# Patient Record
Sex: FEMALE | Race: WHITE | NOT HISPANIC OR LATINO | Employment: OTHER | URBAN - METROPOLITAN AREA
[De-identification: names, ages, dates, MRNs, and addresses within clinical notes are randomized per-mention and may not be internally consistent; named-entity substitution may affect disease eponyms.]

---

## 2021-03-19 ENCOUNTER — TELEPHONE (OUTPATIENT)
Dept: FAMILY MEDICINE CLINIC | Facility: CLINIC | Age: 71
End: 2021-03-19

## 2021-03-19 NOTE — TELEPHONE ENCOUNTER
T/c from pts  - both are being seen as np on 4/20 with Dr Lillie Swanson  Inquiring about scheduling a covid vaccine  Have signed up thru mychart but have not gotten a response  Confirmed with manager that we are not able to schedule for pt until they are established with the practice  Advised pt as such

## 2021-04-15 DIAGNOSIS — Z23 ENCOUNTER FOR IMMUNIZATION: ICD-10-CM

## 2021-04-20 ENCOUNTER — APPOINTMENT (OUTPATIENT)
Dept: LAB | Facility: HOSPITAL | Age: 71
End: 2021-04-20
Payer: MEDICARE

## 2021-04-20 ENCOUNTER — OFFICE VISIT (OUTPATIENT)
Dept: FAMILY MEDICINE CLINIC | Facility: CLINIC | Age: 71
End: 2021-04-20
Payer: MEDICARE

## 2021-04-20 VITALS
WEIGHT: 149.6 LBS | HEIGHT: 63 IN | OXYGEN SATURATION: 90 % | HEART RATE: 109 BPM | BODY MASS INDEX: 26.51 KG/M2 | DIASTOLIC BLOOD PRESSURE: 82 MMHG | SYSTOLIC BLOOD PRESSURE: 128 MMHG

## 2021-04-20 DIAGNOSIS — F41.9 ANXIETY AND DEPRESSION: ICD-10-CM

## 2021-04-20 DIAGNOSIS — Z11.59 SCREENING FOR VIRAL DISEASE: ICD-10-CM

## 2021-04-20 DIAGNOSIS — Z13.29 SCREENING FOR THYROID DISORDER: ICD-10-CM

## 2021-04-20 DIAGNOSIS — F40.298 OTHER SPECIFIED PHOBIA: ICD-10-CM

## 2021-04-20 DIAGNOSIS — F33.9 DEPRESSION, RECURRENT (HCC): ICD-10-CM

## 2021-04-20 DIAGNOSIS — F32.A ANXIETY AND DEPRESSION: ICD-10-CM

## 2021-04-20 DIAGNOSIS — Z13.6 SCREENING FOR CARDIOVASCULAR CONDITION: ICD-10-CM

## 2021-04-20 DIAGNOSIS — F41.3 OTHER MIXED ANXIETY DISORDERS: ICD-10-CM

## 2021-04-20 DIAGNOSIS — F17.200 TOBACCO DEPENDENCE: ICD-10-CM

## 2021-04-20 DIAGNOSIS — Z12.31 ENCOUNTER FOR SCREENING MAMMOGRAM FOR MALIGNANT NEOPLASM OF BREAST: ICD-10-CM

## 2021-04-20 DIAGNOSIS — Z76.89 ENCOUNTER TO ESTABLISH CARE WITH NEW DOCTOR: ICD-10-CM

## 2021-04-20 DIAGNOSIS — Z12.11 SCREENING FOR COLON CANCER: Primary | ICD-10-CM

## 2021-04-20 DIAGNOSIS — Z86.69 HISTORY OF SEIZURE DISORDER: ICD-10-CM

## 2021-04-20 DIAGNOSIS — R44.3 HALLUCINATIONS: ICD-10-CM

## 2021-04-20 LAB
ALBUMIN SERPL BCP-MCNC: 4.2 G/DL (ref 3.5–5)
ALP SERPL-CCNC: 134 U/L (ref 46–116)
ALT SERPL W P-5'-P-CCNC: 158 U/L (ref 12–78)
ANION GAP SERPL CALCULATED.3IONS-SCNC: 11 MMOL/L (ref 4–13)
AST SERPL W P-5'-P-CCNC: 66 U/L (ref 5–45)
BASOPHILS # BLD AUTO: 0.07 THOUSANDS/ΜL (ref 0–0.1)
BASOPHILS NFR BLD AUTO: 1 % (ref 0–1)
BILIRUB SERPL-MCNC: 0.23 MG/DL (ref 0.2–1)
BUN SERPL-MCNC: 22 MG/DL (ref 5–25)
CALCIUM SERPL-MCNC: 9.5 MG/DL (ref 8.3–10.1)
CHLORIDE SERPL-SCNC: 104 MMOL/L (ref 100–108)
CHOLEST SERPL-MCNC: 262 MG/DL (ref 50–200)
CO2 SERPL-SCNC: 26 MMOL/L (ref 21–32)
CREAT SERPL-MCNC: 0.84 MG/DL (ref 0.6–1.3)
EOSINOPHIL # BLD AUTO: 0.1 THOUSAND/ΜL (ref 0–0.61)
EOSINOPHIL NFR BLD AUTO: 1 % (ref 0–6)
ERYTHROCYTE [DISTWIDTH] IN BLOOD BY AUTOMATED COUNT: 13.6 % (ref 11.6–15.1)
EST. AVERAGE GLUCOSE BLD GHB EST-MCNC: 120 MG/DL
GFR SERPL CREATININE-BSD FRML MDRD: 72 ML/MIN/1.73SQ M
GLUCOSE P FAST SERPL-MCNC: 108 MG/DL (ref 65–99)
HBA1C MFR BLD: 5.8 %
HCT VFR BLD AUTO: 43.1 % (ref 34.8–46.1)
HDLC SERPL-MCNC: 45 MG/DL
HGB BLD-MCNC: 14.2 G/DL (ref 11.5–15.4)
IMM GRANULOCYTES # BLD AUTO: 0.03 THOUSAND/UL (ref 0–0.2)
IMM GRANULOCYTES NFR BLD AUTO: 0 % (ref 0–2)
LDLC SERPL CALC-MCNC: 159 MG/DL (ref 0–100)
LYMPHOCYTES # BLD AUTO: 2.3 THOUSANDS/ΜL (ref 0.6–4.47)
LYMPHOCYTES NFR BLD AUTO: 31 % (ref 14–44)
MCH RBC QN AUTO: 32.6 PG (ref 26.8–34.3)
MCHC RBC AUTO-ENTMCNC: 32.9 G/DL (ref 31.4–37.4)
MCV RBC AUTO: 99 FL (ref 82–98)
MONOCYTES # BLD AUTO: 0.43 THOUSAND/ΜL (ref 0.17–1.22)
MONOCYTES NFR BLD AUTO: 6 % (ref 4–12)
NEUTROPHILS # BLD AUTO: 4.45 THOUSANDS/ΜL (ref 1.85–7.62)
NEUTS SEG NFR BLD AUTO: 61 % (ref 43–75)
NONHDLC SERPL-MCNC: 217 MG/DL
NRBC BLD AUTO-RTO: 0 /100 WBCS
PLATELET # BLD AUTO: 391 THOUSANDS/UL (ref 149–390)
PMV BLD AUTO: 10.1 FL (ref 8.9–12.7)
POTASSIUM SERPL-SCNC: 3.8 MMOL/L (ref 3.5–5.3)
PROT SERPL-MCNC: 8.6 G/DL (ref 6.4–8.2)
RBC # BLD AUTO: 4.35 MILLION/UL (ref 3.81–5.12)
SODIUM SERPL-SCNC: 141 MMOL/L (ref 136–145)
TRIGL SERPL-MCNC: 291 MG/DL
TSH SERPL DL<=0.05 MIU/L-ACNC: 1.1 UIU/ML (ref 0.36–3.74)
WBC # BLD AUTO: 7.38 THOUSAND/UL (ref 4.31–10.16)

## 2021-04-20 PROCEDURE — 80053 COMPREHEN METABOLIC PANEL: CPT

## 2021-04-20 PROCEDURE — 84443 ASSAY THYROID STIM HORMONE: CPT

## 2021-04-20 PROCEDURE — 36415 COLL VENOUS BLD VENIPUNCTURE: CPT

## 2021-04-20 PROCEDURE — 83036 HEMOGLOBIN GLYCOSYLATED A1C: CPT

## 2021-04-20 PROCEDURE — 86803 HEPATITIS C AB TEST: CPT

## 2021-04-20 PROCEDURE — 99204 OFFICE O/P NEW MOD 45 MIN: CPT | Performed by: STUDENT IN AN ORGANIZED HEALTH CARE EDUCATION/TRAINING PROGRAM

## 2021-04-20 PROCEDURE — 80061 LIPID PANEL: CPT

## 2021-04-20 PROCEDURE — 85025 COMPLETE CBC W/AUTO DIFF WBC: CPT

## 2021-04-20 RX ORDER — QUETIAPINE FUMARATE 400 MG/1
400 TABLET, FILM COATED ORAL
COMMUNITY
Start: 2021-02-09

## 2021-04-20 RX ORDER — GABAPENTIN 100 MG/1
300 CAPSULE ORAL 3 TIMES DAILY
COMMUNITY
Start: 2021-03-09

## 2021-04-20 RX ORDER — ALPRAZOLAM 2 MG/1
2 TABLET ORAL 4 TIMES DAILY
COMMUNITY
Start: 2021-03-24

## 2021-04-20 RX ORDER — QUETIAPINE FUMARATE 100 MG/1
100 TABLET, FILM COATED ORAL EVERY MORNING
COMMUNITY
Start: 2021-04-07

## 2021-04-20 NOTE — ASSESSMENT & PLAN NOTE
Diet and exercise recommended  Do believe that her psych medications are likely contributing to this

## 2021-04-20 NOTE — PATIENT INSTRUCTIONS
Obesity   AMBULATORY CARE:   Obesity  is when your body mass index (BMI) is greater than 30  Your healthcare provider will use your height and weight to measure your BMI  The risks of obesity include  many health problems, such as injuries or physical disability  You may need tests to check for the following:  · Diabetes    · High blood pressure or high cholesterol    · Heart disease    · Gallbladder or liver disease    · Cancer of the colon, breast, prostate, liver, or kidney    · Sleep apnea    · Arthritis or gout    Seek care immediately if:   · You have a severe headache, confusion, or difficulty speaking  · You have weakness on one side of your body  · You have chest pain, sweating, or shortness of breath  Contact your healthcare provider if:   · You have symptoms of gallbladder or liver disease, such as pain in your upper abdomen  · You have knee or hip pain and discomfort while walking  · You have symptoms of diabetes, such as intense hunger and thirst, and frequent urination  · You have symptoms of sleep apnea, such as snoring or daytime sleepiness  · You have questions or concerns about your condition or care  Treatment for obesity  focuses on helping you lose weight to improve your health  Even a small decrease in BMI can reduce the risk for many health problems  Your healthcare provider will help you set a weight-loss goal   · Lifestyle changes  are the first step in treating obesity  These include making healthy food choices and getting regular physical activity  Your healthcare provider may suggest a weight-loss program that involves coaching, education, and therapy  · Medicine  may help you lose weight when it is used with a healthy diet and physical activity  · Surgery  can help you lose weight if you are very obese and have other health problems  There are several types of weight-loss surgery  Ask your healthcare provider for more information      Be successful losing weight:   · Set small, realistic goals  An example of a small goal is to walk for 20 minutes 5 days a week  Anther goal is to lose 5% of your body weight  · Tell friends, family members, and coworkers about your goals  and ask for their support  Ask a friend to lose weight with you, or join a weight-loss support group  · Identify foods or triggers that may cause you to overeat , and find ways to avoid them  Remove tempting high-calorie foods from your home and workplace  Place a bowl of fresh fruit on your kitchen counter  If stress causes you to eat, then find other ways to cope with stress  · Keep a diary to track what you eat and drink  Also write down how many minutes of physical activity you do each day  Weigh yourself once a week and record it in your diary  Eating changes: You will need to eat 500 to 1,000 fewer calories each day than you currently eat to lose 1 to 2 pounds a week  The following changes will help you cut calories:  · Eat smaller portions  Use small plates, no larger than 9 inches in diameter  Fill your plate half full of fruits and vegetables  Measure your food using measuring cups until you know what a serving size looks like  · Eat 3 meals and 1 or 2 snacks each day  Plan your meals in advance  [facility] and eat at home most of the time  Eat slowly  Do not skip meals  Skipping meals can lead to overeating later in the day  This can make it harder for you to lose weight  Talk with a dietitian to help you make a meal plan and schedule that is right for you  · Eat fruits and vegetables at every meal   They are low in calories and high in fiber, which makes you feel full  Do not add butter, margarine, or cream sauce to vegetables  Use herbs to season steamed vegetables  · Eat less fat and fewer fried foods  Eat more baked or grilled chicken and fish  These protein sources are lower in calories and fat than red meat  Limit fast food   Dress your salads with olive oil and vinegar instead of bottled dressing  · Limit the amount of sugar you eat  Do not drink sugary beverages  Limit alcohol  Activity changes:  Physical activity is good for your body in many ways  It helps you burn calories and build strong muscles  It decreases stress and depression, and improves your mood  It can also help you sleep better  Talk to your healthcare provider before you begin an exercise program   · Exercise for at least 30 minutes 5 days a week  Start slowly  Set aside time each day for physical activity that you enjoy and that is convenient for you  It is best to do both weight training and an activity that increases your heart rate, such as walking, bicycling, or swimming  · Find ways to be more active  Do yard work and housecleaning  Walk up the stairs instead of using elevators  Spend your leisure time going to events that require walking, such as outdoor festivals or fairs  This extra physical activity can help you lose weight and keep it off  Follow up with your healthcare provider as directed: You may need to meet with a dietitian  Write down your questions so you remember to ask them during your visits  © Copyright [address] [place] [address] Drive Information is for End User's use only and may not be sold, redistributed or otherwise used for commercial purposes  All illustrations and images included in CareNotes® are the copyrighted property of A D A M , Inc  or [facility] [place] Pasha Young   The above information is an  only  It is not intended as medical advice for individual conditions or treatments  Talk to your doctor, nurse or pharmacist before following any medical regimen to see if it is safe and effective for you  Weight Management   AMBULATORY CARE:   Why it is important to manage your weight:  Being overweight increases your risk of health conditions such as heart disease, high blood pressure, type 2 diabetes, and certain types of cancer   It can also increase your risk for osteoarthritis, sleep apnea, and other respiratory problems  Aim for a slow, steady weight loss  Even a small amount of weight loss can lower your risk of health problems  How to lose weight safely:  A safe and healthy way to lose weight is to eat fewer calories and get regular exercise  · You can lose up about 1 pound a week by decreasing the number of calories you eat by 500 calories each day  You can decrease calories by eating smaller portion sizes or by cutting out high-calorie foods  Read labels to find out how many calories are in the foods you eat  · You can also burn calories with exercise such as walking, swimming, or biking  You will be more likely to keep weight off if you make these changes part of your lifestyle  Exercise at least 30 minutes per day on most days of the week  You can also fit in more physical activity by taking the stairs instead of the elevator or parking farther away from stores  Ask your healthcare provider about the best exercise plan for you  Healthy meal plan for weight management:  A healthy meal plan includes a variety of foods, contains fewer calories, and helps you stay healthy  A healthy meal plan includes the following:     · Eat whole-grain foods more often  A healthy meal plan should contain fiber  Fiber is the part of grains, fruits, and vegetables that is not broken down by your body  Whole-grain foods are healthy and provide extra fiber in your diet  Some examples of whole-grain foods are whole-wheat breads and pastas, oatmeal, brown rice, and bulgur  · Eat a variety of vegetables every day  Include dark, leafy greens such as spinach, kale, kylee greens, and mustard greens  Eat yellow and orange vegetables such as carrots, sweet potatoes, and winter squash  · Eat a variety of fruits every day  Choose fresh or canned fruit (canned in its own juice or light syrup) instead of juice  Fruit juice has very little or no fiber      · Eat low-fat dairy foods  Drink fat-free (skim) milk or 1% milk  Eat fat-free yogurt and low-fat cottage cheese  Try low-fat cheeses such as mozzarella and other reduced-fat cheeses  · Choose meat and other protein foods that are low in fat  Choose beans or other legumes such as split peas or lentils  Choose fish, skinless poultry (chicken or turkey), or lean cuts of red meat (beef or pork)  Before you cook meat or poultry, cut off any visible fat  · Use less fat and oil  Try baking foods instead of frying them  Add less fat, such as margarine, sour cream, regular salad dressing and mayonnaise to foods  Eat fewer high-fat foods  Some examples of high-fat foods include french fries, doughnuts, ice cream, and cakes  · Eat fewer sweets  Limit foods and drinks that are high in sugar  This includes candy, cookies, regular soda, and sweetened drinks  Ways to decrease calories:   · Eat smaller portions  ? Use a small plate with smaller servings  ? Do not eat second helpings  ? When you eat at a restaurant, ask for a box and place half of your meal in the box before you eat  ? Share an entrée with someone else  · Replace high-calorie snacks with healthy, low-calorie snacks  ? Choose fresh fruit, vegetables, fat-free rice cakes, or air-popped popcorn instead of potato chips, nuts, or chocolate  ? Choose water or calorie-free drinks instead of soda or sweetened drinks  · Do not shop for groceries when you are hungry  You may be more likely to make unhealthy food choices  Take a grocery list of healthy foods and shop after you have eaten  · Eat regular meals  Do not skip meals  Skipping meals can lead to overeating later in the day  This can make it harder for you to lose weight  Eat a healthy snack in place of a meal if you do not have time to eat a regular meal  Talk with a dietitian to help you create a meal plan and schedule that is right for you      Other things to consider as you try to lose weight:   · Be aware of situations that may give you the urge to overeat, such as eating while watching television  Find ways to avoid these situations  For example, read a book, go for a walk, or do crafts  · Meet with a weight loss support group or friends who are also trying to lose weight  This may help you stay motivated to continue working on your weight loss goals  © Copyright [address] Information is for End User's use only and may not be sold, redistributed or otherwise used for commercial purposes  All illustrations and images included in CareNotes® are the copyrighted property of A D A M , Inc  or [facility] Pasha Young   The above information is an  only  It is not intended as medical advice for individual conditions or treatments  Talk to your doctor, nurse or pharmacist before following any medical regimen to see if it is safe and effective for you  Calorie Counting Diet   WHAT YOU NEED TO KNOW:   What is a calorie counting diet? It is a meal plan based on counting calories each day to reach a healthy body weight  You will need to eat fewer calories if you are trying to lose weight  Weight loss may decrease your risk for certain health problems or improve your health if you have health problems  Some of these health problems include heart disease, high blood pressure, and diabetes  What foods should I avoid? Your dietitian will tell you if you need to avoid certain foods based on your body weight and health condition  You may need to avoid high-fat foods if you are at risk for or have heart disease  You may need to eat fewer foods from the breads and starches food group if you have diabetes  How many calories are in foods? The following is a list of foods and drinks with the approximate number of calories in each  Check the food label to find the exact number of calories   A dietitian can tell you how many calories you should have from each food group each day   · Carbohydrate:      ? ½ of a 3-inch bagel, 1 slice of bread, or ½ of a hamburger bun or hot dog bun (80)    ? 1 (8-inch) flour tortilla or ½ cup of cooked rice (100)    ? 1 (6-inch) corn tortilla (80)    ? 1 (6-inch) pancake or 1 cup of bran flakes cereal (110)    ? ½ cup of cooked cereal (80)    ? ½ cup of cooked pasta (85)    ? 1 ounce of pretzels (100)    ? 3 cups of air-popped popcorn without butter or oil (80)    · Dairy:      ? 1 cup of skim or 1% milk (90)    ? 1 cup of 2% milk (120)    ? 1 cup of whole milk (160)    ? 1 cup of 2% chocolate milk (220)    ? 1 ounce of low-fat cheese with 3 grams of fat per ounce (70)    ? 1 ounce of cheddar cheese (114)    ? ½ cup of 1% fat cottage cheese (80)    ? 1 cup of plain or sugar-free, fat-free yogurt (90)    · Protein foods:      ? 3 ounces of fish (not breaded or fried) (95)    ? 3 ounces of breaded, fried fish (195)    ? ¾ cup of tuna canned in water (105)    ? 3 ounces of chicken breast without skin (105)    ? 1 fried chicken breast with skin (350)    ? ¼ cup of fat free egg substitute (40)    ? 1 large egg (75)    ? 3 ounces of lean beef or pork (165)    ? 3 ounces of fried pork chop or ham (185)    ? ½ cup of cooked dried beans, such as kidney, bowden, lentils, or navy (115)    ? 3 ounces of bologna or lunch meat (225)    ? 2 links of breakfast sausage (140)    · Vegetables:      ? ½ cup of sliced mushrooms (10)    ? 1 cup of salad greens, such as lettuce, spinach, or ina (15)    ? ½ cup of steamed asparagus (20)    ? ½ cup of cooked summer squash, zucchini squash, or green or wax beans (25)    ? 1 cup of broccoli or cauliflower florets, or 1 medium tomato (25)    ? 1 large raw carrot or ½ cup of cooked carrots (40)    ? ? of a medium cucumber or 1 stalk of celery (5)    ? 1 small baked potato (160)    ? 1 cup of breaded, fried vegetables (230)    · Fruit:      ? 1 (6-inch) banana (55)     ? ½ of a 4-inch grapefruit (55)    ?  15 grapes (60)    ? 1 medium orange or apple (70)    ? 1 large peach (65)    ? 1 cup of fresh pineapple chunks (75)    ? 1 cup of melon cubes (50)    ? 1¼ cups of whole strawberries (45)    ? ½ cup of fruit canned in juice (55)    ? ½ cup of fruit canned in heavy syrup (110)    ? ? cup of raisins (130)    ? ½ cup of unsweetened fruit juice (60)    ? ½ cup of grape, cranberry, or prune juice (90)    · Fat:      ? 10 peanuts or 2 teaspoons of peanut butter (55)    ? 2 tablespoons of avocado or 1 tablespoon of regular salad dressing (45)    ? 2 slices of castillo (90)    ? 1 teaspoon of oil, such as safflower, canola, corn, or olive oil (45)    ? 2 teaspoons of low-fat margarine, or 1 tablespoon of low-fat mayonnaise (50)    ? 1 teaspoon of regular margarine (40)    ? 1 tablespoon of regular mayonnaise (135)    ? 1 tablespoon of cream cheese or 2 tablespoons of low-fat cream cheese (45)    ? 2 tablespoons of vegetable shortening (215)    · Dessert and sweets:      ? 8 animal crackers or 5 vanilla wafers (80)    ? 1 frozen fruit juice bar (80)    ? ½ cup of ice milk or low-fat frozen yogurt (90)    ? ½ cup of sherbet or sorbet (125)    ? ½ cup of sugar-free pudding or custard (60)    ? ½ cup of ice cream (140)    ? ½ cup of pudding or custard (175)    ? 1 (2-inch) square chocolate brownie (185)    · Combination foods:      ? Bean burrito made with an 8-inch tortilla, without cheese (275)    ? Chicken breast sandwich with lettuce and tomato (325)    ? 1 cup of chicken noodle soup (60)    ? 1 beef taco (175)    ? Regular hamburger with lettuce and tomato (310)    ? Regular cheeseburger with lettuce and tomato (410)     ? ¼ of a 12-inch cheese pizza (280)    ? Fried fish sandwich with lettuce and tomato (425)    ? Hot dog and bun (275)    ? 1½ cups of macaroni and cheese (310)    ? Taco salad with a fried tortilla shell (870)    · Low-calorie foods:      ? 1 tablespoon of ketchup or 1 tablespoon of fat free sour cream (15)    ?  1 teaspoon of mustard (5)    ? ¼ cup of salsa (20)    ? 1 large dill pickle (15)    ? 1 tablespoon of fat free salad dressing (10)    ? 2 teaspoons of low-sugar, light jam or jelly, or 1 tablespoon of sugar-free syrup (15)    ? 1 sugar-free popsicle (15)    ? 1 cup of club soda, seltzer water, or diet soda (0)    CARE AGREEMENT:   You have the right to help plan your care  Discuss treatment options with your healthcare provider to decide what care you want to receive  You always have the right to refuse treatment  The above information is an  only  It is not intended as medical advice for individual conditions or treatments  Talk to your doctor, nurse or pharmacist before following any medical regimen to see if it is safe and effective for you  © Copyright [address] Information is for End User's use only and may not be sold, redistributed or otherwise used for commercial purposes   All illustrations and images included in CareNotes® are the copyrighted property of A D A M , [employer]  or [address] [place] [address]

## 2021-04-20 NOTE — PROGRESS NOTES
Assessment/Plan:         Problem List Items Addressed This Visit        Other    Anxiety and depression     Stable on current medications  Declines any behavioral issues  Follows with dev psych         Relevant Medications    ALPRAZolam (XANAX) 2 MG tablet    QUEtiapine (SEROquel) 400 MG tablet    QUEtiapine (SEROquel) 100 mg tablet    History of seizure disorder     Unclear history? Reports she is on keppra once daily? Would like her to bring any records of this         Depression, recurrent ([address] )     stable         Relevant Medications    ALPRAZolam (XANAX) 2 MG tablet    QUEtiapine (SEROquel) 400 MG tablet    QUEtiapine (SEROquel) 100 mg tablet    BMI 26 0-26 9,adult     Diet and exercise recommended  Do believe that her psych medications are likely contributing to this  Tobacco dependence    Relevant Orders    CT lung screening program    Hallucinations     Hears her mother speaking when she wakes up  Other wise no other hallucinations              Other Visit Diagnoses     Screening for colon cancer    -  Primary    Relevant Orders    Cologuard    Encounter to establish care with new doctor        Screening for thyroid disorder        Relevant Orders    TSH, 3rd generation with Free T4 reflex    HEMOGLOBIN A1C W/ EAG ESTIMATION    Screening for cardiovascular condition        Relevant Orders    CBC and differential    Comprehensive metabolic panel    Lipid panel    TSH, 3rd generation with Free T4 reflex    Other specified phobia         Relevant Orders    CBC and differential    Other mixed anxiety disorders         Relevant Medications    ALPRAZolam (XANAX) 2 MG tablet    QUEtiapine (SEROquel) 400 MG tablet    QUEtiapine (SEROquel) 100 mg tablet    Other Relevant Orders    TSH, 3rd generation with Free T4 reflex    Encounter for screening mammogram for malignant neoplasm of breast        Relevant Orders    Mammo screening bilateral w 3d & cad    Screening for viral disease        Relevant Orders Hepatitis C antibody        No other vaccnes until she is 2 weeks post covid vaccine    Subjective:      Patient ID: Airam Michelle is a 78 y o  female  Hx of psyhciatric issues  Well controlled and follows with dev sanon- Dr Rickie Head  Also reports she takes keppra    Back Pain  This is a new problem  The current episode started more than 1 month ago  The problem occurs intermittently  The problem has been gradually improving since onset  The pain is present in the sacro-iliac  The quality of the pain is described as aching  The pain radiates to the left thigh  The pain is at a severity of 3/10  The pain is mild  The pain is worse during the night  The symptoms are aggravated by lying down and sitting  Stiffness is present at night  Associated symptoms include headaches, leg pain and weakness  Pertinent negatives include no abdominal pain, bladder incontinence, bowel incontinence, chest pain, dysuria, fever, numbness, paresis, paresthesias, pelvic pain, perianal numbness, tingling or weight loss  Risk factors include lack of exercise, menopause, obesity, poor posture and sedentary lifestyle  She has tried NSAIDs for the symptoms  The treatment provided moderate relief  The following portions of the patient's history were reviewed and updated as appropriate:   Past Medical History:  She has no past medical history on file ,  _______________________________________________________________________  Medical Problems:  does not have any pertinent problems on file ,  _______________________________________________________________________  Past Surgical History:   has no past surgical history on file ,  _______________________________________________________________________  Family History:  family history includes Diabetes in her mother; Stroke in her father ,  _______________________________________________________________________  Social History:   reports that she has been smoking   She has been smoking about 0 50 packs per day  She has never used smokeless tobacco  She reports that she does not drink alcohol or use drugs  ,  _______________________________________________________________________  Allergies:  has No Known Allergies     _______________________________________________________________________  Current Outpatient Medications   Medication Sig Dispense Refill    ALPRAZolam (XANAX) 2 MG tablet Take 2 mg by mouth 4 (four) times a day      gabapentin (NEURONTIN) 100 mg capsule TAKE 2 CAPSULE BY MOUTH 3 TIMES A DAY ( 90 DAY SUPPLY)      QUEtiapine (SEROquel) 100 mg tablet Take 100 mg by mouth every morning      QUEtiapine (SEROquel) 400 MG tablet Take 400 mg by mouth daily at bedtime       No current facility-administered medications for this visit       _______________________________________________________________________  Review of Systems   Constitutional: Negative for chills, fatigue, fever and weight loss  HENT: Negative for rhinorrhea and sore throat  Eyes: Negative for visual disturbance  Respiratory: Negative for cough and shortness of breath  Cardiovascular: Negative for chest pain and palpitations  Gastrointestinal: Negative for abdominal pain, bowel incontinence, constipation, diarrhea, nausea and vomiting  Genitourinary: Negative for bladder incontinence, difficulty urinating, dysuria, frequency and pelvic pain  Musculoskeletal: Positive for back pain  Negative for arthralgias and myalgias  Skin: Negative for color change and rash  Neurological: Positive for weakness and headaches  Negative for tingling, numbness and paresthesias  Objective:  Vitals:    04/20/21 1407   BP: 128/82   BP Location: Left arm   Patient Position: Sitting   Cuff Size: Adult   Pulse: (!) 109   SpO2: 90%   Weight: 67 9 kg (149 lb 9 6 oz)   Height: 5' 3" (1 6 m)     Body mass index is 26 5 kg/m²  Physical Exam  Constitutional:       General: She is not in acute distress       Appearance: Normal appearance  She is obese  She is not ill-appearing  HENT:      Head: Normocephalic and atraumatic  Right Ear: External ear normal       Left Ear: External ear normal       Nose: Nose normal  No congestion or rhinorrhea  Mouth/Throat:      Mouth: Mucous membranes are moist       Pharynx: Oropharynx is clear  No oropharyngeal exudate or posterior oropharyngeal erythema  Eyes:      Extraocular Movements: Extraocular movements intact  Conjunctiva/sclera: Conjunctivae normal       Pupils: Pupils are equal, round, and reactive to light  Neck:      Musculoskeletal: Normal range of motion  Cardiovascular:      Rate and Rhythm: Normal rate and regular rhythm  Pulses: Normal pulses  Heart sounds: No murmur  Pulmonary:      Effort: Pulmonary effort is normal  No respiratory distress  Breath sounds: Normal breath sounds  No wheezing  Chest:      Chest wall: No tenderness  Abdominal:      General: Bowel sounds are normal       Palpations: Abdomen is soft  Tenderness: There is no abdominal tenderness  Musculoskeletal: Normal range of motion  Skin:     General: Skin is warm and dry  Capillary Refill: Capillary refill takes less than 2 seconds  Findings: No rash  Neurological:      General: No focal deficit present  Mental Status: She is alert  Mental status is at baseline  Psychiatric:         Mood and Affect: Mood normal          Behavior: Behavior normal          Thought Content: Thought content normal          BMI Counseling: Body mass index is 26 5 kg/m²  The BMI is above normal  Nutrition recommendations include reducing portion sizes, 3-5 servings of fruits/vegetables daily, moderation in carbohydrate intake, increasing intake of lean protein and reducing intake of cholesterol  Exercise recommendations include moderate aerobic physical activity for 150 minutes/week, exercising 3-5 times per week and strength training exercises

## 2021-04-21 ENCOUNTER — TELEPHONE (OUTPATIENT)
Dept: FAMILY MEDICINE CLINIC | Facility: CLINIC | Age: 71
End: 2021-04-21

## 2021-04-21 DIAGNOSIS — R79.89 ELEVATED LFTS: Primary | ICD-10-CM

## 2021-04-21 LAB — HCV AB SER QL: NORMAL

## 2021-06-02 ENCOUNTER — OFFICE VISIT (OUTPATIENT)
Dept: FAMILY MEDICINE CLINIC | Facility: CLINIC | Age: 71
End: 2021-06-02
Payer: MEDICARE

## 2021-06-02 VITALS
WEIGHT: 148.2 LBS | TEMPERATURE: 96.7 F | HEIGHT: 63 IN | BODY MASS INDEX: 26.26 KG/M2 | HEART RATE: 112 BPM | DIASTOLIC BLOOD PRESSURE: 84 MMHG | SYSTOLIC BLOOD PRESSURE: 120 MMHG

## 2021-06-02 DIAGNOSIS — F33.9 DEPRESSION, RECURRENT (HCC): ICD-10-CM

## 2021-06-02 DIAGNOSIS — F32.A ANXIETY AND DEPRESSION: ICD-10-CM

## 2021-06-02 DIAGNOSIS — F17.200 TOBACCO DEPENDENCE: ICD-10-CM

## 2021-06-02 DIAGNOSIS — Z00.00 MEDICARE ANNUAL WELLNESS VISIT, INITIAL: ICD-10-CM

## 2021-06-02 DIAGNOSIS — R79.89 ELEVATED LFTS: ICD-10-CM

## 2021-06-02 DIAGNOSIS — F41.9 ANXIETY AND DEPRESSION: ICD-10-CM

## 2021-06-02 PROCEDURE — 1123F ACP DISCUSS/DSCN MKR DOCD: CPT | Performed by: STUDENT IN AN ORGANIZED HEALTH CARE EDUCATION/TRAINING PROGRAM

## 2021-06-02 PROCEDURE — 99214 OFFICE O/P EST MOD 30 MIN: CPT | Performed by: STUDENT IN AN ORGANIZED HEALTH CARE EDUCATION/TRAINING PROGRAM

## 2021-06-02 PROCEDURE — G0438 PPPS, INITIAL VISIT: HCPCS | Performed by: STUDENT IN AN ORGANIZED HEALTH CARE EDUCATION/TRAINING PROGRAM

## 2021-06-02 RX ORDER — MULTIVITAMIN
1 TABLET ORAL DAILY
COMMUNITY

## 2021-06-02 RX ORDER — LANOLIN ALCOHOL/MO/W.PET/CERES
CREAM (GRAM) TOPICAL DAILY
COMMUNITY

## 2021-06-02 NOTE — PATIENT INSTRUCTIONS
Medicare Preventive Visit Patient Instructions  Thank you for completing your Welcome to Medicare Visit or Medicare Annual Wellness Visit today  Your next wellness visit will be due in one year (6/3/2022)  The screening/preventive services that you may require over the next 5-10 years are detailed below  Some tests may not apply to you based off risk factors and/or age  Screening tests ordered at today's visit but not completed yet may show as past due  Also, please note that scanned in results may not display below  Preventive Screenings:  Service Recommendations Previous Testing/Comments   Colorectal Cancer Screening  * Colonoscopy    * Fecal Occult Blood Test (FOBT)/Fecal Immunochemical Test (FIT)  * Fecal DNA/Cologuard Test  * Flexible Sigmoidoscopy Age: 54-65 years old   Colonoscopy: every 10 years (may be performed more frequently if at higher risk)  OR  FOBT/FIT: every 1 year  OR  Cologuard: every 3 years  OR  Sigmoidoscopy: every 5 years  Screening may be recommended earlier than age 48 if at higher risk for colorectal cancer  Also, an individualized decision between you and your healthcare provider will decide whether screening between the ages of 74-80 would be appropriate  Colonoscopy: Not on file  FOBT/FIT: Not on file  Cologuard: 04/26/2021  Sigmoidoscopy: Not on file    Screening Current     Breast Cancer Screening Age: 36 years old  Frequency: every 1-2 years  Not required if history of left and right mastectomy Mammogram: Not on file        Cervical Cancer Screening Between the ages of 21-29, pap smear recommended once every 3 years  Between the ages of 33-67, can perform pap smear with HPV co-testing every 5 years     Recommendations may differ for women with a history of total hysterectomy, cervical cancer, or abnormal pap smears in past  Pap Smear: Not on file    Screening Not Indicated   Hepatitis C Screening Once for adults born between 1945 and 1965  More frequently in patients at high risk for Hepatitis C Hep C Antibody: 04/20/2021    Screening Current   Diabetes Screening 1-2 times per year if you're at risk for diabetes or have pre-diabetes Fasting glucose: 108 mg/dL   A1C: 5 8 %    Screening Current   Cholesterol Screening Once every 5 years if you don't have a lipid disorder  May order more often based on risk factors  Lipid panel: 04/20/2021    Screening Current     Other Preventive Screenings Covered by Medicare:  1  Abdominal Aortic Aneurysm (AAA) Screening: covered once if your at risk  You're considered to be at risk if you have a family history of AAA  2  Lung Cancer Screening: covers low dose CT scan once per year if you meet all of the following conditions: (1) Age 50-69; (2) No signs or symptoms of lung cancer; (3) Current smoker or have quit smoking within the last 15 years; (4) You have a tobacco smoking history of at least 30 pack years (packs per day multiplied by number of years you smoked); (5) You get a written order from a healthcare provider  3  Glaucoma Screening: covered annually if you're considered high risk: (1) You have diabetes OR (2) Family history of glaucoma OR (3)  aged 48 and older OR (3)  American aged 72 and older  3  Osteoporosis Screening: covered every 2 years if you meet one of the following conditions: (1) You're estrogen deficient and at risk for osteoporosis based off medical history and other findings; (2) Have a vertebral abnormality; (3) On glucocorticoid therapy for more than 3 months; (4) Have primary hyperparathyroidism; (5) On osteoporosis medications and need to assess response to drug therapy  · Last bone density test (DXA Scan): Not on file  5  HIV Screening: covered annually if you're between the age of 12-76  Also covered annually if you are younger than 13 and older than 72 with risk factors for HIV infection  For pregnant patients, it is covered up to 3 times per pregnancy      Immunizations:  Immunization Recommendations   Influenza Vaccine Annual influenza vaccination during flu season is recommended for all persons aged >= 6 months who do not have contraindications   Pneumococcal Vaccine (Prevnar and Pneumovax)  * Prevnar = PCV13  * Pneumovax = PPSV23   Adults 25-60 years old: 1-3 doses may be recommended based on certain risk factors  Adults 72 years old: Prevnar (PCV13) vaccine recommended followed by Pneumovax (PPSV23) vaccine  If already received PPSV23 since turning 65, then PCV13 recommended at least one year after PPSV23 dose  Hepatitis B Vaccine 3 dose series if at intermediate or high risk (ex: diabetes, end stage renal disease, liver disease)   Tetanus (Td) Vaccine - COST NOT COVERED BY MEDICARE PART B Following completion of primary series, a booster dose should be given every 10 years to maintain immunity against tetanus  Td may also be given as tetanus wound prophylaxis  Tdap Vaccine - COST NOT COVERED BY MEDICARE PART B Recommended at least once for all adults  For pregnant patients, recommended with each pregnancy  Shingles Vaccine (Shingrix) - COST NOT COVERED BY MEDICARE PART B  2 shot series recommended in those aged 48 and above     Health Maintenance Due:      Topic Date Due    MAMMOGRAM  Never done    Colorectal Cancer Screening  04/26/2024    Hepatitis C Screening  Completed     Immunizations Due:      Topic Date Due    DTaP,Tdap,and Td Vaccines (1 - Tdap) Never done    Pneumococcal Vaccine: 65+ Years (1 of 1 - PPSV23) Never done     Advance Directives   What are advance directives? Advance directives are legal documents that state your wishes and plans for medical care  These plans are made ahead of time in case you lose your ability to make decisions for yourself  Advance directives can apply to any medical decision, such as the treatments you want, and if you want to donate organs  What are the types of advance directives?   There are many types of advance directives, and each state has rules about how to use them  You may choose a combination of any of the following:  · Living will: This is a written record of the treatment you want  You can also choose which treatments you do not want, which to limit, and which to stop at a certain time  This includes surgery, medicine, IV fluid, and tube feedings  · Durable power of  for healthcare [facility]): This is a written record that states who you want to make healthcare choices for you when you are unable to make them for yourself  This person, called a proxy, is usually a family member or a friend  You may choose more than 1 proxy  · Do not resuscitate (DNR) order:  A DNR order is used in case your heart stops beating or you stop breathing  It is a request not to have certain forms of treatment, such as CPR  A DNR order may be included in other types of advance directives  · Medical directive: This covers the care that you want if you are in a coma, near death, or unable to make decisions for yourself  You can list the treatments you want for each condition  Treatment may include pain medicine, surgery, blood transfusions, dialysis, IV or tube feedings, and a ventilator (breathing machine)  · Values history: This document has questions about your views, beliefs, and how you feel and think about life  This information can help others choose the care that you would choose  Why are advance directives important? An advance directive helps you control your care  Although spoken wishes may be used, it is better to have your wishes written down  Spoken wishes can be misunderstood, or not followed  Treatments may be given even if you do not want them  An advance directive may make it easier for your family to make difficult choices about your care  Cigarette Smoking and Your Health   Risks to your health if you smoke:  Nicotine and other chemicals found in tobacco damage every cell in your body   Even if you are a light smoker, you have an increased risk for cancer, heart disease, and lung disease  If you are pregnant or have diabetes, smoking increases your risk for complications  Benefits to your health if you stop smoking:   · You decrease respiratory symptoms such as coughing, wheezing, and shortness of breath  · You reduce your risk for cancers of the lung, mouth, throat, kidney, bladder, pancreas, stomach, and cervix  If you already have cancer, you increase the benefits of chemotherapy  You also reduce your risk for cancer returning or a second cancer from developing  · You reduce your risk for heart disease, blood clots, heart attack, and stroke  · You reduce your risk for lung infections, and diseases such as pneumonia, asthma, chronic bronchitis, and emphysema  · Your circulation improves  More oxygen can be delivered to your body  If you have diabetes, you lower your risk for complications, such as kidney, artery, and eye diseases  You also lower your risk for nerve damage  Nerve damage can lead to amputations, poor vision, and blindness  · You improve your body's ability to heal and to fight infections  For more information and support to stop smoking:   · [employer]  Phone: 4- 025 - 209-1048  Web Address: www [employer]  Weight Management   Why it is important to manage your weight:  Being overweight increases your risk of health conditions such as heart disease, high blood pressure, type 2 diabetes, and certain types of cancer  It can also increase your risk for osteoarthritis, sleep apnea, and other respiratory problems  Aim for a slow, steady weight loss  Even a small amount of weight loss can lower your risk of health problems  How to lose weight safely:  A safe and healthy way to lose weight is to eat fewer calories and get regular exercise  You can lose up about 1 pound a week by decreasing the number of calories you eat by 500 calories each day     Healthy meal plan for weight management:  A healthy meal plan includes a variety of foods, contains fewer calories, and helps you stay healthy  A healthy meal plan includes the following:  · Eat whole-grain foods more often  A healthy meal plan should contain fiber  Fiber is the part of grains, fruits, and vegetables that is not broken down by your body  Whole-grain foods are healthy and provide extra fiber in your diet  Some examples of whole-grain foods are whole-wheat breads and pastas, oatmeal, brown rice, and bulgur  · Eat a variety of vegetables every day  Include dark, leafy greens such as spinach, kale, kylee greens, and mustard greens  Eat yellow and orange vegetables such as carrots, sweet potatoes, and winter squash  · Eat a variety of fruits every day  Choose fresh or canned fruit (canned in its own juice or light syrup) instead of juice  Fruit juice has very little or no fiber  · Eat low-fat dairy foods  Drink fat-free (skim) milk or 1% milk  Eat fat-free yogurt and low-fat cottage cheese  Try low-fat cheeses such as mozzarella and other reduced-fat cheeses  · Choose meat and other protein foods that are low in fat  Choose beans or other legumes such as split peas or lentils  Choose fish, skinless poultry (chicken or turkey), or lean cuts of red meat (beef or pork)  Before you cook meat or poultry, cut off any visible fat  · Use less fat and oil  Try baking foods instead of frying them  Add less fat, such as margarine, sour cream, regular salad dressing and mayonnaise to foods  Eat fewer high-fat foods  Some examples of high-fat foods include french fries, doughnuts, ice cream, and cakes  · Eat fewer sweets  Limit foods and drinks that are high in sugar  This includes candy, cookies, regular soda, and sweetened drinks  Exercise:  Exercise at least 30 minutes per day on most days of the week  Some examples of exercise include walking, biking, dancing, and swimming   You can also fit in more physical activity by taking the stairs instead of the elevator or parking farther away from stores  Ask your healthcare provider about the best exercise plan for you  © Copyright [employer] 2018 Information is for End User's use only and may not be sold, redistributed or otherwise used for commercial purposes  All illustrations and images included in CareNotes® are the copyrighted property of A D A M [employer]  or Connor Young     Obesity   AMBULATORY CARE:   Obesity  is when your body mass index (BMI) is greater than 30  Your healthcare provider will use your height and weight to measure your BMI  The risks of obesity include  many health problems, such as injuries or physical disability  You may need tests to check for the following:  · Diabetes    · High blood pressure or high cholesterol    · Heart disease    · Gallbladder or liver disease    · Cancer of the colon, breast, prostate, liver, or kidney    · Sleep apnea    · Arthritis or gout    Seek care immediately if:   · You have a severe headache, confusion, or difficulty speaking  · You have weakness on one side of your body  · You have chest pain, sweating, or shortness of breath  Contact your healthcare provider if:   · You have symptoms of gallbladder or liver disease, such as pain in your upper abdomen  · You have knee or hip pain and discomfort while walking  · You have symptoms of diabetes, such as intense hunger and thirst, and frequent urination  · You have symptoms of sleep apnea, such as snoring or daytime sleepiness  · You have questions or concerns about your condition or care  Treatment for obesity  focuses on helping you lose weight to improve your health  Even a small decrease in BMI can reduce the risk for many health problems  Your healthcare provider will help you set a weight-loss goal   · Lifestyle changes  are the first step in treating obesity  These include making healthy food choices and getting regular physical activity   Your healthcare provider may suggest a weight-loss program that involves coaching, education, and therapy  · Medicine  may help you lose weight when it is used with a healthy diet and physical activity  · Surgery  can help you lose weight if you are very obese and have other health problems  There are several types of weight-loss surgery  Ask your healthcare provider for more information  Be successful losing weight:   · Set small, realistic goals  An example of a small goal is to walk for 20 minutes 5 days a week  Anther goal is to lose 5% of your body weight  · Tell friends, family members, and coworkers about your goals  and ask for their support  Ask a friend to lose weight with you, or join a weight-loss support group  · Identify foods or triggers that may cause you to overeat , and find ways to avoid them  Remove tempting high-calorie foods from your home and workplace  Place a bowl of fresh fruit on your kitchen counter  If stress causes you to eat, then find other ways to cope with stress  · Keep a diary to track what you eat and drink  Also write down how many minutes of physical activity you do each day  Weigh yourself once a week and record it in your diary  Eating changes: You will need to eat 500 to 1,000 fewer calories each day than you currently eat to lose 1 to 2 pounds a week  The following changes will help you cut calories:  · Eat smaller portions  Use small plates, no larger than 9 inches in diameter  Fill your plate half full of fruits and vegetables  Measure your food using measuring cups until you know what a serving size looks like  · Eat 3 meals and 1 or 2 snacks each day  Plan your meals in advance  Esperanza Boucher and eat at home most of the time  Eat slowly  Do not skip meals  Skipping meals can lead to overeating later in the day  This can make it harder for you to lose weight  Talk with a dietitian to help you make a meal plan and schedule that is right for you      · Eat fruits and vegetables at every meal   They are low in calories and high in fiber, which makes you feel full  Do not add butter, margarine, or cream sauce to vegetables  Use herbs to season steamed vegetables  · Eat less fat and fewer fried foods  Eat more baked or grilled chicken and fish  These protein sources are lower in calories and fat than red meat  Limit fast food  Dress your salads with olive oil and vinegar instead of bottled dressing  · Limit the amount of sugar you eat  Do not drink sugary beverages  Limit alcohol  Activity changes:  Physical activity is good for your body in many ways  It helps you burn calories and build strong muscles  It decreases stress and depression, and improves your mood  It can also help you sleep better  Talk to your healthcare provider before you begin an exercise program   · Exercise for at least 30 minutes 5 days a week  Start slowly  Set aside time each day for physical activity that you enjoy and that is convenient for you  It is best to do both weight training and an activity that increases your heart rate, such as walking, bicycling, or swimming  · Find ways to be more active  Do yard work and housecleaning  Walk up the stairs instead of using elevators  Spend your leisure time going to events that require walking, such as outdoor festivals or fairs  This extra physical activity can help you lose weight and keep it off  Follow up with your healthcare provider as directed: You may need to meet with a dietitian  Write down your questions so you remember to ask them during your visits  © Copyright [address] Information is for End User's use only and may not be sold, redistributed or otherwise used for commercial purposes  All illustrations and images included in CareNotes® are the copyrighted property of A D A M , Inc  or [facility] Pasha Young   The above information is an  only   It is not intended as medical advice for individual conditions or treatments  Talk to your doctor, nurse or pharmacist before following any medical regimen to see if it is safe and effective for you  Low Fat Diet   AMBULATORY CARE:   A low-fat diet  is an eating plan that is low in total fat, unhealthy fat, and cholesterol  You may need to follow a low-fat diet if you have trouble digesting or absorbing fat  You may also need to follow this diet if you have high cholesterol  You can also lower your cholesterol by increasing the amount of fiber in your diet  Soluble fiber is a type of fiber that helps to decrease cholesterol levels  Different types of fat in food:   · Limit unhealthy fats  A diet that is high in cholesterol, saturated fat, and trans fat may cause unhealthy cholesterol levels  Unhealthy cholesterol levels increase your risk of heart disease  ? Cholesterol:  Limit intake of cholesterol to less than 200 mg per day  Cholesterol is found in meat, eggs, and dairy  ? Saturated fat:  Limit saturated fat to less than 7% of your total daily calories  Ask your dietitian how many calories you need each day  Saturated fat is found in butter, cheese, ice cream, whole milk, and palm oil  Saturated fat is also found in meat, such as beef, pork, chicken skin, and processed meats  Processed meats include sausage, hot dogs, and bologna  ? Trans fat:  Avoid trans fat as much as possible  Trans fat is used in fried and baked foods  Foods that say trans fat free on the label may still have up to 0 5 grams of trans fat per serving  · Include healthy fats  Replace foods that are high in saturated and trans fat with foods high in healthy fats  This may help to decrease high cholesterol levels  ? Monounsaturated fats: These are found in avocados, nuts, and vegetable oils, such as olive, canola, and sunflower oil  ? Polyunsaturated fats: These can be found in vegetable oils, such as soybean or corn oil  Omega-3 fats can help to decrease the risk of heart disease  Omega-3 fats are found in fish, such as salmon, herring, trout, and tuna  Omega-3 fats can also be found in plant foods, such as walnuts, flaxseed, soybeans, and canola oil  Foods to limit or avoid:   · Grains:      ? Snacks that are made with partially hydrogenated oils, such as chips, regular crackers, and butter-flavored popcorn    ? High-fat baked goods, such as biscuits, croissants, doughnuts, pies, cookies, and pastries    · Dairy:      ? Whole milk, 2% milk, and yogurt and ice cream made with whole milk    ? Half and half creamer, heavy cream, and whipping cream    ? Cheese, cream cheese, and sour cream    · Meats and proteins:      ? High-fat cuts of meat (T-bone steak, regular hamburger, and ribs)    ? Fried meat, poultry (turkey and chicken), and fish    ? Poultry (chicken and turkey) with skin    ? Cold cuts (salami or bologna), hot dogs, castillo, and sausage    ? Whole eggs and egg yolks    · Vegetables and fruits with added fat:      ? Fried vegetables or vegetables in butter or high-fat sauces, such as cream or cheese sauces    ? Fried fruit or fruit served with butter or cream    · Fats:      ? Butter, stick margarine, and shortening    ? Coconut, palm oil, and palm kernel oil    Foods to include:   · Grains:      ? Whole-grain breads, cereals, pasta, and brown rice    ? Low-fat crackers and pretzels    · Vegetables and fruits:      ? Fresh, frozen, or canned vegetables (no salt or low-sodium)    ? Fresh, frozen, dried, or canned fruit (canned in light syrup or fruit juice)    ? Avocado    · Low-fat dairy products:      ? Nonfat (skim) or 1% milk    ? Nonfat or low-fat cheese, yogurt, and cottage cheese    · Meats and proteins:      ? Chicken or turkey with no skin    ? Baked or broiled fish    ? Lean beef and pork (loin, round, extra lean hamburger)    ? Beans and peas, unsalted nuts, soy products    ? Egg whites and substitutes    ?  Seeds and nuts    · Fats:      ? Unsaturated oil, such as canola, olive, peanut, soybean, or sunflower oil    ? Soft or liquid margarine and vegetable oil spread    ? Low-fat salad dressing    Other ways to decrease fat:   · Read food labels before you buy foods  Choose foods that have less than 30% of calories from fat  Choose low-fat or fat-free dairy products  Remember that fat free does not mean calorie free  These foods still contain calories, and too many calories can lead to weight gain  · Trim fat from meat and avoid fried food  Trim all visible fat from meat before you cook it  Remove the skin from poultry  Do not horton meat, fish, or poultry  Bake, roast, boil, or broil these foods instead  Avoid fried foods  Eat a baked potato instead of [place] fries  Steam vegetables instead of sautéing them in butter  · Add less fat to foods  Use imitation castillo bits on salads and baked potatoes instead of regular castillo bits  Use fat-free or low-fat salad dressings instead of regular dressings  Use low-fat or nonfat butter-flavored topping instead of regular butter or margarine on popcorn and other foods  Ways to decrease fat in recipes:  Replace high-fat ingredients with low-fat or nonfat ones  This may cause baked goods to be drier than usual  You may need to use nonfat cooking spray on pans to prevent food from sticking  You also may need to change the amount of other ingredients, such as water, in the recipe  Try the following:  · Use low-fat or light margarine instead of regular margarine or shortening  · Use lean ground turkey breast or chicken, or lean ground beef (less than 5% fat) instead of hamburger  · Add 1 teaspoon of canola oil to 8 ounces of skim milk instead of using cream or half and half  · Use grated zucchini, carrots, or apples in breads instead of coconut  · Use blenderized, low-fat cottage cheese, plain tofu, or low-fat ricotta cheese instead of cream cheese       · Use 1 egg white and 1 teaspoon of canola oil, or use ¼ cup (2 ounces) of fat-free egg substitute instead of a whole egg  · Replace half of the oil that is called for in a recipe with applesauce when you bake  Use 3 tablespoons of cocoa powder and 1 tablespoon of canola oil instead of a square of baking chocolate  How to increase fiber:  Eat enough high-fiber foods to get 20 to 30 grams of fiber every day  Slowly increase your fiber intake to avoid stomach cramps, gas, and other problems  · Eat 3 ounces of whole-grain foods each day  An ounce is about 1 slice of bread  Eat whole-grain breads, such as whole-wheat bread  Whole wheat, whole-wheat flour, or other whole grains should be listed as the first ingredient on the food label  Replace white flour with whole-grain flour or use half of each in recipes  Whole-grain flour is heavier than white flour, so you may have to add more yeast or baking powder  · Eat a high-fiber cereal for breakfast   Oatmeal is a good source of soluble fiber  Look for cereals that have bran or fiber in the name  Choose whole-grain products, such as brown rice, barley, and whole-wheat pasta  · Eat more beans, peas, and lentils  For example, add beans to soups or salads  Eat at least 5 cups of fruits and vegetables each day  Eat fruits and vegetables with the peel because the peel is high in fiber  © Copyright [address] Information is for End User's use only and may not be sold, redistributed or otherwise used for commercial purposes  All illustrations and images included in CareNotes® are the copyrighted property of [employer]  or [address] [place], [place] [address]mar[place]   The above information is an  only  It is not intended as medical advice for individual conditions or treatments  Talk to your doctor, nurse or pharmacist before following any medical regimen to see if it is safe and effective for you      Medicare Preventive Visit Patient Instructions  Thank you for completing your Welcome to Medicare Visit or Medicare Annual Wellness Visit today  Your next wellness visit will be due in one year (6/3/2022)  The screening/preventive services that you may require over the next 5-10 years are detailed below  Some tests may not apply to you based off risk factors and/or age  Screening tests ordered at today's visit but not completed yet may show as past due  Also, please note that scanned in results may not display below  Preventive Screenings:  Service Recommendations Previous Testing/Comments   Colorectal Cancer Screening  * Colonoscopy    * Fecal Occult Blood Test (FOBT)/Fecal Immunochemical Test (FIT)  * Fecal DNA/Cologuard Test  * Flexible Sigmoidoscopy Age: 54-65 years old   Colonoscopy: every 10 years (may be performed more frequently if at higher risk)  OR  FOBT/FIT: every 1 year  OR  Cologuard: every 3 years  OR  Sigmoidoscopy: every 5 years  Screening may be recommended earlier than age 48 if at higher risk for colorectal cancer  Also, an individualized decision between you and your healthcare provider will decide whether screening between the ages of 74-80 would be appropriate  Colonoscopy: Not on file  FOBT/FIT: Not on file  Cologuard: 04/26/2021  Sigmoidoscopy: Not on file    Screening Current     Breast Cancer Screening Age: 36 years old  Frequency: every 1-2 years  Not required if history of left and right mastectomy Mammogram: Not on file        Cervical Cancer Screening Between the ages of 21-29, pap smear recommended once every 3 years  Between the ages of 33-67, can perform pap smear with HPV co-testing every 5 years     Recommendations may differ for women with a history of total hysterectomy, cervical cancer, or abnormal pap smears in past  Pap Smear: Not on file    Screening Not Indicated   Hepatitis C Screening Once for adults born between 1945 and 1965  More frequently in patients at high risk for Hepatitis C Hep C Antibody: 04/20/2021    Screening Current   Diabetes Screening 1-2 times per year if you're at risk for diabetes or have pre-diabetes Fasting glucose: 108 mg/dL   A1C: 5 8 %    Screening Current   Cholesterol Screening Once every 5 years if you don't have a lipid disorder  May order more often based on risk factors  Lipid panel: 04/20/2021    Screening Current     Other Preventive Screenings Covered by Medicare:  6  Abdominal Aortic Aneurysm (AAA) Screening: covered once if your at risk  You're considered to be at risk if you have a family history of AAA  7  Lung Cancer Screening: covers low dose CT scan once per year if you meet all of the following conditions: (1) Age 50-69; (2) No signs or symptoms of lung cancer; (3) Current smoker or have quit smoking within the last 15 years; (4) You have a tobacco smoking history of at least 30 pack years (packs per day multiplied by number of years you smoked); (5) You get a written order from a healthcare provider  8  Glaucoma Screening: covered annually if you're considered high risk: (1) You have diabetes OR (2) Family history of glaucoma OR (3)  aged 48 and older OR (3)  American aged 72 and older  5  Osteoporosis Screening: covered every 2 years if you meet one of the following conditions: (1) You're estrogen deficient and at risk for osteoporosis based off medical history and other findings; (2) Have a vertebral abnormality; (3) On glucocorticoid therapy for more than 3 months; (4) Have primary hyperparathyroidism; (5) On osteoporosis medications and need to assess response to drug therapy  · Last bone density test (DXA Scan): Not on file  10  HIV Screening: covered annually if you're between the age of 12-76  Also covered annually if you are younger than 13 and older than 72 with risk factors for HIV infection  For pregnant patients, it is covered up to 3 times per pregnancy      Immunizations:  Immunization Recommendations   Influenza Vaccine Annual influenza vaccination during flu season is recommended for all persons aged >= 6 months who do not have contraindications   Pneumococcal Vaccine (Prevnar and Pneumovax)  * Prevnar = PCV13  * Pneumovax = PPSV23   Adults 25-60 years old: 1-3 doses may be recommended based on certain risk factors  Adults 72 years old: Prevnar (PCV13) vaccine recommended followed by Pneumovax (PPSV23) vaccine  If already received PPSV23 since turning 65, then PCV13 recommended at least one year after PPSV23 dose  Hepatitis B Vaccine 3 dose series if at intermediate or high risk (ex: diabetes, end stage renal disease, liver disease)   Tetanus (Td) Vaccine - COST NOT COVERED BY MEDICARE PART B Following completion of primary series, a booster dose should be given every 10 years to maintain immunity against tetanus  Td may also be given as tetanus wound prophylaxis  Tdap Vaccine - COST NOT COVERED BY MEDICARE PART B Recommended at least once for all adults  For pregnant patients, recommended with each pregnancy  Shingles Vaccine (Shingrix) - COST NOT COVERED BY MEDICARE PART B  2 shot series recommended in those aged 48 and above     Health Maintenance Due:      Topic Date Due    MAMMOGRAM  Never done    Colorectal Cancer Screening  04/26/2024    Hepatitis C Screening  Completed     Immunizations Due:      Topic Date Due    DTaP,Tdap,and Td Vaccines (1 - Tdap) Never done    Pneumococcal Vaccine: 65+ Years (1 of 1 - PPSV23) Never done     Advance Directives   What are advance directives? Advance directives are legal documents that state your wishes and plans for medical care  These plans are made ahead of time in case you lose your ability to make decisions for yourself  Advance directives can apply to any medical decision, such as the treatments you want, and if you want to donate organs  What are the types of advance directives? There are many types of advance directives, and each state has rules about how to use them  You may choose a combination of any of the following:  · Living will:   This is a written record of the treatment you want  You can also choose which treatments you do not want, which to limit, and which to stop at a certain time  This includes surgery, medicine, IV fluid, and tube feedings  · Durable power of  for healthcare [place] [facility]): This is a written record that states who you want to make healthcare choices for you when you are unable to make them for yourself  This person, called a proxy, is usually a family member or a friend  You may choose more than 1 proxy  · Do not resuscitate (DNR) order:  A DNR order is used in case your heart stops beating or you stop breathing  It is a request not to have certain forms of treatment, such as CPR  A DNR order may be included in other types of advance directives  · Medical directive: This covers the care that you want if you are in a coma, near death, or unable to make decisions for yourself  You can list the treatments you want for each condition  Treatment may include pain medicine, surgery, blood transfusions, dialysis, IV or tube feedings, and a ventilator (breathing machine)  · Values history: This document has questions about your views, beliefs, and how you feel and think about life  This information can help others choose the care that you would choose  Why are advance directives important? An advance directive helps you control your care  Although spoken wishes may be used, it is better to have your wishes written down  Spoken wishes can be misunderstood, or not followed  Treatments may be given even if you do not want them  An advance directive may make it easier for your family to make difficult choices about your care  Cigarette Smoking and Your Health   Risks to your health if you smoke:  Nicotine and other chemicals found in tobacco damage every cell in your body  Even if you are a light smoker, you have an increased risk for cancer, heart disease, and lung disease   If you are pregnant or have diabetes, smoking increases your risk for complications  Benefits to your health if you stop smoking:   · You decrease respiratory symptoms such as coughing, wheezing, and shortness of breath  · You reduce your risk for cancers of the lung, mouth, throat, kidney, bladder, pancreas, stomach, and cervix  If you already have cancer, you increase the benefits of chemotherapy  You also reduce your risk for cancer returning or a second cancer from developing  · You reduce your risk for heart disease, blood clots, heart attack, and stroke  · You reduce your risk for lung infections, and diseases such as pneumonia, asthma, chronic bronchitis, and emphysema  · Your circulation improves  More oxygen can be delivered to your body  If you have diabetes, you lower your risk for complications, such as kidney, artery, and eye diseases  You also lower your risk for nerve damage  Nerve damage can lead to amputations, poor vision, and blindness  · You improve your body's ability to heal and to fight infections  For more information and support to stop smoking:   · [employer]  Phone: 9- [phone]  Web Address: [employer]  Weight Management   Why it is important to manage your weight:  Being overweight increases your risk of health conditions such as heart disease, high blood pressure, type 2 diabetes, and certain types of cancer  It can also increase your risk for osteoarthritis, sleep apnea, and other respiratory problems  Aim for a slow, steady weight loss  Even a small amount of weight loss can lower your risk of health problems  How to lose weight safely:  A safe and healthy way to lose weight is to eat fewer calories and get regular exercise  You can lose up about 1 pound a week by decreasing the number of calories you eat by 500 calories each day  Healthy meal plan for weight management:  A healthy meal plan includes a variety of foods, contains fewer calories, and helps you stay healthy   A healthy meal plan includes the following:  · Eat whole-grain foods more often  A healthy meal plan should contain fiber  Fiber is the part of grains, fruits, and vegetables that is not broken down by your body  Whole-grain foods are healthy and provide extra fiber in your diet  Some examples of whole-grain foods are whole-wheat breads and pastas, oatmeal, brown rice, and bulgur  · Eat a variety of vegetables every day  Include dark, leafy greens such as spinach, kale, kylee greens, and mustard greens  Eat yellow and orange vegetables such as carrots, sweet potatoes, and winter squash  · Eat a variety of fruits every day  Choose fresh or canned fruit (canned in its own juice or light syrup) instead of juice  Fruit juice has very little or no fiber  · Eat low-fat dairy foods  Drink fat-free (skim) milk or 1% milk  Eat fat-free yogurt and low-fat cottage cheese  Try low-fat cheeses such as mozzarella and other reduced-fat cheeses  · Choose meat and other protein foods that are low in fat  Choose beans or other legumes such as split peas or lentils  Choose fish, skinless poultry (chicken or turkey), or lean cuts of red meat (beef or pork)  Before you cook meat or poultry, cut off any visible fat  · Use less fat and oil  Try baking foods instead of frying them  Add less fat, such as margarine, sour cream, regular salad dressing and mayonnaise to foods  Eat fewer high-fat foods  Some examples of high-fat foods include french fries, doughnuts, ice cream, and cakes  · Eat fewer sweets  Limit foods and drinks that are high in sugar  This includes candy, cookies, regular soda, and sweetened drinks  Exercise:  Exercise at least 30 minutes per day on most days of the week  Some examples of exercise include walking, biking, dancing, and swimming  You can also fit in more physical activity by taking the stairs instead of the elevator or parking farther away from stores  Ask your healthcare provider about the best exercise plan for you        © Copyright [employer] 2018 Information is for [employer] use only and may not be sold, redistributed or otherwise used for commercial purposes   All illustrations and images included in CareNotes® are the copyrighted property of A [employer] A M , Inc  or [facility] Pasha Fink

## 2021-06-02 NOTE — PROGRESS NOTES
Assessment/Plan:         Problem List Items Addressed This Visit        Other    Anxiety and depression     Follows with [facility], well controlled  Continue current regiment         Depression, recurrent ([address] )     Follows with [facility], well controlled  Continue current regiment         BMI 26 0-26 9,adult - Primary     Complicated by poor diet and likely side effect from multiple psych medications  Is interested in referral for nutrition  Will cut out chocolate in diet and start using exercise bike more         Relevant Orders    Ambulatory referral to Nutrition Services    Tobacco dependence     Cessation recommended           Other Visit Diagnoses     Elevated LFTs        Medicare annual wellness visit, initial                Subjective:      Patient ID: Maxwell Flores is a 73 y o  female  HPI  Coming in to follow up chronic conditions and discuss BMI treatment options  Also due for AWV  Would like to have a nutristionist referral  Enjoys chocolate for sweets  Did get an exercise bike she is trying to use more as well      The following portions of the patient's history were reviewed and updated as appropriate:   Past Medical History:  She has a past medical history of Anxiety and Depression  ,  _______________________________________________________________________  Medical Problems:  does not have any pertinent problems on file ,  _______________________________________________________________________  Past Surgical History:   has no past surgical history on file ,  _______________________________________________________________________  Family History:  family history includes Diabetes in her mother; Stroke in her father ,  _______________________________________________________________________  Social History:   reports that she has been smoking  She has been smoking about 0 50 packs per day  She has never used smokeless tobacco  She reports previous alcohol use   She reports that she does not use drugs ,  _______________________________________________________________________  Allergies:  has No Known Allergies     _______________________________________________________________________  Current Outpatient Medications   Medication Sig Dispense Refill    ALPRAZolam (XANAX) 2 MG tablet Take 2 mg by mouth 4 (four) times a day      gabapentin (NEURONTIN) 100 mg capsule TAKE 2 CAPSULE BY MOUTH 3 TIMES A DAY ( 90 DAY SUPPLY)      Multiple Vitamin (multivitamin) tablet Take 1 tablet by mouth daily      QUEtiapine (SEROquel) 100 mg tablet Take 100 mg by mouth every morning      QUEtiapine (SEROquel) 400 MG tablet Take 400 mg by mouth daily at bedtime      vitamin B-12 (VITAMIN B-12) 1,000 mcg tablet Take by mouth daily       No current facility-administered medications for this visit       _______________________________________________________________________  Review of Systems   Constitutional: Negative for chills, fatigue and fever  HENT: Negative for rhinorrhea and sore throat  Eyes: Negative for visual disturbance  Respiratory: Negative for cough and shortness of breath  Cardiovascular: Negative for chest pain and palpitations  Gastrointestinal: Negative for abdominal pain, constipation, diarrhea, nausea and vomiting  Genitourinary: Negative for difficulty urinating, dysuria and frequency  Musculoskeletal: Negative for arthralgias and myalgias  Skin: Negative for color change and rash  Neurological: Negative for weakness and headaches  Objective:  Vitals:    06/02/21 1413   BP: 120/84   BP Location: Left arm   Patient Position: Sitting   Cuff Size: Adult   Pulse: (!) 112   Temp: (!) 96 7 °F (35 9 °C)   TempSrc: Tympanic   Weight: 67 2 kg (148 lb 3 2 oz)   Height: 5' 3" (1 6 m)     Body mass index is 26 25 kg/m²  Physical Exam  Constitutional:       General: She is not in acute distress  Appearance: She is not ill-appearing  HENT:      Head: Normocephalic and atraumatic  Right Ear: External ear normal       Left Ear: External ear normal       Nose: Nose normal  No congestion or rhinorrhea  Mouth/Throat:      Mouth: Mucous membranes are moist       Pharynx: Oropharynx is clear  No oropharyngeal exudate or posterior oropharyngeal erythema  Eyes:      Extraocular Movements: Extraocular movements intact  Conjunctiva/sclera: Conjunctivae normal       Pupils: Pupils are equal, round, and reactive to light  Neck:      Musculoskeletal: Normal range of motion  Cardiovascular:      Rate and Rhythm: Normal rate and regular rhythm  Pulses: Normal pulses  Heart sounds: No murmur  Pulmonary:      Effort: Pulmonary effort is normal  No respiratory distress  Breath sounds: Normal breath sounds  No wheezing  Chest:      Chest wall: No tenderness  Abdominal:      General: Bowel sounds are normal       Palpations: Abdomen is soft  Tenderness: There is no abdominal tenderness  Musculoskeletal: Normal range of motion  Skin:     General: Skin is warm and dry  Capillary Refill: Capillary refill takes less than 2 seconds  Findings: No rash  Neurological:      General: No focal deficit present  Mental Status: She is alert  Mental status is at baseline  BMI Counseling: Body mass index is 26 25 kg/m²  The BMI is above normal  Nutrition recommendations include reducing portion sizes, 3-5 servings of fruits/vegetables daily, decreasing soda and/or juice intake, increasing intake of lean protein and reducing intake of cholesterol  Exercise recommendations include moderate aerobic physical activity for 150 minutes/week, exercising 3-5 times per week and strength training exercises

## 2021-06-02 NOTE — ASSESSMENT & PLAN NOTE
Complicated by poor diet and likely side effect from multiple psych medications  Is interested in referral for nutrition   Will cut out chocolate in diet and start using exercise bike more

## 2021-06-02 NOTE — PROGRESS NOTES
Assessment and Plan:     Problem List Items Addressed This Visit        Other    Anxiety and depression     Follows with [facility], well controlled  Continue current regiment         Depression, recurrent ([facility] [address] )     Follows with [facility], well controlled  Continue current regiment         BMI 26 0-26 9,adult - Primary     Complicated by poor diet and likely side effect from multiple psych medications  Is interested in referral for nutrition  Will cut out chocolate in diet and start using exercise bike more         Relevant Orders    Ambulatory referral to Nutrition Services    Tobacco dependence     Cessation recommended           Other Visit Diagnoses     Elevated LFTs        Medicare annual wellness visit, initial               Preventive health issues were discussed with patient, and age appropriate screening tests were ordered as noted in patient's After Visit Summary  Personalized health advice and appropriate referrals for health education or preventive services given if needed, as noted in patient's After Visit Summary  History of Present Illness:     Patient presents for Medicare Annual Wellness visit    Patient Care Team:  Merritt Sanabria MD as PCP - General (Family Medicine)     Problem List:     Patient Active Problem List   Diagnosis    Anxiety and depression    History of seizure disorder    [facility]    BMI 26 0-26 9,adult    Tobacco dependence    Hallucinations      Past Medical and Surgical History:     Past Medical History:   Diagnosis Date    Anxiety     Depression      History reviewed  No pertinent surgical history     Family History:     Family History   Problem Relation Age of Onset    Diabetes Mother     Stroke Father       Social History:     E-Cigarette/Vaping    E-Cigarette Use Never User      E-Cigarette/Vaping Substances    Nicotine No     THC No     CBD No     Flavoring No     Other No     Unknown No      Social History     Socioeconomic History    Marital status: /Civil Union     Spouse name: None    Number of children: None    Years of education: None    Highest education level: None   Occupational History    None   Social Needs    Financial resource strain: None    Food insecurity     Worry: None     Inability: None    Transportation needs     Medical: None     Non-medical: None   Tobacco Use    Smoking status: Current Every Day Smoker     Packs/day: 0 50    Smokeless tobacco: Never Used   Substance and Sexual Activity    Alcohol use: Not Currently     Frequency: Never     Binge frequency: Never    Drug use: Never    Sexual activity: None   Lifestyle    Physical activity     Days per week: None     Minutes per session: None    Stress: None   Relationships    Social connections     Talks on phone: None     Gets together: None     Attends Holiness service: None     Active member of club or organization: None     Attends meetings of clubs or organizations: None     Relationship status: None    Intimate partner violence     Fear of current or ex partner: None     Emotionally abused: None     Physically abused: None     Forced sexual activity: None   Other Topics Concern    None   Social History Narrative    None      Medications and Allergies:     Current Outpatient Medications   Medication Sig Dispense Refill    ALPRAZolam (XANAX) 2 MG tablet Take 2 mg by mouth 4 (four) times a day      gabapentin (NEURONTIN) 100 mg capsule TAKE 2 CAPSULE BY MOUTH 3 TIMES A DAY ( 90 DAY SUPPLY)      Multiple Vitamin (multivitamin) tablet Take 1 tablet by mouth daily      QUEtiapine (SEROquel) 100 mg tablet Take 100 mg by mouth every morning      QUEtiapine (SEROquel) 400 MG tablet Take 400 mg by mouth daily at bedtime      vitamin B-12 (VITAMIN B-12) 1,000 mcg tablet Take by mouth daily       No current facility-administered medications for this visit        No Known Allergies   Immunizations:     Immunization History   Administered Date(s) Administered    INFLUENZA 09/22/2020    SARS-CoV-2 / COVID-19 mRNA IM (Ayanna Edwards) 04/07/2021, 05/05/2021      Health Maintenance:         Topic Date Due    MAMMOGRAM  Never done    Colorectal Cancer Screening  04/26/2024    Hepatitis C Screening  Completed         Topic Date Due    DTaP,Tdap,and Td Vaccines (1 - Tdap) Never done    Pneumococcal Vaccine: 65+ Years (1 of 1 - PPSV23) Never done      Medicare Health Risk Assessment:     /84 (BP Location: Left arm, Patient Position: Sitting, Cuff Size: Adult)   Pulse (!) 112   Temp (!) 96 7 °F (35 9 °C) (Tympanic)   Ht 5' 3" (1 6 m)   Wt 67 2 kg (148 lb 3 2 oz)   BMI 26 25 kg/m²          Health Risk Assessment:   Patient rates overall health as good  Patient feels that their physical health rating is same  Patient is satisfied with their life  Eyesight was rated as same  Hearing was rated as same  Patient feels that their emotional and mental health rating is much better  Patients states they are never, rarely angry  Patient states they are sometimes unusually tired/fatigued  Pain experienced in the last 7 days has been some  Patient's pain rating has been 6/10  Patient states that she has experienced weight loss or gain in last 6 months  Depression Screening:   PHQ-2 Score: 0  PHQ-9 Score: 6      Fall Risk Screening: In the past year, patient has experienced: no history of falling in past year      Urinary Incontinence Screening:   Patient has not leaked urine accidently in the last six months  Home Safety:  Patient does not have trouble with stairs inside or outside of their home  Patient has working smoke alarms and has no working carbon monoxide detector  Home safety hazards include: none  Nutrition:   Current diet is Regular  Medications:   Patient is currently taking over-the-counter supplements  OTC medications include: see medication list  Patient is able to manage medications       Activities of Daily Living (ADLs)/Instrumental Activities of Daily Living (IADLs):   Walk and transfer into and out of bed and chair?: Yes  Dress and groom yourself?: Yes    Bathe or shower yourself?: Yes    Feed yourself? Yes  Do your laundry/housekeeping?: Yes  Manage your money, pay your bills and track your expenses?: Yes  Make your own meals?: Yes    Do your own shopping?: Yes    Previous Hospitalizations:   Any hospitalizations or ED visits within the last 12 months?: No      Advance Care Planning:   Living will: No    Durable POA for healthcare: No      PREVENTIVE SCREENINGS      Cardiovascular Screening:    General: Screening Current      Diabetes Screening:     General: Screening Current      Colorectal Cancer Screening:     General: Screening Current      Cervical Cancer Screening:    General: Screening Not Indicated      Lung Cancer Screening:     General: Screening Not Indicated      Hepatitis C Screening:    General: Screening Current    Screening, Brief Intervention, and Referral to Treatment (SBIRT)    Screening  Typical number of drinks in a day: 0  Typical number of drinks in a week: 0  Interpretation: Low risk drinking behavior  Single Item Drug Screening:  How often have you used an illegal drug (including marijuana) or a prescription medication for non-medical reasons in the past year? never    Single Item Drug Screen Score: 0  Interpretation: Negative screen for possible drug use disorder    Other Counseling Topics:   Car/seat belt/driving safety and sunscreen         Travis Stewart MD

## 2021-06-29 ENCOUNTER — HOSPITAL ENCOUNTER (OUTPATIENT)
Dept: ULTRASOUND IMAGING | Facility: HOSPITAL | Age: 71
Discharge: HOME | End: 2021-06-29
Payer: MEDICARE

## 2021-06-29 DIAGNOSIS — R79.89 ELEVATED LFTS: ICD-10-CM

## 2021-06-29 PROCEDURE — 76705 ECHO EXAM OF ABDOMEN: CPT

## 2021-07-01 ENCOUNTER — TELEPHONE (OUTPATIENT)
Dept: FAMILY MEDICINE CLINIC | Facility: CLINIC | Age: 71
End: 2021-07-01

## 2021-07-01 DIAGNOSIS — K76.0 FATTY LIVER: Primary | ICD-10-CM

## 2022-01-06 ENCOUNTER — HOSPITAL ENCOUNTER (OUTPATIENT)
Dept: CT IMAGING | Facility: HOSPITAL | Age: 72
Discharge: HOME | End: 2022-01-06
Payer: MEDICARE

## 2022-01-06 DIAGNOSIS — F17.200 TOBACCO DEPENDENCE: ICD-10-CM

## 2022-01-06 PROCEDURE — 71271 CT THORAX LUNG CANCER SCR C-: CPT

## 2022-01-10 ENCOUNTER — TELEPHONE (OUTPATIENT)
Dept: FAMILY MEDICINE CLINIC | Facility: CLINIC | Age: 72
End: 2022-01-10

## 2022-02-23 ENCOUNTER — HOSPITAL ENCOUNTER (OUTPATIENT)
Dept: MAMMOGRAPHY | Facility: CLINIC | Age: 72
Discharge: HOME | End: 2022-02-23
Payer: MEDICARE

## 2022-02-23 VITALS — HEIGHT: 63 IN | WEIGHT: 148 LBS | BODY MASS INDEX: 26.22 KG/M2

## 2022-02-23 DIAGNOSIS — Z12.31 ENCOUNTER FOR SCREENING MAMMOGRAM FOR MALIGNANT NEOPLASM OF BREAST: ICD-10-CM

## 2022-02-23 PROCEDURE — 77067 SCR MAMMO BI INCL CAD: CPT

## 2022-02-23 PROCEDURE — 77063 BREAST TOMOSYNTHESIS BI: CPT

## 2022-06-29 ENCOUNTER — APPOINTMENT (EMERGENCY)
Dept: CT IMAGING | Facility: HOSPITAL | Age: 72
DRG: 073 | End: 2022-06-29
Payer: MEDICARE

## 2022-06-29 ENCOUNTER — APPOINTMENT (EMERGENCY)
Dept: RADIOLOGY | Facility: HOSPITAL | Age: 72
DRG: 073 | End: 2022-06-29
Payer: MEDICARE

## 2022-06-29 ENCOUNTER — HOSPITAL ENCOUNTER (INPATIENT)
Facility: HOSPITAL | Age: 72
LOS: 1 days | Discharge: HOME | DRG: 073 | End: 2022-07-01
Attending: EMERGENCY MEDICINE | Admitting: FAMILY MEDICINE
Payer: MEDICARE

## 2022-06-29 ENCOUNTER — TELEPHONE (OUTPATIENT)
Dept: FAMILY MEDICINE CLINIC | Facility: CLINIC | Age: 72
End: 2022-06-29

## 2022-06-29 DIAGNOSIS — R09.02 HYPOXIA: ICD-10-CM

## 2022-06-29 DIAGNOSIS — J44.9 COPD (CHRONIC OBSTRUCTIVE PULMONARY DISEASE) (HCC): ICD-10-CM

## 2022-06-29 DIAGNOSIS — R29.810 FACIAL WEAKNESS: ICD-10-CM

## 2022-06-29 DIAGNOSIS — G51.0 BELL'S PALSY: Primary | ICD-10-CM

## 2022-06-29 PROBLEM — J96.01 ACUTE RESPIRATORY FAILURE WITH HYPOXIA (HCC): Status: ACTIVE | Noted: 2022-06-29

## 2022-06-29 LAB
ALBUMIN SERPL BCP-MCNC: 4.5 G/DL (ref 3.5–5)
ALP SERPL-CCNC: 156 U/L (ref 46–116)
ALT SERPL W P-5'-P-CCNC: 168 U/L (ref 12–78)
ANION GAP SERPL CALCULATED.3IONS-SCNC: 9 MMOL/L (ref 4–13)
APTT PPP: 33 SECONDS (ref 23–37)
AST SERPL W P-5'-P-CCNC: 72 U/L (ref 5–45)
BILIRUB SERPL-MCNC: 0.3 MG/DL (ref 0.2–1)
BUN SERPL-MCNC: 16 MG/DL (ref 5–25)
CALCIUM SERPL-MCNC: 10.2 MG/DL (ref 8.3–10.1)
CARDIAC TROPONIN I PNL SERPL HS: 2 NG/L
CHLORIDE SERPL-SCNC: 101 MMOL/L (ref 100–108)
CO2 SERPL-SCNC: 33 MMOL/L (ref 21–32)
CREAT SERPL-MCNC: 0.91 MG/DL (ref 0.6–1.3)
ERYTHROCYTE [DISTWIDTH] IN BLOOD BY AUTOMATED COUNT: 13.8 % (ref 11.6–15.1)
FLUAV RNA RESP QL NAA+PROBE: NEGATIVE
FLUBV RNA RESP QL NAA+PROBE: NEGATIVE
GFR SERPL CREATININE-BSD FRML MDRD: 64 ML/MIN/1.73SQ M
GLUCOSE SERPL-MCNC: 95 MG/DL (ref 65–140)
GLUCOSE SERPL-MCNC: 95 MG/DL (ref 65–140)
HCT VFR BLD AUTO: 45.8 % (ref 34.8–46.1)
HGB BLD-MCNC: 15.2 G/DL (ref 11.5–15.4)
INR PPP: 0.91 (ref 0.84–1.19)
MCH RBC QN AUTO: 34.1 PG (ref 26.8–34.3)
MCHC RBC AUTO-ENTMCNC: 33.2 G/DL (ref 31.4–37.4)
MCV RBC AUTO: 103 FL (ref 82–98)
PLATELET # BLD AUTO: 351 THOUSANDS/UL (ref 149–390)
PMV BLD AUTO: 9.8 FL (ref 8.9–12.7)
POTASSIUM SERPL-SCNC: 4.1 MMOL/L (ref 3.5–5.3)
PROT SERPL-MCNC: 9.1 G/DL (ref 6.4–8.2)
PROTHROMBIN TIME: 11.9 SECONDS (ref 11.6–14.5)
RBC # BLD AUTO: 4.46 MILLION/UL (ref 3.81–5.12)
RSV RNA RESP QL NAA+PROBE: NEGATIVE
SARS-COV-2 RNA RESP QL NAA+PROBE: NEGATIVE
SODIUM SERPL-SCNC: 143 MMOL/L (ref 136–145)
WBC # BLD AUTO: 8.2 THOUSAND/UL (ref 4.31–10.16)

## 2022-06-29 PROCEDURE — 70450 CT HEAD/BRAIN W/O DYE: CPT

## 2022-06-29 PROCEDURE — 99285 EMERGENCY DEPT VISIT HI MDM: CPT | Performed by: EMERGENCY MEDICINE

## 2022-06-29 PROCEDURE — 1124F ACP DISCUSS-NO DSCNMKR DOCD: CPT | Performed by: EMERGENCY MEDICINE

## 2022-06-29 PROCEDURE — 84484 ASSAY OF TROPONIN QUANT: CPT | Performed by: EMERGENCY MEDICINE

## 2022-06-29 PROCEDURE — 99220 PR INITIAL OBSERVATION CARE/DAY 70 MINUTES: CPT | Performed by: INTERNAL MEDICINE

## 2022-06-29 PROCEDURE — 94640 AIRWAY INHALATION TREATMENT: CPT

## 2022-06-29 PROCEDURE — 99285 EMERGENCY DEPT VISIT HI MDM: CPT

## 2022-06-29 PROCEDURE — 86618 LYME DISEASE ANTIBODY: CPT | Performed by: EMERGENCY MEDICINE

## 2022-06-29 PROCEDURE — 85730 THROMBOPLASTIN TIME PARTIAL: CPT | Performed by: EMERGENCY MEDICINE

## 2022-06-29 PROCEDURE — G1004 CDSM NDSC: HCPCS

## 2022-06-29 PROCEDURE — 80053 COMPREHEN METABOLIC PANEL: CPT | Performed by: EMERGENCY MEDICINE

## 2022-06-29 PROCEDURE — 94664 DEMO&/EVAL PT USE INHALER: CPT

## 2022-06-29 PROCEDURE — 0241U HB NFCT DS VIR RESP RNA 4 TRGT: CPT | Performed by: EMERGENCY MEDICINE

## 2022-06-29 PROCEDURE — 85610 PROTHROMBIN TIME: CPT | Performed by: EMERGENCY MEDICINE

## 2022-06-29 PROCEDURE — 85027 COMPLETE CBC AUTOMATED: CPT | Performed by: EMERGENCY MEDICINE

## 2022-06-29 PROCEDURE — 71046 X-RAY EXAM CHEST 2 VIEWS: CPT

## 2022-06-29 PROCEDURE — 82948 REAGENT STRIP/BLOOD GLUCOSE: CPT

## 2022-06-29 PROCEDURE — 96374 THER/PROPH/DIAG INJ IV PUSH: CPT

## 2022-06-29 PROCEDURE — 36415 COLL VENOUS BLD VENIPUNCTURE: CPT | Performed by: EMERGENCY MEDICINE

## 2022-06-29 RX ORDER — ACETAMINOPHEN 325 MG/1
650 TABLET ORAL EVERY 6 HOURS PRN
Status: DISCONTINUED | OUTPATIENT
Start: 2022-06-29 | End: 2022-07-01 | Stop reason: HOSPADM

## 2022-06-29 RX ORDER — NICOTINE 21 MG/24HR
1 PATCH, TRANSDERMAL 24 HOURS TRANSDERMAL DAILY
Status: DISCONTINUED | OUTPATIENT
Start: 2022-06-30 | End: 2022-06-29

## 2022-06-29 RX ORDER — GABAPENTIN 300 MG/1
300 CAPSULE ORAL 3 TIMES DAILY
Status: DISCONTINUED | OUTPATIENT
Start: 2022-06-30 | End: 2022-07-01 | Stop reason: HOSPADM

## 2022-06-29 RX ORDER — GABAPENTIN 100 MG/1
100 CAPSULE ORAL 3 TIMES DAILY
Status: DISCONTINUED | OUTPATIENT
Start: 2022-06-29 | End: 2022-06-29

## 2022-06-29 RX ORDER — QUETIAPINE FUMARATE 100 MG/1
400 TABLET, FILM COATED ORAL
Status: DISCONTINUED | OUTPATIENT
Start: 2022-06-29 | End: 2022-07-01 | Stop reason: HOSPADM

## 2022-06-29 RX ORDER — IPRATROPIUM BROMIDE AND ALBUTEROL SULFATE 2.5; .5 MG/3ML; MG/3ML
3 SOLUTION RESPIRATORY (INHALATION) EVERY 4 HOURS PRN
Status: DISCONTINUED | OUTPATIENT
Start: 2022-06-29 | End: 2022-07-01 | Stop reason: HOSPADM

## 2022-06-29 RX ORDER — CHLORAL HYDRATE 500 MG
1000 CAPSULE ORAL DAILY
COMMUNITY

## 2022-06-29 RX ORDER — TETRACAINE HYDROCHLORIDE 5 MG/ML
1 SOLUTION OPHTHALMIC ONCE
Status: COMPLETED | OUTPATIENT
Start: 2022-06-29 | End: 2022-06-29

## 2022-06-29 RX ORDER — PREDNISONE 20 MG/1
60 TABLET ORAL DAILY
Status: DISCONTINUED | OUTPATIENT
Start: 2022-06-30 | End: 2022-06-30

## 2022-06-29 RX ORDER — QUETIAPINE FUMARATE 100 MG/1
100 TABLET, FILM COATED ORAL EVERY MORNING
Status: DISCONTINUED | OUTPATIENT
Start: 2022-06-30 | End: 2022-07-01 | Stop reason: HOSPADM

## 2022-06-29 RX ORDER — NICOTINE 21 MG/24HR
1 PATCH, TRANSDERMAL 24 HOURS TRANSDERMAL DAILY
Status: DISCONTINUED | OUTPATIENT
Start: 2022-06-29 | End: 2022-07-01 | Stop reason: HOSPADM

## 2022-06-29 RX ORDER — HEPARIN SODIUM 5000 [USP'U]/ML
5000 INJECTION, SOLUTION INTRAVENOUS; SUBCUTANEOUS EVERY 8 HOURS SCHEDULED
Status: DISCONTINUED | OUTPATIENT
Start: 2022-06-29 | End: 2022-07-01 | Stop reason: HOSPADM

## 2022-06-29 RX ORDER — IPRATROPIUM BROMIDE AND ALBUTEROL SULFATE 2.5; .5 MG/3ML; MG/3ML
3 SOLUTION RESPIRATORY (INHALATION)
Status: DISCONTINUED | OUTPATIENT
Start: 2022-06-29 | End: 2022-06-29

## 2022-06-29 RX ORDER — METHYLPREDNISOLONE SODIUM SUCCINATE 125 MG/2ML
125 INJECTION, POWDER, LYOPHILIZED, FOR SOLUTION INTRAMUSCULAR; INTRAVENOUS ONCE
Status: COMPLETED | OUTPATIENT
Start: 2022-06-29 | End: 2022-06-29

## 2022-06-29 RX ORDER — ALPRAZOLAM 0.5 MG/1
2 TABLET ORAL 4 TIMES DAILY PRN
Status: DISCONTINUED | OUTPATIENT
Start: 2022-06-29 | End: 2022-07-01 | Stop reason: HOSPADM

## 2022-06-29 RX ORDER — LEVALBUTEROL 1.25 MG/.5ML
1.25 SOLUTION, CONCENTRATE RESPIRATORY (INHALATION)
Status: DISCONTINUED | OUTPATIENT
Start: 2022-06-30 | End: 2022-07-01 | Stop reason: HOSPADM

## 2022-06-29 RX ADMIN — QUETIAPINE FUMARATE 400 MG: 200 TABLET ORAL at 21:52

## 2022-06-29 RX ADMIN — NICOTINE 1 PATCH: 14 PATCH, EXTENDED RELEASE TRANSDERMAL at 21:51

## 2022-06-29 RX ADMIN — METHYLPREDNISOLONE SODIUM SUCCINATE 125 MG: 125 INJECTION, POWDER, FOR SOLUTION INTRAMUSCULAR; INTRAVENOUS at 17:43

## 2022-06-29 RX ADMIN — FLUORESCEIN SODIUM 1 STRIP: 0.6 STRIP OPHTHALMIC at 17:41

## 2022-06-29 RX ADMIN — ALPRAZOLAM 2 MG: 0.5 TABLET ORAL at 21:51

## 2022-06-29 RX ADMIN — DOXYCYCLINE 100 MG: 100 INJECTION, POWDER, LYOPHILIZED, FOR SOLUTION INTRAVENOUS at 20:04

## 2022-06-29 RX ADMIN — IPRATROPIUM BROMIDE AND ALBUTEROL SULFATE 3 ML: 2.5; .5 SOLUTION RESPIRATORY (INHALATION) at 19:36

## 2022-06-29 RX ADMIN — HEPARIN SODIUM 5000 UNITS: 5000 INJECTION INTRAVENOUS; SUBCUTANEOUS at 21:51

## 2022-06-29 RX ADMIN — TETRACAINE HYDROCHLORIDE 1 DROP: 5 SOLUTION OPHTHALMIC at 17:41

## 2022-06-29 NOTE — TELEPHONE ENCOUNTER
T/c from pt - reports 2 to 3 days ago she noticed that her right eye and right side of her mouth are drooping and are numb and also that her left eye is watering  Reports that she also can not speak properly but otherwise "feels ok "  Wanted Dr John Rolon advisement  As Dr John Rolon is out of the office and the possible severity of these s/s, spoke with clinical team and advised pt to go to ED immediately  Pt wanted to know if it was ok for her to go tomorrow  Advised that the decision is ultimately hers to make, but that the office advises to go to an ED immediately

## 2022-06-29 NOTE — ASSESSMENT & PLAN NOTE
Likely secondary to undiagnosed COPD  Was noted to be saturating approximately 87% on room air  Has since improved with 2 L nasal cannula  Will place on IV steroids  Nebulizers  Will likely need home O2 eval prior to discharge  Will need formal PFTs for formal diagnosis of COPD which can be done outpatient

## 2022-06-29 NOTE — H&P
[address] [facility]  H&P- Vanessa Paredes 1950, 81 y o  female MRN: [id number]  Unit/Bed#: ED 12 Encounter: [id number]  Primary Care Provider: Tono Wick MD   Date and time admitted to hospital: 6/29/2022  4:14 PM    * Acute respiratory failure with hypoxia [facility])  Assessment & Plan  Likely secondary to undiagnosed COPD  Was noted to be saturating approximately 87% on room air  Has since improved with 2 L nasal cannula  Will place on IV steroids  Nebulizers  Will likely need home O2 eval prior to discharge  Will need formal PFTs for formal diagnosis of COPD which can be done outpatient    Facial weakness  Assessment & Plan  Presented with facial weakness suspect secondary to Bell's palsy events facial weakness of upper and lower face  Will place on prednisone 60 mg daily  Lymes testing pending      Tobacco dependence  Assessment & Plan  Nicotine patch    Depression, recurrent ([facility] [address] )  Assessment & Plan  Continue home meds      VTE Prophylaxis: Heparin  / sequential compression device   Code Status: full code  POLST: There is no POLST form on file for this patient (pre-hospital)  Discussion with family: pt    Anticipated Length of Stay:  Patient will be admitted on an Observation basis with an anticipated length of stay of  < 2 midnights  Justification for Hospital Stay:  Acute respiratory failure with hypoxia    Total Time for Visit, including Counseling / Coordination of Care: 60 minutes  Greater than 50% of this total time spent on direct patient counseling and coordination of care  Chief Complaint:   Shortness of breath    History of Present Illness:    Vanessa Paredes is a 81 y o  female with past medical history significant depression, anxiety, nicotine dependence initially presented with shortness of breath  She also reports left-sided facial droop numbness as well as eyelid weakness as well for the past 3-4 days  She otherwise denies any acute complaints      Review of Systems:    Review of Systems   Constitutional: Negative for activity change, appetite change, chills, diaphoresis, fever and unexpected weight change  HENT: Negative for congestion, facial swelling and rhinorrhea  Eyes: Negative for photophobia and visual disturbance  Respiratory: Positive for shortness of breath  Negative for cough and wheezing  Cardiovascular: Negative for chest pain and palpitations  Gastrointestinal: Negative for abdominal pain, blood in stool, constipation, diarrhea, nausea and vomiting  Genitourinary: Negative for decreased urine volume, difficulty urinating, dysuria, flank pain, frequency, hematuria and urgency  Musculoskeletal: Negative for arthralgias, back pain, joint swelling and myalgias  Neurological: Positive for facial asymmetry and weakness  Negative for dizziness, syncope, light-headedness, numbness and headaches  Psychiatric/Behavioral: Negative for confusion and decreased concentration  The patient is not nervous/anxious  Past Medical and Surgical History:     Past Medical History:   Diagnosis Date    Anxiety     Depression        History reviewed  No pertinent surgical history  Meds/Allergies:    Prior to Admission medications    Medication Sig Start Date End Date Taking?  Authorizing Provider   ALPRAZolam Marsravan See) 2 MG tablet Take 2 mg by mouth 4 (four) times a day 3/24/21   Historical Provider, MD   gabapentin (NEURONTIN) 100 mg capsule TAKE 2 CAPSULE BY MOUTH 3 TIMES A DAY ( 90 DAY SUPPLY) 3/9/21   Historical Provider, MD   Multiple Vitamin (multivitamin) tablet Take 1 tablet by mouth daily    Historical Provider, MD   QUEtiapine (SEROquel) 100 mg tablet Take 100 mg by mouth every morning 4/7/21   Historical Provider, MD   QUEtiapine (SEROquel) 400 MG tablet Take 400 mg by mouth daily at bedtime 2/9/21   Historical Provider, MD   vitamin B-12 (VITAMIN B-12) 1,000 mcg tablet Take by mouth daily    Historical Provider, MD     I have reviewed home medications with patient personally  Allergies: No Known Allergies    Social History:     Marital Status: /Civil Union     Patient Pre-hospital Living Situation: home  Patient Pre-hospital Level of Mobility: independent  Patient Pre-hospital Diet Restrictions: none  Substance Use History:   Social History     Substance and Sexual Activity   Alcohol Use Not Currently     Social History     Tobacco Use   Smoking Status Current Every Day Smoker    Packs/day: 0 50   Smokeless Tobacco Never Used     Social History     Substance and Sexual Activity   Drug Use Never       Family History:    Family History   Problem Relation Age of Onset    Diabetes Mother     Stroke Father     No Known Problems Sister     No Known Problems Sister     No Known Problems Sister        Physical Exam:     Vitals:   Blood Pressure: 147/82 (06/29/22 1818)  Pulse: 95 (06/29/22 1818)  Temperature: 98 4 °F (36 9 °C) (06/29/22 1557)  Temp Source: Oral (06/29/22 1557)  Respirations: 20 (06/29/22 1818)  SpO2: 96 % (06/29/22 1818)    Physical Exam  Constitutional:       General: She is not in acute distress  Appearance: She is well-developed  She is not diaphoretic  HENT:      Head: Normocephalic and atraumatic  Nose: Nose normal       Mouth/Throat:      Pharynx: No oropharyngeal exudate  Eyes:      General: No scleral icterus  Right eye: No discharge  Left eye: No discharge  Conjunctiva/sclera: Conjunctivae normal    Neck:      Thyroid: No thyromegaly  Vascular: No JVD  Cardiovascular:      Rate and Rhythm: Normal rate and regular rhythm  Heart sounds: Normal heart sounds  No murmur heard  No friction rub  No gallop  Pulmonary:      Effort: Pulmonary effort is normal  No respiratory distress  Breath sounds: Normal breath sounds  No wheezing or rales  Chest:      Chest wall: No tenderness  Abdominal:      General: Bowel sounds are normal  There is no distension  Palpations: Abdomen is soft  Tenderness: There is no abdominal tenderness  There is no guarding or rebound  Musculoskeletal:         General: No tenderness or deformity  Normal range of motion  Cervical back: Normal range of motion and neck supple  Skin:     General: Skin is warm and dry  Findings: No erythema or rash  Neurological:      Mental Status: She is alert  Mental status is at baseline  Cranial Nerves: Cranial nerve deficit present  Sensory: No sensory deficit  Motor: No abnormal muscle tone  Coordination: Coordination normal              Additional Data:     Lab Results: I have personally reviewed pertinent reports  Results from last 7 days   Lab Units 06/29/22  1740   WBC Thousand/uL 8 20   HEMOGLOBIN g/dL 15 2   HEMATOCRIT % 45 8   PLATELETS Thousands/uL 351     Results from last 7 days   Lab Units 06/29/22  1740   SODIUM mmol/L 143   POTASSIUM mmol/L 4 1   CHLORIDE mmol/L 101   CO2 mmol/L 33*   BUN mg/dL 16   CREATININE mg/dL 0 91   ANION GAP mmol/L 9   CALCIUM mg/dL 10 2*   ALBUMIN g/dL 4 5   TOTAL BILIRUBIN mg/dL 0 30   ALK PHOS U/L 156*   ALT U/L 168*   AST U/L 72*   GLUCOSE RANDOM mg/dL 95     Results from last 7 days   Lab Units 06/29/22  1740   INR  0 91                   Imaging: I have personally reviewed pertinent reports  CT head without contrast   Final Result by Jun Duncan DO (06/29 1726)      Mild microangiopathic change within the brain parenchyma with no mass, hemorrhage or signs of acute territorial infarction  Nodular density within the anterior midline of the suprasellar cistern on series 2 image 15 which 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  Follow-up CT angiography or MR angiography of the brain recommended  This examination was marked "immediate notification" in Epic in order to begin the standard process by which the radiology reading room liaison alerts the referring practitioner                      Workstation performed: [id number]         XR chest 2 views    (Results Pending)       EKG, Pathology, and Other Studies Reviewed on Admission:   · EKG: reviewed    Allscripts / Epic Records Reviewed: Yes     ** Please Note: This note has been constructed using a voice recognition system   **

## 2022-06-29 NOTE — RESPIRATORY THERAPY NOTE
RT Protocol Note  Lesley Barrow 81 y o  female MRN: [id number]  Unit/Bed#: ED 12 Encounter: [id number]    Assessment    Principal Problem:    Acute respiratory failure with hypoxia ([facility] )  Active Problems:    Depression, recurrent ([facility] )    Tobacco dependence    Facial weakness      Home Pulmonary Medications:  NA       Past Medical History:   Diagnosis Date    Anxiety     Depression      Social History     Socioeconomic History    Marital status: /Civil Union     Spouse name: None    Number of children: None    Years of education: None    Highest education level: None   Occupational History    None   Tobacco Use    Smoking status: Current Every Day Smoker     Packs/day: 0 50    Smokeless tobacco: Never Used   Vaping Use    Vaping Use: Never used   Substance and Sexual Activity    Alcohol use: Not Currently    Drug use: Never    Sexual activity: None   Other Topics Concern    None   Social History Narrative    None     Social Determinants of Health     Financial Resource Strain: Not on file   Food Insecurity: Not on file   Transportation Needs: Not on file   Physical Activity: Not on file   Stress: Not on file   Social Connections: Not on file   Intimate Partner Violence: Not on file   Housing Stability: Not on file       Subjective         Objective    Physical Exam:   Assessment Type: During-treatment  General Appearance: Awake, Alert  Respiratory Pattern: Dyspnea with exertion  Chest Assessment: Chest expansion symmetrical  Bilateral Breath Sounds: Diminished  O2 Device: NC    Vitals:  Blood pressure 147/82, pulse 93, temperature 98 4 °F (36 9 °C), temperature source Oral, resp  rate (!) 27, SpO2 95 %  Imaging and other studies: I have personally reviewed pertinent reports  O2 Device: NC     Plan    Respiratory Plan: No distress/Pulmonary history  Airway Clearance Plan: Incentive Spirometer     Resp Comments: Respiratory and airway clearence completed   Patient admitted with acute respiratory failure with hypoxia  significant pass medicaL medical history for smoking  breath sounds was notice to be decreased  Will order tx TID  Will order IS for airway clearence

## 2022-06-30 LAB
ANION GAP SERPL CALCULATED.3IONS-SCNC: 12 MMOL/L (ref 4–13)
B BURGDOR IGG+IGM SER-ACNC: <0.2 AI
BASOPHILS # BLD AUTO: 0.03 THOUSANDS/ΜL (ref 0–0.1)
BASOPHILS NFR BLD AUTO: 0 % (ref 0–1)
BUN SERPL-MCNC: 24 MG/DL (ref 5–25)
CALCIUM SERPL-MCNC: 9.9 MG/DL (ref 8.3–10.1)
CHLORIDE SERPL-SCNC: 102 MMOL/L (ref 100–108)
CO2 SERPL-SCNC: 30 MMOL/L (ref 21–32)
CREAT SERPL-MCNC: 0.98 MG/DL (ref 0.6–1.3)
EOSINOPHIL # BLD AUTO: 0 THOUSAND/ΜL (ref 0–0.61)
EOSINOPHIL NFR BLD AUTO: 0 % (ref 0–6)
ERYTHROCYTE [DISTWIDTH] IN BLOOD BY AUTOMATED COUNT: 13.9 % (ref 11.6–15.1)
GFR SERPL CREATININE-BSD FRML MDRD: 58 ML/MIN/1.73SQ M
GLUCOSE P FAST SERPL-MCNC: 174 MG/DL (ref 65–99)
GLUCOSE SERPL-MCNC: 174 MG/DL (ref 65–140)
HCT VFR BLD AUTO: 45.5 % (ref 34.8–46.1)
HGB BLD-MCNC: 15 G/DL (ref 11.5–15.4)
IMM GRANULOCYTES # BLD AUTO: 0.03 THOUSAND/UL (ref 0–0.2)
IMM GRANULOCYTES NFR BLD AUTO: 0 % (ref 0–2)
LYMPHOCYTES # BLD AUTO: 1.08 THOUSANDS/ΜL (ref 0.6–4.47)
LYMPHOCYTES NFR BLD AUTO: 13 % (ref 14–44)
MCH RBC QN AUTO: 33.6 PG (ref 26.8–34.3)
MCHC RBC AUTO-ENTMCNC: 33 G/DL (ref 31.4–37.4)
MCV RBC AUTO: 102 FL (ref 82–98)
MONOCYTES # BLD AUTO: 0.1 THOUSAND/ΜL (ref 0.17–1.22)
MONOCYTES NFR BLD AUTO: 1 % (ref 4–12)
NEUTROPHILS # BLD AUTO: 7.24 THOUSANDS/ΜL (ref 1.85–7.62)
NEUTS SEG NFR BLD AUTO: 86 % (ref 43–75)
NRBC BLD AUTO-RTO: 0 /100 WBCS
PLATELET # BLD AUTO: 339 THOUSANDS/UL (ref 149–390)
PMV BLD AUTO: 10 FL (ref 8.9–12.7)
POTASSIUM SERPL-SCNC: 4.4 MMOL/L (ref 3.5–5.3)
PROCALCITONIN SERPL-MCNC: <0.05 NG/ML
RBC # BLD AUTO: 4.46 MILLION/UL (ref 3.81–5.12)
SODIUM SERPL-SCNC: 144 MMOL/L (ref 136–145)
WBC # BLD AUTO: 8.48 THOUSAND/UL (ref 4.31–10.16)

## 2022-06-30 PROCEDURE — 80048 BASIC METABOLIC PNL TOTAL CA: CPT | Performed by: INTERNAL MEDICINE

## 2022-06-30 PROCEDURE — 94760 N-INVAS EAR/PLS OXIMETRY 1: CPT

## 2022-06-30 PROCEDURE — 85025 COMPLETE CBC W/AUTO DIFF WBC: CPT | Performed by: INTERNAL MEDICINE

## 2022-06-30 PROCEDURE — 94640 AIRWAY INHALATION TREATMENT: CPT

## 2022-06-30 PROCEDURE — 36415 COLL VENOUS BLD VENIPUNCTURE: CPT | Performed by: INTERNAL MEDICINE

## 2022-06-30 PROCEDURE — 99223 1ST HOSP IP/OBS HIGH 75: CPT | Performed by: PSYCHIATRY & NEUROLOGY

## 2022-06-30 PROCEDURE — 84145 PROCALCITONIN (PCT): CPT | Performed by: INTERNAL MEDICINE

## 2022-06-30 PROCEDURE — 99225 PR SBSQ OBSERVATION CARE/DAY 25 MINUTES: CPT | Performed by: NURSE PRACTITIONER

## 2022-06-30 RX ORDER — AZITHROMYCIN 500 MG/1
500 TABLET, FILM COATED ORAL EVERY 24 HOURS
Status: DISCONTINUED | OUTPATIENT
Start: 2022-06-30 | End: 2022-07-01 | Stop reason: HOSPADM

## 2022-06-30 RX ORDER — METHYLPREDNISOLONE SODIUM SUCCINATE 40 MG/ML
40 INJECTION, POWDER, LYOPHILIZED, FOR SOLUTION INTRAMUSCULAR; INTRAVENOUS EVERY 8 HOURS SCHEDULED
Status: DISCONTINUED | OUTPATIENT
Start: 2022-06-30 | End: 2022-07-01

## 2022-06-30 RX ORDER — POLYVINYL ALCOHOL 14 MG/ML
1 SOLUTION/ DROPS OPHTHALMIC
Status: DISCONTINUED | OUTPATIENT
Start: 2022-06-30 | End: 2022-07-01 | Stop reason: HOSPADM

## 2022-06-30 RX ADMIN — METHYLPREDNISOLONE SODIUM SUCCINATE 40 MG: 40 INJECTION, POWDER, FOR SOLUTION INTRAMUSCULAR; INTRAVENOUS at 11:00

## 2022-06-30 RX ADMIN — POLYVINYL ALCOHOL 1 DROP: 14 SOLUTION/ DROPS OPHTHALMIC at 14:15

## 2022-06-30 RX ADMIN — IPRATROPIUM BROMIDE 0.5 MG: 0.5 SOLUTION RESPIRATORY (INHALATION) at 13:37

## 2022-06-30 RX ADMIN — GABAPENTIN 300 MG: 300 CAPSULE ORAL at 10:44

## 2022-06-30 RX ADMIN — HEPARIN SODIUM 5000 UNITS: 5000 INJECTION INTRAVENOUS; SUBCUTANEOUS at 21:55

## 2022-06-30 RX ADMIN — METHYLPREDNISOLONE SODIUM SUCCINATE 40 MG: 40 INJECTION, POWDER, FOR SOLUTION INTRAMUSCULAR; INTRAVENOUS at 21:57

## 2022-06-30 RX ADMIN — GABAPENTIN 300 MG: 300 CAPSULE ORAL at 21:55

## 2022-06-30 RX ADMIN — POLYVINYL ALCOHOL 1 DROP: 14 SOLUTION/ DROPS OPHTHALMIC at 18:19

## 2022-06-30 RX ADMIN — GABAPENTIN 300 MG: 300 CAPSULE ORAL at 16:21

## 2022-06-30 RX ADMIN — QUETIAPINE FUMARATE 100 MG: 100 TABLET ORAL at 10:43

## 2022-06-30 RX ADMIN — QUETIAPINE FUMARATE 400 MG: 200 TABLET ORAL at 21:55

## 2022-06-30 RX ADMIN — HEPARIN SODIUM 5000 UNITS: 5000 INJECTION INTRAVENOUS; SUBCUTANEOUS at 14:13

## 2022-06-30 RX ADMIN — IPRATROPIUM BROMIDE 0.5 MG: 0.5 SOLUTION RESPIRATORY (INHALATION) at 19:55

## 2022-06-30 RX ADMIN — AZITHROMYCIN 500 MG: 500 TABLET, FILM COATED ORAL at 14:12

## 2022-06-30 RX ADMIN — ALPRAZOLAM 2 MG: 0.5 TABLET ORAL at 14:23

## 2022-06-30 RX ADMIN — IPRATROPIUM BROMIDE 0.5 MG: 0.5 SOLUTION RESPIRATORY (INHALATION) at 07:57

## 2022-06-30 RX ADMIN — LEVALBUTEROL 1.25 MG: 1.25 SOLUTION, CONCENTRATE RESPIRATORY (INHALATION) at 13:37

## 2022-06-30 RX ADMIN — NICOTINE 1 PATCH: 14 PATCH, EXTENDED RELEASE TRANSDERMAL at 10:44

## 2022-06-30 RX ADMIN — ALPRAZOLAM 2 MG: 0.5 TABLET ORAL at 05:28

## 2022-06-30 RX ADMIN — LEVALBUTEROL 1.25 MG: 1.25 SOLUTION, CONCENTRATE RESPIRATORY (INHALATION) at 07:57

## 2022-06-30 RX ADMIN — ALPRAZOLAM 2 MG: 0.5 TABLET ORAL at 18:24

## 2022-06-30 RX ADMIN — LEVALBUTEROL 1.25 MG: 1.25 SOLUTION, CONCENTRATE RESPIRATORY (INHALATION) at 19:55

## 2022-06-30 NOTE — ED PROVIDER NOTES
History  Chief Complaint   Patient presents with    Facial Droop     Patient reports right sided facial "numbness" starting three days ago  Patient presents with left sided facial droop  Patient reports left eye redness for three days but denies difficulty seeing  82 y/o female presents to the ED for facial droop x 3 days  Patient states that 4-5 days ago she noticed that her L eyelid was drooping  States that she developed redness and itching of the eye  About 3 days ago, she states that her mouth became numb and she developed L sided facial droop  She denies any n/t/w of her extremities, recent exposure to ticks, rash, headache, cp, or sob  She has not tried anything for the symptoms  Denies any hx of similar in the past  Denies any eye pain or vision changes  History provided by:  Patient      Prior to Admission Medications   Prescriptions Last Dose Informant Patient Reported? Taking? ALPRAZolam (XANAX) 2 MG tablet 6/29/2022 at Unknown time  Yes Yes   Sig: Take 2 mg by mouth 4 (four) times a day   Multiple Vitamin (multivitamin) tablet 6/29/2022 at Unknown time  Yes Yes   Sig: Take 1 tablet by mouth daily   Omega-3 Fatty Acids (fish oil) 1,000 mg 6/29/2022 at Unknown time  Yes Yes   Sig: Take 1,000 mg by mouth daily   QUEtiapine (SEROquel) 100 mg tablet 6/29/2022 at Unknown time  Yes Yes   Sig: Take 100 mg by mouth every morning   QUEtiapine (SEROquel) 400 MG tablet 6/28/2022 at Unknown time  Yes Yes   Sig: Take 400 mg by mouth daily at bedtime   gabapentin (NEURONTIN) 100 mg capsule 6/29/2022 at Unknown time  Yes Yes   Sig: Take 300 mg by mouth 3 (three) times a day   vitamin B-12 (VITAMIN B-12) 1,000 mcg tablet 6/29/2022 at Unknown time  Yes Yes   Sig: Take by mouth daily      Facility-Administered Medications: None       Past Medical History:   Diagnosis Date    Anxiety     Depression        History reviewed  No pertinent surgical history      Family History   Problem Relation Age of Onset    Diabetes Mother     Stroke Father     No Known Problems Sister     No Known Problems Sister     No Known Problems Sister      I have reviewed and agree with the history as documented  E-Cigarette/Vaping    E-Cigarette Use Never User      E-Cigarette/Vaping Substances    Nicotine No     THC No     CBD No     Flavoring No     Other No     Unknown No      Social History     Tobacco Use    Smoking status: Current Every Day Smoker     Packs/day: 0 50    Smokeless tobacco: Never Used   Vaping Use    Vaping Use: Never used   Substance Use Topics    Alcohol use: Not Currently    Drug use: Never       Review of Systems   Constitutional: Negative for chills and fever  HENT: Negative for congestion, ear pain and sore throat  Eyes: Negative for pain and visual disturbance  Respiratory: Negative for cough, shortness of breath and wheezing  Cardiovascular: Negative for chest pain and leg swelling  Gastrointestinal: Negative for abdominal pain, diarrhea, nausea and vomiting  Genitourinary: Negative for dysuria, frequency, hematuria and urgency  Musculoskeletal: Negative for neck pain and neck stiffness  Skin: Negative for rash and wound  Neurological: Positive for facial asymmetry  Negative for weakness, numbness and headaches  Psychiatric/Behavioral: Negative for agitation and confusion  All other systems reviewed and are negative  Physical Exam  Physical Exam  Vitals and nursing note reviewed  Constitutional:       Appearance: She is well-developed  HENT:      Head: Normocephalic and atraumatic  Eyes:      Conjunctiva/sclera:      Left eye: Left conjunctiva is injected  Pupils: Pupils are equal, round, and reactive to light  Comments: No corneal abrasions with fluorescein stain    Cardiovascular:      Rate and Rhythm: Normal rate and regular rhythm  Pulmonary:      Effort: Pulmonary effort is normal       Breath sounds: Normal breath sounds     Abdominal: General: Bowel sounds are normal       Palpations: Abdomen is soft  Musculoskeletal:         General: Normal range of motion  Cervical back: Normal range of motion and neck supple  Skin:     General: Skin is warm and dry  Neurological:      Mental Status: She is alert and oriented to person, place, and time  Comments: Slight weakness in the upper face with L eyelid drooping andd inability to completely close her lid  Complete L facial droop   No other neuro deficits          Vital Signs  ED Triage Vitals [06/29/22 1557]   Temperature Pulse Respirations Blood Pressure SpO2   98 4 °F (36 9 °C) 101 18 151/74 93 %      Temp Source Heart Rate Source Patient Position - Orthostatic VS BP Location FiO2 (%)   Oral Monitor Sitting Left arm --      Pain Score       --           Vitals:    06/29/22 1818 06/29/22 1939 06/29/22 2015 06/29/22 2122   BP: 147/82  138/76 135/63   Pulse: 95 93 92 92   Patient Position - Orthostatic VS: Sitting   Lying         Visual Acuity  Visual Acuity    Flowsheet Row Most Recent Value   L Pupil Size (mm) 4   R Pupil Size (mm) 4          ED Medications  Medications   predniSONE tablet 60 mg (has no administration in time range)   heparin (porcine) subcutaneous injection 5,000 Units (5,000 Units Subcutaneous Given 6/29/22 2151)   acetaminophen (TYLENOL) tablet 650 mg (has no administration in time range)   ALPRAZolam (XANAX) tablet 2 mg (2 mg Oral Given 6/29/22 2151)   QUEtiapine (SEROquel) tablet 400 mg (400 mg Oral Given 6/29/22 2152)   QUEtiapine (SEROquel) tablet 100 mg (has no administration in time range)   gabapentin (NEURONTIN) capsule 300 mg (has no administration in time range)   nicotine (NICODERM CQ) 14 mg/24hr TD 24 hr patch 1 patch (1 patch Transdermal Medication Applied 6/29/22 2151)   levalbuterol (XOPENEX) inhalation solution 1 25 mg (has no administration in time range)   ipratropium (ATROVENT) 0 02 % inhalation solution 0 5 mg (has no administration in time range) ipratropium-albuterol (DUO-NEB) 0 5-2 5 mg/3 mL inhalation solution 3 mL (has no administration in time range)   methylPREDNISolone sodium succinate (Solu-MEDROL) injection 125 mg (125 mg Intravenous Given 6/29/22 1743)   tetracaine 0 5 % ophthalmic solution 1 drop (1 drop Left Eye Given 6/29/22 1741)   fluorescein sodium sterile ophthalmic strip 1 strip (1 strip Left Eye Given 6/29/22 1741)   doxycycline (VIBRAMYCIN) 100 mg in sodium chloride 0 9 % 100 mL IVPB (100 mg Intravenous New Bag 6/29/22 2004)       Diagnostic Studies  Results Reviewed     Procedure Component Value Units Date/Time    HS Troponin I 4hr [[id number]]     Lab Status: No result Specimen: Blood     Fingerstick Glucose (POCT) [[id number]]  (Normal) Collected: 06/29/22 1818    Lab Status: Final result Updated: 06/29/22 1906     POC Glucose 95 mg/dl     HS Troponin 0hr (reflex protocol) [[id number]]  (Normal) Collected: 06/29/22 1740    Lab Status: Final result Specimen: Blood from Arm, Right Updated: 06/29/22 1815     hs TnI 0hr 2 ng/L     HS Troponin I 2hr [[id number]]     Lab Status: No result Specimen: Blood     Comprehensive metabolic panel [[id number]]  (Abnormal) Collected: 06/29/22 1740    Lab Status: Final result Specimen: Blood from Arm, Right Updated: 06/29/22 1808     Sodium 143 mmol/L      Potassium 4 1 mmol/L      Chloride 101 mmol/L      CO2 33 mmol/L      ANION GAP 9 mmol/L      BUN 16 mg/dL      Creatinine 0 91 mg/dL      Glucose 95 mg/dL      Calcium 10 2 mg/dL      AST 72 U/L       U/L      Alkaline Phosphatase 156 U/L      Total Protein 9 1 g/dL      Albumin 4 5 g/dL      Total Bilirubin 0 30 mg/dL      eGFR 64 ml/min/1 73sq m     Narrative:      Breana guidelines for Chronic Kidney Disease (CKD):     Stage 1 with normal or high GFR (GFR > 90 mL/min/1 73 square meters)    Stage 2 Mild CKD (GFR = 60-89 mL/min/1 73 square meters)    Stage 3A Moderate CKD (GFR = 45-59 mL/min/1 73 square meters)   Stage 3B Moderate CKD (GFR = 30-44 mL/min/1 73 square meters)    Stage 4 Severe CKD (GFR = 15-29 mL/min/1 73 square meters)    Stage 5 End Stage CKD (GFR <15 mL/min/1 73 square meters)  Note: GFR calculation is accurate only with a steady state creatinine    Protime-INR [[id number]]  (Normal) Collected: 06/29/22 1740    Lab Status: Final result Specimen: Blood from Arm, Right Updated: 06/29/22 1808     Protime 11 9 seconds      INR 0 91    APTT [[id number]]  (Normal) Collected: 06/29/22 1740    Lab Status: Final result Specimen: Blood from Arm, Right Updated: 06/29/22 1808     PTT 33 seconds     CBC and Platelet [[id number]]  (Abnormal) Collected: 06/29/22 1740    Lab Status: Final result Specimen: Blood from Arm, Right Updated: 06/29/22 1751     WBC 8 20 Thousand/uL      RBC 4 46 Million/uL      Hemoglobin 15 2 g/dL      Hematocrit 45 8 %       fL      MCH 34 1 pg      MCHC 33 2 g/dL      RDW 13 8 %      Platelets 081 Thousands/uL      MPV 9 8 fL     Lyme Antibody Profile with reflex to [facility] [[id number]] Collected: 06/29/22 1740    Lab Status: In process Specimen: Blood from Arm, Right Updated: 06/29/22 1749    COVID/FLU/RSV - 2 hour TAT [[id number]]  (Normal) Collected: 06/29/22 1658    Lab Status: Final result Specimen: Nares from Nose Updated: 06/29/22 1743     SARS-CoV-2 Negative     INFLUENZA A PCR Negative     INFLUENZA B PCR Negative     RSV PCR Negative    Narrative:      FOR PEDIATRIC PATIENTS - copy/paste COVID Guidelines URL to browser: [email or web address]    SARS-CoV-2 assay is a Nucleic Acid Amplification assay intended for the  qualitative detection of nucleic acid from SARS-CoV-2 in nasopharyngeal  swabs  Results are for the presumptive identification of SARS-CoV-2 RNA  Positive results are indicative of infection with SARS-CoV-2, the virus  causing COVID-19, but do not rule out bacterial infection or co-infection  with other viruses  Laboratories within the [facility] and its  territories are required to report all positive results to the appropriate  public health authorities  Negative results do not preclude SARS-CoV-2  infection and should not be used as the sole basis for treatment or other  patient management decisions  Negative results must be combined with  clinical observations, patient history, and epidemiological information  This test has not been FDA cleared or approved  This test has been authorized by FDA under an Emergency Use Authorization  (EUA)  This test is only authorized for the duration of time the  declaration that circumstances exist justifying the authorization of the  emergency use of an in vitro diagnostic tests for detection of SARS-CoV-2  virus and/or diagnosis of COVID-19 infection under section 564(b)(1) of  the Act, 21 U  S C  628JKB-5(G)(4), unless the authorization is terminated  or revoked sooner  The test has been validated but independent review by FDA  and CLIA is pending  Test performed using [employer]pert: This RT-PCR assay targets N2,  a region unique to SARS-CoV-2  A conserved region in the E-gene was chosen  for pan-Sarbecovirus detection which includes SARS-CoV-2  CT head without contrast   Final Result by Jun Brannon DO (06/29 1726)      Mild microangiopathic change within the brain parenchyma with no mass, hemorrhage or signs of acute territorial infarction  Nodular density within the anterior midline of the suprasellar cistern on series 2 image 15 which 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  Follow-up CT angiography or MR angiography of the brain recommended  This examination was marked "immediate notification" in Epic in order to begin the standard process by which the radiology reading room liaison alerts the referring practitioner                      Workstation performed: [id number]         XR chest 2 views    (Results Pending)              Procedures  Procedures         ED Course                                             MDM  Number of Diagnoses or Management Options  Bell's palsy: new and requires workup  [facility] new and requires workup  Hypoxia: new and requires workup  Diagnosis management comments: Patient with facial weakness likely 2/2 bells palsy  Patient also noted to be hypoxic on exam likely 2/2 undiagnosed copd- will get labs, covid, lyme, cxr, ct head, and give steroids, doxy and admit  Patient reevaluated and feels improved  Patient updated on results of tests and plan of care including admission to hospital for further evaluation of presenting complaint  Patient demonstrates verbal understanding and agrees with plan  Report to Dr Manjinder Murray with [facility] for continuation of patient care          Amount and/or Complexity of Data Reviewed  Clinical lab tests: ordered and reviewed  Tests in the radiology section of CPT®: ordered and reviewed  Tests in the medicine section of CPT®: ordered and reviewed  Discussion of test results with the performing providers: yes  Decide to obtain previous medical records or to obtain history from someone other than the patient: yes  Obtain history from someone other than the patient: yes  Review and summarize past medical records: yes  Discuss the patient with other providers: yes  Independent visualization of images, tracings, or specimens: yes    Patient Progress  Patient progress: improved      Disposition  Final diagnoses:   Bell's palsy   Hypoxia   COPD (chronic obstructive pulmonary disease) ([facility] )     Time reflects when diagnosis was documented in both MDM as applicable and the Disposition within this note     Time User Action Codes Description Comment    6/29/2022  6:37 PM Suzanna Budd A Add [G51 0] Bell's palsy     6/29/2022  6:37 PM Suzanna Budd A Add [R09 02] Hypoxia     6/29/2022  6:37 PM Suzanna Budd A Add [J44 9] COPD (chronic obstructive pulmonary disease) ([facility] )     6/29/2022  6:53 PM Fabiana Gallagher Add [R29 810] Facial weakness       ED Disposition     ED Disposition   Admit    Condition   Stable    Date/Time   Wed Jun 29, 2022  6:37 PM    Comment   Case was discussed with DENNIS and the patient's admission status was agreed to be Admission Status: observation status to the service of Dr Dimple Fuller   Follow-up Information    None         Patient's Medications   Discharge Prescriptions    No medications on file       No discharge procedures on file      PDMP Review       Value Time User    PDMP Reviewed  Yes 4/20/2021  2:09 PM Marylen Silence, MD          ED Provider  Electronically Signed by           Rima Herrera DO  06/29/22 3215

## 2022-06-30 NOTE — ASSESSMENT & PLAN NOTE
· Patient presented to the ED with complaints of shortness of breath  · Acute respiratory failure with hypoxia evidenced by 87% on room air on arrival   · Patient requiring 2 L of supplemental oxygen; now on 4 L  · Likely secondary to undiagnosed COPD  · Patient received 125 mg Solu Medrol in the ER; will continue on IV Solu-Medrol 40 mg every 6 hours    · ATC Nebulizers  · Will likely need home O2 eval prior to discharge  · Will need formal PFTs for formal diagnosis of COPD which can be done outpatient

## 2022-06-30 NOTE — ASSESSMENT & PLAN NOTE
· Presented with facial weakness suspect secondary to Bell's palsy events facial weakness of upper and lower face  · Lymes testing pending  · CT Head (6/29/22):  Nodular density within the anterior midline of the suprasellar cistern on series 2 image 15 which 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" Follow-up CT angiography or MR angiography of the brain recommended  · Neurology consult, appreciate input

## 2022-06-30 NOTE — CONSULTS
Consultation - Neurology   Scot Montgomery 81 y o  female MRN: [id number]  Unit/Bed#: ED 12 Encounter: [id number]      Assessment/Plan     Facial weakness  Assessment & Plan  80 y/o female with anxiety, depression, tobacco use, who presented with complaints of left facial weakness x 3-4 days prior to admission  Patient also endorses frequent tearing of left eye  BP on presentation 151/74  On exam, patient exhibits complete left facial weakness without other deficits noted  Suspect most likely Bell's palsy  Plan:  - Solumedrol 40 mg Q8H (also on for COPD)  - S/p doxycycline x 1  - Lyme pending  - Artifical tears PRN to left eye  - Monitor neuro exam; notify with any changes  - Medical management and supportive care per primary team  Correction of any metabolic or infectious disturbances  Results:  - CT head:  1  Mild microangiopathic change within the brain parenchyma with no mass, hemorrhage or signs of acute territorial infarction  2  Nodular density within the anterior midline of the suprasellar cistern on series 2 image 15 which 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  Follow-up CT angiography or MR angiography of the brain recommended  - COVID/flu/RSV negative, , AST 72, alk phos 156    * Acute respiratory failure with hypoxia (HCC)  Assessment & Plan  - On Solumedrol for COPD  - Nasal cannula in place  - Medical management per primary team      Recommendations for outpatient neurological follow up have yet to be determined  Case and treatment plan reviewed with attending neurologist, Dr Adrain Sicard       History of Present Illness     Reason for Consult / Principal Problem: Facial weakness  HPI: Scot Montgomery is a 81 y o   female with anxiety, depression, tobacco use, who presents with facial weakness  Per chart review, patient presented with left facial weakness/numbness x 3-4 days as well as shortness of breath  Patient seen and evaluated at the bedside    She states that approximately 4-5 days ago, she started having left facial weakness and right perioral numbness  She denies any shortness of breath  She does endorse frequent tearing from the left eye  She denies any headache, visual disturbances, weakness, pain, dizziness/lightheadedness  She denies ever having these symptoms before  Inpatient consult to Neurology  Consult performed by: SINDY Gomez  Consult ordered by: Felipe Coreas MD        Review of Systems   Constitutional: Negative for fever  HENT: Negative for trouble swallowing  Eyes: Negative for photophobia, pain and visual disturbance  Left eye tearing   Respiratory: Negative for shortness of breath  Cardiovascular: Negative for chest pain  Gastrointestinal: Negative for abdominal pain  Musculoskeletal: Negative for arthralgias, back pain, myalgias and neck pain  Skin: Negative for rash  Neurological: Positive for facial asymmetry and numbness (Right perioral)  Negative for dizziness, tremors, seizures, syncope, speech difficulty, weakness, light-headedness and headaches  Psychiatric/Behavioral: Negative for confusion  All other systems reviewed and are negative  Historical Information   Past Medical History:   Diagnosis Date    Anxiety     Depression      History reviewed  No pertinent surgical history    Social History   Social History     Substance and Sexual Activity   Alcohol Use Not Currently     Social History     Substance and Sexual Activity   Drug Use Never     E-Cigarette/Vaping    E-Cigarette Use Never User      E-Cigarette/Vaping Substances    Nicotine No     THC No     CBD No     Flavoring No     Other No     Unknown No      Social History     Tobacco Use   Smoking Status Current Every Day Smoker    Packs/day: 0 50   Smokeless Tobacco Never Used     Family History:   Family History   Problem Relation Age of Onset    Diabetes Mother     Stroke Father     No Known Problems Sister     No Known Problems Sister     No Known Problems Sister        Review of previous medical records was completed  Meds/Allergies   all current active meds have been reviewed, current meds:   Current Facility-Administered Medications   Medication Dose Route Frequency    acetaminophen (TYLENOL) tablet 650 mg  650 mg Oral Q6H PRN    ALPRAZolam (XANAX) tablet 2 mg  2 mg Oral 4x Daily PRN    azithromycin (ZITHROMAX) tablet 500 mg  500 mg Oral Q24H    gabapentin (NEURONTIN) capsule 300 mg  300 mg Oral TID    heparin (porcine) subcutaneous injection 5,000 Units  5,000 Units Subcutaneous Q8H [address]    ipratropium (ATROVENT) 0 02 % inhalation solution 0 5 mg  0 5 mg Nebulization TID    ipratropium-albuterol (DUO-NEB) 0 5-2 5 mg/3 mL inhalation solution 3 mL  3 mL Nebulization Q4H PRN    levalbuterol (XOPENEX) inhalation solution 1 25 mg  1 25 mg Nebulization TID    methylPREDNISolone sodium succinate (Solu-MEDROL) injection 40 mg  40 mg Intravenous Q8H [address]    nicotine (NICODERM CQ) 14 mg/24hr TD 24 hr patch 1 patch  1 patch Transdermal Daily    polyvinyl alcohol (LIQUIFILM TEARS) 1 4 % ophthalmic solution 1 drop  1 drop Left Eye Q3H PRN    QUEtiapine (SEROquel) tablet 100 mg  100 mg Oral QAM    QUEtiapine (SEROquel) tablet 400 mg  400 mg Oral HS    and PTA meds:   Prior to Admission Medications   Prescriptions Last Dose Informant Patient Reported? Taking?    ALPRAZolam (XANAX) 2 MG tablet 6/29/2022 at Unknown time  Yes Yes   Sig: Take 2 mg by mouth 4 (four) times a day   Multiple Vitamin (multivitamin) tablet 6/29/2022 at Unknown time  Yes Yes   Sig: Take 1 tablet by mouth daily   Omega-3 Fatty Acids (fish oil) 1,000 mg 6/29/2022 at Unknown time  Yes Yes   Sig: Take 1,000 mg by mouth daily   QUEtiapine (SEROquel) 100 mg tablet 6/29/2022 at Unknown time  Yes Yes   Sig: Take 100 mg by mouth every morning   QUEtiapine (SEROquel) 400 MG tablet 6/28/2022 at Unknown time  Yes Yes   Sig: Take 400 mg by mouth daily at bedtime   gabapentin (NEURONTIN) 100 mg capsule 6/29/2022 at Unknown time  Yes Yes   Sig: Take 300 mg by mouth 3 (three) times a day   vitamin B-12 (VITAMIN B-12) 1,000 mcg tablet 6/29/2022 at Unknown time  Yes Yes   Sig: Take by mouth daily      Facility-Administered Medications: None       No Known Allergies    Objective   Vitals:Blood pressure 141/76, pulse 101, temperature 98 °F (36 7 °C), resp  rate 18, height 5' 3" (1 6 m), weight 67 1 kg (147 lb 14 9 oz), SpO2 90 %  ,Body mass index is 26 2 kg/m²  Intake/Output Summary (Last 24 hours) at 6/30/2022 1532  Last data filed at 6/29/2022 2249  Gross per 24 hour   Intake 340 ml   Output --   Net 340 ml       Invasive Devices: Invasive Devices  Report    Peripheral Intravenous Line  Duration           Peripheral IV 06/29/22 Right Wrist <1 day                Physical Exam  Vitals and nursing note reviewed  Constitutional:       General: She is not in acute distress  Appearance: She is not ill-appearing or diaphoretic  HENT:      Head: Normocephalic  Mouth/Throat:      Mouth: Mucous membranes are moist       Pharynx: Oropharynx is clear  Eyes:      General: No scleral icterus  Right eye: No discharge  Left eye: No discharge  Extraocular Movements: Extraocular movements intact and EOM normal       Conjunctiva/sclera:      Right eye: Right conjunctiva is not injected  Left eye: Left conjunctiva is injected  Cardiovascular:      Rate and Rhythm: Normal rate  Pulmonary:      Effort: Pulmonary effort is normal  No respiratory distress  Comments: Nasal cannula in place  Musculoskeletal:         General: Normal range of motion  Cervical back: Normal range of motion  Skin:     General: Skin is warm and dry  Coloration: Skin is not jaundiced or pale  Neurological:      Mental Status: She is alert and oriented to person, place, and time        Coordination: Finger-Nose-Finger Test normal       Deep Tendon Reflexes:      Reflex Scores:       Bicep reflexes are 2+ on the right side and 2+ on the left side  Brachioradialis reflexes are 2+ on the right side and 2+ on the left side  Patellar reflexes are 2+ on the right side and 2+ on the left side  Psychiatric:         Mood and Affect: Mood normal        Neurologic Exam     Mental Status   Oriented to person, place, and time  Level of consciousness: alert  Able to follow commands appropriately  No dysarthria noted  Cranial Nerves     CN II   Right visual field deficit: none  Left visual field deficit: none     CN III, IV, VI   Extraocular motions are normal      CN V   Facial sensation intact  CN VIII   Hearing: intact    CN IX, X   Palate: symmetric    CN XI   CN XI normal      CN XII   CN XII normal    Left facial weakness  Decreased left eyebrow raise  Decreased left eyelid resistance     Motor Exam   Muscle bulk: normal  Right arm pronator drift: absent  Left arm pronator drift: absent  Bilateral upper extremity strength 5/5 deltoids, biceps, triceps, hand   Bilateral lower extremity strength 5/5 hip flexion, dorsiflexion, plantar flexion     Sensory Exam   Light touch normal      Gait, Coordination, and Reflexes     Coordination   Finger to nose coordination: normal    Tremor   Resting tremor: absent  Intention tremor: absent    Reflexes   Right brachioradialis: 2+  Left brachioradialis: 2+  Right biceps: 2+  Left biceps: 2+  Right patellar: 2+  Left patellar: 2+      Lab Results:   I have personally reviewed pertinent reports    , CBC:   Results from last 7 days   Lab Units 06/30/22  0544 06/29/22  1740   WBC Thousand/uL 8 48 8 20   RBC Million/uL 4 46 4 46   HEMOGLOBIN g/dL 15 0 15 2   HEMATOCRIT % 45 5 45 8   MCV fL 102* 103*   PLATELETS Thousands/uL 339 351   , BMP/CMP:   Results from last 7 days   Lab Units 06/30/22  0544 06/29/22  1740   SODIUM mmol/L 144 143   POTASSIUM mmol/L 4 4 4 1   CHLORIDE mmol/L 102 101   CO2 mmol/L 30 33*   BUN mg/dL 24 16 CREATININE mg/dL 0 98 0 91   CALCIUM mg/dL 9 9 10 2*   AST U/L  --  72*   ALT U/L  --  168*   ALK PHOS U/L  --  156*   EGFR ml/min/1 73sq m 58 64   , Vitamin B12:   , HgBA1C:   , TSH:   , Coagulation:   Results from last 7 days   Lab Units 06/29/22  1740   INR  0 91   , Lipid Profile:   , Ammonia:   , Urinalysis:       Invalid input(s): URIBILINOGEN, Drug Screen:   , Medication Drug Levels:       Invalid input(s): CARBAMAZEPINE,  PHENOBARB, LACOSAMIDE, OXCARBAZEPINE     Imaging Studies: I have personally reviewed pertinent reports  EKG, Pathology, and Other Studies: I have personally reviewed pertinent reports      VTE Prophylaxis: Sequential compression device (Venodyne)  and Heparin    Code Status: Level 1 - Full Code  Advance Directive and Living Will:      Power of :    POLST:

## 2022-06-30 NOTE — CASE MANAGEMENT
Case Management Assessment & Discharge Planning Note    Patient name Rosy Echavarria  Location ED 12/ED 12 MRN [id number]  : 1950 Date 2022       Current Admission Date: 2022  Current Admission Diagnosis:Acute respiratory failure with hypoxia [facility])   Patient Active Problem List    Diagnosis Date Noted    [facility] 20[facility]    Facial weakness 2022    Anxiety and depression 2021    History of seizure disorder 2021    Depression, recurrent ([facility] ) 2021    BMI 26 0-26 9,adult 2021    Tobacco dependence 2021    Hallucinations 2021      LOS (days): 0  Geometric Mean LOS (GMLOS) (days):   Days to GMLOS:     OBJECTIVE:              Current admission status: Observation       Preferred Pharmacy:   [facility] [address] [place] [address], [address]  Phone: [phone] Fax: [phone]    Primary Care Provider: Merritt Sanabria MD    Primary Insurance: MEDICARE  Secondary Insurance: BLUE CROSS    ASSESSMENT:  [address] , [address] Representative - Spouse   Primary Phone: [phone] (Mobile)  Home Phone: [phone]               Advance Directives  Does patient have a [address]?: No  Was patient offered paperwork?: Yes (not interested at this time)  Does patient currently have a Health Care decision maker?: Yes, please see Health Care Proxy section  Does patient have Advance Directives?: No  Was patient offered paperwork?: Yes         Readmission Root Cause  30 Day Readmission: No    Patient Information  Admitted from[de-identified] Home  Mental Status: Alert  During Assessment patient was accompanied by: Not accompanied during assessment  Assessment information provided by[de-identified] Patient  Primary Caregiver: Self  Support Systems: Spouse/significant other  [place] of Residence: [place] [address] do you live in?: [facility] entry access options  Select all that apply : Stairs  Number of steps to enter home : 4  Do the steps have railings?: Yes  Type of Current Residence: 2 story home  Upon entering residence, is there a bedroom on the main floor (no further steps)?: No  A bedroom is located on the following floor levels of residence (select all that apply):: 2nd Floor  Upon entering residence, is there a bathroom on the main floor (no further steps)?: Yes  Number of steps to 2nd floor from main floor: One Flight  In the last 12 months, was there a time when you were not able to pay the mortgage or rent on time?: No  In the last 12 months, how many places have you lived?: 1  In the last 12 months, was there a time when you did not have a steady place to sleep or slept in a shelter (including now)?: No  Homeless/housing insecurity resource given?: N/A  Living Arrangements: Lives w/ Spouse/significant other  Is patient a ?: No    Activities of Daily Living Prior to Admission  Functional Status: Independent  Completes ADLs independently?: Yes  Ambulates independently?: Yes  Does patient use assisted devices?: No  Does patient currently own DME?: No  Does patient have a history of Outpatient Therapy (PT/OT)?: No  Does the patient have a history of Short-Term Rehab?: No  Does patient have a history of HH?: No  Does patient currently have [place] [id number]?: No         Patient Information Continued  Income Source: Unemployed  Does patient have prescription coverage?: Yes  Within the past 12 months, you worried that your food would run out before you got the money to buy more : Never true  Within the past 12 months, the food you bought just didn't last and you didn't have money to get more : Never true  Food insecurity resource given?: N/A  Does patient receive dialysis treatments?: No  Does patient have a history of substance abuse?: No  Does patient have a history of Mental Health Diagnosis?: Yes (depression)  Is patient receiving treatment for mental health?: Yes  Has patient received inpatient treatment related to mental health in the last 2 years?: No         Means of Transportation  Means of Transport to Appts[de-identified] Drives Self  In the past 12 months, has lack of transportation kept you from medical appointments or from getting medications?: No  In the past 12 months, has lack of transportation kept you from meetings, work, or from getting things needed for daily living?: No  Was application for public transport provided?: N/A        DISCHARGE DETAILS:    Discharge planning discussed with[de-identified] patient  Freedom of Choice: Yes  Comments - Freedom of Choice: pt will communicate with her family herself  CM contacted family/caregiver?: Yes  Were Treatment Team discharge recommendations reviewed with patient/caregiver?: Yes  Did patient/caregiver verbalize understanding of patient care needs?: Yes  Were patient/caregiver advised of the risks associated with not following Treatment Team discharge recommendations?: No- see comments         Requested [address]         Is the patient interested in [facility] at discharge?: No    DME Referral Provided  Referral made for DME?: No              Treatment Team Recommendation: Home  Discharge Destination Plan[de-identified] Home  Transport at Discharge : Family, Automobile

## 2022-06-30 NOTE — PROGRESS NOTES
[facility]  Progress Note Jaxon Harley 1950, 81 y o  female MRN: [id number]  Unit/Bed#: ED 12 Encounter: [id number]  Primary Care Provider: Saadia Mc MD   Date and time admitted to hospital: 6/29/2022  4:14 PM    * Acute respiratory failure with hypoxia [facility])  Assessment & Plan  · Patient presented to the ED with complaints of shortness of breath  · Acute respiratory failure with hypoxia evidenced by 87% on room air on arrival   · Patient requiring 2 L of supplemental oxygen; now on 4 L  · Likely secondary to undiagnosed COPD  · Patient received 125 mg Solu Medrol in the ER; will continue on IV Solu-Medrol 40 mg every 6 hours  · ATC Nebulizers  · Will likely need home O2 eval prior to discharge  · Will need formal PFTs for formal diagnosis of COPD which can be done outpatient    Facial weakness  Assessment & Plan  · Presented with facial weakness suspect secondary to Bell's palsy events facial weakness of upper and lower face  · Lymes testing pending  · CT Head (6/29/22): Nodular density within the anterior midline of the suprasellar cistern on series 2 image 15 which 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" Follow-up CT angiography or MR angiography of the brain recommended  · Neurology consult, appreciate input    Tobacco dependence  Assessment & Plan  · Nicotine patch    Depression, recurrent ([facility] )  Assessment & Plan  · Continue home meds      VTE Pharmacologic Prophylaxis: VTE Score: 5 High Risk (Score >/= 5) - Pharmacological DVT Prophylaxis Ordered: heparin  Sequential Compression Devices Ordered  Patient Centered Rounds: I performed bedside rounds with nursing staff today  Discussions with Specialists or Other Care Team Provider: Case Management    Education and Discussions with Family / Patient: Patient declined call to   Time Spent for Care: 20 minutes   More than 50% of total time spent on counseling and coordination of care as described above  Current Length of Stay: 0 day(s)  Current Patient Status: Observation   Certification Statement: The patient will continue to require additional inpatient hospital stay due to ongoing treatment in setting of COPD exacerbation, [place] Palsy work up  Discharge Plan: Anticipate discharge in 24-48 hrs to home  Code Status: Level 1 - Full Code    Subjective:   CC: "Can I go home"    Patient resting in bed, denies complaints of chest pain, shortness of breath, fever, or chills  Requesting to go home, discussed with her plan of care including that she remains on oxygen at this time  Attempted to wean to RA, noted to be 87%    Objective:     Vitals:   Temp (24hrs), Av 2 °F (36 8 °C), Min:98 °F (36 7 °C), Max:98 4 °F (36 9 °C)    Temp:  [98 °F (36 7 °C)-98 4 °F (36 9 °C)] 98 °F (36 7 °C)  HR:  [] 97  Resp:  [18-27] 19  BP: (118-151)/(63-84) 141/77  SpO2:  [90 %-97 %] 90 %  Body mass index is 26 2 kg/m²  Input and Output Summary (last 24 hours): Intake/Output Summary (Last 24 hours) at 2022 1213  Last data filed at 2022 2249  Gross per 24 hour   Intake 340 ml   Output --   Net 340 ml       Physical Exam:   Physical Exam  Vitals and nursing note reviewed  Constitutional:       General: She is not in acute distress  Appearance: She is normal weight  She is not ill-appearing  Cardiovascular:      Rate and Rhythm: Normal rate  Pulses: Normal pulses  Heart sounds: Normal heart sounds  Pulmonary:      Effort: Pulmonary effort is normal  No tachypnea, bradypnea or respiratory distress  Breath sounds: Decreased breath sounds present  Comments: 3L 92%; RA 87%  Abdominal:      General: Bowel sounds are normal       Palpations: Abdomen is soft  Musculoskeletal:         General: Normal range of motion  Right lower leg: No edema  Left lower leg: No edema  Skin:     General: Skin is warm and dry     Neurological:      Mental Status: She is alert and oriented to person, place, and time  Psychiatric:         Mood and Affect: Mood normal           Additional Data:     Labs:  Results from last 7 days   Lab Units 06/30/22  0544   WBC Thousand/uL 8 48   HEMOGLOBIN g/dL 15 0   HEMATOCRIT % 45 5   PLATELETS Thousands/uL 339   NEUTROS PCT % 86*   LYMPHS PCT % 13*   MONOS PCT % 1*   EOS PCT % 0     Results from last 7 days   Lab Units 06/30/22  0544 06/29/22  1740   SODIUM mmol/L 144 143   POTASSIUM mmol/L 4 4 4 1   CHLORIDE mmol/L 102 101   CO2 mmol/L 30 33*   BUN mg/dL 24 16   CREATININE mg/dL 0 98 0 91   ANION GAP mmol/L 12 9   CALCIUM mg/dL 9 9 10 2*   ALBUMIN g/dL  --  4 5   TOTAL BILIRUBIN mg/dL  --  0 30   ALK PHOS U/L  --  156*   ALT U/L  --  168*   AST U/L  --  72*   GLUCOSE RANDOM mg/dL 174* 95     Results from last 7 days   Lab Units 06/29/22  1740   INR  0 91     Results from last 7 days   Lab Units 06/29/22  1818   POC GLUCOSE mg/dl 95         Results from last 7 days   Lab Units 06/30/22  0544   PROCALCITONIN ng/ml <0 05       Lines/Drains:  Invasive Devices  Report    Peripheral Intravenous Line  Duration           Peripheral IV 06/29/22 Right Wrist <1 day                  Imaging: No pertinent imaging reviewed      Recent Cultures (last 7 days):         Last 24 Hours Medication List:   Current Facility-Administered Medications   Medication Dose Route Frequency Provider Last Rate    acetaminophen  650 mg Oral Q6H PRN Prakash Tomas MD      ALPRAZolam  2 mg Oral 4x Daily PRN Prakash Tomas MD      gabapentin  300 mg Oral TID Prakash Tomas MD      heparin (porcine)  5,000 Units Subcutaneous Q8H [address] Prakash Tomas MD      ipratropium  0 5 mg Nebulization TID Prakash Tomas MD      ipratropium-albuterol  3 mL Nebulization Q4H PRN Prakash Tomas MD      levalbuterol  1 25 mg Nebulization TID Prakash Tomas MD      methylPREDNISolone sodium succinate  40 mg Intravenous Q8H [address] SINDY Degroot      nicotine  1 patch Transdermal Daily Prakash Nguyễn Palafox MD      polyvinyl alcohol  1 drop Left Eye Q3H PRN SINDY Gomez      QUEtiapine  100 mg Oral QAM Prakash Tomas MD      QUEtiapine  400 mg Oral HS Lalitha Whitney MD          Today, Patient Was Seen By: SINDY Polanco    **Please Note: This note may have been constructed using a voice recognition system  **

## 2022-06-30 NOTE — ED NOTES
This RN noted bloodwork overdue from yesterday evening; per previous shift, 2hr and 4hr troponins were not needed and previous shift was told not to draw  Current provider made aware, awaiting further orders        Dalila Whiting RN  06/30/22 7073

## 2022-06-30 NOTE — ASSESSMENT & PLAN NOTE
80 y/o female with anxiety, depression, tobacco use, who presented with complaints of left facial weakness x 3-4 days prior to admission  Patient also endorses frequent tearing of left eye  BP on presentation 151/74  On exam, patient exhibits complete left facial weakness without other deficits noted  Suspect most likely Bell's palsy  Plan:  - Solumedrol 40 mg Q8H (also on for COPD)  - S/p doxycycline x 1  - Lyme pending  - Artifical tears PRN to left eye  - Monitor neuro exam; notify with any changes  - Medical management and supportive care per primary team  Correction of any metabolic or infectious disturbances  Results:  - CT head:  1  Mild microangiopathic change within the brain parenchyma with no mass, hemorrhage or signs of acute territorial infarction  2  Nodular density within the anterior midline of the suprasellar cistern on series 2 image 15 which 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

## 2022-07-01 VITALS
HEIGHT: 63 IN | OXYGEN SATURATION: 94 % | DIASTOLIC BLOOD PRESSURE: 92 MMHG | HEART RATE: 88 BPM | TEMPERATURE: 98.7 F | WEIGHT: 147.93 LBS | BODY MASS INDEX: 26.21 KG/M2 | RESPIRATION RATE: 20 BRPM | SYSTOLIC BLOOD PRESSURE: 144 MMHG

## 2022-07-01 LAB

## 2022-07-01 PROCEDURE — 94640 AIRWAY INHALATION TREATMENT: CPT

## 2022-07-01 PROCEDURE — 94761 N-INVAS EAR/PLS OXIMETRY MLT: CPT

## 2022-07-01 PROCEDURE — 94760 N-INVAS EAR/PLS OXIMETRY 1: CPT

## 2022-07-01 PROCEDURE — 99232 SBSQ HOSP IP/OBS MODERATE 35: CPT | Performed by: NURSE PRACTITIONER

## 2022-07-01 RX ORDER — METHYLPREDNISOLONE SODIUM SUCCINATE 40 MG/ML
40 INJECTION, POWDER, LYOPHILIZED, FOR SOLUTION INTRAMUSCULAR; INTRAVENOUS EVERY 12 HOURS SCHEDULED
Status: DISCONTINUED | OUTPATIENT
Start: 2022-07-01 | End: 2022-07-01 | Stop reason: HOSPADM

## 2022-07-01 RX ORDER — POLYVINYL ALCOHOL 14 MG/ML
1 SOLUTION/ DROPS OPHTHALMIC
Qty: 15 ML | Refills: 0 | Status: SHIPPED | OUTPATIENT
Start: 2022-07-01 | End: 2022-07-25 | Stop reason: SDUPTHER

## 2022-07-01 RX ORDER — PREDNISONE 20 MG/1
TABLET ORAL
Qty: 29 TABLET | Refills: 0 | Status: SHIPPED | OUTPATIENT
Start: 2022-07-01 | End: 2022-07-13

## 2022-07-01 RX ORDER — AZITHROMYCIN 500 MG/1
500 TABLET, FILM COATED ORAL EVERY 24 HOURS
Qty: 1 TABLET | Refills: 0 | Status: SHIPPED | OUTPATIENT
Start: 2022-07-02 | End: 2022-07-03

## 2022-07-01 RX ADMIN — GABAPENTIN 300 MG: 300 CAPSULE ORAL at 09:33

## 2022-07-01 RX ADMIN — HEPARIN SODIUM 5000 UNITS: 5000 INJECTION INTRAVENOUS; SUBCUTANEOUS at 14:09

## 2022-07-01 RX ADMIN — NICOTINE 1 PATCH: 14 PATCH, EXTENDED RELEASE TRANSDERMAL at 09:34

## 2022-07-01 RX ADMIN — POLYVINYL ALCOHOL 1 DROP: 14 SOLUTION/ DROPS OPHTHALMIC at 09:36

## 2022-07-01 RX ADMIN — POLYVINYL ALCOHOL 1 DROP: 14 SOLUTION/ DROPS OPHTHALMIC at 16:49

## 2022-07-01 RX ADMIN — LEVALBUTEROL 1.25 MG: 1.25 SOLUTION, CONCENTRATE RESPIRATORY (INHALATION) at 13:22

## 2022-07-01 RX ADMIN — QUETIAPINE FUMARATE 100 MG: 100 TABLET ORAL at 09:33

## 2022-07-01 RX ADMIN — ALPRAZOLAM 2 MG: 0.5 TABLET ORAL at 09:33

## 2022-07-01 RX ADMIN — LEVALBUTEROL 1.25 MG: 1.25 SOLUTION, CONCENTRATE RESPIRATORY (INHALATION) at 08:23

## 2022-07-01 RX ADMIN — IPRATROPIUM BROMIDE 0.5 MG: 0.5 SOLUTION RESPIRATORY (INHALATION) at 13:22

## 2022-07-01 RX ADMIN — POLYVINYL ALCOHOL 1 DROP: 14 SOLUTION/ DROPS OPHTHALMIC at 14:28

## 2022-07-01 RX ADMIN — GABAPENTIN 300 MG: 300 CAPSULE ORAL at 16:48

## 2022-07-01 RX ADMIN — HEPARIN SODIUM 5000 UNITS: 5000 INJECTION INTRAVENOUS; SUBCUTANEOUS at 06:08

## 2022-07-01 RX ADMIN — METHYLPREDNISOLONE SODIUM SUCCINATE 40 MG: 40 INJECTION, POWDER, FOR SOLUTION INTRAMUSCULAR; INTRAVENOUS at 03:50

## 2022-07-01 RX ADMIN — ALPRAZOLAM 2 MG: 0.5 TABLET ORAL at 14:08

## 2022-07-01 RX ADMIN — IPRATROPIUM BROMIDE 0.5 MG: 0.5 SOLUTION RESPIRATORY (INHALATION) at 08:23

## 2022-07-01 RX ADMIN — AZITHROMYCIN 500 MG: 500 TABLET, FILM COATED ORAL at 14:08

## 2022-07-01 NOTE — CASE MANAGEMENT
Case Management Discharge Planning Note    Patient name Rise Petit  Location /-66 MRN [id number]  : 1950 Date 2022       Current Admission Date: 2022  Current Admission Diagnosis:Acute respiratory failure with hypoxia [facility])   Patient Active Problem List    Diagnosis Date Noted    [facility] with [facility] ) 2022    Facial weakness 2022    Anxiety and depression 2021    History of seizure disorder 2021    Depression, recurrent ([facility] ) 2021    BMI 26 0-26 9,adult 2021    Tobacco dependence 2021    Hallucinations 2021      LOS (days): 1  Geometric Mean LOS (GMLOS) (days): 3 70  Days to GMLOS:2 5     OBJECTIVE:  Risk of Unplanned Readmission Score: 11 51         Current admission status: Inpatient   Preferred Pharmacy:   [address] - [address] [place] [address] Drive  Phone: [phone] Fax: [phone]    Primary Care Provider: Jessica Alvarado MD    Primary Insurance: MEDICARE  Secondary Insurance: BLUE CROSS    DISCHARGE DETAILS:     Additional Comments: 02 approved via [place], portable 02 tank delivered to bedside  CM spoke with pt's  who is aware that he is able to pick pt up once 02 is delivered to the home  RN and SINDY Serna aware as well  Pt declining need for VNA at this time

## 2022-07-01 NOTE — CASE MANAGEMENT
Case Management Discharge Planning Note    Patient name Jesús Salinas  Location /-71 MRN [id number]  : 1950 Date 2022       Current Admission Date: 2022  Current Admission Diagnosis:Acute respiratory failure with hypoxia [facility])   Patient Active Problem List    Diagnosis Date Noted    Acute respiratory failure with hypoxia ([facility] ) 2022    Facial weakness 2022    Anxiety and depression 2021    History of seizure disorder 2021    Depression, recurrent ([facility] ) 2021    BMI 26 0-26 9,adult 2021    Tobacco dependence 2021    Hallucinations 2021      LOS (days): 1  Geometric Mean LOS (GMLOS) (days): 3 70  Days to GMLOS:2 6     OBJECTIVE:  Risk of Unplanned Readmission Score: 11 1         Current admission status: Inpatient   Preferred Pharmacy:   [address] - [address] [place] [address] Drive  Phone: [phone] Fax: [phone]    Primary Care Provider: Rory Meyers MD    Primary Insurance: MEDICARE  Secondary Insurance: BLUE CROSS    DISCHARGE DETAILS:    Additional Comments: Per respiratory note, pt qualifies for 3L 02 continuous  Order for 3L 02 placed via [place] and sent to [employer] for signature  CM department to follow for coordination of home 02

## 2022-07-01 NOTE — PLAN OF CARE
Problem: PAIN - ADULT  Goal: Verbalizes/displays adequate comfort level or baseline comfort level  Description: Interventions:  - Encourage patient to monitor pain and request assistance  - Assess pain using appropriate pain scale  - Administer analgesics based on type and severity of pain and evaluate response  - Implement non-pharmacological measures as appropriate and evaluate response  - Consider cultural and social influences on pain and pain management  - Notify physician/advanced practitioner if interventions unsuccessful or patient reports new pain  Outcome: Progressing     Problem: INFECTION - ADULT  Goal: Absence or prevention of progression during hospitalization  Description: INTERVENTIONS:  - Assess and monitor for signs and symptoms of infection  - Monitor lab/diagnostic results  - Monitor all insertion sites, i e  indwelling lines, tubes, and drains  - Monitor endotracheal if appropriate and nasal secretions for changes in amount and color  - [place] appropriate cooling/warming therapies per order  - Administer medications as ordered  - Instruct and encourage patient and family to use good hand hygiene technique  - Identify and instruct in appropriate isolation precautions for identified infection/condition  Outcome: Progressing     Problem: SAFETY ADULT  Goal: Patient will remain free of falls  Description: INTERVENTIONS:  - Educate patient/family on patient safety including physical limitations  - Instruct patient to call for assistance with activity   - Consult OT/PT to assist with strengthening/mobility   - Keep Call bell within reach  - Keep bed low and locked with side rails adjusted as appropriate  - Keep care items and personal belongings within reach  - Initiate and maintain comfort rounds  - Make Fall Risk Sign visible to staff  - Offer Toileting every  Hours, in advance of need  - Initiate/Maintain alarm  - Obtain necessary fall risk management equipment:   - Apply yellow socks and bracelet for high fall risk patients  - Consider moving patient to room near nurses station  Outcome: Progressing  Goal: Maintain or return to baseline ADL function  Description: INTERVENTIONS:  -  Assess patient's ability to carry out ADLs; assess patient's baseline for ADL function and identify physical deficits which impact ability to perform ADLs (bathing, care of mouth/teeth, toileting, grooming, dressing, etc )  - Assess/evaluate cause of self-care deficits   - Assess range of motion  - Assess patient's mobility; develop plan if impaired  - Assess patient's need for assistive devices and provide as appropriate  - Encourage maximum independence but intervene and supervise when necessary  - Involve family in performance of ADLs  - Assess for home care needs following discharge   - Consider OT consult to assist with ADL evaluation and planning for discharge  - Provide patient education as appropriate  Outcome: Progressing  Goal: Maintains/Returns to pre admission functional level  Description: INTERVENTIONS:  - Perform BMAT or MOVE assessment daily    - Set and communicate daily mobility goal to care team and patient/family/caregiver  - Collaborate with rehabilitation services on mobility goals if consulted  - Perform Range of Motion  times a day  - Reposition patient every  hours    - Dangle patient  times a day  - Stand patient  times a day  - Ambulate patient  times a day  - Out of bed to chair  times a day   - Out of bed for meals  times a day  - Out of bed for toileting  - Record patient progress and toleration of activity level   Outcome: Progressing     Problem: DISCHARGE PLANNING  Goal: Discharge to home or other facility with appropriate resources  Description: INTERVENTIONS:  - Identify barriers to discharge w/patient and caregiver  - Arrange for needed discharge resources and transportation as appropriate  - Identify discharge learning needs (meds, wound care, etc )  - Arrange for interpretive services to assist at discharge as needed  - Refer to Case Management Department for coordinating discharge planning if the patient needs post-hospital services based on physician/advanced practitioner order or complex needs related to functional status, cognitive ability, or social support system  Outcome: Progressing     Problem: Knowledge Deficit  Goal: Patient/family/caregiver demonstrates understanding of disease process, treatment plan, medications, and discharge instructions  Description: Complete learning assessment and assess knowledge base    Interventions:  - Provide teaching at level of understanding  - Provide teaching via preferred learning methods  Outcome: Progressing     Problem: RESPIRATORY - ADULT  Goal: Achieves optimal ventilation and oxygenation  Description: INTERVENTIONS:  - Assess for changes in respiratory status  - Assess for changes in mentation and behavior  - Position to facilitate oxygenation and minimize respiratory effort  - Oxygen administered by appropriate delivery if ordered  - Initiate smoking cessation education as indicated  - Encourage broncho-pulmonary hygiene including cough, deep breathe, Incentive Spirometry  - Assess the need for suctioning and aspirate as needed  - Assess and instruct to report SOB or any respiratory difficulty  - Respiratory Therapy support as indicated  Outcome: Progressing

## 2022-07-01 NOTE — PROGRESS NOTES
[facility]  Progress Note Felicity Nguyen 1950, 81 y o  female MRN: [id number]  Unit/Bed#: -01 Encounter: [id number]  Primary Care Provider: Jenny Allen MD   Date and time admitted to hospital: 6/29/2022  4:14 PM    * Acute respiratory failure with hypoxia [facility])  Assessment & Plan  · Patient presented to the ED with complaints of shortness of breath  · Acute respiratory failure with hypoxia evidenced by 87% on room air on arrival   · Patient requiring 2 L of supplemental oxygen; now on 3 L  · Likely secondary to undiagnosed COPD  · Patient received 125 mg Solu Medrol in the ER; will taper IV Solu-Medrol 40 mg to every 12 hours  · ATC Nebulizers  · Will likely need home O2 eval prior to discharge  · Will need formal PFTs for formal diagnosis of COPD which can be done outpatient    Facial weakness  Assessment & Plan  · Presented with facial weakness suspect secondary to Bell's palsy events facial weakness of upper and lower face  · Lymes testing pending  · CT Head (6/29/22): Nodular density within the anterior midline of the suprasellar cistern on series 2 image 15 which 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" Follow-up CT angiography or MR angiography of the brain recommended  · Neurology consult, appreciate input  · Received 1 dose of IV Doxycycline  · Artificial tears to left eye as needed    Tobacco dependence  Assessment & Plan  · Nicotine patch    Depression, recurrent ([facility] )  Assessment & Plan  · Continue home meds        VTE Pharmacologic Prophylaxis: VTE Score: 5 High Risk (Score >/= 5) - Pharmacological DVT Prophylaxis Ordered: heparin  Sequential Compression Devices Ordered  Patient Centered Rounds: I performed bedside rounds with nursing staff today  Discussions with Specialists or Other Care Team Provider: Case Management    Education and Discussions with Family / Patient: Patient declined call to        Time Spent for Care: 20 minutes  More than 50% of total time spent on counseling and coordination of care as described above  Current Length of Stay: 1 day(s)  Current Patient Status: Inpatient   Certification Statement: The patient will continue to require additional inpatient hospital stay due to ongoing treatment in setting of COPD exacerbation  Discharge Plan: Anticipate discharge later today or tomorrow to home  Code Status: Level 1 - Full Code    Subjective:   Patient resting on the edge of the bed, denies complaints of pain, shortness of breath, fever, or chills  Feels like she doesn't even need the oxygen  Wants to go home  Objective:     Vitals:   Temp (24hrs), Av 1 °F (36 7 °C), Min:97 5 °F (36 4 °C), Max:98 4 °F (36 9 °C)    Temp:  [97 5 °F (36 4 °C)-98 4 °F (36 9 °C)] 97 5 °F (36 4 °C)  HR:  [] 94  Resp:  [18-19] 19  BP: (132-151)/(76-98) 151/98  SpO2:  [90 %-95 %] 95 %  Body mass index is 26 2 kg/m²  Input and Output Summary (last 24 hours): Intake/Output Summary (Last 24 hours) at 2022 0830  Last data filed at 2022 0500  Gross per 24 hour   Intake 480 ml   Output 650 ml   Net -170 ml       Physical Exam:   Physical Exam  Vitals and nursing note reviewed  Constitutional:       General: She is not in acute distress  Appearance: She is normal weight  She is ill-appearing  Cardiovascular:      Rate and Rhythm: Normal rate  Pulses: Normal pulses  Heart sounds: Normal heart sounds  Pulmonary:      Effort: Pulmonary effort is normal       Breath sounds: Normal breath sounds  Comments: 2L O2 via NC  Abdominal:      General: Bowel sounds are normal       Palpations: Abdomen is soft  Musculoskeletal:         General: Normal range of motion  Right lower leg: No edema  Left lower leg: No edema  Skin:     General: Skin is warm and dry  Neurological:      Mental Status: She is alert and oriented to person, place, and time        Cranial Nerves: Cranial nerve deficit present  Comments: L facial droop; unable to close left eye completely   Psychiatric:         Mood and Affect: Mood normal           Additional Data:     Labs:  Results from last 7 days   Lab Units 06/30/22  0544   WBC Thousand/uL 8 48   HEMOGLOBIN g/dL 15 0   HEMATOCRIT % 45 5   PLATELETS Thousands/uL 339   NEUTROS PCT % 86*   LYMPHS PCT % 13*   MONOS PCT % 1*   EOS PCT % 0     Results from last 7 days   Lab Units 06/30/22  0544 06/29/22  1740   SODIUM mmol/L 144 143   POTASSIUM mmol/L 4 4 4 1   CHLORIDE mmol/L 102 101   CO2 mmol/L 30 33*   BUN mg/dL 24 16   CREATININE mg/dL 0 98 0 91   ANION GAP mmol/L 12 9   CALCIUM mg/dL 9 9 10 2*   ALBUMIN g/dL  --  4 5   TOTAL BILIRUBIN mg/dL  --  0 30   ALK PHOS U/L  --  156*   ALT U/L  --  168*   AST U/L  --  72*   GLUCOSE RANDOM mg/dL 174* 95     Results from last 7 days   Lab Units 06/29/22  1740   INR  0 91     Results from last 7 days   Lab Units 06/29/22  1818   POC GLUCOSE mg/dl 95         Results from last 7 days   Lab Units 06/30/22  0544   PROCALCITONIN ng/ml <0 05       Lines/Drains:  Invasive Devices  Report    Peripheral Intravenous Line  Duration           Peripheral IV 06/29/22 Right Wrist 1 day                      Imaging: No pertinent imaging reviewed      Recent Cultures (last 7 days):         Last 24 Hours Medication List:   Current Facility-Administered Medications   Medication Dose Route Frequency Provider Last Rate    acetaminophen  650 mg Oral Q6H PRN Prakash Tomas MD      ALPRAZolam  2 mg Oral 4x Daily PRN Prakash Tomas MD      azithromycin  500 mg Oral Q24H SINDY Oh      gabapentin  300 mg Oral TID Prakash Tomas MD      heparin (porcine)  5,000 Units Subcutaneous Q8H [address] Prakash Tomas MD      ipratropium  0 5 mg Nebulization TID Prakash Tomas MD      ipratropium-albuterol  3 mL Nebulization Q4H PRN Prakash Tomas MD      levalbuterol  1 25 mg Nebulization TID Guille Carey MD      methylPREDNISolone sodium succinate  40 mg Intravenous Q12H [address] SINDY Oh      nicotine  1 patch Transdermal Daily Prakash Tomas MD      polyvinyl alcohol  1 drop Left Eye Q3H PRN An SINDY Johnson      QUEtiapine  100 mg Oral QAM Prakash Tomas MD      QUEtiapine  400 mg Oral HS Guille Carey MD          Today, Patient Was Seen By: SINDY Oh    **Please Note: This note may have been constructed using a voice recognition system  **

## 2022-07-01 NOTE — ASSESSMENT & PLAN NOTE
· Patient presented to the ED with complaints of shortness of breath  · Acute respiratory failure with hypoxia evidenced by 87% on room air on arrival   · Patient requiring 2 L of supplemental oxygen; now on 3 L  · Likely secondary to undiagnosed COPD  · Patient received 125 mg Solu Medrol in the ER; will taper IV Solu-Medrol 40 mg to every 12 hours    · ATC Nebulizers  · Will likely need home O2 eval prior to discharge  · Will need formal PFTs for formal diagnosis of COPD which can be done outpatient

## 2022-07-01 NOTE — DISCHARGE SUMMARY
[facility]  Discharge- Rise Petit 1950, 81 y o  female MRN: [id number]  Unit/Bed#: -01 Encounter: [id number]  Primary Care Provider: Jessica Alvarado MD   Date and time admitted to hospital: 6/29/2022  4:14 PM    * Acute respiratory failure with hypoxia [facility])  Assessment & Plan  · Patient presented to the ED with complaints of shortness of breath  · Acute respiratory failure with hypoxia evidenced by 87% on room air on arrival   · Patient requiring 2 L of supplemental oxygen; now on 3 L  · Likely secondary to undiagnosed COPD  · She received IV Solu-Medrol; 125 mg in the ER followed by 40 mg every 8 hours  · Transitioned to prednisone taper on discharge  · Home oxygen evaluation completed, recommending 2 L of supplemental oxygen    Facial weakness  Assessment & Plan  · Presented with facial weakness suspect secondary to Bell's palsy events facial weakness of upper and lower face  · Lymes testing negative  · CT Head (6/29/22):  Nodular density within the anterior midline of the suprasellar cistern on series 2 image 15 which 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" Follow-up CT angiography or MR angiography of the brain recommended  · Neurology consult, appreciate input  · Artificial tears to left eye as needed  · Continue Prednisone taper on discharge    Tobacco dependence  Assessment & Plan  · Nicotine patch    Depression, recurrent [facility])  Assessment & Plan  · Continue home meds    Medical Problems             Resolved Problems  Date Reviewed: 7/1/2022   None               Discharging Physician / Practitioner: SINDY Gregory  PCP: Jessica Alvarado MD  Admission Date:   Admission Orders (From admission, onward)     Ordered        06/30/22 1216  Inpatient Admission  Once            06/29/22 1837  Place in Observation  Once                      Discharge Date: 07/01/22    Consultations During Hospital Stay:  IP CONSULT TO NEUROLOGY    Procedures Performed:   · None    Significant Findings / Test Results:   XR chest 2 views   Final Result by Silvia Patel MD (06/30 8212)      No acute cardiopulmonary disease  Workstation performed: [id number]         CT head without contrast   Final Result by Jun Ruiz DO (06/29 1726)      Mild microangiopathic change within the brain parenchyma with no mass, hemorrhage or signs of acute territorial infarction  Nodular density within the anterior midline of the suprasellar cistern on series 2 image 15 which 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  Follow-up CT angiography or MR angiography of the brain recommended  This examination was marked "immediate notification" in Epic in order to begin the standard process by which the radiology reading room liaison alerts the referring practitioner  Workstation performed: [id number]             Incidental Findings:   · None     Test Results Pending at Discharge (will require follow up): · None     Outpatient Tests Requested:  · Follow up with PCP within 1 week    Complications:  None    Reason for Admission: Acute respiratory failure with hypoxia    Hospital Course:   Dora Jefferson is a 81 y o  female patient with past medical history anxiety/depression who originally presented to the hospital on 6/29/2022 due to shortness of breath and left facial droop and numbness x 3-4 days  She was noted to be 87% on room air and was placed on 2 L of supplemental oxygen requiring IV Steroids  Neurology consult, likely result of facial paralysis due to [place] Palsy  Patient given IV Solu-Medrol with improvement of her shortness of breath  She had a home oxygen evaluation, indicated she needs 2 L of supplemental oxygen  Case management involved; this was ordered  Was sent home with a taper dose for [place] Palsy  Please see above list of diagnoses and related plan for additional information       Condition at Discharge: stable    Discharge Day Visit / Exam:   * Please refer to separate progress note for these details *    Discussion with Family: Patient declined call to   Discharge instructions/Information to patient and family:   See after visit summary for information provided to patient and family  Provisions for Follow-Up Care:  See after visit summary for information related to follow-up care and any pertinent home health orders  Disposition:   Home    Planned Readmission: None     Discharge Statement:  I spent 35 minutes discharging the patient  This time was spent on the day of discharge  I had direct contact with the patient on the day of discharge  Greater than 50% of the total time was spent examining patient, answering all patient questions, arranging and discussing plan of care with patient as well as directly providing post-discharge instructions  Additional time then spent on discharge activities  Discharge Medications:  See after visit summary for reconciled discharge medications provided to patient and/or family        **Please Note: This note may have been constructed using a voice recognition system**

## 2022-07-01 NOTE — ASSESSMENT & PLAN NOTE
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

## 2022-07-01 NOTE — ASSESSMENT & PLAN NOTE
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

## 2022-07-01 NOTE — ASSESSMENT & PLAN NOTE
· Patient presented to the ED with complaints of shortness of breath  · Acute respiratory failure with hypoxia evidenced by 87% on room air on arrival   · Patient requiring 2 L of supplemental oxygen; now on 3 L  · Likely secondary to undiagnosed COPD  · She received IV Solu-Medrol; 125 mg in the ER followed by 40 mg every 8 hours  · Transitioned to prednisone taper on discharge    · Home oxygen evaluation completed, recommending 2 L of supplemental oxygen

## 2022-07-01 NOTE — PLAN OF CARE
Problem: RESPIRATORY - ADULT  Goal: Achieves optimal ventilation and oxygenation  Description: INTERVENTIONS:  - Assess for changes in respiratory status  - Assess for changes in mentation and behavior  - Position to facilitate oxygenation and minimize respiratory effort  - Oxygen administered by appropriate delivery if ordered  - Initiate smoking cessation education as indicated  - Encourage broncho-pulmonary hygiene including cough, deep breathe, Incentive Spirometry  - Assess the need for suctioning and aspirate as needed  - Assess and instruct to report SOB or any respiratory difficulty  - Respiratory Therapy support as indicated  Outcome: Progressing     Problem: Knowledge Deficit  Goal: Patient/family/caregiver demonstrates understanding of disease process, treatment plan, medications, and discharge instructions  Description: Complete learning assessment and assess knowledge base    Interventions:  - Provide teaching at level of understanding  - Provide teaching via preferred learning methods  Outcome: Progressing

## 2022-07-01 NOTE — RESPIRATORY THERAPY NOTE
Home Oxygen Qualifying Test     Patient name: Kyleigh Anaya        : 1950   Date of Test:  2022  Diagnosis: ARF w/ hypoxia   Home Oxygen Test:    **Medicare Guidelines require item(s) 1-5 on all ambulatory patients or 1 and 2 on non-ambulatory patients  1  Baseline SPO2 on Room Air at rest 85 %   a  If <= 88% on Room Air add O2 via NC to obtain SpO2 >=88%  If LPM needed, document LPM 3 needed to reach =>88%    2  SPO2 during exertion on Room Air N/A  %  a  During exertion monitor SPO2  If SPO2 increases >=89%, do not add supplemental oxygen    3  SPO2 on Oxygen at Rest 90 % at 3 LPM    4  SPO2 during exertion on Oxygen N/A % at N/A LPM    5  Test performed during exertion activity  [x]  Supplemental Home Oxygen is indicated  []  Client does not qualify for home oxygen  Respiratory Additional Notes- Pt will require 3L O2 continuously      Tato Wells

## 2022-07-05 ENCOUNTER — TRANSITIONAL CARE MANAGEMENT (OUTPATIENT)
Dept: FAMILY MEDICINE CLINIC | Facility: CLINIC | Age: 72
End: 2022-07-05

## 2022-07-05 RX ORDER — GABAPENTIN 300 MG/1
300 CAPSULE ORAL 3 TIMES DAILY
COMMUNITY
Start: 2022-04-22

## 2022-07-08 NOTE — PHYSICIAN ADVISOR
Current patient class: Inpatient  The patient is currently on Hospital Day: 3 at [address]      The patient was admitted to the hospital at 12:16 PM on 6/30/22 for the following diagnosis:  Bell's palsy [G51 0]  Facial weakness [R29 810]  COPD (chronic obstructive pulmonary disease) ([facility] [address] ) [J44 9]  Facial droop [R29 810]  Hypoxia [R09 02]       There is documentation in the medical record of an expected length of stay of at least 2 midnights  The patient is therefore expected to satisfy the 2 midnight benchmark and given the 2 midnight presumption is appropriate for INPATIENT ADMISSION  Given this expectation of a satisfying stay, CMS instructs us that the patient is most often appropriate for inpatient admission under part A provided medical necessity is documented in the chart  After review of the relevant documentation, labs, vital signs and test results, the patient is appropriate for INPATIENT ADMISSION  Admission to the hospital as an inpatient is a complex decision making process which requires the practitioner to consider the patients presenting complaint, history and physical examination and all relevant testing  With this in mind, in this case, the patient was deemed appropriate for INPATIENT ADMISSION  After review of the documentation and testing available at the time of the admission I concur with this clinical determination of medical necessity  Rationale is as follows: The patient is a 81 yrs old Female who presented to the ED at 6/29/2022  4:14 PM with a chief complaint of Facial Droop (Patient reports right sided facial "numbness" starting three days ago  Patient presents with left sided facial droop  Patient reports left eye redness for three days but denies difficulty seeing  )Patient found upon admission to have acute respiratory failure with hypoxia o2 sat 87% RA requiring up to 3l nc and found to have undiagnosed copd   She was treated with IV steroid, nebs and transitioned to prednisone for discharge and required home o2 upon discharge  Her facial weakness/numbness was evaluated y neurology and felt to be due to Bell's palsy  This was treated with supportive care  She was stable for discharge hospital day 3  Ip seems appropriate based on this  The patients vitals on arrival were   ED Triage Vitals   Temperature Pulse Respirations Blood Pressure SpO2   06/29/22 1557 06/29/22 1557 06/29/22 1557 06/29/22 1557 06/29/22 1557   98 4 °F (36 9 °C) 101 18 151/74 93 %      Temp Source Heart Rate Source Patient Position - Orthostatic VS BP Location FiO2 (%)   06/29/22 1557 06/29/22 1557 06/29/22 1557 06/29/22 1557 --   Oral Monitor Sitting Left arm       Pain Score       06/30/22 0329       No Pain           Past Medical History:   Diagnosis Date    Anxiety     Depression      History reviewed  No pertinent surgical history  Consults have been placed to:   IP CONSULT TO NEUROLOGY    Vitals:    07/01/22 1142 07/01/22 1146 07/01/22 1322 07/01/22 1505   BP:    144/92   BP Location:    Left arm   Pulse:    88   Resp:    20   Temp:    98 7 °F (37 1 °C)   TempSrc:    Oral   SpO2: 92% (!) 85% 92% 94%   Weight:       Height:           Most recent labs:    No results for input(s): WBC, HGB, HCT, PLT, K, NA, CALCIUM, BUN, CREATININE, LIPASE, AMYLASE, INR, TROPONINI, CKTOTAL, AST, ALT, ALKPHOS, BILITOT in the last 72 hours  Scheduled Meds:  Continuous Infusions:No current facility-administered medications for this encounter  PRN Meds:      Surgical procedures (if appropriate):

## 2022-07-11 ENCOUNTER — TELEMEDICINE (OUTPATIENT)
Dept: FAMILY MEDICINE CLINIC | Facility: CLINIC | Age: 72
End: 2022-07-11
Payer: MEDICARE

## 2022-07-11 VITALS — WEIGHT: 147 LBS | HEIGHT: 63 IN | BODY MASS INDEX: 26.05 KG/M2 | HEART RATE: 97 BPM | OXYGEN SATURATION: 98 %

## 2022-07-11 DIAGNOSIS — R29.810 FACIAL WEAKNESS: ICD-10-CM

## 2022-07-11 DIAGNOSIS — J96.01 ACUTE RESPIRATORY FAILURE WITH HYPOXIA (HCC): ICD-10-CM

## 2022-07-11 DIAGNOSIS — Z09 HOSPITAL DISCHARGE FOLLOW-UP: Primary | ICD-10-CM

## 2022-07-11 PROCEDURE — 99495 TRANSJ CARE MGMT MOD F2F 14D: CPT | Performed by: STUDENT IN AN ORGANIZED HEALTH CARE EDUCATION/TRAINING PROGRAM

## 2022-07-11 NOTE — PROGRESS NOTES
Virtual TCM Visit:    Verification of patient location:    Patient is located in the following state in which I hold an active license [place]        Assessment/Plan:        Problem List Items Addressed This Visit        Respiratory    Acute respiratory failure with hypoxia ([address] )    Relevant Orders    Complete PFT with post bronchodilator       Other    Facial weakness      Other Visit Diagnoses     Hospital discharge follow-up    -  Primary         [place] palsy present, discussed natural course  Is slowly improvin, breathing has resolved  Will order PFT to establish baseline as she may have an underlying COPD    Reason for visit is TCM    Encounter provider William Hickey MD       Provider [facility] [address]  [place] [address] [facility]  [phone]      Recent Visits  No visits were found meeting these conditions  Showing recent visits within past 7 days and meeting all other requirements  Today's Visits  Date Type Provider Dept   07/11/22 Telemedicine William Hickey MD [facility] today's visits and meeting all other requirements  Future Appointments  No visits were found meeting these conditions  Showing future appointments within next 150 days and meeting all other requirements       After connecting through [facility], the patient was identified by name and date of birth  Carlos Lizarraga was informed that this is a telemedicine visit and that the visit is being conducted through [address] [place] [address] and patient was informed that this is a secure, HIPAA-compliant platform  She agrees to proceed     My office door was closed  No one else was in the room  She acknowledged consent and understanding of privacy and security of the video platform  The patient has agreed to participate and understands they can discontinue the visit at any time      Patient is aware this is a billable service  Subjective:     Patient ID: Micha Farley is a 81 y o  female  HPI    Doing much better, off oxygen right now  Is completing the steroid taper  Is using the eye drops  Is feeling back ot baseline the last 1-2 days  continues to work on smoking cessation, down to 3 cigerettes daily  Her o2 percentage is 97-98%      Review of Systems   Constitutional: Negative for chills, fatigue and fever  HENT: Negative for rhinorrhea and sore throat  Eyes: Positive for itching  Negative for photophobia, pain, redness and visual disturbance  Respiratory: Negative for cough and shortness of breath  Cardiovascular: Negative for chest pain and palpitations  Gastrointestinal: Negative for abdominal pain, constipation, diarrhea, nausea and vomiting  Genitourinary: Negative for difficulty urinating, dysuria and frequency  Musculoskeletal: Negative for arthralgias and myalgias  Skin: Negative for color change and rash  Neurological: Positive for facial asymmetry (left sided) and weakness (left)  Negative for speech difficulty and headaches  Objective:    Vitals:    07/11/22 1044   Pulse: 97   SpO2: 98%   Weight: 66 7 kg (147 lb)   Height: 5' 3" (1 6 m)       Physical Exam  Constitutional:       General: She is not in acute distress  Appearance: Normal appearance  She is not ill-appearing, toxic-appearing or diaphoretic  HENT:      Head: Normocephalic and atraumatic  Right Ear: External ear normal       Left Ear: External ear normal    Neurological:      Mental Status: She is alert  Cranial Nerves: Facial asymmetry (left sided) present  Transitional Care Management Review:  Micha Farley is a 81 y o  female here for TCM follow up       During the TCM phone call patient stated:    TCM Call (since 6/10/2022)     Date and time call was made  7/5/2022 11:14 AM    Hospital care reviewed  Records reviewed    Patient was hospitialized at  [facility]    Date of Admission 06/29/22    Date of discharge  07/01/22    Diagnosis  Bell's Palsy    Disposition  Home    Current Symptoms  None      TCM Call (since 6/10/2022)     Post hospital issues  None    Should patient be enrolled in anticoag monitoring? No    Scheduled for follow up? Yes    Did you obtain your prescribed medications  Yes    Do you need help managing your prescriptions or medications  No    Is transportation to your appointment needed  No    I have advised the patient to call PCP with any new or worsening symptoms  Jelena Meneses/ MA    Support System  Spouse    The type of support provided  Emotional; Physical    Do you have social support  Yes, as much as I need    Are you recieving any outpatient services  No    Are you recieving home care services  No    Are you using any community resources  No    Current waiver services  No    Have you fallen in the last 12 months  No    Interperter language line needed  No    Counseling  Patient    Counseling topics  instructions for management; patient and family education; Risk factor reduction    Comments  I spoke with pt to follow up to review her recent hospital discharge  Pt states that she received wonderful care while inpatient  Pt states that her symptoms have resolved and she is managing well at home  I spent 15 minutes with the patient during this visit  Merritt Sanabria MD      VIRTUAL VISIT DISCLAIMER    Rosy Echavarria verbally agrees to participate in [employer]  Pt is aware that [employer] could be limited without vital signs or the ability to perform a full hands-on physical exam  Kirsty Em understands she or the provider may request at any time to terminate the video visit and request the patient to seek care or treatment in person

## 2022-07-25 ENCOUNTER — TELEPHONE (OUTPATIENT)
Dept: FAMILY MEDICINE CLINIC | Facility: CLINIC | Age: 72
End: 2022-07-25

## 2022-07-25 DIAGNOSIS — G51.0 BELL'S PALSY: ICD-10-CM

## 2022-07-25 RX ORDER — POLYVINYL ALCOHOL 14 MG/ML
1 SOLUTION/ DROPS OPHTHALMIC
Qty: 15 ML | Refills: 3 | Status: SHIPPED | OUTPATIENT
Start: 2022-07-25

## 2022-07-25 NOTE — TELEPHONE ENCOUNTER
T/c from pt's  -- reports she is doing better after being in the hospital for bells palsy, but she still can not close her eye  She is out of the drops she was given at the hospital to help with this  Is requesting a refill of the drops      Please call: [phone]

## 2022-11-02 ENCOUNTER — TELEPHONE (OUTPATIENT)
Dept: FAMILY MEDICINE CLINIC | Facility: CLINIC | Age: 72
End: 2022-11-02

## 2023-01-06 ENCOUNTER — OFFICE VISIT (OUTPATIENT)
Dept: FAMILY MEDICINE CLINIC | Facility: CLINIC | Age: 73
End: 2023-01-06

## 2023-01-06 VITALS
SYSTOLIC BLOOD PRESSURE: 124 MMHG | BODY MASS INDEX: 26.93 KG/M2 | WEIGHT: 152 LBS | HEIGHT: 63 IN | TEMPERATURE: 98.4 F | OXYGEN SATURATION: 97 % | DIASTOLIC BLOOD PRESSURE: 72 MMHG | HEART RATE: 104 BPM

## 2023-01-06 DIAGNOSIS — F41.9 ANXIETY AND DEPRESSION: ICD-10-CM

## 2023-01-06 DIAGNOSIS — Z12.31 ENCOUNTER FOR SCREENING MAMMOGRAM FOR MALIGNANT NEOPLASM OF BREAST: ICD-10-CM

## 2023-01-06 DIAGNOSIS — Z23 IMMUNIZATION DUE: ICD-10-CM

## 2023-01-06 DIAGNOSIS — F17.200 TOBACCO DEPENDENCE: ICD-10-CM

## 2023-01-06 DIAGNOSIS — R79.9 ABNORMAL FINDING OF BLOOD CHEMISTRY, UNSPECIFIED: ICD-10-CM

## 2023-01-06 DIAGNOSIS — J44.9 CHRONIC OBSTRUCTIVE PULMONARY DISEASE, UNSPECIFIED COPD TYPE (HCC): Primary | ICD-10-CM

## 2023-01-06 DIAGNOSIS — F32.A ANXIETY AND DEPRESSION: ICD-10-CM

## 2023-01-06 DIAGNOSIS — Z00.00 ENCOUNTER FOR ANNUAL WELLNESS VISIT (AWV) IN MEDICARE PATIENT: ICD-10-CM

## 2023-01-06 DIAGNOSIS — F17.210 SMOKING GREATER THAN 20 PACK YEARS: ICD-10-CM

## 2023-01-06 DIAGNOSIS — N18.30 STAGE 3 CHRONIC KIDNEY DISEASE, UNSPECIFIED WHETHER STAGE 3A OR 3B CKD (HCC): ICD-10-CM

## 2023-01-06 DIAGNOSIS — Z78.0 ASYMPTOMATIC AGE-RELATED POSTMENOPAUSAL STATE: ICD-10-CM

## 2023-01-06 DIAGNOSIS — Z12.2 ENCOUNTER FOR SCREENING FOR LUNG CANCER: ICD-10-CM

## 2023-01-06 DIAGNOSIS — F33.9 RECURRENT MAJOR DEPRESSIVE DISORDER, REMISSION STATUS UNSPECIFIED (HCC): ICD-10-CM

## 2023-01-06 PROBLEM — J96.01 ACUTE RESPIRATORY FAILURE WITH HYPOXIA (HCC): Status: RESOLVED | Noted: 2022-06-29 | Resolved: 2023-01-06

## 2023-01-06 PROBLEM — R44.3 HALLUCINATIONS: Status: RESOLVED | Noted: 2021-04-20 | Resolved: 2023-01-06

## 2023-01-06 PROBLEM — R29.810 FACIAL WEAKNESS: Status: RESOLVED | Noted: 2022-06-29 | Resolved: 2023-01-06

## 2023-01-06 RX ORDER — CYANOCOBALAMIN 1000 UG/ML
1000 INJECTION, SOLUTION INTRAMUSCULAR; SUBCUTANEOUS
Status: SHIPPED | OUTPATIENT
Start: 2023-01-06

## 2023-01-06 RX ADMIN — CYANOCOBALAMIN 1000 MCG: 1000 INJECTION, SOLUTION INTRAMUSCULAR; SUBCUTANEOUS at 15:41

## 2023-01-06 NOTE — PROGRESS NOTES
Assessment and Plan:     Problem List Items Addressed This Visit        Respiratory    Chronic obstructive pulmonary disease, unspecified COPD type ([place] [id number] ) - Primary     Breathing stable, utd on vaccinations             Genitourinary    Stage 3 chronic kidney disease, unspecified whether stage 3a or 3b CKD ([place] [address] )     Lab Results   Component Value Date    EGFR 58 06/30/2022    EGFR 64 06/29/2022    EGFR 72 04/20/2021    CREATININE 0 98 06/30/2022    CREATININE 0 91 06/29/2022    CREATININE 0 84 04/20/2021     Follow up labs, stay hydrated         Relevant Orders    Comprehensive metabolic panel    CBC and differential    Lipid panel       Other    Anxiety and depression     Follows with Sebastien campos, Dr Carmine Rose, and well controlled          BMI 26 0-26 9,adult     Diet and exercise encouraged          Tobacco dependence     Cessation recommended         Other Visit Diagnoses     Recurrent major depressive disorder, remission status unspecified ([place] [address] )        Encounter for screening for lung cancer        Relevant Orders    CT lung screening program    Smoking greater than 20 pack years        Relevant Orders    CT lung screening program    Asymptomatic age-related postmenopausal state        Relevant Orders    DXA bone density spine hip and pelvis    Encounter for screening mammogram for malignant neoplasm of breast        Relevant Orders    Mammo screening bilateral w 3d & cad    Immunization due        Relevant Medications    cyanocobalamin injection 1,000 mcg    Other Relevant Orders    influenza vaccine, high-dose, PF 0 7 mL (FLUZONE HIGH-DOSE) (Completed)    Encounter for annual wellness visit (AWV) in Medicare patient        Abnormal finding of blood chemistry, unspecified        Relevant Orders    Lipid panel        BMI Counseling: Body mass index is 26 93 kg/m²   The BMI is above normal  Nutrition recommendations include decreasing portion sizes, encouraging healthy choices of fruits and vegetables, decreasing fast food intake, consuming healthier snacks, limiting drinks that contain sugar and moderation in carbohydrate intake  Exercise recommendations include moderate physical activity 150 minutes/week, exercising 3-5 times per week and strength training exercises  No pharmacotherapy was ordered  Rationale for BMI follow-up plan is due to patient being overweight or obese  Tobacco Cessation Counseling: Tobacco cessation counseling was not provided  The patient is sincerely urged to quit consumption of tobacco  She is not ready to quit tobacco        Preventive health issues were discussed with patient, and age appropriate screening tests were ordered as noted in patient's After Visit Summary  Personalized health advice and appropriate referrals for health education or preventive services given if needed, as noted in patient's After Visit Summary  History of Present Illness:     Patient presents for a Medicare Wellness Visit    Patient coming in for follow-up on chronic conditions and due for an AWV  Would like her flu shot  Would also like a B12 shot as she does get fatigue and improves with this     Patient Care Team:  Veronica Berman MD as PCP - General (Family Medicine)     Review of Systems:     Review of Systems   Constitutional: Positive for fatigue  Negative for chills and fever  HENT: Negative for rhinorrhea and sore throat  Eyes: Negative for visual disturbance  Respiratory: Negative for cough and shortness of breath  Cardiovascular: Negative for chest pain and palpitations  Gastrointestinal: Negative for abdominal pain, constipation, diarrhea, nausea and vomiting  Genitourinary: Negative for difficulty urinating, dysuria and frequency  Musculoskeletal: Negative for arthralgias and myalgias  Skin: Negative for color change and rash  Neurological: Negative for weakness and headaches          Problem List:     Patient Active Problem List   Diagnosis   • Anxiety and depression   • History of seizure disorder   • BMI 26 0-26 9,adult   • Tobacco dependence   • Chronic obstructive pulmonary disease, unspecified COPD type ([place] [address] )   • Stage 3 chronic kidney disease, unspecified whether stage 3a or 3b CKD ([place] [address] )      Past Medical and Surgical History:     Past Medical History:   Diagnosis Date   • Anxiety    • Depression    • Depression, recurrent ([place] [address] ) 4/20/2021   • Hallucinations 4/20/2021     History reviewed  No pertinent surgical history  Family History:     Family History   Problem Relation Age of Onset   • Diabetes Mother    • Stroke Father    • No Known Problems Sister    • No Known Problems Sister    • No Known Problems Sister       Social History:     Social History     Socioeconomic History   • Marital status: /Civil Union     Spouse name: None   • Number of children: None   • Years of education: None   • Highest education level: None   Occupational History   • None   Tobacco Use   • Smoking status: Every Day     Packs/day: 0 50     Types: Cigarettes   • Smokeless tobacco: Never   Vaping Use   • Vaping Use: Never used   Substance and Sexual Activity   • Alcohol use: Not Currently   • Drug use: Never   • Sexual activity: None   Other Topics Concern   • None   Social History Narrative   • None     Social Determinants of Health     Financial Resource Strain: Not on file   Food Insecurity: No Food Insecurity   • Worried About Running Out of Food in the Last Year: Never true   • Ran Out of Food in the Last Year: Never true   Transportation Needs: No Transportation Needs   • Lack of Transportation (Medical): No   • Lack of Transportation (Non-Medical):  No   Physical Activity: Not on file   Stress: Not on file   Social Connections: Not on file   Intimate Partner Violence: Not on file   Housing Stability: Low Risk    • Unable to Pay for Housing in the Last Year: No   • Number of Places Lived in the Last Year: 1   • Unstable Housing in the Last Year: No      Medications and Allergies: Current Outpatient Medications   Medication Sig Dispense Refill   • ALPRAZolam (XANAX) 2 MG tablet Take 2 mg by mouth 4 (four) times a day     • gabapentin (NEURONTIN) 300 mg capsule Take 300 mg by mouth 3 (three) times a day     • Multiple Vitamin (multivitamin) tablet Take 1 tablet by mouth daily     • Omega-3 Fatty Acids (fish oil) 1,000 mg Take 1,000 mg by mouth daily     • QUEtiapine (SEROquel) 100 mg tablet Take 100 mg by mouth every morning     • QUEtiapine (SEROquel) 400 MG tablet Take 400 mg by mouth daily at bedtime     • vitamin B-12 (VITAMIN B-12) 1,000 mcg tablet Take by mouth daily       Current Facility-Administered Medications   Medication Dose Route Frequency Provider Last Rate Last Admin   • cyanocobalamin injection 1,000 mcg  1,000 mcg Intramuscular Q30 Days Nereida Martin MD   1,000 mcg at 01/06/23 1541     No Known Allergies   Immunizations:     Immunization History   Administered Date(s) Administered   • COVID-19 MODERNA VACC 0 5 ML IM 04/07/2021, 05/05/2021   • INFLUENZA 09/22/2020, 09/28/2021   • Influenza, high dose seasonal 0 7 mL 01/06/2023      Health Maintenance:         Topic Date Due   • Breast Cancer Screening: Mammogram  02/23/2023   • Colorectal Cancer Screening  04/26/2024   • Hepatitis C Screening  Completed         Topic Date Due   • Hepatitis A Vaccine (1 of 2 - Risk 2-dose series) Never done   • Pneumococcal Vaccine: 65+ Years (1 - PCV) Never done   • Hepatitis B Vaccine (1 of 3 - Risk 3-dose series) Never done   • COVID-19 Vaccine (3 - Booster for Moderna series) 06/30/2021      Medicare Screening Tests and Risk Assessments:     Temi Stiles is here for her Subsequent Wellness visit  Last Medicare Wellness visit information reviewed, patient interviewed, no change since last AWV  Health Risk Assessment:   Patient rates overall health as fair  Patient feels that their physical health rating is slightly better  Patient is satisfied with their life  Eyesight was rated as same  Hearing was rated as same  Patient feels that their emotional and mental health rating is slightly better  Patients states they are sometimes angry  Patient states they are sometimes unusually tired/fatigued  Pain experienced in the last 7 days has been some  Patient's pain rating has been 6/10  Patient states that she has experienced weight loss or gain in last 6 months  Depression Screening:   PHQ-9 Score: 3      Fall Risk Screening: In the past year, patient has experienced: no history of falling in past year      Urinary Incontinence Screening:   Patient has not leaked urine accidently in the last six months  Home Safety:  Patient does not have trouble with stairs inside or outside of their home  Patient has working smoke alarms and has working carbon monoxide detector  Home safety hazards include: none  Nutrition:   Current diet is Regular  Medications:   Patient is currently taking over-the-counter supplements  OTC medications include: vitamin  Patient is able to manage medications  Activities of Daily Living (ADLs)/Instrumental Activities of Daily Living (IADLs):   Walk and transfer into and out of bed and chair?: Yes  Dress and groom yourself?: Yes    Bathe or shower yourself?: Yes    Feed yourself?  Yes  Do your laundry/housekeeping?: Yes  Manage your money, pay your bills and track your expenses?: Yes  Make your own meals?: Yes    Do your own shopping?: Yes    Previous Hospitalizations:   Any hospitalizations or ED visits within the last 12 months?: Yes    How many hospitalizations have you had in the last year?: 1-2    Advance Care Planning:   Living will: No    Durable POA for healthcare: No    Advanced directive: No      PREVENTIVE SCREENINGS      Cardiovascular Screening:    General: Screening Current      Diabetes Screening:     General: Screening Current      Colorectal Cancer Screening:     General: Screening Current      Breast Cancer Screening:     General: Screening Current      Cervical Cancer Screening:    General: Screening Not Indicated      Lung Cancer Screening:     General: Screening Not Indicated      Hepatitis C Screening:    General: Screening Current    Screening, Brief Intervention, and Referral to Treatment (SBIRT)    Screening  Typical number of drinks in a day: 0  Typical number of drinks in a week: 0  Interpretation: Low risk drinking behavior  AUDIT-C Screenin) How often did you have a drink containing alcohol in the past year? never  2) How many drinks did you have on a typical day when you were drinking in the past year? 0  3) How often did you have 6 or more drinks on one occasion in the past year? never    AUDIT-C Score: 0  Interpretation: Score 0-2 (female): Negative screen for alcohol misuse    Single Item Drug Screening:  How often have you used an illegal drug (including marijuana) or a prescription medication for non-medical reasons in the past year? never    Single Item Drug Screen Score: 0  Interpretation: Negative screen for possible drug use disorder    No results found  Physical Exam:     /72 (BP Location: Left arm, Patient Position: Sitting, Cuff Size: Standard)   Pulse 104   Temp 98 4 °F (36 9 °C)   Ht 5' 3" (1 6 m)   Wt 68 9 kg (152 lb)   SpO2 97%   BMI 26 93 kg/m²     Physical Exam  Constitutional:       General: She is not in acute distress  Appearance: Normal appearance  She is not ill-appearing  HENT:      Head: Normocephalic and atraumatic  Right Ear: Tympanic membrane, ear canal and external ear normal       Left Ear: Tympanic membrane, ear canal and external ear normal       Nose: Nose normal       Mouth/Throat:      Mouth: Mucous membranes are moist       Pharynx: Oropharynx is clear  No oropharyngeal exudate or posterior oropharyngeal erythema  Eyes:      General: No scleral icterus  Right eye: No discharge  Left eye: No discharge        Extraocular Movements: Extraocular movements intact  Conjunctiva/sclera: Conjunctivae normal       Pupils: Pupils are equal, round, and reactive to light  Cardiovascular:      Rate and Rhythm: Normal rate and regular rhythm  Pulses: Normal pulses  Heart sounds: Normal heart sounds  No murmur heard  Pulmonary:      Effort: Pulmonary effort is normal  No respiratory distress  Breath sounds: Normal breath sounds  Abdominal:      General: Bowel sounds are normal       Palpations: Abdomen is soft  Tenderness: There is no abdominal tenderness  Musculoskeletal:         General: Normal range of motion  Cervical back: Normal range of motion and neck supple  Lymphadenopathy:      Cervical: No cervical adenopathy  Skin:     General: Skin is warm and dry  Capillary Refill: Capillary refill takes less than 2 seconds  Neurological:      General: No focal deficit present  Mental Status: She is alert and oriented to person, place, and time  Mental status is at baseline  Cranial Nerves: No cranial nerve deficit     Psychiatric:         Mood and Affect: Mood normal           Liana Henderson MD

## 2023-01-06 NOTE — PROGRESS NOTES
Assessment and Plan:     Problem List Items Addressed This Visit        Respiratory    Chronic obstructive pulmonary disease, unspecified COPD type ([place] [address] ) - Primary       Genitourinary    Stage 3 chronic kidney disease, unspecified whether stage 3a or 3b CKD ([place] [address] )   Other Visit Diagnoses     Recurrent major depressive disorder, remission status unspecified ([place] [address] )        Encounter for screening for lung cancer        Relevant Orders    CT lung screening program    Smoking greater than 20 pack years        Relevant Orders    CT lung screening program           Preventive health issues were discussed with patient, and age appropriate screening tests were ordered as noted in patient's After Visit Summary  Personalized health advice and appropriate referrals for health education or preventive services given if needed, as noted in patient's After Visit Summary  History of Present Illness:     Patient presents for a Medicare Wellness Visit    HPI   Patient Care Team:  Yana Osorio MD as PCP - General (Family Medicine)     Review of Systems:     Review of Systems     Problem List:     Patient Active Problem List   Diagnosis   • Anxiety and depression   • History of seizure disorder   • Depression, recurrent (HCC)   • BMI 26 0-26 9,adult   • Tobacco dependence   • Hallucinations   • Facial weakness   • Chronic obstructive pulmonary disease, unspecified COPD type ([place] [address] )   • Stage 3 chronic kidney disease, unspecified whether stage 3a or 3b CKD ([place] [address] )      Past Medical and Surgical History:     Past Medical History:   Diagnosis Date   • Anxiety    • Depression      History reviewed  No pertinent surgical history     Family History:     Family History   Problem Relation Age of Onset   • Diabetes Mother    • Stroke Father    • No Known Problems Sister    • No Known Problems Sister    • No Known Problems Sister       Social History:     Social History     Socioeconomic History   • Marital status: /Civil Union Spouse name: None   • Number of children: None   • Years of education: None   • Highest education level: None   Occupational History   • None   Tobacco Use   • Smoking status: Every Day     Packs/day: 0 50     Types: Cigarettes   • Smokeless tobacco: Never   Vaping Use   • Vaping Use: Never used   Substance and Sexual Activity   • Alcohol use: Not Currently   • Drug use: Never   • Sexual activity: None   Other Topics Concern   • None   Social History Narrative   • None     Social Determinants of Health     Financial Resource Strain: Not on file   Food Insecurity: No Food Insecurity   • Worried About Running Out of Food in the Last Year: Never true   • Ran Out of Food in the Last Year: Never true   Transportation Needs: No Transportation Needs   • Lack of Transportation (Medical): No   • Lack of Transportation (Non-Medical):  No   Physical Activity: Not on file   Stress: Not on file   Social Connections: Not on file   Intimate Partner Violence: Not on file   Housing Stability: Low Risk    • Unable to Pay for Housing in the Last Year: No   • Number of Places Lived in the Last Year: 1   • Unstable Housing in the Last Year: No      Medications and Allergies:     Current Outpatient Medications   Medication Sig Dispense Refill   • ALPRAZolam (XANAX) 2 MG tablet Take 2 mg by mouth 4 (four) times a day     • gabapentin (NEURONTIN) 300 mg capsule Take 300 mg by mouth 3 (three) times a day     • Multiple Vitamin (multivitamin) tablet Take 1 tablet by mouth daily     • Omega-3 Fatty Acids (fish oil) 1,000 mg Take 1,000 mg by mouth daily     • QUEtiapine (SEROquel) 100 mg tablet Take 100 mg by mouth every morning     • QUEtiapine (SEROquel) 400 MG tablet Take 400 mg by mouth daily at bedtime     • vitamin B-12 (VITAMIN B-12) 1,000 mcg tablet Take by mouth daily     • gabapentin (NEURONTIN) 100 mg capsule Take 300 mg by mouth 3 (three) times a day (Patient not taking: Reported on 7/5/2022)     • polyvinyl alcohol (LIQUIFILM TEARS) 1 4 % ophthalmic solution Administer 1 drop into the left eye every 3 (three) hours as needed for dry eyes (Patient not taking: Reported on 1/6/2023) 15 mL 3     No current facility-administered medications for this visit  No Known Allergies   Immunizations:     Immunization History   Administered Date(s) Administered   • COVID-19 MODERNA VACC 0 5 ML IM 04/07/2021, 05/05/2021   • INFLUENZA 09/22/2020, 09/28/2021      Health Maintenance:         Topic Date Due   • Breast Cancer Screening: Mammogram  02/23/2023   • Colorectal Cancer Screening  04/26/2024   • Hepatitis C Screening  Completed         Topic Date Due   • Hepatitis A Vaccine (1 of 2 - Risk 2-dose series) Never done   • Pneumococcal Vaccine: 65+ Years (1 - PCV) Never done   • Hepatitis B Vaccine (1 of 3 - Risk 3-dose series) Never done   • COVID-19 Vaccine (3 - Booster for Moderna series) 06/30/2021   • Influenza Vaccine (1) 09/01/2022      Medicare Screening Tests and Risk Assessments:     Annual Wellness Visit  No results found  Physical Exam:     /72 (BP Location: Left arm, Patient Position: Sitting, Cuff Size: Standard)   Pulse 104   Temp 98 4 °F (36 9 °C)   Ht 5' 3" (1 6 m)   Wt 68 9 kg (152 lb)   SpO2 97%   BMI 26 93 kg/m²     Physical Exam     Margaret Rehman MD  I discussed with her that she is a candidate for lung cancer CT screening  The following Shared Decision-Making points were covered:  1  Benefits of screening were discussed, including the rates of reduction in death from lung cancer and other causes  Harms of screening were reviewed, including false positive tests, radiation exposure levels, risks of invasive procedures, risks of complications of screening, and risk of overdiagnosis  2  I counseled on the importance of adherence to annual lung cancer LDCT screening, impact of co-morbidities, and ability or willingness to undergo diagnosis and treatment    3  I counseled on the importance of maintaining abstinence as a former smoker or was counseled on the importance of smoking cessation if a current smoker    Review of Eligibility Criteria: She meets all of the criteria for Lung Cancer Screening  · She is 81 y o  · She has 20 pack year tobacco history and is a current smoker or has quit within the past 15 years  · She presents no signs or symptoms of lung cancer    After discussion, the patient decided to elect lung cancer screening  BMI Counseling: Body mass index is 26 93 kg/m²   The BMI {[facility] BMI Counselin}

## 2023-01-06 NOTE — PATIENT INSTRUCTIONS
Medicare Preventive Visit Patient Instructions  Thank you for completing your Welcome to Medicare Visit or Medicare Annual Wellness Visit today  Your next wellness visit will be due in one year (1/7/2024)  The screening/preventive services that you may require over the next 5-10 years are detailed below  Some tests may not apply to you based off risk factors and/or age  Screening tests ordered at today's visit but not completed yet may show as past due  Also, please note that scanned in results may not display below  Preventive Screenings:  Service Recommendations Previous Testing/Comments   Colorectal Cancer Screening  * Colonoscopy    * Fecal Occult Blood Test (FOBT)/Fecal Immunochemical Test (FIT)  * Fecal DNA/Cologuard Test  * Flexible Sigmoidoscopy Age: 39-70 years old   Colonoscopy: every 10 years (may be performed more frequently if at higher risk)  OR  FOBT/FIT: every 1 year  OR  Cologuard: every 3 years  OR  Sigmoidoscopy: every 5 years  Screening may be recommended earlier than age 39 if at higher risk for colorectal cancer  Also, an individualized decision between you and your healthcare provider will decide whether screening between the ages of 74-80 would be appropriate  Colonoscopy: Not on file  FOBT/FIT: Not on file  Cologuard: 04/26/2021  Sigmoidoscopy: Not on file    Screening Current     Breast Cancer Screening Age: 36 years old  Frequency: every 1-2 years  Not required if history of left and right mastectomy Mammogram: 02/23/2022    Screening Current   Cervical Cancer Screening Between the ages of 21-29, pap smear recommended once every 3 years  Between the ages of 33-67, can perform pap smear with HPV co-testing every 5 years     Recommendations may differ for women with a history of total hysterectomy, cervical cancer, or abnormal pap smears in past  Pap Smear: Not on file    Screening Not Indicated   Hepatitis C Screening Once for adults born between 1945 and 1965  More frequently in patients at high risk for Hepatitis C Hep C Antibody: 04/20/2021    Screening Current   Diabetes Screening 1-2 times per year if you're at risk for diabetes or have pre-diabetes Fasting glucose: 174 mg/dL (6/30/2022)  A1C: 5 8 % (4/20/2021)  Screening Current   Cholesterol Screening Once every 5 years if you don't have a lipid disorder  May order more often based on risk factors  Lipid panel: 04/20/2021    Screening Current     Other Preventive Screenings Covered by Medicare:  1  Abdominal Aortic Aneurysm (AAA) Screening: covered once if your at risk  You're considered to be at risk if you have a family history of AAA  2  Lung Cancer Screening: covers low dose CT scan once per year if you meet all of the following conditions: (1) Age 50-69; (2) No signs or symptoms of lung cancer; (3) Current smoker or have quit smoking within the last 15 years; (4) You have a tobacco smoking history of at least 20 pack years (packs per day multiplied by number of years you smoked); (5) You get a written order from a healthcare provider  3  Glaucoma Screening: covered annually if you're considered high risk: (1) You have diabetes OR (2) Family history of glaucoma OR (3)  aged 48 and older OR (3)  American aged 72 and older  3  Osteoporosis Screening: covered every 2 years if you meet one of the following conditions: (1) You're estrogen deficient and at risk for osteoporosis based off medical history and other findings; (2) Have a vertebral abnormality; (3) On glucocorticoid therapy for more than 3 months; (4) Have primary hyperparathyroidism; (5) On osteoporosis medications and need to assess response to drug therapy  · Last bone density test (DXA Scan): Not on file  5  HIV Screening: covered annually if you're between the age of 12-76  Also covered annually if you are younger than 13 and older than 72 with risk factors for HIV infection   For pregnant patients, it is covered up to 3 times per pregnancy  Immunizations:  Immunization Recommendations   Influenza Vaccine Annual influenza vaccination during flu season is recommended for all persons aged >= 6 months who do not have contraindications   Pneumococcal Vaccine   * Pneumococcal conjugate vaccine = PCV13 (Prevnar 13), PCV15 (Vaxneuvance), PCV20 (Prevnar 20)  * Pneumococcal polysaccharide vaccine = PPSV23 (Pneumovax) Adults 25-60 years old: 1-3 doses may be recommended based on certain risk factors  Adults 72 years old: 1-2 doses may be recommended based off what pneumonia vaccine you previously received   Hepatitis B Vaccine 3 dose series if at intermediate or high risk (ex: diabetes, end stage renal disease, liver disease)   Tetanus (Td) Vaccine - COST NOT COVERED BY MEDICARE PART B Following completion of primary series, a booster dose should be given every 10 years to maintain immunity against tetanus  Td may also be given as tetanus wound prophylaxis  Tdap Vaccine - COST NOT COVERED BY MEDICARE PART B Recommended at least once for all adults  For pregnant patients, recommended with each pregnancy  Shingles Vaccine (Shingrix) - COST NOT COVERED BY MEDICARE PART B  2 shot series recommended in those aged 48 and above     Health Maintenance Due:      Topic Date Due   • Breast Cancer Screening: Mammogram  02/23/2023   • Colorectal Cancer Screening  04/26/2024   • Hepatitis C Screening  Completed     Immunizations Due:      Topic Date Due   • Hepatitis A Vaccine (1 of 2 - Risk 2-dose series) Never done   • Pneumococcal Vaccine: 65+ Years (1 - PCV) Never done   • Hepatitis B Vaccine (1 of 3 - Risk 3-dose series) Never done   • COVID-19 Vaccine (3 - Booster for Moderna series) 06/30/2021   • Influenza Vaccine (1) 09/01/2022     Advance Directives   What are advance directives? Advance directives are legal documents that state your wishes and plans for medical care   These plans are made ahead of time in case you lose your ability to make decisions for yourself  Advance directives can apply to any medical decision, such as the treatments you want, and if you want to donate organs  What are the types of advance directives? There are many types of advance directives, and each state has rules about how to use them  You may choose a combination of any of the following:  · Living will: This is a written record of the treatment you want  You can also choose which treatments you do not want, which to limit, and which to stop at a certain time  This includes surgery, medicine, IV fluid, and tube feedings  · Durable power of  for healthcare [place]): This is a written record that states who you want to make healthcare choices for you when you are unable to make them for yourself  This person, called a proxy, is usually a family member or a friend  You may choose more than 1 proxy  · Do not resuscitate (DNR) order:  A DNR order is used in case your heart stops beating or you stop breathing  It is a request not to have certain forms of treatment, such as CPR  A DNR order may be included in other types of advance directives  · Medical directive: This covers the care that you want if you are in a coma, near death, or unable to make decisions for yourself  You can list the treatments you want for each condition  Treatment may include pain medicine, surgery, blood transfusions, dialysis, IV or tube feedings, and a ventilator (breathing machine)  · Values history: This document has questions about your views, beliefs, and how you feel and think about life  This information can help others choose the care that you would choose  Why are advance directives important? An advance directive helps you control your care  Although spoken wishes may be used, it is better to have your wishes written down  Spoken wishes can be misunderstood, or not followed  Treatments may be given even if you do not want them   An advance directive may make it easier for your family to make difficult choices about your care  Cigarette Smoking and Your Health   Risks to your health if you smoke:  Nicotine and other chemicals found in tobacco damage every cell in your body  Even if you are a light smoker, you have an increased risk for cancer, heart disease, and lung disease  If you are pregnant or have diabetes, smoking increases your risk for complications  Benefits to your health if you stop smoking:   · You decrease respiratory symptoms such as coughing, wheezing, and shortness of breath  · You reduce your risk for cancers of the lung, mouth, throat, kidney, bladder, pancreas, stomach, and cervix  If you already have cancer, you increase the benefits of chemotherapy  You also reduce your risk for cancer returning or a second cancer from developing  · You reduce your risk for heart disease, blood clots, heart attack, and stroke  · You reduce your risk for lung infections, and diseases such as pneumonia, asthma, chronic bronchitis, and emphysema  · Your circulation improves  More oxygen can be delivered to your body  If you have diabetes, you lower your risk for complications, such as kidney, artery, and eye diseases  You also lower your risk for nerve damage  Nerve damage can lead to amputations, poor vision, and blindness  · You improve your body's ability to heal and to fight infections  For more information and support to stop smoking:   · [employer]  Phone: 7- 197 - 130-0585  Web Address: www [employer]  Weight Management   Why it is important to manage your weight:  Being overweight increases your risk of health conditions such as heart disease, high blood pressure, type 2 diabetes, and certain types of cancer  It can also increase your risk for osteoarthritis, sleep apnea, and other respiratory problems  Aim for a slow, steady weight loss  Even a small amount of weight loss can lower your risk of health problems    How to lose weight safely:  A safe and healthy way to lose weight is to eat fewer calories and get regular exercise  You can lose up about 1 pound a week by decreasing the number of calories you eat by 500 calories each day  Healthy meal plan for weight management:  A healthy meal plan includes a variety of foods, contains fewer calories, and helps you stay healthy  A healthy meal plan includes the following:  · Eat whole-grain foods more often  A healthy meal plan should contain fiber  Fiber is the part of grains, fruits, and vegetables that is not broken down by your body  Whole-grain foods are healthy and provide extra fiber in your diet  Some examples of whole-grain foods are whole-wheat breads and pastas, oatmeal, brown rice, and bulgur  · Eat a variety of vegetables every day  Include dark, leafy greens such as spinach, kale, kylee greens, and mustard greens  Eat yellow and orange vegetables such as carrots, sweet potatoes, and winter squash  · Eat a variety of fruits every day  Choose fresh or canned fruit (canned in its own juice or light syrup) instead of juice  Fruit juice has very little or no fiber  · Eat low-fat dairy foods  Drink fat-free (skim) milk or 1% milk  Eat fat-free yogurt and low-fat cottage cheese  Try low-fat cheeses such as mozzarella and other reduced-fat cheeses  · Choose meat and other protein foods that are low in fat  Choose beans or other legumes such as split peas or lentils  Choose fish, skinless poultry (chicken or turkey), or lean cuts of red meat (beef or pork)  Before you cook meat or poultry, cut off any visible fat  · Use less fat and oil  Try baking foods instead of frying them  Add less fat, such as margarine, sour cream, regular salad dressing and mayonnaise to foods  Eat fewer high-fat foods  Some examples of high-fat foods include french fries, doughnuts, ice cream, and cakes  · Eat fewer sweets  Limit foods and drinks that are high in sugar   This includes candy, cookies, regular soda, and sweetened drinks  Exercise:  Exercise at least 30 minutes per day on most days of the week  Some examples of exercise include walking, biking, dancing, and swimming  You can also fit in more physical activity by taking the stairs instead of the elevator or parking farther away from stores  Ask your healthcare provider about the best exercise plan for you  © Copyright [employer] 2018 Information is for End User's use only and may not be sold, redistributed or otherwise used for commercial purposes  All illustrations and images included in CareNotes® are the copyrighted property of [employer]  or [facility] Preventive Visit Patient Instructions  Thank you for completing your Welcome to Medicare Visit or Medicare Annual Wellness Visit today  Your next wellness visit will be due in one year (1/7/2024)  The screening/preventive services that you may require over the next 5-10 years are detailed below  Some tests may not apply to you based off risk factors and/or age  Screening tests ordered at today's visit but not completed yet may show as past due  Also, please note that scanned in results may not display below  Preventive Screenings:  Service Recommendations Previous Testing/Comments   Colorectal Cancer Screening  * Colonoscopy    * Fecal Occult Blood Test (FOBT)/Fecal Immunochemical Test (FIT)  * Fecal DNA/Cologuard Test  * Flexible Sigmoidoscopy Age: 39-70 years old   Colonoscopy: every 10 years (may be performed more frequently if at higher risk)  OR  FOBT/FIT: every 1 year  OR  Cologuard: every 3 years  OR  Sigmoidoscopy: every 5 years  Screening may be recommended earlier than age 39 if at higher risk for colorectal cancer  Also, an individualized decision between you and your healthcare provider will decide whether screening between the ages of 74-80 would be appropriate   Colonoscopy: Not on file  FOBT/FIT: Not on file  Cologuard: 04/26/2021  Sigmoidoscopy: Not on file    Screening Current     Breast Cancer Screening Age: 36 years old  Frequency: every 1-2 years  Not required if history of left and right mastectomy Mammogram: 02/23/2022    Screening Current   Cervical Cancer Screening Between the ages of 21-29, pap smear recommended once every 3 years  Between the ages of 33-67, can perform pap smear with HPV co-testing every 5 years  Recommendations may differ for women with a history of total hysterectomy, cervical cancer, or abnormal pap smears in past  Pap Smear: Not on file    Screening Not Indicated   Hepatitis C Screening Once for adults born between 1945 and 1965  More frequently in patients at high risk for Hepatitis C Hep C Antibody: 04/20/2021    Screening Current   Diabetes Screening 1-2 times per year if you're at risk for diabetes or have pre-diabetes Fasting glucose: 174 mg/dL (6/30/2022)  A1C: 5 8 % (4/20/2021)  Screening Current   Cholesterol Screening Once every 5 years if you don't have a lipid disorder  May order more often based on risk factors  Lipid panel: 04/20/2021    Screening Current     Other Preventive Screenings Covered by Medicare:  6  Abdominal Aortic Aneurysm (AAA) Screening: covered once if your at risk  You're considered to be at risk if you have a family history of AAA  7  Lung Cancer Screening: covers low dose CT scan once per year if you meet all of the following conditions: (1) Age 50-69; (2) No signs or symptoms of lung cancer; (3) Current smoker or have quit smoking within the last 15 years; (4) You have a tobacco smoking history of at least 20 pack years (packs per day multiplied by number of years you smoked); (5) You get a written order from a healthcare provider  8  Glaucoma Screening: covered annually if you're considered high risk: (1) You have diabetes OR (2) Family history of glaucoma OR (3)  aged 48 and older OR (3)  American aged 72 and older  5   Osteoporosis Screening: covered every 2 years if you meet one of the following conditions: (1) You're estrogen deficient and at risk for osteoporosis based off medical history and other findings; (2) Have a vertebral abnormality; (3) On glucocorticoid therapy for more than 3 months; (4) Have primary hyperparathyroidism; (5) On osteoporosis medications and need to assess response to drug therapy  · Last bone density test (DXA Scan): Not on file  10  HIV Screening: covered annually if you're between the age of 12-76  Also covered annually if you are younger than 13 and older than 72 with risk factors for HIV infection  For pregnant patients, it is covered up to 3 times per pregnancy  Immunizations:  Immunization Recommendations   Influenza Vaccine Annual influenza vaccination during flu season is recommended for all persons aged >= 6 months who do not have contraindications   Pneumococcal Vaccine   * Pneumococcal conjugate vaccine = PCV13 (Prevnar 13), PCV15 (Vaxneuvance), PCV20 (Prevnar 20)  * Pneumococcal polysaccharide vaccine = PPSV23 (Pneumovax) Adults 25-60 years old: 1-3 doses may be recommended based on certain risk factors  Adults 72 years old: 1-2 doses may be recommended based off what pneumonia vaccine you previously received   Hepatitis B Vaccine 3 dose series if at intermediate or high risk (ex: diabetes, end stage renal disease, liver disease)   Tetanus (Td) Vaccine - COST NOT COVERED BY MEDICARE PART B Following completion of primary series, a booster dose should be given every 10 years to maintain immunity against tetanus  Td may also be given as tetanus wound prophylaxis  Tdap Vaccine - COST NOT COVERED BY MEDICARE PART B Recommended at least once for all adults  For pregnant patients, recommended with each pregnancy     Shingles Vaccine (Shingrix) - COST NOT COVERED BY MEDICARE PART B  2 shot series recommended in those aged 48 and above     Health Maintenance Due:      Topic Date Due   • Breast Cancer Screening: Mammogram  02/23/2023   • Colorectal Cancer Screening  04/26/2024   • Hepatitis C Screening  Completed     Immunizations Due:      Topic Date Due   • Hepatitis A Vaccine (1 of 2 - Risk 2-dose series) Never done   • Pneumococcal Vaccine: 65+ Years (1 - PCV) Never done   • Hepatitis B Vaccine (1 of 3 - Risk 3-dose series) Never done   • COVID-19 Vaccine (3 - Booster for Moderna series) 06/30/2021   • Influenza Vaccine (1) 09/01/2022     Advance Directives   What are advance directives? Advance directives are legal documents that state your wishes and plans for medical care  These plans are made ahead of time in case you lose your ability to make decisions for yourself  Advance directives can apply to any medical decision, such as the treatments you want, and if you want to donate organs  What are the types of advance directives? There are many types of advance directives, and each state has rules about how to use them  You may choose a combination of any of the following:  · Living will: This is a written record of the treatment you want  You can also choose which treatments you do not want, which to limit, and which to stop at a certain time  This includes surgery, medicine, IV fluid, and tube feedings  · Durable power of  for healthcare [place] [facility]): This is a written record that states who you want to make healthcare choices for you when you are unable to make them for yourself  This person, called a proxy, is usually a family member or a friend  You may choose more than 1 proxy  · Do not resuscitate (DNR) order:  A DNR order is used in case your heart stops beating or you stop breathing  It is a request not to have certain forms of treatment, such as CPR  A DNR order may be included in other types of advance directives  · Medical directive: This covers the care that you want if you are in a coma, near death, or unable to make decisions for yourself  You can list the treatments you want for each condition   Treatment may include pain medicine, surgery, blood transfusions, dialysis, IV or tube feedings, and a ventilator (breathing machine)  · Values history: This document has questions about your views, beliefs, and how you feel and think about life  This information can help others choose the care that you would choose  Why are advance directives important? An advance directive helps you control your care  Although spoken wishes may be used, it is better to have your wishes written down  Spoken wishes can be misunderstood, or not followed  Treatments may be given even if you do not want them  An advance directive may make it easier for your family to make difficult choices about your care  Cigarette Smoking and Your Health   Risks to your health if you smoke:  Nicotine and other chemicals found in tobacco damage every cell in your body  Even if you are a light smoker, you have an increased risk for cancer, heart disease, and lung disease  If you are pregnant or have diabetes, smoking increases your risk for complications  Benefits to your health if you stop smoking:   · You decrease respiratory symptoms such as coughing, wheezing, and shortness of breath  · You reduce your risk for cancers of the lung, mouth, throat, kidney, bladder, pancreas, stomach, and cervix  If you already have cancer, you increase the benefits of chemotherapy  You also reduce your risk for cancer returning or a second cancer from developing  · You reduce your risk for heart disease, blood clots, heart attack, and stroke  · You reduce your risk for lung infections, and diseases such as pneumonia, asthma, chronic bronchitis, and emphysema  · Your circulation improves  More oxygen can be delivered to your body  If you have diabetes, you lower your risk for complications, such as kidney, artery, and eye diseases  You also lower your risk for nerve damage  Nerve damage can lead to amputations, poor vision, and blindness    · You improve your body's ability to heal and to fight infections  For more information and support to stop smoking:   · [employer]  Phone: 5- 621 - 999-8262  Web Address: www [employer]  Weight Management   Why it is important to manage your weight:  Being overweight increases your risk of health conditions such as heart disease, high blood pressure, type 2 diabetes, and certain types of cancer  It can also increase your risk for osteoarthritis, sleep apnea, and other respiratory problems  Aim for a slow, steady weight loss  Even a small amount of weight loss can lower your risk of health problems  How to lose weight safely:  A safe and healthy way to lose weight is to eat fewer calories and get regular exercise  You can lose up about 1 pound a week by decreasing the number of calories you eat by 500 calories each day  Healthy meal plan for weight management:  A healthy meal plan includes a variety of foods, contains fewer calories, and helps you stay healthy  A healthy meal plan includes the following:  · Eat whole-grain foods more often  A healthy meal plan should contain fiber  Fiber is the part of grains, fruits, and vegetables that is not broken down by your body  Whole-grain foods are healthy and provide extra fiber in your diet  Some examples of whole-grain foods are whole-wheat breads and pastas, oatmeal, brown rice, and bulgur  · Eat a variety of vegetables every day  Include dark, leafy greens such as spinach, kale, kylee greens, and mustard greens  Eat yellow and orange vegetables such as carrots, sweet potatoes, and winter squash  · Eat a variety of fruits every day  Choose fresh or canned fruit (canned in its own juice or light syrup) instead of juice  Fruit juice has very little or no fiber  · Eat low-fat dairy foods  Drink fat-free (skim) milk or 1% milk  Eat fat-free yogurt and low-fat cottage cheese  Try low-fat cheeses such as mozzarella and other reduced-fat cheeses    · Choose meat and other protein foods that are low in fat  Choose beans or other legumes such as split peas or lentils  Choose fish, skinless poultry (chicken or turkey), or lean cuts of red meat (beef or pork)  Before you cook meat or poultry, cut off any visible fat  · Use less fat and oil  Try baking foods instead of frying them  Add less fat, such as margarine, sour cream, regular salad dressing and mayonnaise to foods  Eat fewer high-fat foods  Some examples of high-fat foods include french fries, doughnuts, ice cream, and cakes  · Eat fewer sweets  Limit foods and drinks that are high in sugar  This includes candy, cookies, regular soda, and sweetened drinks  Exercise:  Exercise at least 30 minutes per day on most days of the week  Some examples of exercise include walking, biking, dancing, and swimming  You can also fit in more physical activity by taking the stairs instead of the elevator or parking farther away from stores  Ask your healthcare provider about the best exercise plan for you  © Copyright [employer] 2018 Information is for End User's use only and may not be sold, redistributed or otherwise used for commercial purposes   All illustrations and images included in CareNotes® are the copyrighted property of [employer]  or [address] [place] [address]

## 2023-01-06 NOTE — ASSESSMENT & PLAN NOTE
Lab Results   Component Value Date    EGFR 58 06/30/2022    EGFR 64 06/29/2022    EGFR 72 04/20/2021    CREATININE 0 98 06/30/2022    CREATININE 0 91 06/29/2022    CREATININE 0 84 04/20/2021     Follow up labs, stay hydrated

## 2023-02-03 ENCOUNTER — TELEPHONE (OUTPATIENT)
Dept: FAMILY MEDICINE CLINIC | Facility: CLINIC | Age: 73
End: 2023-02-03

## 2023-02-03 ENCOUNTER — CLINICAL SUPPORT (OUTPATIENT)
Dept: FAMILY MEDICINE CLINIC | Facility: CLINIC | Age: 73
End: 2023-02-03

## 2023-02-03 ENCOUNTER — APPOINTMENT (OUTPATIENT)
Dept: LAB | Facility: HOSPITAL | Age: 73
End: 2023-02-03

## 2023-02-03 DIAGNOSIS — N18.30 STAGE 3 CHRONIC KIDNEY DISEASE, UNSPECIFIED WHETHER STAGE 3A OR 3B CKD (HCC): ICD-10-CM

## 2023-02-03 DIAGNOSIS — R79.9 ABNORMAL FINDING OF BLOOD CHEMISTRY, UNSPECIFIED: ICD-10-CM

## 2023-02-03 DIAGNOSIS — J44.9 CHRONIC OBSTRUCTIVE PULMONARY DISEASE, UNSPECIFIED COPD TYPE (HCC): Primary | ICD-10-CM

## 2023-02-03 DIAGNOSIS — E53.8 LOW SERUM VITAMIN B12: Primary | ICD-10-CM

## 2023-02-03 LAB
ALBUMIN SERPL BCP-MCNC: 4.2 G/DL (ref 3.5–5)
ALP SERPL-CCNC: 142 U/L (ref 46–116)
ALT SERPL W P-5'-P-CCNC: 168 U/L (ref 12–78)
ANION GAP SERPL CALCULATED.3IONS-SCNC: 11 MMOL/L (ref 4–13)
AST SERPL W P-5'-P-CCNC: 75 U/L (ref 5–45)
BASOPHILS # BLD AUTO: 0.04 THOUSANDS/ÂΜL (ref 0–0.1)
BASOPHILS NFR BLD AUTO: 1 % (ref 0–1)
BILIRUB SERPL-MCNC: 0.3 MG/DL (ref 0.2–1)
BUN SERPL-MCNC: 16 MG/DL (ref 5–25)
CALCIUM SERPL-MCNC: 9.6 MG/DL (ref 8.3–10.1)
CHLORIDE SERPL-SCNC: 104 MMOL/L (ref 96–108)
CHOLEST SERPL-MCNC: 273 MG/DL
CO2 SERPL-SCNC: 26 MMOL/L (ref 21–32)
CREAT SERPL-MCNC: 0.74 MG/DL (ref 0.6–1.3)
EOSINOPHIL # BLD AUTO: 0.05 THOUSAND/ÂΜL (ref 0–0.61)
EOSINOPHIL NFR BLD AUTO: 1 % (ref 0–6)
ERYTHROCYTE [DISTWIDTH] IN BLOOD BY AUTOMATED COUNT: 13.2 % (ref 11.6–15.1)
GFR SERPL CREATININE-BSD FRML MDRD: 82 ML/MIN/1.73SQ M
GLUCOSE P FAST SERPL-MCNC: 100 MG/DL (ref 65–99)
HCT VFR BLD AUTO: 44.3 % (ref 34.8–46.1)
HDLC SERPL-MCNC: 38 MG/DL
HGB BLD-MCNC: 15.2 G/DL (ref 11.5–15.4)
IMM GRANULOCYTES # BLD AUTO: 0.01 THOUSAND/UL (ref 0–0.2)
IMM GRANULOCYTES NFR BLD AUTO: 0 % (ref 0–2)
LYMPHOCYTES # BLD AUTO: 2.4 THOUSANDS/ÂΜL (ref 0.6–4.47)
LYMPHOCYTES NFR BLD AUTO: 30 % (ref 14–44)
MCH RBC QN AUTO: 33.6 PG (ref 26.8–34.3)
MCHC RBC AUTO-ENTMCNC: 34.3 G/DL (ref 31.4–37.4)
MCV RBC AUTO: 98 FL (ref 82–98)
MONOCYTES # BLD AUTO: 0.54 THOUSAND/ÂΜL (ref 0.17–1.22)
MONOCYTES NFR BLD AUTO: 7 % (ref 4–12)
NEUTROPHILS # BLD AUTO: 5 THOUSANDS/ÂΜL (ref 1.85–7.62)
NEUTS SEG NFR BLD AUTO: 61 % (ref 43–75)
NONHDLC SERPL-MCNC: 235 MG/DL
NRBC BLD AUTO-RTO: 0 /100 WBCS
PLATELET # BLD AUTO: 306 THOUSANDS/UL (ref 149–390)
PMV BLD AUTO: 10.3 FL (ref 8.9–12.7)
POTASSIUM SERPL-SCNC: 3.7 MMOL/L (ref 3.5–5.3)
PROT SERPL-MCNC: 8.6 G/DL (ref 6.4–8.4)
RBC # BLD AUTO: 4.52 MILLION/UL (ref 3.81–5.12)
SODIUM SERPL-SCNC: 141 MMOL/L (ref 135–147)
TRIGL SERPL-MCNC: 532 MG/DL
WBC # BLD AUTO: 8.04 THOUSAND/UL (ref 4.31–10.16)

## 2023-02-03 NOTE — TELEPHONE ENCOUNTER
Pt presents for a b12 shot today  Does pt keep coming in monthly for a b12 shot? Pt has blood work ordered in chart  b12 is not ordered

## 2023-02-03 NOTE — PROGRESS NOTES
Patient seen for a B12 injection  Given in left deltoid and pt tolerated it well       Christine Chaparro/sebastian  02/03/23  2:43 PM

## 2023-02-06 NOTE — TELEPHONE ENCOUNTER
Pt's spouse has been notified and expressed understanding  He will let pt know that lab has been ordered once she wakes up

## 2023-02-10 ENCOUNTER — TELEPHONE (OUTPATIENT)
Dept: FAMILY MEDICINE CLINIC | Facility: CLINIC | Age: 73
End: 2023-02-10

## 2023-02-10 DIAGNOSIS — K76.0 FATTY LIVER: ICD-10-CM

## 2023-02-10 DIAGNOSIS — E78.2 MIXED HYPERLIPIDEMIA: Primary | ICD-10-CM

## 2023-02-10 RX ORDER — ATORVASTATIN CALCIUM 20 MG/1
20 TABLET, FILM COATED ORAL DAILY
Qty: 90 TABLET | Refills: 3 | Status: SHIPPED | OUTPATIENT
Start: 2023-02-10

## 2023-04-07 ENCOUNTER — TELEPHONE (OUTPATIENT)
Dept: FAMILY MEDICINE CLINIC | Facility: CLINIC | Age: 73
End: 2023-04-07

## 2023-04-07 DIAGNOSIS — E78.2 MIXED HYPERLIPIDEMIA: Primary | ICD-10-CM

## 2023-04-07 RX ORDER — PRAVASTATIN SODIUM 10 MG
10 TABLET ORAL
Qty: 90 TABLET | Refills: 0 | Status: SHIPPED | OUTPATIENT
Start: 2023-04-07

## 2023-04-07 NOTE — TELEPHONE ENCOUNTER
Dr Kathi Colmenares,     Pt called to let you know that she stopped taking her atorvastatin, it made her feel very tired and dizzy  Since she stop the medication a week ago she feels much better  She wanted to let you know that she twisted her right leg ligaments walking around the house and she has been using Biofreeze to help with the pain  She denied swelling, bruising or redness      Christine Chaparro/sebastian  04/07/23  1:31 PM

## 2023-04-07 NOTE — TELEPHONE ENCOUNTER
That's fine, we can re-evaluate iof symptoms worsen   Can try tylenol and motrin as needed for the ankle

## 2023-04-07 NOTE — TELEPHONE ENCOUNTER
Spoke with pt, she is aware that a new cholesterol medication was sent to the pharmacy for her and to try Tylenol or advil for her leg       Christine Chaparro/sebastian  04/07/23  3:51 PM

## 2023-05-03 ENCOUNTER — HOSPITAL ENCOUNTER (OUTPATIENT)
Age: 73
Discharge: HOME | End: 2023-05-03

## 2023-05-03 ENCOUNTER — APPOINTMENT (OUTPATIENT)
Age: 73
End: 2023-05-03

## 2023-05-03 VITALS — HEIGHT: 61 IN | BODY MASS INDEX: 28.72 KG/M2

## 2023-05-03 DIAGNOSIS — E53.8 LOW SERUM VITAMIN B12: ICD-10-CM

## 2023-05-03 DIAGNOSIS — Z12.31 ENCOUNTER FOR SCREENING MAMMOGRAM FOR MALIGNANT NEOPLASM OF BREAST: ICD-10-CM

## 2023-05-03 DIAGNOSIS — Z78.0 ASYMPTOMATIC AGE-RELATED POSTMENOPAUSAL STATE: ICD-10-CM

## 2023-05-03 LAB — VIT B12 SERPL-MCNC: 1120 PG/ML (ref 100–900)

## 2023-05-08 ENCOUNTER — TELEPHONE (OUTPATIENT)
Dept: FAMILY MEDICINE CLINIC | Facility: CLINIC | Age: 73
End: 2023-05-08

## 2023-05-08 NOTE — TELEPHONE ENCOUNTER
T/c from pt -- returning dexa scan results call -- gave Dr Jazlyn Saab' advisement  Pt is also requesting Dr Jazlyn Saab review her mammogram and B12 results and provide advisement

## 2023-05-12 DIAGNOSIS — E78.2 MIXED HYPERLIPIDEMIA: ICD-10-CM

## 2023-05-12 NOTE — TELEPHONE ENCOUNTER
"T/c from pt -     Pt reports side effects of pravastatin (PRAVACHOL) 10 mg tablet - for her cholesterol  Notes it makes her dizzy, very shaky, weak  She states she acts weird while on meds - notes like that's not her, pt thinks the meds is not agreeing with her - notes she does not sleep well at night, gets up several times during the night  Pt is asking you \" what's going to happen next? \"     Awaiting your advisements    Pt stopped taking meds on 05/9/2023        Please advise   "

## 2023-05-15 RX ORDER — PRAVASTATIN SODIUM 10 MG
10 TABLET ORAL
Qty: 90 TABLET | Refills: 0 | Status: SHIPPED | OUTPATIENT
Start: 2023-05-15 | End: 2023-05-26

## 2023-05-24 ENCOUNTER — TELEPHONE (OUTPATIENT)
Dept: FAMILY MEDICINE CLINIC | Facility: CLINIC | Age: 73
End: 2023-05-24

## 2023-05-24 DIAGNOSIS — E78.2 MIXED HYPERLIPIDEMIA: Primary | ICD-10-CM

## 2023-05-24 NOTE — TELEPHONE ENCOUNTER
T/c from pt -- called to report she stopped taking pravastatin because of the side effects she was having -- reports dizziness and difficulty sleeping  Would like Dr Eladia Marino' advisement       Please call pts cell: [phone]

## 2023-05-25 NOTE — TELEPHONE ENCOUNTER
T/c from pt's  -- checking on status of message that was sent yesterday  States she stopped the medication and the symptoms went away  Looking for advisement

## 2023-05-26 RX ORDER — EZETIMIBE 10 MG/1
10 TABLET ORAL DAILY
Qty: 30 TABLET | Refills: 0 | Status: SHIPPED | OUTPATIENT
Start: 2023-05-26 | End: 2024-05-20

## 2023-05-26 NOTE — TELEPHONE ENCOUNTER
Spoke to pt's spouse to inform an alternative medication was sent in  Spouse wondering if pt can try OTC garlique to help with her cholesterol? He heard it's supposed to help and was wondering if she could take that       Please advise

## 2023-05-26 NOTE — TELEPHONE ENCOUNTER
T/c from pt -- gave Dr Fortunato Eaton' advisement about garlique and also verified her alternative to a statin was sent in

## 2023-05-26 NOTE — TELEPHONE ENCOUNTER
Patient can give it a try, does not have great data to support it but it is not harmful- made from garlic

## 2023-05-26 NOTE — TELEPHONE ENCOUNTER
From: Zahida Cordoba  To: Laura Yañez MD  Sent: 5/7/2021 3:10 PM CDT  Subject: Non-Urgent Medical Question    I had a shingles shot last October (10/15/2020) and was to receive the second in December. I was told to wait due to Mechelle.  I was wondering LVM for pt

## 2023-06-14 DIAGNOSIS — E78.2 MIXED HYPERLIPIDEMIA: ICD-10-CM

## 2023-06-14 RX ORDER — EZETIMIBE 10 MG/1
TABLET ORAL
Qty: 30 TABLET | Refills: 0 | Status: SHIPPED | OUTPATIENT
Start: 2023-06-14

## 2023-06-30 ENCOUNTER — TELEPHONE (OUTPATIENT)
Dept: FAMILY MEDICINE CLINIC | Facility: CLINIC | Age: 73
End: 2023-06-30

## 2023-06-30 ENCOUNTER — HOSPITAL ENCOUNTER (OUTPATIENT)
Dept: CT IMAGING | Facility: HOSPITAL | Age: 73
End: 2023-06-30
Payer: MEDICARE

## 2023-06-30 ENCOUNTER — APPOINTMENT (OUTPATIENT)
Dept: LAB | Facility: HOSPITAL | Age: 73
End: 2023-06-30
Payer: MEDICARE

## 2023-06-30 DIAGNOSIS — N18.30 STAGE 3 CHRONIC KIDNEY DISEASE, UNSPECIFIED WHETHER STAGE 3A OR 3B CKD (HCC): ICD-10-CM

## 2023-06-30 DIAGNOSIS — N18.30 STAGE 3 CHRONIC KIDNEY DISEASE, UNSPECIFIED WHETHER STAGE 3A OR 3B CKD (HCC): Primary | ICD-10-CM

## 2023-06-30 DIAGNOSIS — R93.0 ABNORMAL HEAD CT: ICD-10-CM

## 2023-06-30 LAB
ANION GAP SERPL CALCULATED.3IONS-SCNC: 7 MMOL/L
BUN SERPL-MCNC: 10 MG/DL (ref 5–25)
CALCIUM SERPL-MCNC: 10.5 MG/DL (ref 8.4–10.2)
CHLORIDE SERPL-SCNC: 104 MMOL/L (ref 96–108)
CO2 SERPL-SCNC: 26 MMOL/L (ref 21–32)
CREAT SERPL-MCNC: 0.79 MG/DL (ref 0.6–1.3)
GFR SERPL CREATININE-BSD FRML MDRD: 75 ML/MIN/1.73SQ M
GLUCOSE P FAST SERPL-MCNC: 106 MG/DL (ref 65–99)
POTASSIUM SERPL-SCNC: 4 MMOL/L (ref 3.5–5.3)
SODIUM SERPL-SCNC: 137 MMOL/L (ref 135–147)

## 2023-06-30 PROCEDURE — G1004 CDSM NDSC: HCPCS

## 2023-06-30 PROCEDURE — 80048 BASIC METABOLIC PNL TOTAL CA: CPT

## 2023-06-30 PROCEDURE — 70496 CT ANGIOGRAPHY HEAD: CPT

## 2023-06-30 PROCEDURE — 36415 COLL VENOUS BLD VENIPUNCTURE: CPT

## 2023-06-30 RX ADMIN — IOHEXOL 85 ML: 350 INJECTION, SOLUTION INTRAVENOUS at 16:42

## 2023-06-30 NOTE — TELEPHONE ENCOUNTER
Pt having CT w co done - now - Jarvis Stevens called from [facility] outpatient lab - requesting new lab order for pt - spoke with Dr Desouza -  Basic Metabolic Panel Ordered @ 5:94 PM - Jarvis Stevens notified

## 2023-07-17 DIAGNOSIS — E78.2 MIXED HYPERLIPIDEMIA: ICD-10-CM

## 2023-07-17 RX ORDER — EZETIMIBE 10 MG/1
TABLET ORAL
Qty: 90 TABLET | Refills: 1 | Status: SHIPPED | OUTPATIENT
Start: 2023-07-17

## 2023-09-01 NOTE — ASSESSMENT & PLAN NOTE
"  Physical Medicine & Rehabilitation Progress Note    Encounter Date: 9/1/2023    Chief Complaint: Weakness    Interval Events (Subjective):  Seen by OT this morning, significant cognitive impairment with sequencing tasks this am. SLP consult. On my examination he was attentive. Had no concerns.    Objective:  VITAL SIGNS: /56   Pulse 66   Temp 37.2 °C (98.9 °F) (Oral)   Resp 16   Ht 1.778 m (5' 10\")   Wt 55.1 kg (121 lb 8 oz)   SpO2 96%   BMI 17.43 kg/m²   Gen: No acute distress, well developed well nourished adult  HEENT: Normal Cephalic Atraumatic, Normal conjunctiva.   CV: warm extremities, well perfused, no edema  Resp: symmetric chest rise, breathing comfortably on room air  Abd: Soft, Non distended  Extremities: normal bulk, no atrophy  Skin: no visible rashes or lesions.   Neuro: alert, awake  Psych: Mood and affect appropriate and congruent    Laboratory Values:  Recent Results (from the past 72 hour(s))   CBC WITH DIFFERENTIAL    Collection Time: 08/30/23 12:25 AM   Result Value Ref Range    WBC 11.6 (H) 4.8 - 10.8 K/uL    RBC 3.87 (L) 4.70 - 6.10 M/uL    Hemoglobin 12.3 (L) 14.0 - 18.0 g/dL    Hematocrit 37.1 (L) 42.0 - 52.0 %    MCV 95.9 81.4 - 97.8 fL    MCH 31.8 27.0 - 33.0 pg    MCHC 33.2 32.3 - 36.5 g/dL    RDW 45.8 35.9 - 50.0 fL    Platelet Count 125 (L) 164 - 446 K/uL    MPV 12.8 9.0 - 12.9 fL    Neutrophils-Polys 81.80 (H) 44.00 - 72.00 %    Lymphocytes 5.40 (L) 22.00 - 41.00 %    Monocytes 9.20 0.00 - 13.40 %    Eosinophils 2.90 0.00 - 6.90 %    Basophils 0.30 0.00 - 1.80 %    Immature Granulocytes 0.40 0.00 - 0.90 %    Nucleated RBC 0.00 0.00 - 0.20 /100 WBC    Neutrophils (Absolute) 9.52 (H) 1.82 - 7.42 K/uL    Lymphs (Absolute) 0.63 (L) 1.00 - 4.80 K/uL    Monos (Absolute) 1.07 (H) 0.00 - 0.85 K/uL    Eos (Absolute) 0.34 0.00 - 0.51 K/uL    Baso (Absolute) 0.03 0.00 - 0.12 K/uL    Immature Granulocytes (abs) 0.05 0.00 - 0.11 K/uL    NRBC (Absolute) 0.00 K/uL   Basic Metabolic " Presented with facial weakness suspect secondary to Bell's palsy events facial weakness of upper and lower face  Will place on prednisone 60 mg daily  Lymes testing pending  CT revealed"Nodular density within the anterior midline of the suprasellar cistern on series 2 image 15 which 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" Follow-up CT angiography or MR angiography of the brain recommended  Will check CT angio  Neurology consult    Panel    Collection Time: 23 12:25 AM   Result Value Ref Range    Sodium 138 135 - 145 mmol/L    Potassium 4.3 3.6 - 5.5 mmol/L    Chloride 107 96 - 112 mmol/L    Co2 23 20 - 33 mmol/L    Glucose 118 (H) 65 - 99 mg/dL    Bun 21 8 - 22 mg/dL    Creatinine 0.97 0.50 - 1.40 mg/dL    Calcium 8.2 (L) 8.5 - 10.5 mg/dL    Anion Gap 8.0 7.0 - 16.0   MAGNESIUM    Collection Time: 23 12:25 AM   Result Value Ref Range    Magnesium 1.7 1.5 - 2.5 mg/dL   ESTIMATED GFR    Collection Time: 23 12:25 AM   Result Value Ref Range    GFR (CKD-EPI) 79 >60 mL/min/1.73 m 2   EKG    Collection Time: 23  6:51 PM   Result Value Ref Range    Report       [facility]    Test Date:  2023  Pt Name:    DARBY RODRIGUEZ                Department: [facility]  MRN:        [id number]                      Room:       [facility]  Gender:     Male                         Technician: [id number] WT  :        1941                   Requested By:MARÍA SMITH  Order #:    [id number]                    Reading MD: Michael Valencia MD    Measurements  Intervals                                Axis  Rate:       65                           P:          72  OK:         153                          QRS:        -75  QRSD:       148                          T:          96  QT:         418  QTc:        435    Interpretive Statements  Sinus rhythm  Left atrial enlargement.  RBBB and LAFB  LVH with secondary repolarization abnormality  Compared to ECG 2023 02:20:39  Left anterior fascicular block now present  Right bundle-branch block now present  Left ventricular hypertrophy now present  Early repolarization now present  Left bundle-branch block no longe r present  Electronically Signed On 2023 17:48:26 PDT by Michael Valencia MD     CBC with Differential    Collection Time: 23  5:42 AM   Result Value Ref Range    WBC 9.4 4.8 - 10.8 K/uL    RBC 4.05 (L) 4.70 - 6.10 M/uL    Hemoglobin 13.3 (L) 14.0 - 18.0 g/dL     Hematocrit 39.4 (L) 42.0 - 52.0 %    MCV 97.3 81.4 - 97.8 fL    MCH 32.8 27.0 - 33.0 pg    MCHC 33.8 32.3 - 36.5 g/dL    RDW 45.6 35.9 - 50.0 fL    Platelet Count 134 (L) 164 - 446 K/uL    MPV 12.3 9.0 - 12.9 fL    Neutrophils-Polys 72.90 (H) 44.00 - 72.00 %    Lymphocytes 10.20 (L) 22.00 - 41.00 %    Monocytes 10.60 0.00 - 13.40 %    Eosinophils 5.90 0.00 - 6.90 %    Basophils 0.20 0.00 - 1.80 %    Immature Granulocytes 0.20 0.00 - 0.90 %    Nucleated RBC 0.00 0.00 - 0.20 /100 WBC    Neutrophils (Absolute) 6.86 1.82 - 7.42 K/uL    Lymphs (Absolute) 0.96 (L) 1.00 - 4.80 K/uL    Monos (Absolute) 1.00 (H) 0.00 - 0.85 K/uL    Eos (Absolute) 0.56 (H) 0.00 - 0.51 K/uL    Baso (Absolute) 0.02 0.00 - 0.12 K/uL    Immature Granulocytes (abs) 0.02 0.00 - 0.11 K/uL    NRBC (Absolute) 0.00 K/uL   Comp Metabolic Panel (CMP)    Collection Time: 08/31/23  5:42 AM   Result Value Ref Range    Sodium 139 135 - 145 mmol/L    Potassium 4.1 3.6 - 5.5 mmol/L    Chloride 104 96 - 112 mmol/L    Co2 25 20 - 33 mmol/L    Anion Gap 10.0 7.0 - 16.0    Glucose 96 65 - 99 mg/dL    Bun 17 8 - 22 mg/dL    Creatinine 0.88 0.50 - 1.40 mg/dL    Calcium 8.6 8.5 - 10.5 mg/dL    Correct Calcium 9.2 8.5 - 10.5 mg/dL    AST(SGOT) 27 12 - 45 U/L    ALT(SGPT) 13 2 - 50 U/L    Alkaline Phosphatase 68 30 - 99 U/L    Total Bilirubin 1.3 0.1 - 1.5 mg/dL    Albumin 3.3 3.2 - 4.9 g/dL    Total Protein 5.9 (L) 6.0 - 8.2 g/dL    Globulin 2.6 1.9 - 3.5 g/dL    A-G Ratio 1.3 g/dL   HEMOGLOBIN A1C    Collection Time: 08/31/23  5:42 AM   Result Value Ref Range    Glycohemoglobin 4.8 4.0 - 5.6 %    Est Avg Glucose 91 mg/dL   Magnesium    Collection Time: 08/31/23  5:42 AM   Result Value Ref Range    Magnesium 1.9 1.5 - 2.5 mg/dL   TSH with Reflex to FT4    Collection Time: 08/31/23  5:42 AM   Result Value Ref Range    TSH 2.670 0.380 - 5.330 uIU/mL   ESTIMATED GFR    Collection Time: 08/31/23  5:42 AM   Result Value Ref Range    GFR (CKD-EPI) 87 >60 mL/min/1.73 m 2        Medications:  Scheduled Medications   Medication Dose Frequency    Pharmacy Consult Request  1 Each PHARMACY TO DOSE    senna-docusate  2 Tablet BID    clopidogrel  75 mg DAILY    lisinopril  20 mg Q DAY    simvastatin  10 mg Q EVENING    apixaban  5 mg BID    acetaminophen  650 mg TID    omeprazole  20 mg DAILY     PRN medications: Respiratory Therapy Consult, hydrALAZINE, senna-docusate **AND** polyethylene glycol/lytes **AND** magnesium hydroxide **AND** bisacodyl, ondansetron **OR** ondansetron, sodium chloride, oxyCODONE immediate-release **OR** oxyCODONE immediate-release **OR** [DISCONTINUED] HYDROmorphone, nitroglycerin    Diet:  Current Diet Order   Procedures    Diet Order Diet: Regular       Medical Decision Making and Plan:  Femoral neck fracture   - sustained during GLF   - images showed an impacted right subcapital femoral neck fracture  -s/p  open treatment of right femoral neck fracture with total hip arthoplasty by Dr. Mei  - posterior hip precautions, WBAT RLE   - continue with PT/OT inpatient     Chronic mural thrombus   Heart failure with reduced EF   - updated ECHO obtained showed EF of 35%, and noted mural thrombus   - Eliquis 5mg BID started 8/30 with Dr Mei approval  -Hx of hematuria with Eliquis,   -no bleeding to date, neuro exam stable, continue eliquis    Cognitive Deficits found on 9/1  Likely 2/2 Oxycodone use  -15mg taken before noon due to severe pain which is now controlled  -SLP eval     Prior CVA  - minimal deficits, no lateralizing weakness, no known dysphagia but patient has soft-spoken/hypophonic voice  - continue statin, plavix       CAD with history of PCI   - statin and plavix at home  -EKG done on admission  - continue     Neurogenic bladder:  - Timed voids with PVR q4H x3. If PVR > 400mL or if patient is unable to void, straight cath patient.     Neurogenic bowel:  -  Colace, Senna BID on admission  - Goal of 1BM/day.  -      Circadian Rhythm disorder:    Recommend lights on during the day/off at night, minimize nighttime interruptions as able.     Mood  - at risk of adjustment disorder, depression, and anxiety due to functional decline     ID:  - at risk for Urinary tract infection     Skin/Wounds:  - Pressure relief q2h while in bed. Close monitoring for signs of breakdown     Pain:  - Neuroceptic - On Tylenol prn, oxycodone. Continue medications     DVT prophylaxis:  Patient is on Eliquis.      GI prophylaxis:  On Prilosec 20mg daily      Medical Problem List:     HTN: Lisinopril 20mg daily continue medications        -Follow-up Dr Sigifredo farnsworth    ____________________________________    Kamran Vicente MD  Physical Medicine & Rehabilitation   Brain Injury Medicine   ____________________________________

## 2024-01-08 ENCOUNTER — OFFICE VISIT (OUTPATIENT)
Dept: FAMILY MEDICINE CLINIC | Facility: CLINIC | Age: 74
End: 2024-01-08
Payer: MEDICARE

## 2024-01-08 ENCOUNTER — APPOINTMENT (OUTPATIENT)
Dept: LAB | Facility: HOSPITAL | Age: 74
End: 2024-01-08
Payer: MEDICARE

## 2024-01-08 VITALS
WEIGHT: 140 LBS | TEMPERATURE: 98 F | SYSTOLIC BLOOD PRESSURE: 130 MMHG | DIASTOLIC BLOOD PRESSURE: 76 MMHG | HEIGHT: 61 IN | BODY MASS INDEX: 26.43 KG/M2

## 2024-01-08 DIAGNOSIS — B34.9 VIRAL SYNDROME: Primary | ICD-10-CM

## 2024-01-08 DIAGNOSIS — E78.2 MIXED HYPERLIPIDEMIA: ICD-10-CM

## 2024-01-08 DIAGNOSIS — Z01.89 PATIENT REQUEST FOR DIAGNOSTIC TESTING: ICD-10-CM

## 2024-01-08 DIAGNOSIS — N18.30 STAGE 3 CHRONIC KIDNEY DISEASE, UNSPECIFIED WHETHER STAGE 3A OR 3B CKD (HCC): ICD-10-CM

## 2024-01-08 DIAGNOSIS — F32.A ANXIETY AND DEPRESSION: ICD-10-CM

## 2024-01-08 DIAGNOSIS — F17.200 TOBACCO DEPENDENCE: ICD-10-CM

## 2024-01-08 DIAGNOSIS — F41.9 ANXIETY AND DEPRESSION: ICD-10-CM

## 2024-01-08 DIAGNOSIS — Z00.00 ENCOUNTER FOR ANNUAL WELLNESS VISIT (AWV) IN MEDICARE PATIENT: ICD-10-CM

## 2024-01-08 DIAGNOSIS — J44.9 CHRONIC OBSTRUCTIVE PULMONARY DISEASE, UNSPECIFIED COPD TYPE (HCC): ICD-10-CM

## 2024-01-08 DIAGNOSIS — E53.8 LOW SERUM VITAMIN B12: ICD-10-CM

## 2024-01-08 DIAGNOSIS — Z86.69 HISTORY OF SEIZURE DISORDER: ICD-10-CM

## 2024-01-08 DIAGNOSIS — Z00.00 ROUTINE GENERAL MEDICAL EXAMINATION AT A HEALTH CARE FACILITY: ICD-10-CM

## 2024-01-08 LAB
ALBUMIN SERPL BCP-MCNC: 4.8 G/DL (ref 3.5–5)
ALP SERPL-CCNC: 108 U/L (ref 34–104)
ALT SERPL W P-5'-P-CCNC: 64 U/L (ref 7–52)
ANION GAP SERPL CALCULATED.3IONS-SCNC: 7 MMOL/L
AST SERPL W P-5'-P-CCNC: 38 U/L (ref 13–39)
BILIRUB SERPL-MCNC: 0.34 MG/DL (ref 0.2–1)
BUN SERPL-MCNC: 18 MG/DL (ref 5–25)
CALCIUM SERPL-MCNC: 10.1 MG/DL (ref 8.4–10.2)
CHLORIDE SERPL-SCNC: 105 MMOL/L (ref 96–108)
CHOLEST SERPL-MCNC: 262 MG/DL
CO2 SERPL-SCNC: 29 MMOL/L (ref 21–32)
CREAT SERPL-MCNC: 0.78 MG/DL (ref 0.6–1.3)
GFR SERPL CREATININE-BSD FRML MDRD: 76 ML/MIN/1.73SQ M
GLUCOSE P FAST SERPL-MCNC: 101 MG/DL (ref 65–99)
HDLC SERPL-MCNC: 40 MG/DL
LDLC SERPL CALC-MCNC: 153 MG/DL (ref 0–100)
NONHDLC SERPL-MCNC: 222 MG/DL
POTASSIUM SERPL-SCNC: 4 MMOL/L (ref 3.5–5.3)
PROT SERPL-MCNC: 7.8 G/DL (ref 6.4–8.4)
SARS-COV-2 AG UPPER RESP QL IA: NEGATIVE
SL AMB POCT RAPID FLU A: NEGATIVE
SL AMB POCT RAPID FLU B: NEGATIVE
SODIUM SERPL-SCNC: 141 MMOL/L (ref 135–147)
TRIGL SERPL-MCNC: 347 MG/DL
VALID CONTROL: NORMAL
VIT B12 SERPL-MCNC: 593 PG/ML (ref 180–914)

## 2024-01-08 PROCEDURE — G0439 PPPS, SUBSEQ VISIT: HCPCS | Performed by: STUDENT IN AN ORGANIZED HEALTH CARE EDUCATION/TRAINING PROGRAM

## 2024-01-08 PROCEDURE — 80053 COMPREHEN METABOLIC PANEL: CPT

## 2024-01-08 PROCEDURE — 82607 VITAMIN B-12: CPT

## 2024-01-08 PROCEDURE — 36415 COLL VENOUS BLD VENIPUNCTURE: CPT

## 2024-01-08 PROCEDURE — 80061 LIPID PANEL: CPT

## 2024-01-08 PROCEDURE — 99214 OFFICE O/P EST MOD 30 MIN: CPT | Performed by: STUDENT IN AN ORGANIZED HEALTH CARE EDUCATION/TRAINING PROGRAM

## 2024-01-08 PROCEDURE — 87804 INFLUENZA ASSAY W/OPTIC: CPT | Performed by: STUDENT IN AN ORGANIZED HEALTH CARE EDUCATION/TRAINING PROGRAM

## 2024-01-08 PROCEDURE — 87811 SARS-COV-2 COVID19 W/OPTIC: CPT | Performed by: STUDENT IN AN ORGANIZED HEALTH CARE EDUCATION/TRAINING PROGRAM

## 2024-01-08 RX ORDER — EZETIMIBE 10 MG/1
10 TABLET ORAL DAILY
Qty: 90 TABLET | Refills: 1 | Status: SHIPPED | OUTPATIENT
Start: 2024-01-08

## 2024-01-08 RX ORDER — ALPRAZOLAM 2 MG/1
2 TABLET ORAL 4 TIMES DAILY
Status: CANCELLED | OUTPATIENT
Start: 2024-01-08

## 2024-01-08 RX ORDER — METHYLPREDNISOLONE 4 MG/1
TABLET ORAL
Qty: 21 EACH | Refills: 0 | Status: SHIPPED | OUTPATIENT
Start: 2024-01-08

## 2024-01-08 NOTE — PROGRESS NOTES
Assessment and Plan:     Problem List Items Addressed This Visit        Respiratory    Chronic obstructive pulmonary disease, unspecified COPD type ([facility])     Lung exam normal, no exacerbations          Relevant Medications    methylPREDNISolone 4 MG tablet therapy pack       Genitourinary    Stage 3 chronic kidney disease, unspecified whether stage 3a or 3b CKD (HCC)     Lab Results   Component Value Date    EGFR 75 06/30/2023    EGFR 82 02/03/2023    EGFR 58 06/30/2022    CREATININE 0.79 06/30/2023    CREATININE 0.74 02/03/2023    CREATININE 0.98 06/30/2022     Follow up labs and urine study         Relevant Orders    Albumin / creatinine urine ratio    Comprehensive metabolic panel       Other    Anxiety and depression     Follows with [facility] and Dr Corral         History of seizure disorder     No seizure during the last year, remote history of this which has been unclear          Tobacco dependence     Cessation encouraged          Mixed hyperlipidemia    Relevant Medications    ezetimibe (ZETIA) 10 mg tablet    Other Relevant Orders    Lipid panel   Other Visit Diagnoses     Viral syndrome    -  Primary    Relevant Medications    methylPREDNISolone 4 MG tablet therapy pack    Other Relevant Orders    POCT Rapid Covid Ag (Completed)    POCT rapid flu A and B (Completed)    Low serum vitamin B12        Relevant Orders    Vitamin B12    Patient request for diagnostic testing        Relevant Orders    Type and screen    Encounter for annual wellness visit (AWV) in Medicare patient                Depression Screening and Follow-up Plan: Patient was screened for depression during today's encounter. They screened negative with a PHQ-9 score of 0.    Tobacco Cessation Counseling: Tobacco cessation counseling was not provided. The patient is sincerely urged to quit consumption of tobacco. She is not ready to quit tobacco.       Preventive health issues were discussed with patient, and age appropriate  "screening tests were ordered as noted in patient's After Visit Summary.  Personalized health advice and appropriate referrals for health education or preventive services given if needed, as noted in patient's After Visit Summary.     History of Present Illness:     Patient presents for a Medicare Wellness Visit    Not feeling well x 1 month. Symptoms below started after flu and covid shot. Ears hurt, hearing sounds \"blurry\", sore throat runny nose, coughing. Worried about having rsv    Symptoms mildly improving but very slowly    Wants blood work, wants to know her blood type    Dizziness  This is a new problem. The current episode started 1 to 4 weeks ago. The problem has been unchanged. Associated symptoms include coughing and fatigue. Pertinent negatives include no abdominal pain, arthralgias, chest pain, chills, fever, headaches, myalgias, nausea, rash, sore throat, vomiting or weakness.   Fatigue  Associated symptoms include coughing and fatigue. Pertinent negatives include no abdominal pain, arthralgias, chest pain, chills, fever, headaches, myalgias, nausea, rash, sore throat, vomiting or weakness.   Cough  Pertinent negatives include no chest pain, chills, fever, headaches, myalgias, rash, rhinorrhea, sore throat or shortness of breath.      Patient Care Team:  Brien Desouza MD as PCP - General (Family Medicine)     Review of Systems:     Review of Systems   Constitutional:  Positive for fatigue. Negative for chills and fever.   HENT:  Negative for rhinorrhea and sore throat.    Eyes:  Negative for visual disturbance.   Respiratory:  Positive for cough. Negative for shortness of breath.    Cardiovascular:  Negative for chest pain and palpitations.   Gastrointestinal:  Negative for abdominal pain, constipation, diarrhea, nausea and vomiting.   Genitourinary:  Negative for difficulty urinating, dysuria and frequency.   Musculoskeletal:  Negative for arthralgias and myalgias.   Skin:  Negative for color " change and rash.   Neurological:  Positive for dizziness. Negative for weakness and headaches.        Problem List:     Patient Active Problem List   Diagnosis   • Anxiety and depression   • History of seizure disorder   • Tobacco dependence   • Chronic obstructive pulmonary disease, unspecified COPD type (HCC)   • Stage 3 chronic kidney disease, unspecified whether stage 3a or 3b CKD (HCC)   • Mixed hyperlipidemia      Past Medical and Surgical History:     Past Medical History:   Diagnosis Date   • Anxiety    • Depression    • Depression, recurrent (HCC) 4/20/2021   • Hallucinations 4/20/2021     History reviewed. No pertinent surgical history.   Family History:     Family History   Problem Relation Age of Onset   • Diabetes Mother    • Stroke Father    • No Known Problems Sister    • No Known Problems Sister    • No Known Problems Sister    • No Known Problems Daughter    • No Known Problems Daughter    • No Known Problems Daughter    • No Known Problems Maternal Grandmother    • No Known Problems Paternal Grandmother    • No Known Problems Maternal Aunt    • No Known Problems Maternal Aunt    • No Known Problems Maternal Aunt    • No Known Problems Maternal Aunt    • No Known Problems Maternal Aunt    • No Known Problems Maternal Aunt    • No Known Problems Maternal Aunt    • No Known Problems Maternal Aunt    • No Known Problems Paternal Aunt    • Breast cancer Neg Hx       Social History:     Social History     Socioeconomic History   • Marital status: /Civil Union     Spouse name: None   • Number of children: None   • Years of education: None   • Highest education level: None   Occupational History   • None   Tobacco Use   • Smoking status: Every Day     Current packs/day: 0.50     Types: Cigarettes   • Smokeless tobacco: Never   Vaping Use   • Vaping status: Never Used   Substance and Sexual Activity   • Alcohol use: Not Currently   • Drug use: Never   • Sexual activity: None   Other Topics Concern    • None   Social History Narrative   • None     Social Determinants of Health     Financial Resource Strain: Medium Risk (1/1/2024)    Overall Financial Resource Strain (CARDIA)    • Difficulty of Paying Living Expenses: Somewhat hard   Food Insecurity: No Food Insecurity (6/30/2022)    Hunger Vital Sign    • Worried About Running Out of Food in the Last Year: Never true    • Ran Out of Food in the Last Year: Never true   Transportation Needs: No Transportation Needs (1/1/2024)    PRAPARE - Transportation    • Lack of Transportation (Medical): No    • Lack of Transportation (Non-Medical): No   Physical Activity: Not on file   Stress: Not on file   Social Connections: Not on file   Intimate Partner Violence: Not on file   Housing Stability: Low Risk  (6/30/2022)    Housing Stability Vital Sign    • Unable to Pay for Housing in the Last Year: No    • Number of Places Lived in the Last Year: 1    • Unstable Housing in the Last Year: No      Medications and Allergies:     Current Outpatient Medications   Medication Sig Dispense Refill   • ALPRAZolam (XANAX) 2 MG tablet Take 2 mg by mouth 4 (four) times a day     • ezetimibe (ZETIA) 10 mg tablet Take 1 tablet (10 mg total) by mouth daily 90 tablet 1   • gabapentin (NEURONTIN) 300 mg capsule Take 300 mg by mouth 3 (three) times a day     • methylPREDNISolone 4 MG tablet therapy pack Use as directed on package 21 each 0   • Multiple Vitamin (multivitamin) tablet Take 1 tablet by mouth daily     • Omega-3 Fatty Acids (fish oil) 1,000 mg Take 1,000 mg by mouth daily     • QUEtiapine (SEROquel) 100 mg tablet Take 100 mg by mouth every morning     • QUEtiapine (SEROquel) 400 MG tablet Take 400 mg by mouth daily at bedtime     • vitamin B-12 (VITAMIN B-12) 1,000 mcg tablet Take by mouth daily       Current Facility-Administered Medications   Medication Dose Route Frequency Provider Last Rate Last Admin   • cyanocobalamin injection 1,000 mcg  1,000 mcg Intramuscular Q30 Days  Brien Desouza MD   1,000 mcg at 01/06/23 1541     Allergies   Allergen Reactions   • Lipitor [Atorvastatin] Other (See Comments)     Muscle aches      Immunizations:     Immunization History   Administered Date(s) Administered   • COVID-19 MODERNA VACC 0.5 ML IM 04/07/2021, 05/05/2021   • INFLUENZA 09/22/2020, 09/28/2021   • Influenza, high dose seasonal 0.7 mL 01/06/2023      Health Maintenance:         Topic Date Due   • Colorectal Cancer Screening  04/26/2024   • Breast Cancer Screening: Mammogram  05/03/2024   • Hepatitis C Screening  Completed         Topic Date Due   • Pneumococcal Vaccine: 65+ Years (1 - PCV) Never done   • Hepatitis A Vaccine (1 of 2 - Risk 2-dose series) Never done   • Influenza Vaccine (1) 09/01/2023   • COVID-19 Vaccine (3 - 2023-24 season) 09/01/2023      Medicare Screening Tests and Risk Assessments:     Kirsty is here for her Subsequent Wellness visit. Last Medicare Wellness visit information reviewed, patient interviewed, no change since last AWV.     Health Risk Assessment:   Patient rates overall health as fair. Patient feels that their physical health rating is same. Patient is satisfied with their life. Eyesight was rated as same. Hearing was rated as same. Patient feels that their emotional and mental health rating is same. Patients states they are sometimes angry. Patient states they are sometimes unusually tired/fatigued. Pain experienced in the last 7 days has been none. Patient states that she has experienced weight loss or gain in last 6 months.     Depression Screening:   PHQ-9 Score: 0      Fall Risk Screening:   In the past year, patient has experienced: no history of falling in past year      Urinary Incontinence Screening:   Patient has not leaked urine accidently in the last six months.     Home Safety:  Patient does not have trouble with stairs inside or outside of their home. Patient has working smoke alarms and has working carbon monoxide detector. Home safety  hazards include: none.     Nutrition:   Current diet is Limited junk food.     Medications:   Patient is currently taking over-the-counter supplements. OTC medications include: see medication list. Patient is able to manage medications.     Activities of Daily Living (ADLs)/Instrumental Activities of Daily Living (IADLs):   Walk and transfer into and out of bed and chair?: Yes  Dress and groom yourself?: Yes    Bathe or shower yourself?: Yes    Feed yourself? Yes  Do your laundry/housekeeping?: Yes  Manage your money, pay your bills and track your expenses?: Yes  Make your own meals?: Yes    Do your own shopping?: Yes    Durable Medical Equipment Suppliers  n/a    Previous Hospitalizations:   Any hospitalizations or ED visits within the last 12 months?: No      Advance Care Planning:   Living will: No    Durable POA for healthcare: Yes    Advanced directive: Yes      PREVENTIVE SCREENINGS      Cardiovascular Screening:    General: Screening Not Indicated and History Lipid Disorder      Diabetes Screening:     General: Screening Current      Colorectal Cancer Screening:     General: Screening Current      Breast Cancer Screening:     General: Screening Current      Cervical Cancer Screening:    General: Screening Not Indicated      Lung Cancer Screening:     General: Screening Not Indicated      Hepatitis C Screening:    General: Screening Current    Screening, Brief Intervention, and Referral to Treatment (SBIRT)    Screening  Typical number of drinks in a day: 0  Typical number of drinks in a week: 0  Interpretation: Low risk drinking behavior.    AUDIT-C Screenin) How often did you have a drink containing alcohol in the past year? never  2) How many drinks did you have on a typical day when you were drinking in the past year? 0  3) How often did you have 6 or more drinks on one occasion in the past year? never    AUDIT-C Score: 0  Interpretation: Score 0-2 (female): Negative screen for alcohol  "misuse    Single Item Drug Screening:  How often have you used an illegal drug (including marijuana) or a prescription medication for non-medical reasons in the past year? never    Single Item Drug Screen Score: 0  Interpretation: Negative screen for possible drug use disorder    No results found.     Physical Exam:     /76   Temp 98 °F (36.7 °C)   Ht 5' 1\" (1.549 m)   Wt 63.5 kg (140 lb)   BMI 26.45 kg/m²     Physical Exam  Constitutional:       General: She is not in acute distress.     Appearance: Normal appearance. She is not ill-appearing.   HENT:      Head: Normocephalic and atraumatic.      Right Ear: Tympanic membrane, ear canal and external ear normal.      Left Ear: Tympanic membrane, ear canal and external ear normal.      Nose: Nose normal.      Mouth/Throat:      Mouth: Mucous membranes are moist.      Pharynx: Oropharynx is clear. No oropharyngeal exudate or posterior oropharyngeal erythema.   Eyes:      General: No scleral icterus.        Right eye: No discharge.         Left eye: No discharge.      Extraocular Movements: Extraocular movements intact.      Conjunctiva/sclera: Conjunctivae normal.      Pupils: Pupils are equal, round, and reactive to light.   Cardiovascular:      Rate and Rhythm: Normal rate and regular rhythm.      Pulses: Normal pulses.      Heart sounds: Normal heart sounds. No murmur heard.  Pulmonary:      Effort: Pulmonary effort is normal. No respiratory distress.      Breath sounds: Normal breath sounds.   Abdominal:      General: Bowel sounds are normal.      Palpations: Abdomen is soft.      Tenderness: There is no abdominal tenderness.   Musculoskeletal:         General: Normal range of motion.      Cervical back: Normal range of motion and neck supple.   Lymphadenopathy:      Cervical: No cervical adenopathy.   Skin:     General: Skin is warm and dry.      Capillary Refill: Capillary refill takes less than 2 seconds.   Neurological:      General: No focal deficit " present.      Mental Status: She is alert and oriented to person, place, and time. Mental status is at baseline.      Cranial Nerves: No cranial nerve deficit.   Psychiatric:         Mood and Affect: Mood normal.          Brien Desouza MD

## 2024-01-08 NOTE — PATIENT INSTRUCTIONS
Medicare Preventive Visit Patient Instructions  Thank you for completing your Welcome to Medicare Visit or Medicare Annual Wellness Visit today. Your next wellness visit will be due in one year (1/8/2025).  The screening/preventive services that you may require over the next 5-10 years are detailed below. Some tests may not apply to you based off risk factors and/or age. Screening tests ordered at today's visit but not completed yet may show as past due. Also, please note that scanned in results may not display below.  Preventive Screenings:  Service Recommendations Previous Testing/Comments   Colorectal Cancer Screening  * Colonoscopy    * Fecal Occult Blood Test (FOBT)/Fecal Immunochemical Test (FIT)  * Fecal DNA/Cologuard Test  * Flexible Sigmoidoscopy Age: 45-75 years old   Colonoscopy: every 10 years (may be performed more frequently if at higher risk)  OR  FOBT/FIT: every 1 year  OR  Cologuard: every 3 years  OR  Sigmoidoscopy: every 5 years  Screening may be recommended earlier than age 45 if at higher risk for colorectal cancer. Also, an individualized decision between you and your healthcare provider will decide whether screening between the ages of 76-85 would be appropriate. Colonoscopy: Not on file  FOBT/FIT: Not on file  Cologuard: 04/26/2021  Sigmoidoscopy: Not on file    Screening Current     Breast Cancer Screening Age: 40+ years old  Frequency: every 1-2 years  Not required if history of left and right mastectomy Mammogram: 05/03/2023    Screening Current   Cervical Cancer Screening Between the ages of 21-29, pap smear recommended once every 3 years.   Between the ages of 30-65, can perform pap smear with HPV co-testing every 5 years.   Recommendations may differ for women with a history of total hysterectomy, cervical cancer, or abnormal pap smears in past. Pap Smear: Not on file    Screening Not Indicated   Hepatitis C Screening Once for adults born between 1945 and 1965  More frequently in  patients at high risk for Hepatitis C Hep C Antibody: 04/20/2021    Screening Current   Diabetes Screening 1-2 times per year if you're at risk for diabetes or have pre-diabetes Fasting glucose: 106 mg/dL (6/30/2023)  A1C: 5.8 % (4/20/2021)  Screening Current   Cholesterol Screening Once every 5 years if you don't have a lipid disorder. May order more often based on risk factors. Lipid panel: 02/03/2023    Screening Not Indicated  History Lipid Disorder     Other Preventive Screenings Covered by Medicare:  Abdominal Aortic Aneurysm (AAA) Screening: covered once if your at risk. You're considered to be at risk if you have a family history of AAA.  Lung Cancer Screening: covers low dose CT scan once per year if you meet all of the following conditions: (1) Age 55-77; (2) No signs or symptoms of lung cancer; (3) Current smoker or have quit smoking within the last 15 years; (4) You have a tobacco smoking history of at least 20 pack years (packs per day multiplied by number of years you smoked); (5) You get a written order from a healthcare provider.  Glaucoma Screening: covered annually if you're considered high risk: (1) You have diabetes OR (2) Family history of glaucoma OR (3)  aged 50 and older OR (4)  American aged 65 and older  Osteoporosis Screening: covered every 2 years if you meet one of the following conditions: (1) You're estrogen deficient and at risk for osteoporosis based off medical history and other findings; (2) Have a vertebral abnormality; (3) On glucocorticoid therapy for more than 3 months; (4) Have primary hyperparathyroidism; (5) On osteoporosis medications and need to assess response to drug therapy.   Last bone density test (DXA Scan): 05/03/2023.  HIV Screening: covered annually if you're between the age of 15-65. Also covered annually if you are younger than 15 and older than 65 with risk factors for HIV infection. For pregnant patients, it is covered up to 3 times per  pregnancy.    Immunizations:  Immunization Recommendations   Influenza Vaccine Annual influenza vaccination during flu season is recommended for all persons aged >= 6 months who do not have contraindications   Pneumococcal Vaccine   * Pneumococcal conjugate vaccine = PCV13 (Prevnar 13), PCV15 (Vaxneuvance), PCV20 (Prevnar 20)  * Pneumococcal polysaccharide vaccine = PPSV23 (Pneumovax) Adults 19-65 yo with certain risk factors or if 65+ yo  If never received any pneumonia vaccine: recommend Prevnar 20 (PCV20)  Give PCV20 if previously received 1 dose of PCV13 or PPSV23   Hepatitis B Vaccine 3 dose series if at intermediate or high risk (ex: diabetes, end stage renal disease, liver disease)   Respiratory syncytial virus (RSV) Vaccine - COVERED BY MEDICARE PART D  * RSVPreF3 (Arexvy) CDC recommends that adults 60 years of age and older may receive a single dose of RSV vaccine using shared clinical decision-making (SCDM)   Tetanus (Td) Vaccine - COST NOT COVERED BY MEDICARE PART B Following completion of primary series, a booster dose should be given every 10 years to maintain immunity against tetanus. Td may also be given as tetanus wound prophylaxis.   Tdap Vaccine - COST NOT COVERED BY MEDICARE PART B Recommended at least once for all adults. For pregnant patients, recommended with each pregnancy.   Shingles Vaccine (Shingrix) - COST NOT COVERED BY MEDICARE PART B  2 shot series recommended in those 19 years and older who have or will have weakened immune systems or those 50 years and older     Health Maintenance Due:      Topic Date Due   • Colorectal Cancer Screening  04/26/2024   • Breast Cancer Screening: Mammogram  05/03/2024   • Hepatitis C Screening  Completed     Immunizations Due:      Topic Date Due   • Pneumococcal Vaccine: 65+ Years (1 - PCV) Never done   • Hepatitis A Vaccine (1 of 2 - Risk 2-dose series) Never done   • Influenza Vaccine (1) 09/01/2023   • COVID-19 Vaccine (3 - 2023-24 season) 09/01/2023      Advance Directives   What are advance directives?  Advance directives are legal documents that state your wishes and plans for medical care. These plans are made ahead of time in case you lose your ability to make decisions for yourself. Advance directives can apply to any medical decision, such as the treatments you want, and if you want to donate organs.   What are the types of advance directives?  There are many types of advance directives, and each state has rules about how to use them. You may choose a combination of any of the following:  Living will:  This is a written record of the treatment you want. You can also choose which treatments you do not want, which to limit, and which to stop at a certain time. This includes surgery, medicine, IV fluid, and tube feedings.   Durable power of  for healthcare (DPAHC):  This is a written record that states who you want to make healthcare choices for you when you are unable to make them for yourself. This person, called a proxy, is usually a family member or a friend. You may choose more than 1 proxy.  Do not resuscitate (DNR) order:  A DNR order is used in case your heart stops beating or you stop breathing. It is a request not to have certain forms of treatment, such as CPR. A DNR order may be included in other types of advance directives.  Medical directive:  This covers the care that you want if you are in a coma, near death, or unable to make decisions for yourself. You can list the treatments you want for each condition. Treatment may include pain medicine, surgery, blood transfusions, dialysis, IV or tube feedings, and a ventilator (breathing machine).  Values history:  This document has questions about your views, beliefs, and how you feel and think about life. This information can help others choose the care that you would choose.  Why are advance directives important?  An advance directive helps you control your care. Although spoken wishes may  be used, it is better to have your wishes written down. Spoken wishes can be misunderstood, or not followed. Treatments may be given even if you do not want them. An advance directive may make it easier for your family to make difficult choices about your care.   Cigarette Smoking and Your Health   Risks to your health if you smoke:  Nicotine and other chemicals found in tobacco damage every cell in your body. Even if you are a light smoker, you have an increased risk for cancer, heart disease, and lung disease. If you are pregnant or have diabetes, smoking increases your risk for complications.   Benefits to your health if you stop smoking:   You decrease respiratory symptoms such as coughing, wheezing, and shortness of breath.   You reduce your risk for cancers of the lung, mouth, throat, kidney, bladder, pancreas, stomach, and cervix. If you already have cancer, you increase the benefits of chemotherapy. You also reduce your risk for cancer returning or a second cancer from developing.   You reduce your risk for heart disease, blood clots, heart attack, and stroke.   You reduce your risk for lung infections, and diseases such as pneumonia, asthma, chronic bronchitis, and emphysema.  Your circulation improves. More oxygen can be delivered to your body. If you have diabetes, you lower your risk for complications, such as kidney, artery, and eye diseases. You also lower your risk for nerve damage. Nerve damage can lead to amputations, poor vision, and blindness.  You improve your body's ability to heal and to fight infections.  For more information and support to stop smoking:   [employer]  Phone: 7- 687 - 418-7277  Web Address: www.[employer]  Weight Management   Why it is important to manage your weight:  Being overweight increases your risk of health conditions such as heart disease, high blood pressure, type 2 diabetes, and certain types of cancer. It can also increase your risk for osteoarthritis, sleep  apnea, and other respiratory problems. Aim for a slow, steady weight loss. Even a small amount of weight loss can lower your risk of health problems.  How to lose weight safely:  A safe and healthy way to lose weight is to eat fewer calories and get regular exercise. You can lose up about 1 pound a week by decreasing the number of calories you eat by 500 calories each day.   Healthy meal plan for weight management:  A healthy meal plan includes a variety of foods, contains fewer calories, and helps you stay healthy. A healthy meal plan includes the following:  Eat whole-grain foods more often.  A healthy meal plan should contain fiber. Fiber is the part of grains, fruits, and vegetables that is not broken down by your body. Whole-grain foods are healthy and provide extra fiber in your diet. Some examples of whole-grain foods are whole-wheat breads and pastas, oatmeal, brown rice, and bulgur.  Eat a variety of vegetables every day.  Include dark, leafy greens such as spinach, kale, kylee greens, and mustard greens. Eat yellow and orange vegetables such as carrots, sweet potatoes, and winter squash.   Eat a variety of fruits every day.  Choose fresh or canned fruit (canned in its own juice or light syrup) instead of juice. Fruit juice has very little or no fiber.  Eat low-fat dairy foods.  Drink fat-free (skim) milk or 1% milk. Eat fat-free yogurt and low-fat cottage cheese. Try low-fat cheeses such as mozzarella and other reduced-fat cheeses.  Choose meat and other protein foods that are low in fat.  Choose beans or other legumes such as split peas or lentils. Choose fish, skinless poultry (chicken or turkey), or lean cuts of red meat (beef or pork). Before you cook meat or poultry, cut off any visible fat.   Use less fat and oil.  Try baking foods instead of frying them. Add less fat, such as margarine, sour cream, regular salad dressing and mayonnaise to foods. Eat fewer high-fat foods. Some examples of high-fat  foods include french fries, doughnuts, ice cream, and cakes.  Eat fewer sweets.  Limit foods and drinks that are high in sugar. This includes candy, cookies, regular soda, and sweetened drinks.  Exercise:  Exercise at least 30 minutes per day on most days of the week. Some examples of exercise include walking, biking, dancing, and swimming. You can also fit in more physical activity by taking the stairs instead of the elevator or parking farther away from stores. Ask your healthcare provider about the best exercise plan for you.      © Copyright [employer] 2018 Information is for End User's use only and may not be sold, redistributed or otherwise used for commercial purposes. All illustrations and images included in CareNotes® are the copyrighted property of [employer] or [employer]

## 2024-01-08 NOTE — ASSESSMENT & PLAN NOTE
Lab Results   Component Value Date    EGFR 75 06/30/2023    EGFR 82 02/03/2023    EGFR 58 06/30/2022    CREATININE 0.79 06/30/2023    CREATININE 0.74 02/03/2023    CREATININE 0.98 06/30/2022     Follow up labs and urine study

## 2024-01-09 ENCOUNTER — APPOINTMENT (OUTPATIENT)
Dept: LAB | Facility: HOSPITAL | Age: 74
End: 2024-01-09
Payer: MEDICARE

## 2024-01-09 LAB
CREAT UR-MCNC: 77.1 MG/DL
MICROALBUMIN UR-MCNC: <7 MG/L
MICROALBUMIN/CREAT 24H UR: <9 MG/G CREATININE (ref 0–30)

## 2024-01-09 PROCEDURE — 82570 ASSAY OF URINE CREATININE: CPT

## 2024-01-09 PROCEDURE — 82043 UR ALBUMIN QUANTITATIVE: CPT

## 2024-01-17 ENCOUNTER — TELEPHONE (OUTPATIENT)
Dept: FAMILY MEDICINE CLINIC | Facility: CLINIC | Age: 74
End: 2024-01-17

## 2024-01-17 NOTE — TELEPHONE ENCOUNTER
Cholesterol has improved, would like to see it even more improved. Work on lowering trans fats and processed carbs in the diet. Other labs show improving in liver functions as well. Can repeat in 6 months

## 2024-01-17 NOTE — TELEPHONE ENCOUNTER
Received VM from pt's spouse. Asking about lab results. Would like to know the outcome of the results and is requesting a call back.     Please advise

## 2024-01-17 NOTE — TELEPHONE ENCOUNTER
Spoke with pts . Notified him on results. Pts  is really concerned about pts medical problem list stating she has stage 3 chronic Kidney disease and wants to know why its on there.       Please advise

## 2024-01-18 PROBLEM — N18.30 STAGE 3 CHRONIC KIDNEY DISEASE, UNSPECIFIED WHETHER STAGE 3A OR 3B CKD (HCC): Status: RESOLVED | Noted: 2023-01-06 | Resolved: 2024-01-18

## 2024-01-18 NOTE — TELEPHONE ENCOUNTER
T/c from pt's  - checking on status of message from yesterday. Would like response back today.     Please advise

## 2024-04-10 ENCOUNTER — TELEPHONE (OUTPATIENT)
Dept: FAMILY MEDICINE CLINIC | Facility: CLINIC | Age: 74
End: 2024-04-10

## 2024-04-10 DIAGNOSIS — Z12.31 ENCOUNTER FOR SCREENING MAMMOGRAM FOR MALIGNANT NEOPLASM OF BREAST: Primary | ICD-10-CM

## 2024-06-07 ENCOUNTER — APPOINTMENT (EMERGENCY)
Dept: CT IMAGING | Facility: HOSPITAL | Age: 74
DRG: 091 | End: 2024-06-07
Payer: MEDICARE

## 2024-06-07 ENCOUNTER — HOSPITAL ENCOUNTER (INPATIENT)
Facility: HOSPITAL | Age: 74
LOS: 10 days | Discharge: NURSING, REHAB OR LONG-TERM CARE | DRG: 091 | End: 2024-06-17
Attending: EMERGENCY MEDICINE | Admitting: ANESTHESIOLOGY
Payer: MEDICARE

## 2024-06-07 ENCOUNTER — APPOINTMENT (EMERGENCY)
Dept: RADIOLOGY | Facility: HOSPITAL | Age: 74
DRG: 091 | End: 2024-06-07
Payer: MEDICARE

## 2024-06-07 ENCOUNTER — APPOINTMENT (INPATIENT)
Dept: RADIOLOGY | Facility: HOSPITAL | Age: 74
DRG: 091 | End: 2024-06-07
Payer: MEDICARE

## 2024-06-07 DIAGNOSIS — R41.82 ALTERED MENTAL STATUS: Primary | ICD-10-CM

## 2024-06-07 DIAGNOSIS — S62.521A DISPLACED FRACTURE OF DISTAL PHALANX OF RIGHT THUMB, INITIAL ENCOUNTER FOR CLOSED FRACTURE: ICD-10-CM

## 2024-06-07 DIAGNOSIS — R50.9 FEVER: ICD-10-CM

## 2024-06-07 DIAGNOSIS — I10 HYPERTENSION: ICD-10-CM

## 2024-06-07 DIAGNOSIS — R33.9 URINARY RETENTION: ICD-10-CM

## 2024-06-07 DIAGNOSIS — S62.511A CLOSED DISPLACED FRACTURE OF PROXIMAL PHALANX OF RIGHT THUMB, INITIAL ENCOUNTER: ICD-10-CM

## 2024-06-07 DIAGNOSIS — I63.9 STROKE (CEREBRUM) (HCC): ICD-10-CM

## 2024-06-07 DIAGNOSIS — I48.91 NEW ONSET A-FIB (HCC): ICD-10-CM

## 2024-06-07 DIAGNOSIS — E87.20 LACTIC ACIDOSIS: ICD-10-CM

## 2024-06-07 DIAGNOSIS — Z86.69 HISTORY OF SEIZURE DISORDER: ICD-10-CM

## 2024-06-07 PROBLEM — R74.01 ELEVATED TRANSAMINASE LEVEL: Status: ACTIVE | Noted: 2024-06-07

## 2024-06-07 PROBLEM — M62.82 RHABDOMYOLYSIS: Status: ACTIVE | Noted: 2024-06-07

## 2024-06-07 PROBLEM — R65.20 SEVERE SEPSIS (HCC): Status: ACTIVE | Noted: 2024-06-07

## 2024-06-07 PROBLEM — G93.41 ACUTE METABOLIC ENCEPHALOPATHY: Status: ACTIVE | Noted: 2024-06-07

## 2024-06-07 PROBLEM — A41.9 SEVERE SEPSIS (HCC): Status: ACTIVE | Noted: 2024-06-07

## 2024-06-07 LAB
2HR DELTA HS TROPONIN: 24 NG/L
4HR DELTA HS TROPONIN: 126 NG/L
ALBUMIN SERPL BCP-MCNC: 4.5 G/DL (ref 3.5–5)
ALP SERPL-CCNC: 104 U/L (ref 34–104)
ALT SERPL W P-5'-P-CCNC: 379 U/L (ref 7–52)
ANION GAP SERPL CALCULATED.3IONS-SCNC: 10 MMOL/L (ref 4–13)
APAP SERPL-MCNC: 8 UG/ML (ref 10–20)
APTT PPP: 24 SECONDS (ref 23–37)
AST SERPL W P-5'-P-CCNC: 395 U/L (ref 13–39)
BACTERIA UR QL AUTO: NORMAL /HPF
BASE EXCESS BLDA CALC-SCNC: -2 MMOL/L (ref -2–3)
BASOPHILS # BLD AUTO: 0.07 THOUSANDS/ÂΜL (ref 0–0.1)
BASOPHILS NFR BLD AUTO: 0 % (ref 0–1)
BILIRUB DIRECT SERPL-MCNC: 0.27 MG/DL (ref 0–0.2)
BILIRUB SERPL-MCNC: 0.87 MG/DL (ref 0.2–1)
BILIRUB UR QL STRIP: NEGATIVE
BUN SERPL-MCNC: 12 MG/DL (ref 5–25)
C GATTII+NEOFOR DNA CSF QL NAA+NON-PROBE: NOT DETECTED
CA-I BLD-SCNC: 1.13 MMOL/L (ref 1.12–1.32)
CALCIUM SERPL-MCNC: 9.1 MG/DL (ref 8.4–10.2)
CARDIAC TROPONIN I PNL SERPL HS: 156 NG/L
CARDIAC TROPONIN I PNL SERPL HS: 30 NG/L
CARDIAC TROPONIN I PNL SERPL HS: 54 NG/L
CHLORIDE SERPL-SCNC: 96 MMOL/L (ref 96–108)
CK SERPL-CCNC: 1343 U/L (ref 26–192)
CLARITY UR: CLEAR
CMV DNA CSF QL NAA+NON-PROBE: NOT DETECTED
CO2 SERPL-SCNC: 29 MMOL/L (ref 21–32)
COLOR UR: COLORLESS
CREAT SERPL-MCNC: 0.96 MG/DL (ref 0.6–1.3)
E COLI K1 DNA CSF QL NAA+NON-PROBE: NOT DETECTED
EOSINOPHIL # BLD AUTO: 0.57 THOUSAND/ÂΜL (ref 0–0.61)
EOSINOPHIL NFR BLD AUTO: 3 % (ref 0–6)
ERYTHROCYTE [DISTWIDTH] IN BLOOD BY AUTOMATED COUNT: 13.2 % (ref 11.6–15.1)
ETHANOL SERPL-MCNC: <10 MG/DL
EV RNA CSF QL NAA+NON-PROBE: NOT DETECTED
GFR SERPL CREATININE-BSD FRML MDRD: 59 ML/MIN/1.73SQ M
GLUCOSE CSF-MCNC: 138 MG/DL (ref 40–70)
GLUCOSE SERPL-MCNC: 121 MG/DL (ref 65–140)
GLUCOSE SERPL-MCNC: 199 MG/DL (ref 65–140)
GLUCOSE UR STRIP-MCNC: NEGATIVE MG/DL
GP B STREP DNA CSF QL NAA+NON-PROBE: NOT DETECTED
GRAM STN SPEC: NORMAL
HAEM INFLU DNA CSF QL NAA+NON-PROBE: NOT DETECTED
HCO3 BLDA-SCNC: 24.7 MMOL/L (ref 24–30)
HCT VFR BLD AUTO: 43.5 % (ref 34.8–46.1)
HCT VFR BLD CALC: 48 % (ref 34.8–46.1)
HGB BLD-MCNC: 15 G/DL (ref 11.5–15.4)
HGB BLDA-MCNC: 16.3 G/DL (ref 11.5–15.4)
HGB UR QL STRIP.AUTO: ABNORMAL
HHV6 DNA CSF QL NAA+NON-PROBE: NOT DETECTED
HSV1 DNA CSF QL NAA+NON-PROBE: NOT DETECTED
HSV2 DNA CSF QL NAA+NON-PROBE: NOT DETECTED
IMM GRANULOCYTES # BLD AUTO: 0.14 THOUSAND/UL (ref 0–0.2)
IMM GRANULOCYTES NFR BLD AUTO: 1 % (ref 0–2)
INR PPP: 0.99 (ref 0.84–1.19)
KETONES UR STRIP-MCNC: NEGATIVE MG/DL
L MONOCYTOG DNA CSF QL NAA+NON-PROBE: NOT DETECTED
LACTATE SERPL-SCNC: 1.5 MMOL/L (ref 0.5–2)
LACTATE SERPL-SCNC: 3.4 MMOL/L (ref 0.5–2)
LACTATE SERPL-SCNC: 8.4 MMOL/L (ref 0.5–2)
LEUKOCYTE ESTERASE UR QL STRIP: NEGATIVE
LYMPHOCYTES # BLD AUTO: 0.77 THOUSANDS/ÂΜL (ref 0.6–4.47)
LYMPHOCYTES NFR BLD AUTO: 4 % (ref 14–44)
LYMPHOCYTES NFR CSF MANUAL: 32 %
MCH RBC QN AUTO: 33.3 PG (ref 26.8–34.3)
MCHC RBC AUTO-ENTMCNC: 34.5 G/DL (ref 31.4–37.4)
MCV RBC AUTO: 97 FL (ref 82–98)
MONOCYTES # BLD AUTO: 1.41 THOUSAND/ÂΜL (ref 0.17–1.22)
MONOCYTES NFR BLD AUTO: 7 % (ref 4–12)
MONOS+MACROS CSF MANUAL: 67 %
N MEN DNA CSF QL NAA+NON-PROBE: NOT DETECTED
NEUTROPHILS # BLD AUTO: 18.58 THOUSANDS/ÂΜL (ref 1.85–7.62)
NEUTROPHILS NFR CSF MANUAL: 1 %
NEUTS SEG NFR BLD AUTO: 85 % (ref 43–75)
NITRITE UR QL STRIP: NEGATIVE
NON-SQ EPI CELLS URNS QL MICRO: NORMAL /HPF
NRBC BLD AUTO-RTO: 0 /100 WBCS
PARECHOVIRUS A RNA CSF QL NAA+NON-PROBE: NOT DETECTED
PCO2 BLD: 26 MMOL/L (ref 21–32)
PCO2 BLD: 46.2 MM HG (ref 42–50)
PH BLD: 7.33 [PH] (ref 7.3–7.4)
PH UR STRIP.AUTO: 7.5 [PH]
PLATELET # BLD AUTO: 370 THOUSANDS/UL (ref 149–390)
PMV BLD AUTO: 10.3 FL (ref 8.9–12.7)
PO2 BLD: 21 MM HG (ref 35–45)
POTASSIUM BLD-SCNC: 4 MMOL/L (ref 3.5–5.3)
POTASSIUM SERPL-SCNC: 3.8 MMOL/L (ref 3.5–5.3)
PROCALCITONIN SERPL-MCNC: 2.47 NG/ML
PROT CSF-MCNC: 49 MG/DL (ref 15–45)
PROT SERPL-MCNC: 7.1 G/DL (ref 6.4–8.4)
PROT UR STRIP-MCNC: NEGATIVE MG/DL
PROTHROMBIN TIME: 13.7 SECONDS (ref 11.6–14.5)
RBC # BLD AUTO: 4.51 MILLION/UL (ref 3.81–5.12)
RBC #/AREA URNS AUTO: NORMAL /HPF
S PNEUM DNA CSF QL NAA+NON-PROBE: NOT DETECTED
SALICYLATES SERPL-MCNC: <5 MG/DL (ref 3–20)
SAO2 % BLD FROM PO2: 31 % (ref 60–85)
SODIUM BLD-SCNC: 137 MMOL/L (ref 136–145)
SODIUM SERPL-SCNC: 135 MMOL/L (ref 135–147)
SP GR UR STRIP.AUTO: 1.03 (ref 1–1.03)
SPECIMEN SOURCE: ABNORMAL
TOTAL CELLS COUNTED SPEC: 69
UROBILINOGEN UR STRIP-ACNC: <2 MG/DL
VZV DNA CSF QL NAA+NON-PROBE: NOT DETECTED
WBC # BLD AUTO: 21.54 THOUSAND/UL (ref 4.31–10.16)
WBC #/AREA URNS AUTO: NORMAL /HPF

## 2024-06-07 PROCEDURE — 82803 BLOOD GASES ANY COMBINATION: CPT

## 2024-06-07 PROCEDURE — 62270 DX LMBR SPI PNXR: CPT

## 2024-06-07 PROCEDURE — 70498 CT ANGIOGRAPHY NECK: CPT

## 2024-06-07 PROCEDURE — 96368 THER/DIAG CONCURRENT INF: CPT

## 2024-06-07 PROCEDURE — 31500 INSERT EMERGENCY AIRWAY: CPT | Performed by: EMERGENCY MEDICINE

## 2024-06-07 PROCEDURE — 99291 CRITICAL CARE FIRST HOUR: CPT | Performed by: EMERGENCY MEDICINE

## 2024-06-07 PROCEDURE — 87040 BLOOD CULTURE FOR BACTERIA: CPT | Performed by: PHYSICIAN ASSISTANT

## 2024-06-07 PROCEDURE — 80048 BASIC METABOLIC PNL TOTAL CA: CPT | Performed by: PHYSICIAN ASSISTANT

## 2024-06-07 PROCEDURE — 31500 INSERT EMERGENCY AIRWAY: CPT

## 2024-06-07 PROCEDURE — 83605 ASSAY OF LACTIC ACID: CPT | Performed by: NURSE PRACTITIONER

## 2024-06-07 PROCEDURE — 99291 CRITICAL CARE FIRST HOUR: CPT | Performed by: PHYSICIAN ASSISTANT

## 2024-06-07 PROCEDURE — 83605 ASSAY OF LACTIC ACID: CPT | Performed by: PHYSICIAN ASSISTANT

## 2024-06-07 PROCEDURE — 94760 N-INVAS EAR/PLS OXIMETRY 1: CPT

## 2024-06-07 PROCEDURE — 80307 DRUG TEST PRSMV CHEM ANLYZR: CPT | Performed by: NURSE PRACTITIONER

## 2024-06-07 PROCEDURE — 99223 1ST HOSP IP/OBS HIGH 75: CPT | Performed by: ANESTHESIOLOGY

## 2024-06-07 PROCEDURE — 82077 ASSAY SPEC XCP UR&BREATH IA: CPT | Performed by: PHYSICIAN ASSISTANT

## 2024-06-07 PROCEDURE — 0PSRXZZ REPOSITION RIGHT THUMB PHALANX, EXTERNAL APPROACH: ICD-10-PCS | Performed by: EMERGENCY MEDICINE

## 2024-06-07 PROCEDURE — 82550 ASSAY OF CK (CPK): CPT | Performed by: EMERGENCY MEDICINE

## 2024-06-07 PROCEDURE — 85025 COMPLETE CBC W/AUTO DIFF WBC: CPT | Performed by: PHYSICIAN ASSISTANT

## 2024-06-07 PROCEDURE — 82947 ASSAY GLUCOSE BLOOD QUANT: CPT

## 2024-06-07 PROCEDURE — 94002 VENT MGMT INPAT INIT DAY: CPT

## 2024-06-07 PROCEDURE — 96361 HYDRATE IV INFUSION ADD-ON: CPT

## 2024-06-07 PROCEDURE — 82945 GLUCOSE OTHER FLUID: CPT | Performed by: EMERGENCY MEDICINE

## 2024-06-07 PROCEDURE — 73120 X-RAY EXAM OF HAND: CPT

## 2024-06-07 PROCEDURE — 84484 ASSAY OF TROPONIN QUANT: CPT | Performed by: PHYSICIAN ASSISTANT

## 2024-06-07 PROCEDURE — 80143 DRUG ASSAY ACETAMINOPHEN: CPT | Performed by: PHYSICIAN ASSISTANT

## 2024-06-07 PROCEDURE — 5A1945Z RESPIRATORY VENTILATION, 24-96 CONSECUTIVE HOURS: ICD-10-PCS | Performed by: EMERGENCY MEDICINE

## 2024-06-07 PROCEDURE — 99285 EMERGENCY DEPT VISIT HI MDM: CPT

## 2024-06-07 PROCEDURE — 81001 URINALYSIS AUTO W/SCOPE: CPT | Performed by: PHYSICIAN ASSISTANT

## 2024-06-07 PROCEDURE — 80076 HEPATIC FUNCTION PANEL: CPT | Performed by: PHYSICIAN ASSISTANT

## 2024-06-07 PROCEDURE — 87070 CULTURE OTHR SPECIMN AEROBIC: CPT | Performed by: EMERGENCY MEDICINE

## 2024-06-07 PROCEDURE — 85610 PROTHROMBIN TIME: CPT | Performed by: PHYSICIAN ASSISTANT

## 2024-06-07 PROCEDURE — 71045 X-RAY EXAM CHEST 1 VIEW: CPT

## 2024-06-07 PROCEDURE — 36415 COLL VENOUS BLD VENIPUNCTURE: CPT | Performed by: PHYSICIAN ASSISTANT

## 2024-06-07 PROCEDURE — 89051 BODY FLUID CELL COUNT: CPT | Performed by: EMERGENCY MEDICINE

## 2024-06-07 PROCEDURE — 87483 CNS DNA AMP PROBE TYPE 12-25: CPT | Performed by: EMERGENCY MEDICINE

## 2024-06-07 PROCEDURE — NC001 PR NO CHARGE: Performed by: NURSE PRACTITIONER

## 2024-06-07 PROCEDURE — 85730 THROMBOPLASTIN TIME PARTIAL: CPT | Performed by: PHYSICIAN ASSISTANT

## 2024-06-07 PROCEDURE — 96375 TX/PRO/DX INJ NEW DRUG ADDON: CPT

## 2024-06-07 PROCEDURE — 84157 ASSAY OF PROTEIN OTHER: CPT | Performed by: EMERGENCY MEDICINE

## 2024-06-07 PROCEDURE — 84145 PROCALCITONIN (PCT): CPT | Performed by: PHYSICIAN ASSISTANT

## 2024-06-07 PROCEDURE — 96365 THER/PROPH/DIAG IV INF INIT: CPT

## 2024-06-07 PROCEDURE — 74177 CT ABD & PELVIS W/CONTRAST: CPT

## 2024-06-07 PROCEDURE — 71260 CT THORAX DX C+: CPT

## 2024-06-07 PROCEDURE — 009U3ZX DRAINAGE OF SPINAL CANAL, PERCUTANEOUS APPROACH, DIAGNOSTIC: ICD-10-PCS | Performed by: ANESTHESIOLOGY

## 2024-06-07 PROCEDURE — 0BH17EZ INSERTION OF ENDOTRACHEAL AIRWAY INTO TRACHEA, VIA NATURAL OR ARTIFICIAL OPENING: ICD-10-PCS | Performed by: EMERGENCY MEDICINE

## 2024-06-07 PROCEDURE — 89050 BODY FLUID CELL COUNT: CPT | Performed by: EMERGENCY MEDICINE

## 2024-06-07 PROCEDURE — 82330 ASSAY OF CALCIUM: CPT

## 2024-06-07 PROCEDURE — 0T9B70Z DRAINAGE OF BLADDER WITH DRAINAGE DEVICE, VIA NATURAL OR ARTIFICIAL OPENING: ICD-10-PCS | Performed by: PHYSICIAN ASSISTANT

## 2024-06-07 PROCEDURE — 96376 TX/PRO/DX INJ SAME DRUG ADON: CPT

## 2024-06-07 PROCEDURE — 70496 CT ANGIOGRAPHY HEAD: CPT

## 2024-06-07 PROCEDURE — 85014 HEMATOCRIT: CPT

## 2024-06-07 PROCEDURE — 80179 DRUG ASSAY SALICYLATE: CPT | Performed by: PHYSICIAN ASSISTANT

## 2024-06-07 PROCEDURE — 84132 ASSAY OF SERUM POTASSIUM: CPT

## 2024-06-07 PROCEDURE — 87081 CULTURE SCREEN ONLY: CPT

## 2024-06-07 PROCEDURE — 84295 ASSAY OF SERUM SODIUM: CPT

## 2024-06-07 RX ORDER — LEVETIRACETAM 500 MG/5ML
1500 INJECTION, SOLUTION, CONCENTRATE INTRAVENOUS ONCE
Status: COMPLETED | OUTPATIENT
Start: 2024-06-07 | End: 2024-06-07

## 2024-06-07 RX ORDER — CHLORHEXIDINE GLUCONATE ORAL RINSE 1.2 MG/ML
15 SOLUTION DENTAL EVERY 12 HOURS SCHEDULED
Status: DISCONTINUED | OUTPATIENT
Start: 2024-06-07 | End: 2024-06-10

## 2024-06-07 RX ORDER — NICOTINE 21 MG/24HR
14 PATCH, TRANSDERMAL 24 HOURS TRANSDERMAL DAILY
Status: DISCONTINUED | OUTPATIENT
Start: 2024-06-08 | End: 2024-06-17 | Stop reason: HOSPADM

## 2024-06-07 RX ORDER — MIDAZOLAM HYDROCHLORIDE 2 MG/2ML
5 INJECTION, SOLUTION INTRAMUSCULAR; INTRAVENOUS ONCE
Status: COMPLETED | OUTPATIENT
Start: 2024-06-07 | End: 2024-06-07

## 2024-06-07 RX ORDER — ACETAMINOPHEN 10 MG/ML
1000 INJECTION, SOLUTION INTRAVENOUS EVERY 6 HOURS PRN
Status: DISCONTINUED | OUTPATIENT
Start: 2024-06-07 | End: 2024-06-08

## 2024-06-07 RX ORDER — FENTANYL CITRATE 50 UG/ML
50 INJECTION, SOLUTION INTRAMUSCULAR; INTRAVENOUS
Status: DISCONTINUED | OUTPATIENT
Start: 2024-06-07 | End: 2024-06-09

## 2024-06-07 RX ORDER — SODIUM CHLORIDE, SODIUM LACTATE, POTASSIUM CHLORIDE, CALCIUM CHLORIDE 600; 310; 30; 20 MG/100ML; MG/100ML; MG/100ML; MG/100ML
1000 INJECTION, SOLUTION INTRAVENOUS CONTINUOUS
Status: DISCONTINUED | OUTPATIENT
Start: 2024-06-07 | End: 2024-06-07

## 2024-06-07 RX ORDER — ROCURONIUM BROMIDE 10 MG/ML
1.2 INJECTION, SOLUTION INTRAVENOUS ONCE
Status: COMPLETED | OUTPATIENT
Start: 2024-06-07 | End: 2024-06-07

## 2024-06-07 RX ORDER — KETAMINE HCL IN NACL, ISO-OSM 100MG/10ML
SYRINGE (ML) INJECTION
Status: COMPLETED
Start: 2024-06-07 | End: 2024-06-07

## 2024-06-07 RX ORDER — PROPOFOL 10 MG/ML
5-80 INJECTION, EMULSION INTRAVENOUS
Status: DISCONTINUED | OUTPATIENT
Start: 2024-06-07 | End: 2024-06-09

## 2024-06-07 RX ORDER — FENTANYL CITRATE 50 UG/ML
50 INJECTION, SOLUTION INTRAMUSCULAR; INTRAVENOUS
Status: DISCONTINUED | OUTPATIENT
Start: 2024-06-07 | End: 2024-06-07

## 2024-06-07 RX ORDER — LEVETIRACETAM 500 MG/5ML
750 INJECTION, SOLUTION, CONCENTRATE INTRAVENOUS EVERY 12 HOURS SCHEDULED
Status: DISCONTINUED | OUTPATIENT
Start: 2024-06-07 | End: 2024-06-08

## 2024-06-07 RX ORDER — MIDAZOLAM HYDROCHLORIDE 2 MG/2ML
INJECTION, SOLUTION INTRAMUSCULAR; INTRAVENOUS
Status: COMPLETED
Start: 2024-06-07 | End: 2024-06-07

## 2024-06-07 RX ORDER — MIDAZOLAM HYDROCHLORIDE 2 MG/2ML
2 INJECTION, SOLUTION INTRAMUSCULAR; INTRAVENOUS EVERY 4 HOURS PRN
Status: DISCONTINUED | OUTPATIENT
Start: 2024-06-07 | End: 2024-06-08

## 2024-06-07 RX ORDER — ACETAMINOPHEN 10 MG/ML
1000 INJECTION, SOLUTION INTRAVENOUS ONCE
Status: COMPLETED | OUTPATIENT
Start: 2024-06-07 | End: 2024-06-07

## 2024-06-07 RX ORDER — FENTANYL CITRATE 50 UG/ML
50 INJECTION, SOLUTION INTRAMUSCULAR; INTRAVENOUS ONCE
Status: COMPLETED | OUTPATIENT
Start: 2024-06-07 | End: 2024-06-07

## 2024-06-07 RX ORDER — LORAZEPAM 2 MG/ML
1 INJECTION INTRAMUSCULAR ONCE
Status: COMPLETED | OUTPATIENT
Start: 2024-06-07 | End: 2024-06-07

## 2024-06-07 RX ORDER — HEPARIN SODIUM 5000 [USP'U]/ML
5000 INJECTION, SOLUTION INTRAVENOUS; SUBCUTANEOUS EVERY 8 HOURS SCHEDULED
Status: DISCONTINUED | OUTPATIENT
Start: 2024-06-07 | End: 2024-06-08

## 2024-06-07 RX ORDER — MIDAZOLAM HYDROCHLORIDE 2 MG/2ML
2 INJECTION, SOLUTION INTRAMUSCULAR; INTRAVENOUS ONCE
Status: COMPLETED | OUTPATIENT
Start: 2024-06-07 | End: 2024-06-07

## 2024-06-07 RX ORDER — FAMOTIDINE 10 MG/ML
20 INJECTION, SOLUTION INTRAVENOUS
Status: DISCONTINUED | OUTPATIENT
Start: 2024-06-08 | End: 2024-06-08

## 2024-06-07 RX ORDER — KETAMINE HYDROCHLORIDE 50 MG/ML
2 INJECTION, SOLUTION INTRAMUSCULAR; INTRAVENOUS ONCE
Status: DISCONTINUED | OUTPATIENT
Start: 2024-06-07 | End: 2024-06-07

## 2024-06-07 RX ORDER — FENTANYL CITRATE-0.9 % NACL/PF 10 MCG/ML
75 PLASTIC BAG, INJECTION (ML) INTRAVENOUS CONTINUOUS
Status: DISCONTINUED | OUTPATIENT
Start: 2024-06-07 | End: 2024-06-09

## 2024-06-07 RX ORDER — SODIUM CHLORIDE, SODIUM GLUCONATE, SODIUM ACETATE, POTASSIUM CHLORIDE, MAGNESIUM CHLORIDE, SODIUM PHOSPHATE, DIBASIC, AND POTASSIUM PHOSPHATE .53; .5; .37; .037; .03; .012; .00082 G/100ML; G/100ML; G/100ML; G/100ML; G/100ML; G/100ML; G/100ML
125 INJECTION, SOLUTION INTRAVENOUS CONTINUOUS
Status: DISCONTINUED | OUTPATIENT
Start: 2024-06-07 | End: 2024-06-17 | Stop reason: HOSPADM

## 2024-06-07 RX ORDER — NYSTATIN 100000 [USP'U]/G
POWDER TOPICAL 2 TIMES DAILY
Status: DISCONTINUED | OUTPATIENT
Start: 2024-06-08 | End: 2024-06-17 | Stop reason: HOSPADM

## 2024-06-07 RX ADMIN — AMPICILLIN SODIUM 2000 MG: 2 INJECTION, POWDER, FOR SOLUTION INTRAMUSCULAR; INTRAVENOUS at 20:16

## 2024-06-07 RX ADMIN — CEFTRIAXONE SODIUM 2000 MG: 10 INJECTION, POWDER, FOR SOLUTION INTRAVENOUS at 20:09

## 2024-06-07 RX ADMIN — MIDAZOLAM 5 MG: 1 INJECTION INTRAMUSCULAR; INTRAVENOUS at 16:05

## 2024-06-07 RX ADMIN — ACETAMINOPHEN 1000 MG: 10 INJECTION INTRAVENOUS at 13:26

## 2024-06-07 RX ADMIN — MIDAZOLAM HYDROCHLORIDE 2 MG: 2 INJECTION, SOLUTION INTRAMUSCULAR; INTRAVENOUS at 18:11

## 2024-06-07 RX ADMIN — FENTANYL CITRATE 50 MCG: 50 INJECTION INTRAMUSCULAR; INTRAVENOUS at 16:02

## 2024-06-07 RX ADMIN — PROPOFOL 50 MCG/KG/MIN: 10 INJECTION, EMULSION INTRAVENOUS at 17:38

## 2024-06-07 RX ADMIN — FENTANYL CITRATE 50 MCG: 50 INJECTION INTRAMUSCULAR; INTRAVENOUS at 20:43

## 2024-06-07 RX ADMIN — ROCURONIUM BROMIDE 76 MG: 10 INJECTION, SOLUTION INTRAVENOUS at 13:40

## 2024-06-07 RX ADMIN — Medication 128 MG: at 13:39

## 2024-06-07 RX ADMIN — MIDAZOLAM 5 MG: 1 INJECTION INTRAMUSCULAR; INTRAVENOUS at 14:46

## 2024-06-07 RX ADMIN — LEVETIRACETAM 1500 MG: 100 INJECTION, SOLUTION INTRAVENOUS at 14:54

## 2024-06-07 RX ADMIN — SODIUM CHLORIDE, SODIUM GLUCONATE, SODIUM ACETATE, POTASSIUM CHLORIDE, MAGNESIUM CHLORIDE, SODIUM PHOSPHATE, DIBASIC, AND POTASSIUM PHOSPHATE 125 ML/HR: .53; .5; .37; .037; .03; .012; .00082 INJECTION, SOLUTION INTRAVENOUS at 22:35

## 2024-06-07 RX ADMIN — MIDAZOLAM 1 MG/HR: 5 INJECTION INTRAMUSCULAR; INTRAVENOUS at 20:08

## 2024-06-07 RX ADMIN — SODIUM CHLORIDE, SODIUM LACTATE, POTASSIUM CHLORIDE, AND CALCIUM CHLORIDE 1000 ML: .6; .31; .03; .02 INJECTION, SOLUTION INTRAVENOUS at 13:21

## 2024-06-07 RX ADMIN — MIDAZOLAM 2 MG: 1 INJECTION INTRAMUSCULAR; INTRAVENOUS at 20:43

## 2024-06-07 RX ADMIN — LORAZEPAM 1 MG: 2 INJECTION INTRAMUSCULAR; INTRAVENOUS at 13:20

## 2024-06-07 RX ADMIN — FENTANYL CITRATE 50 MCG: 50 INJECTION INTRAMUSCULAR; INTRAVENOUS at 16:15

## 2024-06-07 RX ADMIN — FENTANYL CITRATE 50 MCG: 50 INJECTION INTRAMUSCULAR; INTRAVENOUS at 13:47

## 2024-06-07 RX ADMIN — PROPOFOL 50 MCG/KG/MIN: 10 INJECTION, EMULSION INTRAVENOUS at 21:45

## 2024-06-07 RX ADMIN — IOHEXOL 85 ML: 350 INJECTION, SOLUTION INTRAVENOUS at 14:05

## 2024-06-07 RX ADMIN — VANCOMYCIN HYDROCHLORIDE 1500 MG: 1 INJECTION, POWDER, LYOPHILIZED, FOR SOLUTION INTRAVENOUS at 17:16

## 2024-06-07 RX ADMIN — MIDAZOLAM 2 MG: 1 INJECTION INTRAMUSCULAR; INTRAVENOUS at 18:11

## 2024-06-07 RX ADMIN — SODIUM CHLORIDE, SODIUM LACTATE, POTASSIUM CHLORIDE, AND CALCIUM CHLORIDE 1000 ML: .6; .31; .03; .02 INJECTION, SOLUTION INTRAVENOUS at 14:47

## 2024-06-07 RX ADMIN — FENTANYL CITRATE 50 MCG: 50 INJECTION INTRAMUSCULAR; INTRAVENOUS at 18:13

## 2024-06-07 RX ADMIN — IOHEXOL 85 ML: 350 INJECTION, SOLUTION INTRAVENOUS at 14:12

## 2024-06-07 RX ADMIN — LEVETIRACETAM 750 MG: 100 INJECTION, SOLUTION INTRAVENOUS at 21:44

## 2024-06-07 RX ADMIN — PIPERACILLIN AND TAZOBACTAM 4.5 G: 36; 4.5 INJECTION, POWDER, FOR SOLUTION INTRAVENOUS at 13:31

## 2024-06-07 RX ADMIN — MIDAZOLAM HYDROCHLORIDE 5 MG: 1 INJECTION, SOLUTION INTRAMUSCULAR; INTRAVENOUS at 16:18

## 2024-06-07 RX ADMIN — CHLORHEXIDINE GLUCONATE 0.12% ORAL RINSE 15 ML: 1.2 LIQUID ORAL at 21:08

## 2024-06-07 RX ADMIN — PROPOFOL 5 MCG/KG/MIN: 10 INJECTION, EMULSION INTRAVENOUS at 13:51

## 2024-06-07 RX ADMIN — ACYCLOVIR SODIUM 475 MG: 1000 INJECTION, SOLUTION INTRAVENOUS at 20:44

## 2024-06-07 RX ADMIN — HEPARIN SODIUM 5000 UNITS: 5000 INJECTION INTRAVENOUS; SUBCUTANEOUS at 21:44

## 2024-06-07 RX ADMIN — Medication 75 MCG/HR: at 18:26

## 2024-06-07 RX ADMIN — SODIUM CHLORIDE, SODIUM GLUCONATE, SODIUM ACETATE, POTASSIUM CHLORIDE, MAGNESIUM CHLORIDE, SODIUM PHOSPHATE, DIBASIC, AND POTASSIUM PHOSPHATE 125 ML/HR: .53; .5; .37; .037; .03; .012; .00082 INJECTION, SOLUTION INTRAVENOUS at 18:11

## 2024-06-07 NOTE — ED PROVIDER NOTES
Pt Name: Kirsty Em  MRN: [id number]  Birthdate 1950  Age/Sex: 74 y.o. female  Date of evaluation: 6/7/2024  PCP: Brien Desouza MD    CHIEF COMPLAINT    Chief Complaint   Patient presents with    Altered Mental Status     Arrived via EMS with complaints of altered mental status- found on the floor in sitting position confused with feces all over.          HPI    74 y.o. female presenting with altered mental status.  Patient unable to provide any history throughout initial exam or  ER course, patient altered, not following commands initially, able to make noises but no comprehensible words.  Per EMS and later patient's , patient was in her normal state of health yesterday, per  was somewhat sad due to the possibility she may have to put one of her dogs that is ill down.  Patient's  found her this morning had defecated throughout the house.  sitting on the floor, covered in feces, patient's  states that he cleaned her up, but she became more confused and more agitated, leading him to call an ambulance.  He does note a prior episode of altered mental status although less severe about a year ago when patient took too many of her quetiapine tablets.  No history of trauma.      HPI      Past Medical and Surgical History    Past Medical History:   Diagnosis Date    Anxiety     Depression     Depression, recurrent (HCC) 4/20/2021    Hallucinations 4/20/2021       History reviewed. No pertinent surgical history.    Family History   Problem Relation Age of Onset    Diabetes Mother     Stroke Father     No Known Problems Sister     No Known Problems Sister     No Known Problems Sister     No Known Problems Daughter     No Known Problems Daughter     No Known Problems Daughter     No Known Problems Maternal Grandmother     No Known Problems Paternal Grandmother     No Known Problems Maternal Aunt     No Known Problems Maternal Aunt     No Known Problems Maternal Aunt     No Known Problems  Maternal Aunt     No Known Problems Maternal Aunt     No Known Problems Maternal Aunt     No Known Problems Maternal Aunt     No Known Problems Maternal Aunt     No Known Problems Paternal Aunt     Breast cancer Neg Hx        Social History     Tobacco Use    Smoking status: Every Day     Current packs/day: 0.50     Types: Cigarettes    Smokeless tobacco: Never   Vaping Use    Vaping status: Never Used   Substance Use Topics    Alcohol use: Not Currently    Drug use: Never           Allergies    Allergies   Allergen Reactions    Lipitor [Atorvastatin] Other (See Comments)     Muscle aches       Home Medications    Prior to Admission medications    Medication Sig Start Date End Date Taking? Authorizing Provider   ALPRAZolam (XANAX) 2 MG tablet Take 2 mg by mouth 4 (four) times a day 3/24/21   Historical Provider, MD   ezetimibe (ZETIA) 10 mg tablet Take 1 tablet (10 mg total) by mouth daily 1/8/24   Brien Desouza MD   gabapentin (NEURONTIN) 300 mg capsule Take 300 mg by mouth 3 (three) times a day 4/22/22   Historical Provider, MD   methylPREDNISolone 4 MG tablet therapy pack Use as directed on package 1/8/24   Brien Desouza MD   Multiple Vitamin (multivitamin) tablet Take 1 tablet by mouth daily    Historical Provider, MD   Omega-3 Fatty Acids (fish oil) 1,000 mg Take 1,000 mg by mouth daily    Historical Provider, MD   QUEtiapine (SEROquel) 100 mg tablet Take 100 mg by mouth every morning 4/7/21   Historical Provider, MD   QUEtiapine (SEROquel) 400 MG tablet Take 400 mg by mouth daily at bedtime 2/9/21   Historical Provider, MD   vitamin B-12 (VITAMIN B-12) 1,000 mcg tablet Take by mouth daily    Historical Provider, MD           Review of Systems    Review of Systems   Unable to perform ROS: Mental status change           All other systems reviewed and negative.    Physical Exam      ED Triage Vitals   Temperature Pulse Respirations Blood Pressure SpO2   06/07/24 1318 06/07/24 1318 06/07/24 1318 06/07/24  1318 06/07/24 1318   (S) (!) 104.4 °F (40.2 °C) (!) 129 (!) 25 151/94 92 %      Temp Source Heart Rate Source Patient Position - Orthostatic VS BP Location FiO2 (%)   06/07/24 1318 06/07/24 1318 06/07/24 1318 06/07/24 1318 --   Rectal Monitor Lying Right arm       Pain Score       06/07/24 1602       Med Not Given for Pain - for MAR use only               Physical Exam  Vitals and nursing note reviewed.   Constitutional:       General: She is in acute distress.      Appearance: She is well-developed. She is ill-appearing.   HENT:      Head: Normocephalic and atraumatic.      Right Ear: External ear normal.      Left Ear: External ear normal.      Nose: Nose normal. No congestion or rhinorrhea.      Mouth/Throat:      Mouth: Mucous membranes are dry.      Pharynx: Oropharynx is clear.   Eyes:      Conjunctiva/sclera: Conjunctivae normal.      Pupils: Pupils are equal, round, and reactive to light.   Cardiovascular:      Rate and Rhythm: Regular rhythm. Tachycardia present.      Pulses: Normal pulses.      Heart sounds: Normal heart sounds. No murmur heard.     No friction rub. No gallop.   Pulmonary:      Effort: Pulmonary effort is normal. No respiratory distress.      Breath sounds: Normal breath sounds. No wheezing or rales.   Abdominal:      General: There is distension.      Palpations: Abdomen is soft.      Tenderness: There is no abdominal tenderness. There is no guarding or rebound.   Musculoskeletal:         General: Swelling and tenderness present. No deformity. Normal range of motion.      Cervical back: Normal range of motion and neck supple. No rigidity.      Right lower leg: No edema.      Left lower leg: No edema.      Comments: Swelling and bruising noted to both knees, without deformity, more significant swelling and bruising noted to the right thumb   Skin:     General: Skin is warm and dry.      Capillary Refill: Capillary refill takes less than 2 seconds.      Findings: No erythema or rash.    Neurological:      Mental Status: She is alert.      GCS: GCS eye subscore is 4. GCS verbal subscore is 2. GCS motor subscore is 4.      Comments: Patient altered, vocalizing and unintelligible noises, has left-sided gaze preference, pupils equal round reactive to light, 5 mm, patient grimacing but not responding to commands, has increased tone to the left upper extremity as well as the left lower extremity.  No rigidity in the right side, no clonus.   Psychiatric:         Behavior: Behavior normal.         Thought Content: Thought content normal.         Judgment: Judgment normal.              Diagnostic Results    EKG Interpretation    Rate:  124  BPM  Rhythm:   Sinus tachycardia sinus tachycardia  Axis:  Normal   Intervals: Normal, no blocks, QTc  485 ms  Q waves:  No pathologic Q waves   T waves:  Normal   ST segments:   ST depressions in the lateral leads    Impression: Sinus tachycardia with ST depressions in lateral leads but without STEMI  EKG for comparison: None available    EKG interpreted by me.     Labs:    Results Reviewed       Procedure Component Value Units Date/Time    Blood culture #1 [[id number]] Collected: 06/07/24 1310    Lab Status: Preliminary result Specimen: Blood from Arm, Left Updated: 06/07/24 2101     Blood Culture Received in Microbiology Lab. Culture in Progress.    Blood culture #2 [[id number]] Collected: 06/07/24 1315    Lab Status: Preliminary result Specimen: Blood from Arm, Right Updated: 06/07/24 2101     Blood Culture Received in Microbiology Lab. Culture in Progress.    Meningitis/Encephalitis (ME) Panel [[id number]]  (Normal) Collected: 06/07/24 1639    Lab Status: Final result Specimen: Cerebrospinal Fluid from Lumbar Puncture Updated: 06/1950     C.NEOFORMANS/GATTII Not Detected     CYTOMEGALOVIRUS Not Detected     ENTEROVIRUS Not Detected     E.COLI K1 Not Detected     H.INFLUENZAE Not Detected     H.SIMPLEX 1 Not Detected     H.SIMPLEX 2 Not Detected     HERPES VIRUS  6 Not Detected     PARECHOVIRUS Not Detected     L.MONOCYTOGENES Not Detected     N.MENINGITIDIS Not Detected     S.AGALACTIAE Not Detected     S.PNEUMONIAE Not Detected     V.ZOSTER Not Detected    CSF Diff [[id number]] Collected: 06/07/24 1639    Lab Status: Final result Specimen: Cerebrospinal Fluid from Lumbar Puncture Updated: 06/07/24 1901     Total Counted 69     Neutrophils % (CSF) 1 %      Lymphs % CSF 32 %      Monocytes % (CSF) 67 %     HS Troponin I 4hr [[id number]]  (Abnormal) Collected: 06/07/24 1810    Lab Status: Final result Specimen: Blood from Arm, Right Updated: 06/07/24 1840     hs TnI 4hr 156 ng/L      Delta 4hr hsTnI 126 ng/L     CSF, White cell count with differential (Tube 4) [[id number]]  (Abnormal) Collected: 06/07/24 1639    Lab Status: Final result Specimen: Cerebrospinal Fluid from Lumbar Puncture Updated: 06/07/24 1809     Appearance, CSF clear     Tube Number, CSF 4     WBC, CSF 6.05 /uL      Xanthochromia No    RBC count,CSF [[id number]]  (Abnormal) Collected: 06/07/24 1639    Lab Status: Final result Specimen: Cerebrospinal Fluid from Lumbar Puncture Updated: 06/07/24 1808     RBC, .2 uL     CSF, RBC count (Tube 4) [[id number]]  (Abnormal) Collected: 06/07/24 1639    Lab Status: Final result Specimen: Cerebrospinal Fluid from Lumbar Puncture Updated: 06/07/24 1808     RBC, CSF 12.1 uL     CSF, Gram Stain (Tube 3) [[id number]] Collected: 06/07/24 1639    Lab Status: Final result Specimen: Cerebrospinal Fluid from Lumbar Puncture Updated: 06/07/24 1807     Gram Stain Result No No Polys or Bacteria seen    CSF, Glucose (Tube 2) [[id number]]  (Abnormal) Collected: 06/07/24 1639    Lab Status: Final result Specimen: Cerebrospinal Fluid from Lumbar Puncture Updated: 06/07/24 1718     Glucose,  mg/dL     CSF, Total Protein (Tube 2) [[id number]]  (Abnormal) Collected: 06/07/24 1639    Lab Status: Final result Specimen: Cerebrospinal Fluid from Lumbar Puncture Updated: 06/07/24 1718      Protein, CSF 49 mg/dL     Salicylate level [[id number]]  (Normal) Collected: 06/07/24 1613    Lab Status: Final result Specimen: Blood from Arm, Right Updated: 06/07/24 1646     Salicylate Lvl <5 mg/dL     CSF, Culture and Gram stain (Tube 3) [[id number]] Collected: 06/07/24 1639    Lab Status: In process Specimen: Cerebrospinal Fluid from Lumbar Puncture Updated: 06/07/24 1645    Basic metabolic panel [[id number]] Collected: 06/07/24 1613    Lab Status: Final result Specimen: Blood from Arm, Right Updated: 06/07/24 1635     Sodium 135 mmol/L      Potassium 3.8 mmol/L      Chloride 96 mmol/L      CO2 29 mmol/L      ANION GAP 10 mmol/L      BUN 12 mg/dL      Creatinine 0.96 mg/dL      Glucose 121 mg/dL      Calcium 9.1 mg/dL      eGFR 59 ml/min/1.73sq m     Narrative:      National Kid[employer] guidelines for Chronic Kidney Disease (CKD):     Stage 1 with normal or high GFR (GFR > 90 mL/min/1.73 square meters)    Stage 2 Mild CKD (GFR = 60-89 mL/min/1.73 square meters)    Stage 3A Moderate CKD (GFR = 45-59 mL/min/1.73 square meters)    Stage 3B Moderate CKD (GFR = 30-44 mL/min/1.73 square meters)    Stage 4 Severe CKD (GFR = 15-29 mL/min/1.73 square meters)    Stage 5 End Stage CKD (GFR <15 mL/min/1.73 square meters)  Note: GFR calculation is accurate only with a steady state creatinine    Hepatic function panel [[id number]]  (Abnormal) Collected: 06/07/24 1613    Lab Status: Final result Specimen: Blood from Arm, Right Updated: 06/07/24 1635     Total Bilirubin 0.87 mg/dL      Bilirubin, Direct 0.27 mg/dL      Alkaline Phosphatase 104 U/L       U/L       U/L      Total Protein 7.1 g/dL      Albumin 4.5 g/dL     Acetaminophen level-If concentration is detectable, please discuss with medical  on call. [[id number]]  (Abnormal) Collected: 06/07/24 1613    Lab Status: Final result Specimen: Blood from Arm, Right Updated: 06/07/24 1635     Acetaminophen Level 8 ug/mL     CK [[id number]]   (Abnormal) Collected: 06/07/24 1613    Lab Status: Final result Specimen: Blood from Arm, Right Updated: 06/07/24 1635     Total CK 1,343 U/L     Lactic acid 2 Hours [[id number]]  (Abnormal) Collected: 06/07/24 1613    Lab Status: Final result Specimen: Blood from Vein Updated: 06/07/24 1632     LACTIC ACID 3.4 mmol/L     Narrative:      Result may be elevated if tourniquet was used during collection.    HS Troponin I 2hr [[id number]]  (Abnormal) Collected: 06/07/24 1540    Lab Status: Final result Specimen: Blood from Vein Updated: 06/07/24 1622     hs TnI 2hr 54 ng/L      Delta 2hr hsTnI 24 ng/L     Urine Microscopic [[id number]]  (Normal) Collected: 06/07/24 1455    Lab Status: Final result Specimen: Urine Updated: 06/07/24 1502     RBC, UA 1-2 /hpf      WBC, UA 1-2 /hpf      Epithelial Cells None Seen /hpf      Bacteria, UA None Seen /hpf     UA w Reflex to Microscopic w Reflex to Culture [[id number]]  (Abnormal) Collected: 06/07/24 1455    Lab Status: Final result Specimen: Urine Updated: 06/07/24 1500     Color, UA Colorless     Clarity, UA Clear     Specific Gravity, UA 1.028     pH, UA 7.5     Leukocytes, UA Negative     Nitrite, UA Negative     Protein, UA Negative mg/dl      Glucose, UA Negative mg/dl      Ketones, UA Negative mg/dl      Urobilinogen, UA <2.0 mg/dl      Bilirubin, UA Negative     Occult Blood, UA Moderate    Lactic acid [[id number]]  (Abnormal) Collected: 06/07/24 1346    Lab Status: Final result Specimen: Blood from Arm, Right Updated: 06/07/24 1438     LACTIC ACID 8.4 mmol/L     Narrative:      Result may be elevated if tourniquet was used during collection.    Procalcitonin [[id number]]  (Abnormal) Collected: 06/07/24 1346    Lab Status: Final result Specimen: Blood from Arm, Right Updated: 06/07/24 1430     Procalcitonin 2.47 ng/ml     HS Troponin 0hr (reflex protocol) [[id number]]  (Normal) Collected: 06/07/24 1346    Lab Status: Final result Specimen: Blood from Arm, Right Updated: 06/07/24  1426     hs TnI 0hr 30 ng/L     Ethanol [[id number]]  (Normal) Collected: 06/07/24 1346    Lab Status: Final result Specimen: Blood from Arm, Right Updated: 06/07/24 1418     Ethanol Lvl <10 mg/dL     Protime-INR [[id number]]  (Normal) Collected: 06/07/24 1346    Lab Status: Final result Specimen: Blood from Arm, Right Updated: 06/07/24 1409     Protime 13.7 seconds      INR 0.99    APTT [[id number]]  (Normal) Collected: 06/07/24 1346    Lab Status: Final result Specimen: Blood from Arm, Right Updated: 06/07/24 1409     PTT 24 seconds     CBC and differential [[id number]]  (Abnormal) Collected: 06/07/24 1346    Lab Status: Final result Specimen: Blood from Arm, Right Updated: 06/07/24 1355     WBC 21.54 Thousand/uL      RBC 4.51 Million/uL      Hemoglobin 15.0 g/dL      Hematocrit 43.5 %      MCV 97 fL      MCH 33.3 pg      MCHC 34.5 g/dL      RDW 13.2 %      MPV 10.3 fL      Platelets 370 Thousands/uL      nRBC 0 /100 WBCs      Segmented % 85 %      Immature Grans % 1 %      Lymphocytes % 4 %      Monocytes % 7 %      Eosinophils Relative 3 %      Basophils Relative 0 %      Absolute Neutrophils 18.58 Thousands/µL      Absolute Immature Grans 0.14 Thousand/uL      Absolute Lymphocytes 0.77 Thousands/µL      Absolute Monocytes 1.41 Thousand/µL      Eosinophils Absolute 0.57 Thousand/µL      Basophils Absolute 0.07 Thousands/µL     POCT Blood Gas (CG8+) [[id number]]  (Abnormal) Collected: 06/07/24 1344    Lab Status: Final result Specimen: Venous Updated: 06/07/24 1347     ph, Leif ISTAT 7.335     pCO2, Leif i-STAT 46.2 mm HG      pO2, Leif i-STAT 21.0 mm HG      BE, i-STAT -2 mmol/L      HCO3, Leif i-STAT 24.7 mmol/L      CO2, i-STAT 26 mmol/L      O2 Sat, i-STAT 31 %      SODIUM, I-STAT 137 mmol/l      Potassium, i-STAT 4.0 mmol/L      Calcium, Ionized i-STAT 1.13 mmol/L      Hct, i-STAT 48 %      Hgb, i-STAT 16.3 g/dl      Glucose, i-STAT 199 mg/dl      Specimen Type VENOUS            All labs reviewed and utilized in the  medical decision making process    Radiology:    CT chest abdomen pelvis w contrast   Final Result      Endotracheal tube approximately 2 cm above the cadence. Consider retracting by 2 cm.      Hepatomegaly and diffuse hepatic steatosis.      Marked distention of the urinary bladder. Recommend correlation with urinalysis to exclude infection.      Endometrial thickening measuring up to 15 mm. Recommend further evaluation with pelvic ultrasound on a nonemergent basis.      The study was marked in EPIC for immediate notification.                     Workstation performed: [id number]         CTA stroke alert (head/neck)   Final Result   Suboptimal contrast bolus within the early venous phase.      CTA head:   -No large vessel occlusion, high-grade stenosis or aneurysm.   -Decreased attenuation of contrast within short segment of proximal left petrous segment is likely artifact given streak from left ear hardware.   -Redemonstrated hyperdensity/calcification at the vertebrobasilar junction, unchanged. No associated significant stenosis.      CTA neck:   -Approximate 70% right and 65% left stenosis of the proximal internal carotid arteries due to atherosclerosis. Vascular surgery consult recommended.   -Moderate stenosis at the origin of the left common carotid artery due to atherosclerosis.   -No significant stenosis of the vertebral arteries.      Other:   -3 mm left upper lobe pulmonary nodule based on current Fleischner Society 2017 Guidelines on incidental pulmonary nodule, optional follow-up CT at 12 months can be considered.         Findings were directly discussed with Bruce Gibbons at 2:32 p.m on 6/7/2024.      The study was marked in EPIC for immediate notification.                  Workstation performed: [id number]         CT stroke alert brain   Final Result      -No acute vascular territory infarct, or intracranial hemorrhage   -Stable partially calcified 8 mm extra-axial lesion along the left frontal  convexity which may represent meningioma. Recommend MRI with and without contrast for further evaluation.         Findings were directly discussed with Bruce Gibbons at 2:32 p.m on 6/7/2024.         Workstation performed: [id number]         XR chest 1 view portable    (Results Pending)   XR hand 2 vw right    (Results Pending)       All radiology studies independently viewed by me and interpreted by the radiologist.    Procedure    Intubation    Date/Time: 6/7/2024 1:40 PM    Performed by: Jan Mercer MD  Authorized by: Jan Mercer MD    Patient location:  ED  Consent:     Consent obtained:  Emergent situation  Universal protocol:     Patient identity confirmed:  Anonymous protocol, patient vented/unresponsive  Pre-procedure details:     Patient status:  Altered mental status    Pretreatment medications:  Ketamine    Paralytics:  Rocuronium  Indications:     Indications for intubation: airway protection and hypoxemia    Procedure details:     Preoxygenation:  Nasal cannula    CPR in progress: no      Intubation method:  Oral    Oral intubation technique:  Direct    Laryngoscope blade:  Mac 3    Tube size (mm):  7.5    Tube type:  Cuffed and double lumen    Number of attempts:  1    Tube visualized through cords: yes    Placement assessment:     ETT to teeth:  23 to alveolar ridge    Tube secured with:  ETT odom    Breath sounds:  Equal and absent over the epigastrium    Placement verification: chest rise, condensation, colorimetric ETCO2 device, CXR verification, direct visualization, equal breath sounds and tube exhalation      Chest x-ray findings: ETT at cadence, retracted 2 cm afterwards.  Post-procedure details:     Patient tolerance of procedure:  Tolerated well, no immediate complications  CriticalCare Time    Date/Time: 6/7/2024 6:30 PM    Performed by: Jan Mercer MD  Authorized by: Jan Mercer MD    Critical care provider statement:     Critical care time (minutes):  90     Critical care time was exclusive of:  Teaching time and separately billable procedures and treating other patients    Critical care was necessary to treat or prevent imminent or life-threatening deterioration of the following conditions:  CNS failure or compromise, sepsis and toxidrome    Critical care was time spent personally by me on the following activities:  Obtaining history from patient or surrogate, blood draw for specimens, development of treatment plan with patient or surrogate, discussions with consultants, evaluation of patient's response to treatment, examination of patient, ordering and performing treatments and interventions, ordering and review of laboratory studies, ordering and review of radiographic studies, re-evaluation of patient's condition, review of old charts and ventilator management  Orthopedic injury treatment    Date/Time: 6/7/2024 8:30 PM    Performed by: Jan Mercer MD  Authorized by: Jan Mercer MD    Patient Location:  ED  [place] Protocol:  Patient identity confirmed: anonymous protocol, patient vented/unresponsive    Injury location:  Finger  Location details:  Right thumb  Injury type:  Fracture  Fracture type: proximal phalanx    MCP joint involved?: Yes    Any IP joint involved?: No    Neurovascular status: Neurovascularly intact    Distal perfusion: normal    Range of motion: reduced    Manipulation performed?: Yes    Reduction successful?: Yes    Immobilization:  Splint  Splint type:  Finger splint, static  Supplies used:  Aluminum splint  Distal perfusion: normal    Patient tolerance:  Patient tolerated the procedure well with no immediate complications          ED Course of Care and Re-Assessments  On initial arrival, patient agitated, meeting multiple SIRS criteria, sepsis alert and stroke alert was called.  Patient initially quite agitated, vocalizing but incomprehensible, not initially following commands, noted to have somewhat rigid left arm and left  leg.  Patient given lorazepam with some improvement but still agitated, rigidity of left arm and left leg resolved, moving all 4 extremities, not following commands and not keeping still.  Substantial concern for intracranial hemorrhage, large stroke, or other acute life threat.  Based on altered mental status, critical illness and substantial vital sign abnormalities, as well as desaturation 88%, patient intubated prior to going to CT scan with concern that CTs would not be able to be obtained or the patient might precipitously decompensate.  Intubated with ketamine and rocuronium with concern for possible renal failure with labs still pending at that time.  Sedated with propofol and fentanyl.  CTs obtained, no bleed on CT head, CT angiogram largely unremarkable.  Discussed with stroke neurology, some concern for possible complex partial seizure as contributor, loaded with Keppra.  Labs as above.  Started on broad-spectrum antibiotics, resuscitated with IV fluids.  Patient had significant sedation requirements, suspected due to chronic use of Xanax, gabapentin, among other medications.  Some concern for potential toxidrome from overdose of queitiapine, not felt to be consistent with neuroleptic malignant syndrome or serotonin syndrome at this time based on lack of clonus as well as lack of leadpipe rigidity and overall clinical exam.  Strong suspicion for septic shock, with encephalopathy secondary to same considered as well in differential.  Some difficulty with obtaining labs with multiple samples hemolyzed, I performed a femoral stick on the right femoral vein with an 18-gauge needle under continuous ultrasound guidance after prepping the area with chlorhexidine allowing the prep to dry.  After imaging obtained without clear source of infection noted with altered mental status, lumbar puncture performed with concern for possible meningitis or encephalitis.  Admitted to critical care.        Medications    propofol (DIPRIVAN) 1000 mg in 100 mL infusion (premix) (50 mcg/kg/min × 63.5 kg Intravenous New Bag 6/7/24 1738)   chlorhexidine (PERIDEX) 0.12 % oral rinse 15 mL (15 mL Mouth/Throat Given 6/7/24 2108)   heparin (porcine) subcutaneous injection 5,000 Units (has no administration in time range)   multi-electrolyte (PLASMALYTE-A/ISOLYTE-S PH 7.4) IV solution (125 mL/hr Intravenous New Bag 6/7/24 1811)   fentaNYL 1000 mcg in sodium chloride 0.9% 100mL infusion (75 mcg/hr Intravenous New Bag 6/7/24 1826)   acyclovir (ZOVIRAX) 475 mg in sodium chloride 0.9 % 100 mL IVPB (475 mg Intravenous New Bag 6/7/24 2044)   cefTRIAXone (ROCEPHIN) 2,000 mg in dextrose 5 % 50 mL IVPB (2,000 mg Intravenous New Bag 6/7/24 2009)   ampicillin (OMNIPEN) 2,000 mg in sodium chloride 0.9 % 100 mL IVPB (2,000 mg Intravenous New Bag 6/7/24 2016)   vancomycin (VANCOCIN) 1,250 mg in sodium chloride 0.9 % 250 mL IVPB (has no administration in time range)   midazolam (VERSED) 0.5 mg/mL (STANDARD CONCENTRATION) 100 mL infusion ( Intravenous Canceled Entry 6/7/24 2009)   acetaminophen (Ofirmev) injection 1,000 mg (has no administration in time range)   midazolam (VERSED) injection 2 mg (2 mg Intravenous Given 6/7/24 2043)   fentaNYL injection 50 mcg (50 mcg Intravenous Given 6/7/24 2043)   levETIRAcetam (KEPPRA) injection 750 mg (has no administration in time range)   nicotine (NICODERM CQ) 14 mg/24hr TD 24 hr patch 14 mg (has no administration in time range)   Famotidine (PF) (PEPCID) injection 20 mg (has no administration in time range)   LORazepam (ATIVAN) injection 1 mg (1 mg Intravenous Given 6/7/24 1320)   lactated ringers bolus 1,000 mL (0 mL Intravenous Stopped 6/7/24 1447)   piperacillin-tazobactam (ZOSYN) IVPB 4.5 g (0 g Intravenous Stopped 6/7/24 1400)   acetaminophen (Ofirmev) injection 1,000 mg (0 mg Intravenous Stopped 6/7/24 1356)   ROCuronium (ZEMURON) injection 76 mg (76 mg Intravenous Given 6/7/24 1340)   fentaNYL injection 50  mcg (50 mcg Intravenous Given 6/7/24 1347)   Ketamine HCl 50 mg/5 mL **ADS Override Pull** (128 mg  Given 6/7/24 1339)   iohexol (OMNIPAQUE) 350 MG/ML injection (MULTI-DOSE) 85 mL (85 mL Intravenous Given 6/7/24 1405)   iohexol (OMNIPAQUE) 350 MG/ML injection (MULTI-DOSE) 85 mL (85 mL Intravenous Given 6/7/24 1412)   midazolam (VERSED) injection 5 mg (5 mg Intravenous Given 6/7/24 1446)   levETIRAcetam (KEPPRA) injection 1,500 mg (1,500 mg Intravenous Given 6/7/24 1454)   midazolam (VERSED) injection 5 mg (5 mg Intravenous Given 6/7/24 1605)   fentaNYL injection 50 mcg (50 mcg Intravenous Given 6/7/24 1602)   fentaNYL injection 50 mcg (50 mcg Intravenous Given 6/7/24 1615)   midazolam (VERSED) injection 5 mg (5 mg Intravenous Given 6/7/24 1618)   vancomycin (VANCOCIN) 1500 mg in sodium chloride 0.9% 250 mL IVPB (1,500 mg Intravenous New Bag 6/7/24 1716)   midazolam (VERSED) injection 2 mg (2 mg Intravenous Given 6/7/24 1811)           FINAL IMPRESSION    Final diagnoses:   Fever   Altered mental status   Lactic acidosis   Closed displaced fracture of proximal phalanx of right thumb, initial encounter         DISPOSITION/PLAN    Presentation as above with altered mental status fever and lactic acidosis of unclear etiology.  Vital signs examination course as above.  Briefly, differential is broad at this time including septic shock with acute encephalopathy, meningitis or encephalitis, acute toxidrome from overdose, seizure.  After initial treatment and resuscitation and diagnostics in emergency department, patient admitted to critical care.  After initial critical care needs met, patient noted to have bruising and deformity to the right thumb, x-rays ordered, these were performed in the ICU after patient was transported upstairs.  On review by myself, noted to have a displaced fracture of the proximal phalanx of the right thumb with extension of the MCP joint, I went to the ICU and reduce the fracture and splinted  with an aluminum foam finger splint, with good capillary refill noted after placement without splint.  Hemodynamically stable, intubated ventilated and sedated at time of admit to critical care.  Time reflects when diagnosis was documented in both MDM as applicable and the Disposition within this note       Time User Action Codes Description Comment    6/7/2024  1:19 PM Ramez Chapman Add [R50.9] Fever     6/7/2024  1:40 PM Ramez Chapman Add [R41.82] Altered mental status     6/7/2024  4:54 PM Jan Mercer Modify [R50.9] Fever     6/7/2024  4:54 PM Jan Mercer Modify [R41.82] Altered mental status     6/7/2024  4:54 PM Jan Mercer Add [E87.20] Lactic acidosis     6/7/2024  6:32 PM Miguel AngelAmaury troncosoothy C Modify [R50.9] Fever     6/7/2024  6:32 PM Miguel AngelAmauryJacky C Modify [R41.82] Altered mental status     6/7/2024  6:32 PM Miguel Angel Jacky C Modify [E87.20] Lactic acidosis     6/7/2024  8:42 PM Latonia Talbert Add [S62.521A] Displaced fracture of distal phalanx of right thumb, initial encounter for closed fracture     6/7/2024  9:36 PM Jan Mercer Add [S62.511A] Closed displaced fracture of proximal phalanx of right thumb, initial encounter           ED Disposition       ED Disposition   Admit    Condition   Stable    Date/Time   Fri Jun 7, 2024  4:54 PM    Comment   Case was discussed with Critical Care and the patient's admission status was agreed to be Admission Status: inpatient status to the service of Dr. Mcdowell .               Follow-up Information    None           PATIENT REFERRED TO:    No follow-up provider specified.    DISCHARGE MEDICATIONS:    Current Discharge Medication List        CONTINUE these medications which have NOT CHANGED    Details   ALPRAZolam (XANAX) 2 MG tablet Take 2 mg by mouth 4 (four) times a day      gabapentin (NEURONTIN) 300 mg capsule Take 300 mg by mouth 3 (three) times a day      Multiple Vitamin (multivitamin) tablet Take 1 tablet by mouth  "daily      Omega-3 Fatty Acids (fish oil) 1,000 mg Take 1,000 mg by mouth daily      !! QUEtiapine (SEROquel) 100 mg tablet Take 100 mg by mouth every morning      !! QUEtiapine (SEROquel) 400 MG tablet Take 400 mg by mouth daily at bedtime      vitamin B-12 (VITAMIN B-12) 1,000 mcg tablet Take by mouth daily      ezetimibe (ZETIA) 10 mg tablet Take 1 tablet (10 mg total) by mouth daily  Qty: 90 tablet, Refills: 1    Associated Diagnoses: Mixed hyperlipidemia      methylPREDNISolone 4 MG tablet therapy pack Use as directed on package  Qty: 21 each, Refills: 0    Associated Diagnoses: Viral syndrome       !! - Potential duplicate medications found. Please discuss with provider.          No discharge procedures on file.         Jan Mercer MD    Portions of the record may have been created with voice recognition software.  Occasional wrong word or \"sound alike\" substitutions may have occurred due to the inherent limitations of voice recognition software.  Please read the chart carefully and recognize, using context, where substitutions have occurred     Jan Mercer MD  06/07/24 3798    "

## 2024-06-07 NOTE — ASSESSMENT & PLAN NOTE
POA- tachycardia, tachypnea, leukocytosis, EOD with intubation  Suspect meningitis vs nms vs serotonin syndrome vs PNA with ARHF  Febrile to 104.4 on arrival  Lactic 8.4->3.4  Initial PCL elevated  CXR unremarkable  CTH negative  CT- bladder distention  UA unremarkable  BC pending  F/u LP results  For now, ppx with menigitis coverage- ampicillin, ctx, vanco, acyclovir  Follow WBC and fever curve

## 2024-06-07 NOTE — RESPIRATORY THERAPY NOTE
06/07/24 1423   Respiratory Assessment   Resp Comments FiO2 decreased in an attempt to wean.  Will cont to wean as able.   O2 Device vent   Vent Information   $ Pulse Oximetry Spot Check Charge Completed   SpO2 99 %   AC/VC+ Settings   FIO2 (%) (S)  60 %

## 2024-06-07 NOTE — RESPIRATORY THERAPY NOTE
RT Ventilator Management Note  Kirsty Em 74 y.o. female MRN: [id number]  Unit/Bed#: TR 30 Encounter: [id number]      Daily Screen    No data found in the last 10 encounters.           Physical Exam:   Assessment Type: Assess only  General Appearance: Sedated  Respiratory Pattern: Assisted  Chest Assessment: Chest expansion symmetrical  Bilateral Breath Sounds: Diminished  Suction: ET Tube  O2 Device: vent      Resp Comments: Pt arrived via EMS from home.  Pt awake, but not following commands.  Pt intubated for airway protection.  Pt w/ AMS.     06/07/24 1348   Respiratory Assessment   Assessment Type Assess only   General Appearance Sedated   Respiratory Pattern Assisted   Chest Assessment Chest expansion symmetrical   Bilateral Breath Sounds Diminished   Suction ET Tube   Resp Comments Pt arrived via EMS from home.  Pt awake, but not following commands.  Pt intubated for airway protection.   O2 Device vent   Vent Information   Vent ID Petra   Vent type Drager   Drager Vent Mode AC/VC+   $ Vent Charge-INITIAL Yes   Ventilator Start Yes   Is the patient reintubated? No   $ Pulse Oximetry Spot Check Charge Completed   SpO2 99 %   AC/VC+ Settings   Resp Rate (BPM) 18 BPM   VT (mL) 350 mL   Insp Time (S) 0.83 S   FIO2 (%) 100 %   PEEP (cmH2O) 6 cmH2O   Rise Time (%) 20 %   Trigger Sensitivity Flow (LPM) 2 LPM   Humidification Heater   Heater Temp 98.6 °F (37 °C)   AC/VC+ Actuals   Resp Rate (BPM) 18 BPM   VT (mL) 351 mL   MV (Obs) 5.3   MAP (cmH2O) 8.9 cmH2O   Peak Pressure (cmH2O) 18 cmH2O   I:E Ratio (Obs) 1:3   Static Compliance (mL/cmH20) 36.6 mL/cmH2O   Plateau Pressure (cm H2O) 14.6 cm H2O   Heater Temperature (Obs)   (warming)   AC/VC+ ALARMS   High Peak Pressure (cmH2O) 30 cmH2O   High Resp Rate (BPM) 30 BPM   High MV (L/min) 12 L/min   Low MV (L/min) 3.5 L/min   High VT (mL) 750 mL   Maintenance   Alarm (pink) cable attached No   Resuscitation bag with peep valve at bedside Yes   Water bag changed No    Circuit changed No   ETT  Oral 7.5 mm   Placement Date/Time: 06/07/24 1342   Previously placed by?: Attending  Preoxygenated: Yes  Type: Oral  Tube Size: 7.5 mm  Location: Oral  Insertion: Atraumatic  Placement Verification: Auscultation;End tidal CO2;Chest x-ray  Secured at (cm): 23  Place...   Secured at (cm) 23   Measured from Gums   Secured Location Right   Secured by Commercial tube odom   Site Condition Dry   Cuff Pressure (color) Green

## 2024-06-07 NOTE — ASSESSMENT & PLAN NOTE
Home regimen of Xanax, seroquel  No pill counts available, uncertain of overdose in setting of emotional stressor  Hold regimen acutely

## 2024-06-07 NOTE — ASSESSMENT & PLAN NOTE
74 YOF with PMHx of DOMINGO on benzodiazepines, COPD, HLD presenting to [facility] ED 6/7 afternoon after being found by  covered in feces at their home  Found down for unknown quantity of time with rhabdomyolysis  Symptoms including asphasia, contracture of RUE and RLE, weakness of left side  Stroke alert called  CTH negative  CTA with b/l proximal carotid stenosis 60-75%  For concern for seizure activity, s/p 1.5g of IV keppra, 15mg IV versed, 1mg IV ativan, started on propofol infusion given need for mechanical intubation  S/p lumbar puncture in setting of high fever, regidity  Of note with home regimen of Xanax, Seroquel, with recent emotional stressor at home with unknown pill counts    Differential includes meningitis vs NMS vs serotonin syndrome vs sepsis  Meningitis prophylaxis ordered  Follow LP results  Continue Keppra 1g BID  EEG ordered and pending  Appreciate neurology assistance  Serial neuro exams  Given high benzo requirement, start versed infusion given persistent agitation

## 2024-06-07 NOTE — RESPIRATORY THERAPY NOTE
06/07/24 1355   Respiratory Assessment   Resp Comments ETT repositioned   ETT  Oral 7.5 mm   Placement Date/Time: 06/07/24 1342   Previously placed by?: Attending  Preoxygenated: Yes  Type: Oral  Tube Size: 7.5 mm  Location: Oral  Insertion: Atraumatic  Placement Verification: Auscultation;End tidal CO2;Chest x-ray  Secured at (cm): 23  Place...   Secured at (cm) (S)  21   Measured from Gums

## 2024-06-07 NOTE — ED PROCEDURE NOTE
"Procedure  Lumbar puncture    Date/Time: 6/7/2024 4:39 PM    Performed by: SINDY Henderson  Authorized by: SINDY Henderson  Universal Protocol:  Procedure performed by: (Jan Mercer MD)  Consent: The procedure was performed in an emergent situation.  Risks and benefits: risks, benefits and alternatives were discussed  Consent given by: spouse  Time out: Immediately prior to procedure a \"time out\" was called to verify the correct patient, procedure, equipment, support staff and site/side marked as required.  Timeout called at: 6/7/2024 4:19 PM.  Test results: test results available and properly labeled  Site marked: the operative site was marked  Required items: required blood products, implants, devices, and special equipment available  Patient identity confirmed: verbally with patient and arm band    Patient location:  ED  Pre-procedure details:     Preparation: Patient was prepped and draped in usual sterile fashion    Indications:     Indications: evaluation for infection, evaluation for altered mental status, evaluation of opening pressure and diagnostic intervention    Sedation:     Sedation type:  Anxiolysis  Anesthesia (see MAR for exact dosages):     Anesthesia method:  Local infiltration  Procedure details:     Lumbar space:  L4-L5 interspace    Patient position:  L lateral decubitus    Equipment: Lumbar puncture kit used      Needle gauge:  20G x 2.5in    Ultrasound guidance: no      Number of attempts:  1    Opening pressure (cm H2O):  14    Fluid appearance:  Clear    Tubes of fluid:  4    Total volume (ml):  4  Post-procedure:     Puncture site:  Direct pressure applied and adhesive bandage applied    Patient tolerance of procedure:  Tolerated well, no immediate complications    Patient education: Patient sedated.                     SINDY Henderson  06/07/24 1641    "

## 2024-06-07 NOTE — ASSESSMENT & PLAN NOTE
8.4 on arrival, since improving to 3.4  S/p IVF   Possibly in setting of seizures vs severe sepsis  Trend lactate to clear  Plan as above

## 2024-06-07 NOTE — ASSESSMENT & PLAN NOTE
74 y.o.  female with COPD, stage 3 CKD, reported history of seizure disorder, tobacco dependence, anxiety, depression, HLD, who presented to [facility] on 6/7/24 after being found at home with AMS (found on floor with feces all over). A stroke alert was initiated by the ED team. BP on arrival 151/94. CTH revealed no acute intracranial abnormality. CTA H/N wwo contrast revealed no IR target. Pt was not a thrombolytic candidate due to being outside of thrombolytic time window.     Workup:  -CT head:  -No acute vascular territory infarct, or intracranial hemorrhage  -Stable partially calcified 8 mm extra-axial lesion along the left frontal convexity which may represent meningioma.  -CTA head and neck:  Suboptimal contrast bolus within the early venous phase.  CTA head:  -No large vessel occlusion, high-grade stenosis or aneurysm.  -Decreased attenuation of contrast within short segment of proximal left petrous segment is likely artifact given streak from left ear hardware.  -Redemonstrated hyperdensity/calcification at the vertebrobasilar junction, unchanged. No associated significant stenosis.  CTA neck:  -Approximate 70% right and 65% left stenosis of the proximal internal carotid arteries due to atherosclerosis. Vascular surgery consult recommended.  -Moderate stenosis at the origin of the left common carotid artery due to atherosclerosis.  -No significant stenosis of the vertebral arteries.  Other:  -3 mm left upper lobe pulmonary nodule based on current Fleischner Society 2017 Guidelines on incidental pulmonary nodule, optional follow-up CT at 12 months can be considered.  -MRI brain w/wo contrast:  Focal, subcentimeter acute to subacute infarct along the posterior left frontal cortex.  Small meningioma along the lateral left frontal convexity as suspected on the recent CT examination.  Moderate chronic microangiopathic ischemic changes.  -Echo: EF 55%. No regional wall motion abnormality. Bilateral atrium size  normal.  -Labs on presentation: WBC 21.54, coma panel negative, lactic acid 8.4, procalcitonin 2.47, UA negative, UDS positive for benzos, troponin 54, , , CK 1343, ammonia 33  -CSF studies: glucose 138, protein 49, gram stain negative, culture and gram stain negative, WBC 6, RBC 12, ME panel negative  -Other labs: Keppra level 18.7, LDL 75, A1C 6.3    Plan:   -S/p Keppra 1/5 g bolus x 1; continue Keppra 750 mg BID  -S/p aspirin 300 mg IN x 1; continue aspirin 81 mg and Plavix 75 mg x 21 days, then transition to aspirin monotherapy  -euglycemic/normothermic  goal   -Routine EEG pending official read  -telemetry   -[employer] form completed and submitted online.  made aware of driving restrictions. Will need to discuss driving restrictions with patient once mental status improves.  -seizure precautions   -PT/OT/ST when appropriate  -Frequent neuro checks. Continue to monitor and notify neurology with any changes.  -Medical management and supportive care per critical care team. Correction of any metabolic or infectious disturbances.

## 2024-06-07 NOTE — ASSESSMENT & PLAN NOTE
reports seizure two years ago, states he is uncertain movement was ever seizure  Patient reportedly self discontinued home Keppra  S/p 1500mg IV keppra in ED  Continue keppra 1g BID  EEG ordered  Appreciate neurology assistance

## 2024-06-07 NOTE — ASSESSMENT & PLAN NOTE
Reported hypoxia baljinder-worsening altered mental status  S/p intubation, received on mechanical ventilator  CXR consistent with chronic changes  Currently on ACVC 18/350/60/6+  Wean as able to maintain spo2 >92%  VAP Bundle

## 2024-06-07 NOTE — ASSESSMENT & PLAN NOTE
In setting of unknown downtime, elevated CK  CT with evidence of hepatomegaly, hepatic steatosis  F/u transaminases in AM  Consider RUQUS if worsening

## 2024-06-07 NOTE — ASSESSMENT & PLAN NOTE
CK noted to be 1300 on arrival  Found down at home for unknown quantity of time  Continue aggressive IVF administration  Follow up CK in AM

## 2024-06-07 NOTE — ASSESSMENT & PLAN NOTE
Current every day smoker  With noted COPD exacerbation several years ago  Currently dose not appear to be in an acute exacerbation  Hold on steroids acutely  Mechanical Ventilation as above

## 2024-06-07 NOTE — H&P
[facility]  H&P  Name: Kirsty Em 74 y.o. female I MRN: [id number]  Unit/Bed#: ICU 11 I Date of Admission: 6/7/2024   Date of Service: 6/7/2024 I Hospital Day: 0      Assessment & Plan   * Acute metabolic encephalopathy  Assessment & Plan  74 YOF with PMHx of DOMINGO on benzodiazepines, COPD, HLD presenting to [facility] ED 6/7 afternoon after being found by  covered in feces at their home  Found down for unknown quantity of time with rhabdomyolysis  Symptoms including asphasia, contracture of RUE and RLE, weakness of left side  Stroke alert called  CTH negative  CTA with b/l proximal carotid stenosis 60-75%  For concern for seizure activity, s/p 1.5g of IV keppra, 15mg IV versed, 1mg IV ativan, started on propofol infusion given need for mechanical intubation  S/p lumbar puncture in setting of high fever, regidity  Of note with home regimen of Xanax, Seroquel, with recent emotional stressor at home with unknown pill counts    Differential includes meningitis vs NMS vs serotonin syndrome vs sepsis  Meningitis prophylaxis ordered  Follow LP results  Continue Keppra 1g BID  EEG ordered and pending  Appreciate neurology assistance  Serial neuro exams  Given high benzo requirement, start versed infusion given persistent agitation    Severe sepsis (HCC)  Assessment & Plan  POA- tachycardia, tachypnea, leukocytosis, EOD with intubation  Suspect meningitis vs nms vs serotonin syndrome vs PNA with ARHF  Febrile to 104.4 on arrival  Lactic 8.4->3.4  Initial PCL elevated  CXR unremarkable  CTH negative  CT- bladder distention  UA unremarkable  BC pending  F/u LP results  For now, ppx with menigitis coverage- ampicillin, ctx, vanco, acyclovir  Follow WBC and fever curve    Acute respiratory failure with hypoxia (HCC)  Assessment & Plan  Reported hypoxia baljinder-worsening altered mental status  S/p intubation, received on mechanical ventilator  CXR consistent with chronic changes  Currently on ACVC  18/350/60/6+  Wean as able to maintain spo2 >92%  VAP Bundle    History of seizure disorder  Assessment & Plan   reports seizure two years ago, states he is uncertain movement was ever seizure  Patient reportedly self discontinued home Keppra  S/p 1500mg IV keppra in ED  Continue keppra 1g BID  EEG ordered  Appreciate neurology assistance    Lactic acidosis  Assessment & Plan  8.4 on arrival, since improving to 3.4  S/p IVF   Possibly in setting of seizures vs severe sepsis  Trend lactate to clear  Plan as above    Rhabdomyolysis  Assessment & Plan  CK noted to be 1300 on arrival  Found down at home for unknown quantity of time  Continue aggressive IVF administration  Follow up CK in AM    Elevated transaminase level  Assessment & Plan  In setting of unknown downtime, elevated CK  CT with evidence of hepatomegaly, hepatic steatosis  F/u transaminases in AM  Consider RUQUS if worsening    Chronic obstructive pulmonary disease, unspecified COPD type (HCC)  Assessment & Plan  Current every day smoker  With noted COPD exacerbation several years ago  Currently dose not appear to be in an acute exacerbation  Hold on steroids acutely  Mechanical Ventilation as above    Tobacco dependence  Assessment & Plan  Current 1ppd smoker  Advise cessation when appropriate  NRT    Anxiety and depression  Assessment & Plan  Home regimen of Xanax, seroquel  No pill counts available, uncertain of overdose in setting of emotional stressor  Hold regimen acutely    Closed displaced fracture of proximal phalanx of right thumb  Assessment & Plan  Reduced and splinted by Dr. Mercer   Orthopedics consultation             History of Present Illness     HPI: Kirsty Em is a 74 y.o. with past medical history significant for anxiety depression, hallucinations, tobacco abuse, COPD, stage III CKD, and hyperlipidemia and seizures (self terminated AEDs) who presents the emergency room after being found down.   states he last saw her  last evening before he went to bed, and she was found unresponsive this morning on the floor incontinent of stool and clenching fists.  Stroke alert was initiated.  Per neurology note patient initially had evidence of left gaze deviation with stiffening of the left upper extremity which resolved.  Patient seemed to track objects although seemed dysarthric, but followed simple commands.  Patient was intubated for airway protection.  Neurology saw patient in the emergency department as part of stroke alert.  Imaging was concerning for left frontal meningioma, bilateral ICA stenosis.  Procalcitonin and lactic acid were elevated as well as a leukocytosis and temp of 104.  Neurology felt altered mental status was related to his seizure rather than a stroke in the setting of sepsis and toxic metabolic encephalopathy, especially regarding her self withdrawal of AEDs and suspected history of seizure disorder.  Meningitis could not be excluded, LP was performed and meningitic coverage started, including acyclovir for possible viral meningitis.  Critical care was consulted for further evaluation and treatment.    History obtained from chart review and unobtainable from patient due to mental status.  Review of Systems: See HPI for Review of Systems  Disposition: Critical care  Historical Information   Past Medical History:  No date: Anxiety  No date: Depression  4/20/2021: Depression, recurrent (HCC)  4/20/2021: Hallucinations No past surgical history on file.   Current Outpatient Medications   Medication Instructions    ALPRAZolam (XANAX) 2 mg, Oral, 4 times daily    ezetimibe (ZETIA) 10 mg, Oral, Daily    fish oil 1,000 mg, Oral, Daily    gabapentin (NEURONTIN) 300 mg, Oral, 3 times daily    methylPREDNISolone 4 MG tablet therapy pack Use as directed on package    Multiple Vitamin (multivitamin) tablet 1 tablet, Oral, Daily    QUEtiapine (SEROQUEL) 400 mg, Oral, Daily at bedtime,      QUEtiapine (SEROQUEL) 100 mg, Oral,  Every morning    vitamin B-12 (VITAMIN B-12) 1,000 mcg tablet Oral, Daily    Allergies   Allergen Reactions    Lipitor [Atorvastatin] Other (See Comments)     Muscle aches      Social History     Tobacco Use    Smoking status: Every Day     Current packs/day: 0.50     Types: Cigarettes    Smokeless tobacco: Never   Vaping Use    Vaping status: Never Used   Substance Use Topics    Alcohol use: Not Currently    Drug use: Never    Family History   Problem Relation Age of Onset    Diabetes Mother     Stroke Father     No Known Problems Sister     No Known Problems Sister     No Known Problems Sister     No Known Problems Daughter     No Known Problems Daughter     No Known Problems Daughter     No Known Problems Maternal Grandmother     No Known Problems Paternal Grandmother     No Known Problems Maternal Aunt     No Known Problems Maternal Aunt     No Known Problems Maternal Aunt     No Known Problems Maternal Aunt     No Known Problems Maternal Aunt     No Known Problems Maternal Aunt     No Known Problems Maternal Aunt     No Known Problems Maternal Aunt     No Known Problems Paternal Aunt     Breast cancer Neg Hx           Objective                            Vitals I/O      Most Recent Min/Max in 24hrs   Temp (!) 101.3 °F (38.5 °C) Temp  Min: 100.8 °F (38.2 °C)  Max: 104.4 °F (40.2 °C)   Pulse 85 Pulse  Min: 84  Max: 135   Resp 18 Resp  Min: 13  Max: 37   BP 90/51 BP  Min: 81/51  Max: 239/118   O2 Sat 96 % SpO2  Min: 92 %  Max: 99 %      Intake/Output Summary (Last 24 hours) at 6/7/2024 2117  Last data filed at 6/7/2024 1723  Gross per 24 hour   Intake 2200 ml   Output --   Net 2200 ml       Diet NPO    Invasive Monitoring           Physical Exam   Physical Exam  Vitals and nursing note reviewed.   Eyes:      General: No scleral icterus.     Extraocular Movements: Extraocular movements intact.      Conjunctiva/sclera: Conjunctivae normal.   Skin:     General: Skin is warm and dry.      Capillary Refill: Capillary  refill takes less than 2 seconds.   HENT:      Head: Normocephalic and atraumatic.      Mouth/Throat:      Mouth: Mucous membranes are moist.   Neck:      Vascular: No JVD.   Cardiovascular:      Rate and Rhythm: Normal rate and regular rhythm.      Pulses: Normal pulses.      Heart sounds: Normal heart sounds.   Musculoskeletal:         General: Deformity present. Normal range of motion.      Right lower leg: No edema.      Left lower leg: No edema.      Comments: Right thumb with gross deformity  Extensive bruising   Abdominal: General: Bowel sounds are normal. There is no distension.      Palpations: Abdomen is soft.      Tenderness: There is no abdominal tenderness. There is no guarding.   Constitutional:       General: She is not in acute distress.     Appearance: She is ill-appearing.      Interventions: She is sedated, intubated and restrained.   Pulmonary:      Effort: Pulmonary effort is normal. No accessory muscle usage, respiratory distress or accessory muscle usage. She is intubated.      Comments: Diminished throughout, no subcutaneous emphysema noted, symmetrical excursion  Secretions are normal.Chest:      Chest wall: No tenderness.   Neurological:      General: No focal deficit present.      Mental Status: She is calm.      Cranial Nerves: No facial asymmetry.      Motor: gross motor function is at baseline for patient. No motor deficit.        Corneal reflex present, cough reflex and gag reflex intact.      Comments: Eyes open to voice, follows simple commands, nonverbal secondary to ET tube and No nystagmus or disconjugate gaze noted   Genitourinary/Anorectal:  Pinto present.          Diagnostic Studies      EKG: Sinus rhythm  Imaging:   CT chest abdomen pelvis w contrast   Final Result      Endotracheal tube approximately 2 cm above the cadence. Consider retracting by 2 cm.      Hepatomegaly and diffuse hepatic steatosis.      Marked distention of the urinary bladder. Recommend correlation with  urinalysis to exclude infection.      Endometrial thickening measuring up to 15 mm. Recommend further evaluation with pelvic ultrasound on a nonemergent basis.      The study was marked in EPIC for immediate notification.                     Workstation performed: [id number]         CTA stroke alert (head/neck)   Final Result   Suboptimal contrast bolus within the early venous phase.      CTA head:   -No large vessel occlusion, high-grade stenosis or aneurysm.   -Decreased attenuation of contrast within short segment of proximal left petrous segment is likely artifact given streak from left ear hardware.   -Redemonstrated hyperdensity/calcification at the vertebrobasilar junction, unchanged. No associated significant stenosis.      CTA neck:   -Approximate 70% right and 65% left stenosis of the proximal internal carotid arteries due to atherosclerosis. Vascular surgery consult recommended.   -Moderate stenosis at the origin of the left common carotid artery due to atherosclerosis.   -No significant stenosis of the vertebral arteries.      Other:   -3 mm left upper lobe pulmonary nodule based on current Fleischner Society 2017 Guidelines on incidental pulmonary nodule, optional follow-up CT at 12 months can be considered.         Findings were directly discussed with Bruce Gibbons at 2:32 p.m on 6/7/2024.      The study was marked in EPIC for immediate notification.                  Workstation performed: [id number]         CT stroke alert brain   Final Result      -No acute vascular territory infarct, or intracranial hemorrhage   -Stable partially calcified 8 mm extra-axial lesion along the left frontal convexity which may represent meningioma. Recommend MRI with and without contrast for further evaluation.         Findings were directly discussed with Bruce Gibbons at 2:32 p.m on 6/7/2024.         Workstation performed: [id number]         XR chest 1 view portable    (Results Pending)   XR hand 2 vw right     (Results Pending)      I have personally reviewed pertinent reports.   and I have personally reviewed pertinent films in PACS     Medications:  Scheduled PRN   acyclovir, 10 mg/kg (Ideal), Q12H  ampicillin, 2,000 mg, Q6H  cefTRIAXone, 2,000 mg, Q12H  chlorhexidine, 15 mL, Q12H JERMAINE  heparin (porcine), 5,000 Units, Q8H JERMAINE  levETIRAcetam, 750 mg, Q12H JERMAINE  [START ON 6/8/2024] nicotine, 14 mg, Daily  [START ON 6/8/2024] vancomycin, 1,250 mg, Q24H      acetaminophen, 1,000 mg, Q6H PRN  fentaNYL, 50 mcg, Q1H PRN  midazolam, 2 mg, Q4H PRN       Continuous    fentaNYL, 75 mcg/hr, Last Rate: 75 mcg/hr (06/07/24 1826)  midazolam, 1 mg/hr, Last Rate: 1 mg/hr (06/07/24 2008)  multi-electrolyte, 125 mL/hr, Last Rate: 125 mL/hr (06/07/24 1811)  propofol, 5-50 mcg/kg/min, Last Rate: 50 mcg/kg/min (06/07/24 1738)         Labs:    CBC    Recent Labs     06/07/24  1344 06/07/24  1346   WBC  --  21.54*   HGB 16.3* 15.0   HCT 48* 43.5   PLT  --  370     BMP    Recent Labs     06/07/24  1344 06/07/24  1613   SODIUM  --  135   K  --  3.8   CL  --  96   CO2 26 29   AGAP  --  10   BUN  --  12   CREATININE  --  0.96   CALCIUM  --  9.1       Coags    Recent Labs     06/07/24  1346   INR 0.99   PTT 24        Additional Electrolytes  Recent Labs     06/07/24  1344   CAIONIZED 1.13          Blood Gas    No recent results  No recent results LFTs  Recent Labs     06/07/24  1613   *   *   ALKPHOS 104   ALB 4.5   TBILI 0.87       Infectious  Recent Labs     06/07/24  1346   PROCALCITONI 2.47*     Glucose  Recent Labs     06/07/24  1613   GLUC 121             Anticipated Length of Stay is > 2 midnights  SINDY Haynes

## 2024-06-07 NOTE — CONSULTS
Consultation - Stroke   Kirsty Em 74 y.o. female MRN: [id number]  Unit/Bed#: TR 30 Encounter: [id number]      Assessment & Plan     Altered mental status  Assessment & Plan  74 y.o.  female with COPD, stage 3 CKD, ?history of seizure disorder, tobacco dependence, anxiety, depression, HLD, who presented to [facility] on 6/7/24 after being found at home with AMS (found on floor with feces all over). A stroke alert was initiated by the ED team. BP on arrival 151/94. CTH revealed no acute intracranial abnormality. CTA H/N wwo contrast revealed no IR target. Pt was not a thrombolytic candidate due to being outside of thrombolytic time window.     Concern for seizure versus stroke versus encephalopathy from sepsis    Plan recommended:   - MRI brain wwo contrast   - give Keppra 1 g bolus x 1 on 6/7/24 (pt also on propofol)  - start Keppra 500 mg BID maintenance   - unable to take statin at this time with intubation   - can utilize  mg NJ if warranted   - Recommend the following labs: hemoglobin A1c, lipid panel  - euglycemic/normothermic  goal   - telemetry   - may need [employer] form completed   - seizure precautions   - PT/OT/ST when appropriate  - Frequent neuro checks. Continue to monitor and notify neurology with any changes.  - Medical management and supportive care per critical care team. Correction of any metabolic or infectious disturbances.                   Thrombolytic Decision: Patient not a candidate. Unclear time of onset outside appropriate time window.      Recommendations for outpatient neurological follow up have yet to be determined.    **History and physical examination obtained by attending neurologist. AP in room as witness to history and physical examination obtained and acted solely as a scribe for attending neurologist. This AP did not provide any decision making for this encounter.**        History of Present Illness     Reason for Consult / Principal Problem: Stroke alert   Hx and PE limited  "by: AMS, pt unable to provide reliable history, physical exam limited due to ED team preparing to intubate pt   Patient last known well: Per ED provider, last night  Stroke alert called: 1330 PM 6/7/24  Neurology time of arrival: 1331 PM 6/7/24  HPI: Kirsty Em is a 74 y.o.  female with COPD, stage 3 CKD, ?history of seizure disorder, tobacco dependence, anxiety, depression, HLD, who presented to [facility] on 6/7/24 after being found at home with AMS (found on floor with feces all over). A stroke alert was initiated by the ED team. BP on arrival 151/94. CTH revealed no acute intracranial abnormality. CTA H/N wwo contrast revealed no IR target. Pt was not a thrombolytic candidate due to being outside of thrombolytic time window.     Pt unable to provide reliable history due to AMS. Per ED provider, pt found on floor this morning with AMS and with feces all over. Per ED provider, LKW last night. No further history able to be obtained as ED team preparing to intubate patient.    No further history was available via chart review regarding \"history of seizures\" noted in chart. Pt not on any AED other than gabapentin as outpatient per chart review.     Consult to Neurology  Consult performed by: Ellyn Rojas PA-C  Consult ordered by: Ramez Chapman PA-C          Review of Systems   Unable to perform ROS: Mental status change       Historical Information   Past Medical History:   Diagnosis Date    Anxiety     Depression     Depression, recurrent (HCC) 4/20/2021    Hallucinations 4/20/2021     History reviewed. No pertinent surgical history.  Social History   Social History     Substance and Sexual Activity   Alcohol Use Not Currently     Social History     Substance and Sexual Activity   Drug Use Never     E-Cigarette/Vaping    E-Cigarette Use Never User      E-Cigarette/Vaping Substances    Nicotine No     THC No     CBD No     Flavoring No     Other No     Unknown No      Social History     Tobacco Use   Smoking " Status Every Day    Current packs/day: 0.50    Types: Cigarettes   Smokeless Tobacco Never     Family History:   Family History   Problem Relation Age of Onset    Diabetes Mother     Stroke Father     No Known Problems Sister     No Known Problems Sister     No Known Problems Sister     No Known Problems Daughter     No Known Problems Daughter     No Known Problems Daughter     No Known Problems Maternal Grandmother     No Known Problems Paternal Grandmother     No Known Problems Maternal Aunt     No Known Problems Maternal Aunt     No Known Problems Maternal Aunt     No Known Problems Maternal Aunt     No Known Problems Maternal Aunt     No Known Problems Maternal Aunt     No Known Problems Maternal Aunt     No Known Problems Maternal Aunt     No Known Problems Paternal Aunt     Breast cancer Neg Hx        Review of previous medical records was  completed.     Meds/Allergies   all current active meds have been reviewed, current meds:   Current Facility-Administered Medications   Medication Dose Route Frequency    cyanocobalamin injection 1,000 mcg  1,000 mcg Intramuscular Q30 Days    ketamine (Ketalar) injection 125 mg  2 mg/kg Intramuscular Once    lactated ringers infusion 1,000 mL  1,000 mL Intravenous Continuous    levETIRAcetam (KEPPRA) injection 1,500 mg  1,500 mg Intravenous Once    propofol (DIPRIVAN) 1000 mg in 100 mL infusion (premix)  5-50 mcg/kg/min Intravenous Titrated   , and PTA meds:   Prior to Admission Medications   Prescriptions Last Dose Informant Patient Reported? Taking?   ALPRAZolam (XANAX) 2 MG tablet   Yes No   Sig: Take 2 mg by mouth 4 (four) times a day   Multiple Vitamin (multivitamin) tablet   Yes No   Sig: Take 1 tablet by mouth daily   Omega-3 Fatty Acids (fish oil) 1,000 mg   Yes No   Sig: Take 1,000 mg by mouth daily   QUEtiapine (SEROquel) 100 mg tablet   Yes No   Sig: Take 100 mg by mouth every morning   QUEtiapine (SEROquel) 400 MG tablet   Yes No   Sig: Take 400 mg by mouth  daily at bedtime   ezetimibe (ZETIA) 10 mg tablet   No No   Sig: Take 1 tablet (10 mg total) by mouth daily   gabapentin (NEURONTIN) 300 mg capsule   Yes No   Sig: Take 300 mg by mouth 3 (three) times a day   methylPREDNISolone 4 MG tablet therapy pack   No No   Sig: Use as directed on package   vitamin B-12 (VITAMIN B-12) 1,000 mcg tablet   Yes No   Sig: Take by mouth daily      Facility-Administered Medications Last Administration Doses Remaining   cyanocobalamin injection 1,000 mcg 1/6/2023  3:41 PM           Allergies   Allergen Reactions    Lipitor [Atorvastatin] Other (See Comments)     Muscle aches       Objective   Vitals:Blood pressure (!) 239/118, pulse (!) 115, temperature (!) 101.5 °F (38.6 °C), temperature source Probe, resp. rate (!) 32, SpO2 97%.,There is no height or weight on file to calculate BMI.    Intake/Output Summary (Last 24 hours) at 6/7/2024 1449  Last data filed at 6/7/2024 1400  Gross per 24 hour   Intake 200 ml   Output --   Net 200 ml       Invasive Devices:   Invasive Devices       Peripheral Intravenous Line  Duration             Peripheral IV 06/07/24 Dorsal (posterior);Right Hand <1 day    Peripheral IV 06/07/24 Right;Ventral (anterior) Forearm <1 day              Airway  Duration             ETT  Oral 7.5 mm <1 day                    Physical Exam  Vitals and nursing note reviewed.   Cardiovascular:      Rate and Rhythm: Tachycardia present.   Neurological:      Mental Status: She is alert.       Neurologic Exam     Mental Status     - able to follow simple command  - dysarthric        Cranial Nerves     - L gaze deviation initially, improved after a few minutes   - R visual field cut with blink to threat testing      Motor Exam   - able to maintain antigravity with LUE x 10 seconds without drift  - initially when entered room, LUE stiffened and L hand clenched  - able to lift RLE antigravity   - unable to test LLE due to ED team intubating pt   - unable to test RUE due to ED team  intubating patient      Sensory Exam     - withdraws and grimaces to pain in BLE      Gait, Coordination, and Reflexes     Gait  Gait: (deferred for patient safety)    Reflexes   Right plantar: upgoing  Left plantar: upgoing      NIHSS:  1a.Level of Consciousness: 0 = Alert   1b. LOC Questions: 1 = Answers one correctly   1c. LOC Commands: 1 = Obeys one correctly   2. Best Gaze: 2 = Forced Deviation   3. Visual: 2 = Complete hemianopia   4. Facial Palsy: 0=Normal symmetric movement   5a. Motor Right Arm: UN = Untestable (amputation, fused joint)-- ED team preparing for intubation    5b. Motor Left Arm: 0=No drift, limb holds 90 (or 45) degrees for full 10 seconds   6a. Motor Right Leg: UN = Untestable (amputation, fused joint); ED team preparing for intubation    6b. Motor Left Le=Drift, limb holds 90 (or 45) degrees but drifts down before full 10 seconds: does not hit bed   7. Limb Ataxia:  UN = Untestable (amputation, fused joint); ED team prepring for intubation    8. Sensory: 1=Mild to moderate sensory loss; patient feels pinprick is less sharp or is dull on the affected side; there is a loss of superficial pain with pinprick but patient is aware She is being touched   9. Best Language:  0=No aphasia, normal (could not be assessed; ED team preparing for intubation)   10. Dysarthria: 1=Mild to moderate, patient slurs at least some words and at worst, can be understood with some difficulty   11. Extinction and Inattention (formerly Neglect): 0=No abnormality (could not be assessed, ED team preparing for intubation)   Total Score: 9     Time NIHSS was completed: 1341 PM 24    Modified [place] Score:  Unable to determine currently, will gather additional data    Lab Results: CBC:   Results from last 7 days   Lab Units 24  1346 24  1344   WBC Thousand/uL 21.54*  --    RBC Million/uL 4.51  --    HEMOGLOBIN g/dL 15.0  --    I STAT HEMOGLOBIN g/dl  --  16.3*   HEMATOCRIT % 43.5  --    HEMATOCRIT,  "ISTAT %  --  48*   MCV fL 97  --    PLATELETS Thousands/uL 370  --    , BMP/CMP:   Results from last 7 days   Lab Units 06/07/24  1344   CO2, I-STAT mmol/L 26   GLUCOSE, ISTAT mg/dl 199*   , Vitamin B12:   , HgBA1C:   , TSH:   , Coagulation:   Results from last 7 days   Lab Units 06/07/24  1346   INR  0.99   , Lipid Profile:   , Ammonia:   , Urinalysis:       Invalid input(s): \"URIBILINOGEN\", Drug Screen:   , Medication Drug Levels:       Invalid input(s): \"CARBAMAZEPINE\", \"OXCARBAZEPINE\"  Imaging Studies: Attending neurologist personally reviewed CTH and CTA H/N wwo contrast 6/7/24 reports, imaging in PACS, and personally discussed with radiologist.   EKG, Pathology, and Other Studies: Attending neurologist personally reviewed pertinent reports       Code Status: Prior  Advance Directive and Living Will:      Power of :    POLST:      Counseling / Coordination of Care  Total Critical Care time spent by attending neurologist 40 minutes excluding procedures, teaching and family updates.      This note was completed in part utilizing Dragon Software.  Grammatical errors, random word insertions, spelling mistakes, and incomplete sentences may be an occasional consequence of this system secondary to software limitations, ambient noise, and hardware issues.  If you have any questions or concerns about the content, text, or information contained within the body of this dictation, please contact the provider for clarification.         "

## 2024-06-07 NOTE — RESPIRATORY THERAPY NOTE
RT Ventilator Management Note  Kirsty Em 74 y.o. female MRN: [id number]  Unit/Bed#: TR 30 Encounter: [id number]      Daily Screen    No data found in the last 10 encounters.           Physical Exam:   Assessment Type: Assess only  General Appearance: Sedated  Respiratory Pattern: Assisted  Chest Assessment: Chest expansion symmetrical  Bilateral Breath Sounds: Diminished  Suction: ET Tube  O2 Device: vent      Resp Comments: Pt remains on full vent support at this time.  No changes made.  Pt moving to ICU.     06/07/24 1703   Respiratory Assessment   Assessment Type Assess only   General Appearance Sedated   Respiratory Pattern Assisted   Chest Assessment Chest expansion symmetrical   Bilateral Breath Sounds Diminished   Suction ET Tube   Resp Comments Pt remains on full vent support at this time.  No changes made.  Pt moving to ICU.   O2 Device vent   Vent Information   Vent ID [place]   Vent type Drager   Drager Vent Mode AC/VC+   $ Pulse Oximetry Spot Check Charge Completed   SpO2 94 %   AC/VC+ Settings   Resp Rate (BPM) 18 BPM   VT (mL) 350 mL   Insp Time (S) 0.83 S   FIO2 (%) 60 %   PEEP (cmH2O) 6 cmH2O   Rise Time (%) 20 %   Trigger Sensitivity Flow (LPM) 2 LPM   Humidification Heater   Heater Temp 98.6 °F (37 °C)   AC/VC+ Actuals   Resp Rate (BPM) 18 BPM   VT (mL) 420 mL   MV (Obs) 5.61   MAP (cmH2O) 9.7 cmH2O   Peak Pressure (cmH2O) 22 cmH2O   I:E Ratio (Obs) 1:3   Static Compliance (mL/cmH20) 43.1 mL/cmH2O   Plateau Pressure (cm H2O) 19.5 cm H2O   Heater Temperature (Obs) 98.6 °F (37 °C)   AC/VC+ ALARMS   High Peak Pressure (cmH2O) 30 cmH2O   High Resp Rate (BPM) 30 BPM   High MV (L/min) 12 L/min   Low MV (L/min) 3.5 L/min   High VT (mL) 750 mL   Maintenance   Alarm (pink) cable attached No   Resuscitation bag with peep valve at bedside Yes   Water bag changed No   Circuit changed No   ETT  Oral 7.5 mm   Placement Date/Time: 06/07/24 1342   Previously placed by?: Attending  Preoxygenated: Yes  Type: Oral   Tube Size: 7.5 mm  Location: Oral  Insertion: Atraumatic  Placement Verification: Auscultation;End tidal CO2;Chest x-ray  Secured at (cm): 23  Place...   Secured at (cm) 21   Measured from Gums   Secured Location (S)  Center   Repositioned (S)  Right to Center   Secured by Commercial tube odom   Site Condition Dry   Cuff Pressure (color) Green

## 2024-06-08 ENCOUNTER — APPOINTMENT (INPATIENT)
Dept: MRI IMAGING | Facility: HOSPITAL | Age: 74
DRG: 091 | End: 2024-06-08
Payer: MEDICARE

## 2024-06-08 ENCOUNTER — APPOINTMENT (INPATIENT)
Dept: RADIOLOGY | Facility: HOSPITAL | Age: 74
DRG: 091 | End: 2024-06-08
Payer: MEDICARE

## 2024-06-08 PROBLEM — I63.9 STROKE (CEREBRUM) (HCC): Status: ACTIVE | Noted: 2024-06-08

## 2024-06-08 LAB
ABO GROUP BLD: NORMAL
ABO GROUP BLD: NORMAL
ALBUMIN SERPL BCP-MCNC: 3.5 G/DL (ref 3.5–5)
ALP SERPL-CCNC: 72 U/L (ref 34–104)
ALT SERPL W P-5'-P-CCNC: 260 U/L (ref 7–52)
AMMONIA PLAS-SCNC: 33 UMOL/L (ref 18–72)
AMPHETAMINES SERPL QL SCN: NEGATIVE
ANION GAP SERPL CALCULATED.3IONS-SCNC: 9 MMOL/L (ref 4–13)
AST SERPL W P-5'-P-CCNC: 311 U/L (ref 13–39)
BARBITURATES UR QL: NEGATIVE
BASOPHILS # BLD AUTO: 0.04 THOUSANDS/ÂΜL (ref 0–0.1)
BASOPHILS NFR BLD AUTO: 0 % (ref 0–1)
BENZODIAZ UR QL: POSITIVE
BILIRUB SERPL-MCNC: 0.34 MG/DL (ref 0.2–1)
BLD GP AB SCN SERPL QL: NEGATIVE
BUN SERPL-MCNC: 15 MG/DL (ref 5–25)
CALCIUM SERPL-MCNC: 7.5 MG/DL (ref 8.4–10.2)
CHLORIDE SERPL-SCNC: 102 MMOL/L (ref 96–108)
CK SERPL-CCNC: ABNORMAL U/L (ref 26–192)
CO2 SERPL-SCNC: 26 MMOL/L (ref 21–32)
COCAINE UR QL: NEGATIVE
CREAT SERPL-MCNC: 0.93 MG/DL (ref 0.6–1.3)
EOSINOPHIL # BLD AUTO: 0.01 THOUSAND/ÂΜL (ref 0–0.61)
EOSINOPHIL NFR BLD AUTO: 0 % (ref 0–6)
ERYTHROCYTE [DISTWIDTH] IN BLOOD BY AUTOMATED COUNT: 13.3 % (ref 11.6–15.1)
FENTANYL UR QL SCN: NEGATIVE
GFR SERPL CREATININE-BSD FRML MDRD: 61 ML/MIN/1.73SQ M
GLUCOSE SERPL-MCNC: 117 MG/DL (ref 65–140)
GLUCOSE SERPL-MCNC: 97 MG/DL (ref 65–140)
HCT VFR BLD AUTO: 32.7 % (ref 34.8–46.1)
HGB BLD-MCNC: 11.4 G/DL (ref 11.5–15.4)
HYDROCODONE UR QL SCN: NEGATIVE
IMM GRANULOCYTES # BLD AUTO: 0.06 THOUSAND/UL (ref 0–0.2)
IMM GRANULOCYTES NFR BLD AUTO: 0 % (ref 0–2)
INR PPP: 1.03 (ref 0.84–1.19)
LYMPHOCYTES # BLD AUTO: 3.13 THOUSANDS/ÂΜL (ref 0.6–4.47)
LYMPHOCYTES NFR BLD AUTO: 19 % (ref 14–44)
MAGNESIUM SERPL-MCNC: 1.9 MG/DL (ref 1.9–2.7)
MCH RBC QN AUTO: 33.5 PG (ref 26.8–34.3)
MCHC RBC AUTO-ENTMCNC: 34.9 G/DL (ref 31.4–37.4)
MCV RBC AUTO: 96 FL (ref 82–98)
METHADONE UR QL: NEGATIVE
MONOCYTES # BLD AUTO: 1.58 THOUSAND/ÂΜL (ref 0.17–1.22)
MONOCYTES NFR BLD AUTO: 9 % (ref 4–12)
NEUTROPHILS # BLD AUTO: 11.95 THOUSANDS/ÂΜL (ref 1.85–7.62)
NEUTS SEG NFR BLD AUTO: 72 % (ref 43–75)
NRBC BLD AUTO-RTO: 0 /100 WBCS
OPIATES UR QL SCN: NEGATIVE
OXYCODONE+OXYMORPHONE UR QL SCN: NEGATIVE
PCP UR QL: NEGATIVE
PHOSPHATE SERPL-MCNC: 3.3 MG/DL (ref 2.3–4.1)
PLATELET # BLD AUTO: 269 THOUSANDS/UL (ref 149–390)
PMV BLD AUTO: 10 FL (ref 8.9–12.7)
POTASSIUM SERPL-SCNC: 3.1 MMOL/L (ref 3.5–5.3)
PROCALCITONIN SERPL-MCNC: 13.81 NG/ML
PROT SERPL-MCNC: 5.9 G/DL (ref 6.4–8.4)
PROTHROMBIN TIME: 14.1 SECONDS (ref 11.6–14.5)
RBC # BLD AUTO: 3.4 MILLION/UL (ref 3.81–5.12)
RH BLD: POSITIVE
RH BLD: POSITIVE
SODIUM SERPL-SCNC: 137 MMOL/L (ref 135–147)
SPECIMEN EXPIRATION DATE: NORMAL
THC UR QL: NEGATIVE
VANCOMYCIN SERPL-MCNC: 11.9 UG/ML (ref 10–20)
WBC # BLD AUTO: 16.77 THOUSAND/UL (ref 4.31–10.16)

## 2024-06-08 PROCEDURE — 80053 COMPREHEN METABOLIC PANEL: CPT | Performed by: NURSE PRACTITIONER

## 2024-06-08 PROCEDURE — 99222 1ST HOSP IP/OBS MODERATE 55: CPT | Performed by: PHYSICIAN ASSISTANT

## 2024-06-08 PROCEDURE — 73140 X-RAY EXAM OF FINGER(S): CPT

## 2024-06-08 PROCEDURE — 82550 ASSAY OF CK (CPK): CPT | Performed by: NURSE PRACTITIONER

## 2024-06-08 PROCEDURE — 99223 1ST HOSP IP/OBS HIGH 75: CPT | Performed by: STUDENT IN AN ORGANIZED HEALTH CARE EDUCATION/TRAINING PROGRAM

## 2024-06-08 PROCEDURE — 86901 BLOOD TYPING SEROLOGIC RH(D): CPT | Performed by: NURSE PRACTITIONER

## 2024-06-08 PROCEDURE — 99233 SBSQ HOSP IP/OBS HIGH 50: CPT | Performed by: PSYCHIATRY & NEUROLOGY

## 2024-06-08 PROCEDURE — A9585 GADOBUTROL INJECTION: HCPCS | Performed by: ANESTHESIOLOGY

## 2024-06-08 PROCEDURE — 85610 PROTHROMBIN TIME: CPT | Performed by: NURSE PRACTITIONER

## 2024-06-08 PROCEDURE — 84145 PROCALCITONIN (PCT): CPT | Performed by: NURSE PRACTITIONER

## 2024-06-08 PROCEDURE — 29125 APPL SHORT ARM SPLINT STATIC: CPT | Performed by: PHYSICIAN ASSISTANT

## 2024-06-08 PROCEDURE — 82948 REAGENT STRIP/BLOOD GLUCOSE: CPT

## 2024-06-08 PROCEDURE — 84100 ASSAY OF PHOSPHORUS: CPT | Performed by: NURSE PRACTITIONER

## 2024-06-08 PROCEDURE — 80202 ASSAY OF VANCOMYCIN: CPT

## 2024-06-08 PROCEDURE — 94760 N-INVAS EAR/PLS OXIMETRY 1: CPT

## 2024-06-08 PROCEDURE — 85025 COMPLETE CBC W/AUTO DIFF WBC: CPT | Performed by: NURSE PRACTITIONER

## 2024-06-08 PROCEDURE — 70553 MRI BRAIN STEM W/O & W/DYE: CPT

## 2024-06-08 PROCEDURE — 82140 ASSAY OF AMMONIA: CPT | Performed by: NURSE PRACTITIONER

## 2024-06-08 PROCEDURE — 83735 ASSAY OF MAGNESIUM: CPT | Performed by: NURSE PRACTITIONER

## 2024-06-08 PROCEDURE — 86900 BLOOD TYPING SEROLOGIC ABO: CPT | Performed by: NURSE PRACTITIONER

## 2024-06-08 PROCEDURE — 99291 CRITICAL CARE FIRST HOUR: CPT | Performed by: ANESTHESIOLOGY

## 2024-06-08 PROCEDURE — 86850 RBC ANTIBODY SCREEN: CPT | Performed by: NURSE PRACTITIONER

## 2024-06-08 RX ORDER — POTASSIUM CHLORIDE 20MEQ/15ML
40 LIQUID (ML) ORAL 3 TIMES DAILY
Status: DISCONTINUED | OUTPATIENT
Start: 2024-06-08 | End: 2024-06-09

## 2024-06-08 RX ORDER — ASPIRIN 81 MG/1
81 TABLET, CHEWABLE ORAL DAILY
Status: DISCONTINUED | OUTPATIENT
Start: 2024-06-09 | End: 2024-06-08

## 2024-06-08 RX ORDER — MIDAZOLAM HYDROCHLORIDE 2 MG/2ML
2 INJECTION, SOLUTION INTRAMUSCULAR; INTRAVENOUS 2 TIMES DAILY PRN
Status: DISCONTINUED | OUTPATIENT
Start: 2024-06-08 | End: 2024-06-09

## 2024-06-08 RX ORDER — MAGNESIUM SULFATE HEPTAHYDRATE 40 MG/ML
2 INJECTION, SOLUTION INTRAVENOUS ONCE
Status: COMPLETED | OUTPATIENT
Start: 2024-06-08 | End: 2024-06-08

## 2024-06-08 RX ORDER — VANCOMYCIN HYDROCHLORIDE 750 MG/150ML
750 INJECTION, SOLUTION INTRAVENOUS EVERY 12 HOURS
Status: DISCONTINUED | OUTPATIENT
Start: 2024-06-08 | End: 2024-06-09

## 2024-06-08 RX ORDER — DEXMEDETOMIDINE HYDROCHLORIDE 4 UG/ML
.1-1.5 INJECTION, SOLUTION INTRAVENOUS
Status: DISCONTINUED | OUTPATIENT
Start: 2024-06-08 | End: 2024-06-10

## 2024-06-08 RX ORDER — ASPIRIN 81 MG/1
81 TABLET, CHEWABLE ORAL DAILY
Status: DISCONTINUED | OUTPATIENT
Start: 2024-06-08 | End: 2024-06-17 | Stop reason: HOSPADM

## 2024-06-08 RX ORDER — HYDROMORPHONE HCL/PF 1 MG/ML
1 SYRINGE (ML) INJECTION ONCE
Status: COMPLETED | OUTPATIENT
Start: 2024-06-08 | End: 2024-06-08

## 2024-06-08 RX ORDER — ENOXAPARIN SODIUM 100 MG/ML
40 INJECTION SUBCUTANEOUS
Status: DISCONTINUED | OUTPATIENT
Start: 2024-06-08 | End: 2024-06-12

## 2024-06-08 RX ORDER — HYDROMORPHONE HCL/PF 1 MG/ML
1 SYRINGE (ML) INJECTION 2 TIMES DAILY PRN
Status: DISCONTINUED | OUTPATIENT
Start: 2024-06-08 | End: 2024-06-09

## 2024-06-08 RX ORDER — EZETIMIBE 10 MG/1
10 TABLET ORAL DAILY
Status: DISCONTINUED | OUTPATIENT
Start: 2024-06-08 | End: 2024-06-17 | Stop reason: HOSPADM

## 2024-06-08 RX ORDER — SODIUM CHLORIDE, SODIUM GLUCONATE, SODIUM ACETATE, POTASSIUM CHLORIDE, MAGNESIUM CHLORIDE, SODIUM PHOSPHATE, DIBASIC, AND POTASSIUM PHOSPHATE .53; .5; .37; .037; .03; .012; .00082 G/100ML; G/100ML; G/100ML; G/100ML; G/100ML; G/100ML; G/100ML
1000 INJECTION, SOLUTION INTRAVENOUS ONCE
Status: COMPLETED | OUTPATIENT
Start: 2024-06-08 | End: 2024-06-08

## 2024-06-08 RX ORDER — GADOBUTROL 604.72 MG/ML
6 INJECTION INTRAVENOUS
Status: COMPLETED | OUTPATIENT
Start: 2024-06-08 | End: 2024-06-08

## 2024-06-08 RX ORDER — EZETIMIBE 10 MG/1
10 TABLET ORAL DAILY
Status: DISCONTINUED | OUTPATIENT
Start: 2024-06-08 | End: 2024-06-08

## 2024-06-08 RX ORDER — ASPIRIN 300 MG/1
300 SUPPOSITORY RECTAL ONCE
Status: DISCONTINUED | OUTPATIENT
Start: 2024-06-08 | End: 2024-06-08

## 2024-06-08 RX ORDER — LEVETIRACETAM 100 MG/ML
750 SOLUTION ORAL EVERY 12 HOURS SCHEDULED
Status: DISCONTINUED | OUTPATIENT
Start: 2024-06-08 | End: 2024-06-09

## 2024-06-08 RX ORDER — ALBUTEROL SULFATE 2.5 MG/3ML
2.5 SOLUTION RESPIRATORY (INHALATION) EVERY 4 HOURS PRN
Status: DISCONTINUED | OUTPATIENT
Start: 2024-06-08 | End: 2024-06-14

## 2024-06-08 RX ORDER — ACETAMINOPHEN 160 MG/5ML
650 SUSPENSION ORAL EVERY 6 HOURS PRN
Status: DISCONTINUED | OUTPATIENT
Start: 2024-06-08 | End: 2024-06-09

## 2024-06-08 RX ADMIN — PROPOFOL 50 MCG/KG/MIN: 10 INJECTION, EMULSION INTRAVENOUS at 19:12

## 2024-06-08 RX ADMIN — GADOBUTROL 6 ML: 604.72 INJECTION INTRAVENOUS at 17:59

## 2024-06-08 RX ADMIN — FENTANYL CITRATE 50 MCG: 50 INJECTION INTRAMUSCULAR; INTRAVENOUS at 23:00

## 2024-06-08 RX ADMIN — AMPICILLIN SODIUM 2000 MG: 2 INJECTION, POWDER, FOR SOLUTION INTRAMUSCULAR; INTRAVENOUS at 00:31

## 2024-06-08 RX ADMIN — SODIUM CHLORIDE, SODIUM GLUCONATE, SODIUM ACETATE, POTASSIUM CHLORIDE, MAGNESIUM CHLORIDE, SODIUM PHOSPHATE, DIBASIC, AND POTASSIUM PHOSPHATE 1000 ML: .53; .5; .37; .037; .03; .012; .00082 INJECTION, SOLUTION INTRAVENOUS at 13:24

## 2024-06-08 RX ADMIN — SODIUM CHLORIDE, SODIUM LACTATE, POTASSIUM CHLORIDE, AND CALCIUM CHLORIDE 1000 ML: .6; .31; .03; .02 INJECTION, SOLUTION INTRAVENOUS at 03:21

## 2024-06-08 RX ADMIN — PROPOFOL 50 MCG/KG/MIN: 10 INJECTION, EMULSION INTRAVENOUS at 12:52

## 2024-06-08 RX ADMIN — DEXMEDETOMIDINE HYDROCHLORIDE 0.3 MCG/KG/HR: 400 INJECTION INTRAVENOUS at 13:24

## 2024-06-08 RX ADMIN — PROPOFOL 50 MCG/KG/MIN: 10 INJECTION, EMULSION INTRAVENOUS at 07:30

## 2024-06-08 RX ADMIN — ASPIRIN 81 MG: 81 TABLET, CHEWABLE ORAL at 15:04

## 2024-06-08 RX ADMIN — MAGNESIUM SULFATE HEPTAHYDRATE 2 G: 40 INJECTION, SOLUTION INTRAVENOUS at 07:57

## 2024-06-08 RX ADMIN — HYDROMORPHONE HYDROCHLORIDE 1 MG: 1 INJECTION, SOLUTION INTRAMUSCULAR; INTRAVENOUS; SUBCUTANEOUS at 16:45

## 2024-06-08 RX ADMIN — NICOTINE 14 MG: 14 PATCH, EXTENDED RELEASE TRANSDERMAL at 08:02

## 2024-06-08 RX ADMIN — FAMOTIDINE 20 MG: 10 INJECTION, SOLUTION INTRAVENOUS at 08:01

## 2024-06-08 RX ADMIN — HEPARIN SODIUM 5000 UNITS: 5000 INJECTION INTRAVENOUS; SUBCUTANEOUS at 05:06

## 2024-06-08 RX ADMIN — PROPOFOL 40 MCG/KG/MIN: 10 INJECTION, EMULSION INTRAVENOUS at 23:45

## 2024-06-08 RX ADMIN — CHLORHEXIDINE GLUCONATE 0.12% ORAL RINSE 15 ML: 1.2 LIQUID ORAL at 08:00

## 2024-06-08 RX ADMIN — Medication 75 MCG/HR: at 04:30

## 2024-06-08 RX ADMIN — CEFTRIAXONE SODIUM 2000 MG: 10 INJECTION, POWDER, FOR SOLUTION INTRAVENOUS at 06:01

## 2024-06-08 RX ADMIN — AMPICILLIN SODIUM 2000 MG: 2 INJECTION, POWDER, FOR SOLUTION INTRAMUSCULAR; INTRAVENOUS at 06:01

## 2024-06-08 RX ADMIN — MIDAZOLAM 2 MG: 1 INJECTION INTRAMUSCULAR; INTRAVENOUS at 08:59

## 2024-06-08 RX ADMIN — MIDAZOLAM 2 MG: 1 INJECTION INTRAMUSCULAR; INTRAVENOUS at 04:55

## 2024-06-08 RX ADMIN — CHLORHEXIDINE GLUCONATE 0.12% ORAL RINSE 15 ML: 1.2 LIQUID ORAL at 20:29

## 2024-06-08 RX ADMIN — ENOXAPARIN SODIUM 40 MG: 40 INJECTION SUBCUTANEOUS at 15:04

## 2024-06-08 RX ADMIN — SODIUM CHLORIDE, SODIUM GLUCONATE, SODIUM ACETATE, POTASSIUM CHLORIDE, MAGNESIUM CHLORIDE, SODIUM PHOSPHATE, DIBASIC, AND POTASSIUM PHOSPHATE 250 ML/HR: .53; .5; .37; .037; .03; .012; .00082 INJECTION, SOLUTION INTRAVENOUS at 22:11

## 2024-06-08 RX ADMIN — NYSTATIN 1 APPLICATION: 100000 POWDER TOPICAL at 08:03

## 2024-06-08 RX ADMIN — FENTANYL CITRATE 50 MCG: 50 INJECTION INTRAMUSCULAR; INTRAVENOUS at 08:05

## 2024-06-08 RX ADMIN — LEVETIRACETAM 750 MG: 100 INJECTION, SOLUTION INTRAVENOUS at 08:00

## 2024-06-08 RX ADMIN — POTASSIUM CHLORIDE 40 MEQ: 1.5 SOLUTION ORAL at 18:05

## 2024-06-08 RX ADMIN — ACYCLOVIR SODIUM 475 MG: 1000 INJECTION, SOLUTION INTRAVENOUS at 06:27

## 2024-06-08 RX ADMIN — PROPOFOL 50 MCG/KG/MIN: 10 INJECTION, EMULSION INTRAVENOUS at 02:34

## 2024-06-08 RX ADMIN — FENTANYL CITRATE 50 MCG: 50 INJECTION INTRAMUSCULAR; INTRAVENOUS at 00:34

## 2024-06-08 RX ADMIN — FENTANYL CITRATE 50 MCG: 50 INJECTION INTRAMUSCULAR; INTRAVENOUS at 12:52

## 2024-06-08 RX ADMIN — VANCOMYCIN HYDROCHLORIDE 750 MG: 750 INJECTION, SOLUTION INTRAVENOUS at 18:19

## 2024-06-08 RX ADMIN — POTASSIUM CHLORIDE 40 MEQ: 1.5 SOLUTION ORAL at 20:29

## 2024-06-08 RX ADMIN — POTASSIUM CHLORIDE 40 MEQ: 1.5 SOLUTION ORAL at 08:02

## 2024-06-08 RX ADMIN — NYSTATIN 1 APPLICATION: 100000 POWDER TOPICAL at 18:08

## 2024-06-08 RX ADMIN — HEPARIN SODIUM 5000 UNITS: 5000 INJECTION INTRAVENOUS; SUBCUTANEOUS at 13:24

## 2024-06-08 RX ADMIN — VANCOMYCIN HYDROCHLORIDE 1250 MG: 5 INJECTION, POWDER, LYOPHILIZED, FOR SOLUTION INTRAVENOUS at 06:27

## 2024-06-08 RX ADMIN — HYDROMORPHONE HYDROCHLORIDE 1 MG: 1 INJECTION, SOLUTION INTRAMUSCULAR; INTRAVENOUS; SUBCUTANEOUS at 14:34

## 2024-06-08 RX ADMIN — EZETIMIBE 10 MG: 10 TABLET ORAL at 15:04

## 2024-06-08 RX ADMIN — LEVETIRACETAM 750 MG: 100 SOLUTION ORAL at 20:28

## 2024-06-08 RX ADMIN — SODIUM CHLORIDE, SODIUM GLUCONATE, SODIUM ACETATE, POTASSIUM CHLORIDE, MAGNESIUM CHLORIDE, SODIUM PHOSPHATE, DIBASIC, AND POTASSIUM PHOSPHATE 125 ML/HR: .53; .5; .37; .037; .03; .012; .00082 INJECTION, SOLUTION INTRAVENOUS at 02:32

## 2024-06-08 RX ADMIN — ACYCLOVIR SODIUM 475 MG: 1000 INJECTION, SOLUTION INTRAVENOUS at 18:08

## 2024-06-08 RX ADMIN — Medication 75 MCG/HR: at 20:05

## 2024-06-08 NOTE — ASSESSMENT & PLAN NOTE
reports seizure two years ago, states he is uncertain movement was ever seizure  Patient reportedly self discontinued home Keppra  S/p 1500mg IV keppra in ED  Continue keppra 750 mg BID  EEG ordered  Appreciate neurology assistance

## 2024-06-08 NOTE — RESPIRATORY THERAPY NOTE
06/08/24 0824   Respiratory Assessment   Assessment Type Assess only   General Appearance Awake   Respiratory Pattern Assisted   Chest Assessment Chest expansion symmetrical   Resp Comments remains on full support, lowered O2 to 40%, bbs rhonchi, suctioned for thick clear secretions, attempted SBT with apnea ventilation, placed back on full settings, spo2 92%   O2 Device vent   Vent Information   Drager Vent Mode AC/VC+   $ Pulse Oximetry Spot Check Charge Completed   SpO2 92 %   AC/VC+ Settings   Resp Rate (BPM) 18 BPM   VT (mL) 350 mL   Insp Time (S) 0.83 S   FIO2 (%) 40 %   PEEP (cmH2O) 6 cmH2O   Humidification Heater   Heater Temp 98.6 °F (37 °C)   AC/VC+ Actuals   Resp Rate (BPM) 18 BPM   VT (mL) 392 mL   MV (Obs) 5.84   MAP (cmH2O) 11 cmH2O   Peak Pressure (cmH2O) 26 cmH2O   I:E Ratio (Obs) 1:3   Static Compliance (mL/cmH20) 33 mL/cmH2O   Plateau Pressure (cm H2O) 18 cm H2O   Heater Temperature (Obs) 98.6 °F (37 °C)   AC/VC+ ALARMS   High Peak Pressure (cmH2O) 35 cmH2O   High Resp Rate (BPM) 35 BPM   High MV (L/min) 12 L/min   Low MV (L/min) 3.5 L/min   High VT (mL) 750 mL   Maintenance   Alarm (pink) cable attached Yes   Resuscitation bag with peep valve at bedside Yes   Water bag changed No   Circuit changed No   Daily Screen   Patient safety screen outcome: Failed   Not Ready for Weaning due to: Underline problem not resolved   IHI Ventilator Associated Pneumonia Bundle   Daily Assessment of Readiness to Extubate Yes   Head of Bed Elevated HOB 20   ETT  Oral 7.5 mm   Placement Date/Time: 06/07/24 1342   Previously placed by?: Attending  Preoxygenated: Yes  Type: Oral  Tube Size: 7.5 mm  Location: Oral  Insertion: Atraumatic  Placement Verification: Auscultation;End tidal CO2;Chest x-ray  Secured at (cm): 23  Place...   Secured at (cm) 21   Measured from Gums   Secured Location Center   Repositioned Right to Center   Secured by Commercial tube odom   Site Condition Dry   Cuff Pressure (color) Green    HI-LO Suction  Continuous low suction   HI-LO Secretions Scant   HI-LO Intervention Patent     RT Ventilator Management Note  Kirsty Em 74 y.o. female MRN: [id number]  Unit/Bed#: ICU 11 Encounter: [id number]      Daily Screen         6/8/2024  0824             Patient safety screen outcome:: Failed    Not Ready for Weaning due to:: Underline problem not resolved              Physical Exam:   Assessment Type: Assess only  General Appearance: Awake  Respiratory Pattern: Assisted  Chest Assessment: Chest expansion symmetrical  Bilateral Breath Sounds: Diminished  Cough: None  Suction: ET Tube  O2 Device: vent      Resp Comments: remains on full support, lowered O2 to 40%, bbs rhonchi, suctioned for thick clear secretions, attempted SBT with apnea ventilation, placed back on full settings, spo2 92%

## 2024-06-08 NOTE — ASSESSMENT & PLAN NOTE
Current every day smoker  Noted COPD exacerbation several years ago  Currently dose not appear to be in an acute exacerbation  Hold on steroids acutely  Mechanical Ventilation as above

## 2024-06-08 NOTE — ASSESSMENT & PLAN NOTE
74 YOF with PMHx of DOMINGO on benzodiazepines, COPD, HLD presenting to [facility] ED 6/7 afternoon after being found by  covered in feces at their home  Found down for unknown quantity of time with rhabdomyolysis  Symptoms including asphasia, contracture of RUE and RLE, weakness of left side  Stroke alert called  CTH negative  CTA with b/l proximal carotid stenosis 60-75%  For concern for seizure activity, s/p 1.5g of IV keppra, 15mg IV versed, 1mg IV ativan, started on propofol infusion given need for mechanical intubation  S/p lumbar puncture in setting of high fever, regidity  Of note with home regimen of Xanax, Seroquel, with recent emotional stressor at home with unknown pill counts    Differential includes viral meningitis vs NMS vs serotonin syndrome vs sepsis, neurology favoring seizures.  Left frontal stroke noted on MRI 6/8  Continue Keppra 500mg BID per neurology recommendations  EEG ordered and pending  Appreciate neurology assistance  Serial neuro exams  Given high benzo requirement, continue versed infusion given persistent agitation

## 2024-06-08 NOTE — ASSESSMENT & PLAN NOTE
POA- tachycardia, tachypnea, leukocytosis, EOD with intubation  Suspect meningitis vs nms vs serotonin syndrome vs PNA with ARHF  Febrile to 104.4 on arrival  Lactic 8.4->3.4  Initial PCL elevated  CXR unremarkable  CTH negative  CT- bladder distention  UA unremarkable  BC pending  F/u LP results  For now, ppx with menigitis coverage- cefepime, vanco, acyclovir  Follow WBC and fever curve

## 2024-06-08 NOTE — CONSULTS
Consultation - Infectious Disease   Kirsty Em 74 y.o. female MRN: [id number]  Unit/Bed#: ICU 11 Encounter: [id number]      IMPRESSION & RECOMMENDATIONS:     1. SIRS. Fever, tachynea, leukocytosis, lactic acidosis, rhabdomyolysis. Patient found done by her  at home. She has a history of epilepsy but stopped her AEDs.  Concern for seizure by neurology on admission.  No evidence of stroke on CTA/CT head. CT C/A/P without infectious source. Status post LP 6/7 which showed 6 WBC, glucose 138, protein 49, RBCs. ME panel negative and gram stain without polys or bacteria. Fever and lab/CSF abnormalities may all be related to seizures. CSF not consistent with bacterial meningitis. Consideration for viral meningitis.   -for now, continue renally dose adjusted IV acyclovir, vancomycin   -Continue IV ceftriaxone but decreased from CNS dosing to 2 g every 24 hours   -Stop IV ampicillin   -Follow-up blood cultures and CSF cultures   -If blood cultures negative for 48 hours, recommend stopping vancomycin and ceftriaxone   -Follow-up MRI brain   -Repeat CBC tomorrow to monitor treatment response   -Monitor fever curve    2. Acute metabolic encephalopathy. Patient found down by her  at home covered in feces. CTH and CTA without evidence of stroke. Neurology concerned for seizure since patient stopped AEDs. Seizure alone can cause CPK elevation, lactic acidosis, fever.  There was also some concern for NMS versus serotonin syndrome.  Status post LP 6/7, CSF studies not consistent with bacterial meningitis, less concerning for viral etiology although could consider early viral meningitis. ME panel and CSF gram stain negative. Patient intubated for airway protection.   -antimicrobials as above   -follow up MRI brain   -follow up blood and CSF cultures   -consider repeat LP in a few days to recheck HSV PCR (can be negative early in infection)    3.  Acute hypoxic respiratory failure.  Patient intubated for hypoxemia and  encephalopathy.  Remains intubated at this time.  Ventilator management per ICU.    4.  History of seizure disorder.  Patient with possible seizure 2 years ago and self discontinued AEDs.   -Neurology following   -Follow-up EEG    5.  Rhabdomyolysis.  CPK elevated to 12K today.  Likely due to seizure, prolonged downtime.   -IVF per critical care   -Monitor CPK    6. Transaminitis. May be due to rhabdo, medications, sezure. CT with hepatomegaly and diffuse hepatic steatosis. Monitor LFTs    7. Right thumb fracture. In setting of seizure activty. Orthopedic surgery consulted.    I have discussed the above management plan to continue Vancomycin, Acycylovir, and Ceftriaxone with the critical care AP who is in agreement. ID will follow.    I have performed an extensive review of the medical records in Epic including review of the notes, radiographs, and laboratory results     HISTORY OF PRESENT ILLNESS:  Reason for Consult:   HPI: Kirsty Em is a 74 y.o. woman with a history of anxiety, depression, tobacco abuse, COPD, seizures, CKD stage III who was brought to the [facility] ED after being found down by her .  The patient was seen the evening prior and was found in the morning unresponsive on the floor incontinent of stool.  Per report she had stopped taking her AEDs.  On arrival in the ED stroke alert was called.  The patient was febrile to 104.4 with tachycardia, tachypnea, leukocytosis, lactic acidosis, CPL elevation.  She was noted by neurology to have evidence of left gaze deviation with stiffening of the left upper extremity.  Concern for seizure.  CT head and CTA head and neck did not show any evidence of infarct or hemorrhage.  CT C/A/P without acute infectious abnormalities.  The patient underwent LP which showed 6 WBC, glucose 138, protein 49, RBCs. ME panel negative and gram stain without polys or bacteria. She remains intubated, sedated but is agitated. ID is consulted for further management.      REVIEW OF SYSTEMS:  A complete review of systems is negative other than that noted in the HPI.    PAST MEDICAL HISTORY:  Past Medical History:   Diagnosis Date    Anxiety     Depression     Depression, recurrent (HCC) 2021    Hallucinations 2021     History reviewed. No pertinent surgical history.    FAMILY HISTORY:  Non-contributory    SOCIAL HISTORY:  Social History   Social History     Substance and Sexual Activity   Alcohol Use Not Currently     Social History     Substance and Sexual Activity   Drug Use Never     Social History     Tobacco Use   Smoking Status Every Day    Current packs/day: 0.50    Types: Cigarettes   Smokeless Tobacco Never       ALLERGIES:  Allergies   Allergen Reactions    Lipitor [Atorvastatin] Other (See Comments)     Muscle aches       MEDICATIONS:  All current active medications have been reviewed.    PHYSICAL EXAM:  Temp:  [97.3 °F (36.3 °C)-104.4 °F (40.2 °C)] 98.1 °F (36.7 °C)  HR:  [] 79  Resp:  [13-37] 20  BP: ()/() 116/62  SpO2:  [91 %-99 %] 97 %  Temp (24hrs), Av.9 °F (38.3 °C), Min:97.3 °F (36.3 °C), Max:104.4 °F (40.2 °C)  Current: Temperature: 98.1 °F (36.7 °C)    Intake/Output Summary (Last 24 hours) at 2024 1220  Last data filed at 2024 0600  Gross per 24 hour   Intake 5324.53 ml   Output 1250 ml   Net 4074.53 ml       General Appearance:  Intubated, sedated but still appears agitated, writhing in bed   Head:  Normocephalic, without obvious abnormality, atraumatic   Eyes:  Conjunctiva pink and sclera anicteric, both eyes   Nose: Nares normal, mucosa normal, no drainage   Throat: Oropharynx moist without lesions   Neck: No nuchal rigidity   Back:   Symmetric, no curvature, ROM normal, no CVA tenderness   Lungs:   Transmitted breath sounds from the ventilator   Chest Wall:  No tenderness or deformity   Heart:  RRR; no murmur, rub or gallop   Abdomen:   Soft, non-tender, non-distended, positive bowel sounds    Extremities: No  cyanosis, clubbing or edema   Skin: No rashes or lesions. No draining wounds noted.   Lymph nodes: Cervical, supraclavicular nodes normal   Neurologic: Intubated, agitated       LABS, IMAGING, & OTHER STUDIES:  Lab Results:  I have personally reviewed pertinent labs.  Results from last 7 days   Lab Units 06/08/24  0452 06/07/24  1346 06/07/24  1344   WBC Thousand/uL 16.77* 21.54*  --    HEMOGLOBIN g/dL 11.4* 15.0  --    I STAT HEMOGLOBIN g/dl  --   --  16.3*   PLATELETS Thousands/uL 269 370  --      Results from last 7 days   Lab Units 06/08/24  0452 06/07/24  1613 06/07/24  1613 06/07/24  1344   SODIUM mmol/L 137  --  135  --    POTASSIUM mmol/L 3.1*   < > 3.8  --    CHLORIDE mmol/L 102   < > 96  --    CO2 mmol/L 26   < > 29  --    CO2, I-STAT mmol/L  --   --   --  26   BUN mg/dL 15   < > 12  --    CREATININE mg/dL 0.93   < > 0.96  --    EGFR ml/min/1.73sq m 61   < > 59  --    GLUCOSE, ISTAT mg/dl  --   --   --  199*   CALCIUM mg/dL 7.5*   < > 9.1  --    AST U/L 311*  --  395*  --    ALT U/L 260*   < > 379*  --    ALK PHOS U/L 72   < > 104  --     < > = values in this interval not displayed.     Results from last 7 days   Lab Units 06/07/24  1639 06/07/24  1315 06/07/24  1310   BLOOD CULTURE   --  Received in Microbiology Lab. Culture in Progress. Received in Microbiology Lab. Culture in Progress.   GRAM STAIN RESULT  No No Polys or Bacteria seen  --   --      Results from last 7 days   Lab Units 06/08/24  0452 06/07/24  1346   PROCALCITONIN ng/ml 13.81* 2.47*                   Imaging Studies:   I have personally reviewed pertinent imaging study reports and images in PACS.    CT C/A/P with distention of the bladder, no acute infectious abnormalities

## 2024-06-08 NOTE — PLAN OF CARE

## 2024-06-08 NOTE — PROGRESS NOTES
[facility]  Progress Note  Name: Kirsty Em I  MRN: [id number]  Unit/Bed#: ICU 11 I Date of Admission: 6/7/2024   Date of Service: 6/8/2024 I Hospital Day: 1    Assessment & Plan   * Acute metabolic encephalopathy  Assessment & Plan  74 YOF with PMHx of DOMINGO on benzodiazepines, COPD, HLD presenting to [facility] ED 6/7 afternoon after being found by  covered in feces at their home  Found down for unknown quantity of time with rhabdomyolysis  Symptoms including asphasia, contracture of RUE and RLE, weakness of left side  Stroke alert called  CTH negative  CTA with b/l proximal carotid stenosis 60-75%  For concern for seizure activity, s/p 1.5g of IV keppra, 15mg IV versed, 1mg IV ativan, started on propofol infusion given need for mechanical intubation  S/p lumbar puncture in setting of high fever, regidity  Of note with home regimen of Xanax, Seroquel, with recent emotional stressor at home with unknown pill counts    Differential includes viral meningitis vs NMS vs serotonin syndrome vs sepsis  Meningitis prophylaxis ordered  Follow LP results  Continue Keppra 1g BID  EEG ordered and pending  Appreciate neurology assistance  Serial neuro exams  Given high benzo requirement, continue versed infusion given persistent agitation    Severe sepsis (HCC)  Assessment & Plan  POA- tachycardia, tachypnea, leukocytosis, EOD with intubation  Suspect meningitis vs nms vs serotonin syndrome vs PNA with ARHF  Febrile to 104.4 on arrival  Lactic 8.4->3.4  Initial PCL elevated  CXR unremarkable  CTH negative  CT- bladder distention  UA unremarkable  BC pending  F/u LP results  For now, ppx with menigitis coverage- ampicillin, cefepime, vanco, acyclovir  Follow WBC and fever curve    Acute respiratory failure with hypoxia (HCC)  Assessment & Plan  Reported hypoxia baljinder-worsening altered mental status  S/p intubation, received on mechanical ventilator  CXR consistent with chronic changes  Currently on ACVC  18/350/60/6+  Wean as able to maintain spo2 >92%  VAP Bundle    History of seizure disorder  Assessment & Plan   reports seizure two years ago, states he is uncertain movement was ever seizure  Patient reportedly self discontinued home Keppra  S/p 1500mg IV keppra in ED  Continue keppra 500 mg BID  EEG ordered  Appreciate neurology assistance    Lactic acidosis-resolved as of 6/7/2024  Assessment & Plan  8.4 on arrival, since improving to 3.4  S/p IVF   Possibly in setting of seizures vs severe sepsis  Trend lactate to clear  Plan as above    Rhabdomyolysis  Assessment & Plan  CK noted to be 1300 on arrival  Found down at home for unknown quantity of time  Continue aggressive IVF administration  Follow up CK in AM    Elevated transaminase level  Assessment & Plan  In setting of unknown downtime, elevated CK  CT with evidence of hepatomegaly, hepatic steatosis  F/u transaminases in AM  Consider RUQUS if worsening    Chronic obstructive pulmonary disease, unspecified COPD type (HCC)  Assessment & Plan  Current every day smoker  Noted COPD exacerbation several years ago  Currently dose not appear to be in an acute exacerbation  Hold on steroids acutely  Mechanical Ventilation as above    Tobacco dependence  Assessment & Plan  Current 1ppd smoker  Advise cessation when appropriate  NRT    Anxiety and depression  Assessment & Plan  Home regimen of Xanax, seroquel  No pill counts available, uncertain of overdose in setting of emotional stressor  Hold regimen acutely    Closed displaced fracture of proximal phalanx of right thumb  Assessment & Plan  Reduced and splinted by Dr. Mercer   Orthopedics consultation               Disposition: Critical care    ICU Core Measures     Vented Patient  VAP Bundle  VAP bundle ordered     A: Assess, Prevent, and Manage Pain Has pain been assessed? Yes  Need for changes to pain regimen? No   B: Both Spontaneous Awakening Trials (SATs) and Spontaneous Breathing Trials (SBTs)  Plan to perform spontaneous awakening trial today? Yes   Plan to perform spontaneous breathing trial today? Yes   Obvious barriers to extubation? No   C: Choice of Sedation RASS Goal: -2 Light Sedation or 0 Alert and Calm  Need for changes to sedation or analgesia regimen? No   D: Delirium CAM-ICU: Unable to perform secondary to Acute cognitive dysfunction   E: Early Mobility  Plan for early mobility? No   F: Family Engagement Plan for family engagement today? Yes       Antibiotic Review: Continue broad spectrum secondary to severity of illness.  and Infectious disease consulted    Review of Invasive Devices:    Blair Plan: Continue for accurate I/O monitoring for 48 hours        Prophylaxis:  VTE VTE covered by:  heparin (porcine), Subcutaneous, 5,000 Units at 06/07/24 2144       Stress Ulcer  covered byFamotidine (PF) (PEPCID) injection 20 mg [[id number]]         Significant 24hr Events     24hr events: Admitted last evening with severe sepsis and altered mental status requiring intubation and mechanical ventilation.  Patient without evidence for observable seizure activity overnight.  Hemodynamically stable.  Will attempt SBT trial as tolerated this morning     Subjective        Objective                            Vitals I/O      Most Recent Min/Max in 24hrs   Temp 98.8 °F (37.1 °C) Temp  Min: 98.8 °F (37.1 °C)  Max: 104.4 °F (40.2 °C)   Pulse 65 Pulse  Min: 65  Max: 135   Resp 18 Resp  Min: 13  Max: 37   BP (!) 87/54 BP  Min: 68/40  Max: 239/118   O2 Sat 98 % SpO2  Min: 91 %  Max: 99 %      Intake/Output Summary (Last 24 hours) at 6/8/2024 0506  Last data filed at 6/8/2024 0322  Gross per 24 hour   Intake 3710.13 ml   Output 1000 ml   Net 2710.13 ml       Diet NPO    Invasive Monitoring           Physical Exam   Physical Exam  Vitals and nursing note reviewed.   Eyes:      General: No scleral icterus.     Extraocular Movements: Extraocular movements intact.      Conjunctiva/sclera: Conjunctivae normal.   Skin:      General: Skin is warm and dry.      Capillary Refill: Capillary refill takes less than 2 seconds.   HENT:      Head: Normocephalic and atraumatic.      Mouth/Throat:      Mouth: Mucous membranes are moist.   Neck:      Vascular: No JVD.   Cardiovascular:      Rate and Rhythm: Normal rate and regular rhythm.      Pulses: Normal pulses.      Heart sounds: Normal heart sounds.   Musculoskeletal:         General: Deformity and signs of injury present. Normal range of motion.   Abdominal: General: Bowel sounds are normal. There is no distension.      Palpations: Abdomen is soft.      Tenderness: There is no abdominal tenderness. There is no guarding.   Constitutional:       General: She is not in acute distress.     Appearance: She is ill-appearing.      Interventions: She is sedated, intubated and restrained.   Pulmonary:      Effort: No accessory muscle usage, respiratory distress or accessory muscle usage. She is intubated.      Breath sounds: Rhonchi present.   Secretions are normal.Chest:      Chest wall: No tenderness.   Neurological:        Corneal reflex present, cough reflex and gag reflex intact.      Comments: Intubated and mechanically ventilated  Eyes open to voice  Follows commands   Genitourinary/Anorectal:  Pinto present.          Diagnostic Studies      EKG: Sinus rhythm  Imaging:  I have personally reviewed pertinent reports.   and I have personally reviewed pertinent films in PACS     Medications:  Scheduled PRN   acyclovir, 10 mg/kg (Ideal), Q12H  ampicillin, 2,000 mg, Q6H  cefTRIAXone, 2,000 mg, Q12H  chlorhexidine, 15 mL, Q12H JERMAINE  famotidine, 20 mg, Q24H JERMAINE  heparin (porcine), 5,000 Units, Q8H JERMAINE  lactated ringers, 1,000 mL, Once  levETIRAcetam, 750 mg, Q12H JERMAINE  nicotine, 14 mg, Daily  nystatin, , BID  vancomycin, 1,250 mg, Q24H      acetaminophen, 1,000 mg, Q6H PRN  fentaNYL, 50 mcg, Q1H PRN  midazolam, 2 mg, Q4H PRN       Continuous    fentaNYL, 75 mcg/hr, Last Rate: 75 mcg/hr (06/08/24  0430)  midazolam, 1 mg/hr, Last Rate: 1 mg/hr (06/07/24 2008)  multi-electrolyte, 125 mL/hr, Last Rate: 125 mL/hr (06/08/24 0232)  propofol, 5-50 mcg/kg/min, Last Rate: 50 mcg/kg/min (06/08/24 0234)         Labs:    CBC    Recent Labs     06/07/24  1344 06/07/24  1346   WBC  --  21.54*   HGB 16.3* 15.0   HCT 48* 43.5   PLT  --  370     BMP    Recent Labs     06/07/24  1344 06/07/24  1613   SODIUM  --  135   K  --  3.8   CL  --  96   CO2 26 29   AGAP  --  10   BUN  --  12   CREATININE  --  0.96   CALCIUM  --  9.1       Coags    Recent Labs     06/07/24  1346   INR 0.99   PTT 24        Additional Electrolytes  Recent Labs     06/07/24  1344   CAIONIZED 1.13          Blood Gas    No recent results  No recent results LFTs  Recent Labs     06/07/24  1613   *   *   ALKPHOS 104   ALB 4.5   TBILI 0.87       Infectious  Recent Labs     06/07/24  1346   PROCALCITONI 2.47*     Glucose  Recent Labs     06/07/24  1613   GLUC 121               SINDY Haynes

## 2024-06-08 NOTE — CASE MANAGEMENT
Case Management Assessment & Discharge Planning Note    Patient name Kirsty Em  Location ICU 11/ICU 11 MRN [id number]  : 1950 Date 2024       Current Admission Date: 2024  Current Admission Diagnosis:Acute metabolic encephalopathy   Patient Active Problem List    Diagnosis Date Noted Date Diagnosed    Acute metabolic encephalopathy 2024     Elevated transaminase level 2024     Rhabdomyolysis 2024     Severe sepsis (HCC) 2024     Closed displaced fracture of proximal phalanx of right thumb 2024     Mixed hyperlipidemia 2024     Chronic obstructive pulmonary disease, unspecified COPD type (HCC) 2023     Acute respiratory failure with hypoxia (HCC) 2022     Anxiety and depression 2021     History of seizure disorder 2021     Tobacco dependence 2021       LOS (days): 1  Geometric Mean LOS (GMLOS) (days):   Days to GMLOS:     OBJECTIVE:    Risk of Unplanned Readmission Score: 19.79         Current admission status: Inpatient       Preferred Pharmacy:   [facility]/pharmacy #[id number] - [place], [place] - [address]  [address]  [place] [address]  Phone: [phone] Fax: [phone]    Primary Care Provider: Brien Desouza MD    Primary Insurance: MEDICARE  Secondary Insurance: BLUE CROSS    ASSESSMENT:  Active Health Care Proxies       Wilver Em [facility] Care Representative - Spouse   Primary Phone: [phone] (Mobile)  Home Phone: [phone]                 Advance Directives  Does patient have a Health Care POA?: No  Was patient offered paperwork?: No (pt is sedated)  Does patient currently have a Health Care decision maker?: Yes, please see Health Care Proxy section  Does patient have Advance Directives?: No  Was patient offered paperwork?: No (pt is sedated)  Primary Contact:          Readmission Root Cause  30 Day Readmission: No    Patient Information  Admitted from:: Home  Mental Status: Sedated  During  Assessment patient was accompanied by: Spouse  Assessment information provided by:: Spouse  Primary Caregiver: Self  Support Systems: Spouse/significant other  County of Residence: [place]  What city do you live in?: [place]  Home entry access options. Select all that apply.: Stairs  Number of steps to enter home.: 4  Type of Current Residence: 2 story home  Upon entering residence, is there a bedroom on the main floor (no further steps)?: No  A bedroom is located on the following floor levels of residence (select all that apply):: 2nd Floor  Upon entering residence, is there a bathroom on the main floor (no further steps)?: No  Indicate which floors of current residence have a bathroom (select all the apply):: 2nd Floor  Number of steps to 2nd floor from main floor: One Flight  Living Arrangements: Lives w/ Spouse/significant other  Is patient a ?: No    Activities of Daily Living Prior to Admission  Functional Status: Independent  Completes ADLs independently?: Yes  Ambulates independently?: Yes  Does patient use assisted devices?: No  Does patient currently own DME?: No  Does patient have a history of Outpatient Therapy (PT/OT)?: No  Does the patient have a history of Short-Term Rehab?: No  Does patient have a history of HHC?: No  Does patient currently have HHC?: No         Patient Information Continued  Income Source: Unemployed  Does patient have prescription coverage?: Yes  Does patient receive dialysis treatments?: No  Does patient have a history of substance abuse?: No  Does patient have a history of Mental Health Diagnosis?: Yes (Depression)  Is patient receiving treatment for mental health?: Yes  Has patient received inpatient treatment related to mental health in the last 2 years?: No    PHQ 2/9 Screening   Reviewed PHQ 2/9 Depression Screening Score?: No    Means of Transportation  Means of Transport to Appts:: Family transport      Social Determinants of Health (SDOH)      Flowsheet Row Most  Recent Value   Housing Stability    In the last 12 months, was there a time when you were not able to pay the mortgage or rent on time? N   In the past 12 months, how many times have you moved where you were living? 0   At any time in the past 12 months, were you homeless or living in a shelter (including now)? N   Transportation Needs    In the past 12 months, has lack of transportation kept you from medical appointments or from getting medications? no   In the past 12 months, has lack of transportation kept you from meetings, work, or from getting things needed for daily living? No   Food Insecurity    Within the past 12 months, you worried that your food would run out before you got the money to buy more. Never true   Within the past 12 months, the food you bought just didn't last and you didn't have money to get more. Never true   Utilities    In the past 12 months has the electric, gas, oil, or water company threatened to shut off services in your home? No            DISCHARGE DETAILS:    Discharge planning discussed with:: Patients  in person  Freedom of Choice: Yes  Comments - Freedom of Choice: CM discussed freedom of choice as it pertains to discharge planning. Patients unable to answer the needs of patient as he prefers she makes the decisions when she is Alert.  CM contacted family/caregiver?: Yes  Were Treatment Team discharge recommendations reviewed with patient/caregiver?: Yes  Did patient/caregiver verbalize understanding of patient care needs?: Yes  Were patient/caregiver advised of the risks associated with not following Treatment Team discharge recommendations?: Yes    Contacts  Patient Contacts: Wilver Em  Contact Method: In Person  Reason/Outcome: Discharge Planning    Requested Home Health Care         Is the patient interested in HHC at discharge?: No    DME Referral Provided  Referral made for DME?: No    Other Referral/Resources/Interventions Provided:  Referral Comments: TBD based  on conversation with patient.    Would you like to participate in our [facility] service program?  : No - Declined       Discharge Destination Plan:: Other (TBD)  Transport at Discharge : Other (Comment) (TBD)

## 2024-06-08 NOTE — ASSESSMENT & PLAN NOTE
Reduced and splinted by Dr. Mercer emergency room  Appreciate no other orthopedics consultation  Splinted.  For outpatient follow-up

## 2024-06-08 NOTE — PROGRESS NOTES
Progress Note - Neurology   Kirsty Em 74 y.o. female MRN: [id number]  Unit/Bed#: ICU 11 Encounter: [id number]      Assessment & Plan     * Acute metabolic encephalopathy  Assessment & Plan  74 y.o.  female with COPD, stage 3 CKD, ?history of seizure disorder, tobacco dependence, anxiety, depression, HLD, who presented to [facility] on 6/7/24 after being found at home with AMS (found on floor with feces all over). A stroke alert was initiated by the ED team. BP on arrival 151/94. CTH revealed no acute intracranial abnormality. CTA H/N wwo contrast revealed no IR target. Pt was not a thrombolytic candidate due to being outside of thrombolytic time window. Initial neuro exam with stiffening of L UE and Leftward gaze deviation.    Concern for seizure in terms of initial presentation (potentially in the setting of benzo/antipsychotic withdrawal).    6/8: Examined on sedation but eyes open, tracking, spontaneously moving extremities (although not purposely/nonrhythmic); not following commands.  No clinical seizure activity in the interim per critical care/nursing. Awaiting MRI brain/EEG.     Neuroimaging:  -CT head: No acute intracranial pathology  -CTA head/neck: No LVO, bilateral proximal ICA stenosis, moderate left common carotid stenosis    Plan:   - MRI brain wwo contrast pending  - Discussed with attending: Will obtain routine EEG  - iven Keppra 1500 mg bolus on 6/7/24 (pt kemar on propofol)  - Start Keppra 750 mg BID maintenance  -Can check Keppra level tonight/tomorrow  - CSF analysis performed: protein 49, WBC 6, negative ME panel (culture/gram stain pending)  - aspirin 81 mg daily  - euglycemic/normothermic goals   - telemetry   - may need [employer] form completed   - seizure precautions   - PT/OT/ST when appropriate  - Frequent neuro checks. Continue to monitor and notify neurology with any changes.  - Medical management and supportive care per critical care team. Correction of any metabolic or infectious disturbances.  "    Discussed plan of care with attending neurologist.    Recommendations for outpatient neurological follow up have yet to be determined.    Subjective:   Patient  resting in bed, remains intubated on sedation.  Patient's significant other at bedside.  Discussed with critical care nursing, becomes quite agitated and \"thrashing\" when sedation held.  Per nursing no clinical seizure activity in the interim since evaluation yesterday.     Vitals: Blood pressure 116/62, pulse 79, temperature 98.1 °F (36.7 °C), resp. rate 20, height 5' 1\" (1.549 m), weight 62 kg (136 lb 11 oz), SpO2 97%.,Body mass index is 25.83 kg/m².    Examined alongside Dr. Gibbons    Physical Exam:  Physical Exam  Constitutional:       Comments: Encephalopathic, but does open eyes to verbal stimuli.   Eyes:      Extraocular Movements: Extraocular movements intact and EOM normal.      Conjunctiva/sclera: Conjunctivae normal.      Pupils: Pupils are equal, round, and reactive to light.   Pulmonary:      Comments: Intubated on vent  Abdominal:      General: There is no distension.   Musculoskeletal:      Cervical back: Normal range of motion and neck supple.   Skin:     General: Skin is warm and dry.        Neurologic Exam     Mental Status   Examined on sedation, remains intubated.  Does open eyes to verbal stimuli, tracks examiner on both sides of the bed, is able to mouth simple phrases to her  such as \"I love you\".  Does not follow commands.  Not purposely moving extremities throughout.     Cranial Nerves     CN II   Visual fields full to confrontation.     CN III, IV, VI   Pupils are equal, round, and reactive to light.  Extraocular motions are normal.     CN V   Facial sensation intact.     CN VII   Facial expression full, symmetric.     CN VIII   CN VIII normal.     CN IX, X   CN IX normal.   CN X normal.     CN XI   CN XI normal.     CN XII   CN XII normal.   Limited exam with encephalopathy/sedation and intubation.  Full appearing EOMs " within limitations of exam, blink to threat present.  No obvious facial asymmetry.     Motor Exam Moving extremities throughout without clear laterality, although limited exam given intubation and encephalopathy.  Withdraws throughout to nailbed pressure     Sensory Exam   Grimace/withdrawal throughout to noxious stimuli       Gait, Coordination, and Reflexes   No clinical seizure activity observed throughout exam.  Gait/coordination cannot be assessed.          Lab, Imaging and other studies:   CT chest abdomen pelvis w contrast   Final Result by Nelia Rapp MD (06/07 5380)      Endotracheal tube approximately 2 cm above the cadence. Consider retracting by 2 cm.      Hepatomegaly and diffuse hepatic steatosis.      Marked distention of the urinary bladder. Recommend correlation with urinalysis to exclude infection.      Endometrial thickening measuring up to 15 mm. Recommend further evaluation with pelvic ultrasound on a nonemergent basis.      The study was marked in EPIC for immediate notification.                     Workstation performed: [id number]         CTA stroke alert (head/neck)   Final Result by Andi Feldman MD (06/07 7758)   Suboptimal contrast bolus within the early venous phase.      CTA head:   -No large vessel occlusion, high-grade stenosis or aneurysm.   -Decreased attenuation of contrast within short segment of proximal left petrous segment is likely artifact given streak from left ear hardware.   -Redemonstrated hyperdensity/calcification at the vertebrobasilar junction, unchanged. No associated significant stenosis.      CTA neck:   -Approximate 70% right and 65% left stenosis of the proximal internal carotid arteries due to atherosclerosis. Vascular surgery consult recommended.   -Moderate stenosis at the origin of the left common carotid artery due to atherosclerosis.   -No significant stenosis of the vertebral arteries.      Other:   -3 mm left upper lobe pulmonary nodule based on  current Fleischner Society 2017 Guidelines on incidental pulmonary nodule, optional follow-up CT at 12 months can be considered.         Findings were directly discussed with Bruce Gibbons at 2:32 p.m on 6/7/2024.      The study was marked in EPIC for immediate notification.                  Workstation performed: [id number]         CT stroke alert brain   Final Result by Andi Feldman MD (06/07 1454)      -No acute vascular territory infarct, or intracranial hemorrhage   -Stable partially calcified 8 mm extra-axial lesion along the left frontal convexity which may represent meningioma. Recommend MRI with and without contrast for further evaluation.         Findings were directly discussed with Bruce Gibbons at 2:32 p.m on 6/7/2024.         Workstation performed: [id number]         XR chest 1 view portable   Final Result by Elenita Gomez MD (06/08 0824)      ET tube 2 cm above the cadence.            Workstation performed: [id number]         XR hand 2 vw right    (Results Pending)   MRI brain seizure wo and w contrast    (Results Pending)     CBC:   Results from last 7 days   Lab Units 06/08/24  0452 06/07/24  1346 06/07/24  1344   WBC Thousand/uL 16.77* 21.54*  --    RBC Million/uL 3.40* 4.51  --    HEMOGLOBIN g/dL 11.4* 15.0  --    I STAT HEMOGLOBIN g/dl  --   --  16.3*   HEMATOCRIT % 32.7* 43.5  --    HEMATOCRIT, ISTAT %  --   --  48*   MCV fL 96 97  --    PLATELETS Thousands/uL 269 370  --    , BMP/CMP:   Results from last 7 days   Lab Units 06/08/24  0452 06/07/24  1613 06/07/24  1344   SODIUM mmol/L 137 135  --    POTASSIUM mmol/L 3.1* 3.8  --    CHLORIDE mmol/L 102 96  --    CO2 mmol/L 26 29  --    CO2, I-STAT mmol/L  --   --  26   BUN mg/dL 15 12  --    CREATININE mg/dL 0.93 0.96  --    GLUCOSE, ISTAT mg/dl  --   --  199*   CALCIUM mg/dL 7.5* 9.1  --    AST U/L 311* 395*  --    ALT U/L 260* 379*  --    ALK PHOS U/L 72 104  --    EGFR ml/min/1.73sq m 61 59  --    Coagulation:   Results from last 7 days    Lab Units 06/08/24  0452   INR  1.03    Urinalysis:   Results from last 7 days   Lab Units 06/07/24  1455   COLOR UA  Colorless   CLARITY UA  Clear   SPEC GRAV UA  1.028   PH UA  7.5   LEUKOCYTES UA  Negative   NITRITE UA  Negative   GLUCOSE UA mg/dl Negative   KETONES UA mg/dl Negative   BILIRUBIN UA  Negative   BLOOD UA  Moderate*   , Drug Screen:   Results from last 7 days   Lab Units 06/07/24  1455   BARBITURATE UR  Negative   BENZODIAZEPINE UR  Positive*   THC UR  Negative   COCAINE UR  Negative   METHADONE URINE  Negative   OPIATE UR  Negative   PCP UR  Negative     VTE Prophylaxis: Sequential compression device (Venodyne)     Please see attendings attestation for total time spent/billing.  Discussed plan of care with patient/ and critical care: Obtain MRI brain, EEG, check Keppra level.    Please note dictation software was used in the formulation of this note. Please keep that in mind in light of any grammatical errors.

## 2024-06-08 NOTE — PROGRESS NOTES
Kirsty Em is a 74 y.o. female who is currently ordered Vancomycin IV with management by the Pharmacy Consult service.  Relevant clinical data and objective / subjective history reviewed.  Vancomycin Assessment:  Indication and Goal AUC/Trough: CNS infection (goal -600, trough 15-20), -600, trough 15-20  Clinical Status: Critical care  Micro:     Renal Function:  SCr: 0.93 mg/dL  CrCl: 46.2 mL/min  Renal replacement: Not on dialysis  Days of Therapy: 2  Current Dose: 1250mg IV q24h  Vancomycin Plan:  New Dosinmg IV q12h  Estimated AUC: 564 mcg*hr/mL  Estimated Trough: 17.6 mcg/mL  Next Level: 6/10/24 AM labs  Renal Function Monitoring: Daily BMP and UOP  Pharmacy will continue to follow closely for s/sx of nephrotoxicity, infusion reactions and appropriateness of therapy.  BMP and CBC will be ordered per protocol. We will continue to follow the patient’s culture results and clinical progress daily.    Lorraine Avalos, Pharmacist

## 2024-06-08 NOTE — RESPIRATORY THERAPY NOTE
RT Ventilator Management Note  Kirsty Em 74 y.o. female MRN: [id number]  Unit/Bed#: ICU 11 Encounter: [id number]      Daily Screen    No data found in the last 10 encounters.           Physical Exam:   Assessment Type: (P) Assess only  General Appearance: (P) Sleeping, Sedated  Respiratory Pattern: (P) Assisted  Chest Assessment: (P) Chest expansion symmetrical  Bilateral Breath Sounds: (P) Diminished  Cough: None  Suction: ET Tube  O2 Device: ventilator      Resp Comments: (P) pt remains intuabted, FIO2 decreased to 50%     06/08/24 0048   Respiratory Assessment   Assessment Type Assess only   General Appearance Sleeping;Sedated   Respiratory Pattern Assisted   Chest Assessment Chest expansion symmetrical   Bilateral Breath Sounds Diminished   Resp Comments pt remains intuabted, FIO2 decreased to 50%   Vent Information   Vent ID Jerome   Vent type Drager   Drager Vent Mode AC/VC+   $ Pulse Oximetry Spot Check Charge Completed   AC/VC+ Settings   Resp Rate (BPM) 18 BPM   VT (mL) 350 mL   Insp Time (S) 0.83 S   FIO2 (%) (S)  50 %   PEEP (cmH2O) 6 cmH2O   Rise Time (%) 20 %   Trigger Sensitivity Flow (LPM) 2 LPM   Humidification Heater   Heater Temp 98.6 °F (37 °C)   AC/VC+ Actuals   Resp Rate (BPM) 18 BPM   VT (mL) 353 mL   MV (Obs) 5.65   MAP (cmH2O) 10 cmH2O   Peak Pressure (cmH2O) 23 cmH2O   I:E Ratio (Obs) 1:3   Static Compliance (mL/cmH20) 37.2 mL/cmH2O   Plateau Pressure (cm H2O) 14.3 cm H2O   Heater Temperature (Obs) 98.8 °F (37.1 °C)   AC/VC+ ALARMS   High Peak Pressure (cmH2O) 30 cmH2O   High Resp Rate (BPM) 35 BPM   High MV (L/min) 12 L/min   Low MV (L/min) 3.5 L/min   High VT (mL) 750 mL   Maintenance   Alarm (pink) cable attached Yes   Resuscitation bag with peep valve at bedside Yes   Water bag changed No   Circuit changed No   IHI Ventilator Associated Pneumonia Bundle   Daily Assessment of Readiness to Extubate Yes   Head of Bed Elevated HOB 20   ETT  Oral 7.5 mm   Placement Date/Time: 06/07/24 8402    Previously placed by?: Attending  Preoxygenated: Yes  Type: Oral  Tube Size: 7.5 mm  Location: Oral  Insertion: Atraumatic  Placement Verification: Auscultation;End tidal CO2;Chest x-ray  Secured at (cm): 23  Place...   Secured at (cm) 21   Measured from Gums   Secured Location Right   Repositioned Left to Right   Secured by Commercial tube odom   Site Condition Dry   Cuff Pressure (color) Green   HI-LO Suction  Continuous low suction   HI-LO Secretions Scant   HI-LO Intervention Patent

## 2024-06-08 NOTE — ASSESSMENT & PLAN NOTE
MRI reveals left frontal stroke  Neurology consulted for recommendations  Continue to monitor serial exams  Currently remains intubated

## 2024-06-08 NOTE — ASSESSMENT & PLAN NOTE
Current every day smoker  Noted COPD exacerbation several years ago  Bronchodilators as needed  Hold on steroids acutely  Mechanical Ventilation as above

## 2024-06-08 NOTE — ASSESSMENT & PLAN NOTE
reports seizure two years ago, states he is uncertain movement was ever seizure  Patient reportedly self discontinued home Keppra  S/p 1500mg IV keppra in ED  Continue keppra 500 mg BID  EEG ordered  Appreciate neurology assistance

## 2024-06-08 NOTE — ASSESSMENT & PLAN NOTE
CK noted to be 1300 on arrival, continues to rise  Found down at home for unknown quantity of time  Continue aggressive IVF administration  Follow up CK in AM

## 2024-06-08 NOTE — RESPIRATORY THERAPY NOTE
RT Ventilator Management Note  Kirsty Em 74 y.o. female MRN: [id number]  Unit/Bed#: ICU 11 Encounter: [id number]      Daily Screen    No data found in the last 10 encounters.           Physical Exam:   Assessment Type: Assess only  General Appearance: Awake  Respiratory Pattern: Assisted  Chest Assessment: Chest expansion symmetrical  Bilateral Breath Sounds: Diminished  Cough: None  Suction: ET Tube  O2 Device: vent      Resp Comments: pt remains intubatedc on full mechanical support, no changes were made to the patient's vent settings at this time, will continue with current vent settings     06/08/24 0404   Respiratory Assessment   Assessment Type Assess only   General Appearance Awake   Respiratory Pattern Assisted   Chest Assessment Chest expansion symmetrical   Bilateral Breath Sounds Diminished   Cough None   Suction ET Tube   Resp Comments pt remains intubatedc on full mechanical support, no changes were made to the patient's vent settings at this time, will continue with current vent settings   O2 Device vent   Vent Information   Vent ID Petra   Vent type Drager   Drager Vent Mode AC/VC+   $ Pulse Oximetry Spot Check Charge Completed   AC/VC+ Settings   Resp Rate (BPM) 18 BPM   VT (mL) 350 mL   Insp Time (S) 0.83 S   FIO2 (%) 50 %   PEEP (cmH2O) 6 cmH2O   Rise Time (%) 20 %   Trigger Sensitivity Flow (LPM) 2 LPM   Humidification Heater   Heater Temp 98.6 °F (37 °C)   AC/VC+ Actuals   Resp Rate (BPM) 21 BPM   VT (mL) 371 mL   MV (Obs) 5.96   MAP (cmH2O) 9.8 cmH2O   Peak Pressure (cmH2O) 15 cmH2O   I:E Ratio (Obs) 1:1.5   Static Compliance (mL/cmH20) 31 mL/cmH2O   Plateau Pressure (cm H2O) 19.4 cm H2O   Heater Temperature (Obs) 98.8 °F (37.1 °C)   Maintenance   Alarm (pink) cable attached Yes   Resuscitation bag with peep valve at bedside Yes   Water bag changed No   Circuit changed No   IHI Ventilator Associated Pneumonia Bundle   Daily Assessment of Readiness to Extubate Yes   Head of Bed Elevated HOB 20    ETT  Oral 7.5 mm   Placement Date/Time: 06/07/24 1342   Previously placed by?: Attending  Preoxygenated: Yes  Type: Oral  Tube Size: 7.5 mm  Location: Oral  Insertion: Atraumatic  Placement Verification: Auscultation;End tidal CO2;Chest x-ray  Secured at (cm): 23  Place...   Secured at (cm) 21   Measured from Gums   Secured Location Center   Repositioned Right to Center   Secured by Commercial tube odom   Site Condition Dry   Cuff Pressure (color) Green   HI-LO Suction  Continuous low suction   HI-LO Secretions Scant   HI-LO Intervention Patent

## 2024-06-08 NOTE — RESPIRATORY THERAPY NOTE
06/08/24 1059   Respiratory Assessment   Assessment Type Assess only   General Appearance Sedated   Respiratory Pattern Assisted   Chest Assessment Chest expansion symmetrical   Bilateral Breath Sounds Diminished   Resp Comments remains on full support, bs coarse, no changes at this time, spo2 96%   O2 Device vent   Vent Information   Drager Vent Mode AC/VC+   $ Pulse Oximetry Spot Check Charge Completed   SpO2 97 %   AC/VC+ Settings   Resp Rate (BPM) 18 BPM   VT (mL) 350 mL   Insp Time (S) 0.83 S   FIO2 (%) 40 %   PEEP (cmH2O) 6 cmH2O   Humidification Heater   Heater Temp 98.6 °F (37 °C)   AC/VC+ Actuals   Resp Rate (BPM) 21 BPM   VT (mL) 356 mL   MV (Obs) 6.87   MAP (cmH2O) 10 cmH2O   Peak Pressure (cmH2O) 17 cmH2O   I:E Ratio (Obs) 1:3   Static Compliance (mL/cmH20) 35 mL/cmH2O   Plateau Pressure (cm H2O) 16 cm H2O   Heater Temperature (Obs) 98.6 °F (37 °C)   AC/VC+ ALARMS   High Peak Pressure (cmH2O) 35 cmH2O   High Resp Rate (BPM) 35 BPM   High MV (L/min) 12 L/min   Low MV (L/min) 3.5 L/min   High VT (mL) 750 mL   Maintenance   Alarm (pink) cable attached Yes   Resuscitation bag with peep valve at bedside Yes   Water bag changed No   Circuit changed No   ETT  Oral 7.5 mm   Placement Date/Time: 06/07/24 1342   Previously placed by?: Attending  Preoxygenated: Yes  Type: Oral  Tube Size: 7.5 mm  Location: Oral  Insertion: Atraumatic  Placement Verification: Auscultation;End tidal CO2;Chest x-ray  Secured at (cm): 23  Place...   Secured at (cm) 21   Measured from Gums   Secured Location Left   Repositioned Center to Left   Secured by Commercial tube odom   Site Condition Dry   Cuff Pressure (color) Green   HI-LO Suction  Continuous low suction   HI-LO Secretions Scant   HI-LO Intervention Patent     RT Ventilator Management Note  Kirsty Em 74 y.o. female MRN: [id number]  Unit/Bed#: ICU 11 Encounter: [id number]      Daily Screen         6/8/2024  0824             Patient safety screen outcome:: Failed    Not  Ready for Weaning due to:: Underline problem not resolved              Physical Exam:   Assessment Type: Assess only  General Appearance: Sedated  Respiratory Pattern: Assisted  Chest Assessment: Chest expansion symmetrical  Bilateral Breath Sounds: Diminished  Cough: None  Suction: ET Tube  O2 Device: vent      Resp Comments: remains on full support, bs coarse, no changes at this time, spo2 96%

## 2024-06-08 NOTE — RESPIRATORY THERAPY NOTE
06/08/24 1612   Respiratory Assessment   Assessment Type Assess only   General Appearance Sedated   Respiratory Pattern Assisted   Chest Assessment Chest expansion symmetrical   Bilateral Breath Sounds Diminished   Resp Comments remains on full support, no changes at this time   O2 Device vent   Vent Information   Drager Vent Mode AC/VC+   $ Pulse Oximetry Spot Check Charge Completed   SpO2 97 %   AC/VC+ Settings   Resp Rate (BPM) 18 BPM   VT (mL) 350 mL   Insp Time (S) 0.83 S   FIO2 (%) 40 %   PEEP (cmH2O) 6 cmH2O   Humidification Heater   Heater Temp 98.6 °F (37 °C)   AC/VC+ Actuals   Resp Rate (BPM) 18 BPM   VT (mL) 331 mL   MV (Obs) 5.58   MAP (cmH2O) 11 cmH2O   Peak Pressure (cmH2O) 27 cmH2O   Static Compliance (mL/cmH20) 32 mL/cmH2O   Plateau Pressure (cm H2O) 19 cm H2O   Heater Temperature (Obs) 98.6 °F (37 °C)   AC/VC+ ALARMS   High Peak Pressure (cmH2O) 35 cmH2O   High Resp Rate (BPM) 35 BPM   High MV (L/min) 12 L/min   Low MV (L/min) 3.5 L/min   High VT (mL) 750 mL   Maintenance   Alarm (pink) cable attached Yes   Resuscitation bag with peep valve at bedside Yes   Water bag changed No   Circuit changed No   ETT  Oral 7.5 mm   Placement Date/Time: 06/07/24 1342   Previously placed by?: Attending  Preoxygenated: Yes  Type: Oral  Tube Size: 7.5 mm  Location: Oral  Insertion: Atraumatic  Placement Verification: Auscultation;End tidal CO2;Chest x-ray  Secured at (cm): 23  Place...   Secured at (cm) 21   Measured from Gums   Secured by Commercial tube odom   Site Condition Dry   Cuff Pressure (color) Green   HI-LO Suction  Continuous low suction   HI-LO Secretions Scant   HI-LO Intervention Patent     RT Ventilator Management Note  Kirsty Em 74 y.o. female MRN: [id number]  Unit/Bed#: ICU 11 Encounter: [id number]      Daily Screen         6/8/2024  0824             Patient safety screen outcome:: Failed    Not Ready for Weaning due to:: Underline problem not resolved              Physical Exam:    Assessment Type: Assess only  General Appearance: Sedated  Respiratory Pattern: Assisted  Chest Assessment: Chest expansion symmetrical  Bilateral Breath Sounds: Diminished  O2 Device: vent      Resp Comments: remains on full support, no changes at this time

## 2024-06-08 NOTE — ASSESSMENT & PLAN NOTE
Reported hypoxia baljinder-worsening altered mental status  S/p intubation, received on mechanical ventilator  CXR consistent with chronic changes  Currently maintained on settings  Wean as able to maintain spo2 >92%  VAP Bundle

## 2024-06-08 NOTE — ASSESSMENT & PLAN NOTE
POA- tachycardia, tachypnea, leukocytosis, EOD with intubation  Suspect meningitis vs nms vs serotonin syndrome vs PNA with ARHF  Febrile to 104.4 on arrival  Lactic 8.4->3.4  Initial PCL elevated  CXR unremarkable  CTH negative  CT- bladder distention  UA unremarkable  BC pending  F/u LP results  For now, ppx with menigitis coverage- ampicillin, cefepime, vanco, acyclovir  Follow WBC and fever curve

## 2024-06-08 NOTE — ASSESSMENT & PLAN NOTE
74 YOF with PMHx of DOMINGO on benzodiazepines, COPD, HLD presenting to [facility] ED 6/7 afternoon after being found by  covered in feces at their home  Found down for unknown quantity of time with rhabdomyolysis  Symptoms including asphasia, contracture of RUE and RLE, weakness of left side  Stroke alert called  CTH negative  CTA with b/l proximal carotid stenosis 60-75%  For concern for seizure activity, s/p 1.5g of IV keppra, 15mg IV versed, 1mg IV ativan, started on propofol infusion given need for mechanical intubation  S/p lumbar puncture in setting of high fever, regidity  Of note with home regimen of Xanax, Seroquel, with recent emotional stressor at home with unknown pill counts    Differential includes viral meningitis vs NMS vs serotonin syndrome vs sepsis  Meningitis prophylaxis ordered  Follow LP results  Continue Keppra 1g BID  EEG ordered and pending  Appreciate neurology assistance  Serial neuro exams  Given high benzo requirement, continue versed infusion given persistent agitation

## 2024-06-08 NOTE — ASSESSMENT & PLAN NOTE
In setting of unknown downtime, elevated CK  CT with evidence of hepatomegaly, hepatic steatosis  F/u transaminases in AM  Consider RUQ US if worsening

## 2024-06-08 NOTE — RESPIRATORY THERAPY NOTE
06/07/24 2001   Respiratory Assessment   General Appearance Eyes open/responds to stimulus   Respiratory Pattern Assisted;Spontaneous   Chest Assessment Chest expansion symmetrical   Bilateral Breath Sounds Clear   Cough None   Resp Comments pt remains intubated and mechanically ventilated   O2 Device ventilator   Vent Information   Vent ID Sterling   Vent type Drager   Drager Vent Mode AC/VC+   Is the patient reintubated? No   $ Pulse Oximetry Spot Check Charge Completed   SpO2 96 %   AC/VC+ Settings   Resp Rate (BPM) 18 BPM   VT (mL) 350 mL   Insp Time (S) 0.83 S   FIO2 (%) 60 %   PEEP (cmH2O) 6 cmH2O   Rise Time (%) 20 %   Trigger Sensitivity Flow (LPM) 2 LPM   Humidification Heater   Heater Temp 98.6 °F (37 °C)   AC/VC+ Actuals   Resp Rate (BPM) 20 BPM   VT (mL) 348 mL   MV (Obs) 6.47   MAP (cmH2O) 8.8 cmH2O   Peak Pressure (cmH2O) 15 cmH2O   I:E Ratio (Obs) 1:2.9   Static Compliance (mL/cmH20) 59 mL/cmH2O   Plateau Pressure (cm H2O) 15.3 cm H2O   Heater Temperature (Obs) 98.6 °F (37 °C)   AC/VC+ ALARMS   High Peak Pressure (cmH2O) 30 cmH2O   High Resp Rate (BPM) 35 BPM   High MV (L/min) 12 L/min   Low MV (L/min) 3.5 L/min   High VT (mL) 750 mL   AC/VC+ Apnea Settings   Apnea Time (s) 20 S   Maintenance   Resuscitation bag with peep valve at bedside Yes   Water bag changed No   Circuit changed No   IHI Ventilator Associated Pneumonia Bundle   Head of Bed Elevated HOB 30   ETT  Oral 7.5 mm   Placement Date/Time: 06/07/24 1342   Previously placed by?: Attending  Preoxygenated: Yes  Type: Oral  Tube Size: 7.5 mm  Location: Oral  Insertion: Atraumatic  Placement Verification: Auscultation;End tidal CO2;Chest x-ray  Secured at (cm): 23  Place...   Secured at (cm) 21   Measured from Gums   Secured Location (S)  Left   Repositioned Center to Left   Secured by Commercial tube odom   Site Condition Dry   Cuff Pressure (cm H2O)   (MLT)   Cuff Pressure (color) Green   HI-LO Suction  Continuous low suction   HI-LO  Secretions Scant   HI-LO Intervention Patent

## 2024-06-09 ENCOUNTER — APPOINTMENT (INPATIENT)
Dept: NON INVASIVE DIAGNOSTICS | Facility: HOSPITAL | Age: 74
DRG: 091 | End: 2024-06-09
Payer: MEDICARE

## 2024-06-09 PROBLEM — R73.03 PRE-DIABETES: Status: ACTIVE | Noted: 2024-06-09

## 2024-06-09 LAB
ALBUMIN SERPL BCP-MCNC: 3 G/DL (ref 3.5–5)
ALP SERPL-CCNC: 66 U/L (ref 34–104)
ALT SERPL W P-5'-P-CCNC: 201 U/L (ref 7–52)
ANION GAP SERPL CALCULATED.3IONS-SCNC: 8 MMOL/L (ref 4–13)
AORTIC ROOT: 2.7 CM
APICAL FOUR CHAMBER EJECTION FRACTION: 58 %
APPEARANCE CSF: CLEAR
ASCENDING AORTA: 2.8 CM
AST SERPL W P-5'-P-CCNC: 207 U/L (ref 13–39)
BASOPHILS # BLD AUTO: 0.04 THOUSANDS/ÂΜL (ref 0–0.1)
BASOPHILS NFR BLD AUTO: 0 % (ref 0–1)
BILIRUB SERPL-MCNC: 0.46 MG/DL (ref 0.2–1)
BSA FOR ECHO PROCEDURE: 1.6 M2
BUN SERPL-MCNC: 8 MG/DL (ref 5–25)
CALCIUM ALBUM COR SERPL-MCNC: 7.7 MG/DL (ref 8.3–10.1)
CALCIUM SERPL-MCNC: 6.9 MG/DL (ref 8.4–10.2)
CHLORIDE SERPL-SCNC: 106 MMOL/L (ref 96–108)
CHOLEST SERPL-MCNC: 165 MG/DL
CK SERPL-CCNC: 9243 U/L (ref 26–192)
CO2 SERPL-SCNC: 23 MMOL/L (ref 21–32)
CREAT SERPL-MCNC: 0.6 MG/DL (ref 0.6–1.3)
E WAVE DECELERATION TIME: 180 MS
E/A RATIO: 1.36
EOSINOPHIL # BLD AUTO: 0.06 THOUSAND/ÂΜL (ref 0–0.61)
EOSINOPHIL NFR BLD AUTO: 1 % (ref 0–6)
ERYTHROCYTE [DISTWIDTH] IN BLOOD BY AUTOMATED COUNT: 13.6 % (ref 11.6–15.1)
EST. AVERAGE GLUCOSE BLD GHB EST-MCNC: 134 MG/DL
FRACTIONAL SHORTENING: 32 (ref 28–44)
GFR SERPL CREATININE-BSD FRML MDRD: 91 ML/MIN/1.73SQ M
GLUCOSE SERPL-MCNC: 99 MG/DL (ref 65–140)
HBA1C MFR BLD: 6.3 %
HCT VFR BLD AUTO: 31.6 % (ref 34.8–46.1)
HDLC SERPL-MCNC: 22 MG/DL
HGB BLD-MCNC: 10.8 G/DL (ref 11.5–15.4)
IMM GRANULOCYTES # BLD AUTO: 0.08 THOUSAND/UL (ref 0–0.2)
IMM GRANULOCYTES NFR BLD AUTO: 1 % (ref 0–2)
INTERVENTRICULAR SEPTUM IN DIASTOLE (PARASTERNAL SHORT AXIS VIEW): 0.8 CM
INTERVENTRICULAR SEPTUM: 0.8 CM (ref 0.6–1.1)
LAAS-AP4: 7.6 CM2
LDLC SERPL DIRECT ASSAY-MCNC: 75 MG/DL (ref 0–100)
LEFT ATRIUM AREA SYSTOLE SINGLE PLANE A4C: 7.7 CM2
LEFT ATRIUM SIZE: 3.6 CM
LEFT INTERNAL DIMENSION IN SYSTOLE: 2.8 CM (ref 2.1–4)
LEFT VENTRICULAR INTERNAL DIMENSION IN DIASTOLE: 4.1 CM (ref 3.5–6)
LEFT VENTRICULAR POSTERIOR WALL IN END DIASTOLE: 1 CM
LEFT VENTRICULAR STROKE VOLUME: 43 ML
LEVETIRACETAM SERPL-MCNC: 18.7 UG/ML (ref 12–46)
LVSV (TEICH): 43 ML
LYMPHOCYTES # BLD AUTO: 2.27 THOUSANDS/ÂΜL (ref 0.6–4.47)
LYMPHOCYTES NFR BLD AUTO: 23 % (ref 14–44)
MAGNESIUM SERPL-MCNC: 2.6 MG/DL (ref 1.9–2.7)
MCH RBC QN AUTO: 33.2 PG (ref 26.8–34.3)
MCHC RBC AUTO-ENTMCNC: 34.2 G/DL (ref 31.4–37.4)
MCV RBC AUTO: 97 FL (ref 82–98)
MITRAL REGURGITATION PEAK VELOCITY: 5.94 M/S
MITRAL VALVE MEAN INFLOW VELOCITY: 5.32 M/S
MITRAL VALVE REGURGITANT PEAK GRADIENT: 141 MMHG
MONOCYTES # BLD AUTO: 0.74 THOUSAND/ÂΜL (ref 0.17–1.22)
MONOCYTES NFR BLD AUTO: 7 % (ref 4–12)
MRSA NOSE QL CULT: NORMAL
MV PEAK A VEL: 0.87 M/S
MV PEAK E VEL: 118 CM/S
MV STENOSIS PRESSURE HALF TIME: 52 MS
MV VALVE AREA P 1/2 METHOD: 4.23
NEUTROPHILS # BLD AUTO: 6.9 THOUSANDS/ÂΜL (ref 1.85–7.62)
NEUTS SEG NFR BLD AUTO: 68 % (ref 43–75)
NRBC BLD AUTO-RTO: 0 /100 WBCS
PA SYSTOLIC PRESSURE: 45 MMHG
PHOSPHATE SERPL-MCNC: 1.5 MG/DL (ref 2.3–4.1)
PLATELET # BLD AUTO: 236 THOUSANDS/UL (ref 149–390)
PMV BLD AUTO: 10.2 FL (ref 8.9–12.7)
POTASSIUM SERPL-SCNC: 3.5 MMOL/L (ref 3.5–5.3)
PROCALCITONIN SERPL-MCNC: 6.11 NG/ML
PROT SERPL-MCNC: 5.1 G/DL (ref 6.4–8.4)
RBC # BLD AUTO: 3.25 MILLION/UL (ref 3.81–5.12)
RBC # CSF MANUAL: 12 UL (ref 0–10)
RBC # CSF MANUAL: 200 UL (ref 0–10)
RIGHT ATRIAL 2D VOLUME: 54 ML
RIGHT ATRIUM AREA SYSTOLE A4C: 18.5 CM2
RIGHT VENTRICLE ID DIMENSION: 3.9 CM
SL CV DOP CALC MV VTI RETROGRADE: 176.1 CM
SL CV LV EF: 55
SL CV MV MEAN GRADIENT RETROGRADE: 118 MMHG
SL CV PED ECHO LEFT VENTRICLE DIASTOLIC VOLUME (MOD BIPLANE) 2D: 72 ML
SL CV PED ECHO LEFT VENTRICLE SYSTOLIC VOLUME (MOD BIPLANE) 2D: 29 ML
SODIUM SERPL-SCNC: 137 MMOL/L (ref 135–147)
TOTAL CELLS COUNTED BLD: NO
TR MAX PG: 46 MMHG
TR PEAK VELOCITY: 3.4 M/S
TRICUSPID ANNULAR PLANE SYSTOLIC EXCURSION: 2.3 CM
TRICUSPID VALVE PEAK REGURGITATION VELOCITY: 3.38 M/S
TRIGL SERPL-MCNC: 694 MG/DL
TUBE # CSF: 4
WBC # BLD AUTO: 10.09 THOUSAND/UL (ref 4.31–10.16)
WBC # CSF AUTO: 6 /UL (ref 0–5)

## 2024-06-09 PROCEDURE — 94760 N-INVAS EAR/PLS OXIMETRY 1: CPT

## 2024-06-09 PROCEDURE — 80053 COMPREHEN METABOLIC PANEL: CPT | Performed by: NURSE PRACTITIONER

## 2024-06-09 PROCEDURE — 99233 SBSQ HOSP IP/OBS HIGH 50: CPT | Performed by: ANESTHESIOLOGY

## 2024-06-09 PROCEDURE — 83735 ASSAY OF MAGNESIUM: CPT | Performed by: NURSE PRACTITIONER

## 2024-06-09 PROCEDURE — 93306 TTE W/DOPPLER COMPLETE: CPT | Performed by: INTERNAL MEDICINE

## 2024-06-09 PROCEDURE — 80061 LIPID PANEL: CPT | Performed by: NURSE PRACTITIONER

## 2024-06-09 PROCEDURE — 85025 COMPLETE CBC W/AUTO DIFF WBC: CPT | Performed by: NURSE PRACTITIONER

## 2024-06-09 PROCEDURE — 93306 TTE W/DOPPLER COMPLETE: CPT

## 2024-06-09 PROCEDURE — 92610 EVALUATE SWALLOWING FUNCTION: CPT

## 2024-06-09 PROCEDURE — 94003 VENT MGMT INPAT SUBQ DAY: CPT

## 2024-06-09 PROCEDURE — 84145 PROCALCITONIN (PCT): CPT | Performed by: NURSE PRACTITIONER

## 2024-06-09 PROCEDURE — 99233 SBSQ HOSP IP/OBS HIGH 50: CPT | Performed by: STUDENT IN AN ORGANIZED HEALTH CARE EDUCATION/TRAINING PROGRAM

## 2024-06-09 PROCEDURE — 82550 ASSAY OF CK (CPK): CPT | Performed by: NURSE PRACTITIONER

## 2024-06-09 PROCEDURE — 84100 ASSAY OF PHOSPHORUS: CPT | Performed by: NURSE PRACTITIONER

## 2024-06-09 PROCEDURE — 83036 HEMOGLOBIN GLYCOSYLATED A1C: CPT | Performed by: NURSE PRACTITIONER

## 2024-06-09 PROCEDURE — 99233 SBSQ HOSP IP/OBS HIGH 50: CPT | Performed by: PSYCHIATRY & NEUROLOGY

## 2024-06-09 PROCEDURE — 83520 IMMUNOASSAY QUANT NOS NONAB: CPT | Performed by: NURSE PRACTITIONER

## 2024-06-09 PROCEDURE — 83721 ASSAY OF BLOOD LIPOPROTEIN: CPT | Performed by: NURSE PRACTITIONER

## 2024-06-09 RX ORDER — LEVETIRACETAM 500 MG/5ML
750 INJECTION, SOLUTION, CONCENTRATE INTRAVENOUS EVERY 12 HOURS SCHEDULED
Status: DISCONTINUED | OUTPATIENT
Start: 2024-06-09 | End: 2024-06-10

## 2024-06-09 RX ORDER — QUETIAPINE FUMARATE 100 MG/1
100 TABLET, FILM COATED ORAL EVERY MORNING
Status: DISCONTINUED | OUTPATIENT
Start: 2024-06-09 | End: 2024-06-17 | Stop reason: HOSPADM

## 2024-06-09 RX ORDER — ACETAMINOPHEN 325 MG/1
650 TABLET ORAL EVERY 6 HOURS PRN
Status: DISCONTINUED | OUTPATIENT
Start: 2024-06-09 | End: 2024-06-17 | Stop reason: HOSPADM

## 2024-06-09 RX ORDER — QUETIAPINE FUMARATE 100 MG/1
400 TABLET, FILM COATED ORAL
Status: DISCONTINUED | OUTPATIENT
Start: 2024-06-09 | End: 2024-06-17 | Stop reason: HOSPADM

## 2024-06-09 RX ORDER — ACETAMINOPHEN 10 MG/ML
1000 INJECTION, SOLUTION INTRAVENOUS EVERY 8 HOURS PRN
Status: DISCONTINUED | OUTPATIENT
Start: 2024-06-09 | End: 2024-06-09

## 2024-06-09 RX ORDER — CLOPIDOGREL BISULFATE 75 MG/1
75 TABLET ORAL DAILY
Status: DISCONTINUED | OUTPATIENT
Start: 2024-06-10 | End: 2024-06-16

## 2024-06-09 RX ORDER — POTASSIUM CHLORIDE 20 MEQ/1
20 TABLET, EXTENDED RELEASE ORAL ONCE
Status: COMPLETED | OUTPATIENT
Start: 2024-06-09 | End: 2024-06-09

## 2024-06-09 RX ORDER — VANCOMYCIN HYDROCHLORIDE 1 G/200ML
1000 INJECTION, SOLUTION INTRAVENOUS EVERY 12 HOURS
Status: DISCONTINUED | OUTPATIENT
Start: 2024-06-09 | End: 2024-06-09

## 2024-06-09 RX ORDER — ALPRAZOLAM 0.5 MG/1
2 TABLET ORAL 4 TIMES DAILY
Status: DISCONTINUED | OUTPATIENT
Start: 2024-06-09 | End: 2024-06-17 | Stop reason: HOSPADM

## 2024-06-09 RX ORDER — GABAPENTIN 300 MG/1
300 CAPSULE ORAL 3 TIMES DAILY
Status: DISCONTINUED | OUTPATIENT
Start: 2024-06-09 | End: 2024-06-17 | Stop reason: HOSPADM

## 2024-06-09 RX ADMIN — DEXMEDETOMIDINE HYDROCHLORIDE 1.5 MCG/KG/HR: 400 INJECTION INTRAVENOUS at 20:50

## 2024-06-09 RX ADMIN — ACETAMINOPHEN 650 MG: 325 TABLET, FILM COATED ORAL at 16:54

## 2024-06-09 RX ADMIN — LEVETIRACETAM 750 MG: 100 INJECTION, SOLUTION INTRAVENOUS at 08:59

## 2024-06-09 RX ADMIN — CHLORHEXIDINE GLUCONATE 0.12% ORAL RINSE 15 ML: 1.2 LIQUID ORAL at 09:28

## 2024-06-09 RX ADMIN — ALPRAZOLAM 2 MG: 0.5 TABLET ORAL at 21:00

## 2024-06-09 RX ADMIN — ASPIRIN 81 MG: 81 TABLET, CHEWABLE ORAL at 08:43

## 2024-06-09 RX ADMIN — CHLORHEXIDINE GLUCONATE 0.12% ORAL RINSE 15 ML: 1.2 LIQUID ORAL at 20:49

## 2024-06-09 RX ADMIN — DEXMEDETOMIDINE HYDROCHLORIDE 1.5 MCG/KG/HR: 400 INJECTION INTRAVENOUS at 11:42

## 2024-06-09 RX ADMIN — QUETIAPINE FUMARATE 400 MG: 100 TABLET ORAL at 21:00

## 2024-06-09 RX ADMIN — QUETIAPINE FUMARATE 100 MG: 100 TABLET ORAL at 09:28

## 2024-06-09 RX ADMIN — NYSTATIN: 100000 POWDER TOPICAL at 09:32

## 2024-06-09 RX ADMIN — SODIUM CHLORIDE, SODIUM GLUCONATE, SODIUM ACETATE, POTASSIUM CHLORIDE, MAGNESIUM CHLORIDE, SODIUM PHOSPHATE, DIBASIC, AND POTASSIUM PHOSPHATE 125 ML/HR: .53; .5; .37; .037; .03; .012; .00082 INJECTION, SOLUTION INTRAVENOUS at 19:33

## 2024-06-09 RX ADMIN — GABAPENTIN 300 MG: 300 CAPSULE ORAL at 09:28

## 2024-06-09 RX ADMIN — GABAPENTIN 300 MG: 300 CAPSULE ORAL at 20:50

## 2024-06-09 RX ADMIN — POTASSIUM CHLORIDE 20 MEQ: 1500 TABLET, EXTENDED RELEASE ORAL at 10:56

## 2024-06-09 RX ADMIN — CEFTRIAXONE SODIUM 2000 MG: 10 INJECTION, POWDER, FOR SOLUTION INTRAVENOUS at 06:21

## 2024-06-09 RX ADMIN — NYSTATIN: 100000 POWDER TOPICAL at 17:01

## 2024-06-09 RX ADMIN — DEXMEDETOMIDINE HYDROCHLORIDE 1.5 MCG/KG/HR: 400 INJECTION INTRAVENOUS at 15:51

## 2024-06-09 RX ADMIN — GABAPENTIN 300 MG: 300 CAPSULE ORAL at 16:56

## 2024-06-09 RX ADMIN — LEVETIRACETAM 750 MG: 100 INJECTION, SOLUTION INTRAVENOUS at 20:50

## 2024-06-09 RX ADMIN — SODIUM CHLORIDE, SODIUM GLUCONATE, SODIUM ACETATE, POTASSIUM CHLORIDE, MAGNESIUM CHLORIDE, SODIUM PHOSPHATE, DIBASIC, AND POTASSIUM PHOSPHATE 250 ML/HR: .53; .5; .37; .037; .03; .012; .00082 INJECTION, SOLUTION INTRAVENOUS at 02:18

## 2024-06-09 RX ADMIN — PROPOFOL 50 MCG/KG/MIN: 10 INJECTION, EMULSION INTRAVENOUS at 05:57

## 2024-06-09 RX ADMIN — FENTANYL CITRATE 50 MCG: 50 INJECTION INTRAMUSCULAR; INTRAVENOUS at 02:14

## 2024-06-09 RX ADMIN — POTASSIUM PHOSPHATE, MONOBASIC AND POTASSIUM PHOSPHATE, DIBASIC 30 MMOL: 224; 236 INJECTION, SOLUTION, CONCENTRATE INTRAVENOUS at 09:56

## 2024-06-09 RX ADMIN — SODIUM CHLORIDE, SODIUM GLUCONATE, SODIUM ACETATE, POTASSIUM CHLORIDE, MAGNESIUM CHLORIDE, SODIUM PHOSPHATE, DIBASIC, AND POTASSIUM PHOSPHATE 250 ML/HR: .53; .5; .37; .037; .03; .012; .00082 INJECTION, SOLUTION INTRAVENOUS at 05:55

## 2024-06-09 RX ADMIN — ALPRAZOLAM 2 MG: 0.5 TABLET ORAL at 17:01

## 2024-06-09 RX ADMIN — EZETIMIBE 10 MG: 10 TABLET ORAL at 09:08

## 2024-06-09 RX ADMIN — NICOTINE 14 MG: 14 PATCH, EXTENDED RELEASE TRANSDERMAL at 09:05

## 2024-06-09 RX ADMIN — ENOXAPARIN SODIUM 40 MG: 40 INJECTION SUBCUTANEOUS at 08:56

## 2024-06-09 RX ADMIN — ACYCLOVIR SODIUM 475 MG: 1000 INJECTION, SOLUTION INTRAVENOUS at 06:50

## 2024-06-09 RX ADMIN — DEXMEDETOMIDINE HYDROCHLORIDE 0.3 MCG/KG/HR: 400 INJECTION INTRAVENOUS at 05:57

## 2024-06-09 RX ADMIN — FENTANYL CITRATE 50 MCG: 50 INJECTION INTRAMUSCULAR; INTRAVENOUS at 03:58

## 2024-06-09 RX ADMIN — FENTANYL CITRATE 50 MCG: 50 INJECTION INTRAMUSCULAR; INTRAVENOUS at 05:55

## 2024-06-09 RX ADMIN — ALPRAZOLAM 2 MG: 0.5 TABLET ORAL at 11:03

## 2024-06-09 RX ADMIN — VANCOMYCIN HYDROCHLORIDE 750 MG: 750 INJECTION, SOLUTION INTRAVENOUS at 08:36

## 2024-06-09 NOTE — PLAN OF CARE
Recommendations:   Diet: NPO except medications   Meds: crushed with puree   Feeding assistance: 1:1  Frequent Oral care: 2-4x/day  Aspiration precautions and compensatory swallowing strategies: upright posture, only feed when fully alert, slow rate of feeding, small bites/sips, and alternating bites and sips  Other Recommendations/ considerations: SLP will continue to follow to ensure adequate management of oral intake, reevaluation for initiation of full oral diet x1-3

## 2024-06-09 NOTE — SPEECH THERAPY NOTE
Speech-Language Pathology Bedside Swallow Evaluation        Patient Name: Kirsty Em  Today's Date: 6/9/2024     Problem List  Principal Problem:    Acute metabolic encephalopathy  Active Problems:    Anxiety and depression    History of seizure disorder    Tobacco dependence    Acute respiratory failure with hypoxia (HCC)    Chronic obstructive pulmonary disease, unspecified COPD type ([facility])    Elevated transaminase level    Rhabdomyolysis    Severe sepsis ([facility])    Closed displaced fracture of proximal phalanx of right thumb    Stroke (cerebrum) ([facility])       Past Medical History  Past Medical History:   Diagnosis Date    Anxiety     Depression     Depression, recurrent (HCC) 4/20/2021    Hallucinations 4/20/2021       Past Surgical History  History reviewed. No pertinent surgical history.    Summary/Impressions:    Pt presents with s/s oropharyngeal dysphagia; this impairment is likely secondary to recent CVA, AMS.  Requires max verbal-tactile cues for engagement and participation in today's assessment.  Noted audible congestion/hoarse vocal quality prior to PO offerings.  Pt s/p extubation.  Poor reception to feeding utensils/PO offerings, appears to fluctuate.  Pt does not recognize straw, unable to initiate extraction.  Takes small quantities of puree solid, nectar thick by cup and spoon with no overt s/s of aspiration.  Initiates frequent reflexive swallows in attempt to clear secretions, some improvement in congestion following dry swallows.  O2 remains WNL.  PO trials limited by patient participation/refusal.     Suspect poor stamina, cognitive endurance for full meal at this time.  Will continue to follow closely; appears OK for small quantities puree/meds crushed.      Recommendations:   Diet: NPO except medications   Meds: crushed with puree   Feeding assistance: 1:1  Frequent Oral care: 2-4x/day  Aspiration precautions and compensatory swallowing strategies: upright posture, only feed when fully alert, slow  rate of feeding, small bites/sips, and alternating bites and sips  Other Recommendations/ considerations: SLP will continue to follow to ensure adequate management of oral intake, reevaluation for initiation of full oral diet x1-3    Current Medical Status  Pt is a 74 y.o. female who presented to [facility] with DOMINGO on benzodiazepines, COPD, HLD presenting to [facility] ED 6/7 afternoon after being found by  covered in feces at their home .  Found down for unknown quantity of time with rhabdomyolysis  Symptoms including asphasia, contracture of RUE and RLE, weakness of left side.  Pt intubated 6/7; extubated 6/9.  MRI positive for CVA.  See below for additional details.    Past medical history:  Please see H&P for details    Special Studies:  6/8/24 MRI:  Focal, subcentimeter acute to subacute infarct along the posterior left frontal cortex.  Small meningioma along the lateral left frontal convexity as suspected on the recent CT examination.  Moderate chronic microangiopathic ischemic changes.    Social/Education/Vocational Hx:  Pt lives with family    Swallow Information   Current Risks for Dysphagia & Aspiration: CVA, recent intubation, and AMS  Current Symptoms/Concerns:  AMS  Current Diet: NPO   Baseline Diet: regular diet and thin liquids    Baseline Assessment   Behavior/Cognition: decreased attention  Speech/Language Status: not able to to follow commands- pt   Patient Positioning: upright in bed    Swallow Mechanism Exam   Facial: right facial droop- limited assessment   Labial: unable to test 2/2 limited command following  Lingual: unable to test 2/2 limited command following  Velum: unable to visualize  Mandible: adequate ROM  Dentition: adequate  Vocal quality:hoarse and rough   Volitional Cough: unable to initiate volitional cough   Respiratory: MFNC    Consistencies Assessed and Performance   Consistencies Administered: thin liquids, nectar thick, honey thick, and puree    Oral Stage: Decreased  reception to various PO offerings/utensils.  Unable to extract liquid from straw.  Intermittently accepts teaspoon quantities of puree, liquids from spoon and cup.  Prolonged oral transfer.  Min retention.  Trials limited 2/2 pt participation.  Chewable solids deferred for pt safety.     Pharyngeal Stage: Laryngeal rise noted upon palpation. Swallow initiation appears delayed.  Audible congestion/wet vocal quality noted prior to PO offerings.  ?secretion mgmt.  Does improve somewhat w/ dry swallows though patient unable to follow cues for cued throat clear/re-swallow.  Remains unaware, unable to follow commands consistently.  No overt s/s of aspiration observed today.      Esophageal Concerns: none reported      Results Reviewed with: patient, RN, MD, and CRNP     Plan  Will continue to follow for x1-3  Dysphagia Goals: pt will participate in repeat swallow eval to determine safety of po intake and/or further instrumental testing.        Kirsten Marin MS, CCC-SLP  Speech-Language Pathologist  PA #[id number]  [place] #[id number]

## 2024-06-09 NOTE — RESPIRATORY THERAPY NOTE
RT Ventilator Management Note  Kirsty Em 74 y.o. female MRN: [id number]  Unit/Bed#: ICU 11 Encounter: [id number]      Daily Screen         6/8/2024 2322 6/9/2024 0429          Patient safety screen outcome:: Failed Failed (P)       Not Ready for Weaning due to:: Underline problem not resolved Underline problem not resolved (P)                 Physical Exam:   Assessment Type: (P) Assess only  General Appearance: (P) Sedated  Respiratory Pattern: (P) Assisted  Chest Assessment: (P) Chest expansion symmetrical  Bilateral Breath Sounds: (P) Diminished  O2 Device: (P) vent      Resp Comments: (P) patient remains intubated on full mechanical support, no changes were made to the patient's vent settings. ETT was repositioned to the center and remains in place with a commercial tube odom   06/09/24 0429   Respiratory Assessment   Assessment Type Assess only   General Appearance Sedated   Respiratory Pattern Assisted   Chest Assessment Chest expansion symmetrical   Bilateral Breath Sounds Diminished   Resp Comments patient remains intubated on full mechanical support, no changes were made to the patient's vent settings. ETT was repositioned to the center and remains in place with a commercial tube odom   O2 Device vent   Vent Information   Vent ID Elgin   Vent type Drager   Drager Vent Mode AC/VC+   $ Pulse Oximetry Spot Check Charge Completed   AC/VC+ Settings   Resp Rate (BPM) 18 BPM   VT (mL) 350 mL   Insp Time (S) 0.83 S   FIO2 (%) 40 %   PEEP (cmH2O) 6 cmH2O   Rise Time (%) 20 %   Trigger Sensitivity Flow (LPM) 2 LPM   Humidification Heater   Heater Temp 98.6 °F (37 °C)   AC/VC+ Actuals   Resp Rate (BPM) 18 BPM   VT (mL) 349 mL   MV (Obs) 5.66   MAP (cmH2O) 10 cmH2O   Peak Pressure (cmH2O) 25 cmH2O   I:E Ratio (Obs) 1:3   Static Compliance (mL/cmH20) 30.4 mL/cmH2O   Plateau Pressure (cm H2O) 18.7 cm H2O   Heater Temperature (Obs) 98.6 °F (37 °C)   AC/VC+ ALARMS   High Peak Pressure (cmH2O) 35 cmH2O   High Resp  Rate (BPM) 34 BPM   High MV (L/min) 12 L/min   Low MV (L/min) 3.5 L/min   High VT (mL) 750 mL   Maintenance   Alarm (pink) cable attached Yes   Resuscitation bag with peep valve at bedside Yes   Water bag changed No   Circuit changed No   Daily Screen   Patient safety screen outcome: Failed   Not Ready for Weaning due to: Underline problem not resolved   IHI Ventilator Associated Pneumonia Bundle   Daily Assessment of Readiness to Extubate Yes   Head of Bed Elevated HOB 20   ETT  Oral 7.5 mm   Placement Date/Time: 06/07/24 1342   Previously placed by?: Attending  Preoxygenated: Yes  Type: Oral  Tube Size: 7.5 mm  Location: Oral  Insertion: Atraumatic  Placement Verification: Auscultation;End tidal CO2;Chest x-ray  Secured at (cm): 23  Place...   Secured at (cm) 21   Measured from Gums   Secured Location Center   Repositioned Right to Center   Secured by Commercial tube odom   Site Condition Dry   Cuff Pressure (color) Green   HI-LO Suction  Continuous low suction   HI-LO Secretions Scant   HI-LO Intervention Patent

## 2024-06-09 NOTE — PROGRESS NOTES
On license of [facility]  Progress Note  Name: Kirsty Em I  MRN: [id number]  Unit/Bed#: ICU 11 I Date of Admission: 6/7/2024   Date of Service: 6/9/2024 I Hospital Day: 2    Assessment & Plan   * Acute metabolic encephalopathy  Assessment & Plan  74 YOF with PMHx of DOMINGO on benzodiazepines, COPD, HLD presenting to [facility] ED 6/7 afternoon after being found by  covered in feces at their home  Found down for unknown quantity of time with rhabdomyolysis  Symptoms including asphasia, contracture of RUE and RLE, weakness of left side  Stroke alert called  CTH negative  CTA with b/l proximal carotid stenosis 60-75%  For concern for seizure activity, s/p 1.5g of IV keppra, 15mg IV versed, 1mg IV ativan, started on propofol infusion given need for mechanical intubation  S/p lumbar puncture in setting of high fever, regidity  Of note with home regimen of Xanax, Seroquel, with recent emotional stressor at home with unknown pill counts    Differential includes viral meningitis vs NMS vs serotonin syndrome vs sepsis, neurology favoring seizures.  Left frontal stroke noted on MRI 6/8  Continue Keppra 500mg BID per neurology recommendations  EEG ordered and pending  Appreciate neurology assistance  Serial neuro exams  Given high benzo requirement, continue versed infusion given persistent agitation    Severe sepsis (HCC)  Assessment & Plan  POA- tachycardia, tachypnea, leukocytosis, EOD with intubation  Suspect meningitis vs nms vs serotonin syndrome vs PNA with ARHF  Febrile to 104.4 on arrival  Lactic 8.4->3.4  Initial PCL elevated  CXR unremarkable  CTH negative  CT- bladder distention  UA unremarkable  BC pending  F/u LP results  For now, ppx with menigitis coverage- cefepime, vanco, acyclovir  Follow WBC and fever curve    Acute respiratory failure with hypoxia (HCC)  Assessment & Plan  Reported hypoxia baljinder-worsening altered mental status  S/p intubation, received on mechanical ventilator  CXR consistent  with chronic changes  Currently maintained on settings  Wean as able to maintain spo2 >92%  VAP Bundle    History of seizure disorder  Assessment & Plan   reports seizure two years ago, states he is uncertain movement was ever seizure  Patient reportedly self discontinued home Keppra  S/p 1500mg IV keppra in ED  Continue keppra 750 mg BID  EEG ordered  Appreciate neurology assistance    Rhabdomyolysis  Assessment & Plan  CK noted to be 1300 on arrival, continues to rise  Found down at home for unknown quantity of time  Continue aggressive IVF administration  Follow up CK in AM    Elevated transaminase level  Assessment & Plan  In setting of unknown downtime, elevated CK  CT with evidence of hepatomegaly, hepatic steatosis  F/u transaminases in AM  Consider RUQ US if worsening    Chronic obstructive pulmonary disease, unspecified COPD type (HCC)  Assessment & Plan  Current every day smoker  Noted COPD exacerbation several years ago  Bronchodilators as needed  Hold on steroids acutely  Mechanical Ventilation as above    Tobacco dependence  Assessment & Plan  Current 1ppd smoker  Advise cessation when appropriate  NRT    Anxiety and depression  Assessment & Plan  Home regimen of Xanax, seroquel  No pill counts available, uncertain of overdose in setting of emotional stressor  Hold regimen acutely    Stroke (cerebrum) (HCC)  Assessment & Plan  MRI reveals left frontal stroke  Neurology consulted for recommendations  Continue to monitor serial exams  Currently remains intubated    Closed displaced fracture of proximal phalanx of right thumb  Assessment & Plan  Reduced and splinted by Dr. Mercer emergency room  Appreciate no other orthopedics consultation  Splinted.  For outpatient follow-up               Disposition: Critical care    ICU Core Measures     Vented Patient  VAP Bundle  VAP bundle ordered     A: Assess, Prevent, and Manage Pain Has pain been assessed? Yes  Need for changes to pain regimen? No   B:  Both Spontaneous Awakening Trials (SATs) and Spontaneous Breathing Trials (SBTs) Plan to perform spontaneous awakening trial today? Yes   Plan to perform spontaneous breathing trial today? Yes   Obvious barriers to extubation? No   C: Choice of Sedation RASS Goal: -2 Light Sedation  Need for changes to sedation or analgesia regimen? No   D: Delirium CAM-ICU: Unable to perform secondary to Acute cognitive dysfunction   E: Early Mobility  Plan for early mobility? Yes   F: Family Engagement Plan for family engagement today? Yes       Antibiotic Review: Continue broad spectrum secondary to severity of illness.     Review of Invasive Devices:    Blair Plan: Continue for accurate I/O monitoring for 48 hours        Prophylaxis:  VTE VTE covered by:  enoxaparin, Subcutaneous, 40 mg at 06/08/24 1504       Stress Ulcer  not ordered         Significant 24hr Events     24hr events: Hemodynamically stable overnight.  Requiring significant sedation.  For SBT today, however mental status may preclude liberation from the ventilator.     Subjective        Objective                            Vitals I/O      Most Recent Min/Max in 24hrs   Temp 98.1 °F (36.7 °C) Temp  Min: 97.3 °F (36.3 °C)  Max: 100 °F (37.8 °C)   Pulse (!) 46 Pulse  Min: 46  Max: 94   Resp 18 Resp  Min: 16  Max: 29   /70 BP  Min: 84/51  Max: 131/63   O2 Sat 99 % SpO2  Min: 87 %  Max: 99 %      Intake/Output Summary (Last 24 hours) at 6/9/2024 0343  Last data filed at 6/9/2024 0200  Gross per 24 hour   Intake 8061.78 ml   Output 1150 ml   Net 6911.78 ml       Diet Enteral/Parenteral; Tube Feeding No Oral Diet; Jevity 1.2 Willian; Continuous; 30    Invasive Monitoring           Physical Exam   Physical Exam  Vitals and nursing note reviewed.   Eyes:      General: No scleral icterus.     Extraocular Movements: Extraocular movements intact.      Conjunctiva/sclera: Conjunctivae normal.   Skin:     General: Skin is warm and dry.      Capillary Refill: Capillary refill  takes less than 2 seconds.   HENT:      Head: Normocephalic and atraumatic.      Mouth/Throat:      Mouth: Mucous membranes are moist.   Neck:      Vascular: No JVD.   Cardiovascular:      Rate and Rhythm: Regular rhythm. Bradycardia present.      Pulses: Normal pulses.      Heart sounds: Normal heart sounds.   Musculoskeletal:         General: Signs of injury present. Normal range of motion.      Right lower leg: No edema.      Left lower leg: No edema.      Comments: Right upper extremity immobilized due to fractured right thumb   Abdominal: General: Bowel sounds are normal. There is no distension.      Palpations: Abdomen is soft.      Tenderness: There is no abdominal tenderness. There is no guarding.   Constitutional:       General: She is not in acute distress.     Appearance: She is ill-appearing.      Interventions: She is sedated, intubated and restrained.   Pulmonary:      Effort: Pulmonary effort is normal. No accessory muscle usage, respiratory distress or accessory muscle usage. She is intubated.      Comments: Diminished throughout, no subcutaneous emphysema noted, symmetrical excursion  Secretions are normal.Chest:      Chest wall: No tenderness.   Neurological:      General: No focal deficit present.      Mental Status: She is calm.      Motor: gross motor function is at baseline for patient. No motor deficit.        Corneal reflex present, cough reflex and gag reflex intact.      Comments: Eyes open to voice, localizes, does not follow commands, nonverbal secondary to ET tube   Genitourinary/Anorectal:  Pinto present.          Diagnostic Studies      EKG: Sinus bradycardia  Imaging:  I have personally reviewed pertinent reports.   and I have personally reviewed pertinent films in PACS     Medications:  Scheduled PRN   acyclovir, 10 mg/kg (Ideal), Q12H  aspirin, 81 mg, Daily  cefTRIAXone, 2,000 mg, Q24H  chlorhexidine, 15 mL, Q12H JERMAINE  enoxaparin, 40 mg, Q24H JERMAINE  ezetimibe, 10 mg,  Daily  levETIRAcetam, 750 mg, Q12H JERMAINE  nicotine, 14 mg, Daily  nystatin, , BID  potassium chloride, 40 mEq, TID  vancomycin, 750 mg, Q12H      acetaminophen, 650 mg, Q6H PRN  albuterol, 2.5 mg, Q4H PRN  fentaNYL, 50 mcg, Q1H PRN  HYDROmorphone, 1 mg, BID PRN  midazolam, 2 mg, BID PRN       Continuous    dexmedetomidine, 0.1-0.7 mcg/kg/hr, Last Rate: 0.2 mcg/kg/hr (06/09/24 0310)  fentaNYL, 75 mcg/hr, Last Rate: 75 mcg/hr (06/08/24 2005)  multi-electrolyte, 250 mL/hr, Last Rate: 250 mL/hr (06/09/24 0218)  norepinephrine, 1-30 mcg/min, Last Rate: Stopped (06/08/24 1726)  propofol, 5-80 mcg/kg/min, Last Rate: 40 mcg/kg/min (06/09/24 0310)         Labs:    CBC    Recent Labs     06/07/24  1346 06/08/24  0452   WBC 21.54* 16.77*   HGB 15.0 11.4*   HCT 43.5 32.7*    269     BMP    Recent Labs     06/07/24  1613 06/08/24  0452   SODIUM 135 137   K 3.8 3.1*   CL 96 102   CO2 29 26   AGAP 10 9   BUN 12 15   CREATININE 0.96 0.93   CALCIUM 9.1 7.5*       Coags    Recent Labs     06/07/24  1346 06/08/24  0452   INR 0.99 1.03   PTT 24  --         Additional Electrolytes  Recent Labs     06/07/24  1344 06/08/24  0452   MG  --  1.9   PHOS  --  3.3   CAIONIZED 1.13  --           Blood Gas    No recent results  No recent results LFTs  Recent Labs     06/07/24  1613 06/08/24  0452   * 260*   * 311*   ALKPHOS 104 72   ALB 4.5 3.5   TBILI 0.87 0.34       Infectious  Recent Labs     06/07/24  1346 06/08/24  0452   PROCALCITONI 2.47* 13.81*     Glucose  Recent Labs     06/07/24  1613 06/08/24  0452   GLUC 121 97               SINDY Haynes

## 2024-06-09 NOTE — PLAN OF CARE
Problem: PAIN - ADULT  Goal: Verbalizes/displays adequate comfort level or baseline comfort level  Description: Interventions:  - Encourage patient to monitor pain and request assistance  - Assess pain using appropriate pain scale  - Administer analgesics based on type and severity of pain and evaluate response  - Implement non-pharmacological measures as appropriate and evaluate response  - Consider cultural and social influences on pain and pain management  - Notify physician/advanced practitioner if interventions unsuccessful or patient reports new pain  Outcome: Progressing     Problem: INFECTION - ADULT  Goal: Absence or prevention of progression during hospitalization  Description: INTERVENTIONS:  - Assess and monitor for signs and symptoms of infection  - Monitor lab/diagnostic results  - Monitor all insertion sites, i.e. indwelling lines, tubes, and drains  - Monitor endotracheal if appropriate and nasal secretions for changes in amount and color  - Davison appropriate cooling/warming therapies per order  - Administer medications as ordered  - Instruct and encourage patient and family to use good hand hygiene technique  - Identify and instruct in appropriate isolation precautions for identified infection/condition  Outcome: Progressing  Goal: Absence of fever/infection during neutropenic period  Description: INTERVENTIONS:  - Monitor WBC    Outcome: Progressing     Problem: SAFETY ADULT  Goal: Patient will remain free of falls  Description: INTERVENTIONS:  - Educate patient/family on patient safety including physical limitations  - Instruct patient to call for assistance with activity   - Consult OT/PT to assist with strengthening/mobility   - Keep Call bell within reach  - Keep bed low and locked with side rails adjusted as appropriate  - Keep care items and personal belongings within reach  - Initiate and maintain comfort rounds  - Make Fall Risk Sign visible to staff  - Apply yellow socks and bracelet  for high fall risk patients  - Consider moving patient to room near nurses station  Outcome: Progressing  Goal: Maintain or return to baseline ADL function  Description: INTERVENTIONS:  -  Assess patient's ability to carry out ADLs; assess patient's baseline for ADL function and identify physical deficits which impact ability to perform ADLs (bathing, care of mouth/teeth, toileting, grooming, dressing, etc.)  - Assess/evaluate cause of self-care deficits   - Assess range of motion  - Assess patient's mobility; develop plan if impaired  - Assess patient's need for assistive devices and provide as appropriate  - Encourage maximum independence but intervene and supervise when necessary  - Involve family in performance of ADLs  - Assess for home care needs following discharge   - Consider OT consult to assist with ADL evaluation and planning for discharge  - Provide patient education as appropriate  Outcome: Progressing  Goal: Maintains/Returns to pre admission functional level  Description: INTERVENTIONS:  - Perform AM-PAC 6 Click Basic Mobility/ Daily Activity assessment daily.  - Set and communicate daily mobility goal to care team and patient/family/caregiver.   - Collaborate with rehabilitation services on mobility goals if consulted  - Out of bed for toileting  - Record patient progress and toleration of activity level   Outcome: Progressing

## 2024-06-09 NOTE — PROGRESS NOTES
Kirsty Em is a 74 y.o. female who is currently ordered Vancomycin IV with management by the Pharmacy Consult service.  Relevant clinical data and objective / subjective history reviewed.  Vancomycin Assessment:  Indication and Goal AUC/Trough: CNS infection (goal -600, trough 15-20), -600, trough 15-20  Clinical Status: Critical care  Micro:     Renal Function:  SCr: 0.6 mg/dL  CrCl: 71.6 mL/min  Renal replacement: Not on dialysis  Days of Therapy: 3  Current Dose: 750mg IV q12h  Vancomycin Plan:  New Dosinmg IV q12h  Estimated AUC: 562 mcg*hr/mL  Estimated Trough: 15.5 mcg/mL  Next Level: 6/10/24 with AM labs  Renal Function Monitoring: Daily BMP and UOP  Pharmacy will continue to follow closely for s/sx of nephrotoxicity, infusion reactions and appropriateness of therapy.  BMP and CBC will be ordered per protocol. We will continue to follow the patient’s culture results and clinical progress daily.    Lorraine Avalos, Pharmacist

## 2024-06-09 NOTE — RESPIRATORY THERAPY NOTE
06/09/24 0800   Respiratory Assessment   Resp Comments patient extubated to 10L mfnc, spo2 93%   O2 Device mfnc

## 2024-06-09 NOTE — RESPIRATORY THERAPY NOTE
06/09/24 0727   Respiratory Assessment   Assessment Type Assess only   General Appearance Awake   Respiratory Pattern Assisted   Chest Assessment Chest expansion symmetrical   Bilateral Breath Sounds Diminished   Resp Comments placed on sbt 6/6 at approx 0710, spo2 91%   O2 Device vent   Vent Information   Drager Vent Mode CPAP/PS Spont   $ Vent Daily Charge-Subsequent Yes   $ Pulse Oximetry Spot Check Charge Completed   SpO2 (!) 86 %   CPAP/PS Spont Settings   FIO2 (%) 40 %   PEEP (cmH2O) 6 cmH2O   Pressure Support (cmH2O) 6 cmH20   Heater Temp 98.6 °F (37 °C)   CPAP/PS Spont Actuals   Resp Rate (BPM) 16 BPM   VT (mL) 449 mL   MV (Obs) 6.69   MAP (cmH2O) 8 cmH2O   Peak Pressure (cmH2O) 16 cmH2O   RSBI 38   Heater Temperature (Obs) 98.6 °F (37 °C)   CPAP/PS Spont Alarms   High Peak Pressure (cmH20) 35 cmH2O   High Resp Rate (BPM) 35 BPM   High MV (L/min) 12 L/min   Low MV (L/min) 3.5 L/min   High Edin VTE (mL) 750 mL   Low Edin VTE (mL) 170 mL   Maintenance   Alarm (pink) cable attached Yes   Resuscitation bag with peep valve at bedside Yes   Water bag changed No   Circuit changed No   Daily Screen   Patient safety screen outcome: Passed   IHI Ventilator Associated Pneumonia Bundle   Daily Awakening Trials Performed Yes   Daily Assessment of Readiness to Extubate Yes   Head of Bed Elevated HOB 30   ETT  Oral 7.5 mm   Placement Date/Time: 06/07/24 1342   Previously placed by?: Attending  Preoxygenated: Yes  Type: Oral  Tube Size: 7.5 mm  Location: Oral  Insertion: Atraumatic  Placement Verification: Auscultation;End tidal CO2;Chest x-ray  Secured at (cm): 23  Place...   Secured at (cm) 21   Measured from Gums   Secured by Commercial tube odom   Site Condition Dry   Cuff Pressure (color) Green   HI-LO Suction  Continuous low suction   HI-LO Secretions Scant   HI-LO Intervention Patent     RT Ventilator Management Note  Kirsty Em 74 y.o. female MRN: [id number]  Unit/Bed#: ICU 11 Encounter: [id number]      Daily  Screen         6/9/2024  0429 6/9/2024  0727          Patient safety screen outcome:: Failed Passed      Not Ready for Weaning due to:: Underline problem not resolved --                Physical Exam:   Assessment Type: Assess only  General Appearance: Awake  Respiratory Pattern: Assisted  Chest Assessment: Chest expansion symmetrical  Bilateral Breath Sounds: Diminished  O2 Device: vent      Resp Comments: placed on sbt 6/6 at approx 0710, spo2 91%

## 2024-06-09 NOTE — CONSULTS
Orthopedics   Kirsty Em 74 y.o. female MRN: [id number]  Unit/Bed#: ICU 11-01      Chief Complaint:   Right hand pain    HPI:  74 y.o. female with unknown injury noted to have deformity on evaluation. Imaging demonstrated right proximal phalanx fracture and distal phalanx fracture. Patient intubated and sedated. No family present at bedside     Review Of Systems:   Skin: with abrasions  Neuro: See HPI  Musculoskeletal: See HPI  14 point review of systems unobtainable due to pts condition    Past Medical History:   Past Medical History:   Diagnosis Date    Anxiety     Depression     Depression, recurrent (HCC) 4/20/2021    Hallucinations 4/20/2021       Past Surgical History:   History reviewed. No pertinent surgical history.    Family History:  Family history reviewed and non-contributory  Family History   Problem Relation Age of Onset    Diabetes Mother     Stroke Father     No Known Problems Sister     No Known Problems Sister     No Known Problems Sister     No Known Problems Daughter     No Known Problems Daughter     No Known Problems Daughter     No Known Problems Maternal Grandmother     No Known Problems Paternal Grandmother     No Known Problems Maternal Aunt     No Known Problems Maternal Aunt     No Known Problems Maternal Aunt     No Known Problems Maternal Aunt     No Known Problems Maternal Aunt     No Known Problems Maternal Aunt     No Known Problems Maternal Aunt     No Known Problems Maternal Aunt     No Known Problems Paternal Aunt     Breast cancer Neg Hx        Social History:  Social History     Socioeconomic History    Marital status: /Civil Union     Spouse name: None    Number of children: None    Years of education: None    Highest education level: None   Occupational History    None   Tobacco Use    Smoking status: Every Day     Current packs/day: 0.50     Types: Cigarettes    Smokeless tobacco: Never   Vaping Use    Vaping status: Never Used   Substance and Sexual Activity     Alcohol use: Not Currently    Drug use: Never    Sexual activity: None   Other Topics Concern    None   Social History Narrative    None     Social Determinants of Health     Financial Resource Strain: Medium Risk (1/1/2024)    Overall Financial Resource Strain (CARDIA)     Difficulty of Paying Living Expenses: Somewhat hard   Food Insecurity: No Food Insecurity (6/8/2024)    Hunger Vital Sign     Worried About Running Out of Food in the Last Year: Never true     Ran Out of Food in the Last Year: Never true   Transportation Needs: No Transportation Needs (6/8/2024)    PRAPARE - Transportation     Lack of Transportation (Medical): No     Lack of Transportation (Non-Medical): No   Physical Activity: Not on file   Stress: Not on file   Social Connections: Not on file   Intimate Partner Violence: Not on file   Housing Stability: Low Risk  (6/8/2024)    Housing Stability Vital Sign     Unable to Pay for Housing in the Last Year: No     Number of Times Moved in the Last Year: 0     Homeless in the Last Year: No       Allergies:   Allergies   Allergen Reactions    Lipitor [Atorvastatin] Other (See Comments)     Muscle aches            Labs:  0   Lab Value Date/Time    HCT 32.7 (L) 06/08/2024 0452    HCT 43.5 06/07/2024 1346    HCT 48 (H) 06/07/2024 1344    HCT 44.3 02/03/2023 1509    HGB 11.4 (L) 06/08/2024 0452    HGB 15.0 06/07/2024 1346    HGB 16.3 (H) 06/07/2024 1344    HGB 15.2 02/03/2023 1509    INR 1.03 06/08/2024 0452    WBC 16.77 (H) 06/08/2024 0452    WBC 21.54 (H) 06/07/2024 1346    WBC 8.04 02/03/2023 1509       Meds:    Current Facility-Administered Medications:     acetaminophen (TYLENOL) oral suspension 650 mg, 650 mg, Per NG Tube, Q6H PRN, SINDY Lamar    acyclovir (ZOVIRAX) 475 mg in sodium chloride 0.9 % 100 mL IVPB, 10 mg/kg (Ideal), Intravenous, Q12H, SINDY Resendiz, Last Rate: 100 mL/hr at 06/08/24 1808, 475 mg at 06/08/24 1808    albuterol inhalation solution 2.5 mg, 2.5 mg,  Nebulization, Q4H PRN, Micheal Mcdowell MD    aspirin chewable tablet 81 mg, 81 mg, Oral, Daily, SINDY Lamar, 81 mg at 06/08/24 1504    [START ON 6/9/2024] cefTRIAXone (ROCEPHIN) 2,000 mg in dextrose 5 % 50 mL IVPB, 2,000 mg, Intravenous, Q24H, Jill Handy MD    chlorhexidine (PERIDEX) 0.12 % oral rinse 15 mL, 15 mL, Mouth/Throat, Q12H JERMAINE, SINDY Resendiz, 15 mL at 06/08/24 2029    dexmedeTOMIDine (Precedex) 400 mcg in sodium chloride 0.9% 100 mL, 0.1-0.7 mcg/kg/hr, Intravenous, Titrated, SINDY Lamar, Last Rate: 4.7 mL/hr at 06/08/24 1806, 0.3 mcg/kg/hr at 06/08/24 1806    enoxaparin (LOVENOX) subcutaneous injection 40 mg, 40 mg, Subcutaneous, Q24H JERMAINE, SINDY Lamar, 40 mg at 06/08/24 1504    ezetimibe (ZETIA) tablet 10 mg, 10 mg, Per NG Tube, Daily, SINDY Lamar, 10 mg at 06/08/24 1504    fentaNYL 1000 mcg in sodium chloride 0.9% 100mL infusion, 75 mcg/hr, Intravenous, Continuous, SINDY Resendiz, Last Rate: 7.5 mL/hr at 06/08/24 2005, 75 mcg/hr at 06/08/24 2005    fentaNYL injection 50 mcg, 50 mcg, Intravenous, Q1H PRN, SINDY Resendiz, 50 mcg at 06/08/24 2300    HYDROmorphone (DILAUDID) injection 1 mg, 1 mg, Intravenous, BID PRN, SINDY Lamar    levETIRAcetam (KEPPRA) oral solution 750 mg, 750 mg, Oral, Q12H JERMAINE, SINDY Lamar, 750 mg at 06/08/24 2028    midazolam (VERSED) injection 2 mg, 2 mg, Intravenous, BID PRN, SINDY Lamar    multi-electrolyte (PLASMALYTE-A/ISOLYTE-S PH 7.4) IV solution, 250 mL/hr, Intravenous, Continuous, SINDY Lamar, Last Rate: 250 mL/hr at 06/08/24 2211, 250 mL/hr at 06/08/24 2211    nicotine (NICODERM CQ) 14 mg/24hr TD 24 hr patch 14 mg, 14 mg, Transdermal, Daily, SINDY Resendiz, 14 mg at 06/08/24 0802    NOREPINEPHRINE 4 MG  ML NSS (CMPD ORDER) infusion, 1-30 mcg/min, Intravenous, Titrated, SINDY Lamar, Held at 06/08/24 1726     "nystatin (MYCOSTATIN) powder, , Topical, BID, SINDY Haynes, 1 Application at 06/08/24 1808    potassium chloride oral solution 40 mEq, 40 mEq, Per NG Tube, TID, SINDY Lamar, 40 mEq at 06/08/24 2029    propofol (DIPRIVAN) 1000 mg in 100 mL infusion (premix), 5-80 mcg/kg/min, Intravenous, Titrated, SINDY Lamar, Last Rate: 11.4 mL/hr at 06/08/24 2200, 30 mcg/kg/min at 06/08/24 2200    vancomycin (VANCOCIN) IVPB (premix in dextrose) 750 mg 150 mL, 750 mg, Intravenous, Q12H, Micheal Mcdowell MD, Stopped at 06/08/24 2000    Blood Culture:   Lab Results   Component Value Date    BLOODCX No Growth at 24 hrs. 06/07/2024       Wound Culture:   No results found for: \"WOUNDCULT\"    Ins and Outs:  I/O last 24 hours:  In: 7752.6 [I.V.:6162.6; NG/GT:110; IV Piggyback:1480]  Out: 2000 [Urine:2000]          Physical Exam:   /67   Pulse (!) 48   Temp 97.7 °F (36.5 °C)   Resp 18   Ht 5' 1\" (1.549 m)   Wt 62 kg (136 lb 11 oz)   SpO2 98%   BMI 25.83 kg/m²   Gen: Intubated and sedated  Respiratory: on ventilator  Cardiovascular: Well Perfused peripherally, 2+ distal pulse  Abdomen: nondistended, no peritoneal signs  Musculoskeletal: right upper extremity  Skin intact with ecchymosis around the thumb  Musculature is soft and compressible  Brisk capillary refill    Radiology:   I personally reviewed the films.  Hand x rays AP/Lateral views showed displaced proximal phalanx of the thumb    POST reduction films demonstrate improvement in alignment of right thumb proximal phalanx fracture    Procedure: Splint application  After adequate positioning was obtained, pt was placed in a well padded thumb spica splint.  Pt tolerated the procedure well and was neurovascularly intact pre and post procedure.  Post splinting radiographs show improvement in alignment    _*_*_*_*_*_*_*_*_*_*_*_*_*_*_*_*_*_*_*_*_*_*_*_*_*_*_*_*_*_*_*_*_*_*_*_*_*_*_*_*_*    Assessment:  74 y.o.  female status post " unwitnessed injury with a right thumb proximal and distal phalanx fracture     Plan:   NWB right hand in splint   Repeat xrays in 1 week  Body mass index is 25.83 kg/m². .  Dispo: Ortho signing off    Magnus Soriano PA-C

## 2024-06-09 NOTE — PROGRESS NOTES
Progress Note - Neurology   Kirsty Em 74 y.o. female MRN: [id number]  Unit/Bed#: ICU 11 Encounter: [id number]  Assessment and plan:  Patient is a 74-year-old lady with COPD, CKD, seizure disorder, anxiety depression, and presented with AMS suspected to have a seizure and in the emergency department noted to have signs of sepsis, SIRS, acute respiratory failure for which she was admitted to critical care unit.  Patient felt to have a possible seizure on admission and was loaded with Keppra.  She has been successfully extubated as of this a.m., seems more awake, but restless requiring constant supervision.  CSF studies showed no evidence of any meningitis, and MRI of the brain with and without contrast done today per seizure protocol revealed pansinus mucosal thickening, bilateral mastoid fluid, as well as 0.9 cm left frontal convexity meningioma and the 0.7 cm focus of restricted diffusion in the posterior left frontal cortex compatible with an acute to subacute infarct.  Although unlikely that the infarct would cause her severe mental status changes.  Patient has not had any obvious seizures since on Keppra and currently antibiotics have been discontinued as per ID.  Toxic metabolic encephalopathy.  Patient remains confused and intermittently agitated.  Witnessed seizure in the setting of sepsis, with a history of seizure disorder patient restarted on Keppra.  Will recommend EEG in AM.  Acute CVA,  2D echo normal LV size, EF 55%, normal LA size, and no abnormal valvular changes except for mild to moderate mitral regurg., continue aspirin/statin therapy with addition of Plavix with DAPT for 21 days, adequate blood pressure blood sugar control, patient may also need outpatient Zio patch.      Subjective:   Patient awake, opens eyes to verbal calls, intermittently agitated seems confused, speech is coherent, and family at bedside.  No witnessed seizures, and had a lengthy discussion with the patient's family  regarding the MRI findings of meningioma as well as small acute stroke, as well as seizure disorder and the need for her to be on Keppra.    ROS: 12 system cued query was unchanged from org. consult note.    Vitals:   Vitals:    06/09/24 1435   BP:    Pulse:    Resp:    Temp:    SpO2: 96%   ,Body mass index is 25.7 kg/m².    MEDS:    Current Facility-Administered Medications:     acetaminophen (TYLENOL) tablet 650 mg, 650 mg, Oral, Q6H PRN, SINDY Lamar    albuterol inhalation solution 2.5 mg, 2.5 mg, Nebulization, Q4H PRN, Micheal Mcdowell MD    ALPRAZolam (XANAX) tablet 2 mg, 2 mg, Oral, 4x Daily, SINDY Lamar, 2 mg at 06/09/24 1103    aspirin chewable tablet 81 mg, 81 mg, Oral, Daily, SINDY Lamar, 81 mg at 06/09/24 0843    chlorhexidine (PERIDEX) 0.12 % oral rinse 15 mL, 15 mL, Mouth/Throat, Q12H JERMAINE, SINDY Resendiz, 15 mL at 06/09/24 0928    dexmedeTOMIDine (Precedex) 400 mcg in sodium chloride 0.9% 100 mL, 0.1-1.5 mcg/kg/hr, Intravenous, Titrated, SINDY Lamar, Last Rate: 23.3 mL/hr at 06/09/24 1551, 1.5 mcg/kg/hr at 06/09/24 1551    enoxaparin (LOVENOX) subcutaneous injection 40 mg, 40 mg, Subcutaneous, Q24H JERMAINE, SINDY Lamar, 40 mg at 06/09/24 0856    ezetimibe (ZETIA) tablet 10 mg, 10 mg, Per NG Tube, Daily, SINDY Lamar, 10 mg at 06/09/24 0908    gabapentin (NEURONTIN) capsule 300 mg, 300 mg, Oral, TID, SINDY Lamar, 300 mg at 06/09/24 0928    levETIRAcetam (KEPPRA) injection 750 mg, 750 mg, Intravenous, Q12H JERMAINE, SINDY Lamar, 750 mg at 06/09/24 0859    multi-electrolyte (PLASMALYTE-A/ISOLYTE-S PH 7.4) IV solution, 125 mL/hr, Intravenous, Continuous, SINDY Lamar, Last Rate: 125 mL/hr at 06/09/24 0906, 125 mL/hr at 06/09/24 0906    nicotine (NICODERM CQ) 14 mg/24hr TD 24 hr patch 14 mg, 14 mg, Transdermal, Daily, SINDY Resendiz, 14 mg at 06/09/24 0905    nystatin (MYCOSTATIN) powder, ,  Topical, BID, SINDY Haynes, Given at 06/09/24 0932    potassium phosphates 30 mmol in sodium chloride 0.9 % 250 mL infusion, 30 mmol, Intravenous, Once, SINDY Lamar, Last Rate: 41.7 mL/hr at 06/09/24 0956, 30 mmol at 06/09/24 0956    QUEtiapine (SEROquel) tablet 100 mg, 100 mg, Oral, QAM, SINDY Lamar, 100 mg at 06/09/24 0928    QUEtiapine (SEROquel) tablet 400 mg, 400 mg, Oral, HS, SINDY Lamar    Physical Exam:  General appearance: alert, appears stated age and cooperative  Head: Normocephalic, without obvious abnormality, atraumatic    Neurologic:  Seems awake, and restless since her left forearm and wrist is in a splint she attempts to pull it out, barely obeys simple commands, pupils are equally reactive, patient tracks objects, speech is mildly dysarthric but coherent, and moves all 4 extremities antigravity.  Withdraws to pain bilaterally, DTRs are 1+.  Toes are silent.    Lab Results: I have personally reviewed pertinent reports.  Imaging Studies: I have personally reviewed pertinent films in PACS        Counseling / Coordination of Care  Total time spent today 30 minutes. Greater than 50% of total time was spent with the patient and / or family counseling and / or coordination of care.      6/9/2024,3:53 PM    Dictation voice to text software has been used in the creation of this document. Please consider this in light of any contextual or grammatical errors.

## 2024-06-09 NOTE — RESPIRATORY THERAPY NOTE
RT Ventilator Management Note  Kirsty Em 74 y.o. female MRN: [id number]  Unit/Bed#: ICU 11 Encounter: [id number]      Daily Screen         6/8/2024 1929 6/8/2024 2322          Patient safety screen outcome:: Failed Failed (P)       Not Ready for Weaning due to:: Underline problem not resolved Underline problem not resolved (P)                 Physical Exam:   Assessment Type: (P) Assess only  General Appearance: (P) Sedated  Respiratory Pattern: (P) Assisted  Chest Assessment: (P) Chest expansion symmetrical  Bilateral Breath Sounds: (P) Diminished  O2 Device: (P) vent      Resp Comments: (P) no changes made to the patient's vent settings     06/08/24 2322   Respiratory Assessment   Assessment Type Assess only   General Appearance Sedated   Respiratory Pattern Assisted   Chest Assessment Chest expansion symmetrical   Bilateral Breath Sounds Diminished   Resp Comments no changes made to the patient's vent settings   O2 Device vent   Vent Information   Vent ID Petra   Vent type Drager   Drager Vent Mode AC/VC+   $ Pulse Oximetry Spot Check Charge Completed   AC/VC+ Settings   Resp Rate (BPM) 18 BPM   VT (mL) 350 mL   Insp Time (S) 0.89 S   FIO2 (%) 40 %   PEEP (cmH2O) 6 cmH2O   Rise Time (%) 20 %   Trigger Sensitivity Flow (LPM) 2 LPM   Humidification Heater   Heater Temp 98.6 °F (37 °C)   AC/VC+ Actuals   Resp Rate (BPM) 18 BPM   VT (mL) 353 mL   MV (Obs) 5.59   MAP (cmH2O) 9.9 cmH2O   Peak Pressure (cmH2O) 23 cmH2O   I:E Ratio (Obs) 1:3   Static Compliance (mL/cmH20) 34.3 mL/cmH2O   Plateau Pressure (cm H2O) 17.5 cm H2O   Heater Temperature (Obs) 98.4 °F (36.9 °C)   AC/VC+ ALARMS   High Peak Pressure (cmH2O) 35 cmH2O   High Resp Rate (BPM) 34 BPM   High MV (L/min) 12 L/min   Low MV (L/min) 3.5 L/min   High VT (mL) 750 mL   Maintenance   Alarm (pink) cable attached Yes   Resuscitation bag with peep valve at bedside Yes   Water bag changed No   Circuit changed No   Daily Screen   Patient safety screen outcome:  Failed   Not Ready for Weaning due to: Underline problem not resolved   IHI Ventilator Associated Pneumonia Bundle   Daily Assessment of Readiness to Extubate Yes   Head of Bed Elevated HOB 30   ETT  Oral 7.5 mm   Placement Date/Time: 06/07/24 1342   Previously placed by?: Attending  Preoxygenated: Yes  Type: Oral  Tube Size: 7.5 mm  Location: Oral  Insertion: Atraumatic  Placement Verification: Auscultation;End tidal CO2;Chest x-ray  Secured at (cm): 23  Place...   Secured at (cm) 21   Measured from Gums   Secured Location Right   Repositioned Left to Right   Secured by Commercial tube odom   Site Condition Dry   Cuff Pressure (color) Green   HI-LO Suction  Continuous low suction   HI-LO Secretions Scant   HI-LO Intervention Patent

## 2024-06-09 NOTE — PROGRESS NOTES
Progress Note - Infectious Disease   Kirsty Em 74 y.o. female MRN: [id number]  Unit/Bed#: ICU 11 Encounter: [id number]      Impression/Plan:    1. SIRS. Fever, tachynea, leukocytosis, lactic acidosis, rhabdomyolysis. Patient found done by her  at home. She has a history of epilepsy but stopped her AEDs.  Concern for seizure by neurology on admission. CT C/A/P without infectious source. Status post LP 6/7 which was relatively unremarkable for infection with 6 WBC, glucose 138, protein 49, RBCs. ME panel negative and gram stain without polys or bacteria. Fever and lab/CSF abnormalities may all be related to seizures. CSF not consistent with bacterial meningitis. Consideration for viral meningitis but less likely with rapid clinical improvement, negative ME panel, MRI without infectious abnormalities.  MRI does not show any stroke so suspect that presentation is related to stroke and possible precipitate seizure.  Fevers and leukocytosis resolved.              -Stop vancomycin, ceftriaxone, acyclovir              -Follow-up blood cultures and CSF cultures              -Repeat CBC tomorrow to monitor treatment response              -Monitor fever curve     2. Acute metabolic encephalopathy. Patient found down by her  at home covered in feces. CTH and CTA without evidence of stroke. Neurology concerned for seizure since patient stopped AEDs. Seizure alone can cause CPK elevation, lactic acidosis, fever.  There was also some concern for NMS versus serotonin syndrome.  Status post LP 6/7, CSF studies not consistent with bacterial meningitis, less concerning for viral etiology although could consider early viral meningitis. ME panel and CSF gram stain negative. Patient intubated for airway protection, now extubated 6/9.  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              -Hold antimicrobials   -Neurology follow-up               -follow up blood and CSF cultures     3.  Acute hypoxic respiratory failure.  Patient intubated for hypoxemia and encephalopathy.  Now extubated      4.  History of seizure disorder.  Patient with possible seizure 2 years ago and self discontinued AEDs.              -Neurology following              -Follow-up EEG     5.  Rhabdomyolysis.  Likely due to seizure, prolonged downtime.  CPK downtrending              -IVF per critical care              -Monitor CPK     6. Transaminitis. May be due to rhabdo, medications, sezure. CT with hepatomegaly and diffuse hepatic steatosis.  LFTs improving.  Monitor LFTs     7. Right thumb fracture. In setting of seizure activty. Orthopedic surgery consulted.     I have discussed the above management plan to stop antimicrobials with the critical care AP. Discussed with the patient's family at bedside. ID will follow    Antibiotics:  Acyclovir day 3  Ceftriaxone day 3  Vancomycin day 3    Subjective:  The patient was extubated this morning.  She is somewhat confused but is answering questions and interacting with her family.  She denies any headache, neck pain.  Fevers have resolved.  White blood cell count normalized.    Objective:  Vitals:  Temp:  [97.3 °F (36.3 °C)-100 °F (37.8 °C)] 97.9 °F (36.6 °C)  HR:  [44-94] 48  Resp:  [18-29] 18  BP: ()/(55-75) 130/58  SpO2:  [86 %-99 %] 93 %  Temp (24hrs), Av.1 °F (36.7 °C), Min:97.3 °F (36.3 °C), Max:100 °F (37.8 °C)  Current: Temperature: 97.9 °F (36.6 °C)    Physical Exam:   General Appearance:  Chronically ill-appearing but no acute distress   Throat: Oropharynx moist without lesions.    Lungs:   Clear to auscultation bilaterally; no wheezes, rhonchi or rales; respirations unlabored   Heart:  RRR; no murmur, rub or gallop   Abdomen:   Soft, non-tender, non-distended, positive bowel sounds.     Extremities: Right hand wrapped in a splint   Skin: No new rashes or lesions. No draining wounds noted.       Labs:   All pertinent  labs and imaging studies were personally reviewed  Results from last 7 days   Lab Units 06/09/24  0445 06/08/24  0452 06/07/24  1346   WBC Thousand/uL 10.09 16.77* 21.54*   HEMOGLOBIN g/dL 10.8* 11.4* 15.0   PLATELETS Thousands/uL 236 269 370     Results from last 7 days   Lab Units 06/09/24  0445 06/08/24  0452 06/07/24  1613 06/07/24  1613 06/07/24  1344   SODIUM mmol/L 137 137  --  135  --    POTASSIUM mmol/L 3.5 3.1*   < > 3.8  --    CHLORIDE mmol/L 106 102   < > 96  --    CO2 mmol/L 23 26   < > 29  --    CO2, I-STAT mmol/L  --   --   --   --  26   BUN mg/dL 8 15   < > 12  --    CREATININE mg/dL 0.60 0.93   < > 0.96  --    EGFR ml/min/1.73sq m 91 61   < > 59  --    GLUCOSE, ISTAT mg/dl  --   --   --   --  199*   CALCIUM mg/dL 6.9* 7.5*   < > 9.1  --    AST U/L 207* 311*  --  395*  --    ALT U/L 201* 260*   < > 379*  --    ALK PHOS U/L 66 72   < > 104  --     < > = values in this interval not displayed.     Results from last 7 days   Lab Units 06/09/24  0445 06/08/24  0452 06/07/24  1346   PROCALCITONIN ng/ml 6.11* 13.81* 2.47*                   Micro:  Results from last 7 days   Lab Units 06/07/24  1639 06/07/24  1315 06/07/24  1310   BLOOD CULTURE   --  No Growth at 24 hrs. No Growth at 24 hrs.   GRAM STAIN RESULT  No No Polys or Bacteria seen  --   --        Imaging:  I have personally reviewed pertinent imaging study reports and images in PACS.     CT C/A/P with distention of the bladder, no acute infectious abnormalities

## 2024-06-10 ENCOUNTER — APPOINTMENT (INPATIENT)
Dept: NEUROLOGY | Facility: HOSPITAL | Age: 74
DRG: 091 | End: 2024-06-10
Payer: MEDICARE

## 2024-06-10 PROBLEM — I65.23 BILATERAL CAROTID ARTERY STENOSIS: Status: ACTIVE | Noted: 2024-06-10

## 2024-06-10 LAB
ALBUMIN SERPL BCP-MCNC: 3.3 G/DL (ref 3.5–5)
ALP SERPL-CCNC: 76 U/L (ref 34–104)
ALT SERPL W P-5'-P-CCNC: 186 U/L (ref 7–52)
ANION GAP SERPL CALCULATED.3IONS-SCNC: 12 MMOL/L (ref 4–13)
AST SERPL W P-5'-P-CCNC: 176 U/L (ref 13–39)
BACTERIA CSF CULT: NO GROWTH
BASOPHILS # BLD AUTO: 0.04 THOUSANDS/ÂΜL (ref 0–0.1)
BASOPHILS NFR BLD AUTO: 0 % (ref 0–1)
BILIRUB SERPL-MCNC: 0.67 MG/DL (ref 0.2–1)
BUN SERPL-MCNC: 6 MG/DL (ref 5–25)
CALCIUM ALBUM COR SERPL-MCNC: 7.9 MG/DL (ref 8.3–10.1)
CALCIUM SERPL-MCNC: 7.3 MG/DL (ref 8.4–10.2)
CHLORIDE SERPL-SCNC: 107 MMOL/L (ref 96–108)
CO2 SERPL-SCNC: 23 MMOL/L (ref 21–32)
CREAT SERPL-MCNC: 0.5 MG/DL (ref 0.6–1.3)
EOSINOPHIL # BLD AUTO: 0.05 THOUSAND/ÂΜL (ref 0–0.61)
EOSINOPHIL NFR BLD AUTO: 0 % (ref 0–6)
ERYTHROCYTE [DISTWIDTH] IN BLOOD BY AUTOMATED COUNT: 13.2 % (ref 11.6–15.1)
GFR SERPL CREATININE-BSD FRML MDRD: 97 ML/MIN/1.73SQ M
GLUCOSE SERPL-MCNC: 83 MG/DL (ref 65–140)
HCT VFR BLD AUTO: 31.6 % (ref 34.8–46.1)
HGB BLD-MCNC: 11.1 G/DL (ref 11.5–15.4)
IMM GRANULOCYTES # BLD AUTO: 0.06 THOUSAND/UL (ref 0–0.2)
IMM GRANULOCYTES NFR BLD AUTO: 1 % (ref 0–2)
LYMPHOCYTES # BLD AUTO: 2.63 THOUSANDS/ÂΜL (ref 0.6–4.47)
LYMPHOCYTES NFR BLD AUTO: 21 % (ref 14–44)
MAGNESIUM SERPL-MCNC: 2.1 MG/DL (ref 1.9–2.7)
MCH RBC QN AUTO: 33.6 PG (ref 26.8–34.3)
MCHC RBC AUTO-ENTMCNC: 35.1 G/DL (ref 31.4–37.4)
MCV RBC AUTO: 96 FL (ref 82–98)
MONOCYTES # BLD AUTO: 0.92 THOUSAND/ÂΜL (ref 0.17–1.22)
MONOCYTES NFR BLD AUTO: 7 % (ref 4–12)
NEUTROPHILS # BLD AUTO: 8.96 THOUSANDS/ÂΜL (ref 1.85–7.62)
NEUTS SEG NFR BLD AUTO: 71 % (ref 43–75)
NRBC BLD AUTO-RTO: 0 /100 WBCS
PHOSPHATE SERPL-MCNC: 2.3 MG/DL (ref 2.3–4.1)
PLATELET # BLD AUTO: 262 THOUSANDS/UL (ref 149–390)
PMV BLD AUTO: 10 FL (ref 8.9–12.7)
POTASSIUM SERPL-SCNC: 2.9 MMOL/L (ref 3.5–5.3)
PROT SERPL-MCNC: 5.8 G/DL (ref 6.4–8.4)
RBC # BLD AUTO: 3.3 MILLION/UL (ref 3.81–5.12)
SODIUM SERPL-SCNC: 142 MMOL/L (ref 135–147)
WBC # BLD AUTO: 12.66 THOUSAND/UL (ref 4.31–10.16)

## 2024-06-10 PROCEDURE — 85025 COMPLETE CBC W/AUTO DIFF WBC: CPT | Performed by: NURSE PRACTITIONER

## 2024-06-10 PROCEDURE — 95816 EEG AWAKE AND DROWSY: CPT | Performed by: PSYCHIATRY & NEUROLOGY

## 2024-06-10 PROCEDURE — 80053 COMPREHEN METABOLIC PANEL: CPT | Performed by: NURSE PRACTITIONER

## 2024-06-10 PROCEDURE — 95816 EEG AWAKE AND DROWSY: CPT

## 2024-06-10 PROCEDURE — 99223 1ST HOSP IP/OBS HIGH 75: CPT | Performed by: PHYSICAL MEDICINE & REHABILITATION

## 2024-06-10 PROCEDURE — 92526 ORAL FUNCTION THERAPY: CPT

## 2024-06-10 PROCEDURE — 99233 SBSQ HOSP IP/OBS HIGH 50: CPT | Performed by: STUDENT IN AN ORGANIZED HEALTH CARE EDUCATION/TRAINING PROGRAM

## 2024-06-10 PROCEDURE — 94760 N-INVAS EAR/PLS OXIMETRY 1: CPT

## 2024-06-10 PROCEDURE — 83735 ASSAY OF MAGNESIUM: CPT | Performed by: NURSE PRACTITIONER

## 2024-06-10 PROCEDURE — 84100 ASSAY OF PHOSPHORUS: CPT | Performed by: NURSE PRACTITIONER

## 2024-06-10 PROCEDURE — 97167 OT EVAL HIGH COMPLEX 60 MIN: CPT

## 2024-06-10 PROCEDURE — 99233 SBSQ HOSP IP/OBS HIGH 50: CPT | Performed by: ANESTHESIOLOGY

## 2024-06-10 PROCEDURE — 99233 SBSQ HOSP IP/OBS HIGH 50: CPT | Performed by: PSYCHIATRY & NEUROLOGY

## 2024-06-10 PROCEDURE — 97163 PT EVAL HIGH COMPLEX 45 MIN: CPT

## 2024-06-10 RX ORDER — HYDRALAZINE HYDROCHLORIDE 20 MG/ML
5 INJECTION INTRAMUSCULAR; INTRAVENOUS EVERY 6 HOURS PRN
Status: DISCONTINUED | OUTPATIENT
Start: 2024-06-10 | End: 2024-06-17 | Stop reason: HOSPADM

## 2024-06-10 RX ORDER — POTASSIUM CHLORIDE 20 MEQ/1
20 TABLET, EXTENDED RELEASE ORAL
Status: COMPLETED | OUTPATIENT
Start: 2024-06-10 | End: 2024-06-10

## 2024-06-10 RX ORDER — POTASSIUM CHLORIDE 20 MEQ/1
40 TABLET, EXTENDED RELEASE ORAL ONCE
Status: COMPLETED | OUTPATIENT
Start: 2024-06-10 | End: 2024-06-10

## 2024-06-10 RX ORDER — POTASSIUM CHLORIDE 14.9 MG/ML
20 INJECTION INTRAVENOUS
Status: COMPLETED | OUTPATIENT
Start: 2024-06-10 | End: 2024-06-10

## 2024-06-10 RX ORDER — LEVETIRACETAM 500 MG/1
750 TABLET ORAL EVERY 12 HOURS SCHEDULED
Status: DISCONTINUED | OUTPATIENT
Start: 2024-06-10 | End: 2024-06-17 | Stop reason: HOSPADM

## 2024-06-10 RX ORDER — LABETALOL HYDROCHLORIDE 5 MG/ML
10 INJECTION, SOLUTION INTRAVENOUS EVERY 6 HOURS PRN
Status: DISCONTINUED | OUTPATIENT
Start: 2024-06-10 | End: 2024-06-17 | Stop reason: HOSPADM

## 2024-06-10 RX ADMIN — LABETALOL HYDROCHLORIDE 10 MG: 5 INJECTION, SOLUTION INTRAVENOUS at 14:46

## 2024-06-10 RX ADMIN — ALPRAZOLAM 2 MG: 0.5 TABLET ORAL at 13:10

## 2024-06-10 RX ADMIN — POTASSIUM PHOSPHATE, MONOBASIC AND POTASSIUM PHOSPHATE, DIBASIC 30 MMOL: 224; 236 INJECTION, SOLUTION, CONCENTRATE INTRAVENOUS at 12:06

## 2024-06-10 RX ADMIN — QUETIAPINE FUMARATE 400 MG: 100 TABLET ORAL at 21:18

## 2024-06-10 RX ADMIN — POTASSIUM CHLORIDE 20 MEQ: 1500 TABLET, EXTENDED RELEASE ORAL at 08:10

## 2024-06-10 RX ADMIN — NICOTINE 14 MG: 14 PATCH, EXTENDED RELEASE TRANSDERMAL at 08:09

## 2024-06-10 RX ADMIN — SODIUM CHLORIDE, SODIUM GLUCONATE, SODIUM ACETATE, POTASSIUM CHLORIDE, MAGNESIUM CHLORIDE, SODIUM PHOSPHATE, DIBASIC, AND POTASSIUM PHOSPHATE 50 ML/HR: .53; .5; .37; .037; .03; .012; .00082 INJECTION, SOLUTION INTRAVENOUS at 11:33

## 2024-06-10 RX ADMIN — POTASSIUM CHLORIDE 20 MEQ: 14.9 INJECTION, SOLUTION INTRAVENOUS at 08:11

## 2024-06-10 RX ADMIN — NYSTATIN: 100000 POWDER TOPICAL at 09:00

## 2024-06-10 RX ADMIN — ALPRAZOLAM 2 MG: 0.5 TABLET ORAL at 18:46

## 2024-06-10 RX ADMIN — POTASSIUM CHLORIDE 20 MEQ: 1500 TABLET, EXTENDED RELEASE ORAL at 18:47

## 2024-06-10 RX ADMIN — ASPIRIN 81 MG: 81 TABLET, CHEWABLE ORAL at 08:09

## 2024-06-10 RX ADMIN — ALPRAZOLAM 2 MG: 0.5 TABLET ORAL at 08:08

## 2024-06-10 RX ADMIN — POTASSIUM CHLORIDE 40 MEQ: 1500 TABLET, EXTENDED RELEASE ORAL at 06:09

## 2024-06-10 RX ADMIN — GABAPENTIN 300 MG: 300 CAPSULE ORAL at 21:18

## 2024-06-10 RX ADMIN — GABAPENTIN 300 MG: 300 CAPSULE ORAL at 18:47

## 2024-06-10 RX ADMIN — CLOPIDOGREL 75 MG: 75 TABLET ORAL at 08:10

## 2024-06-10 RX ADMIN — HYDRALAZINE HYDROCHLORIDE 5 MG: 20 INJECTION INTRAMUSCULAR; INTRAVENOUS at 16:35

## 2024-06-10 RX ADMIN — ENOXAPARIN SODIUM 40 MG: 40 INJECTION SUBCUTANEOUS at 08:10

## 2024-06-10 RX ADMIN — NYSTATIN: 100000 POWDER TOPICAL at 18:48

## 2024-06-10 RX ADMIN — POTASSIUM CHLORIDE 20 MEQ: 14.9 INJECTION, SOLUTION INTRAVENOUS at 06:09

## 2024-06-10 RX ADMIN — GABAPENTIN 300 MG: 300 CAPSULE ORAL at 08:09

## 2024-06-10 RX ADMIN — LEVETIRACETAM 750 MG: 500 TABLET, FILM COATED ORAL at 21:18

## 2024-06-10 RX ADMIN — EZETIMIBE 10 MG: 10 TABLET ORAL at 08:54

## 2024-06-10 RX ADMIN — CHLORHEXIDINE GLUCONATE 0.12% ORAL RINSE 15 ML: 1.2 LIQUID ORAL at 08:11

## 2024-06-10 RX ADMIN — QUETIAPINE FUMARATE 100 MG: 100 TABLET ORAL at 08:10

## 2024-06-10 RX ADMIN — POTASSIUM CHLORIDE 20 MEQ: 1500 TABLET, EXTENDED RELEASE ORAL at 13:11

## 2024-06-10 RX ADMIN — ALPRAZOLAM 2 MG: 0.5 TABLET ORAL at 21:18

## 2024-06-10 RX ADMIN — LEVETIRACETAM 750 MG: 100 INJECTION, SOLUTION INTRAVENOUS at 08:10

## 2024-06-10 NOTE — ASSESSMENT & PLAN NOTE
06/19/23      Pedro Hawley  [address]  [facility] [address]       Please call the office at [phone] with any major changes to your health if it could affect your upcoming procedure.     We understand circumstances happen; if you need to reschedule your procedure, we would appreciate a 5-day notice if possible, so we have the opportunity to adjust the schedule. -Thank you           Colonoscopy Golytely Prep     No alcohol or illegal drugs 48 hours prior to the procedure. If you consume marijuana products or \"weed\" within 24 hours prior to your procedure, your procedure will be cancelled due to hospital policy.      Please  Golytely/Dulcolax at the [facility] pharmacy.     One Week Prior to the Procedure: October 30     Stop Iron Supplements including multivitamins  Avoid eating popcorn, seeds and nuts        The Day Prior to the Procedure: November 5     Breakfast: You can have two eggs and two slices of toast with butter/margarine OR two slices of toast with butter/margarine and a cup of cottage cheese.   Lunch: One cup of macaroni and cheese OR one cup of white rice and 8oz vanilla yogurt.   Thereafter continue to consume full liquid diet which includes such things as white milk, plain vanilla ice cream, plain vanilla pudding, plain vanilla yogurt, Ensure/Boost UNTIL 6 PM.    May have clear liquid diet as stated below all day UNTIL MIDNIGHT.   Such as: coffee, tea, broth (without noodles), Jell-O (without fruit), clear fruit juice (like white grape or apple), 7-Up, Popsicles and Brant-Aid. Do not drink pulpy juices such as orange juice.   DO NOT EAT ANYTHING WITH FRUIT, VEGETABLES, NUTS OR SEEDS. AVOID ANYTHING RED OR PURPLE IN COLOR.  Take 2 Dulcolax tablets at 12:00 noon.  Drink 1/2 gallon of Golytely between 5:00 pm and 7:00 pm (you can mix with sugar-free Crystal Light or Gatorade, if desired. No red or purple colors).  Nothing to eat or drink after midnight except for the  In setting of unknown downtime, elevated CK  CT with evidence of hepatomegaly, hepatic steatosis  Improving  Continue to trend daily   remaining Golytely  in the morning as directed.     THE MORNING OF THE PROCEDURE: November 6     Drink the remaining half gallon of Golytely 5 hours prior to the procedure time (between 3:30 am and 5:30 am)  NOTHING more to eat or drink.  If unable to finish prep in the morning, or if there is formed stool present and not clear call Same Day Surgery for further instructions [phone]        If diabetic monitor blood sugars throughout morning.   Do not take oral diabetic medications the morning of your procedure.  All other prescribed medication should be taken as usual (these will not affect the prep)  Please arrange transportation back to your home following the procedure. Buses or cabs are not allowed.     Activity  Due to the medication you received during your Colonoscopy, Flexible Sigmoidoscopy or an Upper endoscopy (EGD) , do not drive a motor vehicle, operate machinery or appliances, make important decisions, sign legal documents, or drink alcohol for 24 hours.        [facility] - Same Day Surgery 2nd Floor     Date and Time: November 6 at 8:30 am (arrive at 7:30 am)            **Time is subject to change**           Reminder: please check with your Insurance Company to see if this procedure will be covered.     If you have any questions about the prep, please call our office at [phone].     Your procedure will be performed by Gastroenterologist: Rafael Hughes MD.Patient Education     Colonoscopy  Colonoscopy is a test to view the inside of your lower digestive tract (colon and rectum). Sometimes it can show the last part of the small intestine (ileum). During the test, small pieces of tissue may be removed for testing. This is called a biopsy. Small growths, such as polyps, may also be removed.      A camera attached to a flexible tube with a viewing lens is used to take video pictures.   Why is colonoscopy done?  The test is done to help look for colon cancer. And it  can help find the source of abdominal pain, bleeding, and changes in bowel habits. It may be needed once a year to every 10 years, depending on factors such as your:  Age  Health history  Family health history  Symptoms  Results from any prior colonoscopy  Risks and possible complications  These include:  Bleeding               A puncture or tear in the colon   Risks of anesthesia  A cancer lesion not being seen or fully removed  Getting ready   To prepare for the test:  Talk with your healthcare provider about the risks of the test (see below). Also ask your healthcare provider about alternatives to the test.  Tell your healthcare provider about any medicines and supplements you take. Also tell him or her about any health conditions you may have.  Make sure your rectum and colon are empty for the test. Follow the diet and bowel prep instructions exactly. If you don’t, the test may need to be rescheduled.  Plan for a friend or family member to drive you home after the test.     You may discuss the results with your doctor right away or at a future visit.  During the test   The test is usually done in the hospital on an outpatient basis or at an outpatient clinic. This means you go home the same day. The procedure takes about 30 minutes. During that time:  You are given relaxing (sedating) medicine through an IV line. You may be drowsy, or fully asleep.  The healthcare provider will first give you a physical exam to check for anal and rectal problems.  Then the anus is lubricated and the scope inserted.  If you are awake, you may have a feeling similar to needing to have a bowel movement. You may also feel pressure as air is pumped into the colon. It’s OK to pass gas during the procedure.  Biopsy, polyp removal, or other treatments may be done during the test.  After the test   You may have gas right after the test. It can help to try to pass it to help prevent later bloating. Your healthcare provider may discuss the  results with you right away. Or you may need to schedule a follow-up visit to talk about the results. After the test, you can go back to your normal eating and other activities. You may be tired from the sedation and need to rest for a few hours. Discuss your medicines with your provider to understand if they can be restarted right away.  When to call your healthcare provider  Call your healthcare provider if you have any of the following after the procedure:  Pain in your belly  Fever of 100.4°F (38°C) or higher, or as directed by your provider  Rectal bleeding  Nausea or vomiting  Jacqui last reviewed this educational content on 6/1/2019 © 2000-2021 [employer]. All rights reserved. This information is not intended as a substitute for professional medical care. Always follow your healthcare professional's instructions.          Patient Education        Upper GI Endoscopy  An upper GI endoscopy lets your healthcare provider look right into the beginning of your gastrointestinal (GI) tract. The esophagus, stomach, and the first part of the small intestine (the duodenum) make up the upper GI tract.       During endoscopy, a long, flexible tube is used to view the inside of your upper GI tract.       Before the test   Follow these and any other instructions you're given before your endoscopy. If you don’t follow the healthcare provider’s instructions carefully, the test may need to be canceled or done over:   Follow any directions you're given for not eating or drinking before your test. In some cases, you may be able to take medicines with sips of water until 2 hours before the test. Talk with your provider about this.   Bring your X-rays and any other test results you have.  Because you will be sedated, arrange for an adult to drive you home after the exam.  Tell your provider before the exam if you're taking any medicines. This includes any over-the-counter and prescription medicines, vitamins,  herbs, and supplements. Some medicines may be adjusted or stopped before the test. Don't stop any medicine unless directed by your provider.  Tell your provider if you have any health problems.  The procedure  Here is what to expect:  You will lie on the endoscopy table. People often lie on their left side.  You will be placed on a heart monitor and given oxygen.  Your throat may be numbed with a spray or gargle. You're given medicine through an IV (intravenous) line placed in your hand or arm. These medicines will help you relax and stay comfortable. You may be awake and drowsy or asleep during the test.  The healthcare provider will put a thin tube (endoscope) in your mouth and down your esophagus. This tube is thinner than most pieces of food that you swallow. It won't affect your breathing. The medicine helps keep you from gagging.  Air is put into your GI tract to expand it. It can make you burp.  During the procedure, the healthcare provider can take tissue samples (biopsies) and remove abnormalities such as small bumps or growths (polyps). The provider can also treat abnormalities using tools placed through the endoscope. You won't feel this.   The endoscope carries images of your upper GI tract to a video screen. If you're awake, you may be able to look at the images.  After the procedure is done, you'll rest and be monitored for a time. An adult must drive you home.  After the procedure, you may feel gassy for a few hours. You may have a sore throat. This should improve in 1 or 2 days.  When to call your healthcare provider  Call your healthcare provider right away if you have:   Black or tarry stools, or blood in your stool  Fever of 100.4°F (38°C) or higher, or as directed by your healthcare provider  Pain in your belly that doesn't go away  Upset stomach and vomiting, or vomiting blood  [email or web address] last reviewed this educational content on 9/1/2021 © 2000-2022 [employer]. All rights  reserved. This information is not intended as a substitute for professional medical care. Always follow your healthcare professional's instructions.

## 2024-06-10 NOTE — ASSESSMENT & PLAN NOTE
Neurology consulted,  CTA head/neck with significant carotid disease bilaterally 70% right and 65% left  MRI brain with focal acute to subacute infarct along the posterior left frontal cortex.  Continue aspirin/Plavix x 21 days then aspirin monotherapy, patient allergic to statin  ST recommending Puree diet thin liquids, no need for VBS  PT/OT recommending moderate intensity resource  CM consult for dispo planning  Patient will need follow up with neurovascular on discharge in 6 weeks

## 2024-06-10 NOTE — ASSESSMENT & PLAN NOTE
-Stroke pathway  Aspirin 81 mg and Plavix 75 mg x 21 days, then transition to aspirin monotherapy  Atorvastatin 40 mg as able  Goal normotension; avoid hypotension  Recommend Zio patch/loop recorder outpatient  Continue telemetry  PT/OT/ST  Frequent neuro checks. Continue to monitor and notify neurology with any changes.

## 2024-06-10 NOTE — CONSULTS
PHYSICAL MEDICINE AND REHABILITATION CONSULT NOTE  Kirsty Em 74 y.o. female MRN: [id number]  Unit/Bed#: ICU 11 Encounter: [id number]    Requested by (Physician/Service): Arcadio Alvarado DO  Reason for Consultation:  Assessment of rehabilitation needs  Reason for Hospitalization:  Lactic acidosis [E87.20]  Altered mental status [R41.82]  Fever [R50.9]  AMS (altered mental status) [R41.82]      History of Present Illness:  Kirsty Em is a 74 y.o. female who  has a past medical history of Anxiety, Depression, Depression, recurrent (HCC) (4/20/2021), and Hallucinations (4/20/2021). who presented to the [facility] on 6/7 after being found down by her .  Stroke alert was initiated.  Per neurology she was noted to have a left gaze deviation and stiffening of the left upper extremity at admission which resolved.  She was dysarthric but follows commands.  Patient was intubated for airway protection.  Imaging was concerning for left frontal meningioma, bilateral ICA stenosis.  Procalcitonin and lactic acid was elevated patient was noted to have a temp of 104 and leukocytosis.  Neurology suspected seizure in the setting of sepsis and toxic metabolic encephalopathy, especially regarding herself withdrawal of AEDs.  LP was performed to rule out bacterial meningitis and patient was admitted in ICU for further monitoring.  MRI of the brain was completed on 6/8 and revealed focal acute to subacute infarct along the posterior left frontal cortex.  Infectious disease was consulted and patient was started on IV antibiotics on 6/8.  Also orthopedics was consulted secondary to right hand pain and patient was noted to have deformity on evaluation.  Imaging demonstrated right proximal phalanx and distal phalanx fracture.  Patient was made nonweightbearing right hand in splint.  Routine EEG was recommended by neurology and did not reveal any ictal or interictal activity.    PM&R consulted for  rehabilitation recommendations.    Restrictions include:  none    Hospital Complications/Comorbidities:   Complications: As above  Comorbidities: As above    Morbid Obesity (BMI > 40) No     Last Weight Last BMI   Wt Readings from Last 1 Encounters:   06/09/24 61.7 kg (136 lb)    Body mass index is 25.7 kg/m².     Functional History:     Prior to Admission:     Functional Status: Patient was independent with mobility/ambulation, transfers, ADL's, IADL's.     Present:  Physical Therapy Occupational Therapy Speech Therapy   Mod assist with bed mobility and mod assist with sit to stand and stand to sit transfers. Mod assist with grooming, upper body bathing, max assist with lower body bathing, max assist with upper body dressing, max assist with lower body dressing and toileting. Tolerating dysphagia level 1 diet with puréed with nectar thick liquids.     Past Medical History:   Past Surgical History:   Family History:     Past Medical History:   Diagnosis Date    Anxiety     Depression     Depression, recurrent (HCC) 4/20/2021    Hallucinations 4/20/2021    History reviewed. No pertinent surgical history.  Family History   Problem Relation Age of Onset    Diabetes Mother     Stroke Father     No Known Problems Sister     No Known Problems Sister     No Known Problems Sister     No Known Problems Daughter     No Known Problems Daughter     No Known Problems Daughter     No Known Problems Maternal Grandmother     No Known Problems Paternal Grandmother     No Known Problems Maternal Aunt     No Known Problems Maternal Aunt     No Known Problems Maternal Aunt     No Known Problems Maternal Aunt     No Known Problems Maternal Aunt     No Known Problems Maternal Aunt     No Known Problems Maternal Aunt     No Known Problems Maternal Aunt     No Known Problems Paternal Aunt     Breast cancer Neg Hx      - No family history of neurologic diseases.      Medications:    Current Facility-Administered Medications:      acetaminophen (TYLENOL) tablet 650 mg, 650 mg, Oral, Q6H PRN, SINDY Lamar, 650 mg at 06/09/24 1654    albuterol inhalation solution 2.5 mg, 2.5 mg, Nebulization, Q4H PRN, SINDY Lamar    ALPRAZolam (XANAX) tablet 2 mg, 2 mg, Oral, 4x Daily, SINDY Lamar, 2 mg at 06/10/24 1310    aspirin chewable tablet 81 mg, 81 mg, Oral, Daily, SINDY Lamar, 81 mg at 06/10/24 0809    clopidogrel (PLAVIX) tablet 75 mg, 75 mg, Oral, Daily, SINDY Lamar, 75 mg at 06/10/24 0810    enoxaparin (LOVENOX) subcutaneous injection 40 mg, 40 mg, Subcutaneous, Q24H JERMAINE, SINDY Lamar, 40 mg at 06/10/24 0810    ezetimibe (ZETIA) tablet 10 mg, 10 mg, Per NG Tube, Daily, SINDY Lamar, 10 mg at 06/10/24 0854    gabapentin (NEURONTIN) capsule 300 mg, 300 mg, Oral, TID, SINDY Lamar, 300 mg at 06/10/24 0809    hydrALAZINE (APRESOLINE) injection 5 mg, 5 mg, Intravenous, Q6H PRN, SINDY Lamar    labetalol (NORMODYNE) injection 10 mg, 10 mg, Intravenous, Q6H PRN, SINDY Lamar, 10 mg at 06/10/24 1446    levETIRAcetam (KEPPRA) tablet 750 mg, 750 mg, Oral, Q12H JERMAINE, SINDY Lamar    multi-electrolyte (PLASMALYTE-A/ISOLYTE-S PH 7.4) IV solution, 50 mL/hr, Intravenous, Continuous, SINDY Lamar, Last Rate: 50 mL/hr at 06/10/24 1134, 50 mL/hr at 06/10/24 1134    nicotine (NICODERM CQ) 14 mg/24hr TD 24 hr patch 14 mg, 14 mg, Transdermal, Daily, SINDY Lamar, 14 mg at 06/10/24 0809    nystatin (MYCOSTATIN) powder, , Topical, BID, SINDY Lamar, Given at 06/10/24 0900    potassium chloride (Klor-Con M20) CR tablet 20 mEq, 20 mEq, Oral, TID With Meals, SINDY Lamar, 20 mEq at 06/10/24 1311    potassium phosphates 30 mmol in sodium chloride 0.9 % 250 mL infusion, 30 mmol, Intravenous, Once, SINDY Lamar, Last Rate: 41.7 mL/hr at 06/10/24 1206, 30 mmol at 06/10/24 1206    QUEtiapine (SEROquel) tablet 100 mg,  100 mg, Oral, QAM, SINDY Lamar, 100 mg at 06/10/24 0810    QUEtiapine (SEROquel) tablet 400 mg, 400 mg, Oral, JANNIE SINDY Lamar, 400 mg at 06/09/24 2100    Allergies:   Allergies   Allergen Reactions    Lipitor [Atorvastatin] Other (See Comments)     Muscle aches        Social History:    Social History     Socioeconomic History    Marital status: /Civil Union     Spouse name: None    Number of children: None    Years of education: None    Highest education level: None   Occupational History    None   Tobacco Use    Smoking status: Every Day     Current packs/day: 0.50     Types: Cigarettes    Smokeless tobacco: Never   Vaping Use    Vaping status: Never Used   Substance and Sexual Activity    Alcohol use: Not Currently    Drug use: Never    Sexual activity: None   Other Topics Concern    None   Social History Narrative    None     Social Determinants of Health     Financial Resource Strain: Medium Risk (1/1/2024)    Overall Financial Resource Strain (CARDIA)     Difficulty of Paying Living Expenses: Somewhat hard   Food Insecurity: No Food Insecurity (6/8/2024)    Hunger Vital Sign     Worried About Running Out of Food in the Last Year: Never true     Ran Out of Food in the Last Year: Never true   Transportation Needs: No Transportation Needs (6/8/2024)    PRAPARE - Transportation     Lack of Transportation (Medical): No     Lack of Transportation (Non-Medical): No   Physical Activity: Not on file   Stress: Not on file   Social Connections: Not on file   Intimate Partner Violence: Not on file   Housing Stability: Low Risk  (6/8/2024)    Housing Stability Vital Sign     Unable to Pay for Housing in the Last Year: No     Number of Times Moved in the Last Year: 0     Homeless in the Last Year: No        Kirsty Em Lives with: lives with their spouse.  She lives in a  two-level house with 4 steps to enter .      Review of Systems: A 10-point review of systems was performed. Negative except as  listed above.     Physical Exam:  Vital Signs:      Temp:  [98.1 °F (36.7 °C)-100 °F (37.8 °C)] 100 °F (37.8 °C)  HR:  [60-95] 95  Resp:  [20-33] 20  BP: (125-191)/(58-90) 175/82   Intake/Output Summary (Last 24 hours) at 6/10/2024 1634  Last data filed at 6/10/2024 1201  Gross per 24 hour   Intake 1711.87 ml   Output 7025 ml   Net -5313.13 ml        Laboratory:      Lab Results   Component Value Date    HGB 11.1 (L) 06/10/2024    HCT 31.6 (L) 06/10/2024    WBC 12.66 (H) 06/10/2024     Lab Results   Component Value Date    BUN 6 06/10/2024    K 2.9 (L) 06/10/2024     06/10/2024    GLUCOSE 199 (H) 06/07/2024    CREATININE 0.50 (L) 06/10/2024     Lab Results   Component Value Date    PROTIME 14.1 06/08/2024    INR 1.03 06/08/2024        General: alert, no apparent distress, cooperative, and comfortable  Head: Normal, normocephalic, atraumatic.  Eye: Normal external eye, conjunctiva, lids   Ears: Normal external ears  Nose: Normal external nose, mucus membranes.  Pharynx: Dental Hygiene adequate. Normal buccal mucosa. Normal pharynx.  Neck / Thyroid: Supple, no masses, nodes, nodules or enlargement.    Abdomen: soft, nontender, nondistended, no masses or organomegaly  Skin/Extremity: no rashes, no erythema, no peripheral edema    Neurologic: Seems awake and restless, obeys simple commands, tracks objects, speech is mildly dysarthric but coherent, moves all 4 extremities antigravity, withdraws to pain bilaterally.  Deep tendon reflexes are 1+.  Toes are silent.      Imaging: Reviewed  XR thumb right first digit-min 2v    Result Date: 6/10/2024  Impression: Fractures in the proximal and distal phalanges of the thumb. Workstation performed: [id number]     XR hand 2 vw right    Result Date: 6/10/2024  Impression: 1. Acute comminuted displaced fracture in the proximal aspect of the proximal phalanx of the thumb. 2. Acute fracture in the proximal aspect of the distal phalanx of the thumb. The study was marked in EPIC  for significant notification. Workstation performed: [id number]     MRI brain seizure wo and w contrast    Result Date: 6/8/2024  Impression: Focal, subcentimeter acute to subacute infarct along the posterior left frontal cortex. Small meningioma along the lateral left frontal convexity as suspected on the recent CT examination. Moderate chronic microangiopathic ischemic changes. The study was marked in EPIC for immediate notification. Workstation performed: [id number]     XR chest 1 view portable    Result Date: 6/8/2024  Impression: ET tube 2 cm above the cadence. Workstation performed: [id number]     CT chest abdomen pelvis w contrast    Result Date: 6/7/2024  Impression: Endotracheal tube approximately 2 cm above the cadence. Consider retracting by 2 cm. Hepatomegaly and diffuse hepatic steatosis. Marked distention of the urinary bladder. Recommend correlation with urinalysis to exclude infection. Endometrial thickening measuring up to 15 mm. Recommend further evaluation with pelvic ultrasound on a nonemergent basis. The study was marked in EPIC for immediate notification. Workstation performed: [id number]     CT stroke alert brain    Result Date: 6/7/2024  Impression: -No acute vascular territory infarct, or intracranial hemorrhage -Stable partially calcified 8 mm extra-axial lesion along the left frontal convexity which may represent meningioma. Recommend MRI with and without contrast for further evaluation. Findings were directly discussed with Bruce Gibbons at 2:32 p.m on 6/7/2024. Workstation performed: [id number]     CTA stroke alert (head/neck)    Result Date: 6/7/2024  Impression: Suboptimal contrast bolus within the early venous phase. CTA head: -No large vessel occlusion, high-grade stenosis or aneurysm. -Decreased attenuation of contrast within short segment of proximal left petrous segment is likely artifact given streak from left ear hardware. -Redemonstrated hyperdensity/calcification at the  vertebrobasilar junction, unchanged. No associated significant stenosis. CTA neck: -Approximate 70% right and 65% left stenosis of the proximal internal carotid arteries due to atherosclerosis. Vascular surgery consult recommended. -Moderate stenosis at the origin of the left common carotid artery due to atherosclerosis. -No significant stenosis of the vertebral arteries. Other: -3 mm left upper lobe pulmonary nodule based on current Fleischner Society 2017 Guidelines on incidental pulmonary nodule, optional follow-up CT at 12 months can be considered. Findings were directly discussed with Bruce Gibbons at 2:32 p.m on 6/7/2024. The study was marked in EPIC for immediate notification. Workstation performed: [id number]       Assessment and Recommendations:    74-year-old female presents with acute metabolic/toxic encephalopathy and acute hypoxic respiratory failure requiring mechanical ventilation.  Patient was extubated on 6/10..  Impairments:  Impaired functional mobility and ability to perform ADL's  Impaired cognition and speech    Recommendations:  - Chart reviewed  - Imaging reviewed   - Continue PT/OT/SLP while inpatient.    -Nonweightbearing right hand per Ortho.    - Pt is unable to safely return home until she is at the supervision/contact-guard functional level (25% assistance level with or without a device)  modified-independent functional level (0% assistance with a device) independent functional level (0% assistance without a device)        - Based upon patient's current functional level, we recommend referral to subacute rehabilitation facility (daily nursing care and 1-2 hours of therapy 5 days a week) to allow for achievement of modified-independent functional level.     - Disposition unclear at this point in time but inpatient rehab a possibility in the near future pending achievement of clinically stability, potential for functional progress.    Thank you for allowing the PM&R service to  participate in the care of this patient. We will continue to follow Kirsty Em's progress with you. Please do not hesitate to call with questions or concerns    ** Please Note: Fluency Direct voice to text software may have been used in the creation of this document. **

## 2024-06-10 NOTE — SPEECH THERAPY NOTE
Speech Language/Pathology     Speech/Language Pathology Progress Note     Patient Name: Kirsty Em    Today's Date: 6/10/2024     Problem List  Principal Problem:    Acute metabolic encephalopathy  Active Problems:    Anxiety and depression    History of seizure disorder    Tobacco dependence    Acute respiratory failure with hypoxia (HCC)    Chronic obstructive pulmonary disease, unspecified COPD type (HCC)    Mixed hyperlipidemia    Elevated transaminase level    Rhabdomyolysis    Closed displaced fracture of proximal phalanx of right thumb    Stroke (cerebrum) (HCC)    Pre-diabetes    Bilateral carotid artery stenosis        Recommendations:   Diet: puree/level 1 diet and nectar thick liquids   Meds: crushed with puree   Feeding Assistance: 1:1 full feed   Frequent Oral care: 2-4x/day  Aspiration precautions and compensatory swallowing strategies: upright posture, only feed when fully alert, slow rate of feeding, small bites/sips, no straws, and alternating bites and sips  Other Recommendations/ considerations: ST will continue to follow to ensure mgmt oral diet and adjust/advance as indicated x1-3; determine need for instrumental upon retaining to baseline cognitive status        Subjective:  Patient received awake, remains confused.   present for later portion of session. Pt wears partial plate (lower only) though not on site.  Does report baseline coughing w/ tea (only).  No hx difficulties swallowing otherwise.     Objective:  The following consistencies were tested puree, mech soft, honey, nectar, thin liquid   Fluctuation in bolus reception noted; pt benefits from verbal cues for engagement and command following.  Requires assist w/ self-feeding.  Prolonged oral transfer w/ occasional bolus holding; absent mastication. Pt unable to manage moistened solid.  Suspected delayed swallow initiation as per digital manipulation. Audible congestion, immediate overt cough with thin liquids (across multiple  offerings).  Cannot r/o aspiration w/ same/        Assessment:  Suspect oropharyngeal dysphagia given recent seizure, CVA; level of severity may be worsened by AMS.        Plan:  ST follow-up x2-4      Kirsten Marin MS, CCC-SLP  Speech-Language Pathologist  [place] #[id number]  [place] #[id number]

## 2024-06-10 NOTE — PLAN OF CARE
Problem: PHYSICAL THERAPY ADULT  Goal: Performs mobility at highest level of function for planned discharge setting.  See evaluation for individualized goals.  Description: Treatment/Interventions: Functional transfer training, LE strengthening/ROM, Therapeutic exercise, Endurance training, Patient/family training, Equipment eval/education, Bed mobility, Continued evaluation, Spoke to nursing, OT  Equipment Recommended:  (TBD, will require U/L AD d/t NWB R hand)       See flowsheet documentation for full assessment, interventions and recommendations.  6/10/2024 0827 by Alma Rosa Polo PT  Note: Prognosis: Good  Problem List: Decreased strength, Decreased endurance, Decreased range of motion, Impaired balance, Decreased mobility, Decreased cognition, Decreased safety awareness, Orthopedic restrictions  Assessment: Pt is 74 y.o. female seen for PT evaluation on 6/10/2024 s/p admit to [facility] on 6/7/2024 w/ Acute metabolic encephalopathy. PT was consulted to assess pt's functional mobility and d/c needs. Order placed for PT eval and tx. Unable to obtain any PLOF / home s/u information from pt at time of PT IE d/t cognitive deficits, CM to follow up. At time of eval, pt requiring mod A x2 for all phases of mobility, L UE HHA used for OOB transfer. Upon evaluation, pt presenting with impaired functional mobility d/t decreased strength, decreased ROM, decreased endurance, impaired balance, decreased mobility, decreased cognition, decreased safety awareness, orthopedic restrictions of NWB R UE, and activity intolerance. Pertinent PMHx and current co-morbidities affecting pt's physical performance at time of assessment include: acute metabolic encephalopathy, anxiety and depression, h/o seizure disorder, tobacco dependence, acute respiratory failure with hypoxia, COPD, rhabdomyoysis, R thumb proximal phalanx fracture, stroke, pre diabetes, B/L carotid artery stenosis. Personal factors affecting pt at time of eval  include: stairs to enter home, inability to ambulate household distances, inability to navigate level surfaces w/o external assistance, unable to perform dynamic tasks in community, decreased cognition, positive fall history, decreased initiation and engagement, impulsivity, and limited insight into impairments. The following objective measures performed on IE also reveal limitations: Barthel Index: 20/100, Modified [place]: 4 (moderate/severe disability), and AM-PAC 6-Clicks: 6/24. Pt's clinical presentation is currently unstable/unpredictable seen in pt's presentation of abnormal lab value(s), ongoing medical assessment, and on telemetry monitoring. Overall, pt's rehab potential and prognosis to return to PLOF is good as impacted by objective findings, warranting pt to receive further skilled PT interventions to address identified impairments, activity limitation(s), and participation restriction(s). Pt to benefit from continued PT tx to address deficits as defined above and maximize level of functional independent mobility. From PT/mobility standpoint, recommend level 2, moderate resource intensity in order to facilitate return to PLOF.  Barriers to Discharge: Inaccessible home environment, Decreased caregiver support     Rehab Resource Intensity Level, PT: II (Moderate Resource Intensity)    See flowsheet documentation for full assessment.     6/10/2024 0827 by Alma Rosa Polo PT  Note: Prognosis: Good  Problem List: Decreased strength, Decreased endurance, Decreased range of motion, Impaired balance, Decreased mobility, Decreased cognition, Decreased safety awareness, Orthopedic restrictions  Assessment: Pt is 74 y.o. female seen for PT evaluation on 6/10/2024 s/p admit to [facility] on 6/7/2024 w/ Acute metabolic encephalopathy. PT was consulted to assess pt's functional mobility and d/c needs. Order placed for PT eval and tx. Unable to obtain any PLOF / home s/u information from pt at time of PT IE d/t  cognitive deficits, CM to follow up. At time of eval, pt requiring mod A x2 for all phases of mobility, L UE HHA used for OOB transfer. Upon evaluation, pt presenting with impaired functional mobility d/t decreased strength, decreased ROM, decreased endurance, impaired balance, decreased mobility, decreased cognition, decreased safety awareness, orthopedic restrictions of NWB R UE, and activity intolerance. Pertinent PMHx and current co-morbidities affecting pt's physical performance at time of assessment include: acute metabolic encephalopathy, anxiety and depression, h/o seizure disorder, tobacco dependence, acute respiratory failure with hypoxia, COPD, rhabdomyoysis, R thumb proximal phalanx fracture, stroke, pre diabetes, B/L carotid artery stenosis. Personal factors affecting pt at time of eval include: stairs to enter home, inability to ambulate household distances, inability to navigate level surfaces w/o external assistance, unable to perform dynamic tasks in community, decreased cognition, positive fall history, decreased initiation and engagement, impulsivity, and limited insight into impairments. The following objective measures performed on IE also reveal limitations: Barthel Index: 20/100, Modified [place]: 4 (moderate/severe disability), and AM-PAC 6-Clicks: 6/24. Pt's clinical presentation is currently unstable/unpredictable seen in pt's presentation of abnormal lab value(s), ongoing medical assessment, and on telemetry monitoring. Overall, pt's rehab potential and prognosis to return to PLOF is good as impacted by objective findings, warranting pt to receive further skilled PT interventions to address identified impairments, activity limitation(s), and participation restriction(s). Pt to benefit from continued PT tx to address deficits as defined above and maximize level of functional independent mobility. From PT/mobility standpoint, recommend level 2, moderate resource intensity in order to  facilitate return to PLOF.  Barriers to Discharge: Inaccessible home environment, Decreased caregiver support     Rehab Resource Intensity Level, PT: II (Moderate Resource Intensity)    See flowsheet documentation for full assessment.

## 2024-06-10 NOTE — ASSESSMENT & PLAN NOTE
Presents with confusion from home, aphasia and weakness  Slowly improving; likely in setting of subacute CVA vs seizure  Acute infectious source ruled out   Urinary retention persists, continue main on discharge with outpatient voiding trial   Frequent neurologic checks  Delirium precautions, 1:1 discontinued

## 2024-06-10 NOTE — RESPIRATORY THERAPY NOTE
06/09/24 2101   Respiratory Assessment   Assessment Type Assess only   General Appearance Drowsy   Respiratory Pattern Normal   Chest Assessment Chest expansion symmetrical   Bilateral Breath Sounds Diminished;Coarse   Resp Comments pt remains on MFNC titrated flow to 10L   Oxygen Therapy/Pulse Ox   O2 Device Mid flow nasal cannula   O2 Flow Rate (L/min) 10 L/min   SpO2 Activity At Rest   $ Pulse Oximetry Spot Check Charge Completed

## 2024-06-10 NOTE — ASSESSMENT & PLAN NOTE
Reduced and splinted by Dr. Mercer emergency room  Ortho consulted, appreciate input  Splinted on 6/8  Outpatient follow-up

## 2024-06-10 NOTE — ASSESSMENT & PLAN NOTE
Patient reportedly self-discontinued Keppra at home  Cannot rule out seizure as etiology of presentation  Possibly related to Xanax withdrawal?  Continue Keppra  EEG abnormal, will need ambulatory EEG per neuro

## 2024-06-10 NOTE — RESPIRATORY THERAPY NOTE
06/10/24 0101   Respiratory Assessment   Resp Comments titrated patient's liter flow to 8L   Oxygen Therapy/Pulse Ox   O2 Device Mid flow nasal cannula   O2 Therapy Oxygen humidified   O2 Flow Rate (L/min) (S)  8 L/min   SpO2 Activity At Rest   $ Pulse Oximetry Spot Check Charge Completed

## 2024-06-10 NOTE — ASSESSMENT & PLAN NOTE
Home regimen of Xanax and seroquel  No pill counts available, uncertain of overdose in setting of emotional stressor  Restarted home regimen

## 2024-06-10 NOTE — PHYSICAL THERAPY NOTE
Physical Therapy Evaluation     Patient's Name: Kirsty Em    Admitting Diagnosis  Lactic acidosis [E87.20]  Altered mental status [R41.82]  Fever [R50.9]  AMS (altered mental status) [R41.82]    Problem List  Patient Active Problem List   Diagnosis    Anxiety and depression    History of seizure disorder    Tobacco dependence    Acute respiratory failure with hypoxia (HCC)    Chronic obstructive pulmonary disease, unspecified COPD type (HCC)    Mixed hyperlipidemia    Acute metabolic encephalopathy    Elevated transaminase level    Rhabdomyolysis    Closed displaced fracture of proximal phalanx of right thumb    Stroke (cerebrum) (Formerly [facility])    Pre-diabetes    Bilateral carotid artery stenosis       Past Medical History  Past Medical History:   Diagnosis Date    Anxiety     Depression     Depression, recurrent (HCC) 4/20/2021    Hallucinations 4/20/2021       Past Surgical History  History reviewed. No pertinent surgical history.       06/10/24 0753   PT Last Visit   PT Visit Date 06/10/24   Note Type   Note type Evaluation   Pain Assessment   Pain Assessment Tool 0-10   Pain Score   (pt denying)   Pain Rating: FLACC (Rest) - Face 0   Pain Rating: FLACC (Rest) - Legs 0   Pain Rating: FLACC (Rest) - Activity 0   Pain Rating: FLACC (Rest) - Cry 0   Pain Rating: FLACC (Rest) - Consolability 0   Score: FLACC (Rest) 0   Pain Rating: FLACC (Activity) - Face 0   Pain Rating: FLACC (Activity) - Legs 0   Pain Rating: FLACC (Activity) - Activity 1   Pain Rating: FLACC (Activity) - Cry 0   Pain Rating: FLACC (Activity) - Consolability 1   Score: FLACC (Activity) 2   Restrictions/Precautions   Weight Bearing Precautions Per Order Yes   RUE Weight Bearing Per Order NWB  (per ortho: NWB R hand)   Braces or Orthoses Splint  (R hand)   Other Precautions Cognitive;1:1;Chair Alarm;Bed Alarm;Multiple lines;O2;Fall Risk;Telemetry;Seizure;Impulsive;Hard of hearing  (6L O2 MFNC)   Home Living   Type of Home House   Home Layout Two  "level;Bed/bath upstairs  (4 DAIANA)   Bathroom Equipment Other (Comment)  (no DME at baseline)   Home Equipment Other (Comment)  (no AD at baseline)   Prior Function   Level of [place] Independent with ADLs;Independent with functional mobility   Lives With Spouse   Receives Help From Family   Falls in the last 6 months 1 to 4  (unknown, at least 1 where she was found on the floor PTA)   Comments Unable to obtain any PLOF / home s/u information from pt at time of PT IE d/t cognitive deficits, CM to follow up.  Information listed above obtained from CM note from 24   General   Family/Caregiver Present No   Cognition   Overall Cognitive Status Impaired   Arousal/Participation Arousable   Orientation Level Oriented to person;Disoriented to time;Disoriented to situation  (oriented to first name only, not  or last name,  did acknowledge \"hospital\" when provided)   Memory Decreased recall of biographical information;Decreased short term memory;Decreased recall of recent events;Decreased recall of precautions   Following Commands Follows one step commands with increased time or repetition   Comments pt agreeable to PT evaluation, impulsive at times, noted poor safety awareness requiring frequent vc and tactile cues for safety   RLE Assessment   RLE Assessment   (observed grossly 3+/5 with functional mobility, unable to formally evaluate d/t cognitive deficits, recommend continued assessment as appropriate)   LLE Assessment   LLE Assessment   (observed grossly 3+/5 with functional mobility, unable to formally evaluate d/t cognitive deficits, recommend continued assessment as appropriate)   Coordination   Movements are Fluid and Coordinated   (unable to formally assess d/t cognitive deficits, recommend continued assessment)   Sensation   (unable to formally assess d/t cognitive deficits, recommend continued assessment)   Bed Mobility   Supine to Sit 3  Moderate assistance   Additional items Assist x 2;HOB " elevated;Increased time required;Verbal cues;LE management   Transfers   Sit to Stand 3  Moderate assistance   Additional items Assist x 2;Increased time required;Verbal cues  (L UE HHA)   Stand to Sit 3  Moderate assistance   Additional items Assist x 2;Increased time required;Verbal cues   Stand pivot 3  Moderate assistance   Additional items Assist x 2;Increased time required;Verbal cues  (L UE HHA)   Ambulation/Elevation   Gait pattern Not tested   Balance   Static Sitting Fair   Dynamic Sitting Fair -   Static Standing Poor   Dynamic Standing Poor   Ambulatory   (DNT)   Endurance Deficit   Endurance Deficit Yes   Activity Tolerance   Activity Tolerance Patient limited by fatigue;Treatment limited secondary to medical complications (Comment)  (cognition)   Medical Staff Made Aware Pt seen as a co-eval with OT due to the patient's co-morbidities, clinically unstable presentation, and present impairments which are a regression from the patient's baseline.   Nurse Made Aware KUSH Cooper   Assessment   Prognosis Good   Problem List Decreased strength;Decreased endurance;Decreased range of motion;Impaired balance;Decreased mobility;Decreased cognition;Decreased safety awareness;Orthopedic restrictions   Assessment Pt is 74 y.o. female seen for PT evaluation on 6/10/2024 s/p admit to [facility] on 6/7/2024 w/ Acute metabolic encephalopathy. PT was consulted to assess pt's functional mobility and d/c needs. Order placed for PT eval and tx. Unable to obtain any PLOF / home s/u information from pt at time of PT IE d/t cognitive deficits, CM to follow up. At time of eval, pt requiring mod A x2 for all phases of mobility, L UE HHA used for OOB transfer. Upon evaluation, pt presenting with impaired functional mobility d/t decreased strength, decreased ROM, decreased endurance, impaired balance, decreased mobility, decreased cognition, decreased safety awareness, orthopedic restrictions of NWB R UE, and activity  intolerance. Pertinent PMHx and current co-morbidities affecting pt's physical performance at time of assessment include: acute metabolic encephalopathy, anxiety and depression, h/o seizure disorder, tobacco dependence, acute respiratory failure with hypoxia, COPD, rhabdomyoysis, R thumb proximal phalanx fracture, stroke, pre diabetes, B/L carotid artery stenosis. Personal factors affecting pt at time of eval include: stairs to enter home, inability to ambulate household distances, inability to navigate level surfaces w/o external assistance, unable to perform dynamic tasks in community, decreased cognition, positive fall history, decreased initiation and engagement, impulsivity, and limited insight into impairments. The following objective measures performed on IE also reveal limitations: Barthel Index: 20/100, Modified Jones: 4 (moderate/severe disability), and AM-PAC 6-Clicks: 6/24. Pt's clinical presentation is currently unstable/unpredictable seen in pt's presentation of abnormal lab value(s), ongoing medical assessment, and on telemetry monitoring. Overall, pt's rehab potential and prognosis to return to PLOF is good as impacted by objective findings, warranting pt to receive further skilled PT interventions to address identified impairments, activity limitation(s), and participation restriction(s). Pt to benefit from continued PT tx to address deficits as defined above and maximize level of functional independent mobility. From PT/mobility standpoint, recommend level 2, moderate resource intensity in order to facilitate return to PLOF.   Barriers to Discharge Inaccessible home environment;Decreased caregiver support   Goals   Patient Goals none expressed   STG Expiration Date 06/20/24   Short Term Goal #1 In 7-10 days: Increase bilateral LE strength 1/2 grade to facilitate independent mobility, Perform all bed mobility tasks with mod A of 1 to decrease caregiver burden, Perform all transfers with mod A of 1  to improve independence, Increase static and dynamic sitting and static and dynamic standing balance 1/2 grade to decrease risk for falls, Improve Barthel Index score to 35 or greater to facilitate independence, PT provider will perform functional balance assessment to determine fall risk, and PT to see and establish goals for ambulation, ambulatory balance, elevations/stairs when appropriate   PT Treatment Day 0   Plan   Treatment/Interventions Functional transfer training;LE strengthening/ROM;Therapeutic exercise;Endurance training;Patient/family training;Equipment eval/education;Bed mobility;Continued evaluation;Spoke to nursing;OT   PT Frequency 3-5x/wk   Discharge Recommendation   Rehab Resource Intensity Level, PT II (Moderate Resource Intensity)   Equipment Recommended   (TBD, will require U/L AD d/t NWB R hand)   AM-PAC Basic Mobility Inpatient   Turning in Flat Bed Without Bedrails 1   Lying on Back to Sitting on Edge of Flat Bed Without Bedrails 1   Moving Bed to Chair 1   Standing Up From Chair Using Arms 1   Walk in Room 1   Climb 3-5 Stairs With Railing 1   Basic Mobility Inpatient Raw Score 6   Turning Head Towards Sound 2   Follow Simple Instructions 2   Low Function Basic Mobility Raw Score  10   Low Function Basic Mobility Standardized Score  14.65   [facility] Highest Level Of Mobility   -[facility] Goal 2: Bed activities/Dependent transfer   -HL Achieved 4: Move to chair/commode   Modified Gerry Scale   Modified Bacon Scale 4   Barthel Index   Feeding 5   Bathing 0   Grooming Score 0   Dressing Score 0   Bladder Score 0   Bowels Score 5   Toilet Use Score 5   Transfers (Bed/Chair) Score 5   Mobility (Level Surface) Score 0   Stairs Score 0   Barthel Index Score 20         Alma Rosa Polo, PT, DPT

## 2024-06-10 NOTE — PROGRESS NOTES
Progress Note - Neurology   Kirsty Em 74 y.o. female [id number]  Unit/Bed#: ICU 11/ICU 11    Assessment:    * Acute metabolic encephalopathy  Assessment & Plan  74 y.o.  female with COPD, stage 3 CKD, reported history of seizure disorder, tobacco dependence, anxiety, depression, HLD, who presented to [facility] on 6/7/[facility] after being found at home with AMS (found on floor with feces all over). A stroke alert was initiated by the ED team. BP on arrival 151/94. CTH revealed no acute intracranial abnormality. CTA H/N wwo contrast revealed no IR target. Pt was not a thrombolytic candidate due to being outside of thrombolytic time window.     Workup:  -CT head:  -No acute vascular territory infarct, or intracranial hemorrhage  -Stable partially calcified 8 mm extra-axial lesion along the left frontal convexity which may represent meningioma.  -CTA head and neck:  Suboptimal contrast bolus within the early venous phase.  CTA head:  -No large vessel occlusion, high-grade stenosis or aneurysm.  -Decreased attenuation of contrast within short segment of proximal left petrous segment is likely artifact given streak from left ear hardware.  -Redemonstrated hyperdensity/calcification at the vertebrobasilar junction, unchanged. No associated significant stenosis.  CTA neck:  -Approximate 70% right and 65% left stenosis of the proximal internal carotid arteries due to atherosclerosis. Vascular surgery consult recommended.  -Moderate stenosis at the origin of the left common carotid artery due to atherosclerosis.  -No significant stenosis of the vertebral arteries.  Other:  -3 mm left upper lobe pulmonary nodule based on current Fleischner Society 2017 Guidelines on incidental pulmonary nodule, optional follow-up CT at 12 months can be considered.  -MRI brain w/wo contrast:  Focal, subcentimeter acute to subacute infarct along the posterior left frontal cortex.  Small meningioma along the lateral left frontal convexity as suspected on  the recent CT examination.  Moderate chronic microangiopathic ischemic changes.  -Echo: EF 55%. No regional wall motion abnormality. Bilateral atrium size normal.  -Labs on presentation: WBC 21.54, coma panel negative, lactic acid 8.4, procalcitonin 2.47, UA negative, UDS positive for benzos, troponin 54, , , CK 1343, ammonia 33  -CSF studies: glucose 138, protein 49, gram stain negative, culture and gram stain negative, WBC 6, RBC 12, ME panel negative  -Other labs: Keppra level 18.7, LDL 75, A1C 6.3    Plan:   -S/p Keppra 1/5 g bolus x 1; continue Keppra 750 mg BID  -S/p aspirin 300 mg MN x 1; continue aspirin 81 mg and Plavix 75 mg x 21 days, then transition to aspirin monotherapy  -euglycemic/normothermic  goal   -Routine EEG pending official read  -telemetry   -[employer] [place] completed and [place].  made aware of driving restrictions. Will need to discuss driving restrictions with patient once mental status improves.  -seizure precautions   -PT/OT/ST when appropriate  -Frequent neuro checks. Continue to monitor and notify neurology with any changes.  -Medical management and supportive care per critical care team. Correction of any metabolic or infectious disturbances.     Bilateral carotid artery stenosis  Assessment & Plan  -Per attending neurologist, recommend vascular evaluation when able  -Medical management per primary team    Stroke (cerebrum) (HCC)  Assessment & Plan  -Stroke pathway  Aspirin 81 mg and Plavix 75 mg x 21 days, then transition to aspirin monotherapy  Atorvastatin 40 mg as able  Goal normotension; avoid hypotension  Recommend Zio patch/loop recorder outpatient  Continue telemetry  PT/OT/ST  Frequent neuro checks. Continue to monitor and notify neurology with any changes.        Kirsty Em will need follow up in in 6 weeks with neurovascular attending .  She will not require outpatient neurological testing.     Case and treatment plan reviewed with attending  neurologist, Dr. Osman. Please see attending attestation for any further recommendations.    Subjective:   Patient seen and evaluated with attending neurologist.  at bedside and notes that prior to admission, she was walking around like a robot, walking shakily, and was confused and not making sense. She had bowel incontinence while walking around the room. In the past, her seizures would involve patient being stiff and having shaking but  notes she was able to hear him and he was able to talk her out of it. She would not be able to communicate though. Her eyes would be open. This time, he did not note any shaking or jerking. Patient seemed herself to  on 6/6 but he found her altered on 6/7. No recent illnesses that  is aware of. Patient has been off Keppra for a long time now since she has not had a seizure since she was admitted a year ago.  reports he takes care of her medications and notes she did not miss any meds or overtake any meds. He feels that her mental status seems improved today. Patient states she is tired.      Past Medical History:   Diagnosis Date    Anxiety     Depression     Depression, recurrent (HCC) 4/20/2021    Hallucinations 4/20/2021     History reviewed. No pertinent surgical history.  Family History   Problem Relation Age of Onset    Diabetes Mother     Stroke Father     No Known Problems Sister     No Known Problems Sister     No Known Problems Sister     No Known Problems Daughter     No Known Problems Daughter     No Known Problems Daughter     No Known Problems Maternal Grandmother     No Known Problems Paternal Grandmother     No Known Problems Maternal Aunt     No Known Problems Maternal Aunt     No Known Problems Maternal Aunt     No Known Problems Maternal Aunt     No Known Problems Maternal Aunt     No Known Problems Maternal Aunt     No Known Problems Maternal Aunt     No Known Problems Maternal Aunt     No Known Problems Paternal Aunt     Breast  cancer Neg Hx      Social History     Socioeconomic History    Marital status: /Civil Union     Spouse name: None    Number of children: None    Years of education: None    Highest education level: None   Occupational History    None   Tobacco Use    Smoking status: Every Day     Current packs/day: 0.50     Types: Cigarettes    Smokeless tobacco: Never   Vaping Use    Vaping status: Never Used   Substance and Sexual Activity    Alcohol use: Not Currently    Drug use: Never    Sexual activity: None   Other Topics Concern    None   Social History Narrative    None     Social Determinants of Health     Financial Resource Strain: Medium Risk (1/1/2024)    Overall Financial Resource Strain (CARDIA)     Difficulty of Paying Living Expenses: Somewhat hard   Food Insecurity: No Food Insecurity (6/8/2024)    Hunger Vital Sign     Worried About Running Out of Food in the Last Year: Never true     Ran Out of Food in the Last Year: Never true   Transportation Needs: No Transportation Needs (6/8/2024)    PRAPARE - Transportation     Lack of Transportation (Medical): No     Lack of Transportation (Non-Medical): No   Physical Activity: Not on file   Stress: Not on file   Social Connections: Not on file   Intimate Partner Violence: Not on file   Housing Stability: Low Risk  (6/8/2024)    Housing Stability Vital Sign     Unable to Pay for Housing in the Last Year: No     Number of Times Moved in the Last Year: 0     Homeless in the Last Year: No         Medications:  All current active meds have been reviewed and current meds:  Scheduled Meds:  Current Facility-Administered Medications   Medication Dose Route Frequency Provider Last Rate    acetaminophen  650 mg Oral Q6H PRN SINDY Lamar      albuterol  2.5 mg Nebulization Q4H PRN SINDY Lamar      ALPRAZolam  2 mg Oral 4x Daily SINDY Lamar      aspirin  81 mg Oral Daily SINDY Lamar      clopidogrel  75 mg Oral Daily Yusef Rodriguez  "CRNP      enoxaparin  40 mg Subcutaneous Q24H JERMAINE SINDY Lamar      ezetimibe  10 mg Per NG Tube Daily SINDY Lamar      gabapentin  300 mg Oral TID SINDY Lamar      hydrALAZINE  5 mg Intravenous Q6H PRN SINDY Lamar      labetalol  10 mg Intravenous Q6H PRN SINDY Lamar      levETIRAcetam  750 mg Oral Q12H JERMAINE SINDY Lamar      multi-electrolyte  50 mL/hr Intravenous Continuous SINDY Lamar 50 mL/hr (06/10/24 1134)    nicotine  14 mg Transdermal Daily SINDY Lamar      nystatin   Topical BID SINDY Lamar      potassium chloride  20 mEq Oral TID With Meals SINDY Lamar      potassium phosphate  30 mmol Intravenous Once SINDY Lamar 30 mmol (06/10/24 1206)    QUEtiapine  100 mg Oral QAM SINDY Lamar      QUEtiapine  400 mg Oral HS SINDY Lamar       Continuous Infusions:multi-electrolyte, 50 mL/hr, Last Rate: 50 mL/hr (06/10/24 1134)      PRN Meds:.  acetaminophen    albuterol    hydrALAZINE    labetalol       ROS:   Review of Systems   Unable to perform ROS: Mental status change       Vitals:   /74   Pulse 94   Temp 98.1 °F (36.7 °C) (Oral)   Resp (!) 27   Ht 5' 1\" (1.549 m)   Wt 61.7 kg (136 lb)   SpO2 96%   BMI 25.70 kg/m²       Physical and neurologic exam performed by attending neurologist:   Physical Exam  Vitals and nursing note reviewed.   Constitutional:       General: She is not in acute distress.     Appearance: She is not ill-appearing or diaphoretic.   HENT:      Head: Normocephalic.      Mouth/Throat:      Mouth: Mucous membranes are moist.      Pharynx: Oropharynx is clear.   Eyes:      General: No scleral icterus.        Right eye: No discharge.         Left eye: No discharge.      Conjunctiva/sclera: Conjunctivae normal.   Cardiovascular:      Rate and Rhythm: Normal rate.   Pulmonary:      Effort: Pulmonary effort is normal. No respiratory distress.      " Comments: Nasal cannula in place  Skin:     General: Skin is warm and dry.      Coloration: Skin is not jaundiced or pale.      Comments: R arm dressing intact   Neurological:      Mental Status: She is alert.      Coordination: Finger-nose-finger test: R normal, unable to perform L.      Deep Tendon Reflexes:      Reflex Scores:       Bicep reflexes are 1+ on the right side and 1+ on the left side.       Brachioradialis reflexes are 1+ on the right side and 1+ on the left side.       Patellar reflexes are 1+ on the right side and 2+ on the left side.       Achilles reflexes are 1+ on the right side and 2+ on the left side.      Neurologic Exam     Mental Status   Attention: decreased. Concentration: decreased.   Level of consciousness: alert  Able to state her first name. Able to state her 's name. Unable to state her , location, current month, current season, current POTUS. No dysarthria noted. Able to count correctly. Able to follow commands but requires encouragement and cueing.     Cranial Nerves   Tracks examiner bilaterally from side to side  No gaze preference noted  PERRL  EOMs intact  Face appears symmetric  Bilateral blink to threat  Tongue midline     Motor Exam   Muscle bulk: normal  Bilateral UE strength 5/5 biceps, triceps, hand   Able to raise bilateral UE up but has more difficulty with LUE (although limited due to poor effort)  Able to raise bilateral LE antigravity (3-/5) but difficult to perform formal strength testing due to poor effort/position  Bilateral hip extension 5/5     Sensory Exam   Light touch normal.     Gait, Coordination, and Reflexes     Coordination   Finger-nose-finger test: R normal, unable to perform L.    Tremor   Resting tremor: absent    Reflexes   Right brachioradialis: 1+  Left brachioradialis: 1+  Right biceps: 1+  Left biceps: 1+  Right patellar: 1+  Left patellar: 2+  Right achilles: 1+  Left achilles: 2+  Right ankle clonus: absent  Left ankle clonus:  absent        Labs: I have personally reviewed pertinent reports.   Recent Results (from the past 24 hour(s))   CBC and differential    Collection Time: 06/10/24  4:30 AM   Result Value Ref Range    WBC 12.66 (H) 4.31 - 10.16 Thousand/uL    RBC 3.30 (L) 3.81 - 5.12 Million/uL    Hemoglobin 11.1 (L) 11.5 - 15.4 g/dL    Hematocrit 31.6 (L) 34.8 - 46.1 %    MCV 96 82 - 98 fL    MCH 33.6 26.8 - 34.3 pg    MCHC 35.1 31.4 - 37.4 g/dL    RDW 13.2 11.6 - 15.1 %    MPV 10.0 8.9 - 12.7 fL    Platelets 262 149 - 390 Thousands/uL    nRBC 0 /100 WBCs    Segmented % 71 43 - 75 %    Immature Grans % 1 0 - 2 %    Lymphocytes % 21 14 - 44 %    Monocytes % 7 4 - 12 %    Eosinophils Relative 0 0 - 6 %    Basophils Relative 0 0 - 1 %    Absolute Neutrophils 8.96 (H) 1.85 - 7.62 Thousands/µL    Absolute Immature Grans 0.06 0.00 - 0.20 Thousand/uL    Absolute Lymphocytes 2.63 0.60 - 4.47 Thousands/µL    Absolute Monocytes 0.92 0.17 - 1.22 Thousand/µL    Eosinophils Absolute 0.05 0.00 - 0.61 Thousand/µL    Basophils Absolute 0.04 0.00 - 0.10 Thousands/µL   Comprehensive metabolic panel    Collection Time: 06/10/24  4:30 AM   Result Value Ref Range    Sodium 142 135 - 147 mmol/L    Potassium 2.9 (L) 3.5 - 5.3 mmol/L    Chloride 107 96 - 108 mmol/L    CO2 23 21 - 32 mmol/L    ANION GAP 12 4 - 13 mmol/L    BUN 6 5 - 25 mg/dL    Creatinine 0.50 (L) 0.60 - 1.30 mg/dL    Glucose 83 65 - 140 mg/dL    Calcium 7.3 (L) 8.4 - 10.2 mg/dL    Corrected Calcium 7.9 (L) 8.3 - 10.1 mg/dL     (H) 13 - 39 U/L     (H) 7 - 52 U/L    Alkaline Phosphatase 76 34 - 104 U/L    Total Protein 5.8 (L) 6.4 - 8.4 g/dL    Albumin 3.3 (L) 3.5 - 5.0 g/dL    Total Bilirubin 0.67 0.20 - 1.00 mg/dL    eGFR 97 ml/min/1.73sq m   Magnesium    Collection Time: 06/10/24  4:30 AM   Result Value Ref Range    Magnesium 2.1 1.9 - 2.7 mg/dL   Phosphorus    Collection Time: 06/10/24  4:30 AM   Result Value Ref Range    Phosphorus 2.3 2.3 - 4.1 mg/dL       Imaging: I have  personally reviewed pertinent imaging in PACS, and I have personally reviewed PACS reports.     EKG, Pathology, and Other Studies: I have personally reviewed pertinent reports.       VTE Prophylaxis: Sequential compression device (Venodyne)  and Enoxaparin (Lovenox)

## 2024-06-10 NOTE — ASSESSMENT & PLAN NOTE
MRI reveals left frontal stroke  Neurology consulted for recommendations  Continue to monitor serial exams  Now extubated but encephalopathic

## 2024-06-10 NOTE — ASSESSMENT & PLAN NOTE
Not currently in exacerbation  Wean oxygen for goal SpO2>88%  Respiratory protocol  PRN albuterol

## 2024-06-10 NOTE — CASE MANAGEMENT
Case Management Discharge Planning Note    Patient name Kirsty Em  Location ICU 11/ICU 11 MRN [id number]  : 1950 Date 6/10/2024       Current Admission Date: 2024  Current Admission Diagnosis:Acute metabolic encephalopathy   Patient Active Problem List    Diagnosis Date Noted Date Diagnosed    Bilateral carotid artery stenosis 06/10/2024     Pre-diabetes 2024     Stroke (cerebrum) (HCC) 2024     Acute metabolic encephalopathy 2024     Elevated transaminase level 2024     Rhabdomyolysis 2024     Closed displaced fracture of proximal phalanx of right thumb 2024     Mixed hyperlipidemia 2024     Chronic obstructive pulmonary disease, unspecified COPD type (HCC) 2023     Acute respiratory failure with hypoxia ([facility]) 2022     Anxiety and depression 2021     History of seizure disorder 2021     Tobacco dependence 2021       LOS (days): 3  Geometric Mean LOS (GMLOS) (days):   Days to GMLOS:     OBJECTIVE:  Risk of Unplanned Readmission Score: 17.75         Current admission status: Inpatient   Preferred Pharmacy:   [id number] - [place], [place] - [address]  [address]  [place] [address]  Phone: [phone] Fax: [phone]    Primary Care Provider: Brien Desouza MD    Primary Insurance: MEDICARE  Secondary Insurance: BLUE CROSS    DISCHARGE DETAILS:                                          Other Referral/Resources/Interventions Provided:  Referral Comments: Pt seen by PT/OT;  recommendation made for Level II.  Initial referrals made to STR/SNFs;  replies pending.  Pt extubated yesterday;  on 6L O2 NC, IV keppra, IV KCl, IV K phos, 1:1.  CM will continue to follow.         Treatment Team Recommendation: SNF, Short Term Rehab, Home with Home Health Care  Discharge Destination Plan:: Short Term Rehab, SNF, Home with Home Health Care  Transport at Discharge : Other (Comment) (TBD)

## 2024-06-10 NOTE — ASSESSMENT & PLAN NOTE
Current every day smoker  Noted COPD exacerbation several years ago  No indication of exacerbation currently  Bronchodilators as needed

## 2024-06-10 NOTE — PLAN OF CARE
Diet: puree/level 1 diet and nectar thick liquids   Meds: crushed with puree   Feeding Assistance: 1:1 full feed   Frequent Oral care: 2-4x/day  Aspiration precautions and compensatory swallowing strategies: upright posture, only feed when fully alert, slow rate of feeding, small bites/sips, no straws, and alternating bites and sips  Other Recommendations/ considerations: ST will continue to follow to ensure mgmt oral diet and adjust/advance as indicated x1-3; determine need for instrumental upon retaining to baseline cognitive status

## 2024-06-10 NOTE — PLAN OF CARE
Problem: OCCUPATIONAL THERAPY ADULT  Goal: Performs self-care activities at highest level of function for planned discharge setting.  See evaluation for individualized goals.  Description: Treatment Interventions: ADL retraining, Functional transfer training, UE strengthening/ROM, Endurance training, Cognitive reorientation, Patient/family training, Equipment evaluation/education, Fine motor coordination activities, Compensatory technique education, Activityengagement, Neuromuscular reeducation          See flowsheet documentation for full assessment, interventions and recommendations.   Note: Limitation: Decreased ADL status, Decreased UE strength, Decreased Safe judgement during ADL, Decreased cognition, Decreased endurance, Decreased self-care trans, Decreased high-level ADLs, Decreased fine motor control  Prognosis: Good  Assessment: Patient is a 74 y.o. female seen for OT evaluation s/p admit to [facility]  on 6/7/2024 w/Acute metabolic encephalopathy. Commorbidities affecting patient's functional performance at time of assessment include: anxiety/depression, hx of seizure disorder, tobacco dependence, acute respiratory failure, COPD, HLD, rhabdomyolysis, closed displace fx of R thumb, stroke, and bilateral carotid artery stenosis. Orders placed for OT evaluation and treatment and up and OOB as tolerated . Performed at least two patient identifiers during session including name and wristband.  Prior to admission, per chart review, patient is independent with ADLs, ambulatory with no AD, and lives with  at baseline. Personal factors affecting patient at time of initial evaluation include: steps to enter, difficulty performing ADLs, and difficulty performing IADLs. Upon evaluation, patient requires moderate and maximal assist for UB ADLs, maximal assist for LB ADLs, transfers and functional ambulation in room and bathroom with moderate assist of 2.  Patient is oriented to name,, disoriented  to time,, and disoriented to situation, and presents with limited ability to recall information after a brief period of time (short term memory) / working memory .  Occupational performance is affected by the following deficits: orientation, attention span, impulsive behavior, limited active ROM, decreased muscle strength, impaired gross motor coordination, impaired fine motor coordination, dynamic sit/ stand balance deficit with poor standing tolerance time for self care and functional mobility, decreased activity tolerance, impaired memory, impaired problem solving, decreased safety awareness, delayed righting and equilibrium reactions, and poor trunk control. Patient to benefit from continued Occupational Therapy treatment while in the hospital to address deficits as defined above and maximize level of functional independence with ADLs and functional mobility. Occupational Performance areas to address include: eating, grooming , bathing/ shower, dressing, toilet hygiene, transfer to all surfaces, and functional ambulation. From OT standpoint, recommendation at time of d/c would be Level II (moderate resource intensity).     Rehab Resource Intensity Level, OT: II (Moderate Resource Intensity)        Marisol FELDMAN, OTR/L

## 2024-06-10 NOTE — ASSESSMENT & PLAN NOTE
-Per attending neurologist, recommend vascular evaluation when able  -Medical management per primary team

## 2024-06-10 NOTE — OCCUPATIONAL THERAPY NOTE
Occupational Therapy Evaluation      Kirsty Em    6/10/2024    Patient Active Problem List   Diagnosis    Anxiety and depression    History of seizure disorder    Tobacco dependence    Acute respiratory failure with hypoxia (HCC)    Chronic obstructive pulmonary disease, unspecified COPD type (HCC)    Mixed hyperlipidemia    Acute metabolic encephalopathy    Elevated transaminase level    Rhabdomyolysis    Closed displaced fracture of proximal phalanx of right thumb    Stroke (cerebrum) (HCC)    Pre-diabetes    Bilateral carotid artery stenosis       Past Medical History:   Diagnosis Date    Anxiety     Depression     Depression, recurrent (HCC) 4/20/2021    Hallucinations 4/20/2021        06/10/24 0737   OT Last Visit   OT Visit Date 06/10/24   Note Type   Note type Evaluation   Additional Comments Pt agreeable to OT eval. Upon arrival pt supine in bed with HOB elevated.   Pain Assessment   Pain Assessment Tool FLACC   Pain Score   (pt denied pain multiple times throughout session)   Pain Rating: FLACC (Rest) - Face 0   Pain Rating: FLACC (Rest) - Legs 0   Pain Rating: FLACC (Rest) - Activity 0   Pain Rating: FLACC (Rest) - Cry 0   Pain Rating: FLACC (Rest) - Consolability 0   Score: FLACC (Rest) 0   Pain Rating: FLACC (Activity) - Face 0   Pain Rating: FLACC (Activity) - Legs 0   Pain Rating: FLACC (Activity) - Activity 1   Pain Rating: FLACC (Activity) - Cry 0   Pain Rating: FLACC (Activity) - Consolability 1   Score: FLACC (Activity) 2   Restrictions/Precautions   Weight Bearing Precautions Per Order Yes   RUE Weight Bearing Per Order NWB  (per ortho: NWB R hand)   Braces or Orthoses Splint  (R hand)   Other Precautions Cognitive;1:1;Chair Alarm;Bed Alarm;Multiple lines;Telemetry;O2;Fall Risk;Hard of hearing;Seizure  (6L O2 MFNC)   Home Living   Type of Home House   Home Layout Two level;Bed/bath upstairs;Stairs to enter with rails  (4 DAIANA; FOS to 2nd floor)   Bathroom Equipment   (no DME reported)   Bathroom  Accessibility Accessible   Home Equipment   (no AD used at baseline)   Prior Function   Level of [place] Independent with ADLs;Independent with functional mobility   Lives With Spouse   Receives Help From Family   Falls in the last 6 months 1 to 4  (unknown, at least 1 where she was found on the floor PTA)   Vocational Retired   Comments Pt unable to provide home living and PLOF d/t cognitive impairment. Information listed above obtained from CM note on 6/8/24. Further clarification required.   Lifestyle   Autonomy Per chart review, patient is independent with ADLs, ambulatory with no AD, and lives with  at baseline   Reciprocal Relationships    Service to Others Retired   ADL   Grooming Assistance 3  Moderate Assistance   UB Bathing Assistance 3  Moderate Assistance   LB Bathing Assistance 2  Maximal Assistance   UB Dressing Assistance 2  Maximal Assistance   LB Dressing Assistance 2  Maximal Assistance   Toileting Assistance  2  Maximal Assistance   Additional Comments ADL levels based on functional performance during OT eval.   Bed Mobility   Supine to Sit 3  Moderate assistance   Additional items Assist x 2;HOB elevated;Bedrails;Increased time required;LE management;Verbal cues   Sit to Supine   (DNT: pt seated OOB in recliner at end of session)   Additional Comments Pt required CGA-Min assist for sitting balance. Pt noted to have inconsistent trunk control.   Transfers   Sit to Stand 3  Moderate assistance   Additional items Assist x 2;Increased time required;Verbal cues   Stand to Sit 3  Moderate assistance   Additional items Assist x 2;Increased time required;Verbal cues   Stand pivot 3  Moderate assistance   Additional items Assist x 2;Increased time required;Verbal cues  (utilizing LUE HHA)   Additional Comments Pt demonstrates impulsivity c transfers and decreased safety awareness throughout all functional mobility. VC throughout session for proper hand placement and use of safe body  mechanics   Balance   Static Sitting Fair   Dynamic Sitting Fair -   Static Standing Poor   Dynamic Standing Poor   Activity Tolerance   Activity Tolerance Patient limited by fatigue;Treatment limited secondary to medical complications (Comment)   Medical Staff Made Aware Pt seen as a co-eval with PT due to the patient's co-morbidities and clinically unstable presentation indicated by chart review. During session, pt benefited from two skilled therapists due to extensive physical assistance to achieve transitional movements and pt's decreased activity tolerance.   Nurse Made Aware KUSH Cooper present during eval   RUE Assessment   RUE Assessment WFL  (~90 degree shoulder flexion; grossly 3+/5 MMT based on functional performance)   LUE Assessment   LUE Assessment WFL  (~90 degree shoulder flexion; grossly 3+/5 MMT based on functional performance)   Hand Function   Gross Motor Coordination Impaired   Fine Motor Coordination Impaired   Hand Function Comments NWB R hand per ortho   Cognition   Overall Cognitive Status Impaired   Arousal/Participation Alert;Responsive   Attention Difficulty attending to directions   Orientation Level Oriented to person;Disoriented to time;Disoriented to situation  (verbalized first name but did not report /last name)   Memory Decreased short term memory;Decreased recall of recent events;Decreased recall of precautions   Following Commands Follows one step commands with increased time or repetition   Assessment   Limitation Decreased ADL status;Decreased UE strength;Decreased Safe judgement during ADL;Decreased cognition;Decreased endurance;Decreased self-care trans;Decreased high-level ADLs;Decreased fine motor control   Prognosis Good   Assessment Patient is a 74 y.o. female seen for OT evaluation s/p admit to [facility]  on 2024 w/Acute metabolic encephalopathy. Commorbidities affecting patient's functional performance at time of assessment include:  anxiety/depression, hx of seizure disorder, tobacco dependence, acute respiratory failure, COPD, HLD, rhabdomyolysis, closed displace fx of R thumb, stroke, and bilateral carotid artery stenosis. Orders placed for OT evaluation and treatment and up and OOB as tolerated . Performed at least two patient identifiers during session including name and wristband.  Prior to admission, per chart review, patient is independent with ADLs, ambulatory with no AD, and lives with  at baseline. Personal factors affecting patient at time of initial evaluation include: steps to enter, difficulty performing ADLs, and difficulty performing IADLs. Upon evaluation, patient requires moderate and maximal assist for UB ADLs, maximal assist for LB ADLs, transfers and functional ambulation in room and bathroom with moderate assist of 2.  Patient is oriented to name,, disoriented to time,, and disoriented to situation, and presents with limited ability to recall information after a brief period of time (short term memory) / working memory .  Occupational performance is affected by the following deficits: orientation, attention span, impulsive behavior, limited active ROM, decreased muscle strength, impaired gross motor coordination, impaired fine motor coordination, dynamic sit/ stand balance deficit with poor standing tolerance time for self care and functional mobility, decreased activity tolerance, impaired memory, impaired problem solving, decreased safety awareness, delayed righting and equilibrium reactions, and poor trunk control. Patient to benefit from continued Occupational Therapy treatment while in the hospital to address deficits as defined above and maximize level of functional independence with ADLs and functional mobility. Occupational Performance areas to address include: eating, grooming , bathing/ shower, dressing, toilet hygiene, transfer to all surfaces, and functional ambulation. From OT standpoint, recommendation  at time of d/c would be Level II (moderate resource intensity).   Goals   Patient Goals no goals related to therapy verbalized at this time   Plan   Treatment Interventions ADL retraining;Functional transfer training;UE strengthening/ROM;Endurance training;Cognitive reorientation;Patient/family training;Equipment evaluation/education;Fine motor coordination activities;Compensatory technique education;Activityengagement;Neuromuscular reeducation   Goal Expiration Date 06/25/24   OT Treatment Day 0   OT Frequency 3-5x/wk   Discharge Recommendation   Rehab Resource Intensity Level, OT II (Moderate Resource Intensity)   AM-PAC Daily Activity Inpatient   Lower Body Dressing 1   Bathing 1   Toileting 1   Upper Body Dressing 1   Grooming 2   Eating 2   Daily Activity Raw Score 8   Turning Head Towards Sound 3   Follow Simple Instructions 2   Low Function Daily Activity Raw Score 13   Low Function Daily Activity Standardized Score  23.16   AM-PAC Applied Cognition Inpatient   Following a Speech/Presentation 1   Understanding Ordinary Conversation 2   Taking Medications 1   Remembering Where Things Are Placed or Put Away 1   Remembering List of 4-5 Errands 1   Taking Care of Complicated Tasks 1   Applied Cognition Raw Score 7   Applied Cognition Standardized Score 15.17   Modified [place] Scale   Modified [place] Scale 4   End of Consult   Patient Position at End of Consult Bedside chair;Bed/Chair alarm activated;All needs within reach  (1:1 and RN present in room)   Nurse Communication Nurse aware of consult     GOALS:    *ADL transfers with CGA for inc'd independence with ADLs/purposeful tasks    *UB ADL with (S) for inc'd independence with self cares    *LB ADL with Min (A) using AE prn for inc'd independence with self cares    *Toileting with Min (A) for clothing management and hygiene for return to OF with personal care    *Increase stand tolerance x 5  m for inc'd tolerance with standing purposeful  tasks    *Participate in 10m UE therex to increase overall stamina/activity tolerance for purposeful tasks    *Bed mobility- Min (A) for inc'd independence to manage own comfort and initiate EOB & OOB purposeful tasks    *Patient will verbalize 3 safety awareness/ principles to prevent falls in the home setting.     *Patient will verbalize and demonstrate use of energy conservation/deep breathing techniques and work simplification skills during functional activities with no verbal cues.     *Patient will increase OOB/sitting tolerance to 2-4 hours per day to increase participation in self-care and leisure tasks with no s/s of exertion.     *Patient will engage in ongoing cognitive assessment to assist with safe discharge planning/recommendations.     Marisol Ochoa, FRANCIA, OTR/L

## 2024-06-10 NOTE — ASSESSMENT & PLAN NOTE
Reported hypoxia baljinder-worsening altered mental status  S/p intubation, received on mechanical ventilator  CXR consistent with chronic changes  Now extubated 6/9  Continue [facility]  Titrate oxygen as tolerated to maintain SPO2 >88%

## 2024-06-10 NOTE — PROGRESS NOTES
[facility]  Progress Note  Name: Kirsty Em I  MRN: [id number]  Unit/Bed#: ICU 11 I Date of Admission: 6/7/2024   Date of Service: 6/10/2024 I Hospital Day: 3    Assessment & Plan   * Acute metabolic encephalopathy  Assessment & Plan  74 YOF with PMHx of DOMINGO on benzodiazepines, COPD, HLD presenting to [facility] ED 6/7 afternoon after being found by  covered in feces at their home  Found down for unknown quantity of time with rhabdomyolysis  Symptoms including asphasia, contracture of RUE and RLE, weakness of left side  On presentation concern for seizure activity vs stroke vs meningitis vs toxic ingestion of prescribed medications  Stroke alert called  CTH negative  CTA with b/l proximal carotid stenosis 60-75%  MRI brain showed acute to subacute posterior left frontal cortex CVA  S/p lumbar puncture in setting of high fever, regidity  No indication of meningitis on LP  Continue Keppra 500mg BID per neurology recommendations  EEG ordered and pending  No further seizure like activity  Delirium precautions  Appreciate neurology assistance  Serial neuro exams    Severe sepsis (HCC)  Assessment & Plan  POA- tachycardia, tachypnea, leukocytosis, EOD with intubation  Suspect meningitis vs nms vs serotonin syndrome vs PNA with ARHF  Febrile to 104.4 on arrival  Initial PCL elevated  CXR unremarkable  CTH negative  CT- bladder distention  UA unremarkable  BC pending, NGTD  LP without indication of infection  Antibiotics now stopped per ID  Follow WBC and fever curve    Acute respiratory failure with hypoxia (HCC)  Assessment & Plan  Reported hypoxia baljinder-worsening altered mental status  S/p intubation, received on mechanical ventilator  CXR consistent with chronic changes  Now extubated 6/9  Continue MFNC  Titrate oxygen as tolerated to maintain SPO2 >88%    History of seizure disorder  Assessment & Plan   reports seizure two years ago, states he is uncertain movement was ever  seizure  Patient reportedly self discontinued home Keppra  S/p 1500mg IV keppra in ED  Continue keppra 750 mg BID  EEG ordered  Appreciate neurology assistance    Rhabdomyolysis  Assessment & Plan  CK noted to be 1300 on arrival, peaked at 15k, now trending down  Found down at home for unknown quantity of time  Continue aggressive IVF administration  Follow up CK in AM    Elevated transaminase level  Assessment & Plan  In setting of unknown downtime, elevated CK  CT with evidence of hepatomegaly, hepatic steatosis  Improving  Continue to trend daily    Chronic obstructive pulmonary disease, unspecified COPD type (HCC)  Assessment & Plan  Current every day smoker  Noted COPD exacerbation several years ago  No indication of exacerbation currently  Bronchodilators as needed    Tobacco dependence  Assessment & Plan  Current 1ppd smoker  Advise cessation when appropriate  NRT    Anxiety and depression  Assessment & Plan  Home regimen of Xanax and seroquel  No pill counts available, uncertain of overdose in setting of emotional stressor  Restarted home regimen    Pre-diabetes  Assessment & Plan  Encourage lifestyle changes    Stroke (cerebrum) (HCC)  Assessment & Plan  MRI reveals left frontal stroke  Neurology consulted for recommendations  Continue to monitor serial exams  Now extubated but encephalopathic    Closed displaced fracture of proximal phalanx of right thumb  Assessment & Plan  Reduced and splinted by Dr. Mercer emergency room  Ortho consulted, appreciate input  Splinted on 6/8  Outpatient follow-up             Disposition: Critical care    ICU Core Measures     A: Assess, Prevent, and Manage Pain Has pain been assessed? Yes  Need for changes to pain regimen? No   B: Both SAT/SAT  N/A   C: Choice of Sedation RASS Goal: 0 Alert and Calm  Need for changes to sedation or analgesia regimen? No   D: Delirium CAM-ICU: Positive   E: Early Mobility  Plan for early mobility? Yes   F: Family Engagement Plan for  family engagement today? Yes       Review of Invasive Devices:    Blair Plan: Continue for accurate I/O monitoring for 48 hours        Prophylaxis:  VTE VTE covered by:  enoxaparin, Subcutaneous, 40 mg at 06/09/24 0856       Stress Ulcer  not ordered         Significant 24hr Events     24hr events: Patient was weaned off precedex overnight following restarting her home medication regimen. She is alert and oriented x2 this morning and is not currently agitated. No other overnight events.     Subjective   Review of Systems: See HPI for Review of Systems     Objective                            Vitals I/O      Most Recent Min/Max in 24hrs   Temp 98.4 °F (36.9 °C) Temp  Min: 98.1 °F (36.7 °C)  Max: 99.7 °F (37.6 °C)   Pulse 87 Pulse  Min: 60  Max: 106   Resp (!) 25 Resp  Min: 21  Max: 39   /65 BP  Min: 125/58  Max: 203/105   O2 Sat 97 % SpO2  Min: 86 %  Max: 99 %      Intake/Output Summary (Last 24 hours) at 6/10/2024 0518  Last data filed at 6/10/2024 0420  Gross per 24 hour   Intake 2013.75 ml   Output 5300 ml   Net -3286.25 ml       Diet NPO; Sips with meds    Invasive Monitoring           Physical Exam   Physical Exam  Vitals reviewed.   Eyes:      Conjunctiva/sclera: Conjunctivae normal.   Skin:     General: Skin is warm and dry.   HENT:      Head: Normocephalic and atraumatic.   Cardiovascular:      Rate and Rhythm: Normal rate and regular rhythm.      Pulses: Normal pulses.      Heart sounds: Normal heart sounds.   Musculoskeletal:      Right lower leg: No edema.      Left lower leg: No edema.   Abdominal:      Palpations: Abdomen is soft.   Constitutional:       Appearance: She is ill-appearing.   Pulmonary:      Effort: Pulmonary effort is normal.      Breath sounds: Normal breath sounds.   Neurological:      General: No focal deficit present.      Mental Status: She is alert. She is disoriented to time and disoriented to situation.   Genitourinary/Anorectal:  Pinto present.          Diagnostic Studies       EKG: Sinus rhythm on telemetry  Imaging:  I have personally reviewed pertinent reports.       Medications:  Scheduled PRN   ALPRAZolam, 2 mg, 4x Daily  aspirin, 81 mg, Daily  chlorhexidine, 15 mL, Q12H JERMAINE  clopidogrel, 75 mg, Daily  enoxaparin, 40 mg, Q24H JERMAINE  ezetimibe, 10 mg, Daily  gabapentin, 300 mg, TID  levETIRAcetam, 750 mg, Q12H JERMAINE  nicotine, 14 mg, Daily  nystatin, , BID  QUEtiapine, 100 mg, QAM  QUEtiapine, 400 mg, HS      acetaminophen, 650 mg, Q6H PRN  albuterol, 2.5 mg, Q4H PRN       Continuous    dexmedetomidine, 0.1-1.5 mcg/kg/hr, Last Rate: Stopped (06/10/24 0000)  multi-electrolyte, 125 mL/hr, Last Rate: 125 mL/hr (06/09/24 1933)         Labs:    CBC    Recent Labs     06/09/24  0445 06/10/24  0430   WBC 10.09 12.66*   HGB 10.8* 11.1*   HCT 31.6* 31.6*    262     BMP    Recent Labs     06/09/24  0445 06/10/24  0430   SODIUM 137 142   K 3.5 2.9*    107   CO2 23 23   AGAP 8 12   BUN 8 6   CREATININE 0.60 0.50*   CALCIUM 6.9* 7.3*       Coags    No recent results       Additional Electrolytes  Recent Labs     06/09/24  0445 06/10/24  0430   MG 2.6 2.1   PHOS 1.5*  --           Blood Gas    No recent results  No recent results LFTs  Recent Labs     06/09/24  0445 06/10/24  0430   * 186*   * 176*   ALKPHOS 66 76   ALB 3.0* 3.3*   TBILI 0.46 0.67       Infectious  Recent Labs     06/09/24  0445   PROCALCITONI 6.11*     Glucose  Recent Labs     06/09/24  0445 06/10/24  0430   GLUC 99 83               SINDY Scott

## 2024-06-10 NOTE — ASSESSMENT & PLAN NOTE
S/p splinting by ortho  Repeat xray in 1 week (from 6/8)  NWB right hand   Appreciate orthopedic recommendations

## 2024-06-10 NOTE — PROGRESS NOTES
Progress Note - Infectious Disease   Kirsty Em 74 y.o. female MRN: [id number]  Unit/Bed#: ICU 11 Encounter: [id number]      Impression/Plan:    1. SIRS. Fever, tachynea, leukocytosis, lactic acidosis, rhabdomyolysis. Patient found done by her  at home. She has a history of epilepsy but stopped her AEDs.  Concern for seizure by neurology on admission. CT C/A/P without infectious source. Status post LP 6/7 which was relatively unremarkable for infection with 6 WBC, glucose 138, protein 49, RBCs. ME panel negative and gram stain without polys or bacteria. Fever and lab/CSF abnormalities may all be related to seizures. CSF not consistent with bacterial meningitis. Consideration for viral meningitis but less likely with rapid clinical improvement, negative ME panel, MRI without infectious abnormalities.  MRI does not show any stroke so suspect that presentation is related to stroke and possible precipitate seizure.  Fevers and leukocytosis resolved. O2 requirements improving. No clinical or radiographic evidence of pneumonia              -monitor off antibiotics               -Follow-up blood cultures and CSF cultures              -Repeat CBC tomorrow to monitor treatment response              -Monitor fever curve     2. Acute metabolic encephalopathy. Patient found down by her  at home covered in feces. CTH and CTA without evidence of stroke. Neurology concerned for seizure since patient stopped AEDs. Seizure alone can cause CPK elevation, lactic acidosis, fever.  There was also some concern for NMS versus serotonin syndrome.  Status post LP 6/7, CSF studies not consistent with bacterial meningitis, less concerning for viral etiology although could consider early viral meningitis. ME panel and CSF gram stain negative. Patient intubated for airway protection, now extubated 6/9.  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               -Hold antimicrobials   -Neurology follow-up              -follow up blood and CSF cultures     3.  Acute hypoxic respiratory failure.  Patient intubated for hypoxemia and encephalopathy.  Now extubated , O2 weaning     4.  History of seizure disorder.  Patient with possible seizure 2 years ago and self discontinued AEDs.              -Neurology following              -Follow-up EEG     5.  Rhabdomyolysis.  Likely due to seizure, prolonged downtime.  CPK downtrending              -IVF per critical care              -Monitor CPK     6. Transaminitis. May be due to rhabdo, medications, sezure. CT with hepatomegaly and diffuse hepatic steatosis.  LFTs improving.  Monitor LFTs     7. Right thumb fracture. In setting of seizure activty. Orthopedic surgery consulted.     I have discussed the above management plan to monitor off antibiotics with the primary team. Discussed with the patient's family at bedside. ID will follow    Antibiotics:  Off antibiotics     Subjective:  The patient remains confused. Afebrile, weaned to 6L NC O2. Status post EEG. She reports some shortness of breath.    Objective:  Vitals:  Temp:  [98.4 °F (36.9 °C)-99.7 °F (37.6 °C)] 98.4 °F (36.9 °C)  HR:  [60-94] 94  Resp:  [21-33] 27  BP: (125-191)/(58-90) 157/67  SpO2:  [91 %-99 %] 96 %  Temp (24hrs), Av.2 °F (37.3 °C), Min:98.4 °F (36.9 °C), Max:99.7 °F (37.6 °C)  Current: Temperature: 98.4 °F (36.9 °C)    Physical Exam:   General Appearance:  Chronically ill-appearing but no acute distress   Throat: Oropharynx moist without lesions.    Lungs:   Clear to auscultation bilaterally; no wheezes, rhonchi or rales; respirations unlabored   Heart:  RRR; no murmur, rub or gallop   Abdomen:   Soft, non-tender, non-distended, positive bowel sounds.     Extremities: Right hand wrapped in a splint   Skin: No new rashes or lesions. No draining wounds noted.       Labs:   All pertinent labs and imaging studies were personally reviewed  Results from  last 7 days   Lab Units 06/10/24  0430 06/09/24  0445 06/08/24  0452   WBC Thousand/uL 12.66* 10.09 16.77*   HEMOGLOBIN g/dL 11.1* 10.8* 11.4*   PLATELETS Thousands/uL 262 236 269     Results from last 7 days   Lab Units 06/10/24  0430 06/09/24  0445 06/08/24  0452 06/07/24  1613 06/07/24  1344   SODIUM mmol/L 142 137 137   < >  --    POTASSIUM mmol/L 2.9* 3.5 3.1*   < >  --    CHLORIDE mmol/L 107 106 102   < >  --    CO2 mmol/L 23 23 26   < >  --    CO2, I-STAT mmol/L  --   --   --   --  26   BUN mg/dL 6 8 15   < >  --    CREATININE mg/dL 0.50* 0.60 0.93   < >  --    EGFR ml/min/1.73sq m 97 91 61   < >  --    GLUCOSE, ISTAT mg/dl  --   --   --   --  199*   CALCIUM mg/dL 7.3* 6.9* 7.5*   < >  --    AST U/L 176* 207* 311*   < >  --    ALT U/L 186* 201* 260*   < >  --    ALK PHOS U/L 76 66 72   < >  --     < > = values in this interval not displayed.     Results from last 7 days   Lab Units 06/09/24 0445 06/08/24  0452 06/07/24  1346   PROCALCITONIN ng/ml 6.11* 13.81* 2.47*                   Micro:  Results from last 7 days   Lab Units 06/07/24  1935 06/07/24  1639 06/07/24  1315 06/07/24  1310   BLOOD CULTURE   --   --  No Growth at 48 hrs. No Growth at 48 hrs.   GRAM STAIN RESULT   --  No No Polys or Bacteria seen  --   --    MRSA CULTURE ONLY  No Methicillin Resistant Staphlyococcus aureus (MRSA) isolated  --   --   --        Imaging:  I have personally reviewed pertinent imaging study reports and images in PACS.     CT C/A/P with distention of the bladder, no acute infectious abnormalities

## 2024-06-10 NOTE — ASSESSMENT & PLAN NOTE
Presumed secondary to aspiration prior to presentation  Wean oxygen for goal SpO2>88%  Respiratory protocol

## 2024-06-10 NOTE — ASSESSMENT & PLAN NOTE
CK noted to be 1300 on arrival, peaked at 15k, now trending down  Found down at home for unknown quantity of time  Continue aggressive IVF administration  Follow up CK in AM

## 2024-06-10 NOTE — ASSESSMENT & PLAN NOTE
74 YOF with PMHx of DOMINGO on benzodiazepines, COPD, HLD presenting to [facility] ED 6/7 afternoon after being found by  covered in feces at their home  Found down for unknown quantity of time with rhabdomyolysis  Symptoms including asphasia, contracture of RUE and RLE, weakness of left side  On presentation concern for seizure activity vs stroke vs meningitis vs toxic ingestion of prescribed medications  Stroke alert called  CTH negative  CTA with b/l proximal carotid stenosis 60-75%  MRI brain showed acute to subacute posterior left frontal cortex CVA  S/p lumbar puncture in setting of high fever, regidity  No indication of meningitis on LP  Continue Keppra 500mg BID per neurology recommendations  EEG ordered and pending  No further seizure like activity  Delirium precautions  Appreciate neurology assistance  Serial neuro exams

## 2024-06-10 NOTE — ASSESSMENT & PLAN NOTE
POA- tachycardia, tachypnea, leukocytosis, EOD with intubation  Suspect meningitis vs nms vs serotonin syndrome vs PNA with ARHF  Febrile to 104.4 on arrival  Initial PCL elevated  CXR unremarkable  CTH negative  CT- bladder distention  UA unremarkable  BC pending, NGTD  LP without indication of infection  Antibiotics now stopped per ID  Follow WBC and fever curve

## 2024-06-11 PROBLEM — I10 HYPERTENSION: Status: ACTIVE | Noted: 2024-06-11

## 2024-06-11 LAB
ALBUMIN SERPL BCP-MCNC: 3.5 G/DL (ref 3.5–5)
ALP SERPL-CCNC: 83 U/L (ref 34–104)
ALT SERPL W P-5'-P-CCNC: 167 U/L (ref 7–52)
ANION GAP SERPL CALCULATED.3IONS-SCNC: 10 MMOL/L (ref 4–13)
AST SERPL W P-5'-P-CCNC: 129 U/L (ref 13–39)
BILIRUB SERPL-MCNC: 0.97 MG/DL (ref 0.2–1)
BUN SERPL-MCNC: 6 MG/DL (ref 5–25)
CALCIUM SERPL-MCNC: 8.4 MG/DL (ref 8.4–10.2)
CHLORIDE SERPL-SCNC: 106 MMOL/L (ref 96–108)
CK SERPL-CCNC: 4153 U/L (ref 26–192)
CO2 SERPL-SCNC: 29 MMOL/L (ref 21–32)
CREAT SERPL-MCNC: 0.56 MG/DL (ref 0.6–1.3)
ERYTHROCYTE [DISTWIDTH] IN BLOOD BY AUTOMATED COUNT: 13.3 % (ref 11.6–15.1)
GFR SERPL CREATININE-BSD FRML MDRD: 93 ML/MIN/1.73SQ M
GLUCOSE SERPL-MCNC: 103 MG/DL (ref 65–140)
HCT VFR BLD AUTO: 32.7 % (ref 34.8–46.1)
HGB BLD-MCNC: 11.6 G/DL (ref 11.5–15.4)
MAGNESIUM SERPL-MCNC: 1.9 MG/DL (ref 1.9–2.7)
MCH RBC QN AUTO: 33.3 PG (ref 26.8–34.3)
MCHC RBC AUTO-ENTMCNC: 35.5 G/DL (ref 31.4–37.4)
MCV RBC AUTO: 94 FL (ref 82–98)
PHOSPHATE SERPL-MCNC: 1.3 MG/DL (ref 2.3–4.1)
PLATELET # BLD AUTO: 306 THOUSANDS/UL (ref 149–390)
PMV BLD AUTO: 9.8 FL (ref 8.9–12.7)
POTASSIUM SERPL-SCNC: 2.8 MMOL/L (ref 3.5–5.3)
PROT SERPL-MCNC: 6.3 G/DL (ref 6.4–8.4)
RBC # BLD AUTO: 3.48 MILLION/UL (ref 3.81–5.12)
SODIUM SERPL-SCNC: 145 MMOL/L (ref 135–147)
WBC # BLD AUTO: 10.99 THOUSAND/UL (ref 4.31–10.16)

## 2024-06-11 PROCEDURE — 84100 ASSAY OF PHOSPHORUS: CPT | Performed by: NURSE PRACTITIONER

## 2024-06-11 PROCEDURE — 99232 SBSQ HOSP IP/OBS MODERATE 35: CPT | Performed by: STUDENT IN AN ORGANIZED HEALTH CARE EDUCATION/TRAINING PROGRAM

## 2024-06-11 PROCEDURE — 85027 COMPLETE CBC AUTOMATED: CPT | Performed by: NURSE PRACTITIONER

## 2024-06-11 PROCEDURE — 82550 ASSAY OF CK (CPK): CPT | Performed by: NURSE PRACTITIONER

## 2024-06-11 PROCEDURE — 80053 COMPREHEN METABOLIC PANEL: CPT | Performed by: NURSE PRACTITIONER

## 2024-06-11 PROCEDURE — 99233 SBSQ HOSP IP/OBS HIGH 50: CPT | Performed by: NURSE PRACTITIONER

## 2024-06-11 PROCEDURE — 83735 ASSAY OF MAGNESIUM: CPT | Performed by: NURSE PRACTITIONER

## 2024-06-11 RX ORDER — POTASSIUM CHLORIDE 14.9 MG/ML
20 INJECTION INTRAVENOUS
Status: COMPLETED | OUTPATIENT
Start: 2024-06-11 | End: 2024-06-11

## 2024-06-11 RX ORDER — LISINOPRIL 10 MG/1
10 TABLET ORAL DAILY
Status: DISCONTINUED | OUTPATIENT
Start: 2024-06-11 | End: 2024-06-12

## 2024-06-11 RX ORDER — POTASSIUM CHLORIDE 20 MEQ/1
40 TABLET, EXTENDED RELEASE ORAL ONCE
Status: COMPLETED | OUTPATIENT
Start: 2024-06-11 | End: 2024-06-11

## 2024-06-11 RX ADMIN — HYDRALAZINE HYDROCHLORIDE 5 MG: 20 INJECTION INTRAMUSCULAR; INTRAVENOUS at 08:26

## 2024-06-11 RX ADMIN — ALPRAZOLAM 2 MG: 0.5 TABLET ORAL at 17:07

## 2024-06-11 RX ADMIN — POTASSIUM CHLORIDE 20 MEQ: 14.9 INJECTION, SOLUTION INTRAVENOUS at 09:24

## 2024-06-11 RX ADMIN — LEVETIRACETAM 750 MG: 500 TABLET, FILM COATED ORAL at 09:16

## 2024-06-11 RX ADMIN — ALPRAZOLAM 2 MG: 0.5 TABLET ORAL at 22:22

## 2024-06-11 RX ADMIN — ASPIRIN 81 MG: 81 TABLET, CHEWABLE ORAL at 09:16

## 2024-06-11 RX ADMIN — ENOXAPARIN SODIUM 40 MG: 40 INJECTION SUBCUTANEOUS at 09:17

## 2024-06-11 RX ADMIN — NYSTATIN: 100000 POWDER TOPICAL at 09:19

## 2024-06-11 RX ADMIN — CLOPIDOGREL 75 MG: 75 TABLET ORAL at 09:16

## 2024-06-11 RX ADMIN — LISINOPRIL 10 MG: 10 TABLET ORAL at 09:24

## 2024-06-11 RX ADMIN — GABAPENTIN 300 MG: 300 CAPSULE ORAL at 16:36

## 2024-06-11 RX ADMIN — GABAPENTIN 300 MG: 300 CAPSULE ORAL at 22:22

## 2024-06-11 RX ADMIN — NICOTINE 14 MG: 14 PATCH, EXTENDED RELEASE TRANSDERMAL at 09:17

## 2024-06-11 RX ADMIN — ALPRAZOLAM 2 MG: 0.5 TABLET ORAL at 13:50

## 2024-06-11 RX ADMIN — POTASSIUM CHLORIDE 40 MEQ: 1500 TABLET, EXTENDED RELEASE ORAL at 09:24

## 2024-06-11 RX ADMIN — QUETIAPINE FUMARATE 400 MG: 100 TABLET ORAL at 22:22

## 2024-06-11 RX ADMIN — GABAPENTIN 300 MG: 300 CAPSULE ORAL at 09:16

## 2024-06-11 RX ADMIN — HYDRALAZINE HYDROCHLORIDE 5 MG: 20 INJECTION INTRAMUSCULAR; INTRAVENOUS at 16:36

## 2024-06-11 RX ADMIN — QUETIAPINE FUMARATE 100 MG: 100 TABLET ORAL at 09:16

## 2024-06-11 RX ADMIN — NYSTATIN: 100000 POWDER TOPICAL at 18:59

## 2024-06-11 RX ADMIN — ALPRAZOLAM 2 MG: 0.5 TABLET ORAL at 09:16

## 2024-06-11 RX ADMIN — POTASSIUM CHLORIDE 20 MEQ: 14.9 INJECTION, SOLUTION INTRAVENOUS at 11:35

## 2024-06-11 RX ADMIN — EZETIMIBE 10 MG: 10 TABLET ORAL at 09:17

## 2024-06-11 RX ADMIN — LEVETIRACETAM 750 MG: 500 TABLET, FILM COATED ORAL at 22:22

## 2024-06-11 NOTE — PROGRESS NOTES
Progress Note - Infectious Disease   Kirsty Em 74 y.o. female MRN: [id number]  Unit/Bed#: ICU 11 Encounter: [id number]      Impression/Plan:  1. SIRS. Fever, tachynea, leukocytosis, lactic acidosis, rhabdomyolysis. Patient found done by her  at home. She has a history of epilepsy but stopped her AEDs.  Concern for seizure by neurology on admission. CT C/A/P without infectious source. Status post LP 6/7 which was relatively unremarkable for infection with 6 WBC, glucose 138, protein 49, RBCs. ME panel negative and gram stain without polys or bacteria. CSF cultures resulted NG. Fever and lab/CSF abnormalities may all be related to seizures. CSF not consistent with bacterial meningitis. Consideration for viral meningitis but less likely with rapid clinical improvement, negative ME panel, MRI without infectious abnormalities. MRI shows acute to subacute stroke along the posterior left frontal cortex. So suspect that presentation is related to stroke and possible precipitate seizure.  Fevers and leukocytosis improved. O2 requirements improving. No clinical or radiographic evidence of pneumonia  -Continue to monitor off antibiotics   -Follow-up blood cultures until finalized; currently ngtd x72h  -Repeat CBCD tomorrow AM to assess for ongoing clinical improvement  -Continue to monitor fever curve/vitals     2. Acute metabolic encephalopathy. Patient found down by her  at home covered in feces. CTH and CTA without evidence of stroke. Neurology concerned for seizure since patient stopped AEDs. Seizure alone can cause CPK elevation, lactic acidosis, fever.  There was also some concern for NMS versus serotonin syndrome.  Status post LP 6/7, CSF studies not consistent with bacterial meningitis, less concerning for viral etiology although could consider early viral meningitis. ME panel and CSF gram stain negative. CSF cultures resulted NG. Patient intubated for airway protection, now extubated 6/9.  MRI with  acute to subacute infarct in the left frontal cortex, no meningeal enhancement or temporal lobe abnormalities.  -Continue to monitor off antibiotics  -Neurology follow-up     3.  Acute hypoxic respiratory failure.  Patient intubated for hypoxemia and encephalopathy.  Now extubated , O2 weaning. No clinical or radiographic evidence of pneumonia at this time.      4.  History of seizure disorder.  Patient with possible seizure 2 years ago and self discontinued AEDs.  -Neurology following  -Follow-up EEG     5.  Rhabdomyolysis.  Likely due to seizure, prolonged downtime.  CPK downtrending.   -IVF per critical care  -Monitor CPK     6. Transaminitis. May be due to rhabdo, medications, sezure. CT with hepatomegaly and diffuse hepatic steatosis.  LFTs improving.    -Monitor LFTs     7. Right thumb fracture. In setting of seizure activty. Orthopedic surgery consulted.    Above plan was discussed in detail with patient at bedside.   Above plan was discussed in detail with primary service, who agrees with the plan to continue monitoring off antibiotics.    Antibiotics:  Off antibiotics    Subjective:  Patient remains afebrile today. Now on 5L NC. Still reporting some shortness of breath. No nausea, vomiting, diarrhea, abdominal pain, urinary symptoms. Still some intermittent confusion. No new symptoms.    Objective:  Vitals:  Temp:  [98 °F (36.7 °C)-100 °F (37.8 °C)] 98.2 °F (36.8 °C)  HR:  [] 81  Resp:  [12-39] 30  BP: (112-213)/(54-98) 157/77  SpO2:  [91 %-98 %] 92 %  Temp (24hrs), Av.8 °F (37.1 °C), Min:98 °F (36.7 °C), Max:100 °F (37.8 °C)  Current: Temperature: 98.2 °F (36.8 °C)    Physical Exam:   General Appearance:  Chronically ill appearing, NAD. Sitting upright in chair next to bed.   Throat: Oropharynx moist without lesions. No perioral lesions or ulcerations.    Lungs:   Clear to auscultation bilaterally; no wheezes, rhonchi or rales; respirations unlabored. On 5L NC.    Heart:  RRR; no murmur, rub  or gallop.   Abdomen:   Soft, non-tender, non-distended, positive bowel sounds.     Extremities: No clubbing or cyanosis, no edema. R hand/thumb wrapped in a splint.    Skin: No new rashes, lesions, or draining wounds noted on exposed skin.     Labs, Imaging, & Other studies:   All pertinent labs and imaging studies were personally reviewed  Results from last 7 days   Lab Units 06/11/24  0602 06/10/24  0430 06/09/24  0445   WBC Thousand/uL 10.99* 12.66* 10.09   HEMOGLOBIN g/dL 11.6 11.1* 10.8*   PLATELETS Thousands/uL 306 262 236     Results from last 7 days   Lab Units 06/11/24  0602 06/10/24  0430 06/09/24  0445 06/07/24  1613 06/07/24  1344   POTASSIUM mmol/L 2.8* 2.9* 3.5   < >  --    CHLORIDE mmol/L 106 107 106   < >  --    CO2 mmol/L 29 23 23   < >  --    CO2, I-STAT mmol/L  --   --   --   --  26   BUN mg/dL 6 6 8   < >  --    CREATININE mg/dL 0.56* 0.50* 0.60   < >  --    EGFR ml/min/1.73sq m 93 97 91   < >  --    GLUCOSE, ISTAT mg/dl  --   --   --   --  199*   CALCIUM mg/dL 8.4 7.3* 6.9*   < >  --    AST U/L 129* 176* 207*   < >  --    ALT U/L 167* 186* 201*   < >  --    ALK PHOS U/L 83 76 66   < >  --     < > = values in this interval not displayed.     Results from last 7 days   Lab Units 06/07/24  1935 06/07/24  1639 06/07/24  1315 06/07/24  1310   BLOOD CULTURE   --   --  No Growth at 72 hrs. No Growth at 72 hrs.   GRAM STAIN RESULT   --  No No Polys or Bacteria seen  --   --    MRSA CULTURE ONLY  No Methicillin Resistant Staphlyococcus aureus (MRSA) isolated  --   --   --      Results from last 7 days   Lab Units 06/09/24  0445 06/08/24  0452 06/07/24  1346   PROCALCITONIN ng/ml 6.11* 13.81* 2.47*                     Imaging Studies:  I have personally reviewed pertinent imaging study reports and images in PACS.       Jessica Lundberg PA-C  [facility]

## 2024-06-11 NOTE — CASE MANAGEMENT
Case Management Discharge Planning Note    Patient name Kirsty Em  Location ICU 11/ICU 11 MRN [id number]  : 1950 Date 2024       Current Admission Date: 2024  Current Admission Diagnosis:Acute metabolic encephalopathy   Patient Active Problem List    Diagnosis Date Noted Date Diagnosed    Hypertension 2024     Bilateral carotid artery stenosis 06/10/2024     Pre-diabetes 2024     Stroke (cerebrum) (HCC) 2024     Acute metabolic encephalopathy 2024     Elevated transaminase level 2024     Rhabdomyolysis 2024     Closed displaced fracture of proximal phalanx of right thumb 2024     Mixed hyperlipidemia 2024     Chronic obstructive pulmonary disease, unspecified COPD type ([facility]) 2023     Acute respiratory failure with hypoxia ([facility]) 2022     Anxiety and depression 2021     History of seizure disorder 2021     Tobacco dependence 2021       LOS (days): 4  Geometric Mean LOS (GMLOS) (days):   Days to GMLOS:     OBJECTIVE:  Risk of Unplanned Readmission Score: 14.42         Current admission status: Inpatient   Preferred Pharmacy:   [id number] - [place], [place] - [address]  [address]  [place] [address]  Phone: [phone] Fax: [phone]    Primary Care Provider: Brien Desouza MD    Primary Insurance: MEDICARE  Secondary Insurance: BLUE CROSS    DISCHARGE DETAILS:                                          Other Referral/Resources/Interventions Provided:  Referral Comments: Pt remains on 1:1;  pt will need to be off 1:1 for at least 24 hrs prior to SNF placement.  Several facilities following.  Per AM rounds, pt is hypertensive and disoriented, known to be noncompliant prior to admission.  CM will continue to follow.         Treatment Team Recommendation: Short Term Rehab, SNF, Home with Home Health Care  Discharge Destination Plan:: Home with Home Health Care, SNF, Short Term Rehab  Transport at  Discharge : Other (Comment) (TBD)

## 2024-06-11 NOTE — PROGRESS NOTES
[facility]  Progress Note  Name: Kirsty Em I MRN: [id number]  Unit/Bed#: ICU 11-01I Date of Admission: 6/7/2024   Date of Service: 6/7/2024  I Hospital Day: 4    * Acute metabolic encephalopathy  Assessment & Plan  Slowly improving  Frequent neurologic checks  Patient initially on one-to-one observation due to pulling on lines  Patient has been calm cooperative today we will monitor off one-to-one  Continue home medications  Delirium precautions     Stroke (cerebrum) (HCC)  Assessment & Plan  Continue aspirin/Plavix, patient allergic to statin  PT/OT/ST  ST recommending Puree diet nectar thick liquid   PT/OT recommending moderate intensity resource  CM consult for dispo planning  Pending PMR consult  Appreciate neurology recommendation  Patient will need follow up with neurovascular on discharge in 6 weeks     Hypertension  Assessment & Plan  Patient with HTN this morning sbp 213  Will start lisinopril 10 mg daily  Continue IV hydralazine 5mg prn  Neurology recommending normotension at this time  Continue to monitor aim to bring BP down gradually     Bilateral carotid artery stenosis  Assessment & Plan  Continue aspirin/Plavix  ICU discussion with neurology, plan for vascular surgery follow-up as an outpatient     Pre-diabetes  Assessment & Plan  Encourage lifestyle changes  BG glucose reasonable via labs continue to monitor     Closed displaced fracture of proximal phalanx of right thumb  Assessment & Plan  S/p splinting by ortho  Repeat xray in 1 week (from 6/8)  NWB right hand   Appreciate orthopedic recommendations    Rhabdomyolysis  Assessment & Plan  Resolving  Repeat CK now 6/9 >9000  Continue IVF until reliably tolerating diet    Elevated transaminase level  Assessment & Plan  Suspect related to acuity of presentation  Slowly resolving      Mixed hyperlipidemia  Assessment & Plan  Continue home Zetia  Patient intolerant of statins     Chronic obstructive pulmonary disease,  unspecified COPD type (HCC)  Assessment & Plan  Not currently in exacerbation  Wean oxygen for goal SpO2>88%  Respiratory protocol  PRN albuterol     Acute respiratory failure with hypoxia (HCC)  Assessment & Plan  Presumed secondary to aspiration prior to presentation  Wean oxygen for goal SpO2>88%  Respiratory protocol     Tobacco dependence  Assessment & Plan  Encourage cessation   Nicotine replacement     History of seizure disorder  Assessment & Plan  Patient reportedly self-discontinued Keppra at home  Cannot rule out seizure as etiology of presentation  Possibly related to Xanax withdrawal  Continue Keppra  EEG completed appreciate recs   Appreciate neurology recommendation    Anxiety and depression  Assessment & Plan  Continue home Xanax and Seroquel            VTE Pharmacologic Prophylaxis: VTE Score: 9 High Risk (Score >/= 5) - Pharmacological DVT Prophylaxis Ordered: enoxaparin (Lovenox). Sequential Compression Devices Ordered.    Mobility:   Basic Mobility Inpatient Raw Score: 17  JH-HLM Goal: 5: Stand one or more mins  JH-HLM Achieved: 4: Move to chair/commode  JH-HLM Goal NOT achieved. Continue with multidisciplinary rounding and encourage appropriate mobility to improve upon JH-HLM goals.    Patient Centered Rounds: I performed bedside rounds with nursing staff today.   Discussions with Specialists or Other Care Team Provider: Most recent neurology and infectious disease notes reviewed    Education and Discussions with Family / Patient: Attempted to update  (wife) via phone. Left voicemail.     Total Time Spent on Date of Encounter in care of patient: 55 mins. This time was spent on one or more of the following: performing physical exam; counseling and coordination of care; obtaining or reviewing history; documenting in the medical record; reviewing/ordering tests, medications or procedures; communicating with other healthcare professionals and discussing with patient's  family/caregivers.    Current Length of Stay: 4 day(s)  Current Patient Status: Inpatient   Certification Statement: The patient will continue to require additional inpatient hospital stay due to acute metabolic encephalopathy status post stroke with need for blood pressure control.  Discharge Plan: Anticipate discharge in 48 hrs to rehab facility.    Code Status: Level 1 - Full Code    Subjective:   Patient can state her name and that she is in the hospital cannot recall with detail what happened.  Patient does not recall her  was here today does not have any complaints other than not being able to lay her bed flat.    Objective:     Vitals:   Temp (24hrs), Av.8 °F (37.1 °C), Min:98 °F (36.7 °C), Max:99.9 °F (37.7 °C)    Temp:  [98 °F (36.7 °C)-99.9 °F (37.7 °C)] 99.9 °F (37.7 °C)  HR:  [] 96  Resp:  [12-39] 22  BP: (112-213)/(54-98) 189/88  SpO2:  [91 %-98 %] 98 %  Body mass index is 25.7 kg/m².     Input and Output Summary (last 24 hours):     Intake/Output Summary (Last 24 hours) at 2024 1637  Last data filed at 2024 1449  Gross per 24 hour   Intake 1490 ml   Output 3875 ml   Net -2385 ml       Physical Exam:   Physical Exam  Vitals and nursing note reviewed.   Constitutional:       General: She is not in acute distress.     Appearance: She is well-developed.   HENT:      Head: Normocephalic and atraumatic.   Eyes:      Conjunctiva/sclera: Conjunctivae normal.   Cardiovascular:      Rate and Rhythm: Normal rate and regular rhythm.      Heart sounds: No murmur heard.  Pulmonary:      Effort: Pulmonary effort is normal. No respiratory distress.      Breath sounds: Normal breath sounds.   Abdominal:      Palpations: Abdomen is soft.      Tenderness: There is no abdominal tenderness.   Musculoskeletal:         General: No swelling.      Cervical back: Neck supple.   Skin:     General: Skin is warm and dry.      Capillary Refill: Capillary refill takes less than 2 seconds.   Neurological:       Mental Status: She is alert.      Comments: Alert and Oriented x 2   Psychiatric:         Mood and Affect: Mood normal.          Additional Data:     Labs:  Results from last 7 days   Lab Units 06/11/24  0602 06/10/24  0430   WBC Thousand/uL 10.99* 12.66*   HEMOGLOBIN g/dL 11.6 11.1*   HEMATOCRIT % 32.7* 31.6*   PLATELETS Thousands/uL 306 262   SEGS PCT %  --  71   LYMPHO PCT %  --  21   MONO PCT %  --  7   EOS PCT %  --  0     Results from last 7 days   Lab Units 06/11/24  0602   SODIUM mmol/L 145   POTASSIUM mmol/L 2.8*   CHLORIDE mmol/L 106   CO2 mmol/L 29   BUN mg/dL 6   CREATININE mg/dL 0.56*   ANION GAP mmol/L 10   CALCIUM mg/dL 8.4   ALBUMIN g/dL 3.5   TOTAL BILIRUBIN mg/dL 0.97   ALK PHOS U/L 83   ALT U/L 167*   AST U/L 129*   GLUCOSE RANDOM mg/dL 103     Results from last 7 days   Lab Units 06/08/24  0452   INR  1.03     Results from last 7 days   Lab Units 06/08/24  0031   POC GLUCOSE mg/dl 117     Results from last 7 days   Lab Units 06/09/24  0445   HEMOGLOBIN A1C % 6.3*     Results from last 7 days   Lab Units 06/09/24  0445 06/08/24  0452 06/07/24  2152 06/07/24  1613 06/07/24  1346   LACTIC ACID mmol/L  --   --  1.5 3.4* 8.4*   PROCALCITONIN ng/ml 6.11* 13.81*  --   --  2.47*       Lines/Drains:  Invasive Devices       Peripheral Intravenous Line  Duration             Peripheral IV 06/08/24 Left;Ventral (anterior) Forearm 3 days    Peripheral IV 06/11/24 Left;Ventral (anterior) Foot <1 day              Drain  Duration             Urethral Catheter Temperature probe 16 Fr. 2 days                  Urinary Catheter:  Goal for removal: No longer needed. Will place order to discontinue               Imaging: Reviewed radiology reports from this admission including: MRI brain    Recent Cultures (last 7 days):   Results from last 7 days   Lab Units 06/07/24  1639 06/07/24  1315 06/07/24  1310   BLOOD CULTURE   --  No Growth at 72 hrs. No Growth at 72 hrs.   GRAM STAIN RESULT  No No Polys or Bacteria  seen  --   --        Last 24 Hours Medication List:   Current Facility-Administered Medications   Medication Dose Route Frequency Provider Last Rate    acetaminophen  650 mg Oral Q6H PRN SINDY Lamar      albuterol  2.5 mg Nebulization Q4H PRN SINDY Lamar      ALPRAZolam  2 mg Oral 4x Daily SINDY Lamar      aspirin  81 mg Oral Daily SINDY Lamar      clopidogrel  75 mg Oral Daily SINDY Lamar      enoxaparin  40 mg Subcutaneous Q24H JERMAINE SINDY Lamar      ezetimibe  10 mg Per NG Tube Daily SINDY Lamar      gabapentin  300 mg Oral TID SINDY Lamar      hydrALAZINE  5 mg Intravenous Q6H PRN SINDY Lamar      labetalol  10 mg Intravenous Q6H PRN SINDY Lamar      levETIRAcetam  750 mg Oral Q12H [facility] SINDY Lamar      lisinopril  10 mg Oral Daily SINDY Mccarty      multi-electrolyte  50 mL/hr Intravenous Continuous SINDY Lamar 50 mL/hr (06/10/24 1134)    nicotine  14 mg Transdermal Daily SINDY Lamar      nystatin   Topical BID SINDY Lamar      QUEtiapine  100 mg Oral QAM SINDY Lamar      QUEtiapine  400 mg Oral HS SINDY Lamar          Today, Patient Was Seen By: SINDY Mccarty    **Please Note: This note may have been constructed using a voice recognition system.**

## 2024-06-11 NOTE — PLAN OF CARE
Problem: PAIN - ADULT  Goal: Verbalizes/displays adequate comfort level or baseline comfort level  Description: Interventions:  - Encourage patient to monitor pain and request assistance  - Assess pain using appropriate pain scale  - Administer analgesics based on type and severity of pain and evaluate response  - Implement non-pharmacological measures as appropriate and evaluate response  - Consider cultural and social influences on pain and pain management  - Notify physician/advanced practitioner if interventions unsuccessful or patient reports new pain  6/11/2024 1513 by Pratik Weems RN  Outcome: Progressing  6/11/2024 1509 by Pratik Weems RN  Outcome: Progressing     Problem: SAFETY ADULT  Goal: Patient will remain free of falls  Description: INTERVENTIONS:  - Educate patient/family on patient safety including physical limitations  - Instruct patient to call for assistance with activity   - Consult OT/PT to assist with strengthening/mobility   - Keep Call bell within reach  - Keep bed low and locked with side rails adjusted as appropriate  - Keep care items and personal belongings within reach  - Initiate and maintain comfort rounds  - Make Fall Risk Sign visible to staff  - Offer Toileting every 2 Hours, in advance of need  - Initiate/Maintain bedalarm  - Apply yellow socks and bracelet for high fall risk patients  - Consider moving patient to room near nurses station  6/11/2024 1513 by Pratik Weems RN  Outcome: Progressing  6/11/2024 1509 by Pratik Weems RN  Outcome: Progressing  Goal: Maintain or return to baseline ADL function  Description: INTERVENTIONS:  -  Assess patient's ability to carry out ADLs; assess patient's baseline for ADL function and identify physical deficits which impact ability to perform ADLs (bathing, care of mouth/teeth, toileting, grooming, dressing, etc.)  - Assess/evaluate cause of self-care deficits   - Assess range of motion  - Assess patient's mobility; develop plan  if impaired  - Assess patient's need for assistive devices and provide as appropriate  - Encourage maximum independence but intervene and supervise when necessary  - Involve family in performance of ADLs  - Assess for home care needs following discharge   - Consider OT consult to assist with ADL evaluation and planning for discharge  - Provide patient education as appropriate  6/11/2024 1513 by Pratik Weems RN  Outcome: Progressing  6/11/2024 1509 by Pratik Weems RN  Outcome: Progressing  Goal: Maintains/Returns to pre admission functional level  Description: INTERVENTIONS:  - Perform AM-PAC 6 Click Basic Mobility/ Daily Activity assessment daily.  - Set and communicate daily mobility goal to care team and patient/family/caregiver.   - Collaborate with rehabilitation services on mobility goals if consulted  - Perform Range of Motion 4 times a day.  - Reposition patient every 2 hours.  - Dangle patient 3 times a day  - Stand patient 3 times a day  - Ambulate patient 3 times a day  - Out of bed to chair 3 times a day   - Out of bed for meals 3 times a day  - Out of bed for toileting  - Record patient progress and toleration of activity level   6/11/2024 1513 by Pratik Weems RN  Outcome: Progressing  6/11/2024 1509 by Pratik Weems RN  Outcome: Progressing     Problem: DISCHARGE PLANNING  Goal: Discharge to home or other facility with appropriate resources  Description: INTERVENTIONS:  - Identify barriers to discharge w/patient and caregiver  - Arrange for needed discharge resources and transportation as appropriate  - Identify discharge learning needs (meds, wound care, etc.)  - Arrange for interpretive services to assist at discharge as needed  - Refer to Case Management Department for coordinating discharge planning if the patient needs post-hospital services based on physician/advanced practitioner order or complex needs related to functional status, cognitive ability, or social support system  6/11/2024  1513 by Pratik Weems RN  Outcome: Progressing  6/11/2024 1509 by Pratik Weems RN  Outcome: Progressing     Problem: Knowledge Deficit  Goal: Patient/family/caregiver demonstrates understanding of disease process, treatment plan, medications, and discharge instructions  Description: Complete learning assessment and assess knowledge base.  Interventions:  - Provide teaching at level of understanding  - Provide teaching via preferred learning methods  6/11/2024 1513 by Pratik Weems RN  Outcome: Progressing  6/11/2024 1509 by Pratik Weems RN  Outcome: Progressing     Problem: Nutrition/Hydration-ADULT  Goal: Nutrient/Hydration intake appropriate for improving, restoring or maintaining nutritional needs  Description: Monitor and assess patient's nutrition/hydration status for malnutrition. Collaborate with interdisciplinary team and initiate plan and interventions as ordered.  Monitor patient's weight and dietary intake as ordered or per policy. Utilize nutrition screening tool and intervene as necessary. Determine patient's food preferences and provide high-protein, high-caloric foods as appropriate.     INTERVENTIONS:  - Monitor oral intake, urinary output, labs, and treatment plans  - Assess nutrition and hydration status and recommend course of action  - Evaluate amount of meals eaten  - Assist patient with eating if necessary   - Allow adequate time for meals  - Recommend/ encourage appropriate diets, oral nutritional supplements, and vitamin/mineral supplements  - Order, calculate, and assess calorie counts as needed  - Recommend, monitor, and adjust tube feedings and TPN/PPN based on assessed needs  - Assess need for intravenous fluids  - Provide specific nutrition/hydration education as appropriate  - Include patient/family/caregiver in decisions related to nutrition  6/11/2024 1513 by Pratik Weems RN  Outcome: Progressing  6/11/2024 1509 by Pratik Weems RN  Outcome: Progressing     Problem:  Prexisting or High Potential for Compromised Skin Integrity  Goal: Skin integrity is maintained or improved  Description: INTERVENTIONS:  - Identify patients at risk for skin breakdown  - Assess and monitor skin integrity  - Assess and monitor nutrition and hydration status  - Monitor labs   - Assess for incontinence   - Turn and reposition patient  - Assist with mobility/ambulation  - Relieve pressure over bony prominences  - Avoid friction and shearing  - Provide appropriate hygiene as needed including keeping skin clean and dry  - Evaluate need for skin moisturizer/barrier cream  - Collaborate with interdisciplinary team   - Patient/family teaching  - Consider wound care consult   6/11/2024 1513 by Pratik Weems RN  Outcome: Progressing  6/11/2024 1509 by Pratik Weems RN  Outcome: Progressing

## 2024-06-11 NOTE — ASSESSMENT & PLAN NOTE
Fluctuating BP, started on lisinopril  Continue IV hydralazine or labetalol as needed  Monitor VS per unit protocol, Blood Pressure: (!) 169/135

## 2024-06-12 ENCOUNTER — APPOINTMENT (INPATIENT)
Dept: RADIOLOGY | Facility: HOSPITAL | Age: 74
DRG: 091 | End: 2024-06-12
Payer: MEDICARE

## 2024-06-12 PROBLEM — I48.91 NEW ONSET A-FIB (HCC): Status: ACTIVE | Noted: 2024-06-12

## 2024-06-12 LAB
ANION GAP SERPL CALCULATED.3IONS-SCNC: 9 MMOL/L (ref 4–13)
APTT PPP: 26 SECONDS (ref 23–37)
APTT PPP: 35 SECONDS (ref 23–37)
ATRIAL RATE: 170 BPM
BACTERIA BLD CULT: NORMAL
BACTERIA BLD CULT: NORMAL
BUN SERPL-MCNC: 9 MG/DL (ref 5–25)
CALCIUM SERPL-MCNC: 8.8 MG/DL (ref 8.4–10.2)
CHLORIDE SERPL-SCNC: 106 MMOL/L (ref 96–108)
CK SERPL-CCNC: 1405 U/L (ref 26–192)
CO2 SERPL-SCNC: 30 MMOL/L (ref 21–32)
CREAT SERPL-MCNC: 0.56 MG/DL (ref 0.6–1.3)
ERYTHROCYTE [DISTWIDTH] IN BLOOD BY AUTOMATED COUNT: 13.5 % (ref 11.6–15.1)
GFR SERPL CREATININE-BSD FRML MDRD: 93 ML/MIN/1.73SQ M
GLUCOSE SERPL-MCNC: 104 MG/DL (ref 65–140)
HCT VFR BLD AUTO: 35.2 % (ref 34.8–46.1)
HGB BLD-MCNC: 12.2 G/DL (ref 11.5–15.4)
INR PPP: 1.03 (ref 0.84–1.19)
MCH RBC QN AUTO: 33.1 PG (ref 26.8–34.3)
MCHC RBC AUTO-ENTMCNC: 34.7 G/DL (ref 31.4–37.4)
MCV RBC AUTO: 95 FL (ref 82–98)
PLATELET # BLD AUTO: 335 THOUSANDS/UL (ref 149–390)
PMV BLD AUTO: 10 FL (ref 8.9–12.7)
POTASSIUM SERPL-SCNC: 3.7 MMOL/L (ref 3.5–5.3)
PROTHROMBIN TIME: 14.1 SECONDS (ref 11.6–14.5)
QRS AXIS: 101 DEGREES
QRSD INTERVAL: 72 MS
QT INTERVAL: 252 MS
QTC INTERVAL: 438 MS
RBC # BLD AUTO: 3.69 MILLION/UL (ref 3.81–5.12)
SODIUM SERPL-SCNC: 145 MMOL/L (ref 135–147)
T WAVE AXIS: -47 DEGREES
VENTRICULAR RATE: 182 BPM
WBC # BLD AUTO: 12.17 THOUSAND/UL (ref 4.31–10.16)

## 2024-06-12 PROCEDURE — 82550 ASSAY OF CK (CPK): CPT | Performed by: NURSE PRACTITIONER

## 2024-06-12 PROCEDURE — 99233 SBSQ HOSP IP/OBS HIGH 50: CPT | Performed by: PHYSICIAN ASSISTANT

## 2024-06-12 PROCEDURE — 93005 ELECTROCARDIOGRAM TRACING: CPT

## 2024-06-12 PROCEDURE — 99232 SBSQ HOSP IP/OBS MODERATE 35: CPT | Performed by: STUDENT IN AN ORGANIZED HEALTH CARE EDUCATION/TRAINING PROGRAM

## 2024-06-12 PROCEDURE — 92526 ORAL FUNCTION THERAPY: CPT

## 2024-06-12 PROCEDURE — 85027 COMPLETE CBC AUTOMATED: CPT | Performed by: NURSE PRACTITIONER

## 2024-06-12 PROCEDURE — 80048 BASIC METABOLIC PNL TOTAL CA: CPT | Performed by: NURSE PRACTITIONER

## 2024-06-12 PROCEDURE — 85730 THROMBOPLASTIN TIME PARTIAL: CPT | Performed by: INTERNAL MEDICINE

## 2024-06-12 PROCEDURE — 99223 1ST HOSP IP/OBS HIGH 75: CPT | Performed by: INTERNAL MEDICINE

## 2024-06-12 PROCEDURE — 93010 ELECTROCARDIOGRAM REPORT: CPT | Performed by: INTERNAL MEDICINE

## 2024-06-12 PROCEDURE — 85610 PROTHROMBIN TIME: CPT | Performed by: PHYSICIAN ASSISTANT

## 2024-06-12 PROCEDURE — 85730 THROMBOPLASTIN TIME PARTIAL: CPT | Performed by: PHYSICIAN ASSISTANT

## 2024-06-12 RX ORDER — HEPARIN SODIUM 1000 [USP'U]/ML
1800 INJECTION, SOLUTION INTRAVENOUS; SUBCUTANEOUS EVERY 6 HOURS PRN
Status: DISCONTINUED | OUTPATIENT
Start: 2024-06-12 | End: 2024-06-16

## 2024-06-12 RX ORDER — HEPARIN SODIUM 1000 [USP'U]/ML
3600 INJECTION, SOLUTION INTRAVENOUS; SUBCUTANEOUS EVERY 6 HOURS PRN
Status: DISCONTINUED | OUTPATIENT
Start: 2024-06-12 | End: 2024-06-16

## 2024-06-12 RX ORDER — DILTIAZEM HYDROCHLORIDE 120 MG/1
120 CAPSULE, COATED, EXTENDED RELEASE ORAL DAILY
Status: DISCONTINUED | OUTPATIENT
Start: 2024-06-12 | End: 2024-06-17 | Stop reason: HOSPADM

## 2024-06-12 RX ORDER — DILTIAZEM HYDROCHLORIDE 5 MG/ML
10 INJECTION INTRAVENOUS ONCE
Status: COMPLETED | OUTPATIENT
Start: 2024-06-12 | End: 2024-06-12

## 2024-06-12 RX ORDER — HEPARIN SODIUM 10000 [USP'U]/100ML
3-20 INJECTION, SOLUTION INTRAVENOUS
Status: DISCONTINUED | OUTPATIENT
Start: 2024-06-12 | End: 2024-06-16

## 2024-06-12 RX ADMIN — ALPRAZOLAM 2 MG: 0.5 TABLET ORAL at 22:00

## 2024-06-12 RX ADMIN — ALPRAZOLAM 2 MG: 0.5 TABLET ORAL at 12:06

## 2024-06-12 RX ADMIN — NICOTINE 14 MG: 14 PATCH, EXTENDED RELEASE TRANSDERMAL at 08:55

## 2024-06-12 RX ADMIN — LISINOPRIL 10 MG: 10 TABLET ORAL at 08:54

## 2024-06-12 RX ADMIN — QUETIAPINE FUMARATE 100 MG: 100 TABLET ORAL at 08:54

## 2024-06-12 RX ADMIN — QUETIAPINE FUMARATE 400 MG: 100 TABLET ORAL at 21:59

## 2024-06-12 RX ADMIN — HEPARIN SODIUM 3600 UNITS: 1000 INJECTION INTRAVENOUS; SUBCUTANEOUS at 19:47

## 2024-06-12 RX ADMIN — CLOPIDOGREL 75 MG: 75 TABLET ORAL at 08:53

## 2024-06-12 RX ADMIN — DILTIAZEM HYDROCHLORIDE 10 MG: 5 INJECTION INTRAVENOUS at 09:56

## 2024-06-12 RX ADMIN — EZETIMIBE 10 MG: 10 TABLET ORAL at 12:07

## 2024-06-12 RX ADMIN — DILTIAZEM HYDROCHLORIDE 120 MG: 120 CAPSULE, COATED, EXTENDED RELEASE ORAL at 12:07

## 2024-06-12 RX ADMIN — NYSTATIN: 100000 POWDER TOPICAL at 19:04

## 2024-06-12 RX ADMIN — ALPRAZOLAM 2 MG: 0.5 TABLET ORAL at 19:03

## 2024-06-12 RX ADMIN — GABAPENTIN 300 MG: 300 CAPSULE ORAL at 08:53

## 2024-06-12 RX ADMIN — ALPRAZOLAM 2 MG: 0.5 TABLET ORAL at 08:53

## 2024-06-12 RX ADMIN — SODIUM CHLORIDE, SODIUM GLUCONATE, SODIUM ACETATE, POTASSIUM CHLORIDE, MAGNESIUM CHLORIDE, SODIUM PHOSPHATE, DIBASIC, AND POTASSIUM PHOSPHATE 50 ML/HR: .53; .5; .37; .037; .03; .012; .00082 INJECTION, SOLUTION INTRAVENOUS at 03:19

## 2024-06-12 RX ADMIN — NYSTATIN: 100000 POWDER TOPICAL at 08:54

## 2024-06-12 RX ADMIN — GABAPENTIN 300 MG: 300 CAPSULE ORAL at 22:00

## 2024-06-12 RX ADMIN — GABAPENTIN 300 MG: 300 CAPSULE ORAL at 16:07

## 2024-06-12 RX ADMIN — LEVETIRACETAM 750 MG: 500 TABLET, FILM COATED ORAL at 21:58

## 2024-06-12 RX ADMIN — ENOXAPARIN SODIUM 40 MG: 40 INJECTION SUBCUTANEOUS at 08:54

## 2024-06-12 RX ADMIN — HEPARIN SODIUM 12 UNITS/KG/HR: 10000 INJECTION, SOLUTION INTRAVENOUS at 12:10

## 2024-06-12 RX ADMIN — LEVETIRACETAM 750 MG: 500 TABLET, FILM COATED ORAL at 08:54

## 2024-06-12 RX ADMIN — HYDRALAZINE HYDROCHLORIDE 5 MG: 20 INJECTION INTRAMUSCULAR; INTRAVENOUS at 08:53

## 2024-06-12 RX ADMIN — ASPIRIN 81 MG: 81 TABLET, CHEWABLE ORAL at 08:53

## 2024-06-12 NOTE — SPEECH THERAPY NOTE
Speech Language/Pathology     Speech/Language Pathology Progress Note     Patient Name: Kirsty Em    Today's Date: 6/12/2024     Problem List  Principal Problem:    Acute metabolic encephalopathy  Active Problems:    Anxiety and depression    History of seizure disorder    Tobacco dependence    Acute respiratory failure with hypoxia (HCC)    Chronic obstructive pulmonary disease, unspecified COPD type (HCC)    Mixed hyperlipidemia    Elevated transaminase level    Rhabdomyolysis    Closed displaced fracture of proximal phalanx of right thumb    Stroke (cerebrum) (HCC)    Pre-diabetes    Bilateral carotid artery stenosis    Hypertension     Subjective:  Patient received awake, upright in bedside chair.  present and denies difficulties w/ current diet, though pt does not prefer. Verbal output remains limited, pt continues to present with AMS     Previous/current diet: puree/nectar     Objective:  The following consistencies were tested puree solids, nectar thick thin liquids, soft solids, thin liquids   Patient presents with decreased bolus containment, manipulation and control w/ prolonged mastication and oral transfer.  Trials limited.  Coughing following thin liquid trials, throat clear x1 with nectar. No overt s/s of aspiration or distress otherwise.      Assessment:  Oropharyngeal swallow function appears slightly improved; pt now able to elicit rotary chew for softened/moistened solids.  Cannot r/o aspiration or pharyngeal dysphagia.  Suggest instrumental prior to further advancement of liquids.       Plan:  Dys2/nectar  Meds w/ puree  1:1 full feeding assist  EDMUNDO Marin MS, CCC-SLP  Speech-Language Pathologist  [place] #[id number]  [place] #[id number]

## 2024-06-12 NOTE — OCCUPATIONAL THERAPY NOTE
Occupational Therapy Cancellation Note        Patient Name: Kirsty Em  Today's Date: 6/12/2024 06/12/24 0945   OT Last Visit   OT Visit Date 06/12/24   Note Type   Note Type Cancelled Session   Cancel Reasons Medical status  (Attempted to see pt for ongoing OT treatment this date. Pt with elevated HR at this time, reading up to 200s on tele while pt supine in bed. Will hold treatment at this time and follow up when the pt is appropriate to participate with therapy)         Maria Campuzano, ZAINABR/L

## 2024-06-12 NOTE — PROGRESS NOTES
Progress Note - Infectious Disease   Kirsty Em 74 y.o. female MRN: [id number]  Unit/Bed#: ICU 11 Encounter: [id number]      Impression/Plan:  1. SIRS. Fever, tachynea, leukocytosis, lactic acidosis, rhabdomyolysis. Patient found down by her  at home. She has a history of epilepsy but stopped her AEDs.  Concern for seizure by neurology on admission. CT C/A/P without infectious source. Status post LP 6/7 which was relatively unremarkable for infection with 6 WBC, glucose 138, protein 49, RBCs. ME panel negative and gram stain without polys or bacteria. CSF cultures resulted NG. Fever and lab/CSF abnormalities may all be related to seizures. CSF not consistent with bacterial meningitis. Consideration for viral meningitis but less likely with rapid clinical improvement, negative ME panel, MRI without infectious abnormalities. MRI shows acute to subacute stroke along the posterior left frontal cortex. So suspect that presentation is related to stroke and possible precipitate seizure.  Fevers improved, leukocytosis stable. O2 requirements improving. No clinical or radiographic evidence of pneumonia. Blood cultures resulted NG.   -Continue to monitor off antibiotics   -Repeat CBCD tomorrow AM to assess for ongoing clinical improvement  -Continue to monitor fever curve/vitals  -Aspiration precautions     2. Acute metabolic encephalopathy. Patient found down by her  at home covered in feces. CTH and CTA without evidence of stroke. Neurology concerned for seizure since patient stopped AEDs. Seizure alone can cause CPK elevation, lactic acidosis, fever.  There was also some concern for NMS versus serotonin syndrome.  Status post LP 6/7, CSF studies not consistent with bacterial meningitis, less concerning for viral etiology although could consider early viral meningitis. ME panel and CSF gram stain negative. CSF cultures resulted NG. Patient intubated for airway protection, now extubated 6/9.  MRI with acute to  subacute infarct in the left frontal cortex, no meningeal enhancement or temporal lobe abnormalities.  -Continue to monitor off antibiotics  -Neurology follow-up     3.  Acute hypoxic respiratory failure.  Patient intubated for hypoxemia and encephalopathy.  Now extubated , O2 weaning. No clinical or radiographic evidence of pneumonia at this time.   -Appreciate speech evaluation  -Aspiration precautions     4.  History of seizure disorder.  Patient with possible seizure 2 years ago and self discontinued AEDs.  -Neurology following  -Follow-up EEG     5.  Rhabdomyolysis.  Likely due to seizure, prolonged downtime.  CPK downtrending.   -IVF per critical care  -Monitor CPK     6. Transaminitis. May be due to rhabdo, medications, sezure. CT with hepatomegaly and diffuse hepatic steatosis.  LFTs improving.    -Monitor LFTs     7. Right thumb fracture. In setting of seizure activty. Orthopedic surgery consulted.    Above plan was discussed in detail with patient at bedside.   Above plan was discussed in detail with primary service, who agrees with the plan to continue monitoring off antibiotics.    Antibiotics:  Off antibiotics    Subjective:  Patient remains afebrile today. Remains on 5L NC. Evaluated by speech. Cannot r/o aspiration or pharyngeal dysphagia. Now on 1:1 full feeding assist. Patient denies having any pain. No fevers or chills. No nausea, vomiting, diarrhea, CP, SOB, abdominal pain, urinary symptoms. Mental status improving per . No new symptoms.    Objective:  Vitals:  Temp:  [98 °F (36.7 °C)-99.9 °F (37.7 °C)] 99 °F (37.2 °C)  HR:  [] 161  Resp:  [20-36] 36  BP: ()/() 107/55  SpO2:  [88 %-98 %] 88 %  Temp (24hrs), Av.7 °F (37.1 °C), Min:98 °F (36.7 °C), Max:99.9 °F (37.7 °C)  Current: Temperature: 99 °F (37.2 °C)    Physical Exam:   General Appearance:  Chronically ill appearing, NAD. Sitting upright in chair next to bed. Getting fed lunch by .    Throat: Oropharynx  moist without lesions. No perioral lesions or ulcerations.    Lungs:   Clear to auscultation bilaterally; no wheezes, rhonchi or rales; respirations unlabored. On 5L NC.    Heart:  RRR; no murmur, rub or gallop.   Abdomen:   Soft, non-tender, non-distended, positive bowel sounds.     Extremities: No clubbing or cyanosis, no edema. R hand/thumb wrapped in a splint.    Skin: No new rashes, lesions, or draining wounds noted on exposed skin.     Labs, Imaging, & Other studies:   All pertinent labs and imaging studies were personally reviewed  Results from last 7 days   Lab Units 06/12/24  0506 06/11/24  0602 06/10/24  0430   WBC Thousand/uL 12.17* 10.99* 12.66*   HEMOGLOBIN g/dL 12.2 11.6 11.1*   PLATELETS Thousands/uL 335 306 262     Results from last 7 days   Lab Units 06/12/24  0506 06/11/24  0602 06/10/24  0430 06/09/24  0445 06/07/24  1613 06/07/24  1344   POTASSIUM mmol/L 3.7 2.8* 2.9* 3.5   < >  --    CHLORIDE mmol/L 106 106 107 106   < >  --    CO2 mmol/L 30 29 23 23   < >  --    CO2, I-STAT mmol/L  --   --   --   --   --  26   BUN mg/dL 9 6 6 8   < >  --    CREATININE mg/dL 0.56* 0.56* 0.50* 0.60   < >  --    EGFR ml/min/1.73sq m 93 93 97 91   < >  --    GLUCOSE, ISTAT mg/dl  --   --   --   --   --  199*   CALCIUM mg/dL 8.8 8.4 7.3* 6.9*   < >  --    AST U/L  --  129* 176* 207*   < >  --    ALT U/L  --  167* 186* 201*   < >  --    ALK PHOS U/L  --  83 76 66   < >  --     < > = values in this interval not displayed.     Results from last 7 days   Lab Units 06/07/24  1935 06/07/24  1639 06/07/24  1315 06/07/24  1310   BLOOD CULTURE   --   --  No Growth After 4 Days. No Growth After 4 Days.   GRAM STAIN RESULT   --  No No Polys or Bacteria seen  --   --    MRSA CULTURE ONLY  No Methicillin Resistant Staphlyococcus aureus (MRSA) isolated  --   --   --      Results from last 7 days   Lab Units 06/09/24  0445 06/08/24  0452 06/07/24  1346   PROCALCITONIN ng/ml 6.11* 13.81* 2.47*                     Imaging  Studies:  I have personally reviewed pertinent imaging study reports and images in PACS.       Jessica Lundberg PA-C  [facility]

## 2024-06-12 NOTE — SPEECH THERAPY NOTE
Speech Language/Pathology Missed Visit Note    MBS orders placed and study scheduled.  However patient not medically cleared for transport to [facility] at this time.  Study deferred.  Will re-attempt as patient status and scheduling permits.  Continue modified diet.  Nursing aware.      Kirsten Marin MS, CCC-SLP  Speech-Language Pathologist  [place] #[id number]  [place] #[id number]

## 2024-06-12 NOTE — PHYSICAL THERAPY NOTE
Physical Therapy Cancellation     Patient's Name: Kirsty Em    Admitting Diagnosis  Lactic acidosis [E87.20]  Altered mental status [R41.82]  Fever [R50.9]  AMS (altered mental status) [R41.82]    Problem List  Patient Active Problem List   Diagnosis    Anxiety and depression    History of seizure disorder    Tobacco dependence    Acute respiratory failure with hypoxia (HCC)    Chronic obstructive pulmonary disease, unspecified COPD type (HCC)    Mixed hyperlipidemia    Acute metabolic encephalopathy    Elevated transaminase level    Rhabdomyolysis    Closed displaced fracture of proximal phalanx of right thumb    Stroke (cerebrum) ([facility])    Pre-diabetes    Bilateral carotid artery stenosis    Hypertension    New onset a-fib (HCC)       Past Medical History  Past Medical History:   Diagnosis Date    Anxiety     Depression     Depression, recurrent (HCC) 4/20/2021    Hallucinations 4/20/2021       Past Surgical History  History reviewed. No pertinent surgical history.       06/12/24 0944   PT Last Visit   PT Visit Date 06/12/24   Note Type   Note Type Cancelled Session   Cancel Reasons Medical status  (Chart review performed. At this time, PT treatment session cancelled as pt with elevated HR, reading 200s on telemetry. PT will follow and provide PT interventions as appropriate.)           Alma Rosa Polo, PT

## 2024-06-12 NOTE — CONSULTS
Consultation - Cardiology   Kirsty Em 74 y.o. female MRN: [id number]  Unit/Bed#: ICU 11 Encounter: [id number]  06/12/24  1:34 PM    Assessment/ Plan:  New onset Atrial Fibrillation with RVR, HRs up to 170's  Initially planned to trial Cardizem drip, but after we finished seeing the pt, she seemed to convert to sinus tach; start cardizem cd 120mg qd  Increase IVF  Start Heparin gtt x 48hrs will need oral in 48hrs per Neuro; [id number] = 5  Echo done EF 55, G2 DD, mild to moderate MR, mild TR, mild pulmonary hypertension pressure 45mmhg    2.  Acute metabolic encephalopathy  Slowly improving  With frequent neuro checks  Management per neurology insulin    3.  Stroke  MRI with focal acute to subacute infarct along the posterior left frontal cortex  Continue with aspirin Plavix for 21 days then aspirin monotherapy  Allergic to statins  Management per neurology and Slim    4.  Hypertension  BP labile  Started on Cardizem CD  Continue hydralazine and labetalol as needed    5.  Rhabdomyolysis  Improving  Management per Slim    6.  Bilateral carotid artery stenosis  7.  Close displaced fracture of proximal phalanx of right thumb  8.  Transaminitis  9.  Hyperlipidemia  10.  COPD  11.  Seizure disorder  12.  Anxiety/depression    History of Present Illness   Physician Requesting Consult: Kike Welsh MD    Reason for Consult / Principal Problem: afib    HPI: Kirsty Em is a 74 y.o. year old female who presents with mental status on 6/7/24.  Patient known in her normal state of health the day prior to coming in.  Patient's  states the patient may have been sad as they just recently had to put one of the dogs down.  Patient has been found the patient had defecated throughout the house.  Sitting on the floor covered in feces her face.   states he cleaned her up but she became more confused, agitated and therefore called EMS.   He notes a prior episode of altered mental status although less severe about 1 year ago  when she had taken too many Seroquel tablets.  Was also concern for seizure-like activity while in the emergency room. Per neurology there was concern for left gaze deviation and stiffening of the upper extremities which had resolved.  Patient placed on stroke protocol.  Procalcitonin and lactic acid were noted to be elevated as well as leukocytosis and temperature of 104 therefore lumbar puncture was performed and started on antibiotics.  Patient admitted to ICU.  Patient seem to be slowly improving and then this a.m. noted to be in rapid A-fib.  Cardizem given and patient's blood pressure dropped from 170-100.  Nursing also notes patient was given hydralazine right after Cardizem.  Cardiology consult called.    PMH: COPD, CKD, history of seizure disorder, tobacco abuse, hyperlipidemia, anxiety, depression    Inpatient consult to Cardiology  Consult performed by: Judi Torres PA-C  Consult ordered by: Christine Simpson PA-C          EKG: unavailable to me at this time  Tele: AFib with rvr      Review of Systems   Unable to perform ROS: Mental status change       Historical Information   Past Medical History:   Diagnosis Date    Anxiety     Depression     Depression, recurrent (HCC) 4/20/2021    Hallucinations 4/20/2021     History reviewed. No pertinent surgical history.  Social History     Substance and Sexual Activity   Alcohol Use Not Currently     Social History     Substance and Sexual Activity   Drug Use Never     Social History     Tobacco Use   Smoking Status Every Day    Current packs/day: 0.50    Types: Cigarettes   Smokeless Tobacco Never       Family History:   Family History   Problem Relation Age of Onset    Diabetes Mother     Stroke Father     No Known Problems Sister     No Known Problems Sister     No Known Problems Sister     No Known Problems Daughter     No Known Problems Daughter     No Known Problems Daughter     No Known Problems Maternal Grandmother     No Known Problems Paternal  "Grandmother     No Known Problems Maternal Aunt     No Known Problems Maternal Aunt     No Known Problems Maternal Aunt     No Known Problems Maternal Aunt     No Known Problems Maternal Aunt     No Known Problems Maternal Aunt     No Known Problems Maternal Aunt     No Known Problems Maternal Aunt     No Known Problems Paternal Aunt     Breast cancer Neg Hx        Meds/Allergies   all current active meds have been reviewed  Allergies   Allergen Reactions    Lipitor [Atorvastatin] Other (See Comments)     Muscle aches       Objective   Vitals: Blood pressure 107/55, pulse (!) 161, temperature 99 °F (37.2 °C), temperature source Oral, resp. rate (!) 36, height 5' 1\" (1.549 m), weight 61.7 kg (136 lb), SpO2 (!) 88%., Body mass index is 25.7 kg/m².,   Orthostatic Blood Pressures      Flowsheet Row Most Recent Value   Blood Pressure 107/55 filed at 2024 1015   Patient Position - Orthostatic VS Lying filed at 2024 0400            Systolic (24hrs), Av , Min:98 , Max:190     Diastolic (24hrs), Av, Min:52, Max:109        Intake/Output Summary (Last 24 hours) at 2024 1334  Last data filed at 2024 1059  Gross per 24 hour   Intake 1936.66 ml   Output 3200 ml   Net -1263.34 ml       Invasive Devices       Peripheral Intravenous Line  Duration             Peripheral IV 24 Left;Ventral (anterior) Foot 1 day                        Physical Exam:  GEN: Alert and oriented, in no acute distress.  Ill appearing and well nourished.   HEENT: Sclera anicteric, conjunctivae pink, mucous membranes moist. Oropharynx clear.   NECK: Supple, no carotid bruits, no significant JVD. Trachea midline, no thyromegaly.   HEART: Irregularly irregular tachycardia, normal S1 and S2, no murmurs, clicks, gallops or rubs. PMI nondisplaced, no thrills.   LUNGS: decreased breath sounds bilaterally; no wheezes, rales, or rhonchi. No increased work of breathing or signs of respiratory distress.   ABDOMEN: Soft, nontender, " nondistended, normoactive bowel sounds.   EXTREMITIES: +right thumb splint noted, Skin warm and well perfused, no clubbing, cyanosis, or edema.  NEURO: + confused and disoriented  SKIN: Normal without suspicious lesions on exposed skin.      Lab Results:     Troponins:   Results from last 7 days   Lab Units 06/12/24  0506 06/11/24  0602 06/09/24  0445 06/08/24  0452 06/07/24  1810 06/07/24  1613 06/07/24  1540 06/07/24  1346   CK TOTAL U/L 1,405* 4,153* 9,243*   < >  --    < >  --   --    HS TNI 0HR ng/L  --   --   --   --   --   --   --  30   HS TNI 2HR ng/L  --   --   --   --   --   --  54*  --    HS TNI 4HR ng/L  --   --   --   --  156*  --   --   --    HSTNI D4 ng/L  --   --   --   --  126*  --   --   --     < > = values in this interval not displayed.       CBC with diff:   Results from last 7 days   Lab Units 06/12/24  0506 06/11/24  0602 06/10/24  0430 06/09/24  0445 06/08/24  0452 06/07/24  1346 06/07/24  1344   WBC Thousand/uL 12.17* 10.99* 12.66* 10.09 16.77* 21.54*  --    HEMOGLOBIN g/dL 12.2 11.6 11.1* 10.8* 11.4* 15.0  --    I STAT HEMOGLOBIN g/dl  --   --   --   --   --   --  16.3*   HEMATOCRIT % 35.2 32.7* 31.6* 31.6* 32.7* 43.5  --    HEMATOCRIT, ISTAT %  --   --   --   --   --   --  48*   MCV fL 95 94 96 97 96 97  --    PLATELETS Thousands/uL 335 306 262 236 269 370  --    RBC Million/uL 3.69* 3.48* 3.30* 3.25* 3.40* 4.51  --    MCH pg 33.1 33.3 33.6 33.2 33.5 33.3  --    MCHC g/dL 34.7 35.5 35.1 34.2 34.9 34.5  --    RDW % 13.5 13.3 13.2 13.6 13.3 13.2  --    MPV fL 10.0 9.8 10.0 10.2 10.0 10.3  --    NRBC AUTO /100 WBCs  --   --  0 0 0 0  --          CMP:   Results from last 7 days   Lab Units 06/12/24  0506 06/11/24  0602 06/10/24  0430 06/09/24  0445 06/08/24  0452 06/07/24  1613 06/07/24  1344   POTASSIUM mmol/L 3.7 2.8* 2.9* 3.5 3.1* 3.8  --    CHLORIDE mmol/L 106 106 107 106 102 96  --    CO2 mmol/L 30 29 23 23 26 29  --    CO2, I-STAT mmol/L  --   --   --   --   --   --  26   BUN mg/dL 9 6 6  8 15 12  --    CREATININE mg/dL 0.56* 0.56* 0.50* 0.60 0.93 0.96  --    GLUCOSE, ISTAT mg/dl  --   --   --   --   --   --  199*   CALCIUM mg/dL 8.8 8.4 7.3* 6.9* 7.5* 9.1  --    AST U/L  --  129* 176* 207* 311* 395*  --    ALT U/L  --  167* 186* 201* 260* 379*  --    ALK PHOS U/L  --  83 76 66 72 104  --    EGFR ml/min/1.73sq m 93 93 97 91 61 59  --

## 2024-06-12 NOTE — PLAN OF CARE
Problem: PAIN - ADULT  Goal: Verbalizes/displays adequate comfort level or baseline comfort level  Description: Interventions:  - Encourage patient to monitor pain and request assistance  - Assess pain using appropriate pain scale  - Administer analgesics based on type and severity of pain and evaluate response  - Implement non-pharmacological measures as appropriate and evaluate response  - Consider cultural and social influences on pain and pain management  - Notify physician/advanced practitioner if interventions unsuccessful or patient reports new pain  Outcome: Progressing     Problem: SAFETY ADULT  Goal: Patient will remain free of falls  Description: INTERVENTIONS:  - Educate patient/family on patient safety including physical limitations  - Instruct patient to call for assistance with activity   - Consult OT/PT to assist with strengthening/mobility   - Keep Call bell within reach  - Keep bed low and locked with side rails adjusted as appropriate  - Keep care items and personal belongings within reach  - Initiate and maintain comfort rounds  - Make Fall Risk Sign visible to staff  - Offer Toileting every 2 Hours, in advance of need  - Initiate/Maintain bedalarm  - Apply yellow socks and bracelet for high fall risk patients  - Consider moving patient to room near nurses station  Outcome: Progressing  Goal: Maintain or return to baseline ADL function  Description: INTERVENTIONS:  -  Assess patient's ability to carry out ADLs; assess patient's baseline for ADL function and identify physical deficits which impact ability to perform ADLs (bathing, care of mouth/teeth, toileting, grooming, dressing, etc.)  - Assess/evaluate cause of self-care deficits   - Assess range of motion  - Assess patient's mobility; develop plan if impaired  - Assess patient's need for assistive devices and provide as appropriate  - Encourage maximum independence but intervene and supervise when necessary  - Involve family in performance  of ADLs  - Assess for home care needs following discharge   - Consider OT consult to assist with ADL evaluation and planning for discharge  - Provide patient education as appropriate  Outcome: Progressing  Goal: Maintains/Returns to pre admission functional level  Description: INTERVENTIONS:  - Perform AM-PAC 6 Click Basic Mobility/ Daily Activity assessment daily.  - Set and communicate daily mobility goal to care team and patient/family/caregiver.   - Collaborate with rehabilitation services on mobility goals if consulted  - Perform Range of Motion 4 times a day.  - Reposition patient every 2 hours.  - Dangle patient 3 times a day  - Stand patient 3 times a day  - Ambulate patient 3 times a day  - Out of bed to chair 3 times a day   - Out of bed for meals 3 times a day  - Out of bed for toileting  - Record patient progress and toleration of activity level   Outcome: Progressing     Problem: DISCHARGE PLANNING  Goal: Discharge to home or other facility with appropriate resources  Description: INTERVENTIONS:  - Identify barriers to discharge w/patient and caregiver  - Arrange for needed discharge resources and transportation as appropriate  - Identify discharge learning needs (meds, wound care, etc.)  - Arrange for interpretive services to assist at discharge as needed  - Refer to Case Management Department for coordinating discharge planning if the patient needs post-hospital services based on physician/advanced practitioner order or complex needs related to functional status, cognitive ability, or social support system  Outcome: Progressing     Problem: Knowledge Deficit  Goal: Patient/family/caregiver demonstrates understanding of disease process, treatment plan, medications, and discharge instructions  Description: Complete learning assessment and assess knowledge base.  Interventions:  - Provide teaching at level of understanding  - Provide teaching via preferred learning methods  Outcome: Progressing      Problem: Nutrition/Hydration-ADULT  Goal: Nutrient/Hydration intake appropriate for improving, restoring or maintaining nutritional needs  Description: Monitor and assess patient's nutrition/hydration status for malnutrition. Collaborate with interdisciplinary team and initiate plan and interventions as ordered.  Monitor patient's weight and dietary intake as ordered or per policy. Utilize nutrition screening tool and intervene as necessary. Determine patient's food preferences and provide high-protein, high-caloric foods as appropriate.     INTERVENTIONS:  - Monitor oral intake, urinary output, labs, and treatment plans  - Assess nutrition and hydration status and recommend course of action  - Evaluate amount of meals eaten  - Assist patient with eating if necessary   - Allow adequate time for meals  - Recommend/ encourage appropriate diets, oral nutritional supplements, and vitamin/mineral supplements  - Order, calculate, and assess calorie counts as needed  - Recommend, monitor, and adjust tube feedings and TPN/PPN based on assessed needs  - Assess need for intravenous fluids  - Provide specific nutrition/hydration education as appropriate  - Include patient/family/caregiver in decisions related to nutrition  Outcome: Progressing     Problem: Prexisting or High Potential for Compromised Skin Integrity  Goal: Skin integrity is maintained or improved  Description: INTERVENTIONS:  - Identify patients at risk for skin breakdown  - Assess and monitor skin integrity  - Assess and monitor nutrition and hydration status  - Monitor labs   - Assess for incontinence   - Turn and reposition patient  - Assist with mobility/ambulation  - Relieve pressure over bony prominences  - Avoid friction and shearing  - Provide appropriate hygiene as needed including keeping skin clean and dry  - Evaluate need for skin moisturizer/barrier cream  - Collaborate with interdisciplinary team   - Patient/family teaching  - Consider wound  care consult   Outcome: Progressing

## 2024-06-12 NOTE — ASSESSMENT & PLAN NOTE
Morning of 6/12 patient developed rapid afib, HR up to 170s  Cardiology consulted,  Cardizem  mg daily  Heparin gtt therapeutic for 48hr then repeat CT head, if CT head stables, able to transfer to oral NOAC  Unfortunately PTT not therapeutic, therapeutic starting today  Converted to NSR prior to cardizem drip, thus not started   Tele monitor

## 2024-06-12 NOTE — PROGRESS NOTES
[facility]  Progress Note  Name: Kirsty Em I MRN: [id number]  Unit/Bed#: ICU 11-01I Date of Admission: 6/7/2024   Date of Service: 6/7/2024  I Hospital Day: 5    * Acute metabolic encephalopathy  Assessment & Plan  Presents with confusion from home, aphasia and weakness  Slowly improving  Frequent neurologic checks  Delirium precautions, 1:1 discontinued     Stroke (cerebrum) ([facility])  Assessment & Plan  Neurology consulted,  CTA head/neck with significant carotid disease bilaterally 70% right and 65% left  MRI brain with focal acute to subacute infarct along the posterior left frontal cortex.  Continue aspirin/Plavix x 21 days then aspirin monotherapy, patient allergic to statin  ST recommending Puree diet nectar thick liquid - video barium swallow pending   PT/OT recommending moderate intensity resource  CM consult for dispo planning  Pending PMR consult  Patient will need follow up with neurovascular on discharge in 6 weeks     Bilateral carotid artery stenosis  Assessment & Plan  Continue aspirin/Plavix  Plan for vascular surgery follow-up as an outpatient     New onset a-fib ([facility])  Assessment & Plan  Morning of 6/12 patient developed rapid afib, HR up to 170s  Cardiology consulted,  Cardizem  mg daily  Heparin gtt x 48h per neuro then DOAC  Converted to NSR prior to cardizem drip thus not started   Tele monitor     Rhabdomyolysis  Assessment & Plan  Resolving; total CK peaked 15,500 and trending down   Continue IVF until reliably tolerating diet    Hypertension  Assessment & Plan  Fluctuating BP, initially started on lisinopril  Continue IV hydralazine or labetalol as needed  Given initiation of cardizem, will discontinue lisinopril   Monitor VS per unit protocol, Blood Pressure: 107/55     Pre-diabetes  Assessment & Plan  Encourage lifestyle changes  BG glucose reasonable via labs continue to monitor     Closed displaced fracture of proximal phalanx of right  thumb  Assessment & Plan  S/p splinting by ortho  Repeat xray in 1 week (from 6/8)  NWB right hand   Appreciate orthopedic recommendations    Elevated transaminase level  Assessment & Plan  Suspect related to acuity of presentation  Resolving      Mixed hyperlipidemia  Assessment & Plan  Continue home Zetia  Patient intolerant of statins     Chronic obstructive pulmonary disease, unspecified COPD type (HCC)  Assessment & Plan  Not currently in exacerbation  Wean oxygen for goal SpO2>88%  Respiratory protocol  PRN albuterol     Acute respiratory failure with hypoxia (HCC)  Assessment & Plan  Presumed secondary to aspiration prior to presentation  Wean oxygen for goal SpO2>88%  Respiratory protocol     Tobacco dependence  Assessment & Plan  Encourage cessation   Nicotine replacement     History of seizure disorder  Assessment & Plan  Patient reportedly self-discontinued Keppra at home  Cannot rule out seizure as etiology of presentation  Possibly related to Xanax withdrawal?  Continue Keppra  EEG abnormal, will need ambulatory EEG per neuro     Anxiety and depression  Assessment & Plan  Continue home Xanax and Seroquel            VTE Pharmacologic Prophylaxis: VTE Score: 9 High Risk (Score >/= 5) - Pharmacological DVT Prophylaxis Ordered: heparin drip. Sequential Compression Devices Ordered.    Mobility:   Basic Mobility Inpatient Raw Score: 17  JH-HLM Goal: 5: Stand one or more mins  JH-HLM Achieved: 6: Walk 10 steps or more  JH-HLM Goal achieved. Continue to encourage appropriate mobility.    Patient Centered Rounds: I performed bedside rounds with nursing staff today.   Discussions with Specialists or Other Care Team Provider: cards, neuro, CM     Education and Discussions with Family / Patient: Updated  () at bedside.    Total Time Spent on Date of Encounter in care of patient: 60 mins. This time was spent on one or more of the following: performing physical exam; counseling and coordination  of care; obtaining or reviewing history; documenting in the medical record; reviewing/ordering tests, medications or procedures; communicating with other healthcare professionals and discussing with patient's family/caregivers.    Current Length of Stay: 5 day(s)  Current Patient Status: Inpatient   Certification Statement: The patient will continue to require additional inpatient hospital stay due to new onset afib requiring IV heparin drip x 48hr   Discharge Plan: Anticipate discharge in 48-72 hrs to [facility] vs [facility]     Code Status: Level 1 - Full Code    Subjective:   Seen this morning, notified by nursing staff of new onset A-fib with heart rate in the 170s. Blood pressure initially controlled 136/73,  However significantly decreased to 98/52 with recovery to 107/55 with improved heart rate control.  Admits to palpitations and some shortness of breath, no chest pain or pressure.   at bedside, denies prior history of atrial fibrillation.    Objective:     Vitals:   Temp (24hrs), Av.7 °F (37.1 °C), Min:98 °F (36.7 °C), Max:99.9 °F (37.7 °C)    Temp:  [98 °F (36.7 °C)-99.9 °F (37.7 °C)] 99 °F (37.2 °C)  HR:  [] 161  Resp:  [20-36] 36  BP: ()/() 107/55  SpO2:  [88 %-98 %] 88 %  Body mass index is 25.7 kg/m².     Input and Output Summary (last 24 hours):     Intake/Output Summary (Last 24 hours) at 2024 1229  Last data filed at 2024 1059  Gross per 24 hour   Intake 2296.66 ml   Output 3200 ml   Net -903.34 ml       Physical Exam:   Physical Exam  Vitals and nursing note reviewed.   Constitutional:       General: She is awake. She is not in acute distress.     Appearance: Normal appearance. She is well-developed and well-groomed. She is not ill-appearing or toxic-appearing.   Cardiovascular:      Rate and Rhythm: Tachycardia present. Rhythm irregularly irregular.      Heart sounds: No murmur heard.  Pulmonary:      Effort: Pulmonary effort is normal. Tachypnea present. No respiratory  distress.      Breath sounds: No wheezing or rales.      Comments: O2 via NC 5L 88-89% at time of evaluation   Musculoskeletal:         General: Signs of injury (right hand bruised, thumb spica splint noted) present.   Skin:     General: Skin is warm and dry.      Coloration: Skin is not pale.   Neurological:      Mental Status: She is alert. She is disoriented.      Comments: Oriented to self, appears confused again this am at time of evaluation, likely secondary to afib;  also notes she gets more confused with lots of people in the room, gets better when less commotion    Psychiatric:         Mood and Affect: Mood normal.         Behavior: Behavior normal. Behavior is cooperative.           Additional Data:     Labs:  Results from last 7 days   Lab Units 06/12/24  0506 06/11/24  0602 06/10/24  0430   WBC Thousand/uL 12.17*   < > 12.66*   HEMOGLOBIN g/dL 12.2   < > 11.1*   HEMATOCRIT % 35.2   < > 31.6*   PLATELETS Thousands/uL 335   < > 262   SEGS PCT %  --   --  71   LYMPHO PCT %  --   --  21   MONO PCT %  --   --  7   EOS PCT %  --   --  0    < > = values in this interval not displayed.     Results from last 7 days   Lab Units 06/12/24  0506 06/11/24  0602   SODIUM mmol/L 145 145   POTASSIUM mmol/L 3.7 2.8*   CHLORIDE mmol/L 106 106   CO2 mmol/L 30 29   BUN mg/dL 9 6   CREATININE mg/dL 0.56* 0.56*   ANION GAP mmol/L 9 10   CALCIUM mg/dL 8.8 8.4   ALBUMIN g/dL  --  3.5   TOTAL BILIRUBIN mg/dL  --  0.97   ALK PHOS U/L  --  83   ALT U/L  --  167*   AST U/L  --  129*   GLUCOSE RANDOM mg/dL 104 103     Results from last 7 days   Lab Units 06/08/24  0452   INR  1.03     Results from last 7 days   Lab Units 06/08/24  0031   POC GLUCOSE mg/dl 117     Results from last 7 days   Lab Units 06/09/24  0445   HEMOGLOBIN A1C % 6.3*     Results from last 7 days   Lab Units 06/09/24  0445 06/08/24  0452 06/07/24  2152 06/07/24  1613 06/07/24  1346   LACTIC ACID mmol/L  --   --  1.5 3.4* 8.4*   PROCALCITONIN ng/ml 6.11*  13.81*  --   --  2.47*       Lines/Drains:  Invasive Devices       Peripheral Intravenous Line  Duration             Peripheral IV 06/11/24 Left;Ventral (anterior) Foot 1 day                          Imaging: No pertinent imaging reviewed.    Recent Cultures (last 7 days):   Results from last 7 days   Lab Units 06/07/24  1639 06/07/24  1315 06/07/24  1310   BLOOD CULTURE   --  No Growth After 4 Days. No Growth After 4 Days.   GRAM STAIN RESULT  No No Polys or Bacteria seen  --   --        Last 24 Hours Medication List:   Current Facility-Administered Medications   Medication Dose Route Frequency Provider Last Rate    acetaminophen  650 mg Oral Q6H PRN SINDY Lamar      albuterol  2.5 mg Nebulization Q4H PRN SINDY Lamar      ALPRAZolam  2 mg Oral 4x Daily SINDY Lamar      aspirin  81 mg Oral Daily SINDY Lamar      clopidogrel  75 mg Oral Daily SINDY Lamar      diltiazem  120 mg Oral Daily Judi Torres PA-C      ezetimibe  10 mg Per NG Tube Daily SINDY Lamar      gabapentin  300 mg Oral TID SINDY Lamar      heparin (porcine)  3-20 Units/kg/hr (Order-Specific) Intravenous Titrated Judileeroy Torres PA-C 12 Units/kg/hr (06/12/24 1210)    heparin (porcine)  1,800 Units Intravenous Q6H PRN Judisa CARLOS Torres PA-C      heparin (porcine)  3,600 Units Intravenous Q6H PRN Judisa CARLOS Torres PA-C      hydrALAZINE  5 mg Intravenous Q6H PRN SINDY Lamar      labetalol  10 mg Intravenous Q6H PRN SINDY Lamar      levETIRAcetam  750 mg Oral Q12H JERMAINE SINDY Lamar      multi-electrolyte  125 mL/hr Intravenous Continuous Judisa CARLOS Torres PA-C 125 mL/hr (06/12/24 1128)    nicotine  14 mg Transdermal Daily SINDY Lamar      nystatin   Topical BID SINDY Lamar      QUEtiapine  100 mg Oral QAM SINDY Lamar      QUEtiapine  400 mg Oral HS SINDY Lamar          Today, Patient Was Seen By: Christine Simpson,  PARosarioC    **Please Note: This note may have been constructed using a voice recognition system.**

## 2024-06-13 PROBLEM — M62.82 RHABDOMYOLYSIS: Status: RESOLVED | Noted: 2024-06-07 | Resolved: 2024-06-13

## 2024-06-13 PROBLEM — E87.6 HYPOKALEMIA: Status: ACTIVE | Noted: 2024-06-13

## 2024-06-13 LAB
ALBUMIN SERPL BCP-MCNC: 3.1 G/DL (ref 3.5–5)
ALP SERPL-CCNC: 62 U/L (ref 34–104)
ALT SERPL W P-5'-P-CCNC: 90 U/L (ref 7–52)
ANION GAP SERPL CALCULATED.3IONS-SCNC: 7 MMOL/L (ref 4–13)
APTT PPP: 41 SECONDS (ref 23–37)
APTT PPP: 53 SECONDS (ref 23–37)
APTT PPP: 55 SECONDS (ref 23–37)
APTT PPP: 64 SECONDS (ref 23–37)
AST SERPL W P-5'-P-CCNC: 43 U/L (ref 13–39)
BILIRUB SERPL-MCNC: 0.76 MG/DL (ref 0.2–1)
BUN SERPL-MCNC: 13 MG/DL (ref 5–25)
CALCIUM ALBUM COR SERPL-MCNC: 8.8 MG/DL (ref 8.3–10.1)
CALCIUM SERPL-MCNC: 8.1 MG/DL (ref 8.4–10.2)
CHLORIDE SERPL-SCNC: 105 MMOL/L (ref 96–108)
CK SERPL-CCNC: 362 U/L (ref 26–192)
CO2 SERPL-SCNC: 33 MMOL/L (ref 21–32)
CREAT SERPL-MCNC: 0.45 MG/DL (ref 0.6–1.3)
ERYTHROCYTE [DISTWIDTH] IN BLOOD BY AUTOMATED COUNT: 13.8 % (ref 11.6–15.1)
GFR SERPL CREATININE-BSD FRML MDRD: 100 ML/MIN/1.73SQ M
GLUCOSE SERPL-MCNC: 101 MG/DL (ref 65–140)
HCT VFR BLD AUTO: 31.5 % (ref 34.8–46.1)
HGB BLD-MCNC: 10.7 G/DL (ref 11.5–15.4)
MAGNESIUM SERPL-MCNC: 1.9 MG/DL (ref 1.9–2.7)
MCH RBC QN AUTO: 32.4 PG (ref 26.8–34.3)
MCHC RBC AUTO-ENTMCNC: 34 G/DL (ref 31.4–37.4)
MCV RBC AUTO: 96 FL (ref 82–98)
PLATELET # BLD AUTO: 334 THOUSANDS/UL (ref 149–390)
PMV BLD AUTO: 10.2 FL (ref 8.9–12.7)
POTASSIUM SERPL-SCNC: 2.4 MMOL/L (ref 3.5–5.3)
PROT SERPL-MCNC: 5.5 G/DL (ref 6.4–8.4)
RBC # BLD AUTO: 3.3 MILLION/UL (ref 3.81–5.12)
SODIUM SERPL-SCNC: 145 MMOL/L (ref 135–147)
WBC # BLD AUTO: 9.75 THOUSAND/UL (ref 4.31–10.16)

## 2024-06-13 PROCEDURE — 80053 COMPREHEN METABOLIC PANEL: CPT | Performed by: PHYSICIAN ASSISTANT

## 2024-06-13 PROCEDURE — 99233 SBSQ HOSP IP/OBS HIGH 50: CPT | Performed by: PHYSICIAN ASSISTANT

## 2024-06-13 PROCEDURE — 97530 THERAPEUTIC ACTIVITIES: CPT

## 2024-06-13 PROCEDURE — 99233 SBSQ HOSP IP/OBS HIGH 50: CPT | Performed by: INTERNAL MEDICINE

## 2024-06-13 PROCEDURE — 82550 ASSAY OF CK (CPK): CPT | Performed by: PHYSICIAN ASSISTANT

## 2024-06-13 PROCEDURE — 92526 ORAL FUNCTION THERAPY: CPT

## 2024-06-13 PROCEDURE — 85730 THROMBOPLASTIN TIME PARTIAL: CPT | Performed by: STUDENT IN AN ORGANIZED HEALTH CARE EDUCATION/TRAINING PROGRAM

## 2024-06-13 PROCEDURE — 99232 SBSQ HOSP IP/OBS MODERATE 35: CPT | Performed by: STUDENT IN AN ORGANIZED HEALTH CARE EDUCATION/TRAINING PROGRAM

## 2024-06-13 PROCEDURE — 83735 ASSAY OF MAGNESIUM: CPT | Performed by: PHYSICIAN ASSISTANT

## 2024-06-13 PROCEDURE — 85027 COMPLETE CBC AUTOMATED: CPT | Performed by: PHYSICIAN ASSISTANT

## 2024-06-13 PROCEDURE — 85730 THROMBOPLASTIN TIME PARTIAL: CPT | Performed by: INTERNAL MEDICINE

## 2024-06-13 RX ORDER — MAGNESIUM SULFATE HEPTAHYDRATE 40 MG/ML
2 INJECTION, SOLUTION INTRAVENOUS ONCE
Status: COMPLETED | OUTPATIENT
Start: 2024-06-13 | End: 2024-06-13

## 2024-06-13 RX ORDER — LISINOPRIL 10 MG/1
10 TABLET ORAL DAILY
Status: DISCONTINUED | OUTPATIENT
Start: 2024-06-13 | End: 2024-06-14

## 2024-06-13 RX ORDER — POTASSIUM CHLORIDE 14.9 MG/ML
20 INJECTION INTRAVENOUS
Status: COMPLETED | OUTPATIENT
Start: 2024-06-13 | End: 2024-06-13

## 2024-06-13 RX ADMIN — HEPARIN SODIUM 1800 UNITS: 1000 INJECTION INTRAVENOUS; SUBCUTANEOUS at 03:00

## 2024-06-13 RX ADMIN — SODIUM CHLORIDE, SODIUM GLUCONATE, SODIUM ACETATE, POTASSIUM CHLORIDE, MAGNESIUM CHLORIDE, SODIUM PHOSPHATE, DIBASIC, AND POTASSIUM PHOSPHATE 125 ML/HR: .53; .5; .37; .037; .03; .012; .00082 INJECTION, SOLUTION INTRAVENOUS at 17:26

## 2024-06-13 RX ADMIN — ALPRAZOLAM 2 MG: 0.5 TABLET ORAL at 08:36

## 2024-06-13 RX ADMIN — LISINOPRIL 10 MG: 10 TABLET ORAL at 09:08

## 2024-06-13 RX ADMIN — SODIUM CHLORIDE, SODIUM GLUCONATE, SODIUM ACETATE, POTASSIUM CHLORIDE, MAGNESIUM CHLORIDE, SODIUM PHOSPHATE, DIBASIC, AND POTASSIUM PHOSPHATE 125 ML/HR: .53; .5; .37; .037; .03; .012; .00082 INJECTION, SOLUTION INTRAVENOUS at 00:45

## 2024-06-13 RX ADMIN — SODIUM CHLORIDE, SODIUM GLUCONATE, SODIUM ACETATE, POTASSIUM CHLORIDE, MAGNESIUM CHLORIDE, SODIUM PHOSPHATE, DIBASIC, AND POTASSIUM PHOSPHATE 125 ML/HR: .53; .5; .37; .037; .03; .012; .00082 INJECTION, SOLUTION INTRAVENOUS at 09:06

## 2024-06-13 RX ADMIN — LEVETIRACETAM 750 MG: 500 TABLET, FILM COATED ORAL at 08:37

## 2024-06-13 RX ADMIN — NYSTATIN: 100000 POWDER TOPICAL at 08:38

## 2024-06-13 RX ADMIN — GABAPENTIN 300 MG: 300 CAPSULE ORAL at 21:56

## 2024-06-13 RX ADMIN — EZETIMIBE 10 MG: 10 TABLET ORAL at 08:38

## 2024-06-13 RX ADMIN — GABAPENTIN 300 MG: 300 CAPSULE ORAL at 17:59

## 2024-06-13 RX ADMIN — LEVETIRACETAM 750 MG: 500 TABLET, FILM COATED ORAL at 21:56

## 2024-06-13 RX ADMIN — DILTIAZEM HYDROCHLORIDE 120 MG: 120 CAPSULE, COATED, EXTENDED RELEASE ORAL at 08:37

## 2024-06-13 RX ADMIN — MAGNESIUM SULFATE HEPTAHYDRATE 2 G: 40 INJECTION, SOLUTION INTRAVENOUS at 09:09

## 2024-06-13 RX ADMIN — HEPARIN SODIUM 3600 UNITS: 1000 INJECTION INTRAVENOUS; SUBCUTANEOUS at 16:01

## 2024-06-13 RX ADMIN — ASPIRIN 81 MG: 81 TABLET, CHEWABLE ORAL at 08:36

## 2024-06-13 RX ADMIN — POTASSIUM CHLORIDE 20 MEQ: 14.9 INJECTION, SOLUTION INTRAVENOUS at 10:43

## 2024-06-13 RX ADMIN — QUETIAPINE FUMARATE 100 MG: 100 TABLET ORAL at 08:37

## 2024-06-13 RX ADMIN — CLOPIDOGREL 75 MG: 75 TABLET ORAL at 08:37

## 2024-06-13 RX ADMIN — ALPRAZOLAM 2 MG: 0.5 TABLET ORAL at 17:59

## 2024-06-13 RX ADMIN — QUETIAPINE FUMARATE 400 MG: 100 TABLET ORAL at 21:55

## 2024-06-13 RX ADMIN — ALPRAZOLAM 2 MG: 0.5 TABLET ORAL at 21:55

## 2024-06-13 RX ADMIN — NICOTINE 14 MG: 14 PATCH, EXTENDED RELEASE TRANSDERMAL at 08:39

## 2024-06-13 RX ADMIN — HEPARIN SODIUM 18 UNITS/KG/HR: 10000 INJECTION, SOLUTION INTRAVENOUS at 13:55

## 2024-06-13 RX ADMIN — GABAPENTIN 300 MG: 300 CAPSULE ORAL at 08:37

## 2024-06-13 RX ADMIN — POTASSIUM CHLORIDE 20 MEQ: 14.9 INJECTION, SOLUTION INTRAVENOUS at 09:13

## 2024-06-13 NOTE — PLAN OF CARE
Problem: OCCUPATIONAL THERAPY ADULT  Goal: Performs self-care activities at highest level of function for planned discharge setting.  See evaluation for individualized goals.  Description: Treatment Interventions: Functional transfer training, ADL retraining, Endurance training, Patient/family training, Compensatory technique education          See flowsheet documentation for full assessment, interventions and recommendations.   Note: Limitation: Decreased ADL status, Decreased UE strength, Decreased Safe judgement during ADL, Decreased cognition, Decreased endurance, Decreased self-care trans, Decreased high-level ADLs, Decreased fine motor control  Prognosis: Good  Assessment: Patient participated in Skilled OT session this date with interventions consisting of ADL re training with the use of correct body mechnaics,  therapeutic activities to: increase activity tolerance, and increase dynamic sit/ stand balance during functional activity  . Patient agreeable to OT treatment session, upon arrival patient was found supine in bed and in no apparent distress. Patient completed bed mobility with mod assist of 2 and functional transfers with min assist of 2. Pt ambulated to short distance to recliner with min assist of 2 utilizing HHA. While seated in recliner, pt required min assist for self-feeding. In comparison to previous session, patient with improvements in orientation and progression with mobility. Patient requiring cognitive assistance to anticipate next step, one step directives, and frequent rest periods. Patient continues to be functioning below baseline level, occupational performance remains limited secondary to factors listed above and increased risk for falls and injury.   From OT standpoint, recommendation at time of d/c would be Level I (maximum resource intensity).   Patient to benefit from continued Occupational Therapy treatment while in the hospital to address deficits as defined above and  maximize level of functional independence with ADLs and functional mobility.     Rehab Resource Intensity Level, OT: I (Maximum Resource Intensity)        Marisol Ochoa OTCHARLOTTE, OTR/L

## 2024-06-13 NOTE — ARC ADMISSION
Referral received for patient consideration of ARC placement for rehab.  Will review and will update with determination when known.

## 2024-06-13 NOTE — PROGRESS NOTES
"Cardiology Progress Note - Kirsty Em 74 y.o. female MRN: [id number]    Unit/Bed#: ICU 11 Encounter: [id number]      Assessment/Plan:  1.  New onset atrial fibrillation with RVR, heart rates up to 170s  Patient converted to sinus rhythm shortly after seen yesterday  Remains sinus  Plan for heparin drip for 48 hours then oral anticoagulation when okay with neurology  [id number]  Echo done EF 55%; mild to moderate MR    2.  Acute metabolic encephalopathy  Improved per   Management per neurology/slim    3.  Stroke  MRI with focal acute to subacute infarct along the posterior left frontal cortex  Continue aspirin, Plavix for 21 days then aspirin monotherapy    4.  Hypertension  Elevated at times today 156/74  Continue Cardizem , Prill 10    5.  Rhabdomyolysis  Improving  Management per S[place]    6.  Bilateral carotid artery stenosis  7.  Closed displaced fracture of proximal phalanx of right thumb  8.  Transaminitis  9.  Hyperlipidemia  10.  COPD  11.  Seizure Disorder  12.  Anxiety/depression    Subjective:   Patient seen and examined.  No significant events overnight. Im feeling much better. Sitting in chair, spouse at bedside    Objective:     Vitals: Blood pressure 156/74, pulse 95, temperature 97.8 °F (36.6 °C), temperature source Oral, resp. rate (!) 25, height 5' 1\" (1.549 m), weight 61.7 kg (136 lb), SpO2 94%., Body mass index is 25.7 kg/m².,   Orthostatic Blood Pressures      Flowsheet Row Most Recent Value   Blood Pressure 156/74 filed at 06/13/2024 0915   Patient Position - Orthostatic VS Lying filed at 06/12/2024 0400              Intake/Output Summary (Last 24 hours) at 6/13/2024 1543  Last data filed at 6/13/2024 1109  Gross per 24 hour   Intake 3329.98 ml   Output 1225 ml   Net 2104.98 ml         Physical Exam:  GEN: Alert and oriented, in no acute distress.  Ill appearing and well nourished.   HEENT: Sclera anicteric, conjunctivae pink, mucous membranes moist. Oropharynx clear.   NECK: " Supple, no carotid bruits, no significant JVD. Trachea midline, no thyromegaly.   HEART: Regular rhythm, normal S1 and S2, no murmurs, clicks, gallops or rubs. PMI nondisplaced, no thrills.   LUNGS: Decreased breath sounds bilaterally; no wheezes, rales, or rhonchi. No increased work of breathing or signs of respiratory distress.   ABDOMEN: Soft, nontender, nondistended, normoactive bowel sounds.   EXTREMITIES: + right thumb splint, Skin warm and well perfused, no clubbing, cyanosis, or edema.  NEURO: No focal findings. Normal speech. Mood and affect normal.   SKIN: Normal without suspicious lesions on exposed skin.      Medications:      Current Facility-Administered Medications:     acetaminophen (TYLENOL) tablet 650 mg, 650 mg, Oral, Q6H PRN, SINDY Lamar, 650 mg at 06/09/24 1654    albuterol inhalation solution 2.5 mg, 2.5 mg, Nebulization, Q4H PRN, SINDY Lamar    ALPRAZolam (XANAX) tablet 2 mg, 2 mg, Oral, 4x Daily, SINDY Lamar, 2 mg at 06/13/24 0836    aspirin chewable tablet 81 mg, 81 mg, Oral, Daily, SINDY Lamar, 81 mg at 06/13/24 0836    clopidogrel (PLAVIX) tablet 75 mg, 75 mg, Oral, Daily, SINDY Lamar, 75 mg at 06/13/24 0837    diltiazem (CARDIZEM CD) 24 hr capsule 120 mg, 120 mg, Oral, Daily, Judi Torres PA-C, 120 mg at 06/13/24 0837    ezetimibe (ZETIA) tablet 10 mg, 10 mg, Per NG Tube, Daily, SINDY Lamar, 10 mg at 06/13/24 0838    gabapentin (NEURONTIN) capsule 300 mg, 300 mg, Oral, TID, SINDY Lamar, 300 mg at 06/13/24 0837    heparin (porcine) 25,000 units in 0.45% NaCl 250 mL infusion (premix), 3-20 Units/kg/hr (Order-Specific), Intravenous, Titrated, Judi Torres PA-C, Last Rate: 10.8 mL/hr at 06/13/24 1355, 18 Units/kg/hr at 06/13/24 1355    heparin (porcine) injection 1,800 Units, 1,800 Units, Intravenous, Q6H PRN, Judi Torres PA-C, 1,800 Units at 06/13/24 0300    heparin (porcine) injection 3,600 Units, 3,600  Units, Intravenous, Q6H PRN, Judi Torres PA-C, 3,600 Units at 06/12/24 1947    hydrALAZINE (APRESOLINE) injection 5 mg, 5 mg, Intravenous, Q6H PRN, SINDY Lamar, 5 mg at 06/12/24 0853    labetalol (NORMODYNE) injection 10 mg, 10 mg, Intravenous, Q6H PRN, SINDY Lamar, 10 mg at 06/10/24 1446    levETIRAcetam (KEPPRA) tablet 750 mg, 750 mg, Oral, Q12H JERMAINE, SINDY Lamar, 750 mg at 06/13/24 0837    lisinopril (ZESTRIL) tablet 10 mg, 10 mg, Oral, Daily, Christine Simpson PA-C, 10 mg at 06/13/24 0908    multi-electrolyte (PLASMALYTE-A/ISOLYTE-S PH 7.4) IV solution, 125 mL/hr, Intravenous, Continuous, Judi Torres PA-C, Last Rate: 125 mL/hr at 06/13/24 0906, 125 mL/hr at 06/13/24 0906    nicotine (NICODERM CQ) 14 mg/24hr TD 24 hr patch 14 mg, 14 mg, Transdermal, Daily, SINDY Lamar, 14 mg at 06/13/24 0839    nystatin (MYCOSTATIN) powder, , Topical, BID, SINDY Lamar, Given at 06/13/24 0838    QUEtiapine (SEROquel) tablet 100 mg, 100 mg, Oral, QAM, SINDY Lamar, 100 mg at 06/13/24 0837    QUEtiapine (SEROquel) tablet 400 mg, 400 mg, Oral, HS, SINDY Lamar, 400 mg at 06/12/24 2159     Labs & Results:    Results from last 7 days   Lab Units 06/13/24  0652 06/12/24  0506 06/11/24  0602 06/08/24  0452 06/07/24  1810 06/07/24  1613 06/07/24  1540 06/07/24  1346   CK TOTAL U/L 362* 1,405* 4,153*   < >  --    < >  --   --    HS TNI 0HR ng/L  --   --   --   --   --   --   --  30   HS TNI 2HR ng/L  --   --   --   --   --   --  54*  --    HS TNI 4HR ng/L  --   --   --   --  156*  --   --   --    HSTNI D4 ng/L  --   --   --   --  126*  --   --   --     < > = values in this interval not displayed.     Results from last 7 days   Lab Units 06/13/24  0652 06/12/24  0506 06/11/24  0602   WBC Thousand/uL 9.75 12.17* 10.99*   HEMOGLOBIN g/dL 10.7* 12.2 11.6   HEMATOCRIT % 31.5* 35.2 32.7*   PLATELETS Thousands/uL 334 335 306     Results from last 7 days   Lab Units  06/09/24  0445   TRIGLYCERIDES mg/dL 694*   HDL mg/dL 22*     Results from last 7 days   Lab Units 06/13/24  0652 06/12/24  0506 06/11/24  0602 06/10/24  0430 06/07/24  1613 06/07/24  1344   POTASSIUM mmol/L 2.4* 3.7 2.8* 2.9*   < >  --    CHLORIDE mmol/L 105 106 106 107   < >  --    CO2 mmol/L 33* 30 29 23   < >  --    CO2, I-STAT mmol/L  --   --   --   --   --  26   BUN mg/dL 13 9 6 6   < >  --    CREATININE mg/dL 0.45* 0.56* 0.56* 0.50*   < >  --    CALCIUM mg/dL 8.1* 8.8 8.4 7.3*   < >  --    ALK PHOS U/L 62  --  83 76   < >  --    ALT U/L 90*  --  167* 186*   < >  --    AST U/L 43*  --  129* 176*   < >  --    GLUCOSE, ISTAT mg/dl  --   --   --   --   --  199*    < > = values in this interval not displayed.     Results from last 7 days   Lab Units 06/13/24  0848 06/13/24  0218 06/12/24  1845 06/12/24  1205 06/08/24  0452 06/07/24  1346   INR   --   --   --  1.03 1.03 0.99   PTT seconds 64* 53* 35 26  --  24     Results from last 7 days   Lab Units 06/13/24  0652 06/11/24  0602 06/10/24  0430   MAGNESIUM mg/dL 1.9 1.9 2.1

## 2024-06-13 NOTE — PROGRESS NOTES
Progress Note - Infectious Disease   Kirsty Em 74 y.o. female MRN: [id number]  Unit/Bed#: ICU 11 Encounter: [id number]      Impression/Plan:    1. SIRS. Fever, tachynea, leukocytosis, lactic acidosis, rhabdomyolysis. Patient found down by her  at home. She has a history of epilepsy but stopped her AEDs.  Concern for seizure by neurology on admission. CT C/A/P without infectious source. Status post LP 6/7 which was relatively unremarkable for infection with 6 WBC, glucose 138, protein 49, RBCs. ME panel negative and gram stain without polys or bacteria. CSF cultures resulted NG. Fever and lab/CSF abnormalities may all be related to seizures. CSF not consistent with bacterial meningitis. Consideration for viral meningitis but less likely with rapid clinical improvement, negative ME panel, MRI without infectious abnormalities. MRI shows acute to subacute stroke along the posterior left frontal cortex. So suspect that presentation is related to stroke and possible precipitate seizure.  Fevers improved, leukocytosis stable. O2 requirements improving. No clinical or radiographic evidence of pneumonia. Blood cultures resulted no growth   -Continue to monitor off antibiotics   -Repeat CBCD tomorrow AM to assess for ongoing clinical improvement  -Continue to monitor fever curve/vitals  -Aspiration precautions     2. Acute metabolic encephalopathy. Patient found down by her  at home covered in feces. CTH and CTA without evidence of stroke. Neurology concerned for seizure since patient stopped AEDs. Seizure alone can cause CPK elevation, lactic acidosis, fever.  There was also some concern for NMS versus serotonin syndrome.  Status post LP 6/7, CSF studies not consistent with bacterial meningitis, less concerning for viral etiology although could consider early viral meningitis. ME panel and CSF gram stain negative. CSF cultures resulted NG. Patient intubated for airway protection, now extubated 6/9.  MRI with  acute to subacute infarct in the left frontal cortex, no meningeal enhancement or temporal lobe abnormalities. Encephalopathy likely due to stroke/seizure. Mental status improving  -Continue to monitor off antibiotics  -Neurology follow-up     3.  Acute hypoxic respiratory failure.  Patient intubated for hypoxemia and encephalopathy.  Now extubated , O2 weaning. No clinical or radiographic evidence of pneumonia at this time.   -Appreciate speech evaluation  -Aspiration precautions  -considering decreasing IVF or diuresis since patient >2L positive this admission     4.  History of seizure disorder.  Patient with possible seizure 2 years ago and self discontinued AEDs.  -Neurology following     5.  Rhabdomyolysis.  Likely due to seizure, prolonged downtime.  CPK downtrending.   -IVF per primary  -Monitor CPK     6. Transaminitis. May be due to rhabdo, medications, sezure. CT with hepatomegaly and diffuse hepatic steatosis.  LFTs improving.    -Monitor LFTs     7. Right thumb fracture. In setting of seizure activty. Orthopedic surgery consulted.    8. Dysphagia. Awaiting video swallow.    Above plan to monitor off antibiotics discussed with SARAH King. ID will sign off, please call with questions.    Antibiotics:  Off antibiotics     Subjective:  Mental status continues to improved. No shortness of breath. Remains on 4L NC )2.    Objective:  Vitals:  Temp:  [97.8 °F (36.6 °C)] 97.8 °F (36.6 °C)  HR:  [] 95  Resp:  [21-33] 25  BP: ()/() 156/74  SpO2:  [85 %-95 %] 94 %  Temp (24hrs), Av.8 °F (36.6 °C), Min:97.8 °F (36.6 °C), Max:97.8 °F (36.6 °C)  Current: Temperature: 97.8 °F (36.6 °C)    Physical Exam:   General Appearance:  Chronically ill appearing   Throat: Oropharynx moist without lesions.    Lungs:   Supplemental O2. No wheezing   Heart:  RRR; no murmur, rub or gallop   Abdomen:   Soft, non-tender, non-distended, positive bowel sounds.     Extremities: Right thumb in splint   Skin: No new  rashes or lesions. No draining wounds noted.       Labs:   All pertinent labs and imaging studies were personally reviewed  Results from last 7 days   Lab Units 06/13/24  0652 06/12/24  0506 06/11/24  0602   WBC Thousand/uL 9.75 12.17* 10.99*   HEMOGLOBIN g/dL 10.7* 12.2 11.6   PLATELETS Thousands/uL 334 335 306     Results from last 7 days   Lab Units 06/13/24  0652 06/12/24  0506 06/11/24  0602 06/10/24  0430 06/07/24  1613 06/07/24  1344   SODIUM mmol/L 145 145 145 142   < >  --    POTASSIUM mmol/L 2.4* 3.7 2.8* 2.9*   < >  --    CHLORIDE mmol/L 105 106 106 107   < >  --    CO2 mmol/L 33* 30 29 23   < >  --    CO2, I-STAT mmol/L  --   --   --   --   --  26   BUN mg/dL 13 9 6 6   < >  --    CREATININE mg/dL 0.45* 0.56* 0.56* 0.50*   < >  --    EGFR ml/min/1.73sq m 100 93 93 97   < >  --    GLUCOSE, ISTAT mg/dl  --   --   --   --   --  199*   CALCIUM mg/dL 8.1* 8.8 8.4 7.3*   < >  --    AST U/L 43*  --  129* 176*   < >  --    ALT U/L 90*  --  167* 186*   < >  --    ALK PHOS U/L 62  --  83 76   < >  --     < > = values in this interval not displayed.     Results from last 7 days   Lab Units 06/09/24  0445 06/08/24  0452 06/07/24  1346   PROCALCITONIN ng/ml 6.11* 13.81* 2.47*                   Micro:  Results from last 7 days   Lab Units 06/07/24  1935 06/07/24  1639 06/07/24  1315 06/07/24  1310   BLOOD CULTURE   --   --  No Growth After 5 Days. No Growth After 5 Days.   GRAM STAIN RESULT   --  No No Polys or Bacteria seen  --   --    MRSA CULTURE ONLY  No Methicillin Resistant Staphlyococcus aureus (MRSA) isolated  --   --   --        Imaging:  No new imaging

## 2024-06-13 NOTE — PLAN OF CARE
Problem: PHYSICAL THERAPY ADULT  Goal: Performs mobility at highest level of function for planned discharge setting.  See evaluation for individualized goals.  Description: Treatment/Interventions: Functional transfer training, LE strengthening/ROM, Therapeutic exercise, Endurance training, Patient/family training, Equipment eval/education, Bed mobility, Continued evaluation, Spoke to nursing, OT  Equipment Recommended:  (TBD, will require U/L AD d/t NWB R hand)       See flowsheet documentation for full assessment, interventions and recommendations.  Outcome: Progressing  Note: Prognosis: Good  Problem List: Decreased strength, Decreased endurance, Decreased range of motion, Impaired balance, Decreased mobility, Decreased cognition, Decreased safety awareness, Orthopedic restrictions  Assessment: Pt seen for PT treatment session this date, consisting of ther act focused on bed mobility, transfers, short gait trial from EOB to recliner with L UE HHA . Since previous session, pt has made good progress in terms of decreased A for transfers, initiation of short gait trial from EOB to recliner. Current goals and POC established on IE remain appropriate, pt continues to have rehab potential and is making good progress towards STGs. Pt prognosis for achieving goals is good, pending pt progress with hospitalization/medical status improvements, and indicated by Stimulability and ability to follow directions. Pt continues to be functioning below baseline level, and remains limited 2* factors listed above. PT will continue to see pt during current hospitalization in order to address the deficits listed above and provide interventions consistent w/ POC in effort to achieve STGs. PT recommends level 1, maximum resource intensity upon discharge.  Barriers to Discharge: Inaccessible home environment, Decreased caregiver support     Rehab Resource Intensity Level, PT: I (Maximum Resource Intensity)    See flowsheet documentation  for full assessment.

## 2024-06-13 NOTE — SPEECH THERAPY NOTE
"Speech Language/Pathology    Speech/Language Pathology Progress Note    Patient Name: Kirsty Em  Today's Date: 6/13/2024     Problem List  Principal Problem:    Acute metabolic encephalopathy  Active Problems:    Anxiety and depression    History of seizure disorder    Tobacco dependence    Acute respiratory failure with hypoxia (HCC)    Chronic obstructive pulmonary disease, unspecified COPD type ([facility])    Mixed hyperlipidemia    Elevated transaminase level    Closed displaced fracture of proximal phalanx of right thumb    Stroke (cerebrum) ([facility])    Pre-diabetes    Bilateral carotid artery stenosis    Hypertension    New onset a-fib ([facility])    Hypokalemia       Past Medical History  Past Medical History:   Diagnosis Date    Anxiety     Depression     Depression, recurrent (HCC) 4/20/2021    Hallucinations 4/20/2021        Past Surgical History  History reviewed. No pertinent surgical history.      Subjective:  Pt received awake and alert in chair. Pt was completing breakfast meal upon arrival. Noted pt was drinking HT cranberry juice with breakfast meal. Pt reports no difficulties with swallowing or drinking however pt does perseverate when asked questions- \"I'm not really a breakfast person\"   Objective:  The following consistencies were administered today: hard solids, nectar thick liquids, thin liquids. Foods included stefani crackers, NT cranberry juice and water. Bolus retrieval and containment were adequate across consistencies and trials. Oral mastication was prolonged and slow but complete for hard solids. A-P transfer was adequate across consistencies. Swallow initiation judged as delayed. Minimum oral residue noted post swallow with hard solids. Noted cough and throat clearing with thin liquids(x1). No changes in respiratory status or voice quality noted. O2 saturation 93% on 4LNC.    Assessment:  Pt presents with at least oral dysphagia, cannot r/o aspiration or pharyngeal dysphagia. Pt's oral mastication " was prolonged, slow for hard solids administered today but complete, with minimum oral residue noted post swallow. Pt is managing well on current diet recommendations based on trials today and her current medical/cognitive status. Provided pt education of alternating liquids and solids and smaller sips/bites. Recommend advancing to dysphagia level 3/moist/easy to chew diet, and nectar thick liquids. Suggest modified swallow study prior to advancement of liquids.    Plan/Recommendations:  Dysphagia level 3/nectar thick liquids  Meds w/puree  Tray set up and close monitoring   Aspiration precautions- upright posture, small sips and bites, alternate solids and liquids, slow rate of feeding  MBS

## 2024-06-13 NOTE — CASE MANAGEMENT
"   Case Management Discharge Planning Note    Patient name Kirsty Em  Location ICU 11/ICU 11 MRN [id number]  : 1950 Date 2024       Current Admission Date: 2024  Current Admission Diagnosis:Acute metabolic encephalopathy   Patient Active Problem List    Diagnosis Date Noted Date Diagnosed    Hypokalemia 2024     New onset a-fib ([facility]) 2024     Hypertension 2024     Bilateral carotid artery stenosis 06/10/2024     Pre-diabetes 2024     Stroke (cerebrum) ([facility]) 2024     Acute metabolic encephalopathy 2024     Elevated transaminase level 2024     Closed displaced fracture of proximal phalanx of right thumb 2024     Mixed hyperlipidemia 2024     Chronic obstructive pulmonary disease, unspecified COPD type ([facility]) 2023     Acute respiratory failure with hypoxia ([facility]) 2022     Anxiety and depression 2021     History of seizure disorder 2021     Tobacco dependence 2021       LOS (days): 6  Geometric Mean LOS (GMLOS) (days): 4.5  Days to GMLOS:-1.5     OBJECTIVE:  Risk of Unplanned Readmission Score: 21.53         Current admission status: Inpatient   Preferred Pharmacy:   [id number] [place] - [address]  [address]  [place] [address]  Phone: [phone] Fax: [phone]    Primary Care Provider: Brien Desouza MD    Primary Insurance: MEDICARE  Secondary Insurance: BLUE CROSS    DISCHARGE DETAILS:                                          Other Referral/Resources/Interventions Provided:  Referral Comments: T/C from pt's daughter Pretty, requesting pt's placement in a \"hospital rehab unit\".  Made her aware that PT-OT were recommending a lower level of care for pt;  explained acute, subacute and home health options for rehab.  Made daughter aware that therapy was recommending home health or subacute rehab, and referrals had been made on behalf of pt.  Visited pt at bedside;  spouse had just " left and daughters are home in [place], per pt.  Reviewed w pt PT-OT recommendations for STR at an acute or subacute level;  recommendations were changed today by PT-OT.  Gave pt list of subacute bed offers and list of acute facilities that were just sent referral.  Asked if CM should contact daughter;  pt asked to use her  as the contact.  Reached spouse and reviewed pt's options.  Spouse would like to meet at bedside and review written info together;  he reports he is at bedside from [id number].  Will contact him tomorrow.                                             IMM Given (Date):: 06/13/24  IMM Given to:: Patient

## 2024-06-13 NOTE — PLAN OF CARE
Problem: PHYSICAL THERAPY ADULT  Goal: Performs mobility at highest level of function for planned discharge setting.  See evaluation for individualized goals.  Description: Treatment/Interventions: Functional transfer training, LE strengthening/ROM, Therapeutic exercise, Endurance training, Patient/family training, Equipment eval/education, Bed mobility, Continued evaluation, Spoke to nursing, OT  Equipment Recommended:  (TBD, will require U/L AD d/t NWB R hand)       See flowsheet documentation for full assessment, interventions and recommendations.  6/13/2024 1353 by Alma Rosa Polo PT  Outcome: Progressing  Note: Prognosis: Good  Problem List: Decreased strength, Decreased endurance, Decreased range of motion, Impaired balance, Decreased mobility, Decreased cognition, Decreased safety awareness, Orthopedic restrictions  Assessment: Pt seen for PT treatment session this date, consisting of ther act focused on bed mobility, transfers, short gait trial from EOB to recliner with L UE HHA . Since previous session, pt has made good progress in terms of decreased A for transfers, initiation of short gait trial from EOB to recliner. Current goals and POC established on IE remain appropriate, pt continues to have rehab potential and is making good progress towards STGs. Pt prognosis for achieving goals is good, pending pt progress with hospitalization/medical status improvements, and indicated by Stimulability and ability to follow directions. Pt continues to be functioning below baseline level, and remains limited 2* factors listed above. PT will continue to see pt during current hospitalization in order to address the deficits listed above and provide interventions consistent w/ POC in effort to achieve STGs. PT recommends level 2, moderate resource intensity upon discharge.  Barriers to Discharge: Inaccessible home environment, Decreased caregiver support     Rehab Resource Intensity Level, PT: II (Moderate Resource  Intensity)    See flowsheet documentation for full assessment.     6/13/2024 1353 by Alma Rosa Polo PT  Outcome: Progressing  Note: Prognosis: Good  Problem List: Decreased strength, Decreased endurance, Decreased range of motion, Impaired balance, Decreased mobility, Decreased cognition, Decreased safety awareness, Orthopedic restrictions  Assessment: Pt seen for PT treatment session this date, consisting of ther act focused on bed mobility, transfers, short gait trial from EOB to recliner with L UE HHA . Since previous session, pt has made good progress in terms of decreased A for transfers, initiation of short gait trial from EOB to recliner. Current goals and POC established on IE remain appropriate, pt continues to have rehab potential and is making good progress towards STGs. Pt prognosis for achieving goals is good, pending pt progress with hospitalization/medical status improvements, and indicated by Stimulability and ability to follow directions. Pt continues to be functioning below baseline level, and remains limited 2* factors listed above. PT will continue to see pt during current hospitalization in order to address the deficits listed above and provide interventions consistent w/ POC in effort to achieve STGs. PT recommends level 2, moderate resource intensity upon discharge.  Barriers to Discharge: Inaccessible home environment, Decreased caregiver support     Rehab Resource Intensity Level, PT: II (Moderate Resource Intensity)    See flowsheet documentation for full assessment.

## 2024-06-13 NOTE — PLAN OF CARE
Problem: PAIN - ADULT  Goal: Verbalizes/displays adequate comfort level or baseline comfort level  Description: Interventions:  - Encourage patient to monitor pain and request assistance  - Assess pain using appropriate pain scale  - Administer analgesics based on type and severity of pain and evaluate response  - Implement non-pharmacological measures as appropriate and evaluate response  - Consider cultural and social influences on pain and pain management  - Notify physician/advanced practitioner if interventions unsuccessful or patient reports new pain  Outcome: Progressing     Problem: SAFETY ADULT  Goal: Patient will remain free of falls  Description: INTERVENTIONS:  - Educate patient/family on patient safety including physical limitations  - Instruct patient to call for assistance with activity   - Consult OT/PT to assist with strengthening/mobility   - Keep Call bell within reach  - Keep bed low and locked with side rails adjusted as appropriate  - Keep care items and personal belongings within reach  - Initiate and maintain comfort rounds  - Make Fall Risk Sign visible to staff  - Offer Toileting every 2 Hours, in advance of need  - Initiate/Maintain bedalarm  - Apply yellow socks and bracelet for high fall risk patients  - Consider moving patient to room near nurses station  Outcome: Progressing     Problem: DISCHARGE PLANNING  Goal: Discharge to home or other facility with appropriate resources  Description: INTERVENTIONS:  - Identify barriers to discharge w/patient and caregiver  - Arrange for needed discharge resources and transportation as appropriate  - Identify discharge learning needs (meds, wound care, etc.)  - Arrange for interpretive services to assist at discharge as needed  - Refer to Case Management Department for coordinating discharge planning if the patient needs post-hospital services based on physician/advanced practitioner order or complex needs related to functional status, cognitive  ability, or social support system  Outcome: Progressing     Problem: Knowledge Deficit  Goal: Patient/family/caregiver demonstrates understanding of disease process, treatment plan, medications, and discharge instructions  Description: Complete learning assessment and assess knowledge base.  Interventions:  - Provide teaching at level of understanding  - Provide teaching via preferred learning methods  Outcome: Progressing     Problem: Nutrition/Hydration-ADULT  Goal: Nutrient/Hydration intake appropriate for improving, restoring or maintaining nutritional needs  Description: Monitor and assess patient's nutrition/hydration status for malnutrition. Collaborate with interdisciplinary team and initiate plan and interventions as ordered.  Monitor patient's weight and dietary intake as ordered or per policy. Utilize nutrition screening tool and intervene as necessary. Determine patient's food preferences and provide high-protein, high-caloric foods as appropriate.     INTERVENTIONS:  - Monitor oral intake, urinary output, labs, and treatment plans  - Assess nutrition and hydration status and recommend course of action  - Evaluate amount of meals eaten  - Assist patient with eating if necessary   - Allow adequate time for meals  - Recommend/ encourage appropriate diets, oral nutritional supplements, and vitamin/mineral supplements  - Order, calculate, and assess calorie counts as needed  - Recommend, monitor, and adjust tube feedings and TPN/PPN based on assessed needs  - Assess need for intravenous fluids  - Provide specific nutrition/hydration education as appropriate  - Include patient/family/caregiver in decisions related to nutrition  Outcome: Progressing     Problem: Prexisting or High Potential for Compromised Skin Integrity  Goal: Skin integrity is maintained or improved  Description: INTERVENTIONS:  - Identify patients at risk for skin breakdown  - Assess and monitor skin integrity  - Assess and monitor  nutrition and hydration status  - Monitor labs   - Assess for incontinence   - Turn and reposition patient  - Assist with mobility/ambulation  - Relieve pressure over bony prominences  - Avoid friction and shearing  - Provide appropriate hygiene as needed including keeping skin clean and dry  - Evaluate need for skin moisturizer/barrier cream  - Collaborate with interdisciplinary team   - Patient/family teaching  - Consider wound care consult   Outcome: Progressing

## 2024-06-13 NOTE — ASSESSMENT & PLAN NOTE
K noted 2.4 today; on tele monitor without acute changes  Replete IV given need for VBS today  Mg 1.9, will replete as well

## 2024-06-13 NOTE — OCCUPATIONAL THERAPY NOTE
Occupational Therapy Treatment Note     Patient Name: Kirsty Em  Today's Date: 6/13/2024  Problem List  Principal Problem:    Acute metabolic encephalopathy  Active Problems:    Anxiety and depression    History of seizure disorder    Tobacco dependence    Acute respiratory failure with hypoxia (HCC)    Chronic obstructive pulmonary disease, unspecified COPD type ([facility])    Mixed hyperlipidemia    Elevated transaminase level    Closed displaced fracture of proximal phalanx of right thumb    Stroke (cerebrum) ([facility])    Pre-diabetes    Bilateral carotid artery stenosis    Hypertension    New onset a-fib ([facility])    Hypokalemia        06/13/24 1316   OT Last Visit   OT Visit Date 06/13/24   Note Type   Note Type Treatment   Pain Assessment   Pain Assessment Tool 0-10   Pain Score 8   Pain Location/Orientation Location: Back   Pain Onset/Description Onset: Ongoing;Frequency: Constant/Continuous   Hospital Pain Intervention(s) Ambulation/increased activity;Repositioned;Medication (See MAR)   Restrictions/Precautions   Weight Bearing Precautions Per Order Yes   RUE Weight Bearing Per Order NWB  (per ortho: NWB R hand)   Braces or Orthoses Splint   Other Precautions Cognitive;Chair Alarm;Bed Alarm;Multiple lines;Telemetry;O2;Fall Risk;Pain;WBS;Seizure   Lifestyle   Autonomy Per chart review, patient is independent with ADLs, ambulatory with no AD, and lives with  at baseline   Reciprocal Relationships    Service to Others Retired   ADL   Eating Assistance 4  Minimal Assistance   Eating Deficit Setup;Verbal cueing;Supervision/safety;Increased time to complete;Scoop assist;Thickened liquids   Eating Comments Lunch tray set-up at end of session   Bed Mobility   Supine to Sit 3  Moderate assistance   Additional items Assist x 2;HOB elevated;Bedrails;Increased time required;Verbal cues;LE management   Sit to Supine   (DNT: pt seated OOB in recliner at end of session)   Transfers   Sit to Stand 4  Minimal assistance    Additional items Assist x 2;Increased time required;Verbal cues   Stand to Sit 4  Minimal assistance   Additional items Assist x 2;Increased time required;Verbal cues   Additional Comments Pt demonstrates impulsivity c transfers and decreased safety awareness throughout all functional mobility. Verbal cues throughout session for proper hand placement and use of safe body mechanics to maintain NWB to R UE   Functional Mobility   Functional Mobility 4  Minimal assistance  (Assist of 2)   Additional Comments Pt ambulated short distance to recliner with no overt SOB. Pt unsteady and requires cues for sequencing   Additional items Hand hold assistance   Cognition   Overall Cognitive Status Impaired   Arousal/Participation Alert;Responsive;Cooperative   Attention Difficulty attending to directions   Orientation Level Oriented to person;Oriented to time;Disoriented to place;Disoriented to situation   Memory Decreased short term memory;Decreased recall of recent events;Decreased recall of precautions   Following Commands Follows one step commands with increased time or repetition   Comments Pt agreeable to OT session   Activity Tolerance   Activity Tolerance Patient limited by fatigue;Patient limited by pain   Medical Staff Made Aware This session, pt required and most appropriately benefited from skilled OT/PT co-treat due to extensive physical assistance of two therapists, significant regression from functional baseline and decreased activity tolerance.   Assessment   Assessment Patient participated in Skilled OT session this date with interventions consisting of ADL re training with the use of correct body mechnaics,  therapeutic activities to: increase activity tolerance, and increase dynamic sit/ stand balance during functional activity  . Patient agreeable to OT treatment session, upon arrival patient was found supine in bed and in no apparent distress. Patient completed bed mobility with mod assist of 2 and  functional transfers with min assist of 2. Pt ambulated to short distance to recliner with min assist of 2 utilizing HHA. While seated in recliner, pt required min assist for self-feeding. In comparison to previous session, patient with improvements in orientation and progression with mobility. Patient requiring cognitive assistance to anticipate next step, one step directives, and frequent rest periods. Patient continues to be functioning below baseline level, occupational performance remains limited secondary to factors listed above and increased risk for falls and injury.   From OT standpoint, recommendation at time of d/c would be Level I (maximum resource intensity).   Patient to benefit from continued Occupational Therapy treatment while in the hospital to address deficits as defined above and maximize level of functional independence with ADLs and functional mobility.   Plan   Treatment Interventions Functional transfer training;ADL retraining;Endurance training;Patient/family training;Compensatory technique education   Goal Expiration Date 06/25/24   OT Treatment Day 1   OT Frequency 3-5x/wk   Discharge Recommendation   Rehab Resource Intensity Level, OT I (Maximum Resource Intensity)   AM-PAC Daily Activity Inpatient   Lower Body Dressing 2   Bathing 2   Toileting 2   Upper Body Dressing 3   Grooming 3   Eating 3   Daily Activity Raw Score 15   Daily Activity Standardized Score (Calc for Raw Score >=11) 34.69   AM-PAC Applied Cognition Inpatient   Following a Speech/Presentation 1   Understanding Ordinary Conversation 2   Taking Medications 1   Remembering Where Things Are Placed or Put Away 1   Remembering List of 4-5 Errands 1   Taking Care of Complicated Tasks 1   Applied Cognition Raw Score 7   Applied Cognition Standardized Score 15.17   Modified [place] Scale   Modified [place] Scale 4   End of Consult   Patient Position at End of Consult Bedside chair;Bed/Chair alarm activated;All needs within reach    Nurse Communication Nurse aware of consult     Marisol Ochoa OTD, OTR/L

## 2024-06-13 NOTE — PLAN OF CARE
Plan/Recommendations:  Dysphagia level 3/nectar thick liquids  Meds w/puree  Tray set up and close monitoring   Aspiration precautions- upright posture, small sips and bites, alternate solids and liquids, slow rate of feeding  MBS

## 2024-06-13 NOTE — QUICK NOTE
Contacted by cardiology re: new onset atrial fibrillation.   MRI reviewed, low stroke burden.  Recommendations:   -Initiate heparin gtt  -When PTT therapeutic x48hrs, repeat CTH  -If CTH stable transition to oral anticoagulant  -Continue to monitor; STAT CTH for change in neuro exam, notify neurology of change

## 2024-06-13 NOTE — PROGRESS NOTES
[facility]  Progress Note  Name: Kirsty Em I MRN: [id number]  Unit/Bed#: ICU 11-01I Date of Admission: 6/7/2024   Date of Service: 6/7/2024  I Hospital Day: 6    * Acute metabolic encephalopathy  Assessment & Plan  Presents with confusion from home, aphasia and weakness  Slowly improving; likely in setting of subacute CVA vs seizure  Acute infectious source ruled out   Frequent neurologic checks  Delirium precautions, 1:1 discontinued     Stroke (cerebrum) (HCC)  Assessment & Plan  Neurology consulted,  CTA head/neck with significant carotid disease bilaterally 70% right and 65% left  MRI brain with focal acute to subacute infarct along the posterior left frontal cortex.  Continue aspirin/Plavix x 21 days then aspirin monotherapy, patient allergic to statin  ST recommending Puree diet nectar thick liquid - video barium swallow pending   PT/OT recommending moderate intensity resource  CM consult for dispo planning  Patient will need follow up with neurovascular on discharge in 6 weeks     Bilateral carotid artery stenosis  Assessment & Plan  Continue aspirin/Plavix  Plan for vascular surgery follow-up as an outpatient     New onset a-fib (HCC)  Assessment & Plan  Morning of 6/12 patient developed rapid afib, HR up to 170s  Cardiology consulted,  Cardizem  mg daily  Heparin gtt x 48h per neuro then DOAC  Converted to NSR prior to cardizem drip, thus not started   Tele monitor     Rhabdomyolysis-resolved as of 6/13/2024  Assessment & Plan  Resolving on IV fluids; total CK peaked 15,500 and trending down 362    Hypokalemia  Assessment & Plan  K noted 2.4 today; on tele monitor without acute changes  Replete IV given need for VBS today  Mg 1.9, will replete as well    Hypertension  Assessment & Plan  Fluctuating BP, started on lisinopril  Continue IV hydralazine or labetalol as needed  Monitor VS per unit protocol, Blood Pressure: (!) 169/135     Pre-diabetes  Assessment & Plan  Encourage  lifestyle changes  BG glucose reasonable via labs continue to monitor     Closed displaced fracture of proximal phalanx of right thumb  Assessment & Plan  S/p splinting by ortho  Repeat xray in 1 week (from 6/8)  NWB right hand   Appreciate orthopedic recommendations    Elevated transaminase level  Assessment & Plan  Suspect related to acuity of presentation  Resolving      Mixed hyperlipidemia  Assessment & Plan  Continue home Zetia  Patient intolerant of statins     Chronic obstructive pulmonary disease, unspecified COPD type (HCC)  Assessment & Plan  Not currently in exacerbation  Wean oxygen for goal SpO2>88%  Respiratory protocol  PRN albuterol     Acute respiratory failure with hypoxia (HCC)  Assessment & Plan  Presumed secondary to aspiration prior to presentation  Wean oxygen for goal SpO2>88%  Respiratory protocol     Tobacco dependence  Assessment & Plan  Encourage cessation   Nicotine replacement     History of seizure disorder  Assessment & Plan  Patient reportedly self-discontinued Keppra at home  Cannot rule out seizure as etiology of presentation  Possibly related to Xanax withdrawal?  Continue Keppra  EEG abnormal, will need ambulatory EEG per neuro     Anxiety and depression  Assessment & Plan  Continue home Xanax and Seroquel      =      VTE Pharmacologic Prophylaxis: VTE Score: 9 High Risk (Score >/= 5) - Pharmacological DVT Prophylaxis Ordered: heparin drip. Sequential Compression Devices Ordered.    Mobility:   Basic Mobility Inpatient Raw Score: 17  JH-HLM Goal: 5: Stand one or more mins  JH-HLM Achieved: 2: Bed activities/Dependent transfer  JH-HLM Goal NOT achieved. Continue with multidisciplinary rounding and encourage appropriate mobility to improve upon JH-HLM goals.    Patient Centered Rounds: I performed bedside rounds with nursing staff today.   Discussions with Specialists or Other Care Team Provider: CM     Education and Discussions with Family / Patient: Updated   () at bedside.    Total Time Spent on Date of Encounter in care of patient: 50 mins. This time was spent on one or more of the following: performing physical exam; counseling and coordination of care; obtaining or reviewing history; documenting in the medical record; reviewing/ordering tests, medications or procedures; communicating with other healthcare professionals and discussing with patient's family/caregivers.    Current Length of Stay: 6 day(s)  Current Patient Status: Inpatient   Certification Statement: The patient will continue to require additional inpatient hospital stay due to new onset afib, heparin drip, hypokalemia, need for VBS   Discharge Plan: Anticipate discharge in 24-48 hrs to rehab facility.    Code Status: Level 1 - Full Code    Subjective:   Patient seen out of bed to the chair this morning,  present again.  She is doing much better today, per the .  More alert and oriented.  Patient herself denies any chest pain or palpitations today.  No shortness of breath.    We discussed cardiac monitoring, possible consideration for rehab placement in about 48 hours, need for video barium swallow to further assess aspiration risk, and need for Pinto catheter at discharge.    Objective:     Vitals:   No data recorded.  HR:  [] 84  Resp:  [21-36] 21  BP: ()/() 112/58  SpO2:  [85 %-94 %] 94 %  Body mass index is 25.7 kg/m².     Input and Output Summary (last 24 hours):     Intake/Output Summary (Last 24 hours) at 6/13/2024 0720  Last data filed at 6/13/2024 0601  Gross per 24 hour   Intake 1965.57 ml   Output 1775 ml   Net 190.57 ml       Physical Exam:   Physical Exam  Vitals and nursing note reviewed.   Constitutional:       Appearance: Normal appearance. She is not ill-appearing or toxic-appearing.   Cardiovascular:      Rate and Rhythm: Normal rate and regular rhythm.      Heart sounds: Normal heart sounds. No murmur heard.     No gallop.   Pulmonary:       Effort: Pulmonary effort is normal. No respiratory distress.      Breath sounds: Normal breath sounds. No wheezing.   Abdominal:      General: Bowel sounds are normal. There is no distension.      Palpations: Abdomen is soft.      Tenderness: There is no abdominal tenderness.   Genitourinary:     Comments: Pinto catheter in place  Musculoskeletal:         General: Signs of injury (R hand splint, bruising noted to fingers) present.   Skin:     General: Skin is warm and dry.      Coloration: Skin is not pale.   Neurological:      Mental Status: She is alert and oriented to person, place, and time.   Psychiatric:         Mood and Affect: Mood normal.         Behavior: Behavior normal.          Additional Data:     Labs:  Results from last 7 days   Lab Units 06/12/24  0506 06/11/24  0602 06/10/24  0430   WBC Thousand/uL 12.17*   < > 12.66*   HEMOGLOBIN g/dL 12.2   < > 11.1*   HEMATOCRIT % 35.2   < > 31.6*   PLATELETS Thousands/uL 335   < > 262   SEGS PCT %  --   --  71   LYMPHO PCT %  --   --  21   MONO PCT %  --   --  7   EOS PCT %  --   --  0    < > = values in this interval not displayed.     Results from last 7 days   Lab Units 06/12/24  0506 06/11/24  0602   SODIUM mmol/L 145 145   POTASSIUM mmol/L 3.7 2.8*   CHLORIDE mmol/L 106 106   CO2 mmol/L 30 29   BUN mg/dL 9 6   CREATININE mg/dL 0.56* 0.56*   ANION GAP mmol/L 9 10   CALCIUM mg/dL 8.8 8.4   ALBUMIN g/dL  --  3.5   TOTAL BILIRUBIN mg/dL  --  0.97   ALK PHOS U/L  --  83   ALT U/L  --  167*   AST U/L  --  129*   GLUCOSE RANDOM mg/dL 104 103     Results from last 7 days   Lab Units 06/12/24  1205   INR  1.03     Results from last 7 days   Lab Units 06/08/24  0031   POC GLUCOSE mg/dl 117     Results from last 7 days   Lab Units 06/09/24  0445   HEMOGLOBIN A1C % 6.3*     Results from last 7 days   Lab Units 06/09/24  0445 06/08/24  0452 06/07/24  2152 06/07/24  1613 06/07/24  1346   LACTIC ACID mmol/L  --   --  1.5 3.4* 8.4*   PROCALCITONIN ng/ml 6.11* 13.81*  --    --  2.47*       Lines/Drains:  Invasive Devices       Peripheral Intravenous Line  Duration             Peripheral IV 06/11/24 Left;Ventral (anterior) Foot 1 day    Peripheral IV 06/13/24 Right;Ventral (anterior) Forearm <1 day              Drain  Duration             Urethral Catheter Latex 16 Fr. <1 day                  Urinary Catheter:  Goal for removal: N/A- Discharging with Pinto           Telemetry:  Telemetry Orders (From admission, onward)               24 Hour Telemetry Monitoring  Continuous x 24 Hours (Telem)        Expiring   Question:  Reason for 24 Hour Telemetry  Answer:  Arrhythmias requiring acute medical intervention / PPM or ICD malfunction                     Telemetry Reviewed: Normal Sinus Rhythm  Indication for Continued Telemetry Use: Arrthymias requiring medical therapy             Imaging: No pertinent imaging reviewed.    Recent Cultures (last 7 days):   Results from last 7 days   Lab Units 06/07/24  1639 06/07/24  1315 06/07/24  1310   BLOOD CULTURE   --  No Growth After 5 Days. No Growth After 5 Days.   GRAM STAIN RESULT  No No Polys or Bacteria seen  --   --        Last 24 Hours Medication List:   Current Facility-Administered Medications   Medication Dose Route Frequency Provider Last Rate    acetaminophen  650 mg Oral Q6H PRN SINDY Lamar      albuterol  2.5 mg Nebulization Q4H PRN SINDY Lamar      ALPRAZolam  2 mg Oral 4x Daily SINDY Lamar      aspirin  81 mg Oral Daily SINDY Lamar      clopidogrel  75 mg Oral Daily SINDY Lamar      diltiazem  120 mg Oral Daily Judi Torres PA-C      ezetimibe  10 mg Per NG Tube Daily SINDY Lamar      gabapentin  300 mg Oral TID SINDY Lamar      heparin (porcine)  3-20 Units/kg/hr (Order-Specific) Intravenous Titrated Judi Torres PA-C 18 Units/kg/hr (06/13/24 0302)    heparin (porcine)  1,800 Units Intravenous Q6H PRN Judi Torres PA-C      heparin (porcine)  3,600  Units Intravenous Q6H PRN Judi Torres PA-C      hydrALAZINE  5 mg Intravenous Q6H PRN SINDY Lamar      labetalol  10 mg Intravenous Q6H PRN SINDY Lamar      levETIRAcetam  750 mg Oral Q12H JERMAINE SINDY Lamar      multi-electrolyte  125 mL/hr Intravenous Continuous Judi Torres PA-C 125 mL/hr (06/13/24 0045)    nicotine  14 mg Transdermal Daily SINDY Lamar      nystatin   Topical BID SINDY Lamar      QUEtiapine  100 mg Oral QAM SINDY Lamar      QUEtiapine  400 mg Oral HS SINDY Lamar          Today, Patient Was Seen By: Christine Simpson PA-C    **Please Note: This note may have been constructed using a voice recognition system.**

## 2024-06-13 NOTE — PHYSICAL THERAPY NOTE
"Physical Therapy Treatment Note       06/13/24 1318   PT Last Visit   PT Visit Date 06/13/24   Note Type   Note Type Treatment   Pain Assessment   Pain Assessment Tool FLACC   Pain Rating: FLACC (Rest) - Face 0   Pain Rating: FLACC (Rest) - Legs 0   Pain Rating: FLACC (Rest) - Activity 0   Pain Rating: FLACC (Rest) - Cry 0   Pain Rating: FLACC (Rest) - Consolability 0   Score: FLACC (Rest) 0   Pain Rating: FLACC (Activity) - Face 0   Pain Rating: FLACC (Activity) - Legs 0   Pain Rating: FLACC (Activity) - Activity 0   Pain Rating: FLACC (Activity) - Cry 1   Pain Rating: FLACC (Activity) - Consolability 1   Score: FLACC (Activity) 2   Restrictions/Precautions   Weight Bearing Precautions Per Order Yes   RUE Weight Bearing Per Order NWB  (per ortho: NWB R hand)   Braces or Orthoses Splint  (R hand)   Other Precautions Cognitive;Chair Alarm;Bed Alarm;Multiple lines;Telemetry;O2;Fall Risk;Hard of hearing;Seizure   General   Chart Reviewed Yes   Response to Previous Treatment Patient with no complaints from previous session.   Family/Caregiver Present Yes   Cognition   Overall Cognitive Status Impaired   Arousal/Participation Alert;Responsive   Attention Difficulty attending to directions   Orientation Level Oriented to person;Oriented to place;Disoriented to time;Disoriented to situation   Memory Decreased recall of biographical information;Decreased short term memory;Decreased recall of recent events;Decreased recall of precautions   Following Commands Follows one step commands with increased time or repetition   Comments pt agreeable to PT session, impulsive at times, noted poor safety awareness requiring frequent vc and tactile cues for safety   Subjective   Subjective \"I'm tired\"   Bed Mobility   Supine to Sit 3  Moderate assistance   Additional items Assist x 2;HOB elevated;Increased time required;Verbal cues;LE management   Transfers   Sit to Stand 4  Minimal assistance   Additional items Assist x 2;Increased time " required;Verbal cues   Stand to Sit 4  Minimal assistance   Additional items Assist x 2;Increased time required;Verbal cues   Additional Comments Pt demonstrates impulsivity c transfers and decreased safety awareness throughout all functional mobility. VC throughout session for proper hand placement and use of safe body mechanics, as well as maintaining NWB to R UE   Ambulation/Elevation   Gait pattern Shuffling;Decreased foot clearance;Short stride;Step to;Excessively slow;Decreased heel strike;Decreased toe off   Gait Assistance 4  Minimal assist   Additional items Assist x 2;Verbal cues;Tactile cues   Assistive Device   (L UE HHA)   Distance 5'   Balance   Static Sitting Fair   Dynamic Sitting Fair -   Static Standing Poor +   Dynamic Standing Poor   Ambulatory Poor   Endurance Deficit   Endurance Deficit Yes   Activity Tolerance   Activity Tolerance Patient limited by fatigue   Nurse Made Aware RN Elvie Christie   Assessment   Prognosis Good   Problem List Decreased strength;Decreased endurance;Decreased range of motion;Impaired balance;Decreased mobility;Decreased cognition;Decreased safety awareness;Orthopedic restrictions   Assessment Pt seen for PT treatment session this date, consisting of ther act focused on bed mobility, transfers, short gait trial from EOB to recliner with L UE HHA . Since previous session, pt has made good progress in terms of decreased A for transfers, initiation of short gait trial from EOB to recliner. Current goals and POC established on IE remain appropriate, pt continues to have rehab potential and is making good progress towards STGs. Pt prognosis for achieving goals is good, pending pt progress with hospitalization/medical status improvements, and indicated by Stimulability and ability to follow directions. Pt continues to be functioning below baseline level, and remains limited 2* factors listed above. PT will continue to see pt during current hospitalization in order to  address the deficits listed above and provide interventions consistent w/ POC in effort to achieve STGs. PT recommends level 1, maximum resource intensity upon discharge.   Barriers to Discharge Inaccessible home environment;Decreased caregiver support   Goals   Patient Goals none expressed   STG Expiration Date 06/20/24   PT Treatment Day 1   Plan   Treatment/Interventions Functional transfer training;LE strengthening/ROM;Therapeutic exercise;Endurance training;Patient/family training;Equipment eval/education;Bed mobility;Continued evaluation;Spoke to nursing;OT;Gait training;Family   Progress Progressing toward goals   PT Frequency 3-5x/wk   Discharge Recommendation   Rehab Resource Intensity Level, PT I (Maximum Resource Intensity)   AM-PAC Basic Mobility Inpatient   Turning in Flat Bed Without Bedrails 2   Lying on Back to Sitting on Edge of Flat Bed Without Bedrails 2   Moving Bed to Chair 2   Standing Up From Chair Using Arms 2   Walk in Room 2   Climb 3-5 Stairs With Railing 1   Basic Mobility Inpatient Raw Score 11   Basic Mobility Standardized Score 30.25   [facility] Highest Level Of Mobility   -[facility] Goal 4: Move to chair/commode   -[facility] Achieved 6: Walk 10 steps or more   Education   Education Provided Mobility training   Patient Reinforcement needed   End of Consult   Patient Position at End of Consult Bedside chair;Bed/Chair alarm activated;All needs within reach       Alma Rosa Polo, PT, DPT

## 2024-06-14 LAB
ANION GAP SERPL CALCULATED.3IONS-SCNC: 6 MMOL/L (ref 4–13)
APTT PPP: 60 SECONDS (ref 23–37)
APTT PPP: 69 SECONDS (ref 23–37)
ATRIAL RATE: 136 BPM
BUN SERPL-MCNC: 9 MG/DL (ref 5–25)
CALCIUM SERPL-MCNC: 8.3 MG/DL (ref 8.4–10.2)
CHLORIDE SERPL-SCNC: 106 MMOL/L (ref 96–108)
CO2 SERPL-SCNC: 35 MMOL/L (ref 21–32)
CREAT SERPL-MCNC: 0.53 MG/DL (ref 0.6–1.3)
GFR SERPL CREATININE-BSD FRML MDRD: 95 ML/MIN/1.73SQ M
GLUCOSE SERPL-MCNC: 109 MG/DL (ref 65–140)
POTASSIUM SERPL-SCNC: 2.7 MMOL/L (ref 3.5–5.3)
QRS AXIS: 103 DEGREES
QRSD INTERVAL: 68 MS
QT INTERVAL: 298 MS
QTC INTERVAL: 480 MS
SODIUM SERPL-SCNC: 147 MMOL/L (ref 135–147)
T WAVE AXIS: -11 DEGREES
VENTRICULAR RATE: 156 BPM

## 2024-06-14 PROCEDURE — 99232 SBSQ HOSP IP/OBS MODERATE 35: CPT | Performed by: INTERNAL MEDICINE

## 2024-06-14 PROCEDURE — 94664 DEMO&/EVAL PT USE INHALER: CPT

## 2024-06-14 PROCEDURE — 99232 SBSQ HOSP IP/OBS MODERATE 35: CPT

## 2024-06-14 PROCEDURE — 93010 ELECTROCARDIOGRAM REPORT: CPT | Performed by: INTERNAL MEDICINE

## 2024-06-14 PROCEDURE — 92526 ORAL FUNCTION THERAPY: CPT

## 2024-06-14 PROCEDURE — 85730 THROMBOPLASTIN TIME PARTIAL: CPT | Performed by: STUDENT IN AN ORGANIZED HEALTH CARE EDUCATION/TRAINING PROGRAM

## 2024-06-14 PROCEDURE — 80048 BASIC METABOLIC PNL TOTAL CA: CPT

## 2024-06-14 RX ORDER — POTASSIUM CHLORIDE 14.9 MG/ML
20 INJECTION INTRAVENOUS
Status: COMPLETED | OUTPATIENT
Start: 2024-06-14 | End: 2024-06-14

## 2024-06-14 RX ORDER — LISINOPRIL 10 MG/1
10 TABLET ORAL 2 TIMES DAILY
Status: DISCONTINUED | OUTPATIENT
Start: 2024-06-14 | End: 2024-06-17 | Stop reason: HOSPADM

## 2024-06-14 RX ORDER — POTASSIUM CHLORIDE 20 MEQ/1
40 TABLET, EXTENDED RELEASE ORAL ONCE
Status: COMPLETED | OUTPATIENT
Start: 2024-06-14 | End: 2024-06-14

## 2024-06-14 RX ADMIN — NYSTATIN: 100000 POWDER TOPICAL at 08:40

## 2024-06-14 RX ADMIN — CLOPIDOGREL 75 MG: 75 TABLET ORAL at 08:39

## 2024-06-14 RX ADMIN — ALPRAZOLAM 2 MG: 0.5 TABLET ORAL at 14:00

## 2024-06-14 RX ADMIN — GABAPENTIN 300 MG: 300 CAPSULE ORAL at 20:38

## 2024-06-14 RX ADMIN — ALPRAZOLAM 2 MG: 0.5 TABLET ORAL at 23:48

## 2024-06-14 RX ADMIN — EZETIMIBE 10 MG: 10 TABLET ORAL at 08:41

## 2024-06-14 RX ADMIN — NYSTATIN: 100000 POWDER TOPICAL at 17:19

## 2024-06-14 RX ADMIN — NICOTINE 14 MG: 14 PATCH, EXTENDED RELEASE TRANSDERMAL at 08:40

## 2024-06-14 RX ADMIN — LEVETIRACETAM 750 MG: 500 TABLET, FILM COATED ORAL at 20:38

## 2024-06-14 RX ADMIN — ALPRAZOLAM 2 MG: 0.5 TABLET ORAL at 18:01

## 2024-06-14 RX ADMIN — QUETIAPINE FUMARATE 400 MG: 100 TABLET ORAL at 23:22

## 2024-06-14 RX ADMIN — POTASSIUM CHLORIDE 20 MEQ: 14.9 INJECTION, SOLUTION INTRAVENOUS at 17:18

## 2024-06-14 RX ADMIN — QUETIAPINE FUMARATE 100 MG: 100 TABLET ORAL at 08:39

## 2024-06-14 RX ADMIN — LISINOPRIL 10 MG: 10 TABLET ORAL at 17:17

## 2024-06-14 RX ADMIN — ASPIRIN 81 MG: 81 TABLET, CHEWABLE ORAL at 08:39

## 2024-06-14 RX ADMIN — LEVETIRACETAM 750 MG: 500 TABLET, FILM COATED ORAL at 08:39

## 2024-06-14 RX ADMIN — LISINOPRIL 10 MG: 10 TABLET ORAL at 08:39

## 2024-06-14 RX ADMIN — GABAPENTIN 300 MG: 300 CAPSULE ORAL at 08:39

## 2024-06-14 RX ADMIN — SODIUM CHLORIDE, SODIUM GLUCONATE, SODIUM ACETATE, POTASSIUM CHLORIDE, MAGNESIUM CHLORIDE, SODIUM PHOSPHATE, DIBASIC, AND POTASSIUM PHOSPHATE 125 ML/HR: .53; .5; .37; .037; .03; .012; .00082 INJECTION, SOLUTION INTRAVENOUS at 01:41

## 2024-06-14 RX ADMIN — POTASSIUM CHLORIDE 40 MEQ: 1500 TABLET, EXTENDED RELEASE ORAL at 17:18

## 2024-06-14 RX ADMIN — DILTIAZEM HYDROCHLORIDE 120 MG: 120 CAPSULE, COATED, EXTENDED RELEASE ORAL at 08:39

## 2024-06-14 RX ADMIN — HEPARIN SODIUM 22 UNITS/KG/HR: 10000 INJECTION, SOLUTION INTRAVENOUS at 09:01

## 2024-06-14 RX ADMIN — HEPARIN SODIUM 1800 UNITS: 1000 INJECTION INTRAVENOUS; SUBCUTANEOUS at 01:01

## 2024-06-14 RX ADMIN — POTASSIUM CHLORIDE 20 MEQ: 14.9 INJECTION, SOLUTION INTRAVENOUS at 21:33

## 2024-06-14 RX ADMIN — POTASSIUM CHLORIDE 20 MEQ: 14.9 INJECTION, SOLUTION INTRAVENOUS at 18:01

## 2024-06-14 RX ADMIN — ALPRAZOLAM 2 MG: 0.5 TABLET ORAL at 08:39

## 2024-06-14 RX ADMIN — GABAPENTIN 300 MG: 300 CAPSULE ORAL at 17:32

## 2024-06-14 NOTE — PLAN OF CARE
Diet: soft/level 3 diet and thin liquids   Meds: whole with liquid 1 at a time   Feeding Assistance: tray set up   Frequent Oral care: 2-4x/day  Aspiration precautions and compensatory swallowing strategies: upright posture, slow rate of feeding, and small bites/sips  Other Recommendations/ considerations: ST follow-up x1-3 to ensure mgmt of oral diet

## 2024-06-14 NOTE — CASE MANAGEMENT
"   Case Management Discharge Planning Note    Patient name Kirsty Em  Location ICU 11/ICU 11 MRN [id number]  : 1950 Date 2024       Current Admission Date: 2024  Current Admission Diagnosis:Acute metabolic encephalopathy   Patient Active Problem List    Diagnosis Date Noted Date Diagnosed    Hypokalemia 2024     New onset a-fib ([facility]) 2024     Hypertension 2024     Bilateral carotid artery stenosis 06/10/2024     Pre-diabetes 2024     Stroke (cerebrum) ([facility]) 2024     Acute metabolic encephalopathy 2024     Elevated transaminase level 2024     Closed displaced fracture of proximal phalanx of right thumb 2024     Mixed hyperlipidemia 2024     Chronic obstructive pulmonary disease, unspecified COPD type ([facility]) 2023     Acute respiratory failure with hypoxia ([facility]) 2022     Anxiety and depression 2021     History of seizure disorder 2021     Tobacco dependence 2021       LOS (days): 7  Geometric Mean LOS (GMLOS) (days): 4.5  Days to GMLOS:-2.5     OBJECTIVE:  Risk of Unplanned Readmission Score: 19.44         Current admission status: Inpatient   Preferred Pharmacy:   [id number] [place] - [address]  [address]  [place] [address]  Phone: [phone] Fax: [phone]    Primary Care Provider: Brien Desouza MD    Primary Insurance: MEDICARE  Secondary Insurance: BLUE CROSS    DISCHARGE DETAILS:                                          Other Referral/Resources/Interventions Provided:  Referral Comments: Received message via FOBOIN from [facility] that \"the patient is no longer coming. The referral has been re-opened for you to find an alternative provider for the patient\".  Message sent to [facility] via AIDIN, asking for confirmation and explanation.  Awaiting response.                                             IMM Given (Date):: 24                               "

## 2024-06-14 NOTE — PROGRESS NOTES
[facility]  Progress Note  Name: Kirsty Em I  MRN: [id number]  Unit/Bed#: ICU 11 I Date of Admission: 6/7/2024   Date of Service: 6/14/2024 I Hospital Day: 7    Assessment & Plan   * Acute metabolic encephalopathy  Assessment & Plan  Presents with confusion from home, aphasia and weakness  Slowly improving; likely in setting of subacute CVA vs seizure  Acute infectious source ruled out   Urinary retention persists, continue main on discharge with outpatient voiding trial   Frequent neurologic checks  Delirium precautions, 1:1 discontinued     Hypokalemia  Assessment & Plan  K noted 2.4 today; on tele monitor without acute changes  Replete IV given need for VBS today  Mg 1.9, will replete as well    New onset a-fib (HCC)  Assessment & Plan  Morning of 6/12 patient developed rapid afib, HR up to 170s  Cardiology consulted,  Cardizem  mg daily  Heparin gtt therapeutic for 48hr then repeat CT head, if CT head stables, able to transfer to oral NOAC  Unfortunately PTT not therapeutic, therapeutic starting today  Converted to NSR prior to cardizem drip, thus not started   Tele monitor     Hypertension  Assessment & Plan  Fluctuating BP, started on lisinopril  Continue IV hydralazine or labetalol as needed  Monitor VS per unit protocol, Blood Pressure: (!) 169/135     Bilateral carotid artery stenosis  Assessment & Plan  Continue aspirin/Plavix  Plan for vascular surgery follow-up as an outpatient     Pre-diabetes  Assessment & Plan  Encourage lifestyle changes  BG glucose reasonable via labs continue to monitor     Stroke (cerebrum) (HCC)  Assessment & Plan  Neurology consulted,  CTA head/neck with significant carotid disease bilaterally 70% right and 65% left  MRI brain with focal acute to subacute infarct along the posterior left frontal cortex.  Continue aspirin/Plavix x 21 days then aspirin monotherapy, patient allergic to statin  ST recommending Puree diet thin liquids, no need  for VBS  PT/OT recommending moderate intensity resource  CM consult for dispo planning  Patient will need follow up with neurovascular on discharge in 6 weeks     Closed displaced fracture of proximal phalanx of right thumb  Assessment & Plan  S/p splinting by ortho  Repeat xray in 1 week (from 6/8)  NWB right hand   Appreciate orthopedic recommendations    Elevated transaminase level  Assessment & Plan  Suspect related to acuity of presentation  Resolving      Mixed hyperlipidemia  Assessment & Plan  Continue home Zetia  Patient intolerant of statins     Chronic obstructive pulmonary disease, unspecified COPD type (HCC)  Assessment & Plan  Not currently in exacerbation  Wean oxygen for goal SpO2>88%  Respiratory protocol  PRN albuterol     Acute respiratory failure with hypoxia (HCC)  Assessment & Plan  Presumed secondary to aspiration prior to presentation  Wean oxygen for goal SpO2>88%  Respiratory protocol     Tobacco dependence  Assessment & Plan  Encourage cessation   Nicotine replacement     History of seizure disorder  Assessment & Plan  Patient reportedly self-discontinued Keppra at home  Cannot rule out seizure as etiology of presentation  Possibly related to Xanax withdrawal?  Continue Keppra  EEG abnormal, will need ambulatory EEG per neuro     Anxiety and depression  Assessment & Plan  Continue home Xanax and Seroquel      Rhabdomyolysis-resolved as of 6/13/2024  Assessment & Plan  Resolving on IV fluids; total CK peaked 15,500 and trending down 362           VTE Pharmacologic Prophylaxis: VTE Score: 9 High Risk (Score >/= 5) - Pharmacological DVT Prophylaxis Ordered: heparin. Sequential Compression Devices Ordered.    Mobility:   Basic Mobility Inpatient Raw Score: 15  JH-HLM Goal: 4: Move to chair/commode  JH-HLM Achieved: 6: Walk 10 steps or more  JH-HLM Goal achieved. Continue to encourage appropriate mobility.    Patient Centered Rounds: I performed bedside rounds with nursing staff today.    Discussions with Specialists or Other Care Team Provider: CM, neurology, cardiology    Education and Discussions with Family / Patient: Updated  () at bedside.    Total Time Spent on Date of Encounter in care of patient: 35 mins. This time was spent on one or more of the following: performing physical exam; counseling and coordination of care; obtaining or reviewing history; documenting in the medical record; reviewing/ordering tests, medications or procedures; communicating with other healthcare professionals and discussing with patient's family/caregivers.    Current Length of Stay: 7 day(s)  Current Patient Status: Inpatient   Certification Statement: The patient will continue to require additional inpatient hospital stay due to need for PTT therapeutic for 48 hours prior to starting NOAC and repeat CT head  Discharge Plan: Anticipate discharge in 48 hrs to rehab facility.    Code Status: Level 1 - Full Code    Subjective:   Patient reports to be feeling well.  Currently denies any chest pain/pressure, palpitations, lightheadedness, nausea, shortness of breath, or chills.    Objective:     Vitals:   Temp (24hrs), Av.5 °F (36.9 °C), Min:97.7 °F (36.5 °C), Max:99.4 °F (37.4 °C)    Temp:  [97.7 °F (36.5 °C)-99.4 °F (37.4 °C)] 98.3 °F (36.8 °C)  HR:  [] 100  Resp:  [18-34] 18  BP: ()/(51-74) 168/74  SpO2:  [89 %-97 %] 93 %  Body mass index is 25.7 kg/m².     Input and Output Summary (last 24 hours):     Intake/Output Summary (Last 24 hours) at 2024 1148  Last data filed at 2024 1000  Gross per 24 hour   Intake 3451.93 ml   Output 3925 ml   Net -473.07 ml       Physical Exam:   Physical Exam  Vitals and nursing note reviewed.   Constitutional:       Appearance: She is normal weight.   HENT:      Head: Normocephalic.      Nose: Nose normal.      Mouth/Throat:      Mouth: Mucous membranes are moist.      Pharynx: Oropharynx is clear.   Eyes:      General: No scleral  icterus.     Conjunctiva/sclera: Conjunctivae normal.      Pupils: Pupils are equal, round, and reactive to light.   Cardiovascular:      Rate and Rhythm: Normal rate and regular rhythm.      Heart sounds: No murmur heard.     No friction rub. No gallop.   Pulmonary:      Effort: Pulmonary effort is normal. No respiratory distress.      Breath sounds: Normal breath sounds. No stridor. No wheezing, rhonchi or rales.   Abdominal:      General: Abdomen is flat.      Palpations: Abdomen is soft.   Musculoskeletal:         General: Normal range of motion.      Cervical back: Normal range of motion and neck supple.      Right lower leg: No edema.      Left lower leg: No edema.   Lymphadenopathy:      Cervical: No cervical adenopathy.   Skin:     General: Skin is warm.      Coloration: Skin is not jaundiced or pale.      Findings: No bruising, erythema or lesion.   Neurological:      General: No focal deficit present.      Mental Status: She is alert and oriented to person, place, and time. Mental status is at baseline.      Cranial Nerves: No cranial nerve deficit.      Motor: No weakness.   Psychiatric:         Mood and Affect: Mood normal.         Behavior: Behavior normal.         Thought Content: Thought content normal.          Additional Data:     Labs:  Results from last 7 days   Lab Units 06/13/24  0652 06/11/24  0602 06/10/24  0430   WBC Thousand/uL 9.75   < > 12.66*   HEMOGLOBIN g/dL 10.7*   < > 11.1*   HEMATOCRIT % 31.5*   < > 31.6*   PLATELETS Thousands/uL 334   < > 262   SEGS PCT %  --   --  71   LYMPHO PCT %  --   --  21   MONO PCT %  --   --  7   EOS PCT %  --   --  0    < > = values in this interval not displayed.     Results from last 7 days   Lab Units 06/13/24  0652   SODIUM mmol/L 145   POTASSIUM mmol/L 2.4*   CHLORIDE mmol/L 105   CO2 mmol/L 33*   BUN mg/dL 13   CREATININE mg/dL 0.45*   ANION GAP mmol/L 7   CALCIUM mg/dL 8.1*   ALBUMIN g/dL 3.1*   TOTAL BILIRUBIN mg/dL 0.76   ALK PHOS U/L 62   ALT U/L  90*   AST U/L 43*   GLUCOSE RANDOM mg/dL 101     Results from last 7 days   Lab Units 06/12/24  1205   INR  1.03     Results from last 7 days   Lab Units 06/08/24  0031   POC GLUCOSE mg/dl 117     Results from last 7 days   Lab Units 06/09/24  0445   HEMOGLOBIN A1C % 6.3*     Results from last 7 days   Lab Units 06/09/24  0445 06/08/24  0452 06/07/24  2152 06/07/24  1613 06/07/24  1346   LACTIC ACID mmol/L  --   --  1.5 3.4* 8.4*   PROCALCITONIN ng/ml 6.11* 13.81*  --   --  2.47*       Lines/Drains:  Invasive Devices       Peripheral Intravenous Line  Duration             Peripheral IV 06/11/24 Left;Ventral (anterior) Foot 3 days    Peripheral IV 06/13/24 Right;Ventral (anterior) Forearm 1 day              Drain  Duration             Urethral Catheter Latex 16 Fr. 1 day                  Urinary Catheter:  Goal for removal: Voiding trial when ambulation improves           Telemetry:  Telemetry Orders (From admission, onward)               24 Hour Telemetry Monitoring  Continuous x 24 Hours (Telem)        Question:  Reason for 24 Hour Telemetry  Answer:  Arrhythmias requiring acute medical intervention / PPM or ICD malfunction                     Telemetry Reviewed: Normal Sinus Rhythm  Indication for Continued Telemetry Use: No indication for continued use. Will discontinue.              Imaging: No pertinent imaging reviewed.    Recent Cultures (last 7 days):   Results from last 7 days   Lab Units 06/07/24  1639 06/07/24  1315 06/07/24  1310   BLOOD CULTURE   --  No Growth After 5 Days. No Growth After 5 Days.   GRAM STAIN RESULT  No No Polys or Bacteria seen  --   --        Last 24 Hours Medication List:   Current Facility-Administered Medications   Medication Dose Route Frequency Provider Last Rate    acetaminophen  650 mg Oral Q6H PRN SINDY Lamar      ALPRAZolam  2 mg Oral 4x Daily SINDY Lamar      aspirin  81 mg Oral Daily SINDY Lamar      clopidogrel  75 mg Oral Daily Yusef  SINDY Rodriguez      diltiazem  120 mg Oral Daily Judileeroy Torres PA-C      ezetimibe  10 mg Per NG Tube Daily SINDY Lamar      gabapentin  300 mg Oral TID SINDY Lamar      heparin (porcine)  3-20 Units/kg/hr (Order-Specific) Intravenous Titrated Judi CARLOS Torres PA-C 22 Units/kg/hr (06/14/24 0103)    heparin (porcine)  1,800 Units Intravenous Q6H PRN Judisa CARLOS Torres PA-C      heparin (porcine)  3,600 Units Intravenous Q6H PRN Judi CARLOS Torres PA-C      hydrALAZINE  5 mg Intravenous Q6H PRN SINDY Lamar      labetalol  10 mg Intravenous Q6H PRN SINDY Lamar      levETIRAcetam  750 mg Oral Q12H JERMAINE SINDY Lamar      lisinopril  10 mg Oral BID Joseph Hsu MD      multi-electrolyte  125 mL/hr Intravenous Continuous Judisa CARLOS Torres PA-C 125 mL/hr (06/14/24 0141)    nicotine  14 mg Transdermal Daily SINDY Lamar      nystatin   Topical BID SINDY Lamar      QUEtiapine  100 mg Oral QAM SINDY Lamar      QUEtiapine  400 mg Oral HS SINDY Lamar          Today, Patient Was Seen By: Bob Dangelo PA-C    **Please Note: This note may have been constructed using a voice recognition system.**

## 2024-06-14 NOTE — ARC ADMISSION
Reviewed updates and patient has been denied for ARC as she will not tolerate the aggressive program.  Subacute rehab recommended for slower paced, longer length of stay.  CM has been updated in Aidin.

## 2024-06-14 NOTE — PLAN OF CARE
Problem: PAIN - ADULT  Goal: Verbalizes/displays adequate comfort level or baseline comfort level  Description: Interventions:  - Encourage patient to monitor pain and request assistance  - Assess pain using appropriate pain scale  - Administer analgesics based on type and severity of pain and evaluate response  - Implement non-pharmacological measures as appropriate and evaluate response  - Consider cultural and social influences on pain and pain management  - Notify physician/advanced practitioner if interventions unsuccessful or patient reports new pain  6/14/2024 0319 by Rosalia Garcia  Outcome: Progressing  6/14/2024 0318 by Rosalia Garcia  Outcome: Progressing     Problem: Knowledge Deficit  Goal: Patient/family/caregiver demonstrates understanding of disease process, treatment plan, medications, and discharge instructions  Description: Complete learning assessment and assess knowledge base.  Interventions:  - Provide teaching at level of understanding  - Provide teaching via preferred learning methods  Outcome: Progressing

## 2024-06-14 NOTE — CASE MANAGEMENT
Case Management Discharge Planning Note    Patient name Kirsty Em  Location ICU 11/ICU 11 MRN [id number]  : 1950 Date 2024       Current Admission Date: 2024  Current Admission Diagnosis:Acute metabolic encephalopathy   Patient Active Problem List    Diagnosis Date Noted Date Diagnosed    Hypokalemia 2024     New onset a-fib ([facility]) 2024     Hypertension 2024     Bilateral carotid artery stenosis 06/10/2024     Pre-diabetes 2024     Stroke (cerebrum) ([facility]) 2024     Acute metabolic encephalopathy 2024     Elevated transaminase level 2024     Closed displaced fracture of proximal phalanx of right thumb 2024     Mixed hyperlipidemia 2024     Chronic obstructive pulmonary disease, unspecified COPD type ([facility]) 2023     Acute respiratory failure with hypoxia ([facility]) 2022     Anxiety and depression 2021     History of seizure disorder 2021     Tobacco dependence 2021       LOS (days): 7  Geometric Mean LOS (GMLOS) (days): 4.5  Days to GMLOS:-2.3     OBJECTIVE:  Risk of Unplanned Readmission Score: 19.59         Current admission status: Inpatient   Preferred Pharmacy:   [id number] [place] - [address]  [address]  [place] [address]  Phone: [phone] Fax: [phone]    Primary Care Provider: Brien Desouza MD    Primary Insurance: MEDICARE  Secondary Insurance: BLUE CROSS    DISCHARGE DETAILS:                                          Other Referral/Resources/Interventions Provided:  Referral Comments: Met w pt and spouse at bedside.  Reviewed acute and subacute programs, reviewed subacute bed offers, and provider overview list for acute programs.  Spouse feels acute care programs will be too strenuous for pt, prefers subacute.  Pt concurred.  Spouse wants closest facility and feels that [facility] be [facility] in [place].  Spouse has no interest in [facility].  [facility] reserved in A[facility].  D/C anticipated for Sun, 6/16.  Waiting for INR to be therapeutic.         Treatment Team Recommendation: Short Term Rehab, SNF, Acute Rehab  Discharge Destination Plan:: Short Term Rehab, SNF  Transport at Discharge : Other (Comment) (TBD)                                   Family notified:: reviewed w spouse at bedside on 6/14.

## 2024-06-14 NOTE — PROGRESS NOTES
"Cardiology Progress Note - Kirsty Em 74 y.o. female MRN: [id number]    Unit/Bed#: ICU 11 Encounter: [id number]      Assessment/Plan:  New onset atrial fibrillation with RVR; heart rates up to 170s  Patient converted to sinus rhythm shortly after seen yesterday  Remains sinus  Plan for heparin drip for 48 hours and oral anticoagulation when okay with neurology  [id number] =5  Echo done EF 55%, mild to moderate MR    2.  Acute metabolic encephalopathy  Slowly improving per   Management per neurology/slim    3.  Stroke  MRI with focal acute to subacute infarct along the posterior left frontal cortex  Neurology states aspirin and Plavix for 21 days then aspirin monotherapy    4.  Hypertension  Elevated at times today 168/74  Lisinopril to twice daily dosing    5.  Rhabdomyolysis  Improving    6.  Bilateral carotid artery stenosis  7.  Closed displaced fracture of proximal phalanx of right thumb  8.  Transaminitis  9.  Hyperlipidemia  10.  COPD  11.  Seizure disorder  12.  Anxiety/depression    Subjective:   Patient seen and examined.  No significant events overnight. Im feeling better today.     Objective:     Vitals: Blood pressure 168/74, pulse 100, temperature 98.3 °F (36.8 °C), resp. rate 18, height 5' 1\" (1.549 m), weight 61.7 kg (136 lb), SpO2 93%., Body mass index is 25.7 kg/m².,   Orthostatic Blood Pressures      Flowsheet Row Most Recent Value   Blood Pressure 168/74 filed at 06/14/2024 0700   Patient Position - Orthostatic VS Lying filed at 06/13/2024 2300              Intake/Output Summary (Last 24 hours) at 6/14/2024 1417  Last data filed at 6/14/2024 1000  Gross per 24 hour   Intake 3201.93 ml   Output 3925 ml   Net -723.07 ml         Physical Exam:  GEN: Alert and oriented in no acute distress.  Well appearing and well nourished.   HEENT: Sclera anicteric, conjunctivae pink, mucous membranes moist. Oropharynx clear.   NECK: Supple, no carotid bruits, no significant JVD. Trachea midline, no " thyromegaly.   HEART: Regular rhythm, normal S1 and S2, no murmurs, clicks, gallops or rubs. PMI nondisplaced, no thrills.   LUNGS: Decreased breath sounds bilaterally; no wheezes, rales, or rhonchi. No increased work of breathing or signs of respiratory distress.   ABDOMEN: Soft, nontender, nondistended, normoactive bowel sounds.   EXTREMITIES: + right thumb splint, Skin warm and well perfused, no clubbing, cyanosis, or edema.  NEURO: No focal findings. Normal speech. Mood and affect normal.   SKIN: Normal without suspicious lesions on exposed skin.      Medications:      Current Facility-Administered Medications:     acetaminophen (TYLENOL) tablet 650 mg, 650 mg, Oral, Q6H PRN, SINDY Lamar, 650 mg at 06/09/24 1654    ALPRAZolam (XANAX) tablet 2 mg, 2 mg, Oral, 4x Daily, SINDY Lamar, 2 mg at 06/14/24 1400    aspirin chewable tablet 81 mg, 81 mg, Oral, Daily, SINDY Lamar, 81 mg at 06/14/24 0839    clopidogrel (PLAVIX) tablet 75 mg, 75 mg, Oral, Daily, SINDY Lamar, 75 mg at 06/14/24 0839    diltiazem (CARDIZEM CD) 24 hr capsule 120 mg, 120 mg, Oral, Daily, Judi Torres PA-C, 120 mg at 06/14/24 0839    ezetimibe (ZETIA) tablet 10 mg, 10 mg, Per NG Tube, Daily, SINDY Lamar, 10 mg at 06/14/24 0841    gabapentin (NEURONTIN) capsule 300 mg, 300 mg, Oral, TID, SINDY Lamar, 300 mg at 06/14/24 0839    heparin (porcine) 25,000 units in 0.45% NaCl 250 mL infusion (premix), 3-20 Units/kg/hr (Order-Specific), Intravenous, Titrated, Judi Torres PA-C, Last Rate: 13.2 mL/hr at 06/14/24 0901, 22 Units/kg/hr at 06/14/24 0901    heparin (porcine) injection 1,800 Units, 1,800 Units, Intravenous, Q6H PRN, Judi Torres PA-C, 1,800 Units at 06/14/24 0101    heparin (porcine) injection 3,600 Units, 3,600 Units, Intravenous, Q6H PRN, Judi Torres PA-C, 3,600 Units at 06/13/24 1601    hydrALAZINE (APRESOLINE) injection 5 mg, 5 mg, Intravenous, Q6H PRN, Yusef  SINDY Rodriguez, 5 mg at 06/12/24 0853    labetalol (NORMODYNE) injection 10 mg, 10 mg, Intravenous, Q6H PRN, SINDY Lamar, 10 mg at 06/10/24 1446    levETIRAcetam (KEPPRA) tablet 750 mg, 750 mg, Oral, Q12H JERMAINE, SINDY Lamar, 750 mg at 06/14/24 0839    lisinopril (ZESTRIL) tablet 10 mg, 10 mg, Oral, BID, Joseph Hsu MD    multi-electrolyte (PLASMALYTE-A/ISOLYTE-S PH 7.4) IV solution, 125 mL/hr, Intravenous, Continuous, Judi Torres PA-C, Last Rate: 125 mL/hr at 06/14/24 0141, 125 mL/hr at 06/14/24 0141    nicotine (NICODERM CQ) 14 mg/24hr TD 24 hr patch 14 mg, 14 mg, Transdermal, Daily, SINDY Lamar, 14 mg at 06/14/24 0840    nystatin (MYCOSTATIN) powder, , Topical, BID, SINDY Lamar, Given at 06/14/24 0840    QUEtiapine (SEROquel) tablet 100 mg, 100 mg, Oral, QAM, SINDY Lamar, 100 mg at 06/14/24 0839    QUEtiapine (SEROquel) tablet 400 mg, 400 mg, Oral, HS, SINDY Lamar, 400 mg at 06/13/24 2155     Labs & Results:    Results from last 7 days   Lab Units 06/13/24  0652 06/12/24  0506 06/11/24  0602 06/08/24  0452 06/07/24  1810 06/07/24  1613 06/07/24  1540   CK TOTAL U/L 362* 1,405* 4,153*   < >  --    < >  --    HS TNI 2HR ng/L  --   --   --   --   --   --  54*   HS TNI 4HR ng/L  --   --   --   --  156*  --   --    HSTNI D4 ng/L  --   --   --   --  126*  --   --     < > = values in this interval not displayed.     Results from last 7 days   Lab Units 06/13/24  0652 06/12/24  0506 06/11/24  0602   WBC Thousand/uL 9.75 12.17* 10.99*   HEMOGLOBIN g/dL 10.7* 12.2 11.6   HEMATOCRIT % 31.5* 35.2 32.7*   PLATELETS Thousands/uL 334 335 306     Results from last 7 days   Lab Units 06/09/24  0445   TRIGLYCERIDES mg/dL 694*   HDL mg/dL 22*     Results from last 7 days   Lab Units 06/13/24  0652 06/12/24  0506 06/11/24  0602 06/10/24  0430   POTASSIUM mmol/L 2.4* 3.7 2.8* 2.9*   CHLORIDE mmol/L 105 106 106 107   CO2 mmol/L 33* 30 29 23   BUN mg/dL 13 9 6 6    CREATININE mg/dL 0.45* 0.56* 0.56* 0.50*   CALCIUM mg/dL 8.1* 8.8 8.4 7.3*   ALK PHOS U/L 62  --  83 76   ALT U/L 90*  --  167* 186*   AST U/L 43*  --  129* 176*     Results from last 7 days   Lab Units 06/14/24  1216 06/14/24  0556 06/13/24  2153 06/12/24  1845 06/12/24  1205 06/08/24  0452   INR   --   --   --   --  1.03 1.03   PTT seconds 60* 69* 55*   < > 26  --     < > = values in this interval not displayed.     Results from last 7 days   Lab Units 06/13/24  0652 06/11/24  0602 06/10/24  0430   MAGNESIUM mg/dL 1.9 1.9 2.1

## 2024-06-14 NOTE — SPEECH THERAPY NOTE
Speech Language/Pathology     Speech/Language Pathology Progress Note     Patient Name: Kirsty Em    Today's Date: 6/14/2024     Problem List  Principal Problem:    Acute metabolic encephalopathy  Active Problems:    Anxiety and depression    History of seizure disorder    Tobacco dependence    Acute respiratory failure with hypoxia (HCC)    Chronic obstructive pulmonary disease, unspecified COPD type (HCC)    Mixed hyperlipidemia    Elevated transaminase level    Closed displaced fracture of proximal phalanx of right thumb    Stroke (cerebrum) (HCC)    Pre-diabetes    Bilateral carotid artery stenosis    Hypertension    New onset a-fib (HCC)    Hypokalemia        Recommendations:   Diet: soft/level 3 diet and thin liquids   Meds: whole with liquid 1 at a time   Feeding Assistance: tray set up   Frequent Oral care: 2-4x/day  Aspiration precautions and compensatory swallowing strategies: upright posture, slow rate of feeding, and small bites/sips  Other Recommendations/ considerations: ST follow-up x1-3 to ensure mgmt of oral diet       Subjective:  Patient received awake, upright in bedside chair. Breakfast tray set up, RN present.     Previous/current diet: dys3/nectar     Objective:  The following consistencies were tested thin liquids by cup, straw, observed RN providing meds 1 at a time w/ thin liquid.  Dysphagia 3 solids from breakfast tray   Patient presents with WFL bolus containment, manipulation and control. Transfer prompt.  Noted to elicit consistent swallows w/ small single sips of liquids, no change w/ taking pills.  Manages well with softer solid texture,  reports to prefer same as pt with upper dentures only at this time.   No overt s/s of aspiration or distress. Vocal quality noted to remain clear. O2 saturation WNL.      Assessment:  Oropharyngeal swallow function appears improved; pt able to take 16oz thin liquids, meds w/ same, with no overt s/s of aspiration observed.  Suspect improvement in  swallow function; need for MBS no longer indicated.  Should concerns arise in the future, pt recommended to return as an outpatient.       Plan:  ST wll continue to follow x1-3  Outpatient MBS if need arises       Kirsten Marin MS, CCC-SLP  Speech-Language Pathologist  [place] #[id number]  [place] #[id number]

## 2024-06-15 LAB
ANION GAP SERPL CALCULATED.3IONS-SCNC: 7 MMOL/L (ref 4–13)
APTT PPP: 67 SECONDS (ref 23–37)
BUN SERPL-MCNC: 9 MG/DL (ref 5–25)
CALCIUM SERPL-MCNC: 8.8 MG/DL (ref 8.4–10.2)
CHLORIDE SERPL-SCNC: 107 MMOL/L (ref 96–108)
CO2 SERPL-SCNC: 31 MMOL/L (ref 21–32)
CREAT SERPL-MCNC: 0.51 MG/DL (ref 0.6–1.3)
ERYTHROCYTE [DISTWIDTH] IN BLOOD BY AUTOMATED COUNT: 14.4 % (ref 11.6–15.1)
GFR SERPL CREATININE-BSD FRML MDRD: 96 ML/MIN/1.73SQ M
GLUCOSE SERPL-MCNC: 99 MG/DL (ref 65–140)
HCT VFR BLD AUTO: 31.5 % (ref 34.8–46.1)
HGB BLD-MCNC: 10.8 G/DL (ref 11.5–15.4)
MAGNESIUM SERPL-MCNC: 1.8 MG/DL (ref 1.9–2.7)
MCH RBC QN AUTO: 33.3 PG (ref 26.8–34.3)
MCHC RBC AUTO-ENTMCNC: 34.3 G/DL (ref 31.4–37.4)
MCV RBC AUTO: 97 FL (ref 82–98)
PHOSPHATE SERPL-MCNC: 3.7 MG/DL (ref 2.3–4.1)
PLATELET # BLD AUTO: 341 THOUSANDS/UL (ref 149–390)
PMV BLD AUTO: 9.9 FL (ref 8.9–12.7)
POTASSIUM SERPL-SCNC: 3.2 MMOL/L (ref 3.5–5.3)
RBC # BLD AUTO: 3.24 MILLION/UL (ref 3.81–5.12)
SODIUM SERPL-SCNC: 145 MMOL/L (ref 135–147)
WBC # BLD AUTO: 8.22 THOUSAND/UL (ref 4.31–10.16)

## 2024-06-15 PROCEDURE — 85730 THROMBOPLASTIN TIME PARTIAL: CPT

## 2024-06-15 PROCEDURE — 99232 SBSQ HOSP IP/OBS MODERATE 35: CPT

## 2024-06-15 PROCEDURE — 84100 ASSAY OF PHOSPHORUS: CPT | Performed by: STUDENT IN AN ORGANIZED HEALTH CARE EDUCATION/TRAINING PROGRAM

## 2024-06-15 PROCEDURE — 85027 COMPLETE CBC AUTOMATED: CPT | Performed by: STUDENT IN AN ORGANIZED HEALTH CARE EDUCATION/TRAINING PROGRAM

## 2024-06-15 PROCEDURE — 83735 ASSAY OF MAGNESIUM: CPT | Performed by: STUDENT IN AN ORGANIZED HEALTH CARE EDUCATION/TRAINING PROGRAM

## 2024-06-15 PROCEDURE — 80048 BASIC METABOLIC PNL TOTAL CA: CPT | Performed by: STUDENT IN AN ORGANIZED HEALTH CARE EDUCATION/TRAINING PROGRAM

## 2024-06-15 RX ORDER — POTASSIUM CHLORIDE 20 MEQ/1
40 TABLET, EXTENDED RELEASE ORAL ONCE
Status: COMPLETED | OUTPATIENT
Start: 2024-06-15 | End: 2024-06-15

## 2024-06-15 RX ADMIN — GABAPENTIN 300 MG: 300 CAPSULE ORAL at 22:29

## 2024-06-15 RX ADMIN — QUETIAPINE FUMARATE 400 MG: 100 TABLET ORAL at 22:26

## 2024-06-15 RX ADMIN — NICOTINE 14 MG: 14 PATCH, EXTENDED RELEASE TRANSDERMAL at 09:14

## 2024-06-15 RX ADMIN — EZETIMIBE 10 MG: 10 TABLET ORAL at 09:14

## 2024-06-15 RX ADMIN — LEVETIRACETAM 750 MG: 500 TABLET, FILM COATED ORAL at 09:15

## 2024-06-15 RX ADMIN — ALPRAZOLAM 2 MG: 0.5 TABLET ORAL at 17:03

## 2024-06-15 RX ADMIN — SODIUM CHLORIDE, SODIUM GLUCONATE, SODIUM ACETATE, POTASSIUM CHLORIDE, MAGNESIUM CHLORIDE, SODIUM PHOSPHATE, DIBASIC, AND POTASSIUM PHOSPHATE 125 ML/HR: .53; .5; .37; .037; .03; .012; .00082 INJECTION, SOLUTION INTRAVENOUS at 11:37

## 2024-06-15 RX ADMIN — CLOPIDOGREL 75 MG: 75 TABLET ORAL at 09:15

## 2024-06-15 RX ADMIN — ALPRAZOLAM 2 MG: 0.5 TABLET ORAL at 09:14

## 2024-06-15 RX ADMIN — LEVETIRACETAM 750 MG: 500 TABLET, FILM COATED ORAL at 22:27

## 2024-06-15 RX ADMIN — NYSTATIN: 100000 POWDER TOPICAL at 09:45

## 2024-06-15 RX ADMIN — NYSTATIN: 100000 POWDER TOPICAL at 17:02

## 2024-06-15 RX ADMIN — LISINOPRIL 10 MG: 10 TABLET ORAL at 09:15

## 2024-06-15 RX ADMIN — QUETIAPINE FUMARATE 100 MG: 100 TABLET ORAL at 09:15

## 2024-06-15 RX ADMIN — SODIUM CHLORIDE, SODIUM GLUCONATE, SODIUM ACETATE, POTASSIUM CHLORIDE, MAGNESIUM CHLORIDE, SODIUM PHOSPHATE, DIBASIC, AND POTASSIUM PHOSPHATE 125 ML/HR: .53; .5; .37; .037; .03; .012; .00082 INJECTION, SOLUTION INTRAVENOUS at 02:50

## 2024-06-15 RX ADMIN — GABAPENTIN 300 MG: 300 CAPSULE ORAL at 09:18

## 2024-06-15 RX ADMIN — HEPARIN SODIUM 20 UNITS/KG/HR: 10000 INJECTION, SOLUTION INTRAVENOUS at 05:24

## 2024-06-15 RX ADMIN — GABAPENTIN 300 MG: 300 CAPSULE ORAL at 17:00

## 2024-06-15 RX ADMIN — POTASSIUM CHLORIDE 40 MEQ: 1500 TABLET, EXTENDED RELEASE ORAL at 09:18

## 2024-06-15 RX ADMIN — ALPRAZOLAM 2 MG: 0.5 TABLET ORAL at 22:26

## 2024-06-15 RX ADMIN — LISINOPRIL 10 MG: 10 TABLET ORAL at 17:11

## 2024-06-15 RX ADMIN — DILTIAZEM HYDROCHLORIDE 120 MG: 120 CAPSULE, COATED, EXTENDED RELEASE ORAL at 09:26

## 2024-06-15 RX ADMIN — ALPRAZOLAM 2 MG: 0.5 TABLET ORAL at 11:12

## 2024-06-15 RX ADMIN — ASPIRIN 81 MG: 81 TABLET, CHEWABLE ORAL at 09:14

## 2024-06-15 NOTE — ASSESSMENT & PLAN NOTE
Morning of 6/12 patient developed rapid afib, HR up to 170s  Cardiology consulted,  Cardizem  mg daily  Heparin gtt therapeutic for 48hr then repeat CT head, if CT head stables, able to transfer to oral NOAC  Therapeutic PTT starting 6/14  Converted to NSR prior to cardizem drip, thus not started   Tele monitor

## 2024-06-15 NOTE — ASSESSMENT & PLAN NOTE
Fluctuating BP, started on lisinopril  Continue IV hydralazine or labetalol as needed  Monitor VS per unit protocol, Blood Pressure: 158/89

## 2024-06-15 NOTE — PLAN OF CARE
Problem: Knowledge Deficit  Goal: Patient/family/caregiver demonstrates understanding of disease process, treatment plan, medications, and discharge instructions  Description: Complete learning assessment and assess knowledge base.  Interventions:  - Provide teaching at level of understanding  - Provide teaching via preferred learning methods  Outcome: Progressing     Problem: SAFETY ADULT  Goal: Patient will remain free of falls  Description: INTERVENTIONS:  - Educate patient/family on patient safety including physical limitations  - Instruct patient to call for assistance with activity   - Consult OT/PT to assist with strengthening/mobility   - Keep Call bell within reach  - Keep bed low and locked with side rails adjusted as appropriate  - Keep care items and personal belongings within reach  - Initiate and maintain comfort rounds  - Make Fall Risk Sign visible to staff  - Offer Toileting every 2 Hours, in advance of need  - Initiate/Maintain bedalarm  - Apply yellow socks and bracelet for high fall risk patients  - Consider moving patient to room near nurses station  Outcome: Progressing  Goal: Maintain or return to baseline ADL function  Description: INTERVENTIONS:  -  Assess patient's ability to carry out ADLs; assess patient's baseline for ADL function and identify physical deficits which impact ability to perform ADLs (bathing, care of mouth/teeth, toileting, grooming, dressing, etc.)  - Assess/evaluate cause of self-care deficits   - Assess range of motion  - Assess patient's mobility; develop plan if impaired  - Assess patient's need for assistive devices and provide as appropriate  - Encourage maximum independence but intervene and supervise when necessary  - Involve family in performance of ADLs  - Assess for home care needs following discharge   - Consider OT consult to assist with ADL evaluation and planning for discharge  - Provide patient education as appropriate  Outcome: Progressing  Goal:  Maintains/Returns to pre admission functional level  Description: INTERVENTIONS:  - Perform AM-PAC 6 Click Basic Mobility/ Daily Activity assessment daily.  - Set and communicate daily mobility goal to care team and patient/family/caregiver.   - Collaborate with rehabilitation services on mobility goals if consulted  - Perform Range of Motion 4 times a day.  - Reposition patient every 2 hours.  - Dangle patient 3 times a day  - Stand patient 3 times a day  - Ambulate patient 3 times a day  - Out of bed to chair 3 times a day   - Out of bed for meals 3 times a day  - Out of bed for toileting  - Record patient progress and toleration of activity level   Outcome: Progressing

## 2024-06-15 NOTE — PROGRESS NOTES
[facility]  Progress Note  Name: Kirsty Em I  MRN: [id number]  Unit/Bed#: -[facility] I Date of Admission: 6/7/2024   Date of Service: 6/15/2024 I Hospital Day: 8    Assessment & Plan   * Acute metabolic encephalopathy  Assessment & Plan  Presents with confusion from home, aphasia and weakness  Slowly improving; likely in setting of subacute CVA vs seizure  Acute infectious source ruled out   Urinary retention persists, continue main on discharge with outpatient voiding trial   Frequent neurologic checks  Delirium precautions, 1:1 discontinued     Hypokalemia  Assessment & Plan  K noted 3.2 today; on tele monitor without acute changes  Given oral potassium, continue to trend    New onset a-fib (HCC)  Assessment & Plan  Morning of 6/12 patient developed rapid afib, HR up to 170s  Cardiology consulted,  Cardizem  mg daily  Heparin gtt therapeutic for 48hr then repeat CT head, if CT head stables, able to transfer to oral NOAC  Therapeutic PTT starting 6/14  Converted to NSR prior to cardizem drip, thus not started   Tele monitor     Hypertension  Assessment & Plan  Fluctuating BP, started on lisinopril  Continue IV hydralazine or labetalol as needed  Monitor VS per unit protocol, Blood Pressure: 158/89     Bilateral carotid artery stenosis  Assessment & Plan  Continue aspirin/Plavix  Plan for vascular surgery follow-up as an outpatient     Pre-diabetes  Assessment & Plan  Encourage lifestyle changes  BG glucose reasonable via labs continue to monitor     Stroke (cerebrum) (HCC)  Assessment & Plan  Neurology consulted,  CTA head/neck with significant carotid disease bilaterally 70% right and 65% left  MRI brain with focal acute to subacute infarct along the posterior left frontal cortex.  Continue aspirin/Plavix x 21 days then aspirin monotherapy, patient allergic to statin  ST recommending Puree diet thin liquids, no need for VBS  PT/OT recommending moderate intensity resource  CM  consult for dispo planning  Patient will need follow up with neurovascular on discharge in 6 weeks     Closed displaced fracture of proximal phalanx of right thumb  Assessment & Plan  S/p splinting by ortho  Repeat xray in 1 week (from 6/8)  NWB right hand   Appreciate orthopedic recommendations    Elevated transaminase level  Assessment & Plan  Suspect related to acuity of presentation  Resolving      Mixed hyperlipidemia  Assessment & Plan  Continue home Zetia  Patient intolerant of statins     Chronic obstructive pulmonary disease, unspecified COPD type (HCC)  Assessment & Plan  Not currently in exacerbation  Wean oxygen for goal SpO2>88%  Respiratory protocol  PRN albuterol     Acute respiratory failure with hypoxia (HCC)  Assessment & Plan  Presumed secondary to aspiration prior to presentation  Wean oxygen for goal SpO2>88%  Respiratory protocol     Tobacco dependence  Assessment & Plan  Encourage cessation   Nicotine replacement     History of seizure disorder  Assessment & Plan  Patient reportedly self-discontinued Keppra at home  Cannot rule out seizure as etiology of presentation  Possibly related to Xanax withdrawal?  Continue Keppra  EEG abnormal, will need ambulatory EEG per neuro     Anxiety and depression  Assessment & Plan  Continue home Xanax and Seroquel             VTE Pharmacologic Prophylaxis: VTE Score: 9 High Risk (Score >/= 5) - Pharmacological DVT Prophylaxis Ordered: heparin drip. Sequential Compression Devices Ordered.    Mobility:   Basic Mobility Inpatient Raw Score: 15  JH-HLM Goal: 4: Move to chair/commode  JH-HLM Achieved: 6: Walk 10 steps or more  JH-HLM Goal achieved. Continue to encourage appropriate mobility.    Patient Centered Rounds: I performed bedside rounds with nursing staff today.   Discussions with Specialists or Other Care Team Provider: CM, neuro    Education and Discussions with Family / Patient: Updated  () at bedside.    Total Time Spent on Date  of Encounter in care of patient: 35 mins. This time was spent on one or more of the following: performing physical exam; counseling and coordination of care; obtaining or reviewing history; documenting in the medical record; reviewing/ordering tests, medications or procedures; communicating with other healthcare professionals and discussing with patient's family/caregivers.    Current Length of Stay: 8 day(s)  Current Patient Status: Inpatient   Certification Statement: The patient will continue to require additional inpatient hospital stay due to awaiting 48 hours of therapeutic PTT to ensure CT head okay and starting NOAC  Discharge Plan: Anticipate discharge in 24-48 hrs to rehab facility.    Code Status: Level 1 - Full Code    Subjective:   Patient reports to be feeling well.  Reports significant fatigue today.  However she denies any chest pain/pressure, palpitations, headedness, nausea, shortness of breath, or chills.    Objective:     Vitals:   Temp (24hrs), Av °F (37.2 °C), Min:98.1 °F (36.7 °C), Max:99.9 °F (37.7 °C)    Temp:  [98.1 °F (36.7 °C)-99.9 °F (37.7 °C)] 99.4 °F (37.4 °C)  HR:  [76-90] 90  Resp:  [16-19] 18  BP: (119-173)/(70-93) 158/89  SpO2:  [91 %-95 %] 92 %  Body mass index is 25.7 kg/m².     Input and Output Summary (last 24 hours):     Intake/Output Summary (Last 24 hours) at 6/15/2024 1151  Last data filed at 6/15/2024 0956  Gross per 24 hour   Intake 2517.92 ml   Output 4501 ml   Net -1983.08 ml       Physical Exam:   Physical Exam  Vitals and nursing note reviewed.   Constitutional:       Appearance: She is normal weight.   HENT:      Head: Normocephalic.      Nose: Nose normal.      Mouth/Throat:      Mouth: Mucous membranes are moist.      Pharynx: Oropharynx is clear.   Eyes:      General: No scleral icterus.     Conjunctiva/sclera: Conjunctivae normal.      Pupils: Pupils are equal, round, and reactive to light.   Cardiovascular:      Rate and Rhythm: Normal rate and regular  rhythm.      Heart sounds: No murmur heard.     No friction rub. No gallop.   Pulmonary:      Effort: Pulmonary effort is normal. No respiratory distress.      Breath sounds: Normal breath sounds. No stridor. No wheezing, rhonchi or rales.   Abdominal:      General: Abdomen is flat.      Palpations: Abdomen is soft.   Musculoskeletal:         General: Normal range of motion.      Cervical back: Normal range of motion and neck supple.      Right lower leg: No edema.      Left lower leg: No edema.   Lymphadenopathy:      Cervical: No cervical adenopathy.   Skin:     General: Skin is warm.      Coloration: Skin is not jaundiced or pale.      Findings: No bruising, erythema or lesion.   Neurological:      General: No focal deficit present.      Mental Status: She is alert and oriented to person, place, and time. Mental status is at baseline.      Cranial Nerves: No cranial nerve deficit.      Motor: No weakness.   Psychiatric:         Mood and Affect: Mood normal.         Behavior: Behavior normal.         Thought Content: Thought content normal.          Additional Data:     Labs:  Results from last 7 days   Lab Units 06/15/24  1015 06/11/24  0602 06/10/24  0430   WBC Thousand/uL 8.22   < > 12.66*   HEMOGLOBIN g/dL 10.8*   < > 11.1*   HEMATOCRIT % 31.5*   < > 31.6*   PLATELETS Thousands/uL 341   < > 262   SEGS PCT %  --   --  71   LYMPHO PCT %  --   --  21   MONO PCT %  --   --  7   EOS PCT %  --   --  0    < > = values in this interval not displayed.     Results from last 7 days   Lab Units 06/15/24  0603 06/14/24  1520 06/13/24  0652   SODIUM mmol/L 145   < > 145   POTASSIUM mmol/L 3.2*   < > 2.4*   CHLORIDE mmol/L 107   < > 105   CO2 mmol/L 31   < > 33*   BUN mg/dL 9   < > 13   CREATININE mg/dL 0.51*   < > 0.45*   ANION GAP mmol/L 7   < > 7   CALCIUM mg/dL 8.8   < > 8.1*   ALBUMIN g/dL  --   --  3.1*   TOTAL BILIRUBIN mg/dL  --   --  0.76   ALK PHOS U/L  --   --  62   ALT U/L  --   --  90*   AST U/L  --   --  43*    GLUCOSE RANDOM mg/dL 99   < > 101    < > = values in this interval not displayed.     Results from last 7 days   Lab Units 06/12/24  1205   INR  1.03         Results from last 7 days   Lab Units 06/09/24  0445   HEMOGLOBIN A1C % 6.3*     Results from last 7 days   Lab Units 06/09/24  0445   PROCALCITONIN ng/ml 6.11*       Lines/Drains:  Invasive Devices       Peripheral Intravenous Line  Duration             Peripheral IV 06/11/24 Left;Ventral (anterior) Foot 4 days    Peripheral IV 06/13/24 Right;Ventral (anterior) Forearm 2 days              Drain  Duration             Urethral Catheter Latex 16 Fr. 2 days                  Urinary Catheter:  Goal for removal: Voiding trial when ambulation improves           Telemetry:  Telemetry Orders (From admission, onward)               24 Hour Telemetry Monitoring  Continuous x 24 Hours (Telem)        Question:  Reason for 24 Hour Telemetry  Answer:  Decompensated CHF- and any one of the following: continuous diuretic infusion or total diuretic dose >200 mg daily, associated electrolyte derangement (I.e. K < 3.0), ionotropic drip (continuous infusion), hx of ventricular arrhythmia, or new EF < 35%                     Telemetry Reviewed: Normal Sinus Rhythm  Indication for Continued Telemetry Use: No indication for continued use. Will discontinue.              Imaging: Reviewed radiology reports from this admission including: CT head    Recent Cultures (last 7 days):         Last 24 Hours Medication List:   Current Facility-Administered Medications   Medication Dose Route Frequency Provider Last Rate    acetaminophen  650 mg Oral Q6H PRN SINDY Hong      ALPRAZolam  2 mg Oral 4x Daily SINDY Hong      aspirin  81 mg Oral Daily SINDY Hong      clopidogrel  75 mg Oral Daily SINDY Hong      diltiazem  120 mg Oral Daily SINDY Hong      ezetimibe  10 mg Per NG Tube Daily SINDY Hong      gabapentin  300 mg Oral TID SINDY Hong       heparin (porcine)  3-20 Units/kg/hr (Order-Specific) Intravenous Titrated SINDY Hong 20 Units/kg/hr (06/15/24 1055)    heparin (porcine)  1,800 Units Intravenous Q6H PRN SINDY Hong      heparin (porcine)  3,600 Units Intravenous Q6H PRN SINDY Hong      hydrALAZINE  5 mg Intravenous Q6H PRN SINDY Hong      labetalol  10 mg Intravenous Q6H PRN SINDY Hong      levETIRAcetam  750 mg Oral Q12H JERMAINE SINDY Hong      lisinopril  10 mg Oral BID SINDY Hong      multi-electrolyte  125 mL/hr Intravenous Continuous SINDY Hong 125 mL/hr (06/15/24 1137)    nicotine  14 mg Transdermal Daily SINDY Hong      nystatin   Topical BID SINDY Hong      QUEtiapine  100 mg Oral QAM SINDY Hong      QUEtiapine  400 mg Oral HS SINDY Hong          Today, Patient Was Seen By: Bob Dangelo PA-C    **Please Note: This note may have been constructed using a voice recognition system.**

## 2024-06-15 NOTE — NURSING NOTE
Pt requested , Wilver be called and updated with transfer from ICU to [facility]. This writer called pt's  and no answer, VM left.

## 2024-06-15 NOTE — PLAN OF CARE
Problem: PAIN - ADULT  Goal: Verbalizes/displays adequate comfort level or baseline comfort level  Description: Interventions:  - Encourage patient to monitor pain and request assistance  - Assess pain using appropriate pain scale  - Administer analgesics based on type and severity of pain and evaluate response  - Implement non-pharmacological measures as appropriate and evaluate response  - Consider cultural and social influences on pain and pain management  - Notify physician/advanced practitioner if interventions unsuccessful or patient reports new pain  Outcome: Progressing     Problem: SAFETY ADULT  Goal: Patient will remain free of falls  Description: INTERVENTIONS:  - Educate patient/family on patient safety including physical limitations  - Instruct patient to call for assistance with activity   - Consult OT/PT to assist with strengthening/mobility   - Keep Call bell within reach  - Keep bed low and locked with side rails adjusted as appropriate  - Keep care items and personal belongings within reach  - Initiate and maintain comfort rounds  - Make Fall Risk Sign visible to staff  - Offer Toileting every 2 Hours, in advance of need  - Initiate/Maintain bedalarm  - Apply yellow socks and bracelet for high fall risk patients  - Consider moving patient to room near nurses station  Outcome: Progressing

## 2024-06-15 NOTE — PHYSICAL THERAPY NOTE
Physical Therapy Cancellation Note    Chart review performed. At this time, PT treatment session cancelled secondary to refusal (see below). PT will follow and provide PT interventions as appropriate.       06/15/24 3161   PT Last Visit   PT Visit Date 06/15/24   Note Type   Note Type Cancelled Session   Cancel Reasons Refusal  (PT attempted to see patient before lunch, but SO states pt has been trying to sleep and suggested coming after lunch. Upon return, pt was still sleeping. PT will ffup and conduct treatment per pt tolerance.)       Neo Daniels, PT

## 2024-06-16 ENCOUNTER — APPOINTMENT (INPATIENT)
Dept: CT IMAGING | Facility: HOSPITAL | Age: 74
DRG: 091 | End: 2024-06-16
Payer: MEDICARE

## 2024-06-16 ENCOUNTER — APPOINTMENT (INPATIENT)
Dept: RADIOLOGY | Facility: HOSPITAL | Age: 74
DRG: 091 | End: 2024-06-16
Payer: MEDICARE

## 2024-06-16 LAB — APTT PPP: 66 SECONDS (ref 23–37)

## 2024-06-16 PROCEDURE — 99232 SBSQ HOSP IP/OBS MODERATE 35: CPT

## 2024-06-16 PROCEDURE — 73140 X-RAY EXAM OF FINGER(S): CPT

## 2024-06-16 PROCEDURE — 97110 THERAPEUTIC EXERCISES: CPT

## 2024-06-16 PROCEDURE — 85730 THROMBOPLASTIN TIME PARTIAL: CPT | Performed by: INTERNAL MEDICINE

## 2024-06-16 PROCEDURE — 70450 CT HEAD/BRAIN W/O DYE: CPT

## 2024-06-16 PROCEDURE — 97116 GAIT TRAINING THERAPY: CPT

## 2024-06-16 RX ADMIN — HEPARIN SODIUM 20 UNITS/KG/HR: 10000 INJECTION, SOLUTION INTRAVENOUS at 02:34

## 2024-06-16 RX ADMIN — ACETAMINOPHEN 650 MG: 325 TABLET, FILM COATED ORAL at 22:18

## 2024-06-16 RX ADMIN — ALPRAZOLAM 2 MG: 0.5 TABLET ORAL at 11:58

## 2024-06-16 RX ADMIN — LISINOPRIL 10 MG: 10 TABLET ORAL at 17:00

## 2024-06-16 RX ADMIN — LISINOPRIL 10 MG: 10 TABLET ORAL at 08:13

## 2024-06-16 RX ADMIN — ALPRAZOLAM 2 MG: 0.5 TABLET ORAL at 17:00

## 2024-06-16 RX ADMIN — GABAPENTIN 300 MG: 300 CAPSULE ORAL at 16:59

## 2024-06-16 RX ADMIN — EZETIMIBE 10 MG: 10 TABLET ORAL at 08:15

## 2024-06-16 RX ADMIN — ALPRAZOLAM 2 MG: 0.5 TABLET ORAL at 22:15

## 2024-06-16 RX ADMIN — NYSTATIN 1 APPLICATION: 100000 POWDER TOPICAL at 08:15

## 2024-06-16 RX ADMIN — QUETIAPINE FUMARATE 400 MG: 100 TABLET ORAL at 22:15

## 2024-06-16 RX ADMIN — GABAPENTIN 300 MG: 300 CAPSULE ORAL at 22:15

## 2024-06-16 RX ADMIN — LEVETIRACETAM 750 MG: 500 TABLET, FILM COATED ORAL at 22:15

## 2024-06-16 RX ADMIN — CLOPIDOGREL 75 MG: 75 TABLET ORAL at 08:15

## 2024-06-16 RX ADMIN — LEVETIRACETAM 750 MG: 500 TABLET, FILM COATED ORAL at 08:13

## 2024-06-16 RX ADMIN — ALPRAZOLAM 2 MG: 0.5 TABLET ORAL at 08:15

## 2024-06-16 RX ADMIN — NICOTINE 14 MG: 14 PATCH, EXTENDED RELEASE TRANSDERMAL at 08:15

## 2024-06-16 RX ADMIN — DILTIAZEM HYDROCHLORIDE 120 MG: 120 CAPSULE, COATED, EXTENDED RELEASE ORAL at 08:13

## 2024-06-16 RX ADMIN — GABAPENTIN 300 MG: 300 CAPSULE ORAL at 08:13

## 2024-06-16 RX ADMIN — QUETIAPINE FUMARATE 100 MG: 100 TABLET ORAL at 08:14

## 2024-06-16 RX ADMIN — ASPIRIN 81 MG: 81 TABLET, CHEWABLE ORAL at 08:13

## 2024-06-16 RX ADMIN — APIXABAN 5 MG: 5 TABLET, FILM COATED ORAL at 17:00

## 2024-06-16 RX ADMIN — SODIUM CHLORIDE, SODIUM GLUCONATE, SODIUM ACETATE, POTASSIUM CHLORIDE, MAGNESIUM CHLORIDE, SODIUM PHOSPHATE, DIBASIC, AND POTASSIUM PHOSPHATE 125 ML/HR: .53; .5; .37; .037; .03; .012; .00082 INJECTION, SOLUTION INTRAVENOUS at 23:54

## 2024-06-16 RX ADMIN — NYSTATIN: 100000 POWDER TOPICAL at 20:23

## 2024-06-16 NOTE — PROGRESS NOTES
[facility]  Progress Note  Name: Kirsty Em I  MRN: [id number]  Unit/Bed#: -01 I Date of Admission: 6/7/2024   Date of Service: 6/16/2024 I Hospital Day: 9    Assessment & Plan   * Acute metabolic encephalopathy  Assessment & Plan  Presents with confusion from home, aphasia and weakness  Slowly improving; likely in setting of subacute CVA vs seizure  Acute infectious source ruled out   Urinary retention persists, continue main on discharge with outpatient voiding trial   Frequent neurologic checks  Delirium precautions, 1:1 discontinued     Hypokalemia  Assessment & Plan  K noted 3.2 today; on tele monitor without acute changes  Given oral potassium, continue to trend    New onset a-fib (HCC)  Assessment & Plan  Morning of 6/12 patient developed rapid afib, HR up to 170s  Cardiology consulted,  Cardizem  mg daily  Repeat head CT stable, will discontinue heparin in favor for Eliquis  Converted to NSR prior to cardizem drip, thus not started   Tele monitor     Hypertension  Assessment & Plan  Fluctuating BP, started on lisinopril  Continue IV hydralazine or labetalol as needed  Monitor VS per unit protocol, Blood Pressure: 132/78     Bilateral carotid artery stenosis  Assessment & Plan  Continue aspirin/Plavix  Plan for neurovascular surgery follow-up as an outpatient     Pre-diabetes  Assessment & Plan  Encourage lifestyle changes  BG glucose reasonable via labs continue to monitor     Stroke (cerebrum) (HCC)  Assessment & Plan  Neurology consulted,  CTA head/neck with significant carotid disease bilaterally 70% right and 65% left  MRI brain with focal acute to subacute infarct along the posterior left frontal cortex.  Due to starting Eliquis after discussion with neurology, will only continue with ASA, discontinue Plavix  ST recommending Puree diet thin liquids, no need for VBS  PT/OT recommending moderate intensity resource  CM consult for dispo planning  Patient will  need follow up with neurovascular on discharge in 6 weeks     Closed displaced fracture of proximal phalanx of right thumb  Assessment & Plan  S/p splinting by ortho  Repeat xray in 1 week, ordered   NWB right hand   Appreciate orthopedic recommendations    Elevated transaminase level  Assessment & Plan  Suspect related to acuity of presentation  Resolving      Mixed hyperlipidemia  Assessment & Plan  Continue home Zetia  Patient intolerant of statins     Chronic obstructive pulmonary disease, unspecified COPD type (HCC)  Assessment & Plan  Not currently in exacerbation  Wean oxygen for goal SpO2>88%  Respiratory protocol  PRN albuterol     Acute respiratory failure with hypoxia (HCC)  Assessment & Plan  Presumed secondary to aspiration prior to presentation  Wean oxygen for goal SpO2>88%  Respiratory protocol     History of seizure disorder  Assessment & Plan  Patient reportedly self-discontinued Keppra at home  Cannot rule out seizure as etiology of presentation  Possibly related to Xanax withdrawal?  Continue Keppra, continue in the outpatient setting  Follow-up with epilepsy specialist  EEG abnormal, will need ambulatory EEG per neuro     Anxiety and depression  Assessment & Plan  Continue home Xanax and Seroquel             VTE Pharmacologic Prophylaxis: VTE Score: 9 High Risk (Score >/= 5) - Pharmacological DVT Prophylaxis Ordered: apixaban (Eliquis). Sequential Compression Devices Ordered.    Mobility:   Basic Mobility Inpatient Raw Score: 13  JH-HLM Goal: 4: Move to chair/commode  JH-HLM Achieved: 6: Walk 10 steps or more  JH-HLM Goal achieved. Continue to encourage appropriate mobility.    Patient Centered Rounds: I performed bedside rounds with nursing staff today.   Discussions with Specialists or Other Care Team Provider: CM, neurology    Education and Discussions with Family / Patient: Updated  () at bedside.    Total Time Spent on Date of Encounter in care of patient: 35 mins. This  time was spent on one or more of the following: performing physical exam; counseling and coordination of care; obtaining or reviewing history; documenting in the medical record; reviewing/ordering tests, medications or procedures; communicating with other healthcare professionals and discussing with patient's family/caregivers.    Current Length of Stay: 9 day(s)  Current Patient Status: Inpatient   Certification Statement: The patient will continue to require additional inpatient hospital stay due to awaiting safe dispo  Discharge Plan: Anticipate discharge tomorrow to rehab facility.    Code Status: Level 1 - Full Code    Subjective:   Patient reports to be feeling very tired.  Currently offers no other complaints at this time.  Currently denies any chest pain/pressure, palpitations, lightness, nausea, shortness of breath, or chills.    Objective:     Vitals:   Temp (24hrs), Av.8 °F (36.6 °C), Min:97.6 °F (36.4 °C), Max:97.9 °F (36.6 °C)    Temp:  [97.6 °F (36.4 °C)-97.9 °F (36.6 °C)] 97.9 °F (36.6 °C)  HR:  [] 88  Resp:  [16-20] 18  BP: (125-163)/(73-83) 132/78  SpO2:  [86 %-95 %] 93 %  Body mass index is 25.7 kg/m².     Input and Output Summary (last 24 hours):     Intake/Output Summary (Last 24 hours) at 2024 1442  Last data filed at 2024 1301  Gross per 24 hour   Intake 640 ml   Output 1700 ml   Net -1060 ml       Physical Exam:   Physical Exam  Vitals and nursing note reviewed.   Constitutional:       Appearance: She is obese.   HENT:      Head: Normocephalic.      Nose: Nose normal.      Mouth/Throat:      Mouth: Mucous membranes are moist.      Pharynx: Oropharynx is clear.   Eyes:      General: No scleral icterus.     Conjunctiva/sclera: Conjunctivae normal.      Pupils: Pupils are equal, round, and reactive to light.   Cardiovascular:      Rate and Rhythm: Normal rate and regular rhythm.      Heart sounds: No murmur heard.     No friction rub. No gallop.   Pulmonary:      Effort:  Pulmonary effort is normal. No respiratory distress.      Breath sounds: Normal breath sounds. No stridor. No wheezing, rhonchi or rales.   Abdominal:      General: Abdomen is flat.      Palpations: Abdomen is soft.   Musculoskeletal:         General: Normal range of motion.      Cervical back: Normal range of motion and neck supple.      Right lower leg: No edema.      Left lower leg: No edema.   Lymphadenopathy:      Cervical: No cervical adenopathy.   Skin:     General: Skin is warm.      Coloration: Skin is not jaundiced or pale.      Findings: No bruising, erythema or lesion.   Neurological:      General: No focal deficit present.      Mental Status: She is alert and oriented to person, place, and time. Mental status is at baseline.      Cranial Nerves: No cranial nerve deficit.      Motor: No weakness.   Psychiatric:         Mood and Affect: Mood normal.         Behavior: Behavior normal.         Thought Content: Thought content normal.          Additional Data:     Labs:  Results from last 7 days   Lab Units 06/15/24  1015 06/11/24  0602 06/10/24  0430   WBC Thousand/uL 8.22   < > 12.66*   HEMOGLOBIN g/dL 10.8*   < > 11.1*   HEMATOCRIT % 31.5*   < > 31.6*   PLATELETS Thousands/uL 341   < > 262   SEGS PCT %  --   --  71   LYMPHO PCT %  --   --  21   MONO PCT %  --   --  7   EOS PCT %  --   --  0    < > = values in this interval not displayed.     Results from last 7 days   Lab Units 06/15/24  0603 06/14/24  1520 06/13/24  0652   SODIUM mmol/L 145   < > 145   POTASSIUM mmol/L 3.2*   < > 2.4*   CHLORIDE mmol/L 107   < > 105   CO2 mmol/L 31   < > 33*   BUN mg/dL 9   < > 13   CREATININE mg/dL 0.51*   < > 0.45*   ANION GAP mmol/L 7   < > 7   CALCIUM mg/dL 8.8   < > 8.1*   ALBUMIN g/dL  --   --  3.1*   TOTAL BILIRUBIN mg/dL  --   --  0.76   ALK PHOS U/L  --   --  62   ALT U/L  --   --  90*   AST U/L  --   --  43*   GLUCOSE RANDOM mg/dL 99   < > 101    < > = values in this interval not displayed.     Results from  last 7 days   Lab Units 06/12/24  1205   INR  1.03                   Lines/Drains:  Invasive Devices       Peripheral Intravenous Line  Duration             Peripheral IV 06/13/24 Right;Ventral (anterior) Forearm 3 days              Drain  Duration             Urethral Catheter Latex 16 Fr. 3 days                  Urinary Catheter:  Goal for removal: Voiding trial when ambulation improves               Imaging: Reviewed radiology reports from this admission including: MRI brain    Recent Cultures (last 7 days):         Last 24 Hours Medication List:   Current Facility-Administered Medications   Medication Dose Route Frequency Provider Last Rate    acetaminophen  650 mg Oral Q6H PRN SINDY Hong      ALPRAZolam  2 mg Oral 4x Daily SINDY Hong      apixaban  5 mg Oral BID Bob Dangelo PA-C      aspirin  81 mg Oral Daily SINDY Hong      diltiazem  120 mg Oral Daily SINDY Hong      ezetimibe  10 mg Per NG Tube Daily SINDY Hong      gabapentin  300 mg Oral TID SINDY Hong      hydrALAZINE  5 mg Intravenous Q6H PRN SINDY Hong      labetalol  10 mg Intravenous Q6H PRN SINDY Hong      levETIRAcetam  750 mg Oral Q12H JERMAINE SINDY Hong      lisinopril  10 mg Oral BID SINDY Hong      multi-electrolyte  125 mL/hr Intravenous Continuous SINDY Hong 125 mL/hr (06/15/24 1137)    nicotine  14 mg Transdermal Daily SINDY Hong      nystatin   Topical BID SINDY Hong      QUEtiapine  100 mg Oral QAM SINDY Hong      QUEtiapine  400 mg Oral HS SINDY Hong          Today, Patient Was Seen By: Bob Dangelo PA-C    **Please Note: This note may have been constructed using a voice recognition system.**

## 2024-06-16 NOTE — ASSESSMENT & PLAN NOTE
S/p splinting by ortho  Repeat xray in 1 week, ordered   NWB right hand   Appreciate orthopedic recommendations

## 2024-06-16 NOTE — PHYSICAL THERAPY NOTE
"Physical Therapy Treatment Note       06/16/24 0821   PT Last Visit   PT Visit Date 06/16/24   Note Type   Note Type Treatment   Pain Assessment   Pain Assessment Tool 0-10   Pain Score 8   Pain Location/Orientation Orientation: Lower;Location: Back   Restrictions/Precautions   Weight Bearing Precautions Per Order Yes   RUE Weight Bearing Per Order NWB   Braces or Orthoses Splint  (R hand)   Other Precautions Cognitive;1:1;Chair Alarm;Bed Alarm;Multiple lines;Telemetry;O2;Fall Risk;Hard of hearing;Seizure   General   Chart Reviewed Yes   Response to Previous Treatment Patient with no complaints from previous session.   Family/Caregiver Present No   Cognition   Overall Cognitive Status Impaired   Arousal/Participation Alert;Responsive   Attention Attends with cues to redirect   Orientation Level Oriented to person;Oriented to place  (oriented to month and day of week \"2015\")   Memory Decreased short term memory;Decreased recall of recent events;Decreased recall of precautions   Following Commands Follows one step commands with increased time or repetition   Comments pt agreeable to PT session   Subjective   Subjective \"I can get up\"   Bed Mobility   Additional Comments pt received OOB to recliner upon arrival   Transfers   Sit to Stand 3  Moderate assistance   Additional items Assist x 1;Armrests;Increased time required;Verbal cues   Stand to Sit 3  Moderate assistance   Additional items Assist x 1;Armrests;Increased time required;Verbal cues   Additional Comments Pt demonstrates impulsivity c transfers and decreased safety awareness throughout all functional mobility. VC throughout session for proper hand placement and use of safe body mechanics.  Frequent vc required to maintain NWB to R UE   Ambulation/Elevation   Gait pattern Shuffling;Decreased foot clearance;Short stride;Step to;Excessively slow;Decreased heel strike;Decreased toe off   Gait Assistance 3  Moderate assist   Additional items Assist x 1;Verbal " cues   Assistive Device Other (Comment)  (L UE HHA)   Distance 20'   Balance   Static Sitting Fair   Dynamic Sitting Fair -   Static Standing Poor +   Dynamic Standing Poor   Ambulatory Poor   Endurance Deficit   Endurance Deficit Yes   Activity Tolerance   Activity Tolerance Patient limited by fatigue   Nurse Made Aware RN Susanna   Exercises   Hip Abduction Sitting;20 reps;AROM;Bilateral   Knee AROM Long Arc Quad Sitting;20 reps;AROM;Bilateral   Ankle Pumps Sitting;20 reps;AROM;Bilateral   Marching Sitting;20 reps;AROM;Bilateral   Assessment   Prognosis Good   Problem List Decreased strength;Decreased endurance;Decreased range of motion;Impaired balance;Decreased mobility;Decreased cognition;Decreased safety awareness;Orthopedic restrictions   Assessment Pt seen for PT treatment session this date, consisting of ther ex focused on strengthening and gt training on level surfaces to improve pt safety in household environment. Pt requiring mod a x1 for all phases of mobility, L UE HHA provided; frequent vc required to maintain NWB R UE t/o all phases of mobility/ transitional movements. Pt tolerated LB exercises vc for technique. Current goals and POC established on IE remain appropriate, pt continues to have rehab potential and is making good progress towards STGs. Pt prognosis for achieving goals is good, pending pt progress with hospitalization/medical status improvements, and indicated by Stimulability and ability to follow directions. Pt limited d/t poor orientation and lack of self-control (impulsivity). Pt continues to be functioning below baseline level, and remains limited 2* factors listed above. PT will continue to see pt during current hospitalization in order to address the deficits listed above and provide interventions consistent w/ POC in effort to achieve STGs. PT recommends level 1, maximum resource intensity upon discharge.   Barriers to Discharge Inaccessible home environment;Decreased caregiver  support   Goals   Patient Goals none expressed   STG Expiration Date 06/20/24   PT Treatment Day 2   Plan   Treatment/Interventions Functional transfer training;LE strengthening/ROM;Therapeutic exercise;Endurance training;Patient/family training;Equipment eval/education;Bed mobility;Continued evaluation;Spoke to nursing;OT   Progress Progressing toward goals   PT Frequency 3-5x/wk   Discharge Recommendation   Rehab Resource Intensity Level, PT I (Maximum Resource Intensity)   Equipment Recommended   (TBD, will require U/L AD d/t NWB R hand)   AM-PAC Basic Mobility Inpatient   Turning in Flat Bed Without Bedrails 3   Lying on Back to Sitting on Edge of Flat Bed Without Bedrails 2   Moving Bed to Chair 2   Standing Up From Chair Using Arms 2   Walk in Room 2   Climb 3-5 Stairs With Railing 2   Basic Mobility Inpatient Raw Score 13   Basic Mobility Standardized Score 33.99   [facility] Highest Level Of Mobility   -[facility] Goal 4: Move to chair/commode   -[facility] Achieved 6: Walk 10 steps or more   Education   Education Provided Mobility training;Home exercise program   Patient Reinforcement needed   End of Consult   Patient Position at End of Consult Bedside chair;Bed/Chair alarm activated;All needs within reach       Alma Rosa Polo, PT, DPT

## 2024-06-16 NOTE — PLAN OF CARE
Problem: PHYSICAL THERAPY ADULT  Goal: Performs mobility at highest level of function for planned discharge setting.  See evaluation for individualized goals.  Description: Treatment/Interventions: Functional transfer training, LE strengthening/ROM, Therapeutic exercise, Endurance training, Patient/family training, Equipment eval/education, Bed mobility, Continued evaluation, Spoke to nursing, OT  Equipment Recommended:  (TBD, will require U/L AD d/t NWB R hand)       See flowsheet documentation for full assessment, interventions and recommendations.  Outcome: Progressing  Note: Prognosis: Good  Problem List: Decreased strength, Decreased endurance, Decreased range of motion, Impaired balance, Decreased mobility, Decreased cognition, Decreased safety awareness, Orthopedic restrictions  Assessment: Pt seen for PT treatment session this date, consisting of ther ex focused on strengthening and gt training on level surfaces to improve pt safety in household environment. Pt requiring mod a x1 for all phases of mobility, L UE HHA provided; frequent vc required to maintain NWB R UE t/o all phases of mobility/ transitional movements. Pt tolerated LB exercises vc for technique. Current goals and POC established on IE remain appropriate, pt continues to have rehab potential and is making good progress towards STGs. Pt prognosis for achieving goals is good, pending pt progress with hospitalization/medical status improvements, and indicated by Stimulability and ability to follow directions. Pt limited d/t poor orientation and lack of self-control (impulsivity). Pt continues to be functioning below baseline level, and remains limited 2* factors listed above. PT will continue to see pt during current hospitalization in order to address the deficits listed above and provide interventions consistent w/ POC in effort to achieve STGs. PT recommends level 1, maximum resource intensity upon discharge.  Barriers to Discharge:  Inaccessible home environment, Decreased caregiver support     Rehab Resource Intensity Level, PT: I (Maximum Resource Intensity)    See flowsheet documentation for full assessment.

## 2024-06-16 NOTE — ASSESSMENT & PLAN NOTE
Patient reportedly self-discontinued Keppra at home  Cannot rule out seizure as etiology of presentation  Possibly related to Xanax withdrawal?  Continue Keppra, continue in the outpatient setting  Follow-up with epilepsy specialist  EEG abnormal, will need ambulatory EEG per neuro

## 2024-06-16 NOTE — ASSESSMENT & PLAN NOTE
Fluctuating BP, started on lisinopril  Continue IV hydralazine or labetalol as needed  Monitor VS per unit protocol, Blood Pressure: 132/78

## 2024-06-16 NOTE — ASSESSMENT & PLAN NOTE
Morning of 6/12 patient developed rapid afib, HR up to 170s  Cardiology consulted,  Cardizem  mg daily  Repeat head CT stable, will discontinue heparin in favor for Eliquis  Converted to NSR prior to cardizem drip, thus not started   Tele monitor

## 2024-06-16 NOTE — ASSESSMENT & PLAN NOTE
Neurology consulted,  CTA head/neck with significant carotid disease bilaterally 70% right and 65% left  MRI brain with focal acute to subacute infarct along the posterior left frontal cortex.  Due to starting Eliquis after discussion with neurology, will only continue with ASA, discontinue Plavix  ST recommending Puree diet thin liquids, no need for VBS  PT/OT recommending moderate intensity resource  CM consult for dispo planning  Patient will need follow up with neurovascular on discharge in 6 weeks

## 2024-06-17 ENCOUNTER — TELEPHONE (OUTPATIENT)
Dept: OTHER | Facility: OTHER | Age: 74
End: 2024-06-17

## 2024-06-17 VITALS
BODY MASS INDEX: 25.68 KG/M2 | SYSTOLIC BLOOD PRESSURE: 136 MMHG | HEIGHT: 61 IN | RESPIRATION RATE: 18 BRPM | HEART RATE: 93 BPM | DIASTOLIC BLOOD PRESSURE: 70 MMHG | WEIGHT: 136 LBS | TEMPERATURE: 98.6 F | OXYGEN SATURATION: 90 %

## 2024-06-17 PROCEDURE — 92526 ORAL FUNCTION THERAPY: CPT

## 2024-06-17 PROCEDURE — 99239 HOSP IP/OBS DSCHRG MGMT >30: CPT

## 2024-06-17 RX ORDER — LISINOPRIL 10 MG/1
10 TABLET ORAL 2 TIMES DAILY
Qty: 120 TABLET | Refills: 0
Start: 2024-06-17 | End: 2024-08-16

## 2024-06-17 RX ORDER — ASPIRIN 81 MG/1
81 TABLET, CHEWABLE ORAL DAILY
Qty: 60 TABLET | Refills: 0
Start: 2024-06-18 | End: 2024-08-17

## 2024-06-17 RX ORDER — LEVETIRACETAM 750 MG/1
750 TABLET ORAL EVERY 12 HOURS SCHEDULED
Qty: 120 TABLET | Refills: 0
Start: 2024-06-17 | End: 2024-08-16

## 2024-06-17 RX ORDER — DILTIAZEM HYDROCHLORIDE 120 MG/1
120 CAPSULE, COATED, EXTENDED RELEASE ORAL DAILY
Qty: 60 CAPSULE | Refills: 0
Start: 2024-06-18 | End: 2024-08-17

## 2024-06-17 RX ADMIN — ALPRAZOLAM 2 MG: 0.5 TABLET ORAL at 09:56

## 2024-06-17 RX ADMIN — DILTIAZEM HYDROCHLORIDE 120 MG: 120 CAPSULE, COATED, EXTENDED RELEASE ORAL at 09:56

## 2024-06-17 RX ADMIN — GABAPENTIN 300 MG: 300 CAPSULE ORAL at 17:00

## 2024-06-17 RX ADMIN — LEVETIRACETAM 750 MG: 500 TABLET, FILM COATED ORAL at 09:56

## 2024-06-17 RX ADMIN — NYSTATIN: 100000 POWDER TOPICAL at 10:06

## 2024-06-17 RX ADMIN — APIXABAN 5 MG: 5 TABLET, FILM COATED ORAL at 17:08

## 2024-06-17 RX ADMIN — ASPIRIN 81 MG: 81 TABLET, CHEWABLE ORAL at 09:56

## 2024-06-17 RX ADMIN — QUETIAPINE FUMARATE 100 MG: 100 TABLET ORAL at 09:56

## 2024-06-17 RX ADMIN — NICOTINE 14 MG: 14 PATCH, EXTENDED RELEASE TRANSDERMAL at 10:07

## 2024-06-17 RX ADMIN — LISINOPRIL 10 MG: 10 TABLET ORAL at 09:56

## 2024-06-17 RX ADMIN — GABAPENTIN 300 MG: 300 CAPSULE ORAL at 09:56

## 2024-06-17 RX ADMIN — NYSTATIN: 100000 POWDER TOPICAL at 17:08

## 2024-06-17 RX ADMIN — LISINOPRIL 10 MG: 10 TABLET ORAL at 17:08

## 2024-06-17 RX ADMIN — ALPRAZOLAM 2 MG: 0.5 TABLET ORAL at 17:08

## 2024-06-17 RX ADMIN — EZETIMIBE 10 MG: 10 TABLET ORAL at 09:56

## 2024-06-17 RX ADMIN — APIXABAN 5 MG: 5 TABLET, FILM COATED ORAL at 09:56

## 2024-06-17 RX ADMIN — ALPRAZOLAM 2 MG: 0.5 TABLET ORAL at 12:58

## 2024-06-17 NOTE — ASSESSMENT & PLAN NOTE
Presumed secondary to aspiration prior to presentation  Weaned off of oxygen  Presumed secondary to aspiration prior to presentation  Wean oxygen for goal SpO2>88%  Respiratory protocol

## 2024-06-17 NOTE — ASSESSMENT & PLAN NOTE
Patient reportedly self-discontinued Keppra at home  Cannot rule out seizure as etiology of presentation  Possibly related to Xanax withdrawal  Continue Keppra, continue in the outpatient setting  Follow-up with epilepsy specialist  EEG abnormal, will need ambulatory EEG per neuro

## 2024-06-17 NOTE — ASSESSMENT & PLAN NOTE
Morning of 6/12 patient developed rapid afib, HR up to 170s  Cardiology consulted,  Cardizem  mg daily  Repeat head CT stable, will discontinue heparin in favor for Eliquis  Converted to NSR prior to cardizem drip, thus not started   Tele monitor Morning of 6/12 patient developed rapid afib, HR up to 170s has since resolved

## 2024-06-17 NOTE — DISCHARGE SUMMARY
[facility]  Discharge- Kirsty Em 1950, 74 y.o. female MRN: [id number]  Unit/Bed#: -Nadeem Encounter: [id number]  Primary Care Provider: Brien Desouza MD   Date and time admitted to hospital: 6/7/2024  1:10 PM    * Acute metabolic encephalopathy  Assessment & Plan  Presents with confusion from home, aphasia and weakness  Slowly improving; likely in setting of subacute CVA vs seizure  Acute infectious source ruled out   Urinary retention persists, continue main on discharge with outpatient voiding trial       Hypokalemia  Assessment & Plan  Re-pleated.     New onset a-fib (HCC)  Assessment & Plan  Morning of 6/12 patient developed rapid afib, HR up to 170s  Cardiology consulted,  Cardizem  mg daily  Repeat head CT stable, will discontinue heparin in favor for Eliquis  Converted to NSR prior to cardizem drip, thus not started   Tele monitor Morning of 6/12 patient developed rapid afib, HR up to 170s has since resolved       Hypertension  Assessment & Plan  Fluctuating BP, started on lisinopril  Continue IV hydralazine or labetalol as needed  Monitor VS per unit protocol, Blood Pressure: 136/70   Continue at home lisinopril dosage.      Bilateral carotid artery stenosis  Assessment & Plan  Continue aspirin discontinue Plavix  Plan for neurovascular surgery follow-up as an outpatient   Continue Eilquis.       Pre-diabetes  Assessment & Plan  Encourage lifestyle changes      Stroke (cerebrum) (HCC)  Assessment & Plan  Neurology consulted,  CTA head/neck with significant carotid disease bilaterally 70% right and 65% left  MRI brain with focal acute to subacute infarct along the posterior left frontal cortex.  Due to starting Eliquis after discussion with neurology, will only continue with ASA, discontinue Plavix  ST recommending Puree diet thin liquids, no need for VBS  PT/OT recommending moderate intensity resource  Placement at short term rehab facility.   Patient will need  follow up with neurovascular on discharge in 6 weeks     Closed displaced fracture of proximal phalanx of right thumb  Assessment & Plan  S/p splinting by ortho  Repeat xray in 1 week, appears to be relatively stable with possibly slight displacement, continue to follow-up with Ortho in the outpatient setting  NWB right hand   Appreciate orthopedic recommendations    Elevated transaminase level  Assessment & Plan  Suspect related to acuity of presentation  Resolved.    Mixed hyperlipidemia  Assessment & Plan  Continue home Zetia  Patient intolerant of statins     Chronic obstructive pulmonary disease, unspecified COPD type (HCC)  Assessment & Plan  Not currently in exacerbation  Wean oxygen for goal SpO2>88%, currently satting well on room air  Respiratory protocol  PRN albuterol Not currently in exacerbation      Acute respiratory failure with hypoxia (HCC)  Assessment & Plan  Presumed secondary to aspiration prior to presentation  Weaned off of oxygen  Presumed secondary to aspiration prior to presentation  Wean oxygen for goal SpO2>88%  Respiratory protocol     Tobacco dependence  Assessment & Plan  Encourage cessation   Nicotine replacement     History of seizure disorder  Assessment & Plan  Patient reportedly self-discontinued Keppra at home  Cannot rule out seizure as etiology of presentation  Possibly related to Xanax withdrawal  Continue Keppra, continue in the outpatient setting  Follow-up with epilepsy specialist  EEG abnormal, will need ambulatory EEG per neuro     Anxiety and depression  Assessment & Plan  Continue home Xanax and Seroquel          Medical Problems       Resolved Problems  Date Reviewed: 6/12/2024            Resolved    Lactic acidosis 6/7/2024     Resolved by  SINDY Haynes    Rhabdomyolysis 6/13/2024     Resolved by  Christine Simpson PA-C        Discharging Physician / Practitioner: Bob Dangelo PA-C  PCP: Brien Desouza MD  Admission Date:   Admission Orders  (From admission, onward)       Ordered        06/07/24 1655  INPATIENT ADMISSION  Once                          Discharge Date: 06/17/24    Consultations During Hospital Stay:  Critical care, neurology, infectious disease, pulmonology, speech pathology, PT/OT, cardiology, CM.     Procedures Performed:   Intubated 6/7.   LP 6/7   glucose 136  Protein 49.   WBC 6.   RBC 12.   Negative meningitis/encephalitis panel.   Gram stain negative.   CXR 6/7 - ET tube 2 cm above cadence.   CT head 6/7 - Stable partially calcified 8 mm extra-axial lesion along the left frontal convexity which may represent meningioma.   CTA 6/7 - Approximate 70% right and 65% left stenosis of the proximal internal carotid arteries due to atherosclerosis.   XR thumb 6/8 - Fractures in the proximal and distal phalanges of the thumb.  MRI head 6/8 - Focal, subcentimeter acute to subacute infarct along the posterior left frontal cortex.Small meningioma along the lateral left frontal convexity as suspected on the recent CT examination. Moderate chronic microangiopathic ischemic changes.    Significant Findings / Test Results:   CT head 6/7 - Stable partially calcified 8 mm extra-axial lesion along the left frontal convexity which may represent meningioma.   CTA 6/7 - Approximate 70% right and 65% left stenosis of the proximal internal carotid arteries due to atherosclerosis.   XR thumb 6/8 - Fractures in the proximal and distal phalanges of the thumb.  MRI head 6/8 - Focal, subcentimeter acute to subacute infarct along the posterior left frontal cortex.Small meningioma along the lateral left frontal convexity as suspected on the recent CT examination. Moderate chronic microangiopathic ischemic changes.  Lactic acid 8.4 on presentation.   Procalcitonin 2.47 on presentation.   Troponin 54 on presentation.   Totol CK 1343.  Leukocytosis at 21.54K.     Incidental Findings:   None.    Test Results Pending at Discharge (will require follow up):   none      Outpatient Tests Requested:  none    Complications:  none.     Reason for Admission: stroke/AMS    Hospital Course:   Kirsty Em is a 74 y.o. female patient who originally presented to the hospital on 6/7/2024 due to being found on the floor unresponsive by her . PMH of anxiety, depression, hallucinations, tobacco abuse, COPD, CKD III, seizures (self terminated AEDs) and hyperlipidemia. States he found her on the floor with incontinence of stool and clenching of fists. Stroke alert pathway was initiated. Neurology evaluation part of stroke alert. Patient was intubated for airway protection and critical care was consulted for further evaluation and treatment.     CT head and neck concerning for left frontal meningioma and bilateral ICA stenosis. Neurology evaluation determined more likely seizure etiology for AMS in setting of sepsis and toxic metabolic encephalopathy due to self withdrawal of AEDs. LP was performed, meningitis coverage with cefepime, vanco and acyclovir.     Was started on Keppra 1500 mg bolus 6/7/2024 and started Keppra 750 mg maintenance 6/9. Extubated 6/9 and transitioned to floor. Patient was evaluated by speech pathology, PT/OT.     6/12 patient was found to be in new onset atrial fibrillation with RVR at 170 BPM.  Cardiology was consulted and patient spontaneously converted to NSR. Was started on cardizem. Echo performed and showed EF of 55%. Patient was restarted on Heparin and remained therapeutic for 48 hours. Repeat CT head clear. Transitioned to Eliquis.     Patient was able to be placed at rehab facility ([facility]). She is to continue Keppra 750 mg twice a day, Eliquis 5 mg twice a day, aspirin 81 mg daily. She is to start home oxygen (flow and ween as able as rehab sees fit) and is being discharged with a main catheter. Patient to follow up with neurovascular and epilepsy specialist    Please see above list of diagnoses and related plan for additional information.  "    Condition at Discharge: good    Discharge Day Visit / Exam:   Subjective:  patient is awake and alert. She feels very tired. Has no other complaints at this time and denies chest pain, shortness of breath, palpitations, chills, body aches or nausea.   Vitals: Blood Pressure: 136/70 (06/17/24 1528)  Pulse: 93 (06/17/24 1528)  Temperature: 98.6 °F (37 °C) (06/17/24 1528)  Temp Source: Oral (06/17/24 1528)  Respirations: 18 (06/17/24 1528)  Height: 5' 1\" (154.9 cm) (06/09/24 1200)  Weight - Scale: 61.7 kg (136 lb) (06/09/24 1200)  SpO2: 90 % (06/17/24 1528)  Exam:   Physical Exam  Vitals and nursing note reviewed.   Constitutional:       General: She is not in acute distress.     Appearance: She is obese.   HENT:      Head: Normocephalic and atraumatic.      Right Ear: External ear normal.      Left Ear: External ear normal.      Nose: Nose normal. No congestion.      Mouth/Throat:      Mouth: Mucous membranes are moist.      Pharynx: Oropharynx is clear. No oropharyngeal exudate.   Eyes:      Extraocular Movements: Extraocular movements intact.      Pupils: Pupils are equal, round, and reactive to light.   Cardiovascular:      Rate and Rhythm: Normal rate and regular rhythm.      Heart sounds: Normal heart sounds. No murmur heard.     No friction rub. No gallop.   Pulmonary:      Effort: Pulmonary effort is normal. No respiratory distress.      Breath sounds: Normal breath sounds. No stridor. No rhonchi.   Abdominal:      General: Abdomen is flat. There is no distension.      Palpations: Abdomen is soft.      Tenderness: There is no abdominal tenderness.   Musculoskeletal:         General: No swelling or tenderness. Normal range of motion.      Cervical back: Normal range of motion. No tenderness.   Skin:     General: Skin is warm and dry.      Coloration: Skin is not jaundiced or pale.      Findings: No erythema.   Neurological:      General: No focal deficit present.      Mental Status: She is alert and oriented " to person, place, and time.   Psychiatric:         Mood and Affect: Mood normal.         Behavior: Behavior normal.       Discussion with Family: Updated  () at bedside.    Discharge instructions/Information to patient and family:   See after visit summary for information provided to patient and family.      Provisions for Follow-Up Care:  See after visit summary for information related to follow-up care and any pertinent home health orders.      Mobility at time of Discharge:   Basic Mobility Inpatient Raw Score: 13  -HLM Goal: 4: Move to chair/commode  JH-HLM Achieved: 6: Walk 10 steps or more  HLM Goal achieved. Continue to encourage appropriate mobility.     Disposition:   Other: short term rehab.     Planned Readmission: none     Discharge Statement:  I spent 65 minutes discharging the patient. This time was spent on the day of discharge. I had direct contact with the patient on the day of discharge. Greater than 50% of the total time was spent examining patient, answering all patient questions, arranging and discussing plan of care with patient as well as directly providing post-discharge instructions.  Additional time then spent on discharge activities.    Discharge Medications:  See after visit summary for reconciled discharge medications provided to patient and/or family.      **Please Note: This note may have been constructed using a voice recognition system**

## 2024-06-17 NOTE — DISCHARGE INSTR - AVS FIRST PAGE
Please be aware there are numerous medication regimen adjustments.  Please start taking a daily baby aspirin and Eliquis.  You will notice there was an order for Xarelto, please do not take Xarelto as Eliquis is the cheaper alternative.  I have also added lisinopril which is a blood pressure medication.  Additionally I have added Keppra which is to help with your seizure prevention. I have also ordered Cardizem to help with your heart rate.     There are numerous teams that I would like you to follow-up with.  Neurology-you will see 2 separate neurology phone numbers.  Please call the neurovascular team as well as the epilepsy clinic and schedule appointment for follow-up with care.  You should see these teams within 4 to 6 weeks.    Orthopedic this team will help you with your-newly found fracture.  Please see this team within 1 week.  Your hand is currently nonweightbearing.  Please do not use this until seen by the orthopedic team.    Cardiology-please call the cardiology team within 1 week.    Urology-please see the urology team within 1 week for P[place] care and hopeful removal.  The short-term rehab may be able to assist with your P[place] care and hopefully start voiding trial.    PCP-please see your PCP within 1 week for continuity care.

## 2024-06-17 NOTE — ASSESSMENT & PLAN NOTE
Continue aspirin discontinue Plavix  Plan for neurovascular surgery follow-up as an outpatient   Continue Eilquis.

## 2024-06-17 NOTE — ASSESSMENT & PLAN NOTE
Presents with confusion from home, aphasia and weakness  Slowly improving; likely in setting of subacute CVA vs seizure  Acute infectious source ruled out   Urinary retention persists, continue main on discharge with outpatient voiding trial

## 2024-06-17 NOTE — ASSESSMENT & PLAN NOTE
Neurology consulted,  CTA head/neck with significant carotid disease bilaterally 70% right and 65% left  MRI brain with focal acute to subacute infarct along the posterior left frontal cortex.  Due to starting Eliquis after discussion with neurology, will only continue with ASA, discontinue Plavix  ST recommending Puree diet thin liquids, no need for VBS  PT/OT recommending moderate intensity resource  Placement at short term rehab facility.   Patient will need follow up with neurovascular on discharge in 6 weeks

## 2024-06-17 NOTE — ASSESSMENT & PLAN NOTE
Not currently in exacerbation  Wean oxygen for goal SpO2>88%, currently satting well on room air  Respiratory protocol  PRN albuterol Not currently in exacerbation

## 2024-06-17 NOTE — ASSESSMENT & PLAN NOTE
S/p splinting by ortho  Repeat xray in 1 week, appears to be relatively stable with possibly slight displacement, continue to follow-up with Ortho in the outpatient setting  NWB right hand   Appreciate orthopedic recommendations

## 2024-06-17 NOTE — CASE MANAGEMENT
Case Management Discharge Planning Note    Patient name Kirsty Em  Location /-01 MRN [id number]  : 1950 Date 2024       Current Admission Date: 2024  Current Admission Diagnosis:Acute metabolic encephalopathy   Patient Active Problem List    Diagnosis Date Noted Date Diagnosed    Hypokalemia 2024     New onset a-fib ([facility]) 2024     Hypertension 2024     Bilateral carotid artery stenosis 06/10/2024     Pre-diabetes 2024     Stroke (cerebrum) ([facility]) 2024     Acute metabolic encephalopathy 2024     Elevated transaminase level 2024     Closed displaced fracture of proximal phalanx of right thumb 2024     Mixed hyperlipidemia 2024     Chronic obstructive pulmonary disease, unspecified COPD type ([facility]) 2023     Acute respiratory failure with hypoxia ([facility]) 2022     Anxiety and depression 2021     History of seizure disorder 2021     Tobacco dependence 2021       LOS (days): 10  Geometric Mean LOS (GMLOS) (days): 4.5  Days to GMLOS:-5.4     OBJECTIVE:  Risk of Unplanned Readmission Score: 18.73         Current admission status: Inpatient   Preferred Pharmacy:   [id number] [place] - [address]  [address]  [place] [id number]  Phone: [phone] Fax: [phone]    Primary Care Provider: Brien Desouza MD    Primary Insurance: MEDICARE  Secondary Insurance: BLUE CROSS    DISCHARGE DETAILS:    Discharge planning discussed with:: patient, patient's daughter, and patient's  at bedside  Freedom of Choice: Yes  Comments - Freedom of Choice: CM maintained freedom of choice as it pertains to discharge planning. CM provided printed patient choice list to patient and family at bedside. They selected [facility], then after being informed no beds available there, choice for [facility]. [facility] via  [facility]. CM also provided acute rehab options ([facility]) which patient and family were not agreeable to.  CM contacted family/caregiver?: Yes  Were Treatment Team discharge recommendations reviewed with patient/caregiver?: Yes  Did patient/caregiver verbalize understanding of patient care needs?: N/A- going to facility  Were patient/caregiver advised of the risks associated with not following Treatment Team discharge recommendations?: Yes    Contacts  Patient Contacts: Wilver Em  Relationship to Patient:: Family  Contact Method: In Person  Reason/Outcome: Continuity of Care, Emergency Contact, Discharge Planning    Requested Home Health Care         Is the patient interested in HHC at discharge?: No    DME Referral Provided  Referral made for DME?: No    Other Referral/Resources/Interventions Provided:  Interventions: Short Term Rehab  Referral Comments: CM reserved [facility] [place] per patient/ patient family selection and updated facility contacts to reflect the same. Spoke to liaison Lisa Diaz who reports she is agreeable to accepting patient without any time preference and agreeable to confirmed 6:45pm  time.    Would you like to participate in our [facility] service program?  : No - Declined    Treatment Team Recommendation: Short Term Rehab  Discharge Destination Plan:: Short Term Rehab  Transport at Discharge : [facility]  Dispatcher Contacted: Yes  Number/Name of Dispatcher: Roundtrip  Transported by (Company and Unit #): [id number]  ETA of Transport (Date): 06/17/24  ETA of Transport (Time): 1845    IMM Given (Date):: 06/17/24  IMM Given to:: Family  Family notified:: CM reviewed IMM with patient and patient's  at bedside who verbalized understanding of rights and provided verbal acknowledgement of IMM. Copy of IMM left with patient at bedside. IMM returned to medical records.    Additional Comments: Informed SLIM and bedside RN of scheduled  time which they  were agreeable to. PCS form and facesheet for transfer printed and placed in patient chart- david RN informed.

## 2024-06-17 NOTE — PLAN OF CARE
Dysphagia 3/thin  Meds 1 at a time with liquid  Upright for all PO intake, slow rate of intake  ST follow-up x1-2  Ongoing ST follow-up at next level of care

## 2024-06-17 NOTE — SPEECH THERAPY NOTE
Speech Language/Pathology     Speech/Language Pathology Progress Note     Patient Name: Kirsty Em    Today's Date: 6/17/2024     Problem List  Principal Problem:    Acute metabolic encephalopathy  Active Problems:    Anxiety and depression    History of seizure disorder    Tobacco dependence    Acute respiratory failure with hypoxia ([facility])    Chronic obstructive pulmonary disease, unspecified COPD type ([facility])    Mixed hyperlipidemia    Elevated transaminase level    Closed displaced fracture of proximal phalanx of right thumb    Stroke (cerebrum) ([facility])    Pre-diabetes    Bilateral carotid artery stenosis    Hypertension    New onset a-fib ([facility])    Hypokalemia      Subjective:  Patient received awake, alert and upright in bed.   present.    Previous/current diet: dys3/thin     Objective:  The following consistencies were tested soft, regular, puree, thin   Patient presents with functional bolus containment, manipulation and control.  Mastication judged to be prolonged, complete.  Transfer prolonged with mild oral retention.    No overt s/s of aspiration or distress. Vocal quality noted to remain clear.      Assessment:  Ongoing improvement in oropharyngeal swallow; pt expresses preference for dysphagia 3/pre-cut solids 2/2 dexterity.       Plan:  Dysphagia 3/thin  Meds 1 at a time with liquid  Upright for all PO intake, slow rate of intake  ST follow-up x1-2  Ongoing ST follow-up at next level of care       Kirsten Marin MS, CCC-SLP  Speech-Language Pathologist  [place] #[id number]  [place] #[id number]

## 2024-06-17 NOTE — ASSESSMENT & PLAN NOTE
Fluctuating BP, started on lisinopril  Continue IV hydralazine or labetalol as needed  Monitor VS per unit protocol, Blood Pressure: 136/70   Continue at home lisinopril dosage.

## 2024-06-18 ENCOUNTER — NURSING HOME VISIT (OUTPATIENT)
Dept: GERIATRICS | Facility: OTHER | Age: 74
End: 2024-06-18
Payer: MEDICARE

## 2024-06-18 ENCOUNTER — DOCUMENTATION (OUTPATIENT)
Dept: GERIATRICS | Facility: OTHER | Age: 74
End: 2024-06-18

## 2024-06-18 ENCOUNTER — TELEPHONE (OUTPATIENT)
Dept: OTHER | Facility: OTHER | Age: 74
End: 2024-06-18

## 2024-06-18 ENCOUNTER — TELEPHONE (OUTPATIENT)
Dept: GERIATRICS | Facility: OTHER | Age: 74
End: 2024-06-18

## 2024-06-18 ENCOUNTER — TELEPHONE (OUTPATIENT)
Age: 74
End: 2024-06-18

## 2024-06-18 DIAGNOSIS — G93.41 ACUTE METABOLIC ENCEPHALOPATHY: ICD-10-CM

## 2024-06-18 DIAGNOSIS — R44.3 HALLUCINATIONS: ICD-10-CM

## 2024-06-18 DIAGNOSIS — I10 PRIMARY HYPERTENSION: ICD-10-CM

## 2024-06-18 DIAGNOSIS — F17.200 TOBACCO DEPENDENCE: ICD-10-CM

## 2024-06-18 DIAGNOSIS — J44.9 CHRONIC OBSTRUCTIVE PULMONARY DISEASE, UNSPECIFIED COPD TYPE (HCC): ICD-10-CM

## 2024-06-18 DIAGNOSIS — I63.9 CEREBROVASCULAR ACCIDENT (CVA), UNSPECIFIED MECHANISM (HCC): Primary | ICD-10-CM

## 2024-06-18 DIAGNOSIS — R33.9 URINARY RETENTION: ICD-10-CM

## 2024-06-18 DIAGNOSIS — J96.01 ACUTE RESPIRATORY FAILURE WITH HYPOXIA (HCC): ICD-10-CM

## 2024-06-18 DIAGNOSIS — F41.9 ANXIETY AND DEPRESSION: Primary | ICD-10-CM

## 2024-06-18 DIAGNOSIS — G40.909 SEIZURE DISORDER (HCC): ICD-10-CM

## 2024-06-18 DIAGNOSIS — I48.91 NEW ONSET A-FIB (HCC): ICD-10-CM

## 2024-06-18 DIAGNOSIS — F32.A DEPRESSION, UNSPECIFIED DEPRESSION TYPE: ICD-10-CM

## 2024-06-18 DIAGNOSIS — F41.9 ANXIETY DISORDER, UNSPECIFIED TYPE: Primary | ICD-10-CM

## 2024-06-18 DIAGNOSIS — S62.511D CLOSED DISPLACED FRACTURE OF PROXIMAL PHALANX OF RIGHT THUMB WITH ROUTINE HEALING, SUBSEQUENT ENCOUNTER: ICD-10-CM

## 2024-06-18 DIAGNOSIS — I65.23 BILATERAL CAROTID ARTERY STENOSIS: ICD-10-CM

## 2024-06-18 DIAGNOSIS — F32.A ANXIETY AND DEPRESSION: Primary | ICD-10-CM

## 2024-06-18 DIAGNOSIS — F41.9 ANXIETY DISORDER, UNSPECIFIED TYPE: ICD-10-CM

## 2024-06-18 PROBLEM — Z86.69 HISTORY OF SEIZURE DISORDER: Status: RESOLVED | Noted: 2021-04-20 | Resolved: 2024-06-18

## 2024-06-18 PROCEDURE — 99309 SBSQ NF CARE MODERATE MDM 30: CPT | Performed by: FAMILY MEDICINE

## 2024-06-18 RX ORDER — ALPRAZOLAM 2 MG/1
2 TABLET ORAL 4 TIMES DAILY
Qty: 56 TABLET | Refills: 0 | Status: SHIPPED | OUTPATIENT
Start: 2024-06-18 | End: 2024-06-19 | Stop reason: SDUPTHER

## 2024-06-18 RX ORDER — ALPRAZOLAM 0.5 MG/1
2 TABLET ORAL ONCE
COMMUNITY
End: 2024-06-18 | Stop reason: SDUPTHER

## 2024-06-18 RX ORDER — ALPRAZOLAM 0.5 MG/1
1 TABLET ORAL ONCE
Qty: 2 TABLET | Refills: 0 | Status: SHIPPED | OUTPATIENT
Start: 2024-06-18 | End: 2024-06-25

## 2024-06-18 RX ORDER — ALPRAZOLAM 0.5 MG/1
2 TABLET ORAL ONCE
Qty: 4 TABLET | Refills: 0 | Status: SHIPPED | OUTPATIENT
Start: 2024-06-18 | End: 2024-06-25

## 2024-06-18 NOTE — ASSESSMENT & PLAN NOTE
Patient appears to have had a seziure after CVA.   Although concern for not taking her medications  reports he prepares her medications and she takes them and has not missed doses.   Did have a Neurologist in [place] but unclear if she is seeing a neurologist or not locally. But has been getting prescriptions.

## 2024-06-18 NOTE — PROGRESS NOTES
[facility]  [address]  [place] [address]   [facility]  History and Physical    NAME: Kirsty Em  AGE: 74 y.o. SEX: female [phone]    DATE OF ENCOUNTER: 6/18/2024    Code status:  CPR    Assessment and Plan     Problem List Items Addressed This Visit        Cardiovascular and Mediastinum    Bilateral carotid artery stenosis     On dapt.   Will need fu with neurology/vascular surgery.   Intolerant to atorvastatin.   Continue with zetia.          Hypertension    New onset a-fib (HCC)     On cardizem CD  Eliquis  Currenlty rate controlled.   Examination consistent with sinus today.          Stroke (cerebrum) (HCC) - Primary     New onset of afib likely resulting to stroke and seizure.     [facility] to [facility] for comprehensive rehab program.     Intolerant of atorvastatin.   On zetia.     Cotninue with ASA.   Cotninue with Elqius as well.   Benefit > risk with DAPT at this time.             Respiratory    Acute respiratory failure with hypoxia (HCC)     S/p mech intubation. Due to acute enceophalopathy.   On 02 via nasal cannula   Plan for wean off.          Chronic obstructive pulmonary disease, unspecified COPD type (HCC)     No exacerbation.   Currently on 02            Nervous and Auditory    Acute metabolic encephalopathy     Improved.   Secondary to cva and stroke  Monitor.   Supportive treatment.          Seizure disorder (HCC)     Patient appears to have had a seziure after CVA.   Although concern for not taking her medications  reports he prepares her medications and she takes them and has not missed doses.   Did have a Neurologist in [place] but unclear if she is seeing a neurologist or not locally. But has been getting prescriptions.             Musculoskeletal and Integument    Closed displaced fracture of proximal phalanx of right thumb     In a splint.   Will need Ortho fu.             Behavioral Health    Anxiety disorder       Sees [facility], Dr. Corral,   Regular visits very 3 months.   Continues on high dose of Xanax.     Reports Seroquel dosing was used for insomnia.   Unclear if any other diagnosis.   Reviewed through Epic connect with PCP. Reports history of hallucinations.  Psychiatry notes unavailable to me but probably shizophrenia /schizoaffective do.          Relevant Medications    ALPRAZolam (XANAX) 2 MG tablet    Depression    Relevant Medications    ALPRAZolam (XANAX) 2 MG tablet    Tobacco dependence   Other Visit Diagnoses     Hallucinations        Urinary retention        voiding trial        Patient and family reports    All medications and routine orders were reviewed and updated as needed.    Plan discussed with: Patient and family    Chief Complaint     Seen for admission at Nursing Facility    History of Present Illness     Kirsty is seen for admission to [facility]. She is admitted for comprehnsive rehabilitations program.     Reviewed hospital records.     Leah was found unresponsive by her . She was intubated for respiratory failure and enceophalopathy. Found to have new onset afib that led to CVA. Extubated 6/9/24.     Seziure disorder. Known sz disorder, has been on keppra, reports she was taking this regularly as he prepares the medication for her. Does not appear to be seeing Neurology on a regular basis.     Afib. New onset. Currently rate controlled and clinicallyin sinus. On cardizem and eliquis. No palpitations, no chest pain, no sob.     Depression/anxiety/insomnia. Reports has been on seroquel for insomnia. Has been on xanax 2 mg QID. She does see Dr. Corral Psychiatry every three monthis. Reports no hx of schizophrenia or schizoaffective disorder. Review with PCP reports a hx of halluincations.     Adelaida seen at bedside. Appears to be comfortable. No new complaints. Her husbnad is at her bedside assisting with the history.     HISTORY:  Past Medical History:   Diagnosis Date   • Anxiety    •  Depression    • Depression, recurrent (HCC) 04/20/2021   • Hallucinations 04/20/2021   • History of seizure disorder 04/20/2021     Family History   Problem Relation Age of Onset   • Diabetes Mother    • Stroke Father    • No Known Problems Sister    • No Known Problems Sister    • No Known Problems Sister    • No Known Problems Daughter    • No Known Problems Daughter    • No Known Problems Daughter    • No Known Problems Maternal Grandmother    • No Known Problems Paternal Grandmother    • No Known Problems Maternal Aunt    • No Known Problems Maternal Aunt    • No Known Problems Maternal Aunt    • No Known Problems Maternal Aunt    • No Known Problems Maternal Aunt    • No Known Problems Maternal Aunt    • No Known Problems Maternal Aunt    • No Known Problems Maternal Aunt    • No Known Problems Paternal Aunt    • Breast cancer Neg Hx      Social History     Socioeconomic History   • Marital status: /Civil Union     Spouse name: Not on file   • Number of children: Not on file   • Years of education: Not on file   • Highest education level: Not on file   Occupational History   • Not on file   Tobacco Use   • Smoking status: Every Day     Current packs/day: 0.50     Types: Cigarettes   • Smokeless tobacco: Never   Vaping Use   • Vaping status: Never Used   Substance and Sexual Activity   • Alcohol use: Not Currently   • Drug use: Never   • Sexual activity: Not on file   Other Topics Concern   • Not on file   Social History Narrative   • Not on file     Social Determinants of Health     Financial Resource Strain: Medium Risk (1/1/2024)    Overall Financial Resource Strain (CARDIA)    • Difficulty of Paying Living Expenses: Somewhat hard   Food Insecurity: No Food Insecurity (6/8/2024)    Hunger Vital Sign    • Worried About Running Out of Food in the Last Year: Never true    • Ran Out of Food in the Last Year: Never true   Transportation Needs: No Transportation Needs (6/8/2024)    PRAPARE - Transportation     • Lack of Transportation (Medical): No    • Lack of Transportation (Non-Medical): No   Physical Activity: Not on file   Stress: Not on file   Social Connections: Not on file   Intimate Partner Violence: Not on file   Housing Stability: Low Risk  (6/8/2024)    Housing Stability Vital Sign    • Unable to Pay for Housing in the Last Year: No    • Number of Times Moved in the Last Year: 0    • Homeless in the Last Year: No       Allergies:  Allergies   Allergen Reactions   • Lipitor [Atorvastatin] Other (See Comments)     Muscle aches       Review of Systems     Review of Systems   Constitutional: Negative.  Negative for activity change, appetite change, chills, diaphoresis, fatigue and fever.   HENT:  Negative for congestion, facial swelling and sore throat.    Respiratory: Negative.  Negative for apnea, cough, chest tightness and shortness of breath.    Cardiovascular: Negative.  Negative for chest pain and palpitations.   Gastrointestinal: Negative.  Negative for abdominal distention, abdominal pain, blood in stool, constipation, diarrhea and nausea.   Genitourinary: Negative.  Negative for difficulty urinating, dysuria, flank pain and frequency.       Medications and orders     All medications reviewed and updated in correction EMR.      Objective     Vitals: 163/80, 18, 93, 97%.     Physical Exam  Vitals and nursing note reviewed.   Constitutional:       Appearance: Normal appearance. She is well-developed.   HENT:      Head: Normocephalic and atraumatic.      Right Ear: External ear normal.      Left Ear: External ear normal.      Nose: Nose normal.   Eyes:      Conjunctiva/sclera: Conjunctivae normal.      Pupils: Pupils are equal, round, and reactive to light.   Neck:      Thyroid: No thyromegaly.      Trachea: No tracheal deviation.   Cardiovascular:      Rate and Rhythm: Normal rate and regular rhythm.      Heart sounds: Normal heart sounds. No murmur heard.  Pulmonary:      Effort: Pulmonary effort is  normal. No respiratory distress.      Breath sounds: Normal breath sounds. No wheezing.   Abdominal:      General: Bowel sounds are normal.      Palpations: Abdomen is soft.   Musculoskeletal:         General: Normal range of motion.      Cervical back: Normal range of motion and neck supple.   Skin:     General: Skin is warm and dry.      Capillary Refill: Capillary refill takes less than 2 seconds.   Neurological:      General: No focal deficit present.      Mental Status: She is alert and oriented to person, place, and time.      Cranial Nerves: No cranial nerve deficit.      Sensory: No sensory deficit.      Motor: Weakness present.   Psychiatric:         Mood and Affect: Mood normal.         Behavior: Behavior normal.         Pertinent Laboratory/Diagnostic Studies:   The following labs/studies were reviewed please see chart or hospital paperwork for details.    - Counseling Documentation: patient was counseled regarding: instructions for management, patient and family education, impressions, and risks and benefits of treatment options  - Medication Side Effects: Adverse side effects of medications were reviewed with the patient/guardian today.

## 2024-06-18 NOTE — ASSESSMENT & PLAN NOTE
New onset of afib likely resulting to stroke and seizure.     Admit to [facility] for comprehensive rehab program.     Intolerant of atorvastatin.   On zetia.     Cotninue with ASA.   Cotninue with Elqius as well.   Benefit > risk with DAPT at this time.

## 2024-06-18 NOTE — TELEPHONE ENCOUNTER
Nursing home or Independent living name:  If its not nursing home or Independent living, do they see PCP in Network:  Who is calling: [facility]  Callback number: [phone]  Symptoms: looking for one time order for xanax  Did you page oncall or place in triage hub: paged on call provider

## 2024-06-18 NOTE — ASSESSMENT & PLAN NOTE
On dapt.   Will need fu with neurology/vascular surgery.   Intolerant to atorvastatin.   Continue with zetia.

## 2024-06-18 NOTE — TELEPHONE ENCOUNTER
Patient is on alprazolam 2mg QID, per nurse they do not have signed script to allow patient to get her evening dose today and only have 0.5mg tabs of alprazolam in stock therefore needed script for 4x 0.5mg tabs to equal total dose of 2mg to allow her to get appropriate evening dose. Nurse noted in the morning other providers will be present who should be able to take over further refills. No further concerns presently. Will send to Jose HOFFMAN as requested.

## 2024-06-18 NOTE — ASSESSMENT & PLAN NOTE
Sees [facility], Dr. Corral,   Regular visits very 3 months.   Continues on high dose of Xanax.     Reports Seroquel dosing was used for insomnia.   Unclear if any other diagnosis.   Reviewed through Epic connect with PCP. Reports history of hallucinations.  Psychiatry notes unavailable to me but probably shizophrenia /schizoaffective do.

## 2024-06-18 NOTE — ASSESSMENT & PLAN NOTE
S/p mech intubation. Due to acute enceophalopathy.   On 02 via nasal cannula   Plan for wean off.

## 2024-06-19 ENCOUNTER — TELEPHONE (OUTPATIENT)
Dept: OBGYN CLINIC | Facility: HOSPITAL | Age: 74
End: 2024-06-19

## 2024-06-19 DIAGNOSIS — F41.9 ANXIETY DISORDER, UNSPECIFIED TYPE: ICD-10-CM

## 2024-06-19 RX ORDER — ALPRAZOLAM 2 MG/1
2 TABLET ORAL 4 TIMES DAILY
Qty: 56 TABLET | Refills: 0 | Status: SHIPPED | OUTPATIENT
Start: 2024-06-19 | End: 2024-07-03

## 2024-06-19 NOTE — TELEPHONE ENCOUNTER
Viral from [facility] calling to review a patient's medication. TC to on-call provider requesting follow up.

## 2024-06-19 NOTE — TELEPHONE ENCOUNTER
Caller: [facility]    Doctor: HAND    Reason for call: Fractures in the proximal and distal phalanges of the thumb. There may be slight displacement in the fracture of the proximal phalanx compared to the prior study.   XR in Epic  Would like [facility] due to  transporting patient and traveling from [place], [place]    Call back#: [phone] Nurse May leave message - secure line

## 2024-06-20 NOTE — TELEPHONE ENCOUNTER
Caller: Loren at [facility]    Doctor: Dr Yates    Reason for call: Loren is calling back in from a missed call relating an appointment for the patient to be seen for a fracture after working with hand  patient has appointment.

## 2024-06-21 ENCOUNTER — NURSING HOME VISIT (OUTPATIENT)
Dept: GERIATRICS | Facility: OTHER | Age: 74
End: 2024-06-21
Payer: MEDICARE

## 2024-06-21 DIAGNOSIS — I63.9 CEREBROVASCULAR ACCIDENT (CVA), UNSPECIFIED MECHANISM (HCC): ICD-10-CM

## 2024-06-21 DIAGNOSIS — F41.9 ANXIETY DISORDER, UNSPECIFIED TYPE: ICD-10-CM

## 2024-06-21 DIAGNOSIS — I48.91 NEW ONSET A-FIB (HCC): ICD-10-CM

## 2024-06-21 DIAGNOSIS — I10 PRIMARY HYPERTENSION: ICD-10-CM

## 2024-06-21 DIAGNOSIS — G40.909 SEIZURE DISORDER (HCC): ICD-10-CM

## 2024-06-21 DIAGNOSIS — S62.511D CLOSED DISPLACED FRACTURE OF PROXIMAL PHALANX OF RIGHT THUMB WITH ROUTINE HEALING, SUBSEQUENT ENCOUNTER: Primary | ICD-10-CM

## 2024-06-21 DIAGNOSIS — R33.9 URINARY RETENTION: ICD-10-CM

## 2024-06-21 PROCEDURE — 99309 SBSQ NF CARE MODERATE MDM 30: CPT

## 2024-06-22 NOTE — PROGRESS NOTES
[facility]  [address] [address]  [phone]  [facility]  Code 31 (STR)          NAME: Kirsty Em  AGE: 74 y.o. SEX: female CODE STATUS: CPR    DATE OF ENCOUNTER: 6/21/2024    Assessment and Plan     1. Closed displaced fracture of proximal phalanx of right thumb with routine healing, subsequent encounter  Assessment & Plan:  Right thumb fracture  Splinted  Will need ortho f/u  2. Seizure disorder (HCC)  Assessment & Plan:  Patient found unresponsive by   Neurology felt likely seizure etiology in the setting of sepsis, met. Encephalopathy  Started on Keppra, continue 750 mg Q 12  F/u with neurology  3. Urinary retention  Assessment & Plan:  Pinto catheter placed while in hospital  Voiding trial while at rehab  4. Cerebrovascular accident (CVA), unspecified mechanism (HCC)  Assessment & Plan:  MRI showing Focal, subcentimeter acute to subacute infarct along the posterior left frontal cortex.  Small meningioma along the lateral left frontal convexity as suspected on the recent CT examination.  Moderate chronic microangiopathic ischemic changes.  Patient intolerant to statins, continue Zetia  Continue ASA 81 mg daily  Continue Eliquis 5 mg BID  5. New onset a-fib (HCC)  Assessment & Plan:  HR controlled, 89  Denies CP/palpitations  Continue Cardizem  mg daily  Continue Eliquis 5 mg BID  6. Primary hypertension  Assessment & Plan:  /78  Continue to monitor BP  Continue Lisinopril 10 mg daily  7. Anxiety disorder, unspecified type  Assessment & Plan:  Follows with psychiatry  Continues on high doses of Xanax and Seroquel       All medications and routine orders were reviewed and updated as needed.    Chief Complaint     STR follow up visit  Patient's care was coordinated with nursing facility staff. Recent vitals, labs, and updated medications were review on [facility] system in facility.  Past Medical and Surgical History      Past Medical History:    Diagnosis Date    Anxiety     Depression     Depression, recurrent (HCC) 04/20/2021    Hallucinations 04/20/2021    History of seizure disorder 04/20/2021     Past Surgical History:   Procedure Laterality Date    ORIF FINGER FRACTURE Right 6/27/2024    Procedure: ORIF OF RIGHT THUMB;  Surgeon: Dagoberto Yates MD;  Location: [facility];  Service: Orthopedics     Allergies   Allergen Reactions    Lipitor [Atorvastatin] Other (See Comments)     Muscle aches          History of Present Illness     HPI  Kirsty Em is a 74 year old female, she is a STR patient of [facility] since 6/17/24. Past Medical Hx including but not limited to CVA, HTN, COPD, HLD, Anxiety. She was seen in collaboration with nursing for medical mgmt and STR follow up.     Hospital Course  Patient was admitted to the hospital 6/7/24 after being found unresponsive on the floor by her . He stated he found her on the floor with incontinence of stool and clenching her fists.  Stroke alert pathway was initiated. Patient was intubated for airway protection. VT of head and neck concerning for left frontal meningioma and b/l ICA stenosis. Neurology felt more likely seizure etiology in the setting of sepsis and toxic metabolic encephalopathy in the setting of self withdrawal of AEDs. LP was done, menigitis coverage with cefepime, vanco, and acyclovir. Patient was started on Keppra 1500 bolus then 750 mg maintenance dose. Patient was extubated and down graded to floor. She was found to be in new onset Afib with RVR, . Patient spontaneously converted to SR. Cardiology followed, patient was started on amiodarone, heparin, then transitioned to Eliquis. Patient being discharged with Pinto catheter. Rehab was recommended and patient was discharged to [facility].    Rehab Course  Kirsty was seen and examined at bedside today. He  is visiting. On exam patient is resting in bed and does not appear to be in distress. Patient is sleeping  well and has a good appetite. She continues with therapy.    Denies CP/SOB/N/V/D. Denies lightheadedness, dizziness, headaches, vision changes. Patient states they are eating well and staying hydrated. Denies any bowel or bladder issues. Per review of SNF records, Patient is eating 3 meals per day, consuming  %. Last documented BM 6/20/24. No concerns from nursing at this time.    The patient's allergies, past medical, surgical, social and family history were reviewed and unchanged.    Review of Systems     Review of Systems   Constitutional:  Negative for fever.   HENT:  Negative for congestion.    Respiratory:  Negative for cough and shortness of breath.         Oxygen use   Cardiovascular:  Negative for chest pain, palpitations and leg swelling.   Gastrointestinal:  Negative for abdominal distention, abdominal pain, constipation, diarrhea, nausea and vomiting.   Genitourinary:  Negative for difficulty urinating and dysuria.   Musculoskeletal:         Thumb fracture with splint   Skin: Negative.    Neurological:  Positive for weakness. Negative for dizziness and headaches.   Hematological: Negative.    Psychiatric/Behavioral:  Negative for confusion and sleep disturbance.          Objective     Vitals:   Vitals:    06/21/24 1130   BP: 140/78   Pulse: 89   Resp: 18   Temp: (!) 97.2 °F (36.2 °C)   SpO2: 94%         Physical Exam  Vitals and nursing note reviewed.   Constitutional:       General: She is not in acute distress.     Appearance: Normal appearance. She is not ill-appearing.   HENT:      Head: Normocephalic and atraumatic.   Eyes:      Conjunctiva/sclera: Conjunctivae normal.   Cardiovascular:      Rate and Rhythm: Normal rate and regular rhythm.      Heart sounds: Normal heart sounds.   Pulmonary:      Effort: Pulmonary effort is normal. No respiratory distress.      Breath sounds: Normal breath sounds. No wheezing.   Abdominal:      General: Bowel sounds are normal. There is no distension.       Palpations: Abdomen is soft.      Tenderness: There is no abdominal tenderness.   Musculoskeletal:      Right lower leg: No edema.      Left lower leg: No edema.   Skin:     General: Skin is warm.   Neurological:      Mental Status: She is alert and oriented to person, place, and time.      Motor: Weakness present.      Gait: Gait abnormal.   Psychiatric:         Mood and Affect: Mood normal.         Behavior: Behavior normal.         Pertinent Laboratory/Diagnostic Studies:   Reviewed in facility chart-stable      Current Medications   Medications reviewed and updated see facility MAR for details.      Current Outpatient Medications:     ALPRAZolam (XANAX) 2 MG tablet, Take 1 tablet (2 mg total) by mouth 4 (four) times a day for 14 days, Disp: 56 tablet, Rfl: 0    apixaban (Eliquis) 5 mg, Take 1 tablet (5 mg total) by mouth 2 (two) times a day, Disp: 60 tablet, Rfl: 0    aspirin 81 mg chewable tablet, Chew 1 tablet (81 mg total) daily, Disp: 60 tablet, Rfl: 0    diltiazem (CARDIZEM CD) 120 mg 24 hr capsule, Take 1 capsule (120 mg total) by mouth daily, Disp: 60 capsule, Rfl: 0    ezetimibe (ZETIA) 10 mg tablet, Take 1 tablet (10 mg total) by mouth daily, Disp: 90 tablet, Rfl: 1    gabapentin (NEURONTIN) 300 mg capsule, Take 300 mg by mouth 3 (three) times a day, Disp: , Rfl:     levETIRAcetam (KEPPRA) 750 mg tablet, Take 1 tablet (750 mg total) by mouth every 12 (twelve) hours, Disp: 120 tablet, Rfl: 0    lisinopril (ZESTRIL) 10 mg tablet, Take 1 tablet (10 mg total) by mouth 2 (two) times a day, Disp: 120 tablet, Rfl: 0    Multiple Vitamin (multivitamin) tablet, Take 1 tablet by mouth daily, Disp: , Rfl:     Omega-3 Fatty Acids (fish oil) 1,000 mg, Take 1,000 mg by mouth daily, Disp: , Rfl:     oxyCODONE (Roxicodone) 5 immediate release tablet, Take 0.5 tablets (2.5 mg total) by mouth every 6 (six) hours as needed for moderate pain Max Daily Amount: 10 mg, Disp: 15 tablet, Rfl: 0    QUEtiapine (SEROquel) 100 mg  "tablet, Take 100 mg by mouth every morning, Disp: , Rfl:     QUEtiapine (SEROquel) 300 mg tablet, Take 1 tablet (300 mg total) by mouth daily at bedtime, Disp: , Rfl:     tamsulosin (FLOMAX) 0.4 mg, Take 1 capsule (0.4 mg total) by mouth daily with dinner, Disp: , Rfl:     vitamin B-12 (VITAMIN B-12) 1,000 mcg tablet, Take by mouth daily, Disp: , Rfl:     Current Facility-Administered Medications:     cyanocobalamin injection 1,000 mcg, 1,000 mcg, Intramuscular, Q30 Days, Brien Desouza MD, 1,000 mcg at 01/06/23 1541     Please note:  Voice-recognition software may have been used in the preparation of this document.  Occasional wrong word or \"sound-alike\" substitutions may have occurred due to the inherent limitations of voice recognition software.  Interpretation should be guided by context.         SINDY Muñoz  7/1/2024  11:35 AM    "

## 2024-06-24 ENCOUNTER — NURSING HOME VISIT (OUTPATIENT)
Dept: GERIATRICS | Facility: OTHER | Age: 74
End: 2024-06-24
Payer: MEDICARE

## 2024-06-24 ENCOUNTER — TELEPHONE (OUTPATIENT)
Dept: CARDIOLOGY CLINIC | Facility: CLINIC | Age: 74
End: 2024-06-24

## 2024-06-24 ENCOUNTER — TELEPHONE (OUTPATIENT)
Age: 74
End: 2024-06-24

## 2024-06-24 ENCOUNTER — TELEPHONE (OUTPATIENT)
Dept: NEUROLOGY | Facility: CLINIC | Age: 74
End: 2024-06-24

## 2024-06-24 ENCOUNTER — TELEPHONE (OUTPATIENT)
Dept: NEUROSURGERY | Facility: CLINIC | Age: 74
End: 2024-06-24

## 2024-06-24 VITALS — DIASTOLIC BLOOD PRESSURE: 83 MMHG | HEART RATE: 81 BPM | SYSTOLIC BLOOD PRESSURE: 139 MMHG | OXYGEN SATURATION: 95 %

## 2024-06-24 DIAGNOSIS — S62.511D CLOSED DISPLACED FRACTURE OF PROXIMAL PHALANX OF RIGHT THUMB WITH ROUTINE HEALING, SUBSEQUENT ENCOUNTER: ICD-10-CM

## 2024-06-24 DIAGNOSIS — R94.31 QT PROLONGATION: ICD-10-CM

## 2024-06-24 DIAGNOSIS — F41.9 ANXIETY DISORDER, UNSPECIFIED TYPE: ICD-10-CM

## 2024-06-24 DIAGNOSIS — R33.9 URINARY RETENTION: ICD-10-CM

## 2024-06-24 DIAGNOSIS — I51.89 DIASTOLIC DYSFUNCTION: ICD-10-CM

## 2024-06-24 DIAGNOSIS — G40.909 SEIZURE DISORDER (HCC): ICD-10-CM

## 2024-06-24 DIAGNOSIS — I48.91 NEW ONSET A-FIB (HCC): ICD-10-CM

## 2024-06-24 DIAGNOSIS — J96.01 ACUTE RESPIRATORY FAILURE WITH HYPOXIA (HCC): Primary | ICD-10-CM

## 2024-06-24 DIAGNOSIS — I63.9 CEREBROVASCULAR ACCIDENT (CVA), UNSPECIFIED MECHANISM (HCC): ICD-10-CM

## 2024-06-24 DIAGNOSIS — F33.9 EPISODE OF RECURRENT MAJOR DEPRESSIVE DISORDER, UNSPECIFIED DEPRESSION EPISODE SEVERITY (HCC): ICD-10-CM

## 2024-06-24 DIAGNOSIS — J44.9 CHRONIC OBSTRUCTIVE PULMONARY DISEASE, UNSPECIFIED COPD TYPE (HCC): ICD-10-CM

## 2024-06-24 PROCEDURE — 99310 SBSQ NF CARE HIGH MDM 45: CPT

## 2024-06-24 RX ORDER — QUETIAPINE FUMARATE 300 MG/1
300 TABLET, FILM COATED ORAL
Start: 2024-06-24

## 2024-06-24 RX ORDER — TAMSULOSIN HYDROCHLORIDE 0.4 MG/1
0.4 CAPSULE ORAL
Start: 2024-06-24

## 2024-06-24 NOTE — ASSESSMENT & PLAN NOTE
No acute exacerbation   Currently on 2L NC  Lungs with poor air movement on auscultation   Daughter reports patient was previously hospitalized and discharged with oxygen but never followed up with Pulmonology  Patient denies wearing oxygen at home   Not currently on any maintenance inhalers  Will need Pulmonology f/u with PFTs

## 2024-06-24 NOTE — ASSESSMENT & PLAN NOTE
Unclear of home O2 requirements as patient has been lost to follow up, reportedly previously discharged with O2 but does not wear   Currently 95% on 2L   Wean O2 as appropriate

## 2024-06-24 NOTE — TELEPHONE ENCOUNTER
[facility] / [facility] / Acute metabolic encephalopathy- DC 06/17/24 - Non [facility] SNF/TCU/SNU    Pt scheduled with  on 09/06/2024 @ 12:30pm     Kirsty Em will need follow up in in 6 weeks with neurovascular attending . She will not require outpatient neurological testing.     Case and treatment plan reviewed with attending neurologist, Dr. Osman. Please see attending attestation for any further recommendations.

## 2024-06-24 NOTE — ASSESSMENT & PLAN NOTE
Had main catheter inserted during hospitalization   Void trial was attempted at rehab and was unsuccessful   Will start flomax   F/u with urology

## 2024-06-24 NOTE — ASSESSMENT & PLAN NOTE
Patient appears to have had a seziure after CVA.   Although concern for not taking her medications   prepared medications for patient   Per daughter patient has had hx of seizures for the last 20 years  Continue keppra   F/u with neurology

## 2024-06-24 NOTE — ASSESSMENT & PLAN NOTE
Last EKG with QTc 480   Currently on high dose seroquel   Will wean seroquel- reduced HS dose from 400 mg to 300 mg   Continue to wean as able

## 2024-06-24 NOTE — ASSESSMENT & PLAN NOTE
Followed by psychiatrist every 3 months (Dr. Corral)  Currently on high dose seroquel- reportedly for insomnia?  Unable to obtain psych records unclear of any other psych diagnoses as seroquel is not used for depression  Wean seroquel and xanax as able   Will reduce seroquel from 400 mg QHS to 300 mg QHS, continue 100 mg in am for now

## 2024-06-24 NOTE — PROGRESS NOTES
[facility]  [address] [facility] [address] [address]  [phone]  FACILITY: [facility]  Code 31 (STR)          NAME: Kirsty Em  AGE: 74 y.o. SEX: female CODE STATUS: CPR    DATE OF ENCOUNTER: 6/24/2024    Assessment and Plan     1. Acute respiratory failure with hypoxia (HCC)  Assessment & Plan:  Unclear of home O2 requirements as patient has been lost to follow up, reportedly previously discharged with O2 but does not wear   Currently 95% on 2L   Wean O2 as appropriate   2. Chronic obstructive pulmonary disease, unspecified COPD type (HCC)  Assessment & Plan:  No acute exacerbation   Currently on 2L NC  Lungs with poor air movement on auscultation   Daughter reports patient was previously hospitalized and discharged with oxygen but never followed up with Pulmonology  Patient denies wearing oxygen at home   Not currently on any maintenance inhalers  Will need Pulmonology f/u with PFTs   3. New onset a-fib (HCC)  Assessment & Plan:  HR stable   Continue cardizem for rate control   Continue eliquis for AC  4. Cerebrovascular accident (CVA), unspecified mechanism (HCC)  Assessment & Plan:  New onset of afib likely resulting to stroke and seizure.   MRI brain revealed Focal, subcentimeter acute to subacute infarct along the posterior left frontal cortex.   Intolerant of atorvastatin, continue zetia.   Continue ASA and eliquis   F/u with neurology   5. Seizure disorder (HCC)  Assessment & Plan:  Patient appears to have had a seziure after CVA.   Although concern for not taking her medications   prepared medications for patient   Per daughter patient has had hx of seizures for the last 20 years  Continue keppra   F/u with neurology   6. Closed displaced fracture of proximal phalanx of right thumb with routine healing, subsequent encounter  Assessment & Plan:  Continue splint   Continue NWB  Ortho f/u tomorrow    7. Episode of recurrent major depressive disorder, unspecified depression episode  severity (HCC)  Assessment & Plan:  Followed by psychiatrist every 3 months (Dr. Corral)  Currently on high dose seroquel- reportedly for insomnia?  Unable to obtain psych records unclear of any other psych diagnoses as seroquel is not used for depression  Wean seroquel and xanax as able   Will reduce seroquel from 400 mg QHS to 300 mg QHS, continue 100 mg in am for now   Orders:  -     QUEtiapine (SEROquel) 300 mg tablet; Take 1 tablet (300 mg total) by mouth daily at bedtime  8. QT prolongation  Assessment & Plan:  Last EKG with QTc 480   Currently on high dose seroquel   Will wean seroquel- reduced HS dose from 400 mg to 300 mg   Continue to wean as able   9. Diastolic dysfunction  Assessment & Plan:  Recent echo with EF 55% revealed moderately abnormal diastolic dysfunction   F/u with cardiology   10. Anxiety disorder, unspecified type  Assessment & Plan:  Continues on high dose of Xanax.   Wean as able  11. Urinary retention  Assessment & Plan:  Had main catheter inserted during hospitalization   Void trial was attempted at rehab and was unsuccessful   Will start flomax   F/u with urology   Orders:  -     tamsulosin (FLOMAX) 0.4 mg; Take 1 capsule (0.4 mg total) by mouth daily with dinner     I have spent a total time of 60 minutes on 06/24/24 in caring for this patient including Diagnostic results, Risks and benefits of tx options, Patient and family education, Importance of tx compliance, Documenting in the medical record, Reviewing / ordering tests, medicine, procedures  , and Obtaining or reviewing history  .    All medications and routine orders were reviewed and updated as needed.    Chief Complaint     STR follow up visit    Past Medical and Surgica History      Past Medical History:   Diagnosis Date    Anxiety     Depression     Depression, recurrent (HCC) 04/20/2021    Hallucinations 04/20/2021    History of seizure disorder 04/20/2021     No past surgical history on file.  Allergies   Allergen  Reactions    Lipitor [Atorvastatin] Other (See Comments)     Muscle aches          History of Present Illness     Patient is a 74 y.o. old female being seen at [facility] for follow up of acute and chronic medical conditions. Patient is seen and examined sitting in bed without acute distress. She denies CP/SOB. She remains on supplemental oxygen. Reports at home she does not wear oxygen but has it from previous hospitalization which she thinks was from bells palsy. She reports  administers medications. She cannot recall if or when she had previous seizures but daughter reports hx of seizures for the past 20 years. Had lengthy discussion with daughter and son in law today regarding concerns of current living situation as patient has not been compliant with follow ups and had prolonged seizure at home prior to 911 being called. Patient has scheduled follow up with orthopedics tomorrow for right thumb fracture/dislocation. Daughter reports she will be scheduling follow up with neurology, cardiology and pulmonology and will schedule with urology on discharge.           The patient's allergies, past medical, surgical, social and family history were reviewed and unchanged.    Review of Systems     Review of Systems   Genitourinary:  Positive for difficulty urinating.   All other systems reviewed and are negative.        Objective     Vitals:   Vitals:    06/24/24 1535   BP: 139/83   Pulse: 81   SpO2: 95%         Physical Exam  Vitals reviewed.   Constitutional:       General: She is not in acute distress.     Appearance: Normal appearance. She is not ill-appearing, toxic-appearing or diaphoretic.   HENT:      Head: Normocephalic and atraumatic.   Eyes:      Conjunctiva/sclera: Conjunctivae normal.   Cardiovascular:      Rate and Rhythm: Normal rate. Rhythm irregular.      Pulses: Normal pulses.      Heart sounds: Normal heart sounds. No murmur heard.  Pulmonary:      Effort: Pulmonary effort is normal. No respiratory  distress.      Breath sounds: Decreased air movement present. Decreased breath sounds present.   Abdominal:      General: Bowel sounds are normal. There is no distension.      Palpations: Abdomen is soft.      Tenderness: There is no abdominal tenderness.   Musculoskeletal:      Right lower leg: No edema.      Left lower leg: No edema.   Skin:     General: Skin is warm and dry.   Neurological:      General: No focal deficit present.      Mental Status: She is alert and oriented to person, place, and time.      Motor: Weakness present.   Psychiatric:         Mood and Affect: Mood normal.         Behavior: Behavior normal.         Thought Content: Thought content normal.         Cognition and Memory: Memory is impaired.         Pertinent Laboratory/Diagnostic Studies:   Reviewed in facility chart-stable      Current Medications   Medications reviewed and updated see facility MAR for details.      Current Outpatient Medications:     QUEtiapine (SEROquel) 300 mg tablet, Take 1 tablet (300 mg total) by mouth daily at bedtime, Disp: , Rfl:     tamsulosin (FLOMAX) 0.4 mg, Take 1 capsule (0.4 mg total) by mouth daily with dinner, Disp: , Rfl:     ALPRAZolam (XANAX) 0.5 mg tablet, Take 4 tablets (2 mg total) by mouth 1 (one) time for 1 dose, Disp: 4 tablet, Rfl: 0    ALPRAZolam (XANAX) 0.5 mg tablet, Take 2 tablets (1 mg total) by mouth 1 (one) time for 1 dose Give 2 tablets = 1 mg, Disp: 2 tablet, Rfl: 0    ALPRAZolam (XANAX) 2 MG tablet, Take 1 tablet (2 mg total) by mouth 4 (four) times a day for 14 days, Disp: 56 tablet, Rfl: 0    apixaban (Eliquis) 5 mg, Take 1 tablet (5 mg total) by mouth 2 (two) times a day, Disp: 60 tablet, Rfl: 0    aspirin 81 mg chewable tablet, Chew 1 tablet (81 mg total) daily, Disp: 60 tablet, Rfl: 0    diltiazem (CARDIZEM CD) 120 mg 24 hr capsule, Take 1 capsule (120 mg total) by mouth daily, Disp: 60 capsule, Rfl: 0    ezetimibe (ZETIA) 10 mg tablet, Take 1 tablet (10 mg total) by mouth daily,  "Disp: 90 tablet, Rfl: 1    gabapentin (NEURONTIN) 300 mg capsule, Take 300 mg by mouth 3 (three) times a day, Disp: , Rfl:     levETIRAcetam (KEPPRA) 750 mg tablet, Take 1 tablet (750 mg total) by mouth every 12 (twelve) hours, Disp: 120 tablet, Rfl: 0    lisinopril (ZESTRIL) 10 mg tablet, Take 1 tablet (10 mg total) by mouth 2 (two) times a day, Disp: 120 tablet, Rfl: 0    Multiple Vitamin (multivitamin) tablet, Take 1 tablet by mouth daily, Disp: , Rfl:     Omega-3 Fatty Acids (fish oil) 1,000 mg, Take 1,000 mg by mouth daily, Disp: , Rfl:     QUEtiapine (SEROquel) 100 mg tablet, Take 100 mg by mouth every morning, Disp: , Rfl:     vitamin B-12 (VITAMIN B-12) 1,000 mcg tablet, Take by mouth daily, Disp: , Rfl:     Current Facility-Administered Medications:     cyanocobalamin injection 1,000 mcg, 1,000 mcg, Intramuscular, Q30 Days, Brien Desouza MD, 1,000 mcg at 01/06/23 1541     Plan discussed with Dr. Rubio. Dr. Rubio noted agreement with assessment and plan.      Please note:  Voice-recognition software may have been used in the preparation of this document.  Occasional wrong word or \"sound-alike\" substitutions may have occurred due to the inherent limitations of voice recognition software.  Interpretation should be guided by context.         SINDY Mackay        "

## 2024-06-24 NOTE — TELEPHONE ENCOUNTER
6/24/24 received a call from [facility] Loren at office would like patient to be scheduled for a stroke she can be reached at [phone] advised intake dept via in basket message.

## 2024-06-24 NOTE — ASSESSMENT & PLAN NOTE
New onset of afib likely resulting to stroke and seizure.   MRI brain revealed Focal, subcentimeter acute to subacute infarct along the posterior left frontal cortex.   Intolerant of atorvastatin, continue zetia.   Continue ASA and eliquis   F/u with neurology

## 2024-06-24 NOTE — TELEPHONE ENCOUNTER
Daughter Tish calling about appointments. Unable to give information due to not on medical communication consent form. Will call back with patient.

## 2024-06-24 NOTE — TELEPHONE ENCOUNTER
New Patient    What is the reason for the patient’s appointment?: Hospital follow up for urinary retention     Patient is currently at [facility] after being hospitalized. They did attempt a voiding trial and patient failed. Patient now has a main catheter placed again.     What office location does the patient prefer?: [place]     Have patient records been requested?:  If No, are the records showing in Epic: Records in epic     HISTORY:   Has the patient had any previous Urologist(s)?: Facility is unsure     Was the patient seen in the ED?: 6/7/24    Please review and call facility back to schedule patient.     CB: [phone] - Loren

## 2024-06-24 NOTE — TELEPHONE ENCOUNTER
Please assist in scheduling a New patient ER follow up for urinary retention failed void trials in the past. 221 ml removed from bladder. Please schedule in appropriate timeframe per decision tree.

## 2024-06-25 ENCOUNTER — APPOINTMENT (OUTPATIENT)
Dept: RADIOLOGY | Facility: AMBULARY SURGERY CENTER | Age: 74
End: 2024-06-25
Attending: STUDENT IN AN ORGANIZED HEALTH CARE EDUCATION/TRAINING PROGRAM
Payer: MEDICARE

## 2024-06-25 ENCOUNTER — HOSPITAL ENCOUNTER (OUTPATIENT)
Dept: CT IMAGING | Facility: HOSPITAL | Age: 74
Discharge: HOME | End: 2024-06-25
Attending: STUDENT IN AN ORGANIZED HEALTH CARE EDUCATION/TRAINING PROGRAM
Payer: MEDICARE

## 2024-06-25 ENCOUNTER — PREP FOR PROCEDURE (OUTPATIENT)
Dept: OBGYN CLINIC | Facility: CLINIC | Age: 74
End: 2024-06-25

## 2024-06-25 ENCOUNTER — OFFICE VISIT (OUTPATIENT)
Dept: OBGYN CLINIC | Facility: CLINIC | Age: 74
End: 2024-06-25
Payer: MEDICARE

## 2024-06-25 ENCOUNTER — ANESTHESIA EVENT (OUTPATIENT)
Dept: PERIOP | Facility: HOSPITAL | Age: 74
End: 2024-06-25
Payer: MEDICARE

## 2024-06-25 VITALS — BODY MASS INDEX: 24.35 KG/M2 | WEIGHT: 129 LBS | HEIGHT: 61 IN

## 2024-06-25 DIAGNOSIS — S62.521A CLOSED DISPLACED FRACTURE OF DISTAL PHALANX OF RIGHT THUMB, INITIAL ENCOUNTER: ICD-10-CM

## 2024-06-25 DIAGNOSIS — S62.511A CLOSED DISPLACED FRACTURE OF PROXIMAL PHALANX OF RIGHT THUMB, INITIAL ENCOUNTER: ICD-10-CM

## 2024-06-25 DIAGNOSIS — S62.511A CLOSED DISPLACED FRACTURE OF PROXIMAL PHALANX OF RIGHT THUMB, INITIAL ENCOUNTER: Primary | ICD-10-CM

## 2024-06-25 PROCEDURE — 99214 OFFICE O/P EST MOD 30 MIN: CPT | Performed by: STUDENT IN AN ORGANIZED HEALTH CARE EDUCATION/TRAINING PROGRAM

## 2024-06-25 PROCEDURE — 73140 X-RAY EXAM OF FINGER(S): CPT

## 2024-06-25 PROCEDURE — 73200 CT UPPER EXTREMITY W/O DYE: CPT

## 2024-06-25 RX ORDER — CHLORHEXIDINE GLUCONATE ORAL RINSE 1.2 MG/ML
15 SOLUTION DENTAL ONCE
Status: CANCELLED | OUTPATIENT
Start: 2024-06-25 | End: 2024-06-25

## 2024-06-25 NOTE — H&P (VIEW-ONLY)
ORTHOPAEDIC HAND, WRIST, AND ELBOW OFFICE  VISIT      ASSESSMENT/PLAN:      Diagnoses and all orders for this visit:    Closed displaced fracture of proximal phalanx of right thumb, initial encounter  -     Ambulatory Referral to Orthopedic Surgery  -     XR thumb right first digit-min 2v; Future  -     Cancel: CT upper extremity wo contrast right; Future  -     Case request operating room: CLOSED REDUCTION PERCUTANEOUS PINNING VERSUS OPEN REDUCTION W/ INTERNAL FIXATION (ORIF)  RIGHT THUMB; Standing  -     Case request operating room: CLOSED REDUCTION PERCUTANEOUS PINNING VERSUS OPEN REDUCTION W/ INTERNAL FIXATION (ORIF)  RIGHT THUMB  -     CT upper extremity wo contrast right; Future    Closed displaced fracture of distal phalanx of right thumb, initial encounter  -     Cancel: CT upper extremity wo contrast right; Future  -     Case request operating room: CLOSED REDUCTION PERCUTANEOUS PINNING VERSUS OPEN REDUCTION W/ INTERNAL FIXATION (ORIF)  RIGHT THUMB; Standing  -     Case request operating room: CLOSED REDUCTION PERCUTANEOUS PINNING VERSUS OPEN REDUCTION W/ INTERNAL FIXATION (ORIF)  RIGHT THUMB  -     CT upper extremity wo contrast right; Future    Other orders  -     Diet NPO; Sips with meds; Standing  -     Void on call to OR; Standing  -     Insert peripheral IV; Standing  -     chlorhexidine (PERIDEX) 0.12 % oral rinse 15 mL  -     Nursing Communication Swab both nares with Povidone-Iodine solution, EXCLUDE if patient has shellfish/Iodine allergy. Routine, day of surgery, on call to OR; Standing  -     Nursing Communication CHG bath, have staff wash entire body (neck down) per pre-op bathing protocol. Routine, evening prior to, and day of surgery.; Standing  -     Nursing Communication Warmimg Interventions Implemented; Standing  -     ceFAZolin (ANCEF) 1,000 mg in sodium chloride 0.9 % 50 mL IVPB          74 y.o. female with closed, displaced, comminuted fracture of proximal and distal phalanx of right  thumb  Diagnostics reviewed and physical exam performed.  Diagnosis, treatment options and associated risks were discussed with the patient including no treatment, nonsurgical treatment and potential for surgical intervention.  The patient was given the opportunity to ask questions regarding each.   Operative intervention in the form of CRPP vs ORIF was discussed at length, as well as expected outcome and recovery. Discussed nonoperative management with closed management. Discussed closed management is not recommended as she has angulated fracture, significant articular comminution with articular incongruency.  At this time, after informed discussion, patient wishes to proceed with operative right thumb  Risks and benefits were discussed. Risks of the surgery are inclusive of but not limited to bleeding, infection, nerve injury, blood clot, worsening of symptoms, not achieving the anticipated results, persistent stiffness, weakness and the need for additional surgery. Discussed delayed treatment and significant articular comminution may contribute to worse outcome. Discussed risk of post-traumatic arthritis. D The patient verbally stated they understood those risks and would like to proceed with the surgery. Surgical consent was signed in the office today. I will see the patient the day of surgery.  Patient placed in alumafoam splint to be worn at all times until her surgery  Continue with Tylenol as needed for pain relief        Follow Up:  After surgery       To Do Next Visit:  Re-evaluation of current issue and X-rays of the  right  thumb out of splint      Discussions:  Fracture Operative Treatment: The physiology of a fractured bone was discussed with the patient today.  With non-displaced or minimally displaced fractures, conservative treatment such as casting or splinting often results in a functional recovery.  Typically, these fractures are immobilized in either a cast or splint depending on the pattern.   Radiographs are typically taken at intervals throughout the fracture healing to ensure that reduction or alignment is not lost.  If the fracture loses its alignment, surgical intervention may be required to stabilize it.  Medical conditions such as diabetes, osteoporosis, vitamin D deficiency, and a history of or exposure to smoking may delay or prevent fracture healing. Options between cast/splint immobilization and surgical treatment were offered and the risks and benefits of both were discussed. With displaced fractures, operative treatment often results in a functional recovery.  Typically, these fractures undergo reduction either through percutaneous or open methods depending on the location and fracture pattern.  Radiographs are typically taken at intervals throughout the fracture healing ensure maintenance of reduction and alignment.  If the fracture loses its alignment, revision surgery may be required.  Medical conditions such as diabetes, osteoporosis, vitamin D deficiency, and a history of or exposure to smoking may delay or prevent fracture healing.  The risks and benefits of the procedure were explained to the patient, which include, but are not limited to: Bleeding, infection, recurrence, pain, scar, malunion, nonunion, damage to tendons, damage to nerves, and damage to blood vessels, and complications related to anesthesia, failure to give desire result, need for more surgery.  These risks, along with alternative conservative treatment options, and postoperative protocols were voiced back and understood by the patient.  All questions were answered to the patient's satisfaction.  The patient agrees to comply with a standard postoperative protocol, and is willing to proceed.  Education was provided via written and auditory forms.  There were no barriers to learning.  Written handouts regarding wound care, incision and scar care, and general preoperative information was provided to the patient.  Prior to  surgery, the patient may be requested to stop all anti-inflammatory medications.  Prophylactic aspirin, Plavix, and Coumadin may be allowed to be continued.  Medications including vitamin E., ginkgo, and fish oil are requested to be stopped approximately one week prior to surgery.  Hypertensive medications and beta blockers, if taken, should be continued.      Dagoberto Yates MD  Attending, Orthopaedic Surgery  Hand, Wrist, and Elbow Surgery  [facility]    ______________________________________________________________________________________________    CHIEF COMPLAINT:  Chief Complaint   Patient presents with   • Right Hand - Fracture   • Right Thumb - Fracture       SUBJECTIVE:  Patient is a 74 y.o. RHD female who presents today for evaluation and treatment of right thumb injury sustained 6/7/2024 after a fall onto the outstretched hand secondary to a seizure. She was placed in a splint and referred to Orthopedics for further evaluation and treatment. Today she describes 10/10 pain that was improved to 4/10 when the splint. Denies previous injury, surgery, trauma to the thumb. She is taking Tylenol for pain with some relief.     Occupation: retired    I have personally reviewed all the relevant PMH, PSH, SH, FH, Medications and allergies      PAST MEDICAL HISTORY:  Past Medical History:   Diagnosis Date   • Anxiety    • Depression    • Depression, recurrent (HCC) 04/20/2021   • Hallucinations 04/20/2021   • History of seizure disorder 04/20/2021       PAST SURGICAL HISTORY:  History reviewed. No pertinent surgical history.    FAMILY HISTORY:  Family History   Problem Relation Age of Onset   • Diabetes Mother    • Stroke Father    • No Known Problems Sister    • No Known Problems Sister    • No Known Problems Sister    • No Known Problems Daughter    • No Known Problems Daughter    • No Known Problems Daughter    • No Known Problems Maternal Grandmother    • No Known Problems Paternal  Grandmother    • No Known Problems Maternal Aunt    • No Known Problems Maternal Aunt    • No Known Problems Maternal Aunt    • No Known Problems Maternal Aunt    • No Known Problems Maternal Aunt    • No Known Problems Maternal Aunt    • No Known Problems Maternal Aunt    • No Known Problems Maternal Aunt    • No Known Problems Paternal Aunt    • Breast cancer Neg Hx        SOCIAL HISTORY:  Social History     Tobacco Use   • Smoking status: Every Day     Current packs/day: 0.50     Types: Cigarettes   • Smokeless tobacco: Never   Vaping Use   • Vaping status: Never Used   Substance Use Topics   • Alcohol use: Not Currently   • Drug use: Never       MEDICATIONS:    Current Outpatient Medications:   •  ALPRAZolam (XANAX) 2 MG tablet, Take 1 tablet (2 mg total) by mouth 4 (four) times a day for 14 days, Disp: 56 tablet, Rfl: 0  •  apixaban (Eliquis) 5 mg, Take 1 tablet (5 mg total) by mouth 2 (two) times a day, Disp: 60 tablet, Rfl: 0  •  aspirin 81 mg chewable tablet, Chew 1 tablet (81 mg total) daily, Disp: 60 tablet, Rfl: 0  •  diltiazem (CARDIZEM CD) 120 mg 24 hr capsule, Take 1 capsule (120 mg total) by mouth daily, Disp: 60 capsule, Rfl: 0  •  gabapentin (NEURONTIN) 300 mg capsule, Take 300 mg by mouth 3 (three) times a day, Disp: , Rfl:   •  levETIRAcetam (KEPPRA) 750 mg tablet, Take 1 tablet (750 mg total) by mouth every 12 (twelve) hours, Disp: 120 tablet, Rfl: 0  •  lisinopril (ZESTRIL) 10 mg tablet, Take 1 tablet (10 mg total) by mouth 2 (two) times a day, Disp: 120 tablet, Rfl: 0  •  Multiple Vitamin (multivitamin) tablet, Take 1 tablet by mouth daily, Disp: , Rfl:   •  Omega-3 Fatty Acids (fish oil) 1,000 mg, Take 1,000 mg by mouth daily, Disp: , Rfl:   •  QUEtiapine (SEROquel) 100 mg tablet, Take 100 mg by mouth every morning, Disp: , Rfl:   •  QUEtiapine (SEROquel) 300 mg tablet, Take 1 tablet (300 mg total) by mouth daily at bedtime, Disp: , Rfl:   •  vitamin B-12 (VITAMIN B-12) 1,000 mcg tablet, Take  by mouth daily, Disp: , Rfl:   •  ezetimibe (ZETIA) 10 mg tablet, Take 1 tablet (10 mg total) by mouth daily, Disp: 90 tablet, Rfl: 1  •  tamsulosin (FLOMAX) 0.4 mg, Take 1 capsule (0.4 mg total) by mouth daily with dinner, Disp: , Rfl:     Current Facility-Administered Medications:   •  cyanocobalamin injection 1,000 mcg, 1,000 mcg, Intramuscular, Q30 Days, Brien Desouza MD, 1,000 mcg at 01/06/23 1541    ALLERGIES:  Allergies   Allergen Reactions   • Lipitor [Atorvastatin] Other (See Comments)     Muscle aches           REVIEW OF SYSTEMS:  Musculoskeletal:        As noted in HPI.   All other systems reviewed and are negative.    VITALS:  There were no vitals filed for this visit.    LABS:  HgA1c:   Lab Results   Component Value Date    HGBA1C 6.3 (H) 06/09/2024     BMP:   Lab Results   Component Value Date    GLUCOSE 199 (H) 06/07/2024    CALCIUM 8.8 06/15/2024    K 3.2 (L) 06/15/2024    CO2 31 06/15/2024     06/15/2024    BUN 9 06/15/2024    CREATININE 0.51 (L) 06/15/2024       _____________________________________________________  PHYSICAL EXAMINATION:  General: Well developed and well nourished, alert & oriented x 3, appears comfortable  Psychiatric: Normal  HEENT: Normocephalic, Atraumatic Trachea Midline, No torticollis  Pulmonary: No audible wheezing or respiratory distress   Abdomen/GI: Non tender, non distended   Cardiovascular: No pitting edema, 2+ radial pulse   Skin: No masses, erythema, lacerations, fluctation, ulcerations  Neurovascular: Sensation Intact to the Median, Ulnar, Radial Nerve, Motor Intact to the Median, Ulnar, Radial Nerve, and Pulses Intact  Musculoskeletal: Normal, except as noted in detailed exam and in HPI.      MUSCULOSKELETAL EXAMINATION:  Right thumb:  Radially deviated 35 degrees at proximal phalanx level  FPL, EPL intact  Swelling, ecchymosis  Ttp diffusely throughout the thumb  Brisk capillary refill  Sensation intact to Ax/R/M/U nerve  distributions      ___________________________________________________  STUDIES REVIEWED:  Xrays of the right thumb were reviewed and independently interpreted in PACS by Dr. Yates and demonstrate closed, displaced, comminuted proximal phalanx fracture with articular incongruency and radial deviation through fracture site, displaced distal phalanx intra-articular fracture          PROCEDURES PERFORMED:  Procedures  No Procedures performed today    _____________________________________________________      Scribe Attestation    I,:  Nadia Smiley am acting as a scribe while in the presence of the attending physician.:       I,:  Dagoberto Yates MD personally performed the services described in this documentation    as scribed in my presence.:

## 2024-06-25 NOTE — PROGRESS NOTES
ORTHOPAEDIC HAND, WRIST, AND ELBOW OFFICE  VISIT      ASSESSMENT/PLAN:      Diagnoses and all orders for this visit:    Closed displaced fracture of proximal phalanx of right thumb, initial encounter  -     Ambulatory Referral to Orthopedic Surgery  -     XR thumb right first digit-min 2v; Future  -     Cancel: CT upper extremity wo contrast right; Future  -     Case request operating room: CLOSED REDUCTION PERCUTANEOUS PINNING VERSUS OPEN REDUCTION W/ INTERNAL FIXATION (ORIF)  RIGHT THUMB; Standing  -     Case request operating room: CLOSED REDUCTION PERCUTANEOUS PINNING VERSUS OPEN REDUCTION W/ INTERNAL FIXATION (ORIF)  RIGHT THUMB  -     CT upper extremity wo contrast right; Future    Closed displaced fracture of distal phalanx of right thumb, initial encounter  -     Cancel: CT upper extremity wo contrast right; Future  -     Case request operating room: CLOSED REDUCTION PERCUTANEOUS PINNING VERSUS OPEN REDUCTION W/ INTERNAL FIXATION (ORIF)  RIGHT THUMB; Standing  -     Case request operating room: CLOSED REDUCTION PERCUTANEOUS PINNING VERSUS OPEN REDUCTION W/ INTERNAL FIXATION (ORIF)  RIGHT THUMB  -     CT upper extremity wo contrast right; Future    Other orders  -     Diet NPO; Sips with meds; Standing  -     Void on call to OR; Standing  -     Insert peripheral IV; Standing  -     chlorhexidine (PERIDEX) 0.12 % oral rinse 15 mL  -     Nursing Communication Swab both nares with Povidone-Iodine solution, EXCLUDE if patient has shellfish/Iodine allergy. Routine, day of surgery, on call to OR; Standing  -     Nursing Communication CHG bath, have staff wash entire body (neck down) per pre-op bathing protocol. Routine, evening prior to, and day of surgery.; Standing  -     Nursing Communication Warmimg Interventions Implemented; Standing  -     ceFAZolin (ANCEF) 1,000 mg in sodium chloride 0.9 % 50 mL IVPB          74 y.o. female with closed, displaced, comminuted fracture of proximal and distal phalanx of right  thumb  Diagnostics reviewed and physical exam performed.  Diagnosis, treatment options and associated risks were discussed with the patient including no treatment, nonsurgical treatment and potential for surgical intervention.  The patient was given the opportunity to ask questions regarding each.   Operative intervention in the form of CRPP vs ORIF was discussed at length, as well as expected outcome and recovery. Discussed nonoperative management with closed management. Discussed closed management is not recommended as she has angulated fracture, significant articular comminution with articular incongruency.  At this time, after informed discussion, patient wishes to proceed with operative right thumb  Risks and benefits were discussed. Risks of the surgery are inclusive of but not limited to bleeding, infection, nerve injury, blood clot, worsening of symptoms, not achieving the anticipated results, persistent stiffness, weakness and the need for additional surgery. Discussed delayed treatment and significant articular comminution may contribute to worse outcome. Discussed risk of post-traumatic arthritis. D The patient verbally stated they understood those risks and would like to proceed with the surgery. Surgical consent was signed in the office today. I will see the patient the day of surgery.  Patient placed in alumafoam splint to be worn at all times until her surgery  Continue with Tylenol as needed for pain relief        Follow Up:  After surgery       To Do Next Visit:  Re-evaluation of current issue and X-rays of the  right  thumb out of splint      Discussions:  Fracture Operative Treatment: The physiology of a fractured bone was discussed with the patient today.  With non-displaced or minimally displaced fractures, conservative treatment such as casting or splinting often results in a functional recovery.  Typically, these fractures are immobilized in either a cast or splint depending on the pattern.   Radiographs are typically taken at intervals throughout the fracture healing to ensure that reduction or alignment is not lost.  If the fracture loses its alignment, surgical intervention may be required to stabilize it.  Medical conditions such as diabetes, osteoporosis, vitamin D deficiency, and a history of or exposure to smoking may delay or prevent fracture healing. Options between cast/splint immobilization and surgical treatment were offered and the risks and benefits of both were discussed. With displaced fractures, operative treatment often results in a functional recovery.  Typically, these fractures undergo reduction either through percutaneous or open methods depending on the location and fracture pattern.  Radiographs are typically taken at intervals throughout the fracture healing ensure maintenance of reduction and alignment.  If the fracture loses its alignment, revision surgery may be required.  Medical conditions such as diabetes, osteoporosis, vitamin D deficiency, and a history of or exposure to smoking may delay or prevent fracture healing.  The risks and benefits of the procedure were explained to the patient, which include, but are not limited to: Bleeding, infection, recurrence, pain, scar, malunion, nonunion, damage to tendons, damage to nerves, and damage to blood vessels, and complications related to anesthesia, failure to give desire result, need for more surgery.  These risks, along with alternative conservative treatment options, and postoperative protocols were voiced back and understood by the patient.  All questions were answered to the patient's satisfaction.  The patient agrees to comply with a standard postoperative protocol, and is willing to proceed.  Education was provided via written and auditory forms.  There were no barriers to learning.  Written handouts regarding wound care, incision and scar care, and general preoperative information was provided to the patient.  Prior to  surgery, the patient may be requested to stop all anti-inflammatory medications.  Prophylactic aspirin, Plavix, and Coumadin may be allowed to be continued.  Medications including vitamin E., ginkgo, and fish oil are requested to be stopped approximately one week prior to surgery.  Hypertensive medications and beta blockers, if taken, should be continued.      Dagoberto Yates MD  Attending, Orthopaedic Surgery  Hand, Wrist, and Elbow Surgery  [facility]    ______________________________________________________________________________________________    CHIEF COMPLAINT:  Chief Complaint   Patient presents with   • Right Hand - Fracture   • Right Thumb - Fracture       SUBJECTIVE:  Patient is a 74 y.o. RHD female who presents today for evaluation and treatment of right thumb injury sustained 6/7/2024 after a fall onto the outstretched hand secondary to a seizure. She was placed in a splint and referred to Orthopedics for further evaluation and treatment. Today she describes 10/10 pain that was improved to 4/10 when the splint. Denies previous injury, surgery, trauma to the thumb. She is taking Tylenol for pain with some relief.     Occupation: retired    I have personally reviewed all the relevant PMH, PSH, SH, FH, Medications and allergies      PAST MEDICAL HISTORY:  Past Medical History:   Diagnosis Date   • Anxiety    • Depression    • Depression, recurrent (HCC) 04/20/2021   • Hallucinations 04/20/2021   • History of seizure disorder 04/20/2021       PAST SURGICAL HISTORY:  History reviewed. No pertinent surgical history.    FAMILY HISTORY:  Family History   Problem Relation Age of Onset   • Diabetes Mother    • Stroke Father    • No Known Problems Sister    • No Known Problems Sister    • No Known Problems Sister    • No Known Problems Daughter    • No Known Problems Daughter    • No Known Problems Daughter    • No Known Problems Maternal Grandmother    • No Known Problems Paternal  Grandmother    • No Known Problems Maternal Aunt    • No Known Problems Maternal Aunt    • No Known Problems Maternal Aunt    • No Known Problems Maternal Aunt    • No Known Problems Maternal Aunt    • No Known Problems Maternal Aunt    • No Known Problems Maternal Aunt    • No Known Problems Maternal Aunt    • No Known Problems Paternal Aunt    • Breast cancer Neg Hx        SOCIAL HISTORY:  Social History     Tobacco Use   • Smoking status: Every Day     Current packs/day: 0.50     Types: Cigarettes   • Smokeless tobacco: Never   Vaping Use   • Vaping status: Never Used   Substance Use Topics   • Alcohol use: Not Currently   • Drug use: Never       MEDICATIONS:    Current Outpatient Medications:   •  ALPRAZolam (XANAX) 2 MG tablet, Take 1 tablet (2 mg total) by mouth 4 (four) times a day for 14 days, Disp: 56 tablet, Rfl: 0  •  apixaban (Eliquis) 5 mg, Take 1 tablet (5 mg total) by mouth 2 (two) times a day, Disp: 60 tablet, Rfl: 0  •  aspirin 81 mg chewable tablet, Chew 1 tablet (81 mg total) daily, Disp: 60 tablet, Rfl: 0  •  diltiazem (CARDIZEM CD) 120 mg 24 hr capsule, Take 1 capsule (120 mg total) by mouth daily, Disp: 60 capsule, Rfl: 0  •  gabapentin (NEURONTIN) 300 mg capsule, Take 300 mg by mouth 3 (three) times a day, Disp: , Rfl:   •  levETIRAcetam (KEPPRA) 750 mg tablet, Take 1 tablet (750 mg total) by mouth every 12 (twelve) hours, Disp: 120 tablet, Rfl: 0  •  lisinopril (ZESTRIL) 10 mg tablet, Take 1 tablet (10 mg total) by mouth 2 (two) times a day, Disp: 120 tablet, Rfl: 0  •  Multiple Vitamin (multivitamin) tablet, Take 1 tablet by mouth daily, Disp: , Rfl:   •  Omega-3 Fatty Acids (fish oil) 1,000 mg, Take 1,000 mg by mouth daily, Disp: , Rfl:   •  QUEtiapine (SEROquel) 100 mg tablet, Take 100 mg by mouth every morning, Disp: , Rfl:   •  QUEtiapine (SEROquel) 300 mg tablet, Take 1 tablet (300 mg total) by mouth daily at bedtime, Disp: , Rfl:   •  vitamin B-12 (VITAMIN B-12) 1,000 mcg tablet, Take  by mouth daily, Disp: , Rfl:   •  ezetimibe (ZETIA) 10 mg tablet, Take 1 tablet (10 mg total) by mouth daily, Disp: 90 tablet, Rfl: 1  •  tamsulosin (FLOMAX) 0.4 mg, Take 1 capsule (0.4 mg total) by mouth daily with dinner, Disp: , Rfl:     Current Facility-Administered Medications:   •  cyanocobalamin injection 1,000 mcg, 1,000 mcg, Intramuscular, Q30 Days, Brien Desouza MD, 1,000 mcg at 01/06/23 1541    ALLERGIES:  Allergies   Allergen Reactions   • Lipitor [Atorvastatin] Other (See Comments)     Muscle aches           REVIEW OF SYSTEMS:  Musculoskeletal:        As noted in HPI.   All other systems reviewed and are negative.    VITALS:  There were no vitals filed for this visit.    LABS:  HgA1c:   Lab Results   Component Value Date    HGBA1C 6.3 (H) 06/09/2024     BMP:   Lab Results   Component Value Date    GLUCOSE 199 (H) 06/07/2024    CALCIUM 8.8 06/15/2024    K 3.2 (L) 06/15/2024    CO2 31 06/15/2024     06/15/2024    BUN 9 06/15/2024    CREATININE 0.51 (L) 06/15/2024       _____________________________________________________  PHYSICAL EXAMINATION:  General: Well developed and well nourished, alert & oriented x 3, appears comfortable  Psychiatric: Normal  HEENT: Normocephalic, Atraumatic Trachea Midline, No torticollis  Pulmonary: No audible wheezing or respiratory distress   Abdomen/GI: Non tender, non distended   Cardiovascular: No pitting edema, 2+ radial pulse   Skin: No masses, erythema, lacerations, fluctation, ulcerations  Neurovascular: Sensation Intact to the Median, Ulnar, Radial Nerve, Motor Intact to the Median, Ulnar, Radial Nerve, and Pulses Intact  Musculoskeletal: Normal, except as noted in detailed exam and in HPI.      MUSCULOSKELETAL EXAMINATION:  Right thumb:  Radially deviated 35 degrees at proximal phalanx level  FPL, EPL intact  Swelling, ecchymosis  Ttp diffusely throughout the thumb  Brisk capillary refill  Sensation intact to Ax/R/M/U nerve  distributions      ___________________________________________________  STUDIES REVIEWED:  Xrays of the right thumb were reviewed and independently interpreted in PACS by Dr. Yates and demonstrate closed, displaced, comminuted proximal phalanx fracture with articular incongruency and radial deviation through fracture site, displaced distal phalanx intra-articular fracture          PROCEDURES PERFORMED:  Procedures  No Procedures performed today    _____________________________________________________      Scribe Attestation    I,:  Nadia Smiley am acting as a scribe while in the presence of the attending physician.:       I,:  Dagoberto Yates MD personally performed the services described in this documentation    as scribed in my presence.:

## 2024-06-26 ENCOUNTER — TELEPHONE (OUTPATIENT)
Dept: OBGYN CLINIC | Facility: HOSPITAL | Age: 74
End: 2024-06-26

## 2024-06-26 NOTE — PROGRESS NOTES
Nursing Home Visit - Pulmonary   Kirsty Em 74 y.o. female MRN: [id number]  Unit/Bed#:  Encounter: [id number]    Assessment:  Emphysema with unknown COPD status, asymptomatic  Nicotine dependence with current use  Seizure disorder on Keppra   New onset atrial fibrillation currently controlled and on anticoagulation with Eliquis  Right thumb fracture    Plan:  Smoking cessation counseling and provided nicotine patch if she agrees  Encourage ambulation and incentive spirometry  Patient denies any symptoms so no need to start any inhalers currently.  Continue antiseizure medications and follow-up with neurology  Continue Eliquis and diltiazem and follow with cardiology  Outpatient follow-up with pulmonary after discharge from rehab, she will need to continue lung cancer screening and if she develops respiratory symptoms we will do PFTs.    ----------------------------------------------------------------------------------------------------------------------    HPI/Interval History:   Patient is known to have emphysema/COPD, seizure disorder but stopped her antiseizure medications on her own according to records and CKD stage III. She was hospitalized recently with unresponsiveness/encephalopathy requiring intubation for airway protection and then successfully extubated.  She was considered to have likely seizure and was restarted on Keppra.  Also she developed new onset atrial fibrillation with RVR that was managed by cardiology.  Currently she is in the rehab facility.    Patient smoked 1 pack/day for more than 45 years and currently cut down to few cigarettes per day on until prior to this admission.  In general she denies any dyspnea on exertion even with climbing steps, denies chronic cough, denies chest tightness or wheezing.  Currently she feels fine and she is going from fracture procedure.    Vitals:   Reviewed vital signs in chart at nursing facility, stable    Physical Exam:   Gen: Alert and without  "respiratory distress  HEENT: PERRL. O/P: clear. no erythema or exudate.  Neck: Supple. There is no JVD, no LAD or thyromegaly appreciated. Trachea is midline.  Chest: Equal breath sounds bilaterally and clear to auscultation with no wheezing or crackles  Cardiac: S1-S2 regular, no murmurs  Abdomen: Soft and nontender. Bowel sounds are present.  Extremities: No edema, wrapped right hand/stump  Neuro:  Awake alert and oriented, no focal deficits      Pertinent studies:  Echocardiogram: LVEF 55%, grade 2 diastolic dysfunction, estimated peak PA pressure 45, normal RV, mild to moderate MR    Chest CT scan reviewed on PACS: Moderate emphysematous changes specially in the upper lobes, dependent minimal atelectatic changes at the bases.    Labs reviewed: Creatinine 0.51, CO2 31, hemoglobin 10.7, VBG with pH 7.34 and pCO2 46    Malek Darell Brown MD    Portions of the record may have been created with voice recognition software. Occasional wrong word or \"sound a like\" substitutions may have occurred due to the inherent limitations of voice recognition software. Read the chart carefully and recognize, using context, where substitutions have occurred.  "

## 2024-06-26 NOTE — TELEPHONE ENCOUNTER
[facility]    Requesting both pt's  (who will be bringing pt for sx) and the facility (where the pt is currently admitted to be contacted for sx information- time of arrival tomorrow      [phone]- [facility]     [phone]- pt's

## 2024-06-27 ENCOUNTER — ANESTHESIA (OUTPATIENT)
Dept: PERIOP | Facility: HOSPITAL | Age: 74
End: 2024-06-27
Payer: MEDICARE

## 2024-06-27 ENCOUNTER — TELEPHONE (OUTPATIENT)
Dept: OTHER | Facility: OTHER | Age: 74
End: 2024-06-27

## 2024-06-27 ENCOUNTER — APPOINTMENT (OUTPATIENT)
Dept: RADIOLOGY | Facility: HOSPITAL | Age: 74
End: 2024-06-27
Payer: MEDICARE

## 2024-06-27 ENCOUNTER — NURSING HOME VISIT (OUTPATIENT)
Dept: PULMONOLOGY | Facility: SKILLED NURSING FACILITY | Age: 74
End: 2024-06-27
Payer: MEDICARE

## 2024-06-27 ENCOUNTER — HOSPITAL ENCOUNTER (OUTPATIENT)
Facility: HOSPITAL | Age: 74
Setting detail: OUTPATIENT SURGERY
Discharge: HOME | End: 2024-06-27
Attending: STUDENT IN AN ORGANIZED HEALTH CARE EDUCATION/TRAINING PROGRAM | Admitting: STUDENT IN AN ORGANIZED HEALTH CARE EDUCATION/TRAINING PROGRAM
Payer: MEDICARE

## 2024-06-27 VITALS
HEIGHT: 61 IN | HEART RATE: 82 BPM | TEMPERATURE: 97.1 F | WEIGHT: 129 LBS | OXYGEN SATURATION: 88 % | BODY MASS INDEX: 24.35 KG/M2 | DIASTOLIC BLOOD PRESSURE: 87 MMHG | SYSTOLIC BLOOD PRESSURE: 172 MMHG | RESPIRATION RATE: 18 BRPM

## 2024-06-27 DIAGNOSIS — J43.2 CENTRILOBULAR EMPHYSEMA (HCC): Primary | ICD-10-CM

## 2024-06-27 DIAGNOSIS — Z47.89 AFTERCARE FOLLOWING SURGERY OF THE MUSCULOSKELETAL SYSTEM: Primary | ICD-10-CM

## 2024-06-27 PROCEDURE — 26735 TREAT FINGER FRACTURE EACH: CPT | Performed by: STUDENT IN AN ORGANIZED HEALTH CARE EDUCATION/TRAINING PROGRAM

## 2024-06-27 PROCEDURE — 73140 X-RAY EXAM OF FINGER(S): CPT

## 2024-06-27 PROCEDURE — 26756 PIN FINGER FRACTURE EACH: CPT | Performed by: PHYSICIAN ASSISTANT

## 2024-06-27 PROCEDURE — 26756 PIN FINGER FRACTURE EACH: CPT | Performed by: STUDENT IN AN ORGANIZED HEALTH CARE EDUCATION/TRAINING PROGRAM

## 2024-06-27 PROCEDURE — 99305 1ST NF CARE MODERATE MDM 35: CPT | Performed by: INTERNAL MEDICINE

## 2024-06-27 PROCEDURE — 26735 TREAT FINGER FRACTURE EACH: CPT | Performed by: PHYSICIAN ASSISTANT

## 2024-06-27 PROCEDURE — C1713 ANCHOR/SCREW BN/BN,TIS/BN: HCPCS | Performed by: STUDENT IN AN ORGANIZED HEALTH CARE EDUCATION/TRAINING PROGRAM

## 2024-06-27 DEVICE — C-WIRE PAK DOUBLE ENDED ORTHOPAEDIC WIRE, SPADE, .062" (1.57 MM)
Type: IMPLANTABLE DEVICE | Site: THUMB | Status: FUNCTIONAL
Brand: C-WIRE

## 2024-06-27 RX ORDER — OXYCODONE HYDROCHLORIDE 5 MG/1
5 TABLET ORAL EVERY 6 HOURS PRN
Status: DISCONTINUED | OUTPATIENT
Start: 2024-06-27 | End: 2024-06-27

## 2024-06-27 RX ORDER — CHLORHEXIDINE GLUCONATE ORAL RINSE 1.2 MG/ML
15 SOLUTION DENTAL ONCE
Status: COMPLETED | OUTPATIENT
Start: 2024-06-27 | End: 2024-06-27

## 2024-06-27 RX ORDER — CEFAZOLIN SODIUM 1 G/50ML
1000 SOLUTION INTRAVENOUS ONCE
Status: COMPLETED | OUTPATIENT
Start: 2024-06-27 | End: 2024-06-27

## 2024-06-27 RX ORDER — FENTANYL CITRATE/PF 50 MCG/ML
25 SYRINGE (ML) INJECTION
Status: DISCONTINUED | OUTPATIENT
Start: 2024-06-27 | End: 2024-06-27 | Stop reason: HOSPADM

## 2024-06-27 RX ORDER — ONDANSETRON 2 MG/ML
INJECTION INTRAMUSCULAR; INTRAVENOUS AS NEEDED
Status: DISCONTINUED | OUTPATIENT
Start: 2024-06-27 | End: 2024-06-27

## 2024-06-27 RX ORDER — PROPOFOL 10 MG/ML
INJECTION, EMULSION INTRAVENOUS AS NEEDED
Status: DISCONTINUED | OUTPATIENT
Start: 2024-06-27 | End: 2024-06-27

## 2024-06-27 RX ORDER — HYDROMORPHONE HCL/PF 1 MG/ML
0.5 SYRINGE (ML) INJECTION
Status: DISCONTINUED | OUTPATIENT
Start: 2024-06-27 | End: 2024-06-27 | Stop reason: HOSPADM

## 2024-06-27 RX ORDER — SODIUM CHLORIDE, SODIUM LACTATE, POTASSIUM CHLORIDE, CALCIUM CHLORIDE 600; 310; 30; 20 MG/100ML; MG/100ML; MG/100ML; MG/100ML
INJECTION, SOLUTION INTRAVENOUS CONTINUOUS PRN
Status: DISCONTINUED | OUTPATIENT
Start: 2024-06-27 | End: 2024-06-27

## 2024-06-27 RX ORDER — ONDANSETRON 2 MG/ML
4 INJECTION INTRAMUSCULAR; INTRAVENOUS ONCE AS NEEDED
Status: DISCONTINUED | OUTPATIENT
Start: 2024-06-27 | End: 2024-06-27 | Stop reason: HOSPADM

## 2024-06-27 RX ORDER — LIDOCAINE HYDROCHLORIDE 10 MG/ML
INJECTION, SOLUTION EPIDURAL; INFILTRATION; INTRACAUDAL; PERINEURAL AS NEEDED
Status: DISCONTINUED | OUTPATIENT
Start: 2024-06-27 | End: 2024-06-27

## 2024-06-27 RX ORDER — FENTANYL CITRATE 50 UG/ML
INJECTION, SOLUTION INTRAMUSCULAR; INTRAVENOUS AS NEEDED
Status: DISCONTINUED | OUTPATIENT
Start: 2024-06-27 | End: 2024-06-27

## 2024-06-27 RX ORDER — IBUPROFEN 600 MG/1
600 TABLET ORAL ONCE
Status: COMPLETED | OUTPATIENT
Start: 2024-06-27 | End: 2024-06-27

## 2024-06-27 RX ORDER — DIPHENHYDRAMINE HYDROCHLORIDE 50 MG/ML
12.5 INJECTION INTRAMUSCULAR; INTRAVENOUS ONCE AS NEEDED
Status: DISCONTINUED | OUTPATIENT
Start: 2024-06-27 | End: 2024-06-27 | Stop reason: HOSPADM

## 2024-06-27 RX ORDER — OXYCODONE HYDROCHLORIDE 5 MG/1
TABLET ORAL
Qty: 15 TABLET | Refills: 0 | Status: SHIPPED | OUTPATIENT
Start: 2024-06-27 | End: 2024-06-27

## 2024-06-27 RX ORDER — MAGNESIUM HYDROXIDE 1200 MG/15ML
LIQUID ORAL AS NEEDED
Status: DISCONTINUED | OUTPATIENT
Start: 2024-06-27 | End: 2024-06-27 | Stop reason: HOSPADM

## 2024-06-27 RX ADMIN — CEFAZOLIN SODIUM 1000 MG: 1 SOLUTION INTRAVENOUS at 15:42

## 2024-06-27 RX ADMIN — ONDANSETRON 4 MG: 2 INJECTION INTRAMUSCULAR; INTRAVENOUS at 16:25

## 2024-06-27 RX ADMIN — LIDOCAINE HYDROCHLORIDE 50 MG: 10 INJECTION, SOLUTION EPIDURAL; INFILTRATION; INTRACAUDAL at 15:42

## 2024-06-27 RX ADMIN — FENTANYL CITRATE 25 MCG: 50 INJECTION INTRAMUSCULAR; INTRAVENOUS at 16:12

## 2024-06-27 RX ADMIN — FENTANYL CITRATE 25 MCG: 50 INJECTION INTRAMUSCULAR; INTRAVENOUS at 15:42

## 2024-06-27 RX ADMIN — CHLORHEXIDINE GLUCONATE 15 ML: 1.2 RINSE ORAL at 14:13

## 2024-06-27 RX ADMIN — IBUPROFEN 600 MG: 600 TABLET ORAL at 17:40

## 2024-06-27 RX ADMIN — PROPOFOL 100 MCG/KG/MIN: 10 INJECTION, EMULSION INTRAVENOUS at 15:44

## 2024-06-27 RX ADMIN — FENTANYL CITRATE 25 MCG: 50 INJECTION INTRAMUSCULAR; INTRAVENOUS at 16:28

## 2024-06-27 RX ADMIN — SODIUM CHLORIDE, SODIUM LACTATE, POTASSIUM CHLORIDE, AND CALCIUM CHLORIDE: .6; .31; .03; .02 INJECTION, SOLUTION INTRAVENOUS at 15:17

## 2024-06-27 RX ADMIN — FENTANYL CITRATE 25 MCG: 50 INJECTION INTRAMUSCULAR; INTRAVENOUS at 15:23

## 2024-06-27 RX ADMIN — PROPOFOL 50 MG: 10 INJECTION, EMULSION INTRAVENOUS at 15:42

## 2024-06-27 RX ADMIN — PROPOFOL 30 MG: 10 INJECTION, EMULSION INTRAVENOUS at 15:23

## 2024-06-27 NOTE — ANESTHESIA POSTPROCEDURE EVALUATION
Post-Op Assessment Note    CV Status:  Stable    Pain management: adequate       Mental Status:  Alert and awake   Hydration Status:  Euvolemic   PONV Controlled:  Controlled   Airway Patency:  Patent     Post Op Vitals Reviewed: Yes    No anethesia notable event occurred.    Staff: CRNA           BP      Temp      Pulse     Resp      SpO2

## 2024-06-27 NOTE — DISCHARGE INSTR - AVS FIRST PAGE
Post Operative Instructions    You have had surgery on your arm today, please read and follow the information below:  Elevate your hand above your elbow during the next 24-48 hours to help with swelling.  Place your hand and arm over your head with motion at your shoulder three times a day.  Do not apply any cream/ointment/oil to your incisions including antibiotics (I.e.Neosporin)  Do not use peroxide to clean your wound   Do not soak your hands in standing water (dishwater, tubs, Jacuzzi's, pools, etc.) until given permission (typically 2-3 weeks after injury)    Call the office if you notice any:  Increased numbness or tingling of your hand or fingers that is not relieved with elevation.  Increasing pain that is not controlled with medication.  Difficulty chewing, breathing, swallowing.  Chest pains or shortness of breath.  Fever over 101.4 degrees.    Bandage: Do NOT remove bandage until follow-up appointment.    Motion: Move fingers into a fist 5 times a day, DO NOT move any splinted fingers.    Weight bearing status: The operated extremity should be non-weight bearing until further notice.    Ice: Ice for 10 minutes every hour as needed for swelling x 24 hours.    Sling: No sling necessary.    Pain medication:   Oxycodone 0.5 - 1 tablet every 6 hours AS NEEDED for pain  *   You may take Tylenol (acetaminophen) in addition to your pain medication. This is over the counter. Please follow the instructions on the bottle. BE AWARE - your pain medication (hydrocodone/oxycodone) may already include acetaminophen in it. If it does, you must include this in your daily amount and make sure not take more than 3000 mg per day.     Antibiotics:  None     Therapy: An order for hand therapy has been placed in your chart. Please call [phone] at your earliest convenience to get an appointment scheduled for a day or two prior to your first postoperative appointment. The earlier you call, the better chance of being able to  get an appointment time that works for you.     Follow-up Appointment: 10-14 days.        Please call the office if you have any questions or concerns regarding your post-operative care.

## 2024-06-27 NOTE — OP NOTE
OPERATIVE REPORT  PATIENT NAME: Kirsty Em    :  1950  MRN: [id number]  Pt Location: AN OR ROOM 02    SURGERY DATE: 2024     Surgeons and Role:     * Dagoberto Yates MD - Primary     * Marian Childs PA-C - Assisting    Physician Assistant need: A physician assistant was required during the procedure for retraction, tissue handling, dissection, and suturing. No qualified resident was available.    Pre-Op Diagnosis Codes:   Closed displaced fracture of proximal phalanx of right thumb  Closed displaced fracture of distal phalanx of right thumb    Post-Op Diagnosis Codes:  Closed displaced fracture of proximal phalanx of right thumb  Closed displaced fracture of distal phalanx of right thumb    Procedure(s) (LRB):  OPEN REDUCTION INTERNAL FIXATION OF RIGHT THUMB PROXIMAL PHALANX FRACTURE  CLOSED REDUCTION PERCUTANEOUS PINNING RIGHT THUMB DISTAL PHALANX FRACTURE    Specimen(s):  * No specimens in log *    Estimated Blood Loss:   Minimal    Drains:  None    Implants:  Implant Name Type Inv. Item Serial No.  Lot No. LRB No. Used Action   LOG [id number] - Ayi Laile MODULAR MINI FRAG LCP SYSTEM - 1           LOG [id number] - Ayi Laile MODULAR HAND SYSTEM TITANIUM IMPLANTS AND INSTRUMENTS - 1           C-WIRE BLACK .062 - [id number]  C-WIRE BLACK .062  [facility] LINV[employer] [id number] Right 1 Implanted       Anesthesia Type:   IV Sedation with Anesthesia    Operative Indications:  Patient is a 74 y.o. female evaluated in clinic with Right thumb proximal and middle phalanx fractures.  Radiographs were reviewed with patient.  Patient sustained injury on around 2024 and was admitted to hospital for about 10 days after a stroke.  We discussed treatment options with patient including conservative management and surgical management. Discussed nonoperative management with immobilization.  We discussed operative management with closed reduction percutaneous pinning versus open reduction internal fixation.  Given  incongruent base of proximal phalanx and articular impaction on radiographs, we recommended operative management.  We discussed risk, benefits, and alternatives to surgery.  We discussed risk including pain, bleeding, stiffness, infection, need for further surgeries.  We discussed risk for posttraumatic arthritis given significant articular injury of the proximal phalanx base.  Patient elected for surgical management.  Informed consent was obtained.     Operative Findings:  Right thumb proximal and distal phalanx fractures that were treated with pin fixation.    Complications:   None     Procedure and Technique:  Patient was identified in the preoperative holding area.  Surgical sites were marked with patient participation.  Patient was taken back to the operating theater.  Anesthesia was induced.  Arm tourniquet was applied, however, never inflated.  Extremity was prepped and draped in typical fashion.  Formal time-out was performed confirming site, patient, and procedure. All present were in agreement.  A 50-50 mixture of lidocaine 1% and bupivacaine 0.25% was used for local block.    Procedure began with examination under fluoroscopy.  Closed reduction attempts were made to reduce the proximal phalanx intra-articular fracture, however, these were unsuccessful.  Decision was made to proceed with open reduction.  A 8 mm incision was made dorsally over the thumb proximal phalanx.  The extensor mechanism was incised longitudinally ulnar to extensor tendons.  The fracture site was encountered.  A Scalf elevator was introduced to the fracture site and used to elevate the joint surface.  There was significant comminution and significant displacement.  Consideration was made to do a more open approach, however, it was felt that further soft tissue dissection off of the bone would make indirect reduction more difficult.  Additionally, there was felt to be no way to reduce the articular surface well through direct measures  given amount of metaphyseal subarticular impaction.  Given this, reduction was accepted.     The distal phalanx fracture was examined under fluoroscopy.  There was extension through the distal phalanx base.  The articular surface appeared congruent, therefore, the mild extension was accepted.     A 0.062 K wire was introduced retrograde through the tip of the distal phalanx and into the proximal phalanx shaft.  While holding reduction with dorsal translation and ulnar translation of the distal finger, the K wire was driven into the metacarpal shaft. Final radiographs were performed and demonstrated reasonable alignment.  Digital alignment was inspected and there was appropriate rotational and angular alignment.  Incision was irrigated.  Extensor mechanism was repaired with 4-0 Vicryl in simple interrupted fashion.  Skin was closed with 4-0 chromic in horizontal mattress fashion.    Wounds were dressed with Xeroform, 4x4s, cast padding, thumb spica splint, and Ace bandage.  Patient was taken to PACU in stable condition.    Postoperative Plan:  We will see the patient at around the 2-week rafi.  We will leave pins in until 4-6 weeks pending radiographic healing.      Patient Disposition:  PACU         SIGNATURE: Dagoberto Yates MD  DATE: June 27, 2024  TIME: 4:36 PM

## 2024-06-27 NOTE — INTERVAL H&P NOTE
H&P reviewed. After examining the patient I find no changes in the patients condition since the H&P had been written.

## 2024-06-28 ENCOUNTER — TELEPHONE (OUTPATIENT)
Age: 74
End: 2024-06-28

## 2024-06-28 ENCOUNTER — NURSING HOME VISIT (OUTPATIENT)
Dept: GERIATRICS | Facility: OTHER | Age: 74
End: 2024-06-28

## 2024-06-28 VITALS
HEART RATE: 75 BPM | TEMPERATURE: 97.6 F | DIASTOLIC BLOOD PRESSURE: 72 MMHG | OXYGEN SATURATION: 97 % | SYSTOLIC BLOOD PRESSURE: 142 MMHG

## 2024-06-28 DIAGNOSIS — G40.909 SEIZURE DISORDER (HCC): ICD-10-CM

## 2024-06-28 DIAGNOSIS — J43.2 CENTRILOBULAR EMPHYSEMA (HCC): ICD-10-CM

## 2024-06-28 DIAGNOSIS — I63.9 CEREBROVASCULAR ACCIDENT (CVA), UNSPECIFIED MECHANISM (HCC): ICD-10-CM

## 2024-06-28 DIAGNOSIS — S62.511D CLOSED DISPLACED FRACTURE OF PROXIMAL PHALANX OF RIGHT THUMB WITH ROUTINE HEALING, SUBSEQUENT ENCOUNTER: Primary | ICD-10-CM

## 2024-06-28 DIAGNOSIS — I10 PRIMARY HYPERTENSION: ICD-10-CM

## 2024-06-28 DIAGNOSIS — I48.91 NEW ONSET A-FIB (HCC): ICD-10-CM

## 2024-06-28 RX ORDER — OXYCODONE HYDROCHLORIDE 5 MG/1
2.5 TABLET ORAL EVERY 6 HOURS PRN
Qty: 15 TABLET | Refills: 0 | Status: SHIPPED | OUTPATIENT
Start: 2024-06-28

## 2024-06-28 NOTE — TELEPHONE ENCOUNTER
Wilver, spouse of pt called in stating that pt is currently at [facility] in [place] and that she received a letter in the mail that her license is going to be revoked due to hisotry of seizures.     Wilver stated that he has some forms from [facility] that he will be dropping off Monday around 3:30PM, that need to be filled out by Dr. Desouza.     For any further information or questions, please call pt or spouse back. Thank you!    Kirsty Em   [phone]    Wilver Em   [phone]

## 2024-06-28 NOTE — ASSESSMENT & PLAN NOTE
S/p ORIF of right thumb  Continue NWB  Neurovascular status intact   Patient currently on tylenol ATC. Pain uncontrolled with current regimen, will add on oxycodone 2.5 mg every 6 hours as needed   F/u with ortho

## 2024-06-28 NOTE — ASSESSMENT & PLAN NOTE
No acute exacerbation   Currently on 2L NC  Seen by Pulmonology yesterday   Not currently on any maintenance inhalers  Will need Pulmonology f/u after discharge with PFTs

## 2024-06-28 NOTE — PROGRESS NOTES
[facility]  [address] [address]  [phone]  FACILITY: [facility]  Code 31 (STR)          NAME: Kirsty Em  AGE: 74 y.o. SEX: female CODE STATUS: CPR    DATE OF ENCOUNTER: 6/28/2024    Assessment and Plan     1. Closed displaced fracture of proximal phalanx of right thumb with routine healing, subsequent encounter  Assessment & Plan:  S/p ORIF of right thumb  Continue NWB  Neurovascular status intact   Patient currently on tylenol ATC. Pain uncontrolled with current regimen, will add on oxycodone 2.5 mg every 6 hours as needed   F/u with ortho   Orders:  -     oxyCODONE (Roxicodone) 5 immediate release tablet; Take 0.5 tablets (2.5 mg total) by mouth every 6 (six) hours as needed for moderate pain Max Daily Amount: 10 mg  2. Centrilobular emphysema (HCC)  Assessment & Plan:  No acute exacerbation   Currently on 2L NC  Seen by Pulmonology yesterday   Not currently on any maintenance inhalers  Will need Pulmonology f/u after discharge with PFTs   3. Primary hypertension  Assessment & Plan:  BP acceptable   Continue lisinopril 10 mg daily   Monitor BP  4. New onset a-fib (HCC)  Assessment & Plan:  HR stable   Continue cardizem for rate control   Continue eliquis for AC  5. Cerebrovascular accident (CVA), unspecified mechanism (HCC)  Assessment & Plan:  New onset of afib likely resulting to stroke and seizure.   MRI brain revealed Focal, subcentimeter acute to subacute infarct along the posterior left frontal cortex.   Intolerant of atorvastatin, continue zetia.   Continue ASA and eliquis   F/u with neurology   6. Seizure disorder (HCC)  Assessment & Plan:  Patient appears to have had a seziure after CVA.   No seizures reported at rehab  Continue keppra   F/u with neurology        All medications and routine orders were reviewed and updated as needed.    Chief Complaint     STR follow up visit    Past Medical and Surgica History      Past Medical History:   Diagnosis Date     Anxiety     Depression     Depression, recurrent (HCC) 04/20/2021    Hallucinations 04/20/2021    History of seizure disorder 04/20/2021     Past Surgical History:   Procedure Laterality Date    ORIF FINGER FRACTURE Right 6/27/2024    Procedure: ORIF OF RIGHT THUMB;  Surgeon: Dagoberto Yates MD;  Location: [facility];  Service: Orthopedics     Allergies   Allergen Reactions    Lipitor [Atorvastatin] Other (See Comments)     Muscle aches          History of Present Illness     Patient is a 74 y.o. old female being seen at [facility] for follow up of acute and chronic medical conditions. Patient is seen and examined sitting in bed without acute distress. She denies CP/SOB. She remains on supplemental oxygen. She was seen by Pulmonology yesterday prior to surgery. She had right thumb ORIF, remains in cast. She reports 8/10 pain. Overnight tylenol was scheduled, oxycodone was originally prescribed by orthopedic surgeon but script was not received and patient's daughter had requested no oxycodone. On speaking with patient today she no longer wants daughters updated and is requesting oxycodone for pain. Patient's spouse is at bedside and plan was discussed with him as well.           The patient's allergies, past medical, surgical, social and family history were reviewed and unchanged.    Review of Systems     Review of Systems   Musculoskeletal:  Positive for arthralgias.   Neurological:  Positive for weakness.   All other systems reviewed and are negative.        Objective     Vitals:   Vitals:    06/28/24 0955   BP: 142/72   Pulse: 75   Temp: 97.6 °F (36.4 °C)   SpO2: 97%         Physical Exam  Vitals reviewed.   Constitutional:       General: She is not in acute distress.     Appearance: Normal appearance. She is not ill-appearing, toxic-appearing or diaphoretic.   HENT:      Head: Normocephalic and atraumatic.   Eyes:      Conjunctiva/sclera: Conjunctivae normal.   Cardiovascular:      Rate and Rhythm: Normal rate and  regular rhythm.      Pulses: Normal pulses.      Heart sounds: Normal heart sounds. No murmur heard.  Pulmonary:      Effort: Pulmonary effort is normal. No respiratory distress.      Breath sounds: Decreased air movement present. Decreased breath sounds present.   Abdominal:      General: Bowel sounds are normal. There is no distension.      Palpations: Abdomen is soft.      Tenderness: There is no abdominal tenderness.   Musculoskeletal:      Right lower leg: No edema.      Left lower leg: No edema.   Skin:     General: Skin is warm and dry.   Neurological:      General: No focal deficit present.      Mental Status: She is alert and oriented to person, place, and time.      Motor: Weakness present.   Psychiatric:         Mood and Affect: Mood normal.         Behavior: Behavior normal.         Thought Content: Thought content normal.         Pertinent Laboratory/Diagnostic Studies:   Reviewed in facility chart-stable      Current Medications   Medications reviewed and updated see facility MAR for details.      Current Outpatient Medications:     oxyCODONE (Roxicodone) 5 immediate release tablet, Take 0.5 tablets (2.5 mg total) by mouth every 6 (six) hours as needed for moderate pain Max Daily Amount: 10 mg, Disp: 15 tablet, Rfl: 0    ALPRAZolam (XANAX) 2 MG tablet, Take 1 tablet (2 mg total) by mouth 4 (four) times a day for 14 days, Disp: 56 tablet, Rfl: 0    apixaban (Eliquis) 5 mg, Take 1 tablet (5 mg total) by mouth 2 (two) times a day, Disp: 60 tablet, Rfl: 0    aspirin 81 mg chewable tablet, Chew 1 tablet (81 mg total) daily, Disp: 60 tablet, Rfl: 0    diltiazem (CARDIZEM CD) 120 mg 24 hr capsule, Take 1 capsule (120 mg total) by mouth daily, Disp: 60 capsule, Rfl: 0    ezetimibe (ZETIA) 10 mg tablet, Take 1 tablet (10 mg total) by mouth daily, Disp: 90 tablet, Rfl: 1    gabapentin (NEURONTIN) 300 mg capsule, Take 300 mg by mouth 3 (three) times a day, Disp: , Rfl:     levETIRAcetam (KEPPRA) 750 mg tablet,  "Take 1 tablet (750 mg total) by mouth every 12 (twelve) hours, Disp: 120 tablet, Rfl: 0    lisinopril (ZESTRIL) 10 mg tablet, Take 1 tablet (10 mg total) by mouth 2 (two) times a day, Disp: 120 tablet, Rfl: 0    Multiple Vitamin (multivitamin) tablet, Take 1 tablet by mouth daily, Disp: , Rfl:     Omega-3 Fatty Acids (fish oil) 1,000 mg, Take 1,000 mg by mouth daily, Disp: , Rfl:     QUEtiapine (SEROquel) 100 mg tablet, Take 100 mg by mouth every morning, Disp: , Rfl:     QUEtiapine (SEROquel) 300 mg tablet, Take 1 tablet (300 mg total) by mouth daily at bedtime, Disp: , Rfl:     tamsulosin (FLOMAX) 0.4 mg, Take 1 capsule (0.4 mg total) by mouth daily with dinner, Disp: , Rfl:     vitamin B-12 (VITAMIN B-12) 1,000 mcg tablet, Take by mouth daily, Disp: , Rfl:     Current Facility-Administered Medications:     cyanocobalamin injection 1,000 mcg, 1,000 mcg, Intramuscular, Q30 Days, Brien Desouza MD, 1,000 mcg at 01/06/23 1541     Plan discussed with Dr. Rubio. Dr. Rubio noted agreement with assessment and plan.      Please note:  Voice-recognition software may have been used in the preparation of this document.  Occasional wrong word or \"sound-alike\" substitutions may have occurred due to the inherent limitations of voice recognition software.  Interpretation should be guided by context.         SINDY Mackay        "

## 2024-06-28 NOTE — TELEPHONE ENCOUNTER
Zack called, requesting a callback from on call provider, regarding patient's tylenol med order. Provider paged via Epic chat

## 2024-06-28 NOTE — ASSESSMENT & PLAN NOTE
Patient appears to have had a seziure after CVA.   No seizures reported at rehab  Continue keppra   F/u with neurology

## 2024-07-01 VITALS
HEART RATE: 89 BPM | TEMPERATURE: 97.2 F | RESPIRATION RATE: 18 BRPM | DIASTOLIC BLOOD PRESSURE: 78 MMHG | OXYGEN SATURATION: 94 % | SYSTOLIC BLOOD PRESSURE: 140 MMHG

## 2024-07-01 NOTE — ASSESSMENT & PLAN NOTE
Patient found unresponsive by   Neurology felt likely seizure etiology in the setting of sepsis, met. Encephalopathy  Started on Keppra, continue 750 mg Q 12  F/u with neurology

## 2024-07-01 NOTE — TELEPHONE ENCOUNTER
Wilver dropped off the paperwork- it is a seizure reporting form. It has been placed in Dr. Desouza' folder.  It can be faxed to [phone].    Please advise once complete and we will notify patient's  it has been completed.     Thank you!

## 2024-07-01 NOTE — ASSESSMENT & PLAN NOTE
MRI showing Focal, subcentimeter acute to subacute infarct along the posterior left frontal cortex.  Small meningioma along the lateral left frontal convexity as suspected on the recent CT examination.  Moderate chronic microangiopathic ischemic changes.  Patient intolerant to statins, continue Zetia  Continue ASA 81 mg daily  Continue Eliquis 5 mg BID

## 2024-07-02 ENCOUNTER — NURSING HOME VISIT (OUTPATIENT)
Dept: GERIATRICS | Facility: OTHER | Age: 74
End: 2024-07-02
Payer: MEDICARE

## 2024-07-02 DIAGNOSIS — I10 PRIMARY HYPERTENSION: ICD-10-CM

## 2024-07-02 DIAGNOSIS — R33.9 URINARY RETENTION: ICD-10-CM

## 2024-07-02 DIAGNOSIS — S62.511D CLOSED DISPLACED FRACTURE OF PROXIMAL PHALANX OF RIGHT THUMB WITH ROUTINE HEALING, SUBSEQUENT ENCOUNTER: ICD-10-CM

## 2024-07-02 DIAGNOSIS — I48.91 NEW ONSET A-FIB (HCC): ICD-10-CM

## 2024-07-02 DIAGNOSIS — F41.9 ANXIETY DISORDER, UNSPECIFIED TYPE: ICD-10-CM

## 2024-07-02 DIAGNOSIS — R31.0 GROSS HEMATURIA: Primary | ICD-10-CM

## 2024-07-02 DIAGNOSIS — G40.909 SEIZURE DISORDER (HCC): ICD-10-CM

## 2024-07-02 PROCEDURE — 99309 SBSQ NF CARE MODERATE MDM 30: CPT

## 2024-07-02 NOTE — PROGRESS NOTES
[facility]  [address] [address]  [phone]  [facility]  Code 31 (STR)          NAME: Kirsty Em  AGE: 74 y.o. SEX: female CODE STATUS: CPR    DATE OF ENCOUNTER: 7/2/2024    Assessment and Plan     1. Gross hematuria  Assessment & Plan:  Patient with main catheter on Eliquis  Noted to have hematuria  Will hold Eliquis x 3 days  Order placed for irrigation q shift  Check CBC  2. Primary hypertension  Assessment & Plan:  /67  Continue to monitor BP  Continue Lisinopril 10 mg daily  3. Seizure disorder (HCC)  Assessment & Plan:  Patient found unresponsive by   Neurology felt likely seizure etiology in the setting of sepsis, met. Encephalopathy  Started on Keppra, continue 750 mg Q 12  F/u with neurology  4. Urinary retention  Assessment & Plan:  Main catheter placed while in hospital  Failed Voiding trial while at rehab  5. New onset a-fib (HCC)  Assessment & Plan:  HR controlled, 80  Denies CP/palpitations  Continue Cardizem  mg daily  Continue Eliquis 5 mg BID  6. Closed displaced fracture of proximal phalanx of right thumb with routine healing, subsequent encounter  Assessment & Plan:  Right thumb fracture  S/P ORIF  NWB  Continue tylenol, oxycodone for pain control  Ortho f/u 7/9  7. Anxiety disorder, unspecified type  Assessment & Plan:  Follows with psychiatry  Continues on high doses of Xanax and Seroquel         All medications and routine orders were reviewed and updated as needed.    Chief Complaint     STR follow up visit  Patient's care was coordinated with nursing facility staff. Recent vitals, labs, and updated medications were review on [facility] system in facility.  Past Medical and Surgical History      Past Medical History:   Diagnosis Date    Anxiety     Depression     Depression, recurrent (HCC) 04/20/2021    Hallucinations 04/20/2021    History of seizure disorder 04/20/2021     Past Surgical History:   Procedure Laterality Date     ORIF FINGER FRACTURE Right 6/27/2024    Procedure: ORIF OF RIGHT THUMB;  Surgeon: Dagoberto Yates MD;  Location: [facility];  Service: Orthopedics     Allergies   Allergen Reactions    Lipitor [Atorvastatin] Other (See Comments)     Muscle aches          History of Present Illness     HPI  Kirsty Em is a 74 year old female, she is a STR patient of [facility] since 6/17/24. Past Medical Hx including but not limited to CVA, HTN, COPD, HLD, Anxiety. She was seen in collaboration with nursing for medical mgmt and STR follow up.     Hospital Course  Patient was admitted to the hospital 6/7/24 after being found unresponsive on the floor by her . He stated he found her on the floor with incontinence of stool and clenching her fists.  Stroke alert pathway was initiated. Patient was intubated for airway protection. VT of head and neck concerning for left frontal meningioma and b/l ICA stenosis. Neurology felt more likely seizure etiology in the setting of sepsis and toxic metabolic encephalopathy in the setting of self withdrawal of AEDs. LP was done, menigitis coverage with cefepime, vanco, and acyclovir. Patient was started on Keppra 1500 bolus then 750 mg maintenance dose. Patient was extubated and down graded to floor. She was found to be in new onset Afib with RVR, . Patient spontaneously converted to SR. Cardiology followed, patient was started on amiodarone, heparin, then transitioned to Eliquis. Patient being discharged with Main catheter. Rehab was recommended and patient was discharged to [facility].    Rehab Course  Kirsty was seen and examined at bedside today. He  is visiting. On exam patient is resting in bed and does not appear to be in distress. Patient is sleeping well and has a good appetite. She continues with therapy. Hematuria noted, will hold Eliquis x 3 days, irrigate main, check CBC.    Denies CP/SOB/N/V/D. Denies lightheadedness, dizziness, headaches, vision changes.  Patient states they are eating well and staying hydrated. Denies any bowel or bladder issues. Per review of SNF records, Patient is eating 3 meals per day, consuming  %. Last documented BM 7/2/24. No concerns from nursing at this time.    The patient's allergies, past medical, surgical, social and family history were reviewed and unchanged.    Review of Systems     Review of Systems   Constitutional:  Negative for fever.   HENT:  Negative for congestion.    Respiratory:  Negative for cough and shortness of breath.         Oxygen use   Cardiovascular:  Negative for chest pain, palpitations and leg swelling.   Gastrointestinal:  Negative for abdominal distention, abdominal pain, constipation, diarrhea, nausea and vomiting.   Genitourinary:  Negative for difficulty urinating and dysuria.   Musculoskeletal:         Thumb fracture s/p ORIF   Skin: Negative.    Neurological:  Positive for weakness. Negative for dizziness and headaches.   Hematological: Negative.    Psychiatric/Behavioral:  Negative for confusion and sleep disturbance.          Objective     Vitals:   Vitals:    07/03/24 0910   BP: 132/67   Pulse: 80   Resp: 18   Temp: 97.6 °F (36.4 °C)   SpO2: 96%           Physical Exam  Vitals and nursing note reviewed.   Constitutional:       General: She is not in acute distress.     Appearance: Normal appearance. She is not ill-appearing.   HENT:      Head: Normocephalic and atraumatic.   Eyes:      Conjunctiva/sclera: Conjunctivae normal.   Cardiovascular:      Rate and Rhythm: Normal rate and regular rhythm.      Heart sounds: Normal heart sounds.   Pulmonary:      Effort: Pulmonary effort is normal. No respiratory distress.      Breath sounds: Normal breath sounds. No wheezing.   Abdominal:      General: Bowel sounds are normal. There is no distension.      Palpations: Abdomen is soft.      Tenderness: There is no abdominal tenderness.   Musculoskeletal:      Right lower leg: No edema.      Left lower leg:  No edema.   Skin:     General: Skin is warm.   Neurological:      Mental Status: She is alert and oriented to person, place, and time.      Motor: Weakness present.      Gait: Gait abnormal.   Psychiatric:         Mood and Affect: Mood normal.         Behavior: Behavior normal.         Pertinent Laboratory/Diagnostic Studies:   Reviewed in facility chart-stable      Current Medications   Medications reviewed and updated see facility MAR for details.      Current Outpatient Medications:     ALPRAZolam (XANAX) 2 MG tablet, Take 1 tablet (2 mg total) by mouth 4 (four) times a day for 14 days, Disp: 56 tablet, Rfl: 0    apixaban (Eliquis) 5 mg, Take 1 tablet (5 mg total) by mouth 2 (two) times a day, Disp: 60 tablet, Rfl: 0    aspirin 81 mg chewable tablet, Chew 1 tablet (81 mg total) daily, Disp: 60 tablet, Rfl: 0    diltiazem (CARDIZEM CD) 120 mg 24 hr capsule, Take 1 capsule (120 mg total) by mouth daily, Disp: 60 capsule, Rfl: 0    ezetimibe (ZETIA) 10 mg tablet, Take 1 tablet (10 mg total) by mouth daily, Disp: 90 tablet, Rfl: 1    gabapentin (NEURONTIN) 300 mg capsule, Take 300 mg by mouth 3 (three) times a day, Disp: , Rfl:     levETIRAcetam (KEPPRA) 750 mg tablet, Take 1 tablet (750 mg total) by mouth every 12 (twelve) hours, Disp: 120 tablet, Rfl: 0    lisinopril (ZESTRIL) 10 mg tablet, Take 1 tablet (10 mg total) by mouth 2 (two) times a day, Disp: 120 tablet, Rfl: 0    Multiple Vitamin (multivitamin) tablet, Take 1 tablet by mouth daily, Disp: , Rfl:     Omega-3 Fatty Acids (fish oil) 1,000 mg, Take 1,000 mg by mouth daily, Disp: , Rfl:     oxyCODONE (Roxicodone) 5 immediate release tablet, Take 0.5 tablets (2.5 mg total) by mouth every 6 (six) hours as needed for moderate pain Max Daily Amount: 10 mg, Disp: 15 tablet, Rfl: 0    QUEtiapine (SEROquel) 100 mg tablet, Take 100 mg by mouth every morning, Disp: , Rfl:     QUEtiapine (SEROquel) 300 mg tablet, Take 1 tablet (300 mg total) by mouth daily at bedtime,  "Disp: , Rfl:     tamsulosin (FLOMAX) 0.4 mg, Take 1 capsule (0.4 mg total) by mouth daily with dinner, Disp: , Rfl:     vitamin B-12 (VITAMIN B-12) 1,000 mcg tablet, Take by mouth daily, Disp: , Rfl:     Current Facility-Administered Medications:     cyanocobalamin injection 1,000 mcg, 1,000 mcg, Intramuscular, Q30 Days, Brien Desouza MD, 1,000 mcg at 01/06/23 1545     Please note:  Voice-recognition software may have been used in the preparation of this document.  Occasional wrong word or \"sound-alike\" substitutions may have occurred due to the inherent limitations of voice recognition software.  Interpretation should be guided by context.         SINDY Muñoz  7/3/2024  9:27 AM    "

## 2024-07-02 NOTE — TELEPHONE ENCOUNTER
Dr Desouza completed form - successfully faxed to fax # provided in this encounter, confirmation sheet received, emailed to pt - form scanned / attached to this t/c note.       Wilver notified - original with confirmation left in accordion, on Side 2 for in office .   Emailed to : [email or web address]         Email   [email or web address]

## 2024-07-03 VITALS
OXYGEN SATURATION: 96 % | RESPIRATION RATE: 18 BRPM | DIASTOLIC BLOOD PRESSURE: 67 MMHG | HEART RATE: 80 BPM | SYSTOLIC BLOOD PRESSURE: 132 MMHG | TEMPERATURE: 97.6 F

## 2024-07-03 PROBLEM — R31.0 GROSS HEMATURIA: Status: ACTIVE | Noted: 2024-07-03

## 2024-07-03 NOTE — ANESTHESIA PREPROCEDURE EVALUATION
Procedure:  ORIF OF RIGHT THUMB (Right: Finger)    Relevant Problems   CARDIO   (+) Hypertension   (+) Mixed hyperlipidemia   (+) New onset a-fib (HCC)      NEURO/PSYCH   (+) Anxiety disorder   (+) Depression   (+) Seizure disorder (HCC)   (+) Stroke (cerebrum) (HCC)      PULMONARY   (+) Acute respiratory failure with hypoxia (HCC)   (+) Centrilobular emphysema (HCC)        Physical Exam    Airway    Mallampati score: III  TM Distance: >3 FB  Neck ROM: full     Dental       Cardiovascular      Pulmonary      Other Findings  post-pubertal.      Anesthesia Plan  ASA Score- 3     Anesthesia Type- IV sedation with anesthesia with ASA Monitors.         Additional Monitors:     Airway Plan:            Plan Factors-Exercise tolerance (METS): <4 METS.    Chart reviewed.                      Induction- intravenous.    Postoperative Plan-     Perioperative Resuscitation Plan - Level 1 - Full Code.       Informed Consent- Anesthetic plan and risks discussed with patient.  I personally reviewed this patient with the CRNA. Discussed and agreed on the Anesthesia Plan with the CRNA..  NPO appropriate. Discussed benefits/risks of monitored anesthetic care and discussed providing a dynamic level of mild to deep sedation. Risks include awareness, airway obstruction, aspiration which may necessitate conversion to general anesthesia. All questions answered. Patient understands and wishes to proceed.    Anesthesia plan and consent discussed with Kirsty who expressed understanding and agreement. Risks/benefits and alternatives discussed with patient including possible PONV, sore throat, damage to teeth/lips/gums and possibility of rare anesthetic and surgical emergencies.

## 2024-07-03 NOTE — ASSESSMENT & PLAN NOTE
Patient with main catheter on Eliquis  Noted to have hematuria  Will hold Eliquis x 3 days  Order placed for irrigation q shift  Check CBC

## 2024-07-05 ENCOUNTER — TELEPHONE (OUTPATIENT)
Dept: FAMILY MEDICINE CLINIC | Facility: CLINIC | Age: 74
End: 2024-07-05

## 2024-07-05 NOTE — TELEPHONE ENCOUNTER
Called and spoke to  told this appt was only one for a while. We can watch for cancellations, but no promises. Checked Suresh, Saadia, and And.   Will look again on Monday.   Detail Level: Detailed Detail Level: Zone

## 2024-07-05 NOTE — TELEPHONE ENCOUNTER
At this time I do not have a later appointment. Please see if he can try to get her for appointment otherwise her new patient appointment to establish care will be delayed

## 2024-07-05 NOTE — TELEPHONE ENCOUNTER
Fax received from [place] [employer] -  signed Seizure Reporting Form (7/2/24) missing answer question 4      Put in your bin

## 2024-07-05 NOTE — TELEPHONE ENCOUNTER
Pt  is calling to reschedule katherin.  He is pts transport and he cannot get to the facility to get pt until after 8 am to pick pt up.   He is coming from the [facility] to [place] to get pt.      Please advise. If this can get rescheduled later.     Pt  can be reached at [place] [phone] or [phone]

## 2024-07-09 ENCOUNTER — APPOINTMENT (OUTPATIENT)
Dept: RADIOLOGY | Facility: AMBULARY SURGERY CENTER | Age: 74
End: 2024-07-09
Attending: STUDENT IN AN ORGANIZED HEALTH CARE EDUCATION/TRAINING PROGRAM
Payer: MEDICARE

## 2024-07-09 ENCOUNTER — NURSING HOME VISIT (OUTPATIENT)
Dept: GERIATRICS | Facility: OTHER | Age: 74
End: 2024-07-09
Payer: MEDICARE

## 2024-07-09 ENCOUNTER — OFFICE VISIT (OUTPATIENT)
Dept: OBGYN CLINIC | Facility: CLINIC | Age: 74
End: 2024-07-09

## 2024-07-09 VITALS
DIASTOLIC BLOOD PRESSURE: 82 MMHG | HEART RATE: 68 BPM | RESPIRATION RATE: 18 BRPM | OXYGEN SATURATION: 96 % | SYSTOLIC BLOOD PRESSURE: 130 MMHG | TEMPERATURE: 97.6 F

## 2024-07-09 VITALS
HEART RATE: 93 BPM | HEIGHT: 61 IN | DIASTOLIC BLOOD PRESSURE: 77 MMHG | BODY MASS INDEX: 24.35 KG/M2 | WEIGHT: 129 LBS | SYSTOLIC BLOOD PRESSURE: 141 MMHG

## 2024-07-09 DIAGNOSIS — R31.0 GROSS HEMATURIA: Primary | ICD-10-CM

## 2024-07-09 DIAGNOSIS — Z98.890 POSTOPERATIVE STATE: Primary | ICD-10-CM

## 2024-07-09 DIAGNOSIS — I48.91 NEW ONSET A-FIB (HCC): ICD-10-CM

## 2024-07-09 DIAGNOSIS — S62.511A CLOSED DISPLACED FRACTURE OF PROXIMAL PHALANX OF RIGHT THUMB, INITIAL ENCOUNTER: ICD-10-CM

## 2024-07-09 DIAGNOSIS — R33.9 URINARY RETENTION: ICD-10-CM

## 2024-07-09 DIAGNOSIS — I10 PRIMARY HYPERTENSION: ICD-10-CM

## 2024-07-09 DIAGNOSIS — G40.909 SEIZURE DISORDER (HCC): ICD-10-CM

## 2024-07-09 DIAGNOSIS — F41.9 ANXIETY DISORDER, UNSPECIFIED TYPE: ICD-10-CM

## 2024-07-09 DIAGNOSIS — S62.511D CLOSED DISPLACED FRACTURE OF PROXIMAL PHALANX OF RIGHT THUMB WITH ROUTINE HEALING, SUBSEQUENT ENCOUNTER: ICD-10-CM

## 2024-07-09 PROCEDURE — 99024 POSTOP FOLLOW-UP VISIT: CPT | Performed by: STUDENT IN AN ORGANIZED HEALTH CARE EDUCATION/TRAINING PROGRAM

## 2024-07-09 PROCEDURE — 73140 X-RAY EXAM OF FINGER(S): CPT

## 2024-07-09 PROCEDURE — 99309 SBSQ NF CARE MODERATE MDM 30: CPT

## 2024-07-09 NOTE — ASSESSMENT & PLAN NOTE
Patient with main catheter on Eliquis  Noted to have hematuria, Eliquis was held for 3 days, Hgb 11.3  Appears to be clearing some  Order placed for irrigation q shift  Per , irrigation had not been done on day shift as of 12:30  Discussed with staff  Check CBC

## 2024-07-09 NOTE — ASSESSMENT & PLAN NOTE
Patient found unresponsive by   Neurology felt likely seizure etiology in the setting of sepsis, met. Encephalopathy  No seizure activity at rehab  Continue Keppra 750 mg Q 12  F/u with neurology 7/17/24

## 2024-07-09 NOTE — ASSESSMENT & PLAN NOTE
Follows with psychiatry  Continues on high doses of Xanax and Seroquel  Continue tapering dose of seroquel

## 2024-07-09 NOTE — PROGRESS NOTES
[facility]  [address] [address]  [phone]  [place] [facility]  Code 31 (STR)          NAME: Kirsty Em  AGE: 74 y.o. SEX: female CODE STATUS: CPR    DATE OF ENCOUNTER: 7/9/2024    Assessment and Plan     1. Gross hematuria  Assessment & Plan:  Patient with main catheter on Eliquis  Noted to have hematuria, Eliquis was held for 3 days, Hgb 11.3  Appears to be clearing some  Order placed for irrigation q shift  Per , irrigation had not been done on day shift as of 12:30  Discussed with staff  Check CBC  2. Urinary retention  Assessment & Plan:  Main catheter placed while in hospital  Failed Voiding trial while at rehab  Urology f/u 7/12/24  3. Anxiety disorder, unspecified type  Assessment & Plan:  Follows with psychiatry  Continues on high doses of Xanax and Seroquel  Continue tapering dose of seroquel  4. Primary hypertension  Assessment & Plan:  /82  Continue to monitor BP  Continue Lisinopril 10 mg daily  5. New onset a-fib (HCC)  Assessment & Plan:  HR controlled, 68  Denies CP/palpitations  Continue Cardizem  mg daily  Continue Eliquis 5 mg BID  6. Seizure disorder (HCC)  Assessment & Plan:  Patient found unresponsive by   Neurology felt likely seizure etiology in the setting of sepsis, met. Encephalopathy  No seizure activity at rehab  Continue Keppra 750 mg Q 12  F/u with neurology 7/17/24  7. Closed displaced fracture of proximal phalanx of right thumb with routine healing, subsequent encounter  Assessment & Plan:  Right thumb fracture  S/P ORIF  NWB  Continue tylenol, oxycodone for pain control  Ortho f/u 7/9 thumb cast applied at visit  Ortho f/u 8/6/24         All medications and routine orders were reviewed and updated as needed.    Chief Complaint     STR follow up visit  Patient's care was coordinated with nursing facility staff. Recent vitals, labs, and updated medications were review on [facility] system in facility.  Past Medical and  Surgical History      Past Medical History:   Diagnosis Date    Anxiety     Depression     Depression, recurrent (HCC) 04/20/2021    Hallucinations 04/20/2021    History of seizure disorder 04/20/2021     Past Surgical History:   Procedure Laterality Date    ORIF FINGER FRACTURE Right 6/27/2024    Procedure: ORIF OF RIGHT THUMB;  Surgeon: Dagoberto Yates MD;  Location: [facility];  Service: Orthopedics     Allergies   Allergen Reactions    Lipitor [Atorvastatin] Other (See Comments)     Muscle aches          History of Present Illness     HPI  Kirsty Em is a 74 year old female, she is a STR patient of [facility] since 6/17/24. Past Medical Hx including but not limited to CVA, HTN, COPD, HLD, Anxiety. She was seen in collaboration with nursing for medical mgmt and STR follow up.     Hospital Course  Patient was admitted to the hospital 6/7/24 after being found unresponsive on the floor by her . He stated he found her on the floor with incontinence of stool and clenching her fists.  Stroke alert pathway was initiated. Patient was intubated for airway protection. VT of head and neck concerning for left frontal meningioma and b/l ICA stenosis. Neurology felt more likely seizure etiology in the setting of sepsis and toxic metabolic encephalopathy in the setting of self withdrawal of AEDs. LP was done, menigitis coverage with cefepime, vanco, and acyclovir. Patient was started on Keppra 1500 bolus then 750 mg maintenance dose. Patient was extubated and down graded to floor. She was found to be in new onset Afib with RVR, . Patient spontaneously converted to SR. Cardiology followed, patient was started on amiodarone, heparin, then transitioned to Eliquis. Patient being discharged with P[place] catheter. Rehab was recommended and patient was discharged to [facility].    Rehab Course  Kirsty was seen and examined at bedside today. He  is visiting. On exam patient is resting in bed and does not  appear to be in distress. Patient is sleeping well and has a good appetite. She continues with therapy. Urine appears to be clearing,brown tinged, advised staff to irrigate per order. Patient will f/u with urology this Friday. Cast applied to right thumb at ortho appointment today.  Denies CP/SOB/N/V/D. Denies lightheadedness, dizziness, headaches, vision changes. Patient states they are eating well and staying hydrated. Denies any bowel or bladder issues. Per review of SNF records, Patient is eating 3 meals per day, consuming  %. Last documented BM 7/9/24. No concerns from nursing at this time.    The patient's allergies, past medical, surgical, social and family history were reviewed and unchanged.    Review of Systems     Review of Systems   Constitutional:  Negative for fever.   HENT:  Negative for congestion.    Respiratory:  Negative for cough and shortness of breath.         Oxygen use   Cardiovascular:  Negative for chest pain, palpitations and leg swelling.   Gastrointestinal:  Negative for abdominal distention, abdominal pain, constipation, diarrhea, nausea and vomiting.   Genitourinary:  Negative for difficulty urinating and dysuria.   Musculoskeletal:         Thumb fracture s/p ORIF   Skin: Negative.    Neurological:  Positive for weakness. Negative for dizziness and headaches.   Hematological: Negative.    Psychiatric/Behavioral:  Negative for confusion and sleep disturbance.          Objective     Vitals:   Vitals:    07/09/24 1129   BP: 130/82   Pulse: 68   Resp: 18   Temp: 97.6 °F (36.4 °C)   SpO2: 96%           Physical Exam  Vitals and nursing note reviewed.   Constitutional:       General: She is not in acute distress.     Appearance: Normal appearance. She is not ill-appearing.   HENT:      Head: Normocephalic and atraumatic.   Eyes:      Conjunctiva/sclera: Conjunctivae normal.   Cardiovascular:      Rate and Rhythm: Normal rate and regular rhythm.      Heart sounds: Normal heart sounds.    Pulmonary:      Effort: Pulmonary effort is normal. No respiratory distress.      Breath sounds: Normal breath sounds. No wheezing.   Abdominal:      General: Bowel sounds are normal. There is no distension.      Palpations: Abdomen is soft.      Tenderness: There is no abdominal tenderness.   Musculoskeletal:      Right lower leg: No edema.      Left lower leg: No edema.   Skin:     General: Skin is warm.   Neurological:      Mental Status: She is alert and oriented to person, place, and time.      Motor: Weakness present.      Gait: Gait abnormal.   Psychiatric:         Mood and Affect: Mood normal.         Behavior: Behavior normal.         Pertinent Laboratory/Diagnostic Studies:   Reviewed in facility chart-stable      Current Medications   Medications reviewed and updated see facility MAR for details.      Current Outpatient Medications:     acetaminophen (TYLENOL) 325 mg tablet, Take 975 mg by mouth every 8 (eight) hours as needed for mild pain, Disp: , Rfl:     ALPRAZolam (XANAX) 2 MG tablet, Take 1 tablet (2 mg total) by mouth 4 (four) times a day for 14 days, Disp: 56 tablet, Rfl: 0    apixaban (Eliquis) 5 mg, Take 1 tablet (5 mg total) by mouth 2 (two) times a day, Disp: 60 tablet, Rfl: 0    aspirin 81 mg chewable tablet, Chew 1 tablet (81 mg total) daily, Disp: 60 tablet, Rfl: 0    diltiazem (CARDIZEM CD) 120 mg 24 hr capsule, Take 1 capsule (120 mg total) by mouth daily, Disp: 60 capsule, Rfl: 0    ezetimibe (ZETIA) 10 mg tablet, TAKE 1 TABLET BY MOUTH EVERY DAY, Disp: 100 tablet, Rfl: 1    gabapentin (NEURONTIN) 300 mg capsule, Take 300 mg by mouth 3 (three) times a day, Disp: , Rfl:     levETIRAcetam (KEPPRA) 750 mg tablet, Take 1 tablet (750 mg total) by mouth every 12 (twelve) hours, Disp: 120 tablet, Rfl: 0    lisinopril (ZESTRIL) 10 mg tablet, Take 1 tablet (10 mg total) by mouth 2 (two) times a day, Disp: 120 tablet, Rfl: 0    Multiple Vitamin (multivitamin) tablet, Take 1 tablet by mouth  "daily, Disp: , Rfl:     oxyCODONE (Roxicodone) 5 immediate release tablet, Take 0.5 tablets (2.5 mg total) by mouth every 6 (six) hours as needed for moderate pain Max Daily Amount: 10 mg, Disp: 15 tablet, Rfl: 0    QUEtiapine (SEROquel) 100 mg tablet, Take 100 mg by mouth every morning, Disp: , Rfl:     QUEtiapine (SEROquel) 300 mg tablet, Take 1 tablet (300 mg total) by mouth daily at bedtime (Patient not taking: Reported on 7/12/2024), Disp: , Rfl:     QUEtiapine (SEROquel) 400 MG tablet, , Disp: , Rfl:     tamsulosin (FLOMAX) 0.4 mg, Take 1 capsule (0.4 mg total) by mouth daily with dinner, Disp: , Rfl:     Vascepa 1 g CAPS, Take 2 capsules (2 g total) by mouth 2 (two) times a day, Disp: , Rfl:     vitamin B-12 (VITAMIN B-12) 1,000 mcg tablet, Take by mouth daily, Disp: , Rfl:     Current Facility-Administered Medications:     cyanocobalamin injection 1,000 mcg, 1,000 mcg, Intramuscular, Q30 Days, Brien Desouza MD, 1,000 mcg at 01/06/23 1541     Please note:  Voice-recognition software may have been used in the preparation of this document.  Occasional wrong word or \"sound-alike\" substitutions may have occurred due to the inherent limitations of voice recognition software.  Interpretation should be guided by context.         SINDY Muñoz  7/12/2024  10:15 AM    "

## 2024-07-09 NOTE — PROGRESS NOTES
Assessment/Plan:  Patient ID: 74 y.o. female   Surgery: Orif Of Right Thumb - Right  Date of Surgery: 6/27/2024    The patient is doing well postoperatively. X-rays were reviewed in the office today. The pin sites is clean, dry, and intact. Discussed custom splinting fabricated by therapy versus casting. The patient elected to proceed with casting. A thumb spica cast was applied in the office today.    Follow Up:  The week of 8/5/24    To Do Next Visit:  Cast off, X-rays of the  right  thumb, and Pins out      CHIEF COMPLAINT:  Chief Complaint   Patient presents with   • Right Thumb - Post-op     Not feeling great         SUBJECTIVE:  Patient is a 74 y.o. year old female who presents for follow up after Orif Of Right Thumb - Right. Today patient states she is doing well.      PHYSICAL EXAMINATION:  General: Well developed and well nourished, alert and oriented x 3, appears comfortable  Psychiatric: Normal    MUSCULOSKELETAL EXAMINATION:  Incision: Clean, dry, intact  Surgery Site: normal, no evidence of infection , pin intact  Range of Motion: As expected  Neurovascular status: Neuro intact, good cap refill         STUDIES REVIEWED:  Xrays of the right thumb were reviewed and independently interpreted in PACS by Dr. Yates and demonstrate CRPP distal and proximal phalanx fractures.        PROCEDURES PERFORMED:  Cast application    Date/Time: 7/9/2024 1:15 PM    Performed by: Dagoberto Yates MD  Authorized by: Dagoberto Yates MD  Universal Protocol:  Consent: Verbal consent obtained.  Consent given by: patient  Patient identity confirmed: verbally with patient    Procedure details:     Laterality:  Right    Location:  Hand    Hand:  R handCast type:     Cast type: thumb spica.    Supplies:  Cotton padding and fiberglass  Post-procedure details:     Patient tolerance of procedure:  Tolerated well, no immediate complications         Scribe Attestation    I,:  Jennifer Freitas MA am acting as a scribe while in  the presence of the attending physician.:       I,:  Dagboerto Yates MD personally performed the services described in this documentation    as scribed in my presence.:

## 2024-07-09 NOTE — ASSESSMENT & PLAN NOTE
Right thumb fracture  S/P ORIF  NWB  Continue tylenol, oxycodone for pain control  Ortho f/u 7/9 thumb cast applied at visit  Ortho f/u 8/6/24

## 2024-07-10 DIAGNOSIS — E78.2 MIXED HYPERLIPIDEMIA: ICD-10-CM

## 2024-07-10 RX ORDER — EZETIMIBE 10 MG/1
10 TABLET ORAL DAILY
Qty: 100 TABLET | Refills: 1 | Status: SHIPPED | OUTPATIENT
Start: 2024-07-10

## 2024-07-11 ENCOUNTER — OFFICE VISIT (OUTPATIENT)
Dept: CARDIOLOGY CLINIC | Facility: CLINIC | Age: 74
End: 2024-07-11
Payer: MEDICARE

## 2024-07-11 VITALS
BODY MASS INDEX: 24.07 KG/M2 | SYSTOLIC BLOOD PRESSURE: 144 MMHG | DIASTOLIC BLOOD PRESSURE: 85 MMHG | WEIGHT: 127.4 LBS | HEART RATE: 92 BPM | OXYGEN SATURATION: 94 %

## 2024-07-11 DIAGNOSIS — E78.1 HYPERTRIGLYCERIDEMIA: ICD-10-CM

## 2024-07-11 DIAGNOSIS — J43.2 CENTRILOBULAR EMPHYSEMA (HCC): ICD-10-CM

## 2024-07-11 DIAGNOSIS — I65.23 BILATERAL CAROTID ARTERY STENOSIS: ICD-10-CM

## 2024-07-11 DIAGNOSIS — I10 PRIMARY HYPERTENSION: ICD-10-CM

## 2024-07-11 DIAGNOSIS — I48.91 NEW ONSET A-FIB (HCC): Primary | ICD-10-CM

## 2024-07-11 DIAGNOSIS — E78.2 MIXED HYPERLIPIDEMIA: ICD-10-CM

## 2024-07-11 DIAGNOSIS — I63.9 CEREBROVASCULAR ACCIDENT (CVA), UNSPECIFIED MECHANISM (HCC): ICD-10-CM

## 2024-07-11 PROCEDURE — 99214 OFFICE O/P EST MOD 30 MIN: CPT | Performed by: NURSE PRACTITIONER

## 2024-07-11 PROCEDURE — 93000 ELECTROCARDIOGRAM COMPLETE: CPT | Performed by: NURSE PRACTITIONER

## 2024-07-11 RX ORDER — ICOSAPENT ETHYL 1000 MG/1
2 CAPSULE ORAL 2 TIMES DAILY
Start: 2024-07-11

## 2024-07-11 RX ORDER — ACETAMINOPHEN 325 MG/1
975 TABLET ORAL EVERY 8 HOURS PRN
COMMUNITY

## 2024-07-11 RX ORDER — QUETIAPINE FUMARATE 400 MG/1
TABLET, FILM COATED ORAL
COMMUNITY
Start: 2024-07-08 | End: 2024-07-12

## 2024-07-11 NOTE — PROGRESS NOTES
Cardiology   Hospital Follow Up   Office Visit Note  Kirsty Em   74 y.o.   female   MRN: [id number]  [facility]  [address]  [address]  [facility] [address] [phone]    PCP: Brien Desouza MD  Cardiologist:  will be Dr. Smith    at [facility]           Summary of Plan:  Change over-the-counter fish oil to Vascepa 2 g twice daily if covered by insurance.  If not, would consider Lovaza as a substitute.  Repeat fasting lipid profile with direct LDL 4 to 12 weeks  Refer to vascular surgery given carotid disease  Follow-up Dr Smith 9/24/2024      Assessment/Plan  New onset atrial fibrillation.  Newly discovered, when admitted with mental status changes and found to have an acute/subacute CVA.  Recent ADM 6/7 - 6/17/2024  Rate controlled with Cardizem  mg daily  OAC with Eliquis 5 mg twice daily  Echo 6/29/2024: EF 55%.  Grade 2 diastolic dysfunction.  Mild to moderate MR.  PASP 45 mmHg  EKG today NSR 92 bpm  Acute/subacute CVA  MRI with focal acute to subacute infarct along the posterior left frontal cortex  Continue with aspirin Plavix for 21 days then aspirin monotherapy  Intolerance to statins  ASCVD  Bilateral carotid artery stenosis- to follow with vasc surg  CTA head neck 6/7/2024  Approximate 70% right and 65% left stenosis of the proximal internal carotid arteries due to atherosclerosis. Vascular surgery consult recommended.  Moderate stenosis at the origin of the left common carotid artery due to atherosclerosis.  No significant stenosis of the vertebral arteries  Hypertension.  /85 started on Cardizem  mg daily  Dyslipidemia, mixed with HTG  Intolerant to a atorvastatin-myalgias  Started on Zetia 10 mg daily  Start Vascepa 2 g twice daily..  Repeat fasting lipid profile with direct LDL in 4 to 12 weeks   Latest Reference Range & Units 02/03/23 15:09 01/08/24 15:12 06/09/24 04:45   Cholesterol See Comment mg/dL 273 (H)  262 (H) 165   Triglycerides See Comment mg/dL 532 (H) 347 (H) 694 (H)   HDL >=50 mg/dL 38 (L) 40 (L) 22 (L)   Non-HDL Cholesterol mg/dl 235 222    LDL Calculated 0 - 100 mg/dL See Comment 153 (H) See Comment   LDL CHOLESTEROL DIRECT 0 - 100 mg/dl   75   History of recent rhabdomyolysis.  6/13/2024 CPK improving, 362   Latest Reference Range & Units 06/08/24 12:21 06/08/24 18:45 06/09/24 04:45 06/11/24 06:02 06/12/24 05:06 06/13/24 06:52   Total CK 26 - 192 U/L 15,550 (H) 13,276 (H) 9,243 (H) 4,153 (H) 1,405 (H) 362 (H)   Seizure disorder  COPD  Tobacco abuse  Cardiac testing  TTE 6/29/2024.  EF 55%.  Wall motion normal.  Grade 2 DD.  RV normal size and function.  Mild to moderate MR.  Monitor.  PASP mildly increased at 45 mmHg                  HPI  Kirsty Em is a 74 y.o.year old with hyperlipidemia, tobacco abuse, CKD, anxiety/depression, and COPD.  She has no known history of CAD, heart failure, nor arrhythmia.      Adm 6/7-6/17/24.  10 days.  [facility]  CC: Mental status change  Initially suspected she may have been depressed as they recently had to put one of the dogs down  She was subsequently found sitting on the floor, covered in feces.  Her  cleaned her up however she became confused and agitated therefore called EMS  Initially, concern for seizure-like activity. Rx keppra  Placed on stroke protocol  Her procalcitonin and lactic acid were elevated.  She also had a temperature of 104  Therefore lumbar puncture was pursued and she was started on antibiotics  She was admitted to ICU   She was found to be in A-fib with RVR, and was evaluated by cardiology  She was diagnosed with an acute/subacute CVA, along with seizure  She converted to sinus rhythm with IV Cardizem  She transition to p.o. Cardizem 120 mg daily  She was started on IV heparin drip  Recommend transitioning to a DOAC when cleared by neurology  She was also started on both aspirin and Plavix  Lisinopril was uptitrated given she was  hypertensive  She was found to have bilateral carotid artery stenosis.  She is evaluated by vascular surgery  She was also found to have rhabdomyolysis  Per neuro: sx felt more likely seizure etiology in the setting of sepsis and toxic metabolic encephalopathy in the setting of self withdrawal of AEDs   discharged with Pinto catheter.   Rehab was recommended and patient was discharged to NH         7/11/24  Hospital follow-up  PMH CVA.  COPD.  Tobacco abuse.  ROS: Denies CP/palpitation.  She had recent surgery to her right hand.  It is currently casted.  She is mostly concerned about being able to function with ADLs given that she has a cast on her right lower arm.  Reports she is rehabilitating and is now using a cane  I reviewed her hospital course  I will refer her to vascular surgery given her carotid disease noted on prior CAT scan of her neck  Will continue Cardizem and Eliquis,  as she is maintaining sinus rhythm by EKG  Her blood pressure is satisfactory  I will change the treatment for her triglycerides given her triglycerides are greater than 600.  All over-the-counter fish oil is not satisfactory  She will return to see a cardiologist at the [place] office, in a few months            Review of Systems   Constitutional: Negative for chills.   Cardiovascular:  Negative for chest pain, claudication, cyanosis, dyspnea on exertion, irregular heartbeat, leg swelling, near-syncope, orthopnea, palpitations, paroxysmal nocturnal dyspnea and syncope.   Respiratory:  Negative for cough and shortness of breath.    Musculoskeletal:         Recent surgery right hand   Gastrointestinal:  Negative for heartburn and nausea.   Neurological:  Negative for dizziness, focal weakness, headaches, light-headedness and weakness.   All other systems reviewed and are negative.            Assessment  Diagnoses and all orders for this visit:    New onset a-fib (HCC)  -     POCT ECG    Bilateral carotid artery stenosis  -      Ambulatory referral to Vascular Surgery; Future    Hypertriglyceridemia  -     Vascepa 1 g CAPS; Take 2 capsules (2 g total) by mouth 2 (two) times a day  -     Lipid Panel With Direct LDL; Future    Mixed hyperlipidemia    Primary hypertension    Cerebrovascular accident (CVA), unspecified mechanism (HCC)    Centrilobular emphysema (HCC)    Other orders  -     QUEtiapine (SEROquel) 400 MG tablet;  (Patient not taking: Reported on 7/11/2024)  -     acetaminophen (TYLENOL) 325 mg tablet; Take 975 mg by mouth every 8 (eight) hours as needed for mild pain        Past Medical History:   Diagnosis Date    Anxiety     Depression     Depression, recurrent (HCC) 04/20/2021    Hallucinations 04/20/2021    History of seizure disorder 04/20/2021       Past Surgical History:   Procedure Laterality Date    ORIF FINGER FRACTURE Right 6/27/2024    Procedure: ORIF OF RIGHT THUMB;  Surgeon: Dagoberto Yates MD;  Location: [facility];  Service: Orthopedics           Allergies  Allergies   Allergen Reactions    Lipitor [Atorvastatin] Other (See Comments)     Muscle aches         Medications    Current Outpatient Medications:     acetaminophen (TYLENOL) 325 mg tablet, Take 975 mg by mouth every 8 (eight) hours as needed for mild pain, Disp: , Rfl:     ALPRAZolam (XANAX) 2 MG tablet, Take 1 tablet (2 mg total) by mouth 4 (four) times a day for 14 days, Disp: 56 tablet, Rfl: 0    apixaban (Eliquis) 5 mg, Take 1 tablet (5 mg total) by mouth 2 (two) times a day, Disp: 60 tablet, Rfl: 0    aspirin 81 mg chewable tablet, Chew 1 tablet (81 mg total) daily, Disp: 60 tablet, Rfl: 0    diltiazem (CARDIZEM CD) 120 mg 24 hr capsule, Take 1 capsule (120 mg total) by mouth daily, Disp: 60 capsule, Rfl: 0    ezetimibe (ZETIA) 10 mg tablet, TAKE 1 TABLET BY MOUTH EVERY DAY, Disp: 100 tablet, Rfl: 1    gabapentin (NEURONTIN) 300 mg capsule, Take 300 mg by mouth 3 (three) times a day, Disp: , Rfl:     levETIRAcetam (KEPPRA) 750 mg tablet, Take 1  tablet (750 mg total) by mouth every 12 (twelve) hours, Disp: 120 tablet, Rfl: 0    lisinopril (ZESTRIL) 10 mg tablet, Take 1 tablet (10 mg total) by mouth 2 (two) times a day, Disp: 120 tablet, Rfl: 0    Multiple Vitamin (multivitamin) tablet, Take 1 tablet by mouth daily, Disp: , Rfl:     oxyCODONE (Roxicodone) 5 immediate release tablet, Take 0.5 tablets (2.5 mg total) by mouth every 6 (six) hours as needed for moderate pain Max Daily Amount: 10 mg, Disp: 15 tablet, Rfl: 0    QUEtiapine (SEROquel) 100 mg tablet, Take 100 mg by mouth every morning, Disp: , Rfl:     QUEtiapine (SEROquel) 300 mg tablet, Take 1 tablet (300 mg total) by mouth daily at bedtime (Patient taking differently: Take 200 mg by mouth daily at bedtime), Disp: , Rfl:     tamsulosin (FLOMAX) 0.4 mg, Take 1 capsule (0.4 mg total) by mouth daily with dinner, Disp: , Rfl:     Vascepa 1 g CAPS, Take 2 capsules (2 g total) by mouth 2 (two) times a day, Disp: , Rfl:     vitamin B-12 (VITAMIN B-12) 1,000 mcg tablet, Take by mouth daily, Disp: , Rfl:     QUEtiapine (SEROquel) 400 MG tablet, , Disp: , Rfl:     Current Facility-Administered Medications:     cyanocobalamin injection 1,000 mcg, 1,000 mcg, Intramuscular, Q30 Days, Brien Desouza MD, 1,000 mcg at 01/06/23 1541      Social History     Socioeconomic History    Marital status: /Civil Union     Spouse name: Not on file    Number of children: Not on file    Years of education: Not on file    Highest education level: Not on file   Occupational History    Not on file   Tobacco Use    Smoking status: Every Day     Current packs/day: 0.50     Types: Cigarettes    Smokeless tobacco: Never   Vaping Use    Vaping status: Never Used   Substance and Sexual Activity    Alcohol use: Not Currently    Drug use: Never    Sexual activity: Not on file   Other Topics Concern    Not on file   Social History Narrative    Not on file     Social Determinants of Health     Financial Resource Strain: Medium Risk  (1/1/2024)    Overall Financial Resource Strain (CARDIA)     Difficulty of Paying Living Expenses: Somewhat hard   Food Insecurity: No Food Insecurity (6/8/2024)    Hunger Vital Sign     Worried About Running Out of Food in the Last Year: Never true     Ran Out of Food in the Last Year: Never true   Transportation Needs: No Transportation Needs (6/8/2024)    PRAPARE - Transportation     Lack of Transportation (Medical): No     Lack of Transportation (Non-Medical): No   Physical Activity: Not on file   Stress: Not on file   Social Connections: Not on file   Intimate Partner Violence: Not on file   Housing Stability: Low Risk  (6/8/2024)    Housing Stability Vital Sign     Unable to Pay for Housing in the Last Year: No     Number of Times Moved in the Last Year: 0     Homeless in the Last Year: No       Family History   Problem Relation Age of Onset    Diabetes Mother     Stroke Father     No Known Problems Sister     No Known Problems Sister     No Known Problems Sister     No Known Problems Daughter     No Known Problems Daughter     No Known Problems Daughter     No Known Problems Maternal Grandmother     No Known Problems Paternal Grandmother     No Known Problems Maternal Aunt     No Known Problems Maternal Aunt     No Known Problems Maternal Aunt     No Known Problems Maternal Aunt     No Known Problems Maternal Aunt     No Known Problems Maternal Aunt     No Known Problems Maternal Aunt     No Known Problems Maternal Aunt     No Known Problems Paternal Aunt     Breast cancer Neg Hx        Physical Exam  Vitals and nursing note reviewed.   Constitutional:       General: She is not in acute distress.     Appearance: She is not diaphoretic.   HENT:      Head: Normocephalic and atraumatic.   Eyes:      Conjunctiva/sclera: Conjunctivae normal.   Cardiovascular:      Rate and Rhythm: Normal rate and regular rhythm.      Pulses: Intact distal pulses.      Heart sounds: Normal heart sounds.   Pulmonary:      Effort:  "Pulmonary effort is normal.      Breath sounds: Normal breath sounds.   Abdominal:      General: Bowel sounds are normal.      Palpations: Abdomen is soft.   Musculoskeletal:         General: Normal range of motion.      Cervical back: Normal range of motion and neck supple.      Comments: Cast right wrist.   Skin:     General: Skin is warm and dry.   Neurological:      Mental Status: She is alert and oriented to person, place, and time.         Vitals: Blood pressure 144/85, pulse 92, weight 57.8 kg (127 lb 6.4 oz), SpO2 94%.   Wt Readings from Last 3 Encounters:   07/11/24 57.8 kg (127 lb 6.4 oz)   07/09/24 58.5 kg (129 lb)   06/27/24 58.5 kg (129 lb)           Labs & Results:  Lab Results   Component Value Date    WBC 8.22 06/15/2024    HGB 10.8 (L) 06/15/2024    HCT 31.5 (L) 06/15/2024    MCV 97 06/15/2024     06/15/2024     No results found for: \"BNP\"  No components found for: \"CHEM\"  Total CK   Date Value Ref Range Status   06/13/2024 362 (H) 26 - 192 U/L Final   06/12/2024 1,405 (H) 26 - 192 U/L Final   06/11/2024 4,153 (H) 26 - 192 U/L Final     No results found for this or any previous visit.    No results found for this or any previous visit.    No valid procedures specified.  No results found for this or any previous visit.                This note was completed in part utilizing [employer] direct voice recognition software.   Grammatical errors, random word insertion, spelling mistakes, and incomplete sentences may be an occasional consequence of the system secondary to software limitations, ambient noise and hardware issues. At the time of dictation, efforts were made to edit, clarify and /or correct errors.  Please read the chart carefully and recognize, using context, where substitutions have occurred.  If you have any questions or concerns about the context, text or information contained within the body of this dictation, please contact myself, the provider, for further " clarification

## 2024-07-12 ENCOUNTER — OFFICE VISIT (OUTPATIENT)
Dept: UROLOGY | Facility: CLINIC | Age: 74
End: 2024-07-12
Payer: MEDICARE

## 2024-07-12 ENCOUNTER — NURSING HOME VISIT (OUTPATIENT)
Dept: GERIATRICS | Facility: OTHER | Age: 74
End: 2024-07-12
Payer: MEDICARE

## 2024-07-12 VITALS
WEIGHT: 126 LBS | SYSTOLIC BLOOD PRESSURE: 132 MMHG | OXYGEN SATURATION: 95 % | DIASTOLIC BLOOD PRESSURE: 70 MMHG | HEART RATE: 87 BPM | BODY MASS INDEX: 23.81 KG/M2

## 2024-07-12 VITALS
TEMPERATURE: 97.6 F | HEART RATE: 76 BPM | RESPIRATION RATE: 18 BRPM | SYSTOLIC BLOOD PRESSURE: 145 MMHG | OXYGEN SATURATION: 97 % | DIASTOLIC BLOOD PRESSURE: 65 MMHG

## 2024-07-12 DIAGNOSIS — S62.511D CLOSED DISPLACED FRACTURE OF PROXIMAL PHALANX OF RIGHT THUMB WITH ROUTINE HEALING, SUBSEQUENT ENCOUNTER: Primary | ICD-10-CM

## 2024-07-12 DIAGNOSIS — R33.9 URINARY RETENTION: ICD-10-CM

## 2024-07-12 DIAGNOSIS — I10 PRIMARY HYPERTENSION: ICD-10-CM

## 2024-07-12 DIAGNOSIS — G40.909 SEIZURE DISORDER (HCC): ICD-10-CM

## 2024-07-12 DIAGNOSIS — F41.9 ANXIETY DISORDER, UNSPECIFIED TYPE: ICD-10-CM

## 2024-07-12 DIAGNOSIS — I48.91 NEW ONSET A-FIB (HCC): ICD-10-CM

## 2024-07-12 PROCEDURE — 99309 SBSQ NF CARE MODERATE MDM 30: CPT

## 2024-07-12 PROCEDURE — 99203 OFFICE O/P NEW LOW 30 MIN: CPT

## 2024-07-12 NOTE — ASSESSMENT & PLAN NOTE
Pinto catheter placed while in hospital  Failed Voiding trial while at rehab  Urology f/u 7/12/24  Intermittent hematuria  Urine is clear today  Will attempt voiding trial per urology recommendations

## 2024-07-12 NOTE — PROGRESS NOTES
7/12/2024      No chief complaint on file.        Assessment and Plan    74 y.o. female managed by NEW PATIENT    Urinary Retention  -16 Fr, 10 mL balloon main in place draining clear, yellow urine.   -Instructions given to facility for patient to have a TOV. Facility instructed to remove main between 8-9am, encourage patient to maintain normal fluid intake. Bladder scan patient that same day between 2-3pm. Reinsert main for urine >250 mLs.   -Patient is on Flomax.  -Has daily bowel movements.  -She states she drinks a lot of cranberry juice, (2) 20 oz cups of water daily. I encouraged her to increase water intake.   -Call the office with any urological concerns.     History of Present Illness  Kirsty Em is a 74 y.o. female here for evaluation of urinary retention. She currently resides at [facility]. She presents with her . She has a main catheter in place draining clear, yellow urine.     Patient was admitted from 6/07-6/17/24 at [facility] for altered mental status-CVA vs seizure. She was found laying in feces, unresponsive at home. Her procalcitonin and lactic acid were elevated. She also had a temperature of 104. She had LP and she was started on antibiotics, ICU admission.   CTA head/neck with significant carotid disease bilaterally 70% right and 65% left. MRI brain with focal acute to subacute infarct along the posterior left frontal cortex.  She was found to be in urinary retention and had a main catheter placed. She also developed new onset a-fib during her admission, evaluated by cardiology. She is on Eliquis. She also sustained a right thumb injury when she was found by her  at home, and underwent a ORIF of right thumb (6/27/24).     She has several medical comorbidities including hypertension, bilateral carotid artery stenosis, stroke, COPD, acute respiratory failure with hypoxia, tobacco dependence, seizure disorder, anxiety and depression.     CT cap w/contrast  (6/07/24) showing mild bilateral collecting system dilation likely related to bladder distention. No obstructing lesions.       Review of Systems   Constitutional:  Negative for appetite change, chills and fever.   HENT:  Negative for ear pain and sore throat.    Eyes:  Negative for pain and visual disturbance.   Respiratory:  Negative for cough and shortness of breath.    Cardiovascular:  Negative for chest pain and palpitations.   Gastrointestinal:  Negative for abdominal pain, constipation, nausea and vomiting.   Genitourinary:  Positive for difficulty urinating. Negative for dysuria, flank pain, frequency, hematuria and urgency.   Musculoskeletal:  Negative for arthralgias and back pain.   Skin:  Negative for color change and rash.   Neurological:  Negative for dizziness, syncope and speech difficulty.   All other systems reviewed and are negative.               Vitals  Vitals:    07/12/24 0801   Weight: 57.2 kg (126 lb)       Physical Exam  Constitutional:       Appearance: Normal appearance.   HENT:      Head: Normocephalic.   Eyes:      Extraocular Movements: Extraocular movements intact.      Pupils: Pupils are equal, round, and reactive to light.   Pulmonary:      Effort: Pulmonary effort is normal. No respiratory distress.   Genitourinary:     Comments: 16 Fr, 10 mL balloon main in place draining clear, yellow urine.  Musculoskeletal:         General: Normal range of motion.      Cervical back: Normal range of motion.      Comments: Cast on right upper extremity   Neurological:      Mental Status: She is alert and oriented to person, place, and time.   Psychiatric:         Behavior: Behavior normal.         Past History  Past Medical History:   Diagnosis Date    Anxiety     Depression     Depression, recurrent (HCC) 04/20/2021    Hallucinations 04/20/2021    History of seizure disorder 04/20/2021     Social History     Socioeconomic History    Marital status: /Civil Union     Spouse name: Not on  file    Number of children: Not on file    Years of education: Not on file    Highest education level: Not on file   Occupational History    Not on file   Tobacco Use    Smoking status: Every Day     Current packs/day: 0.50     Types: Cigarettes    Smokeless tobacco: Never   Vaping Use    Vaping status: Never Used   Substance and Sexual Activity    Alcohol use: Not Currently    Drug use: Never    Sexual activity: Not on file   Other Topics Concern    Not on file   Social History Narrative    Not on file     Social Determinants of Health     Financial Resource Strain: Medium Risk (1/1/2024)    Overall Financial Resource Strain (CARDIA)     Difficulty of Paying Living Expenses: Somewhat hard   Food Insecurity: No Food Insecurity (6/8/2024)    Hunger Vital Sign     Worried About Running Out of Food in the Last Year: Never true     Ran Out of Food in the Last Year: Never true   Transportation Needs: No Transportation Needs (6/8/2024)    PRAPARE - Transportation     Lack of Transportation (Medical): No     Lack of Transportation (Non-Medical): No   Physical Activity: Not on file   Stress: Not on file   Social Connections: Not on file   Intimate Partner Violence: Not on file   Housing Stability: Low Risk  (6/8/2024)    Housing Stability Vital Sign     Unable to Pay for Housing in the Last Year: No     Number of Times Moved in the Last Year: 0     Homeless in the Last Year: No     Social History     Tobacco Use   Smoking Status Every Day    Current packs/day: 0.50    Types: Cigarettes   Smokeless Tobacco Never     Family History   Problem Relation Age of Onset    Diabetes Mother     Stroke Father     No Known Problems Sister     No Known Problems Sister     No Known Problems Sister     No Known Problems Daughter     No Known Problems Daughter     No Known Problems Daughter     No Known Problems Maternal Grandmother     No Known Problems Paternal Grandmother     No Known Problems Maternal Aunt     No Known Problems Maternal  "Aunt     No Known Problems Maternal Aunt     No Known Problems Maternal Aunt     No Known Problems Maternal Aunt     No Known Problems Maternal Aunt     No Known Problems Maternal Aunt     No Known Problems Maternal Aunt     No Known Problems Paternal Aunt     Breast cancer Neg Hx        The following portions of the patient's history were reviewed and updated as appropriate: allergies, current medications, past medical history, past social history, past surgical history and problem list.    Results  No results found for this or any previous visit (from the past 1 hour(s)).]  No results found for: \"PSA\"  Lab Results   Component Value Date    GLUCOSE 199 (H) 06/07/2024    CALCIUM 8.8 06/15/2024    K 3.2 (L) 06/15/2024    CO2 31 06/15/2024     06/15/2024    BUN 9 06/15/2024    CREATININE 0.51 (L) 06/15/2024     Lab Results   Component Value Date    WBC 8.22 06/15/2024    HGB 10.8 (L) 06/15/2024    HCT 31.5 (L) 06/15/2024    MCV 97 06/15/2024     06/15/2024       SINDY Enrique  "

## 2024-07-12 NOTE — PROGRESS NOTES
[facility]  [address] [address]  [phone]  [place] [facility]  Code 31 (STR)          NAME: Kirsty Em  AGE: 74 y.o. SEX: female CODE STATUS: CPR    DATE OF ENCOUNTER: 7/12/2024    Assessment and Plan     1. Closed displaced fracture of proximal phalanx of right thumb with routine healing, subsequent encounter  Assessment & Plan:  Right thumb fracture  S/P ORIF  NWB  Continue tylenol, oxycodone for pain control  Ortho f/u 7/9 thumb cast applied at visit  Ortho f/u 8/6/24  2. Urinary retention  Assessment & Plan:  Pinto catheter placed while in hospital  Failed Voiding trial while at rehab  Urology f/u 7/12/24  Intermittent hematuria  Urine is clear today  Will attempt voiding trial per urology recommendations   3. Anxiety disorder, unspecified type  Assessment & Plan:  Follows with psychiatry  Seroquel decreased to 100 q 12  Continue alprazolam  Op f/u with psychiatry  4. Seizure disorder (HCC)  Assessment & Plan:  Patient found unresponsive by   Neurology felt likely seizure etiology in the setting of sepsis, met. Encephalopathy  No seizure activity at rehab  Continue Keppra 750 mg Q 12  F/u with neurology 7/17/24  5. New onset a-fib (HCC)  Assessment & Plan:  HR controlled, 76  Denies CP/palpitations  Continue Cardizem  mg daily  Continue Eliquis 5 mg BID  6. Primary hypertension  Assessment & Plan:  /65  Continue to monitor BP  Continue Lisinopril 10 mg daily         All medications and routine orders were reviewed and updated as needed.    Chief Complaint     STR follow up visit  Patient's care was coordinated with nursing facility staff. Recent vitals, labs, and updated medications were review on [facility] system in facility.  Past Medical and Surgical History      Past Medical History:   Diagnosis Date    Anxiety     Depression     Depression, recurrent (HCC) 04/20/2021    Hallucinations 04/20/2021    History of seizure disorder 04/20/2021     Past  Surgical History:   Procedure Laterality Date    ORIF FINGER FRACTURE Right 6/27/2024    Procedure: ORIF OF RIGHT THUMB;  Surgeon: Dagoberto Yates MD;  Location: [facility];  Service: Orthopedics     Allergies   Allergen Reactions    Lipitor [Atorvastatin] Other (See Comments)     Muscle aches          History of Present Illness     HPI  Kirsty Em is a 74 year old female, she is a STR patient of [facility] since 6/17/24. Past Medical Hx including but not limited to CVA, HTN, COPD, HLD, Anxiety. She was seen in collaboration with nursing for medical mgmt and STR follow up.     Hospital Course  Patient was admitted to the hospital 6/7/24 after being found unresponsive on the floor by her . He stated he found her on the floor with incontinence of stool and clenching her fists.  Stroke alert pathway was initiated. Patient was intubated for airway protection. VT of head and neck concerning for left frontal meningioma and b/l ICA stenosis. Neurology felt more likely seizure etiology in the setting of sepsis and toxic metabolic encephalopathy in the setting of self withdrawal of AEDs. LP was done, menigitis coverage with cefepime, vanco, and acyclovir. Patient was started on Keppra 1500 bolus then 750 mg maintenance dose. Patient was extubated and down graded to floor. She was found to be in new onset Afib with RVR, . Patient spontaneously converted to SR. Cardiology followed, patient was started on amiodarone, heparin, then transitioned to Eliquis. Patient being discharged with Pinto catheter. Rehab was recommended and patient was discharged to [facility].    Rehab Course  Kirsty was seen and examined at bedside today. Patient found ambulating in [place], returning from urology appointment , not in distress. Will attempt TOV at facility, urine clear today. Seroquel tapered down to 100 q 12. Discussed POC with patients  who is at bedside.   Denies CP/SOB/N/V/D. Denies lightheadedness, dizziness,  headaches, vision changes. Patient states they are eating well and staying hydrated. Denies any bowel or bladder issues. Per review of SNF records, Patient is eating 3 meals per day, consuming  %. Last documented BM 7/11/24. No concerns from nursing at this time.    The patient's allergies, past medical, surgical, social and family history were reviewed and unchanged.    Review of Systems     Review of Systems   Constitutional:  Negative for fever.   HENT:  Negative for congestion.    Respiratory:  Negative for cough and shortness of breath.         Oxygen use   Cardiovascular:  Negative for chest pain, palpitations and leg swelling.   Gastrointestinal:  Negative for abdominal distention, abdominal pain, constipation, diarrhea, nausea and vomiting.   Genitourinary:  Negative for difficulty urinating and dysuria.   Musculoskeletal:         Thumb fracture s/p ORIF   Skin: Negative.    Neurological:  Positive for weakness. Negative for dizziness and headaches.   Hematological: Negative.    Psychiatric/Behavioral:  Negative for confusion and sleep disturbance.          Objective     Vitals:   Vitals:    07/12/24 1018   BP: 145/65   Pulse: 76   Resp: 18   Temp: 97.6 °F (36.4 °C)   SpO2: 97%             Physical Exam  Vitals and nursing note reviewed.   Constitutional:       General: She is not in acute distress.     Appearance: Normal appearance. She is not ill-appearing.   HENT:      Head: Normocephalic and atraumatic.   Eyes:      Conjunctiva/sclera: Conjunctivae normal.   Cardiovascular:      Rate and Rhythm: Normal rate and regular rhythm.      Heart sounds: Normal heart sounds.   Pulmonary:      Effort: Pulmonary effort is normal. No respiratory distress.      Breath sounds: Normal breath sounds. No wheezing.   Abdominal:      General: Bowel sounds are normal. There is no distension.      Palpations: Abdomen is soft.      Tenderness: There is no abdominal tenderness.   Musculoskeletal:      Right lower leg: No  edema.      Left lower leg: No edema.   Skin:     General: Skin is warm.   Neurological:      Mental Status: She is alert and oriented to person, place, and time.      Motor: Weakness present.      Gait: Gait abnormal.   Psychiatric:         Mood and Affect: Mood normal.         Behavior: Behavior normal.         Pertinent Laboratory/Diagnostic Studies:   Reviewed in facility chart-stable      Current Medications   Medications reviewed and updated see facility MAR for details.      Current Outpatient Medications:     acetaminophen (TYLENOL) 325 mg tablet, Take 975 mg by mouth every 8 (eight) hours as needed for mild pain, Disp: , Rfl:     ALPRAZolam (XANAX) 2 MG tablet, Take 1 tablet (2 mg total) by mouth 4 (four) times a day for 14 days, Disp: 56 tablet, Rfl: 0    apixaban (Eliquis) 5 mg, Take 1 tablet (5 mg total) by mouth 2 (two) times a day, Disp: 60 tablet, Rfl: 0    aspirin 81 mg chewable tablet, Chew 1 tablet (81 mg total) daily, Disp: 60 tablet, Rfl: 0    diltiazem (CARDIZEM CD) 120 mg 24 hr capsule, Take 1 capsule (120 mg total) by mouth daily, Disp: 60 capsule, Rfl: 0    ezetimibe (ZETIA) 10 mg tablet, TAKE 1 TABLET BY MOUTH EVERY DAY, Disp: 100 tablet, Rfl: 1    gabapentin (NEURONTIN) 300 mg capsule, Take 300 mg by mouth 3 (three) times a day, Disp: , Rfl:     levETIRAcetam (KEPPRA) 750 mg tablet, Take 1 tablet (750 mg total) by mouth every 12 (twelve) hours, Disp: 120 tablet, Rfl: 0    lisinopril (ZESTRIL) 10 mg tablet, Take 1 tablet (10 mg total) by mouth 2 (two) times a day, Disp: 120 tablet, Rfl: 0    Multiple Vitamin (multivitamin) tablet, Take 1 tablet by mouth daily, Disp: , Rfl:     oxyCODONE (Roxicodone) 5 immediate release tablet, Take 0.5 tablets (2.5 mg total) by mouth every 6 (six) hours as needed for moderate pain Max Daily Amount: 10 mg, Disp: 15 tablet, Rfl: 0    QUEtiapine (SEROquel) 100 mg tablet, Take 100 mg by mouth every 12 (twelve) hours, Disp: , Rfl:     tamsulosin (FLOMAX) 0.4 mg,  "Take 1 capsule (0.4 mg total) by mouth daily with dinner, Disp: , Rfl:     Vascepa 1 g CAPS, Take 2 capsules (2 g total) by mouth 2 (two) times a day, Disp: , Rfl:     vitamin B-12 (VITAMIN B-12) 1,000 mcg tablet, Take by mouth daily, Disp: , Rfl:     Current Facility-Administered Medications:     cyanocobalamin injection 1,000 mcg, 1,000 mcg, Intramuscular, Q30 Days, Brien Desouza MD, 1,000 mcg at 01/06/23 1541     Please note:  Voice-recognition software may have been used in the preparation of this document.  Occasional wrong word or \"sound-alike\" substitutions may have occurred due to the inherent limitations of voice recognition software.  Interpretation should be guided by context.         SINDY Muñoz  7/12/2024  10:33 AM    "

## 2024-07-12 NOTE — ASSESSMENT & PLAN NOTE
Follows with psychiatry  Seroquel decreased to 100 q 12  Continue alprazolam  Op f/u with psychiatry

## 2024-07-16 ENCOUNTER — NURSING HOME VISIT (OUTPATIENT)
Dept: GERIATRICS | Facility: OTHER | Age: 74
End: 2024-07-16
Payer: MEDICARE

## 2024-07-16 VITALS
HEART RATE: 87 BPM | SYSTOLIC BLOOD PRESSURE: 118 MMHG | DIASTOLIC BLOOD PRESSURE: 82 MMHG | OXYGEN SATURATION: 91 % | RESPIRATION RATE: 18 BRPM | TEMPERATURE: 97.6 F

## 2024-07-16 DIAGNOSIS — I10 PRIMARY HYPERTENSION: ICD-10-CM

## 2024-07-16 DIAGNOSIS — I48.91 NEW ONSET A-FIB (HCC): ICD-10-CM

## 2024-07-16 DIAGNOSIS — F41.9 ANXIETY DISORDER, UNSPECIFIED TYPE: ICD-10-CM

## 2024-07-16 DIAGNOSIS — I63.9 CEREBROVASCULAR ACCIDENT (CVA), UNSPECIFIED MECHANISM (HCC): ICD-10-CM

## 2024-07-16 DIAGNOSIS — G40.909 SEIZURE DISORDER (HCC): ICD-10-CM

## 2024-07-16 DIAGNOSIS — S62.511D CLOSED DISPLACED FRACTURE OF PROXIMAL PHALANX OF RIGHT THUMB WITH ROUTINE HEALING, SUBSEQUENT ENCOUNTER: Primary | ICD-10-CM

## 2024-07-16 PROCEDURE — 99309 SBSQ NF CARE MODERATE MDM 30: CPT

## 2024-07-16 NOTE — PROGRESS NOTES
[facility]  [address] [address] [phone]  [place] [facility]  Code 31 (STR)          NAME: Kirsty Em  AGE: 74 y.o. SEX: female CODE STATUS: CPR    DATE OF ENCOUNTER: 7/16/2024    Assessment and Plan     1. Closed displaced fracture of proximal phalanx of right thumb with routine healing, subsequent encounter  Assessment & Plan:  Right thumb fracture  S/P ORIF  NWB  Continue tylenol, oxycodone for pain control  Ortho f/u 7/9 thumb cast applied at visit  Ortho f/u 8/6/24  2. Seizure disorder (HCC)  Assessment & Plan:  Patient found unresponsive by   Neurology felt likely seizure etiology in the setting of sepsis, met. Encephalopathy  No seizure activity at rehab  Continue Keppra 750 mg Q 12  F/u with neurology 7/17/24  3. Primary hypertension  Assessment & Plan:  /82  Continue to monitor BP  Continue Lisinopril 10 mg daily  4. New onset a-fib (HCC)  Assessment & Plan:  HR controlled, 87  Denies CP/palpitations  Continue Cardizem  mg daily  Continue Eliquis 5 mg BID  5. Cerebrovascular accident (CVA), unspecified mechanism (HCC)  Assessment & Plan:  MRI showing Focal, subcentimeter acute to subacute infarct along the posterior left frontal cortex.  Small meningioma along the lateral left frontal convexity as suspected on the recent CT examination.  Moderate chronic microangiopathic ischemic changes.  Patient intolerant to statins, continue Zetia  Continue ASA 81 mg daily  Continue Eliquis 5 mg BID  6. Anxiety disorder, unspecified type  Assessment & Plan:  Follows with psychiatry  Seroquel decreased to 100 q 12  Continue alprazolam  Op f/u with psychiatry           All medications and routine orders were reviewed and updated as needed.    Chief Complaint     STR follow up visit  Patient's care was coordinated with nursing facility staff. Recent vitals, labs, and updated medications were review on [facility] system in facility.  Past Medical and Surgical  History      Past Medical History:   Diagnosis Date    Anxiety     Depression     Depression, recurrent (HCC) 04/20/2021    Hallucinations 04/20/2021    History of seizure disorder 04/20/2021     Past Surgical History:   Procedure Laterality Date    ORIF FINGER FRACTURE Right 6/27/2024    Procedure: ORIF OF RIGHT THUMB;  Surgeon: Dagoberto Yates MD;  [facility];  Service: Orthopedics     Allergies   Allergen Reactions    Lipitor [Atorvastatin] Other (See Comments)     Muscle aches          History of Present Illness     HPI  Kirsty Em is a 74 year old female, she is a STR patient of [facility] since 6/17/24. Past Medical Hx including but not limited to CVA, HTN, COPD, HLD, Anxiety. She was seen in collaboration with nursing for medical mgmt and STR follow up.     Hospital Course  Patient was admitted to the hospital 6/7/24 after being found unresponsive on the floor by her . He stated he found her on the floor with incontinence of stool and clenching her fists.  Stroke alert pathway was initiated. Patient was intubated for airway protection. VT of head and neck concerning for left frontal meningioma and b/l ICA stenosis. Neurology felt more likely seizure etiology in the setting of sepsis and toxic metabolic encephalopathy in the setting of self withdrawal of AEDs. LP was done, menigitis coverage with cefepime, vanco, and acyclovir. Patient was started on Keppra 1500 bolus then 750 mg maintenance dose. Patient was extubated and down graded to floor. She was found to be in new onset Afib with RVR, . Patient spontaneously converted to SR. Cardiology followed, patient was started on amiodarone, heparin, then transitioned to Eliquis. Patient being discharged with Pinto catheter. Rehab was recommended and patient was discharged to [facility].    Rehab Course  Kirsty was seen and examined today. Patient is sitting at the side of her bed, not in distress. Her  is at bedside. Pinto  catheter removed a few days ago, denies difficulty urinating or hematuria. She reports some right thumb pain, continues with pain medications. She says she is doing well and would like to go home soon.     Denies CP/SOB/N/V/D. Denies lightheadedness, dizziness, headaches, vision changes. Patient states they are eating well and staying hydrated. Denies any bowel or bladder issues. Per review of SNF records, Patient is eating 3 meals per day, consuming  %. Last documented BM 7/15/24. No concerns from nursing at this time.    The patient's allergies, past medical, surgical, social and family history were reviewed and unchanged.    Review of Systems     Review of Systems   Constitutional:  Negative for fever.   HENT:  Negative for congestion.    Respiratory:  Negative for cough and shortness of breath.         Oxygen use   Cardiovascular:  Negative for chest pain, palpitations and leg swelling.   Gastrointestinal:  Negative for abdominal distention, abdominal pain, constipation, diarrhea, nausea and vomiting.   Genitourinary:  Negative for difficulty urinating and dysuria.   Musculoskeletal:         Thumb fracture s/p ORIF   Skin: Negative.    Neurological:  Positive for weakness. Negative for dizziness and headaches.   Hematological: Negative.    Psychiatric/Behavioral:  Negative for confusion and sleep disturbance.          Objective     Vitals:   Vitals:    07/16/24 1405   BP: 118/82   Pulse: 87   Resp: 18   Temp: 97.6 °F (36.4 °C)   SpO2: 91%               Physical Exam  Vitals and nursing note reviewed.   Constitutional:       General: She is not in acute distress.     Appearance: Normal appearance. She is not ill-appearing.   HENT:      Head: Normocephalic and atraumatic.   Eyes:      Conjunctiva/sclera: Conjunctivae normal.   Cardiovascular:      Rate and Rhythm: Normal rate and regular rhythm.      Heart sounds: Normal heart sounds.   Pulmonary:      Effort: Pulmonary effort is normal. No respiratory  distress.      Breath sounds: Normal breath sounds. No wheezing.   Abdominal:      General: Bowel sounds are normal. There is no distension.      Palpations: Abdomen is soft.      Tenderness: There is no abdominal tenderness.   Musculoskeletal:      Right lower leg: No edema.      Left lower leg: No edema.   Skin:     General: Skin is warm.   Neurological:      Mental Status: She is alert and oriented to person, place, and time.      Motor: Weakness present.      Gait: Gait abnormal.   Psychiatric:         Mood and Affect: Mood normal.         Behavior: Behavior normal.         Pertinent Laboratory/Diagnostic Studies:   Reviewed in facility chart-stable      Current Medications   Medications reviewed and updated see facility MAR for details.      Current Outpatient Medications:     ALPRAZolam (XANAX) 2 MG tablet, Take 1 tablet (2 mg total) by mouth 4 (four) times a day for 14 days, Disp: 56 tablet, Rfl: 0    acetaminophen (TYLENOL) 325 mg tablet, Take 975 mg by mouth every 8 (eight) hours as needed for mild pain, Disp: , Rfl:     apixaban (Eliquis) 5 mg, Take 1 tablet (5 mg total) by mouth 2 (two) times a day, Disp: 60 tablet, Rfl: 0    aspirin 81 mg chewable tablet, Chew 1 tablet (81 mg total) daily, Disp: 60 tablet, Rfl: 0    diltiazem (CARDIZEM CD) 120 mg 24 hr capsule, Take 1 capsule (120 mg total) by mouth daily, Disp: 60 capsule, Rfl: 0    ezetimibe (ZETIA) 10 mg tablet, TAKE 1 TABLET BY MOUTH EVERY DAY, Disp: 100 tablet, Rfl: 1    gabapentin (NEURONTIN) 300 mg capsule, Take 300 mg by mouth 3 (three) times a day, Disp: , Rfl:     levETIRAcetam (KEPPRA) 750 mg tablet, Take 1 tablet (750 mg total) by mouth every 12 (twelve) hours, Disp: 120 tablet, Rfl: 0    lisinopril (ZESTRIL) 10 mg tablet, Take 1 tablet (10 mg total) by mouth 2 (two) times a day, Disp: 120 tablet, Rfl: 0    Multiple Vitamin (multivitamin) tablet, Take 1 tablet by mouth daily, Disp: , Rfl:     oxyCODONE (Roxicodone) 5 immediate release tablet,  "Take 0.5 tablets (2.5 mg total) by mouth every 6 (six) hours as needed for moderate pain Max Daily Amount: 10 mg, Disp: 15 tablet, Rfl: 0    QUEtiapine (SEROquel) 100 mg tablet, Take 100 mg by mouth every 12 (twelve) hours, Disp: , Rfl:     tamsulosin (FLOMAX) 0.4 mg, Take 1 capsule (0.4 mg total) by mouth daily with dinner, Disp: , Rfl:     Vascepa 1 g CAPS, Take 2 capsules (2 g total) by mouth 2 (two) times a day, Disp: , Rfl:     vitamin B-12 (VITAMIN B-12) 1,000 mcg tablet, Take by mouth daily, Disp: , Rfl:     Current Facility-Administered Medications:     cyanocobalamin injection 1,000 mcg, 1,000 mcg, Intramuscular, Q30 Days, Brien Desouza MD, 1,000 mcg at 01/06/23 1541     Please note:  Voice-recognition software may have been used in the preparation of this document.  Occasional wrong word or \"sound-alike\" substitutions may have occurred due to the inherent limitations of voice recognition software.  Interpretation should be guided by context.         SINDY Muñoz  7/16/2024  2:10 PM    "

## 2024-07-17 ENCOUNTER — OFFICE VISIT (OUTPATIENT)
Dept: NEUROSURGERY | Facility: CLINIC | Age: 74
End: 2024-07-17
Payer: MEDICARE

## 2024-07-17 VITALS
BODY MASS INDEX: 23.79 KG/M2 | SYSTOLIC BLOOD PRESSURE: 138 MMHG | WEIGHT: 126 LBS | DIASTOLIC BLOOD PRESSURE: 76 MMHG | HEIGHT: 61 IN | TEMPERATURE: 98.2 F | RESPIRATION RATE: 19 BRPM | HEART RATE: 96 BPM | OXYGEN SATURATION: 92 %

## 2024-07-17 DIAGNOSIS — G93.89 BRAIN MASS: Primary | ICD-10-CM

## 2024-07-17 DIAGNOSIS — I65.23 BILATERAL CAROTID ARTERY STENOSIS: ICD-10-CM

## 2024-07-17 DIAGNOSIS — R93.89 ABNORMAL COMPUTED TOMOGRAPHY ANGIOGRAPHY (CTA): ICD-10-CM

## 2024-07-17 PROCEDURE — 99204 OFFICE O/P NEW MOD 45 MIN: CPT | Performed by: NEUROLOGICAL SURGERY

## 2024-07-17 NOTE — ASSESSMENT & PLAN NOTE
1.  Left 60-70 percent stenosis on CTA, greater than 50% on right  This was discovered in the context of recent admission and MRI with small stroke.  This was also discovered with a new diagnosis of A-fib.  She was started on Eliquis and also started on antiplatelets at the same time.  She has not yet had a follow-up with neurology for stroke management and optimization but has seen cardiology.    Given her CTA with approximately 60% to 70% stenosis on the left in the context of new diagnosis of A-fib I would like to proceed with medical management and repeat Dopplers in 6 months.  Should there be progressive disease or evidence of recurrent stroke will discuss possible carotid artery stenting or carotid revascularization.

## 2024-07-17 NOTE — ASSESSMENT & PLAN NOTE
1.  Small left frontal mass on MRI consistent with meningioma.  Retrospectively this is visible on CTA from 2023.  We discussed the diagnosis of meningiomas and other low-grade tumors.  These are slow-growing masses without significant risk for malignancy.  There is no evidence of significant mass effect or brain compression to necessitate treatment.  We will see her back in 6 months with a repeat MRI in accordance with NCCN guidelines.

## 2024-07-17 NOTE — PROGRESS NOTES
Ambulatory Visit  Name: Kirsty Em      : 1950      MRN: [id number]  Encounter Provider: Soto Quintana MD  Encounter Date: 2024   Encounter department: [facility] NEUROSURGICAL [facility]    Assessment & Plan   1. Brain mass  Assessment & Plan:  1.  Small left frontal mass on MRI consistent with meningioma.  Retrospectively this is visible on CTA from .  We discussed the diagnosis of meningiomas and other low-grade tumors.  These are slow-growing masses without significant risk for malignancy.  There is no evidence of significant mass effect or brain compression to necessitate treatment.  We will see her back in 6 months with a repeat MRI in accordance with NCCN guidelines.  Orders:  -     MRI brain with and without contrast; Future; Expected date: 2025  -     VAS carotid complete study; Future; Expected date: 2024  2. Abnormal computed tomography angiography (CTA)  -     Ambulatory Referral to Neurosurgery  -     MRI brain with and without contrast; Future; Expected date: 2025  -     VAS carotid complete study; Future; Expected date: 2024  3. Bilateral carotid artery stenosis  Assessment & Plan:  1.  Left 60-70 percent stenosis on CTA, greater than 50% on right  This was discovered in the context of recent admission and MRI with small stroke.  This was also discovered with a new diagnosis of A-fib.  She was started on Eliquis and also started on antiplatelets at the same time.  She has not yet had a follow-up with neurology for stroke management and optimization but has seen cardiology.    Given her CTA with approximately 60% to 70% stenosis on the left in the context of new diagnosis of A-fib I would like to proceed with medical management and repeat Dopplers in 6 months.  Should there be progressive disease or evidence of recurrent stroke will discuss possible carotid artery stenting or carotid revascularization.  Orders:  -     MRI brain with and without  "contrast; Future; Expected date: 01/17/2025  -     VAS carotid complete study; Future; Expected date: 07/17/2024      History of Present Illness     Kirsty Em is a 74 y.o. female who presented presented to the hospital and was discovered to have small left MCA stroke in the context of new onset A-fib and carotid stenosis bilaterally.  She was also found to have a small left convexity meningioma.  She presents for follow-up.      She is now in rehab and has been doing well.  She states she quit smoking approximately 1 year ago.  She is also being evaluated by neurology for seizure concern.    Review of systems obtained by the MA reviewed and updated below.      We reviewed her CTA and MRI in detail as well as the report    Review of Systems   Constitutional: Negative.    HENT: Negative.     Eyes: Negative.    Respiratory: Negative.     Cardiovascular: Negative.    Gastrointestinal: Negative.    Endocrine: Negative.    Genitourinary: Negative.    Musculoskeletal:  Positive for arthralgias (right thumb surgery) and gait problem (uses cane for stability).   Skin: Negative.    Allergic/Immunologic: Negative.    Neurological:  Positive for seizures, weakness (right hand cast) and headaches (sensitive to lights gets headaches at times). Negative for numbness.   Hematological:  Bruises/bleeds easily (medication).   Psychiatric/Behavioral:  Negative for sleep disturbance.        Objective     /76 (BP Location: Left arm, Patient Position: Sitting, Cuff Size: Standard)   Pulse 96   Temp 98.2 °F (36.8 °C) (Temporal)   Resp 19   Ht 5' 1\" (1.549 m)   Wt 57.2 kg (126 lb)   SpO2 92%   BMI 23.81 kg/m²   She is well appearing.  Affect is appropriate. Body mass index is 23.81 kg/m².. She is awake alert and oriented.  Hearing and vision are grossly intact.   Her pupils are equal round reactive to light.  Her extraocular movements are intact.  Her face is symmetric.  Tongue is midline.  Facial sensation is intact and " symmetric throughout.  Shoulder shrug is 5/5.  There is no drift or dysmetria.     Her right arm is in a cast.  Otherwise she appears to have full symmetric strength in bilateral upper and lower extremities.  She ambulates without difficulty.      Her heart rate is regular.  Normal respiratory effort.  Abdomen nondistended.  Left radial pulse  2+.     Administrative Statements   I have spent a total time of 45 minutes in caring for this patient on the day of the visit/encounter including Diagnostic results, Prognosis, Risks and benefits of tx options, Instructions for management, Patient and family education, Importance of tx compliance, Risk factor reductions, Impressions, Counseling / Coordination of care, Documenting in the medical record, Reviewing / ordering tests, medicine, procedures  , and Obtaining or reviewing history  .

## 2024-07-18 ENCOUNTER — NURSING HOME VISIT (OUTPATIENT)
Dept: GERIATRICS | Facility: OTHER | Age: 74
End: 2024-07-18
Payer: MEDICARE

## 2024-07-18 DIAGNOSIS — G40.909 SEIZURE DISORDER (HCC): ICD-10-CM

## 2024-07-18 DIAGNOSIS — F41.9 ANXIETY DISORDER, UNSPECIFIED TYPE: ICD-10-CM

## 2024-07-18 DIAGNOSIS — S62.511D CLOSED DISPLACED FRACTURE OF PROXIMAL PHALANX OF RIGHT THUMB WITH ROUTINE HEALING, SUBSEQUENT ENCOUNTER: ICD-10-CM

## 2024-07-18 DIAGNOSIS — I10 PRIMARY HYPERTENSION: Primary | ICD-10-CM

## 2024-07-18 DIAGNOSIS — I63.9 CEREBROVASCULAR ACCIDENT (CVA), UNSPECIFIED MECHANISM (HCC): ICD-10-CM

## 2024-07-18 DIAGNOSIS — F11.90 NARCOTIC DRUG USE: ICD-10-CM

## 2024-07-18 DIAGNOSIS — I48.91 NEW ONSET A-FIB (HCC): ICD-10-CM

## 2024-07-18 PROCEDURE — 99316 NF DSCHRG MGMT 30 MIN+: CPT

## 2024-07-18 NOTE — PROGRESS NOTES
[facility]  [address] [facility] [address] [address]  [phone]  DISCHARGE SUMMARY  Facility: [facility]  Code 31    NAME: Kirsty Em  AGE: 74 y.o. SEX: female   CODE STATUS: CPR    DATE OF ADMISSION: 6/17/24   DATE OF DISCHARGE: 7/20/24   DISCHARGE DISPOSITION: Stable for discharge to home with family support and home health PT/OT/SN services.   Reason for Admission: [facility] was [facility] for [facility] after hospitalization for syncope.    Past Medical and Surgical History:   Past Medical History:   Diagnosis Date    Anxiety     Depression     Depression, recurrent (HCC) 04/20/2021    Hallucinations 04/20/2021    History of seizure disorder 04/20/2021      Past Surgical History:   Procedure Laterality Date    ORIF FINGER FRACTURE Right 6/27/2024    Procedure: ORIF OF RIGHT THUMB;  Surgeon: Dagoberto Yates MD;  [facility];  Service: Orthopedics       Course of stay:   HPI  Kirsty Em is a 74 year old female, she is a STR patient of [facility] since 6/17/24. Past Medical Hx including but not limited to CVA, HTN, COPD, HLD, Anxiety. She was seen in collaboration with nursing for medical mgmt and STR follow up.      Hospital Course  Patient was admitted to the hospital 6/7/24 after being found unresponsive on the floor by her . He stated he found her on the floor with incontinence of stool and clenching her fists.  Stroke alert pathway was initiated. Patient was intubated for airway protection. VT of head and neck concerning for left frontal meningioma and b/l ICA stenosis. Neurology felt more likely seizure etiology in the setting of sepsis and toxic metabolic encephalopathy in the setting of self withdrawal of AEDs. LP was done, menigitis coverage with cefepime, vanco, and acyclovir. Patient was started on Keppra 1500 bolus then 750 mg maintenance dose. Patient was extubated and down graded to floor. She was found to be in new onset Afib with RVR, HR  170. Patient spontaneously converted to SR. Cardiology followed, patient was started on amiodarone, heparin, then transitioned to Eliquis. Patient being discharged with Pinto catheter. Rehab was recommended and patient was discharged to [facility].     Rehab Course  Kirsty was seen and examined today. Patient is resting in bed, not in distress. Her  is at bedside. She reports some right thumb pain, continues with pain medications. She says she is doing well and is looking forward to going home.      Denies CP/SOB/N/V/D. Denies lightheadedness, dizziness, headaches, vision changes. Patient states they are eating well and staying hydrated. Denies any bowel or bladder issues. Per review of SNF records, Patient is eating 3 meals per day, consuming  %. Last documented BM 7/18/24. No concerns from nursing at this time.  During the patients stay at [facility], she received skilled nursing care, PT, OT, social service support, dietician support, and medical management.  Pt scheduled to be discharged on 7/20/24.  ROS:  Review of Systems   Constitutional:  Negative for fever.   HENT:  Negative for congestion.    Respiratory:  Negative for cough and shortness of breath.         Oxygen use   Cardiovascular:  Negative for chest pain, palpitations and leg swelling.   Gastrointestinal:  Negative for abdominal distention, abdominal pain, constipation, diarrhea, nausea and vomiting.   Genitourinary:  Negative for difficulty urinating and dysuria.   Musculoskeletal:         Thumb fracture s/p ORIF   Skin: Negative.    Neurological:  Negative for dizziness and headaches.   Hematological: Negative.    Psychiatric/Behavioral:  Negative for confusion and sleep disturbance.        PHYSICAL EXAM:  VITALS:   Vitals:    07/19/24 0946   BP: 149/80   Pulse: 79   Resp: 18   Temp: 97.6 °F (36.4 °C)   SpO2: 96%        Physical Exam  Vitals and nursing note reviewed.   Constitutional:       General: She is not in acute distress.     Appearance:  Normal appearance. She is not ill-appearing.   HENT:      Head: Normocephalic and atraumatic.   Eyes:      Conjunctiva/sclera: Conjunctivae normal.   Cardiovascular:      Rate and Rhythm: Normal rate and regular rhythm.      Heart sounds: Normal heart sounds.   Pulmonary:      Effort: Pulmonary effort is normal. No respiratory distress.      Breath sounds: Normal breath sounds. No wheezing.   Abdominal:      General: Bowel sounds are normal. There is no distension.      Palpations: Abdomen is soft.      Tenderness: There is no abdominal tenderness.   Musculoskeletal:      Right lower leg: No edema.      Left lower leg: No edema.   Skin:     General: Skin is warm.   Neurological:      Mental Status: She is alert and oriented to person, place, and time.      Motor: Weakness present.      Gait: Gait abnormal.   Psychiatric:         Mood and Affect: Mood normal.         Behavior: Behavior normal.         Admission Diagnoses:   1. Primary hypertension  Assessment & Plan:  BP stable  Continue to monitor BP  Continue Lisinopril 10 mg twice daily  2. New onset a-fib (HCC)  Assessment & Plan:  HR controlled, 79  Denies CP/palpitations  Continue Cardizem  mg daily  Continue Eliquis 5 mg BID  3. Cerebrovascular accident (CVA), unspecified mechanism (HCC)  Assessment & Plan:  MRI showing Focal, subcentimeter acute to subacute infarct along the posterior left frontal cortex.  Small meningioma along the lateral left frontal convexity as suspected on the recent CT examination.  Moderate chronic microangiopathic ischemic changes.  Patient intolerant to statins, continue Zetia  Continue ASA 81 mg daily  Continue Eliquis 5 mg BID  4. Closed displaced fracture of proximal phalanx of right thumb with routine healing, subsequent encounter  Assessment & Plan:  Right thumb fracture  S/P ORIF  NWB  Continue tylenol, oxycodone for pain control  Ortho f/u 7/9 thumb cast applied at visit  Ortho f/u 8/6/24  5. Anxiety disorder,  unspecified type  Assessment & Plan:  Follows with psychiatry  Seroquel decreased to 100 q 12  Continue alprazolam  Op f/u with psychiatry  6. Seizure disorder (HCC)  Assessment & Plan:  Patient found unresponsive by   Neurology felt likely seizure etiology in the setting of sepsis, met. Encephalopathy  No seizure activity at rehab  Continue Keppra 750 mg Q 12  F/u with neurology        Follow-up Recommendations:    Outpatient Follow up with PCP in the next 2 weeks  Home Health PT/OT/SN services     Labs and testing performed during stay:  7/3/24            WBC COUNT, AUTOMATED 7.1 K/CU.MM 3.5-11.0  Final              RBC COUNT 3.5 M/CU.MM 3.3-4.9  Final             HEMOGLOBIN 11.3 g/dL 10.7-15.1  Final             HEMATOCRIT 35 PERCENT 32-46  Final             MCH 33 pg 25-32 H Final              fL  H Final             MCHC 32 g/dL 31-36  Final             RDW 14.0 % 11.6-14.4  Final             PLATELETS, AUTOMATED  Panel/Test: Platelets  [facility] Code: 777-3  Lab Test Description: PLATELETS, AUTOMATED    Specimen #:   Specimen Source:   Specimen Site Modifier:   Collection Volume:   No. of Sample Containers:     Observation Method:   Nature of Abnormality:  423 K/CU.-400 H Final             MPV 10.6 fL 9.4-12.4  Final                 CREATININE 0.62 mg/dL 0.60-1.50  Final              GLUCOSE 76 mg/dL 65-99  Final             UREA NITROGEN 10 mg/dL 8-23  Final             SODIUM 142 mMOL/L 135-145  Final             POTASSIUM 3.6 mMOL/L 3.4-5.3  Final             CHLORIDE 99 mmol/L   Final             UREA N / CREAT RATIO 16.1 RATIO 12-20  Final             CO2 28 mmol/L 22-32  Final             OSMOLALITY 292 mOsm/kG 275-305  Final     The Serum Osmolality Reference Range has been  adjusted based on current information provided  by the [facility].        CALCIUM 9.6 mg/dL 8.4-10.2  Final             eGFR 95 See note >59  Final     Discharge Medications: See discharge medication  list which was reviewed and signed.      Current Outpatient Medications:     acetaminophen (TYLENOL) 325 mg tablet, Take 975 mg by mouth every 8 (eight) hours as needed for mild pain, Disp: , Rfl:     ALPRAZolam (XANAX) 2 MG tablet, Take 1 tablet (2 mg total) by mouth 4 (four) times a day for 14 days, Disp: 56 tablet, Rfl: 0    apixaban (Eliquis) 5 mg, Take 1 tablet (5 mg total) by mouth 2 (two) times a day, Disp: 60 tablet, Rfl: 0    aspirin 81 mg chewable tablet, Chew 1 tablet (81 mg total) daily, Disp: 60 tablet, Rfl: 0    diltiazem (CARDIZEM CD) 120 mg 24 hr capsule, Take 1 capsule (120 mg total) by mouth daily, Disp: 60 capsule, Rfl: 0    docusate sodium (COLACE) 100 mg capsule, Take 100 mg by mouth 2 (two) times a day, Disp: , Rfl:     ezetimibe (ZETIA) 10 mg tablet, TAKE 1 TABLET BY MOUTH EVERY DAY, Disp: 100 tablet, Rfl: 1    gabapentin (NEURONTIN) 300 mg capsule, Take 300 mg by mouth 3 (three) times a day, Disp: , Rfl:     levETIRAcetam (KEPPRA) 750 mg tablet, Take 1 tablet (750 mg total) by mouth every 12 (twelve) hours, Disp: 120 tablet, Rfl: 0    lisinopril (ZESTRIL) 10 mg tablet, Take 1 tablet (10 mg total) by mouth 2 (two) times a day, Disp: 120 tablet, Rfl: 0    Multiple Vitamin (multivitamin) tablet, Take 1 tablet by mouth daily, Disp: , Rfl:     polyethylene glycol (MIRALAX) 17 g packet, Take 17 g by mouth daily as needed, Disp: , Rfl:     Vascepa 1 g CAPS, Take 2 capsules (2 g total) by mouth 2 (two) times a day, Disp: , Rfl:     vitamin B-12 (VITAMIN B-12) 1,000 mcg tablet, Take by mouth daily, Disp: , Rfl:     oxyCODONE (Roxicodone) 5 immediate release tablet, Take 0.5 tablets (2.5 mg total) by mouth every 6 (six) hours as needed for moderate pain Max Daily Amount: 10 mg, Disp: 15 tablet, Rfl: 0    QUEtiapine (SEROquel) 100 mg tablet, Take 100 mg by mouth every 12 (twelve) hours, Disp: , Rfl:     Current Facility-Administered Medications:     cyanocobalamin injection 1,000 mcg, 1,000 mcg,  "Intramuscular, Q30 Days, Brien Desouza MD, 1,000 mcg at 01/06/23 4011     Discussion with patient/family and further instructions:  -Fall precautions  -Aspiration precautions  -Bleeding precautions  -Monitor for signs/symptoms of infection  -Medication list was reviewed and signed  -DME form was completed    Status at time of discharge: Stable     Billing based on time. Time spent on unit, 40 minutes. Time spent counseling pt on debility/condition, 30 minutes.    Please note:  Voice-recognition software may have been used in the preparation of this document.  Occasional wrong word or \"sound-alike\" substitutions may have occurred due to the inherent limitations of voice recognition software.  Interpretation should be guided by context.        SINDY Muñoz  7/19/2024   "

## 2024-07-18 NOTE — LETTER
July 19, 2024     Brien Desouza MD  [address]  [address]  [facility] [address]    Patient: Kirsty Em   YOB: 1950   Date of Visit: 7/18/2024       Dear Dr. Desouza:    Kirsty will discharge form [facility] 7/20/24, please arrange for follow up visit.       Sincerely,        SINDY Muñoz        CC: No Recipients    SINDY Muñoz  7/19/2024  9:58 AM  Signed  [place] [id number] [facility] [address]  [phone]  DISCHARGE SUMMARY  Facility: [facility]  Code 31    NAME: Kirsty Em  AGE: 74 y.o. SEX: female   CODE STATUS: CPR    DATE OF ADMISSION: 6/17/24   DATE OF DISCHARGE: 7/20/24   DISCHARGE DISPOSITION: Stable for discharge to home with family support and home health PT/OT/SN services.   Reason for Admission: Patient was admitted to [facility] for [facility] after hospitalization for syncope.    Past Medical and Surgical History:   Past Medical History:   Diagnosis Date   • Anxiety    • Depression    • Depression, recurrent (HCC) 04/20/2021   • Hallucinations 04/20/2021   • History of seizure disorder 04/20/2021      Past Surgical History:   Procedure Laterality Date   • ORIF FINGER FRACTURE Right 6/27/2024    Procedure: ORIF OF RIGHT THUMB;  Surgeon: Dagoberto Yates MD;  [facility];  Service: Orthopedics       Course of stay:   HPI  Kirsty Em is a 74 year old female, she is a STR patient of [facility] since 6/17/24. Past Medical Hx including but not limited to CVA, HTN, COPD, HLD, Anxiety. She was seen in collaboration with nursing for medical mgmt and STR follow up.      Hospital Course  Patient was admitted to the hospital 6/7/24 after being found unresponsive on the floor by her . He stated he found her on the floor with incontinence of stool and clenching her fists.  Stroke alert pathway was initiated. Patient was intubated for airway protection. VT of head and neck concerning for left  frontal meningioma and b/l ICA stenosis. Neurology felt more likely seizure etiology in the setting of sepsis and toxic metabolic encephalopathy in the setting of self withdrawal of AEDs. LP was done, menigitis coverage with cefepime, vanco, and acyclovir. Patient was started on Keppra 1500 bolus then 750 mg maintenance dose. Patient was extubated and down graded to floor. She was found to be in new onset Afib with RVR, . Patient spontaneously converted to SR. Cardiology followed, patient was started on amiodarone, heparin, then transitioned to Eliquis. Patient being discharged with Pinto catheter. Rehab was recommended and patient was discharged to [facility].     Rehab Course  Kirsty was seen and examined today. Patient is resting in bed, not in distress. Her  is at bedside. She reports some right thumb pain, continues with pain medications. She says she is doing well and is looking forward to going home.      Denies CP/SOB/N/V/D. Denies lightheadedness, dizziness, headaches, vision changes. Patient states they are eating well and staying hydrated. Denies any bowel or bladder issues. Per review of SNF records, Patient is eating 3 meals per day, consuming  %. Last documented BM 7/18/24. No concerns from nursing at this time.  During the [facility] stay at [facility], she received skilled nursing care, PT, OT, social service support, dietician support, and medical management.  Pt scheduled to be discharged on 7/20/24.  ROS:  Review of Systems   Constitutional:  Negative for fever.   HENT:  Negative for congestion.    Respiratory:  Negative for cough and shortness of breath.         Oxygen use   Cardiovascular:  Negative for chest pain, palpitations and leg swelling.   Gastrointestinal:  Negative for abdominal distention, abdominal pain, constipation, diarrhea, nausea and vomiting.   Genitourinary:  Negative for difficulty urinating and dysuria.   Musculoskeletal:         Thumb fracture s/p ORIF   Skin: Negative.     Neurological:  Negative for dizziness and headaches.   Hematological: Negative.    Psychiatric/Behavioral:  Negative for confusion and sleep disturbance.        PHYSICAL EXAM:  VITALS:   Vitals:    07/19/24 0946   BP: 149/80   Pulse: 79   Resp: 18   Temp: 97.6 °F (36.4 °C)   SpO2: 96%        Physical Exam  Vitals and nursing note reviewed.   Constitutional:       General: She is not in acute distress.     Appearance: Normal appearance. She is not ill-appearing.   HENT:      Head: Normocephalic and atraumatic.   Eyes:      Conjunctiva/sclera: Conjunctivae normal.   Cardiovascular:      Rate and Rhythm: Normal rate and regular rhythm.      Heart sounds: Normal heart sounds.   Pulmonary:      Effort: Pulmonary effort is normal. No respiratory distress.      Breath sounds: Normal breath sounds. No wheezing.   Abdominal:      General: Bowel sounds are normal. There is no distension.      Palpations: Abdomen is soft.      Tenderness: There is no abdominal tenderness.   Musculoskeletal:      Right lower leg: No edema.      Left lower leg: No edema.   Skin:     General: Skin is warm.   Neurological:      Mental Status: She is alert and oriented to person, place, and time.      Motor: Weakness present.      Gait: Gait abnormal.   Psychiatric:         Mood and Affect: Mood normal.         Behavior: Behavior normal.         Admission Diagnoses:   1. Primary hypertension  Assessment & Plan:  BP stable  Continue to monitor BP  Continue Lisinopril 10 mg twice daily  2. New onset a-fib (HCC)  Assessment & Plan:  HR controlled, 79  Denies CP/palpitations  Continue Cardizem  mg daily  Continue Eliquis 5 mg BID  3. Cerebrovascular accident (CVA), unspecified mechanism (HCC)  Assessment & Plan:  MRI showing Focal, subcentimeter acute to subacute infarct along the posterior left frontal cortex.  Small meningioma along the lateral left frontal convexity as suspected on the recent CT examination.  Moderate chronic  microangiopathic ischemic changes.  Patient intolerant to statins, continue Zetia  Continue ASA 81 mg daily  Continue Eliquis 5 mg BID  4. Closed displaced fracture of proximal phalanx of right thumb with routine healing, subsequent encounter  Assessment & Plan:  Right thumb fracture  S/P ORIF  NWB  Continue tylenol, oxycodone for pain control  Ortho f/u 7/9 thumb cast applied at visit  Ortho f/u 8/6/24  5. Anxiety disorder, unspecified type  Assessment & Plan:  Follows with psychiatry  Seroquel decreased to 100 q 12  Continue alprazolam  Op f/u with psychiatry  6. Seizure disorder (HCC)  Assessment & Plan:  Patient found unresponsive by   Neurology felt likely seizure etiology in the setting of sepsis, met. Encephalopathy  No seizure activity at rehab  Continue Keppra 750 mg Q 12  F/u with neurology        Follow-up Recommendations:    Outpatient Follow up with PCP in the next 2 weeks  Home Health PT/OT/SN services     Labs and testing performed during stay:  7/3/24            WBC COUNT, AUTOMATED 7.1 K/CU.MM 3.5-11.0  Final              RBC COUNT 3.5 M/CU.MM 3.3-4.9  Final             HEMOGLOBIN 11.3 g/dL 10.7-15.1  Final             HEMATOCRIT 35 PERCENT 32-46  Final             MCH 33 pg 25-32 H Final              fL  H Final             MCHC 32 g/dL 31-36  Final             RDW 14.0 % 11.6-14.4  Final             PLATELETS, AUTOMATED  Panel/Test: Platelets  [facility] Code: 777-3  Lab Test Description: PLATELETS, AUTOMATED    Specimen #:   Specimen Source:   Specimen Site Modifier:   Collection Volume:   No. of Sample Containers:     Observation Method:   Nature of Abnormality:  423 K/CU.-400 H Final             MPV 10.6 fL 9.4-12.4  Final                 CREATININE 0.62 mg/dL 0.60-1.50  Final              GLUCOSE 76 mg/dL 65-99  Final             UREA NITROGEN 10 mg/dL 8-23  Final             SODIUM 142 mMOL/L 135-145  Final             POTASSIUM 3.6 mMOL/L 3.4-5.3  Final              CHLORIDE 99 mmol/L   Final             UREA N / CREAT RATIO 16.1 RATIO 12-20  Final             CO2 28 mmol/L 22-32  Final             OSMOLALITY 292 mOsm/kG 275-305  Final     The Serum Osmolality Reference Range has been  adjusted based on current information provided  by the [facility].        CALCIUM 9.6 mg/dL 8.4-10.2  Final             eGFR 95 See note >59  Final     Discharge Medications: See discharge medication list which was reviewed and signed.      Current Outpatient Medications:   •  acetaminophen (TYLENOL) 325 mg tablet, Take 975 mg by mouth every 8 (eight) hours as needed for mild pain, Disp: , Rfl:   •  ALPRAZolam (XANAX) 2 MG tablet, Take 1 tablet (2 mg total) by mouth 4 (four) times a day for 14 days, Disp: 56 tablet, Rfl: 0  •  apixaban (Eliquis) 5 mg, Take 1 tablet (5 mg total) by mouth 2 (two) times a day, Disp: 60 tablet, Rfl: 0  •  aspirin 81 mg chewable tablet, Chew 1 tablet (81 mg total) daily, Disp: 60 tablet, Rfl: 0  •  diltiazem (CARDIZEM CD) 120 mg 24 hr capsule, Take 1 capsule (120 mg total) by mouth daily, Disp: 60 capsule, Rfl: 0  •  docusate sodium (COLACE) 100 mg capsule, Take 100 mg by mouth 2 (two) times a day, Disp: , Rfl:   •  ezetimibe (ZETIA) 10 mg tablet, TAKE 1 TABLET BY MOUTH EVERY DAY, Disp: 100 tablet, Rfl: 1  •  gabapentin (NEURONTIN) 300 mg capsule, Take 300 mg by mouth 3 (three) times a day, Disp: , Rfl:   •  levETIRAcetam (KEPPRA) 750 mg tablet, Take 1 tablet (750 mg total) by mouth every 12 (twelve) hours, Disp: 120 tablet, Rfl: 0  •  lisinopril (ZESTRIL) 10 mg tablet, Take 1 tablet (10 mg total) by mouth 2 (two) times a day, Disp: 120 tablet, Rfl: 0  •  Multiple Vitamin (multivitamin) tablet, Take 1 tablet by mouth daily, Disp: , Rfl:   •  polyethylene glycol (MIRALAX) 17 g packet, Take 17 g by mouth daily as needed, Disp: , Rfl:   •  Vascepa 1 g CAPS, Take 2 capsules (2 g total) by mouth 2 (two) times a day, Disp: , Rfl:   •  vitamin B-12 (VITAMIN B-12)  "1,000 mcg tablet, Take by mouth daily, Disp: , Rfl:   •  oxyCODONE (Roxicodone) 5 immediate release tablet, Take 0.5 tablets (2.5 mg total) by mouth every 6 (six) hours as needed for moderate pain Max Daily Amount: 10 mg, Disp: 15 tablet, Rfl: 0  •  QUEtiapine (SEROquel) 100 mg tablet, Take 100 mg by mouth every 12 (twelve) hours, Disp: , Rfl:     Current Facility-Administered Medications:   •  cyanocobalamin injection 1,000 mcg, 1,000 mcg, Intramuscular, Q30 Days, Brien Desouza MD, 1,000 mcg at 01/06/23 1541     Discussion with patient/family and further instructions:  -Fall precautions  -Aspiration precautions  -Bleeding precautions  -Monitor for signs/symptoms of infection  -Medication list was reviewed and signed  -DME form was completed    Status at time of discharge: Stable     Billing based on time. Time spent on unit, 40 minutes. Time spent counseling pt on debility/condition, 30 minutes.    Please note:  Voice-recognition software may have been used in the preparation of this document.  Occasional wrong word or \"sound-alike\" substitutions may have occurred due to the inherent limitations of voice recognition software.  Interpretation should be guided by context.        SINDY Muñoz  7/19/2024   "

## 2024-07-19 VITALS
TEMPERATURE: 97.6 F | RESPIRATION RATE: 18 BRPM | HEART RATE: 79 BPM | SYSTOLIC BLOOD PRESSURE: 149 MMHG | OXYGEN SATURATION: 96 % | DIASTOLIC BLOOD PRESSURE: 80 MMHG

## 2024-07-19 RX ORDER — OXYCODONE HYDROCHLORIDE 5 MG/1
2.5 TABLET ORAL EVERY 6 HOURS PRN
Qty: 15 TABLET | Refills: 0 | Status: SHIPPED | OUTPATIENT
Start: 2024-07-19

## 2024-07-19 RX ORDER — QUETIAPINE FUMARATE 100 MG/1
100 TABLET, FILM COATED ORAL EVERY 12 HOURS
Qty: 60 TABLET | Refills: 0 | Status: SHIPPED | OUTPATIENT
Start: 2024-07-19

## 2024-07-19 RX ORDER — DILTIAZEM HYDROCHLORIDE 120 MG/1
120 CAPSULE, COATED, EXTENDED RELEASE ORAL DAILY
Qty: 60 CAPSULE | Refills: 0 | Status: SHIPPED | OUTPATIENT
Start: 2024-07-19 | End: 2024-09-17

## 2024-07-19 RX ORDER — POLYETHYLENE GLYCOL 3350 17 G/17G
17 POWDER, FOR SOLUTION ORAL DAILY PRN
COMMUNITY

## 2024-07-19 RX ORDER — LEVETIRACETAM 750 MG/1
750 TABLET ORAL EVERY 12 HOURS SCHEDULED
Qty: 120 TABLET | Refills: 0 | Status: SHIPPED | OUTPATIENT
Start: 2024-07-19 | End: 2024-09-17

## 2024-07-19 RX ORDER — DOCUSATE SODIUM 100 MG/1
100 CAPSULE, LIQUID FILLED ORAL 2 TIMES DAILY
COMMUNITY

## 2024-07-19 NOTE — ASSESSMENT & PLAN NOTE
Patient found unresponsive by   Neurology felt likely seizure etiology in the setting of sepsis, met. Encephalopathy  No seizure activity at rehab  Continue Keppra 750 mg Q 12  F/u with neurology

## 2024-07-21 DIAGNOSIS — E78.1 HYPERTRIGLYCERIDEMIA: ICD-10-CM

## 2024-07-22 ENCOUNTER — TELEPHONE (OUTPATIENT)
Age: 74
End: 2024-07-22

## 2024-07-22 DIAGNOSIS — E78.1 HYPERTRIGLYCERIDEMIA: ICD-10-CM

## 2024-07-22 DIAGNOSIS — J43.2 CENTRILOBULAR EMPHYSEMA (HCC): Primary | ICD-10-CM

## 2024-07-22 RX ORDER — ICOSAPENT ETHYL 1 G/1
2 CAPSULE ORAL 2 TIMES DAILY
Qty: 120 CAPSULE | Refills: 6 | Status: SHIPPED | OUTPATIENT
Start: 2024-07-22

## 2024-07-22 RX ORDER — ICOSAPENT ETHYL 1000 MG/1
2 CAPSULE ORAL 2 TIMES DAILY
Qty: 120 CAPSULE | Refills: 6 | Status: SHIPPED | OUTPATIENT
Start: 2024-07-22 | End: 2024-07-22

## 2024-07-22 NOTE — TELEPHONE ENCOUNTER
Damaso from [facility] called in stating they will be seeing patient for PT/OT. Damaso requesting a script for albuterol solution and also the nebulizer machine. Patient was discharged from rehab in 7/19 [facility] and she was given this script. Please advise.

## 2024-07-23 ENCOUNTER — TRANSITIONAL CARE MANAGEMENT (OUTPATIENT)
Dept: FAMILY MEDICINE CLINIC | Facility: CLINIC | Age: 74
End: 2024-07-23

## 2024-07-23 ENCOUNTER — TELEPHONE (OUTPATIENT)
Dept: FAMILY MEDICINE CLINIC | Facility: CLINIC | Age: 74
End: 2024-07-23

## 2024-07-23 RX ORDER — ALBUTEROL SULFATE 2.5 MG/3ML
2.5 SOLUTION RESPIRATORY (INHALATION) EVERY 6 HOURS PRN
Qty: 180 ML | Refills: 5 | Status: SHIPPED | OUTPATIENT
Start: 2024-07-23 | End: 2024-07-30

## 2024-07-23 NOTE — TELEPHONE ENCOUNTER
----- Message from Mell CRAWFORD sent at 7/22/2024  3:27 PM EDT -----  Regarding: TCM visit  Patient needing TCM follow-up:      Patient discharged from [facility] to [facility] on 6/17/24 with primary diagnosis of Acute metabolic encephalopathy.    Discharged from facility on 7/20/24 to home with home care.    Please call patient to set up a TCM visit if you have not already done so.    Thank you.

## 2024-07-25 ENCOUNTER — TELEPHONE (OUTPATIENT)
Age: 74
End: 2024-07-25

## 2024-07-25 NOTE — TELEPHONE ENCOUNTER
Ruiz stated that pt has Nebulizer solution but no machine, can a order be place for the Neb Machine.

## 2024-07-28 LAB
DME PARACHUTE DELIVERY DATE ACTUAL: NORMAL
DME PARACHUTE DELIVERY DATE REQUESTED: NORMAL
DME PARACHUTE ITEM DESCRIPTION: NORMAL
DME PARACHUTE ORDER STATUS: NORMAL
DME PARACHUTE SUPPLIER NAME: NORMAL
DME PARACHUTE SUPPLIER PHONE: NORMAL

## 2024-07-30 ENCOUNTER — OFFICE VISIT (OUTPATIENT)
Dept: FAMILY MEDICINE CLINIC | Facility: CLINIC | Age: 74
End: 2024-07-30
Payer: MEDICARE

## 2024-07-30 VITALS
WEIGHT: 129.2 LBS | HEIGHT: 61 IN | DIASTOLIC BLOOD PRESSURE: 76 MMHG | HEART RATE: 78 BPM | OXYGEN SATURATION: 98 % | TEMPERATURE: 98.3 F | SYSTOLIC BLOOD PRESSURE: 128 MMHG | BODY MASS INDEX: 24.39 KG/M2

## 2024-07-30 DIAGNOSIS — E87.6 HYPOKALEMIA: ICD-10-CM

## 2024-07-30 DIAGNOSIS — G93.89 BRAIN MASS: ICD-10-CM

## 2024-07-30 DIAGNOSIS — H91.93 BILATERAL HEARING LOSS, UNSPECIFIED HEARING LOSS TYPE: ICD-10-CM

## 2024-07-30 DIAGNOSIS — F33.9 EPISODE OF RECURRENT MAJOR DEPRESSIVE DISORDER, UNSPECIFIED DEPRESSION EPISODE SEVERITY (HCC): ICD-10-CM

## 2024-07-30 DIAGNOSIS — G40.909 SEIZURE DISORDER (HCC): ICD-10-CM

## 2024-07-30 DIAGNOSIS — D64.9 ANEMIA, UNSPECIFIED TYPE: ICD-10-CM

## 2024-07-30 DIAGNOSIS — S62.511D CLOSED DISPLACED FRACTURE OF PROXIMAL PHALANX OF RIGHT THUMB WITH ROUTINE HEALING, SUBSEQUENT ENCOUNTER: ICD-10-CM

## 2024-07-30 DIAGNOSIS — Z12.11 SCREEN FOR COLON CANCER: Primary | ICD-10-CM

## 2024-07-30 PROBLEM — G93.41 ACUTE METABOLIC ENCEPHALOPATHY: Status: RESOLVED | Noted: 2024-06-07 | Resolved: 2024-07-30

## 2024-07-30 PROCEDURE — 99495 TRANSJ CARE MGMT MOD F2F 14D: CPT | Performed by: STUDENT IN AN ORGANIZED HEALTH CARE EDUCATION/TRAINING PROGRAM

## 2024-07-30 RX ORDER — OMEGA-3-ACID ETHYL ESTERS 1 G/1
1 CAPSULE, LIQUID FILLED ORAL DAILY
COMMUNITY
Start: 2024-07-20

## 2024-07-30 NOTE — PROGRESS NOTES
Assessment/Plan:         Problem List Items Addressed This Visit        Nervous and Auditory    Seizure disorder (HCC)       Musculoskeletal and Integument    Closed displaced fracture of proximal phalanx of right thumb       Behavioral Health    Depression       Neurology/Sleep    Brain mass     Neurosurgery reviewed imaging in 6 months            Other    Hypokalemia    Relevant Orders    Basic metabolic panel    Magnesium   Other Visit Diagnoses     Screen for colon cancer    -  Primary    Relevant Orders    Cologuard    Anemia, unspecified type        Relevant Orders    CBC (Includes Diff/Plt) (Refl)    Iron Panel (Includes Ferritin, Iron Sat%, Iron, and TIBC)    Bilateral hearing loss, unspecified hearing loss type        Relevant Orders    Ambulatory Referral to Audiology        Reviewed cardio, NS, and acute rehab notes. Has 72 hour EEG coming up.  Continue Keppra 750 twice daily.  Does not tolerate statins so on Zetia and prescription fish oil.    Hearing loss may be related to the area of the CVA.  Place referral recommend going [facility] for comprehensive hearing exam.   Is following closely with Ortho hands.     Is on Seroquel 100 mg twice daily and states mentation is improved per patient as well as her .  Will have repeat carotid Dopplers in 6 months as well    Subjective:       TCM Call     Date and time call was made  7/23/2024 10:12 AM    Hospital care reviewed  Records reviewed    Patient was hospitialized at  [facility]    Date of Admission  06/17/24    Date of discharge  07/20/24    Diagnosis  Acute metabolic encephalopathy    Disposition  Home    Were the patients medications reviewed and updated  Yes    Current Symptoms  None      TCM Call     Post hospital issues  None    Should patient be enrolled in anticoag monitoring?  No    Scheduled for follow up?  Yes    Did you obtain your prescribed medications  Yes    Do you need help managing your prescriptions or medications  No     Is transportation to your appointment needed  No    I have advised the patient to call PCP with any new or worsening symptoms  Alberta Roche    Living Arrangements  Spouse or Significiant other    Support System  None    The type of support provided  Emotional; Physical    Do you have social support  Yes, as much as I need    Are you recieving any outpatient services  No    Are you recieving home care services  No    Are you using any community resources  No    Current waiver services  No    Have you fallen in the last 12 months  No    Interperter language line needed  No    Counseling  Patient; Caregiver    Counseling topics  Activities of daily living; Importance of RX compliance    Comments  I spoke with pt to follow up to review her recent hospital discharge. Pt states that she received wonderful care while inpatient. Pt states that her symptoms have resolved and she is managing well at home.             Patient ID: Kirsty Em is a 74 y.o. female.    HPI      Patient coming in for hospital discharge follow-up.  Brief hospital course listed below.  Issues today however hearing loss since having a hospitalization for this.  Is following closely with neurology, neurosurgery, and cardiology.    Hospital Course:   Kirsty Em is a 74 y.o. female patient who originally presented to the hospital on 6/7/2024 due to being found on the floor unresponsive by her . PMH of anxiety, depression, hallucinations, tobacco abuse, COPD, CKD III, seizures (self terminated AEDs) and hyperlipidemia. States he found her on the floor with incontinence of stool and clenching of fists. Stroke alert pathway was initiated. Neurology evaluation part of stroke alert. Patient was intubated for airway protection and critical care was consulted for further evaluation and treatment.      CT head and neck concerning for left frontal meningioma and bilateral ICA stenosis. Neurology evaluation determined more likely seizure etiology for AMS  in setting of sepsis and toxic metabolic encephalopathy due to self withdrawal of AEDs. LP was performed, meningitis coverage with cefepime, vanco and acyclovir.      Was started on Keppra 1500 mg bolus 6/7/2024 and started Keppra 750 mg maintenance 6/9. Extubated 6/9 and transitioned to floor. Patient was evaluated by speech pathology, PT/OT.      6/12 patient was found to be in new onset atrial fibrillation with RVR at 170 BPM.  Cardiology was consulted and patient spontaneously converted to NSR. Was started on cardizem. Echo performed and showed EF of 55%. Patient was restarted on Heparin and remained therapeutic for 48 hours. Repeat CT head clear. Transitioned to Eliquis.      Patient was able to be placed at rehab facility ([facility]). She is to continue Keppra 750 mg twice a day, Eliquis 5 mg twice a day, aspirin 81 mg daily. She is to start home oxygen (flow and ween as able as rehab sees fit) and is being discharged with a main catheter. Patient to follow up with neurovascular and epilepsy specialist    The following portions of the patient's history were reviewed and updated as appropriate:   Past Medical History:  She has a past medical history of Anxiety, Depression, Depression, recurrent (HCC) (04/20/2021), Hallucinations (04/20/2021), and History of seizure disorder (04/20/2021).,  _______________________________________________________________________  Medical Problems:  does not have any pertinent problems on file.,  _______________________________________________________________________  Past Surgical History:   has a past surgical history that includes ORIF finger fracture (Right, 6/27/2024).,  _______________________________________________________________________  Family History:  family history includes Anxiety disorder in her mother; Diabetes in her mother; No Known Problems in her daughter, daughter, daughter, maternal aunt, maternal aunt, maternal aunt, maternal aunt, maternal aunt,  maternal aunt, maternal aunt, maternal aunt, maternal grandmother, paternal aunt, paternal grandmother, sister, sister, and sister; Stroke in her father.,  _______________________________________________________________________  Social History:   reports that she quit smoking about 2 months ago. Her smoking use included cigarettes. She started smoking about 54 years ago. She has a 27.2 pack-year smoking history. She has never used smokeless tobacco. She reports that she does not drink alcohol and does not use drugs.,  _______________________________________________________________________  Allergies:  is allergic to lipitor [atorvastatin]..  _______________________________________________________________________  Current Outpatient Medications   Medication Sig Dispense Refill   • acetaminophen (TYLENOL) 325 mg tablet Take 975 mg by mouth every 8 (eight) hours as needed for mild pain     • ALPRAZolam (XANAX) 2 MG tablet Take 1 tablet (2 mg total) by mouth 4 (four) times a day for 14 days 56 tablet 0   • apixaban (Eliquis) 5 mg Take 1 tablet (5 mg total) by mouth 2 (two) times a day 60 tablet 0   • aspirin 81 mg chewable tablet Chew 1 tablet (81 mg total) daily 60 tablet 0   • diltiazem (CARDIZEM CD) 120 mg 24 hr capsule Take 1 capsule (120 mg total) by mouth daily 60 capsule 0   • gabapentin (NEURONTIN) 300 mg capsule Take 300 mg by mouth 3 (three) times a day     • levETIRAcetam (KEPPRA) 750 mg tablet Take 1 tablet (750 mg total) by mouth every 12 (twelve) hours 120 tablet 0   • lisinopril (ZESTRIL) 10 mg tablet Take 1 tablet (10 mg total) by mouth 2 (two) times a day 120 tablet 0   • Multiple Vitamin (multivitamin) tablet Take 1 tablet by mouth daily     • naloxone (NARCAN) 4 mg/0.1 mL nasal spray Administer 1 spray into a nostril. If no response after 2-3 minutes, give another dose in the other nostril using a new spray. 1 each 1   • omega-3-acid ethyl esters (LOVAZA) 1 g capsule Take 1 g by mouth daily     •  "polyethylene glycol (MIRALAX) 17 g packet Take 17 g by mouth daily as needed     • QUEtiapine (SEROquel) 100 mg tablet Take 1 tablet (100 mg total) by mouth every 12 (twelve) hours 60 tablet 0   • vitamin B-12 (VITAMIN B-12) 1,000 mcg tablet Take by mouth daily     • ezetimibe (ZETIA) 10 mg tablet TAKE 1 TABLET BY MOUTH EVERY  tablet 1   • Icosapent Ethyl 1 g CAPS Take 2 capsules (2 g total) by mouth 2 (two) times a day 120 capsule 6     Current Facility-Administered Medications   Medication Dose Route Frequency Provider Last Rate Last Admin   • cyanocobalamin injection 1,000 mcg  1,000 mcg Intramuscular Q30 Days Brien Desouza MD   1,000 mcg at 01/06/23 1541     _______________________________________________________________________  Review of Systems   Constitutional:  Negative for chills, fatigue and fever.   HENT:  Positive for hearing loss. Negative for mouth sores, rhinorrhea and sore throat.    Eyes:  Negative for visual disturbance.   Respiratory:  Negative for cough and shortness of breath.    Cardiovascular:  Negative for chest pain and palpitations.   Gastrointestinal:  Negative for abdominal pain, constipation, diarrhea, nausea and vomiting.   Genitourinary:  Negative for difficulty urinating, dysuria and frequency.   Musculoskeletal:  Negative for arthralgias and myalgias.   Skin:  Negative for color change and rash.   Neurological:  Negative for weakness and headaches.         Objective:  Vitals:    07/30/24 1445   BP: 128/76   Pulse: 78   Temp: 98.3 °F (36.8 °C)   TempSrc: Tympanic   SpO2: 98%   Weight: 58.6 kg (129 lb 3.2 oz)   Height: 5' 1\" (1.549 m)     Body mass index is 24.41 kg/m².     Physical Exam  Constitutional:       General: She is not in acute distress.     Appearance: She is not ill-appearing.   HENT:      Head: Normocephalic and atraumatic.      Right Ear: External ear normal.      Left Ear: External ear normal.      Nose: Nose normal. No congestion or rhinorrhea.      " Mouth/Throat:      Mouth: Mucous membranes are moist.      Pharynx: Oropharynx is clear. No oropharyngeal exudate or posterior oropharyngeal erythema.   Eyes:      Extraocular Movements: Extraocular movements intact.      Conjunctiva/sclera: Conjunctivae normal.      Pupils: Pupils are equal, round, and reactive to light.   Cardiovascular:      Rate and Rhythm: Normal rate and regular rhythm.      Pulses: Normal pulses.      Heart sounds: No murmur heard.  Pulmonary:      Effort: Pulmonary effort is normal. No respiratory distress.      Breath sounds: Normal breath sounds. No wheezing.   Chest:      Chest wall: No tenderness.   Abdominal:      General: Bowel sounds are normal.      Palpations: Abdomen is soft.      Tenderness: There is no abdominal tenderness.   Musculoskeletal:         General: Normal range of motion.      Cervical back: Normal range of motion.   Skin:     General: Skin is warm and dry.      Capillary Refill: Capillary refill takes less than 2 seconds.      Findings: No rash.   Neurological:      General: No focal deficit present.      Mental Status: She is alert. Mental status is at baseline.

## 2024-07-31 ENCOUNTER — TELEPHONE (OUTPATIENT)
Age: 74
End: 2024-07-31

## 2024-07-31 NOTE — TELEPHONE ENCOUNTER
Patient's spouse, Wilver, calling stating he is receiving texts for Kirsty in regard to a referral to Vascular from [facility] (referral to Vascular in system)  He states she follows with [facility]'s Neurosurgery Dr Soto Quintana for her carotid artery disease and feels there is no need to see Vascular as well.

## 2024-08-01 ENCOUNTER — TELEPHONE (OUTPATIENT)
Dept: FAMILY MEDICINE CLINIC | Facility: CLINIC | Age: 74
End: 2024-08-01

## 2024-08-01 ENCOUNTER — APPOINTMENT (OUTPATIENT)
Dept: LAB | Facility: HOSPITAL | Age: 74
End: 2024-08-01
Payer: MEDICARE

## 2024-08-01 DIAGNOSIS — D64.9 ANEMIA, UNSPECIFIED TYPE: ICD-10-CM

## 2024-08-01 DIAGNOSIS — E87.6 HYPOKALEMIA: ICD-10-CM

## 2024-08-01 DIAGNOSIS — H91.90 UNILATERAL HEARING LOSS: Primary | ICD-10-CM

## 2024-08-01 NOTE — TELEPHONE ENCOUNTER
Patient's  stopped by the office to drop off the report from [facility]. He stated that the tech questioned him hard about why Dr. Desouza referred her to audiology instead of ENT for the L Sided deafness that has occurred post her stroke.    I informed her  that Dr. Desouza is on vacation and will not be back until next week. He stated that was perfectly fine, patient needs a break from all of the doctor appointments as it is causing her emotional distress. I told her  that I will gladly ask Dr. Desouza for his reasoning and gather his input on this and I will give him a call when I have all of the information. He gave many, many thanks and stated that even if it's 2 weeks until we get back to him, that is okay. He stressed that patient is very upset with having to be at a different doctor almost daily.      Dr. Desouza-  I have placed a copy of the report in your bin for your review (it has also been scanned into media) and when you have a moment, may you please advise and I can call patient's ?  Thank you!

## 2024-08-06 ENCOUNTER — APPOINTMENT (OUTPATIENT)
Dept: RADIOLOGY | Facility: AMBULARY SURGERY CENTER | Age: 74
End: 2024-08-06
Attending: STUDENT IN AN ORGANIZED HEALTH CARE EDUCATION/TRAINING PROGRAM
Payer: MEDICARE

## 2024-08-06 ENCOUNTER — OFFICE VISIT (OUTPATIENT)
Dept: OBGYN CLINIC | Facility: CLINIC | Age: 74
End: 2024-08-06

## 2024-08-06 ENCOUNTER — APPOINTMENT (OUTPATIENT)
Dept: LAB | Facility: AMBULARY SURGERY CENTER | Age: 74
End: 2024-08-06
Payer: MEDICARE

## 2024-08-06 VITALS — HEIGHT: 61 IN | WEIGHT: 129 LBS | BODY MASS INDEX: 24.35 KG/M2

## 2024-08-06 DIAGNOSIS — S62.511A CLOSED DISPLACED FRACTURE OF PROXIMAL PHALANX OF RIGHT THUMB, INITIAL ENCOUNTER: ICD-10-CM

## 2024-08-06 DIAGNOSIS — S62.511A CLOSED DISPLACED FRACTURE OF PROXIMAL PHALANX OF RIGHT THUMB, INITIAL ENCOUNTER: Primary | ICD-10-CM

## 2024-08-06 LAB
ANION GAP SERPL CALCULATED.3IONS-SCNC: 9 MMOL/L (ref 4–13)
BASOPHILS # BLD AUTO: 0.05 THOUSANDS/ÂΜL (ref 0–0.1)
BASOPHILS NFR BLD AUTO: 1 % (ref 0–1)
BUN SERPL-MCNC: 14 MG/DL (ref 5–25)
CALCIUM SERPL-MCNC: 10.2 MG/DL (ref 8.4–10.2)
CHLORIDE SERPL-SCNC: 101 MMOL/L (ref 96–108)
CO2 SERPL-SCNC: 27 MMOL/L (ref 21–32)
CREAT SERPL-MCNC: 0.63 MG/DL (ref 0.6–1.3)
EOSINOPHIL # BLD AUTO: 0.08 THOUSAND/ÂΜL (ref 0–0.61)
EOSINOPHIL NFR BLD AUTO: 1 % (ref 0–6)
ERYTHROCYTE [DISTWIDTH] IN BLOOD BY AUTOMATED COUNT: 14.8 % (ref 11.6–15.1)
FERRITIN SERPL-MCNC: 80 NG/ML (ref 11–307)
GFR SERPL CREATININE-BSD FRML MDRD: 89 ML/MIN/1.73SQ M
GLUCOSE P FAST SERPL-MCNC: 167 MG/DL (ref 65–99)
HCT VFR BLD AUTO: 39.7 % (ref 34.8–46.1)
HGB BLD-MCNC: 12.6 G/DL (ref 11.5–15.4)
IMM GRANULOCYTES # BLD AUTO: 0.03 THOUSAND/UL (ref 0–0.2)
IMM GRANULOCYTES NFR BLD AUTO: 0 % (ref 0–2)
IRON SATN MFR SERPL: 19 % (ref 15–50)
IRON SERPL-MCNC: 78 UG/DL (ref 50–212)
LYMPHOCYTES # BLD AUTO: 2.77 THOUSANDS/ÂΜL (ref 0.6–4.47)
LYMPHOCYTES NFR BLD AUTO: 30 % (ref 14–44)
MAGNESIUM SERPL-MCNC: 2.3 MG/DL (ref 1.9–2.7)
MCH RBC QN AUTO: 32.5 PG (ref 26.8–34.3)
MCHC RBC AUTO-ENTMCNC: 31.7 G/DL (ref 31.4–37.4)
MCV RBC AUTO: 102 FL (ref 82–98)
MONOCYTES # BLD AUTO: 0.32 THOUSAND/ÂΜL (ref 0.17–1.22)
MONOCYTES NFR BLD AUTO: 4 % (ref 4–12)
NEUTROPHILS # BLD AUTO: 6.01 THOUSANDS/ÂΜL (ref 1.85–7.62)
NEUTS SEG NFR BLD AUTO: 64 % (ref 43–75)
NRBC BLD AUTO-RTO: 0 /100 WBCS
PLATELET # BLD AUTO: 423 THOUSANDS/UL (ref 149–390)
PMV BLD AUTO: 10.5 FL (ref 8.9–12.7)
POTASSIUM SERPL-SCNC: 3.8 MMOL/L (ref 3.5–5.3)
RBC # BLD AUTO: 3.88 MILLION/UL (ref 3.81–5.12)
SODIUM SERPL-SCNC: 137 MMOL/L (ref 135–147)
TIBC SERPL-MCNC: 413 UG/DL (ref 250–450)
UIBC SERPL-MCNC: 335 UG/DL (ref 155–355)
WBC # BLD AUTO: 9.26 THOUSAND/UL (ref 4.31–10.16)

## 2024-08-06 PROCEDURE — 36415 COLL VENOUS BLD VENIPUNCTURE: CPT

## 2024-08-06 PROCEDURE — 83735 ASSAY OF MAGNESIUM: CPT

## 2024-08-06 PROCEDURE — 83540 ASSAY OF IRON: CPT

## 2024-08-06 PROCEDURE — 85025 COMPLETE CBC W/AUTO DIFF WBC: CPT

## 2024-08-06 PROCEDURE — 83550 IRON BINDING TEST: CPT

## 2024-08-06 PROCEDURE — 82728 ASSAY OF FERRITIN: CPT

## 2024-08-06 PROCEDURE — 99024 POSTOP FOLLOW-UP VISIT: CPT | Performed by: STUDENT IN AN ORGANIZED HEALTH CARE EDUCATION/TRAINING PROGRAM

## 2024-08-06 PROCEDURE — 80048 BASIC METABOLIC PNL TOTAL CA: CPT

## 2024-08-06 PROCEDURE — 73140 X-RAY EXAM OF FINGER(S): CPT

## 2024-08-06 NOTE — PROGRESS NOTES
Assessment/Plan:  Patient ID: 74 y.o. female   Surgery: Orif Of Right Thumb - Right  Date of Surgery: 6/27/2024    XRs reviewed.  Pin able to be pulled today.  Pt provided a thumb spica for transition for the next 1-2 weeks.  Therapy to work on ROM.     Follow Up:  6 weeks    To Do Next Visit:  X-rays of the  right  thumb      CHIEF COMPLAINT:  Chief Complaint   Patient presents with   • Post-op     Patient is here for cast off xr and pin removal          SUBJECTIVE:  Patient is a 74 y.o. year old female who presents for follow up after Orif Of Right Thumb - Right. Today patient states her pain is improved from last visit.       PHYSICAL EXAMINATION:  General: Well developed and well nourished, alert and oriented x 3, appears comfortable  Psychiatric: Normal    MUSCULOSKELETAL EXAMINATION:  Pin site: Clean, dry, intact  Surgery Site: normal, no evidence of infection   Range of Motion: Limited due to stiffness  Neurovascular status: Neuro intact, good cap refill  Done today: Pin removed      STUDIES REVIEWED:  Xrays of the right thumb were reviewed and independently interpreted in PACS by Dr. Yates and demonstrate continued healing of thumb fxs s/p CRPP with hardware intact.         PROCEDURES PERFORMED:  Procedures  No Procedures performed today    Scribe Attestation    I,:  Marian Childs PA-C am acting as a scribe while in the presence of the attending physician.:       I,:  Dagoberto Yates MD personally performed the services described in this documentation    as scribed in my presence.:          30

## 2024-08-09 LAB — COLOGUARD RESULT REPORTABLE: POSITIVE

## 2024-08-09 NOTE — TELEPHONE ENCOUNTER
ENT will want a hearing test first to determine what the next best course would be or if an ENT vs neuro would be beneficial pending the results

## 2024-08-12 ENCOUNTER — TELEPHONE (OUTPATIENT)
Dept: FAMILY MEDICINE CLINIC | Facility: CLINIC | Age: 74
End: 2024-08-12

## 2024-08-12 DIAGNOSIS — R19.5 POSITIVE COLORECTAL CANCER SCREENING USING COLOGUARD TEST: Primary | ICD-10-CM

## 2024-08-14 NOTE — TELEPHONE ENCOUNTER
Spoke with patient's - explained Dr. Desouza rationale. He expressed understanding. His only question was wether or not she needs to see ENT now, or can she wait until after she sees Neuro? She has a 4 day EEG scheduled in September.     Also, he wanted to know, in regard to her positive Cologuard. Patient's  wants to know if she can wait a month or 2 to do the colonoscopy? She is just completely overwhelmed and becoming quite drained from all of the appointments she has coming up and/or has completed as of late.    Please advise, thank you!

## 2024-08-15 NOTE — TELEPHONE ENCOUNTER
Can place referral to ent in [facility] was positive, so a colonoscopy is recommended

## 2024-08-15 NOTE — TELEPHONE ENCOUNTER
Called patient's spouse to inform him that the ENT and GI have been placed and patient should schedule as soon as she can. He expressed understanding and gave many thanks! Agreed that we would mail a copy to their home and appointments will be scheduled as patient is available.

## 2024-08-19 DIAGNOSIS — I48.91 NEW ONSET A-FIB (HCC): ICD-10-CM

## 2024-09-04 ENCOUNTER — HOSPITAL ENCOUNTER (OUTPATIENT)
Dept: MAMMOGRAPHY | Facility: CLINIC | Age: 74
Discharge: HOME | End: 2024-09-04
Payer: MEDICARE

## 2024-09-04 VITALS — HEIGHT: 61 IN | BODY MASS INDEX: 24.35 KG/M2 | WEIGHT: 129 LBS

## 2024-09-04 DIAGNOSIS — Z12.31 ENCOUNTER FOR SCREENING MAMMOGRAM FOR MALIGNANT NEOPLASM OF BREAST: ICD-10-CM

## 2024-09-04 PROCEDURE — 77067 SCR MAMMO BI INCL CAD: CPT

## 2024-09-04 PROCEDURE — 77063 BREAST TOMOSYNTHESIS BI: CPT

## 2024-09-06 ENCOUNTER — OFFICE VISIT (OUTPATIENT)
Dept: NEUROLOGY | Facility: CLINIC | Age: 74
End: 2024-09-06
Payer: MEDICARE

## 2024-09-06 VITALS
DIASTOLIC BLOOD PRESSURE: 72 MMHG | HEIGHT: 61 IN | HEART RATE: 107 BPM | SYSTOLIC BLOOD PRESSURE: 136 MMHG | WEIGHT: 123.1 LBS | BODY MASS INDEX: 23.24 KG/M2

## 2024-09-06 DIAGNOSIS — G40.909 SEIZURE DISORDER (HCC): ICD-10-CM

## 2024-09-06 DIAGNOSIS — R73.03 PRE-DIABETES: ICD-10-CM

## 2024-09-06 DIAGNOSIS — I10 PRIMARY HYPERTENSION: ICD-10-CM

## 2024-09-06 DIAGNOSIS — I48.91 NEW ONSET A-FIB (HCC): ICD-10-CM

## 2024-09-06 DIAGNOSIS — Z86.69 HISTORY OF SEIZURE DISORDER: ICD-10-CM

## 2024-09-06 DIAGNOSIS — E78.2 MIXED HYPERLIPIDEMIA: ICD-10-CM

## 2024-09-06 DIAGNOSIS — I63.9 STROKE (CEREBRUM) (HCC): ICD-10-CM

## 2024-09-06 DIAGNOSIS — I65.23 BILATERAL CAROTID ARTERY STENOSIS: ICD-10-CM

## 2024-09-06 DIAGNOSIS — Z86.73 HISTORY OF STROKE: Primary | ICD-10-CM

## 2024-09-06 DIAGNOSIS — F17.200 TOBACCO DEPENDENCE: ICD-10-CM

## 2024-09-06 PROCEDURE — 99214 OFFICE O/P EST MOD 30 MIN: CPT | Performed by: PSYCHIATRY & NEUROLOGY

## 2024-09-06 NOTE — PATIENT INSTRUCTIONS
Stroke: Kirsty presents for a follow-up evaluation with regard to her recent stroke which was likely incident to atrial fibrillation.  She also has bilateral carotid artery stenosis which is being followed by the neurosurgery group.  -For ongoing stroke prevention I agree with her current combination of aspirin, Eliquis, Zetia, and appropriate blood pressure and glycemic control  -We will defer to her primary care team for monitoring of her cholesterol panel and blood sugar numbers but would recommend an LDL cholesterol of less than 70 and hemoglobin A1c of less than 7%.  We would recommend a trial of a statin medication if possible however if she has already shown clear intolerance to multiple formulations of statin medicines consideration could be given to a PCSK9 inhibitor  -She should continue her current home therapy plan and thereafter I would like for her to remain physically active on a regular basis  -I agree with the current plan for repeat carotid Doppler imaging and MRI imaging.  She will need some sedation for her MRI and I will message the neurosurgery group in this regard  -She should occasionally check her blood pressure away from the doctor's office targeting numbers less than 130/80 most of the time  -I would strongly recommend that she stay completely free from tobacco as the use of tobacco can increase her risk for additional stroke including increasing the risk for worsening of her carotid arteries  -While taking the combination of Eliquis and aspirin she should avoid other blood thinning medications including ibuprofen/naproxen, she should also avoid alcohol.    Seizure disorder: She reports a remote history of possible seizures and experienced an episode of significant altered mental status leading to her hospitalization which may be consistent with a breakthrough seizure.  -For the time being we will plan to continue levetiracetam 750 mg twice per day  -She is not driving and knows that she is  not permitted to drive until 6 months from the time of her most recent seizure  -Given that she is not having recurrent episodes I do not feel strongly that she needs an ambulatory EEG so we will cancel it at this time however after a review of her seizure history we will discuss together at her next visit the appropriateness for long-term use of Keppra and consider whether any additional EEG monitoring would be warranted or helpful  -In the interim she should observe seizure precautions including avoiding situations where would be quite dangerous if she were to have a seizure (she should avoid going up on ladders, standing on tall stepstools, etc.).  She should avoid going swimming unless there is someone there who could help her, showers are preferred to balance in this setting, she should avoid long periods of insomnia/lack of sleep    She will return to the office to see me directly in 4 months time but I would be happy to see her sooner if the need should arise.  If she were to have strokelike symptoms such as sudden painless loss of vision or double vision, difficulty speaking or swallowing, vertigo/room spinning that does not quickly resolve, or sudden weakness/numbness/loss of coordination affecting 1 side of the face or body she should proceed by ambulance to the nearest emergency room immediately.  If she falls and strikes her head or suddenly develops the worst headache of her life she should likewise proceed by ambulance to the nearest emergency room

## 2024-09-06 NOTE — PROGRESS NOTES
Patient ID: Kirsty Em is a 74 y.o. female who presents to the [facility].    Assessment/Plan:   Patient Instructions   Stroke: Kirsty presents for a follow-up evaluation with regard to her recent stroke which was likely incident to atrial fibrillation.  She also has bilateral carotid artery stenosis which is being followed by the neurosurgery group.  -For ongoing stroke prevention I agree with her current combination of aspirin, Eliquis, Zetia, and appropriate blood pressure and glycemic control  -We will defer to her primary care team for monitoring of her cholesterol panel and blood sugar numbers but would recommend an LDL cholesterol of less than 70 and hemoglobin A1c of less than 7%.  We would recommend a trial of a statin medication if possible however if she has already shown clear intolerance to multiple formulations of statin medicines consideration could be given to a PCSK9 inhibitor  -She should continue her current home therapy plan and thereafter I would like for her to remain physically active on a regular basis  -I agree with the current plan for repeat carotid Doppler imaging and MRI imaging.  She will need some sedation for her MRI and I will message the neurosurgery group in this regard  -She should occasionally check her blood pressure away from the doctor's office targeting numbers less than 130/80 most of the time  -I would strongly recommend that she stay completely free from tobacco as the use of tobacco can increase her risk for additional stroke including increasing the risk for worsening of her carotid arteries  -While taking the combination of Eliquis and aspirin she should avoid other blood thinning medications including ibuprofen/naproxen, she should also avoid alcohol.    Seizure disorder: She reports a remote history of possible seizures and experienced an episode of significant altered mental status leading to her hospitalization which may be consistent with a  breakthrough seizure.  -For the time being we will plan to continue levetiracetam 750 mg twice per day  -She is not driving and knows that she is not permitted to drive until 6 months from the time of her most recent seizure  -Given that she is not having recurrent episodes I do not feel strongly that she needs an ambulatory EEG so we will cancel it at this time however after a review of her seizure history we will discuss together at her next visit the appropriateness for long-term use of Keppra and consider whether any additional EEG monitoring would be warranted or helpful  -In the interim she should observe seizure precautions including avoiding situations where would be quite dangerous if she were to have a seizure (she should avoid going up on ladders, standing on tall stepstools, etc.).  She should avoid going swimming unless there is someone there who could help her, showers are preferred to balance in this setting, she should avoid long periods of insomnia/lack of sleep    She will return to the office to see me directly in 4 months time but I would be happy to see her sooner if the need should arise.  If she were to have strokelike symptoms such as sudden painless loss of vision or double vision, difficulty speaking or swallowing, vertigo/room spinning that does not quickly resolve, or sudden weakness/numbness/loss of coordination affecting 1 side of the face or body she should proceed by ambulance to the nearest emergency room immediately.  If she falls and strikes her head or suddenly develops the worst headache of her life she should likewise proceed by ambulance to the nearest emergency room       Diagnoses and all orders for this visit:    History of stroke    Stroke (cerebrum) (HCC)  -     Ambulatory referral to Neurology    History of seizure disorder  -     Ambulatory referral to Neurology    Bilateral carotid artery stenosis    New onset a-fib (HCC)    Primary hypertension    Seizure disorder  (HCC)    Tobacco dependence    Mixed hyperlipidemia    Pre-diabetes             Subjective:    JUAN DIEGO Em is a 74 y.o. female who presents for evaluation of stroke.  She presented in June of this year initially with severe encephalopathy with concern for seizure-like activity in the emergency room.    She was found to have a left frontal stroke and has atrial fibrillation.  For this reason she was initially placed on a heparin drip and thereafter transitioned to a direct oral anticoagulant.  Reportedly she had weaned herself off of Keppra approximately 1 year before admission.  Initial EEG was unrevealing.    She did have multiple metabolic derangements on admission which may have been a result of her encephalopathy rather than the cause.  Ultimately symptoms improved.          Stroke risk factors were evaluated including:   Lab Results   Component Value Date/Time    CHOLESTEROL 165 06/09/2024 04:45 AM     Lab Results   Component Value Date/Time    TRIG 694 (H) 06/09/2024 04:45 AM     Lab Results   Component Value Date/Time    HDL 22 (L) 06/09/2024 04:45 AM     Lab Results   Component Value Date/Time    LDLCALC  06/09/2024 04:45 AM      Comment:      Calculated LDL invalid, triglycerides >400 mg/dl       Lab Results   Component Value Date/Time    HGBA1C 6.3 (H) 06/09/2024 04:45 AM     Lab Results   Component Value Date/Time     06/09/2024 04:45 AM           Past Medical History:   Diagnosis Date    Anxiety     Depression     Depression, recurrent (HCC) 04/20/2021    Hallucinations 04/20/2021    History of seizure disorder 04/20/2021       Current Outpatient Medications:     acetaminophen (TYLENOL) 325 mg tablet, Take 975 mg by mouth every 8 (eight) hours as needed for mild pain, Disp: , Rfl:     ALPRAZolam (XANAX) 2 MG tablet, Take 1 tablet (2 mg total) by mouth 4 (four) times a day for 14 days, Disp: 56 tablet, Rfl: 0    ezetimibe (ZETIA) 10 mg tablet, TAKE 1 TABLET BY MOUTH EVERY DAY, Disp: 100  "tablet, Rfl: 1    gabapentin (NEURONTIN) 300 mg capsule, Take 300 mg by mouth 3 (three) times a day, Disp: , Rfl:     Multiple Vitamin (multivitamin) tablet, Take 1 tablet by mouth daily, Disp: , Rfl:     naloxone (NARCAN) 4 mg/0.1 mL nasal spray, Administer 1 spray into a nostril. If no response after 2-3 minutes, give another dose in the other nostril using a new spray., Disp: 1 each, Rfl: 1    polyethylene glycol (MIRALAX) 17 g packet, Take 17 g by mouth daily as needed, Disp: , Rfl:     QUEtiapine (SEROquel) 100 mg tablet, Take 1 tablet (100 mg total) by mouth every 12 (twelve) hours, Disp: 60 tablet, Rfl: 0    vitamin B-12 (VITAMIN B-12) 1,000 mcg tablet, Take by mouth daily, Disp: , Rfl:     apixaban (Eliquis) 5 mg, Take 1 tablet (5 mg total) by mouth 2 (two) times a day, Disp: 60 tablet, Rfl: 5    aspirin 81 mg chewable tablet, Chew 1 tablet (81 mg total) daily, Disp: 60 tablet, Rfl: 0    diltiazem (CARDIZEM CD) 120 mg 24 hr capsule, Take 1 capsule (120 mg total) by mouth daily, Disp: 60 capsule, Rfl: 5    Icosapent Ethyl 1 g CAPS, Take 2 capsules (2 g total) by mouth 2 (two) times a day, Disp: 360 capsule, Rfl: 6    levETIRAcetam (KEPPRA) 750 mg tablet, Take 1 tablet (750 mg total) by mouth every 12 (twelve) hours, Disp: 120 tablet, Rfl: 2    lisinopril (ZESTRIL) 10 mg tablet, Take 1 tablet (10 mg total) by mouth 2 (two) times a day, Disp: 120 tablet, Rfl: 0    rosuvastatin (CRESTOR) 10 MG tablet, Take 1 tablet (10 mg total) by mouth daily, Disp: 30 tablet, Rfl: 6    Current Facility-Administered Medications:     cyanocobalamin injection 1,000 mcg, 1,000 mcg, Intramuscular, Q30 Days, Brien Desouza MD, 1,000 mcg at 01/06/23 1541     Objective:    Blood pressure 136/72, pulse (!) 107, height 5' 1\" (1.549 m), weight 55.8 kg (123 lb 1.6 oz).    Neurological Exam    At the time of my evaluation there were no clear new cranial neuropathies or lateralizing signs.  She was able to ambulate.  She is a moderate " historian    ROS:    Review of Systems   Constitutional:  Negative for appetite change, fatigue and fever.   HENT: Negative.  Negative for hearing loss, tinnitus, trouble swallowing and voice change.    Eyes: Negative.  Negative for photophobia, pain and visual disturbance.   Respiratory: Negative.  Negative for shortness of breath.    Cardiovascular: Negative.  Negative for palpitations.   Gastrointestinal: Negative.  Negative for nausea and vomiting.   Endocrine: Negative.  Negative for cold intolerance.   Genitourinary: Negative.  Negative for dysuria, frequency and urgency.   Musculoskeletal:  Negative for back pain, gait problem, myalgias, neck pain and neck stiffness.   Skin: Negative.  Negative for rash.   Allergic/Immunologic: Negative.    Neurological: Negative.  Negative for dizziness, tremors, seizures, syncope, facial asymmetry, speech difficulty, weakness, light-headedness, numbness and headaches.   Hematological: Negative.  Does not bruise/bleed easily.   Psychiatric/Behavioral: Negative.  Negative for confusion, hallucinations and sleep disturbance.

## 2024-09-12 ENCOUNTER — TELEPHONE (OUTPATIENT)
Dept: NEUROSURGERY | Facility: CLINIC | Age: 74
End: 2024-09-12

## 2024-09-12 NOTE — TELEPHONE ENCOUNTER
Message received from Dr. Quintana regarding patients sedation needs for MRI.     Reached out to Kirsty to discuss. Advised we usually prescribe xanax to take prior to MRI if claustrophobic however she already takes a maintenance dose of this and would prefer to have the MRI completed under [facility].     Replaced MRI order under [facility] and assisted to schedule this.     She will require H&P clearing her for GA within 30 days of her MRI. She will be NPO after midnight and will be contacted the evening before to advise of official arrival time. Also arranged US to be completed prior to OV.     Contacted Kirsty back and discussed with her     MRI with GA - [facility] -12/12 1:00 pm   Carotid doppler - [facility] 12/20 1:00 pm

## 2024-09-13 DIAGNOSIS — E78.1 HYPERTRIGLYCERIDEMIA: ICD-10-CM

## 2024-09-13 DIAGNOSIS — I48.91 NEW ONSET A-FIB (HCC): ICD-10-CM

## 2024-09-13 RX ORDER — DILTIAZEM HYDROCHLORIDE 120 MG/1
120 CAPSULE, COATED, EXTENDED RELEASE ORAL DAILY
Qty: 60 CAPSULE | Refills: 5 | Status: SHIPPED | OUTPATIENT
Start: 2024-09-13 | End: 2025-09-08

## 2024-09-13 NOTE — TELEPHONE ENCOUNTER
NOT A DUPLICATE Insurance is requesting a 90 day supply.    Reason for call:   [x] Refill   [] Prior Auth  [] Other:     Office:   [] PCP/Provider -   [x] Specialty/Provider - Cardio    Medication: Icosapent Ethyl 1 g CAPS Take 2 capsules (2 g total) by mouth 2 (two) times a day       Pharmacy: [facility]/pharmacy #[id number] - [place] - [address]     Does the patient have enough for 3 days?   [x] Yes   [] No - Send as HP to POD

## 2024-09-13 NOTE — TELEPHONE ENCOUNTER
Reason for call:   [x] Refill   [] Prior Auth  [x] Other: This was last given by nursing home/rehab center. The patient is asking that  order the refill at this time. The patient is completely out of medication. Please refill asap.    Office:   [x] PCP/Provider -   Ordering Department: [facility] [address] [place] [address]  Authorized By: Brien Desouza MD  [] Specialty/Provider -     Medication: diltiazem (CARDIZEM CD) 120 mg 24 hr capsule    Dose/Frequency: Take 1 capsule (120 mg total) by mouth daily     Quantity: 60    Pharmacy: [facility]/pharmacy #[id number] - [place] - [address] [phone]    Does the patient have enough for 3 days?   [] Yes   [x] No - Send as HP to POD

## 2024-09-14 DIAGNOSIS — E78.1 HYPERTRIGLYCERIDEMIA: ICD-10-CM

## 2024-09-14 RX ORDER — ICOSAPENT ETHYL 1 G/1
2 CAPSULE ORAL 2 TIMES DAILY
Qty: 360 CAPSULE | Refills: 1 | OUTPATIENT
Start: 2024-09-14

## 2024-09-14 RX ORDER — ICOSAPENT ETHYL 1 G/1
2 CAPSULE ORAL 2 TIMES DAILY
Qty: 360 CAPSULE | Refills: 6 | Status: SHIPPED | OUTPATIENT
Start: 2024-09-14

## 2024-09-17 ENCOUNTER — APPOINTMENT (OUTPATIENT)
Dept: RADIOLOGY | Facility: AMBULARY SURGERY CENTER | Age: 74
End: 2024-09-17
Attending: STUDENT IN AN ORGANIZED HEALTH CARE EDUCATION/TRAINING PROGRAM
Payer: MEDICARE

## 2024-09-17 ENCOUNTER — OFFICE VISIT (OUTPATIENT)
Dept: OBGYN CLINIC | Facility: CLINIC | Age: 74
End: 2024-09-17
Payer: MEDICARE

## 2024-09-17 VITALS — BODY MASS INDEX: 23.22 KG/M2 | HEIGHT: 61 IN | WEIGHT: 123 LBS

## 2024-09-17 DIAGNOSIS — S62.511A CLOSED DISPLACED FRACTURE OF PROXIMAL PHALANX OF RIGHT THUMB, INITIAL ENCOUNTER: ICD-10-CM

## 2024-09-17 DIAGNOSIS — S62.511A CLOSED DISPLACED FRACTURE OF PROXIMAL PHALANX OF RIGHT THUMB, INITIAL ENCOUNTER: Primary | ICD-10-CM

## 2024-09-17 PROCEDURE — 99024 POSTOP FOLLOW-UP VISIT: CPT | Performed by: STUDENT IN AN ORGANIZED HEALTH CARE EDUCATION/TRAINING PROGRAM

## 2024-09-17 PROCEDURE — 20600 DRAIN/INJ JOINT/BURSA W/O US: CPT | Performed by: STUDENT IN AN ORGANIZED HEALTH CARE EDUCATION/TRAINING PROGRAM

## 2024-09-17 PROCEDURE — 73140 X-RAY EXAM OF FINGER(S): CPT

## 2024-09-17 RX ADMIN — ROPIVACAINE HYDROCHLORIDE 1 ML: 2 INJECTION, SOLUTION EPIDURAL; INFILTRATION; PERINEURAL at 13:15

## 2024-09-17 RX ADMIN — BETAMETHASONE SODIUM PHOSPHATE AND BETAMETHASONE ACETATE 6 MG: 3; 3 INJECTION, SUSPENSION INTRA-ARTICULAR; INTRALESIONAL; INTRAMUSCULAR; SOFT TISSUE at 13:15

## 2024-09-17 NOTE — PROGRESS NOTES
Assessment/Plan:  Patient ID: 74 y.o. female   Surgery: Orif Of Right Thumb - Right  Date of Surgery: 6/27/2024    12 weeks s/p above stated surgery. The patient does have stiffness and pain on exam. She has been doing home therapy, but has not yet done any outpatient therapy. Discussed with the patient I would recommend outpatient hand specific therapy to help with her motion.  The patient was agreeable to this and a referral was placed for this today. Patient uses her thumb spica brace at times. Recommended discontinuation of this completely as it is likely hindering her motion. Discussed with the patient that she did sustain fractures into the joint which can predispose her to arthritis. We offered injections today to help with pain and hopefully allow improvements with therapy. The patient consented and underwent a right thumb IP and MCP joint injections in the office today without any complications.    The patient's DXA scan from 2023 was reviewed in the office today which demonstrates osteoporosis with radius T score -4.0. She is not currently on any osteoporosis medications. Discussed evaluation with endocrinology, however, patient states she has too many doctors to see as it is and would like to avoid a new physician and declined referral to endocrine. Discussed evaluation and discussion with PCP and consider possible medications for osteoporosis.  The patient was open to this and a letter was sent to the patient's PCP in the office today. Patient has appointment with PCP in 2 months.    Follow Up:  2 months    To Do Next Visit:  X-rays of the  right  thumb  Follow-up conversation with PCP regarding osteoporosis management       CHIEF COMPLAINT:  No chief complaint on file.        SUBJECTIVE:  Patient is a 74 y.o. year old female who presents for follow up after Orif Of Right Thumb - Right. Today patient states she is in a lot of pain. She has been getting home therapy. She has been using the thumb spica  brace on occasions.       PHYSICAL EXAMINATION:  General: Well developed and well nourished, alert and oriented x 3, appears comfortable  Psychiatric: Normal    MUSCULOSKELETAL EXAMINATION:  Incision: Clean, dry, intact  Surgery Site: normal, no evidence of infection   Range of Motion: MCP flexon 30  IP flexion 25  MCP full extension  IP full extension  Nontender at prior fracture sites.  Neurovascular status: Neuro intact, good cap refill         STUDIES REVIEWED:  Xrays of the right thumb were reviewed and independently interpreted in PACS by Dr. Yates and demonstrate healing distal and proximal phalanx fractures. Further fracture consolidation noted.     DXA scan 5/3/23 LUMBAR SPINE  Level: L2, L4  (L1, L3 vertebrae excluded from analysis due to local structural abnormalities or artifact):  BMD: 0.863 gm/cm2  T-score: -1.9        LEFT  TOTAL HIP:  BMD: 0.655 gm/cm2  T-score: -2.3     LEFT  FEMORAL NECK:  BMD: 0.595 gm/cm2  T score: -2.3     LEFT  FOREARM:  33% RADIUS BMD: 0.452 gm/cm2  T-score: -4.0       PROCEDURES PERFORMED:  Small joint arthrocentesis: R thumb IP  Universal Protocol:  procedure performed by consultantConsent: Verbal consent obtained.  Consent given by: patient  Patient identity confirmed: verbally with patient  Supporting Documentation  Indications: pain   Procedure Details  Location: thumb - R thumb IP  Preparation: Patient was prepped and draped in the usual sterile fashion  Needle size: 25 G  Ultrasound guidance: no  Medications administered: 1 mL ropivacaine 0.2 %; 6 mg betamethasone acetate-betamethasone sodium phosphate 6 (3-3) mg/mL    Patient tolerance: patient tolerated the procedure well with no immediate complications  Dressing:  Sterile dressing applied      Small joint arthrocentesis: R thumb MCP  Universal Protocol:  procedure performed by consultantConsent: Verbal consent obtained.  Consent given by: patient  Patient identity confirmed: verbally with patient  Supporting  Documentation  Indications: pain   Procedure Details  Location: thumb - R thumb MCP  Preparation: Patient was prepped and draped in the usual sterile fashion  Needle size: 25 G  Ultrasound guidance: no  Medications administered: 6 mg betamethasone acetate-betamethasone sodium phosphate 6 (3-3) mg/mL; 1 mL ropivacaine 0.2 %    Patient tolerance: patient tolerated the procedure well with no immediate complications  Dressing:  Sterile dressing applied             Scribe Attestation      I,:  Jennifer Freitas MA am acting as a scribe while in the presence of the attending physician.:       I,:  Dagoberto Yates MD personally performed the services described in this documentation    as scribed in my presence.:

## 2024-09-17 NOTE — LETTER
September 17, 2024     Brien Desouza MD  [address] [address]    Patient: Kirsty Em   YOB: 1950   Date of Visit: 9/17/2024       Dear Dr. Desouza:    I have been treating the patient surgically for a right thumb fracture. Her DXA scan from 2023 was reviewed with the patient which demonstrates a T-score of 4 and osteoporosis. The patient is open to discussing medication for this. Please discuss possible medications for osteoporosis with the patient.    If you have questions, please do not hesitate to call me. I look forward to following your patient along with you.         Sincerely,        Dagoberto Yates MD

## 2024-09-18 DIAGNOSIS — I48.91 NEW ONSET A-FIB (HCC): ICD-10-CM

## 2024-09-18 DIAGNOSIS — G40.909 SEIZURE DISORDER (HCC): ICD-10-CM

## 2024-09-18 RX ORDER — BETAMETHASONE SODIUM PHOSPHATE AND BETAMETHASONE ACETATE 3; 3 MG/ML; MG/ML
6 INJECTION, SUSPENSION INTRA-ARTICULAR; INTRALESIONAL; INTRAMUSCULAR; SOFT TISSUE
Status: COMPLETED | OUTPATIENT
Start: 2024-09-17 | End: 2024-09-17

## 2024-09-18 RX ORDER — ROPIVACAINE HYDROCHLORIDE 2 MG/ML
1 INJECTION, SOLUTION EPIDURAL; INFILTRATION; PERINEURAL
Status: COMPLETED | OUTPATIENT
Start: 2024-09-17 | End: 2024-09-17

## 2024-09-18 RX ORDER — LEVETIRACETAM 750 MG/1
750 TABLET ORAL EVERY 12 HOURS SCHEDULED
Qty: 120 TABLET | Refills: 2 | Status: SHIPPED | OUTPATIENT
Start: 2024-09-18 | End: 2024-11-17

## 2024-09-24 ENCOUNTER — OFFICE VISIT (OUTPATIENT)
Dept: CARDIOLOGY CLINIC | Facility: CLINIC | Age: 74
End: 2024-09-24
Payer: MEDICARE

## 2024-09-24 VITALS
HEART RATE: 91 BPM | RESPIRATION RATE: 16 BRPM | DIASTOLIC BLOOD PRESSURE: 76 MMHG | SYSTOLIC BLOOD PRESSURE: 110 MMHG | BODY MASS INDEX: 23.41 KG/M2 | HEIGHT: 61 IN | WEIGHT: 124 LBS | OXYGEN SATURATION: 93 %

## 2024-09-24 DIAGNOSIS — E78.2 MIXED HYPERLIPIDEMIA: ICD-10-CM

## 2024-09-24 DIAGNOSIS — I63.9 CEREBROVASCULAR ACCIDENT (CVA), UNSPECIFIED MECHANISM (HCC): ICD-10-CM

## 2024-09-24 DIAGNOSIS — J96.01 ACUTE RESPIRATORY FAILURE WITH HYPOXIA (HCC): ICD-10-CM

## 2024-09-24 DIAGNOSIS — E87.6 HYPOKALEMIA: ICD-10-CM

## 2024-09-24 DIAGNOSIS — I10 PRIMARY HYPERTENSION: ICD-10-CM

## 2024-09-24 DIAGNOSIS — I65.23 BILATERAL CAROTID ARTERY STENOSIS: ICD-10-CM

## 2024-09-24 DIAGNOSIS — R73.03 PRE-DIABETES: ICD-10-CM

## 2024-09-24 DIAGNOSIS — R94.31 QT PROLONGATION: ICD-10-CM

## 2024-09-24 DIAGNOSIS — I48.91 NEW ONSET A-FIB (HCC): Primary | ICD-10-CM

## 2024-09-24 PROCEDURE — 99214 OFFICE O/P EST MOD 30 MIN: CPT | Performed by: INTERNAL MEDICINE

## 2024-09-24 PROCEDURE — 93000 ELECTROCARDIOGRAM COMPLETE: CPT | Performed by: INTERNAL MEDICINE

## 2024-09-24 RX ORDER — ROSUVASTATIN CALCIUM 10 MG/1
10 TABLET, COATED ORAL DAILY
Qty: 30 TABLET | Refills: 6 | Status: SHIPPED | OUTPATIENT
Start: 2024-09-24

## 2024-09-24 NOTE — PROGRESS NOTES
Cardiology   Hospital Follow Up   Office Visit Note  Kirsty Em   74 y.o.   female   MRN: [id number]  [facility] [place]  [address]  [address]  [facility] [address] [phone]    PCP: Brien Desouza MD      The patient is seen as a new patient, previously seen in [place] for a hospital follow-up.  She was admitted to [place] with mental status changes June 7 to June 17, 2024 and was found to have an acute/subacute CVA and new onset atrial fibrillation.  She was also found to have bilateral carotid disease that has been treated medically.  She was started on Eliquis and treated with aspirin, diltiazem, Zetia, Vascepa, lisinopril.  She also had a seizure. She follows with nuerology, ortho, pcp, vascular surgery. She broke her thumb twice.     She now feels well. No bleeding on eliquis. No cp, sob, palpitations, syncope. She is going for a colonoscopy.     EKG today shows NSR, PRWP.          Assessment/Plan  New onset atrial fibrillation.  Newly discovered, when admitted with mental status changes and found to have an acute/subacute CVA. ADM 6/7 - 6/17/2024                  Chads vasc score is 5 - continue eliquis       She is getting a colonoscopy soon- ok to hold eliquis 48 hours prior and resume post.         Rate controlled with Cardizem  mg daily  OAC with Eliquis 5 mg twice daily  Echo 6/29/2024: EF 55%.  Grade 2 diastolic dysfunction.  Mild to moderate MR.  PASP 45 mmHg  EKG today NSR 92 bpm  Acute/subacute CVA  MRI with focal acute to subacute infarct along the posterior left frontal cortex, repeat scheduled.  Intolerance to statins  ASCVD  Bilateral carotid artery stenosis- follows w vasc surg  CTA head neck 6/7/2024  Approximate 70% right and 65% left stenosis of the proximal internal carotid arteries due to atherosclerosis. Vascular surgery consult recommended.  Moderate stenosis at the origin of the left common carotid artery due to atherosclerosis.  No  significant stenosis of the vertebral arteries  Hypertension.  /85 started on Cardizem  mg daily  Dyslipidemia, mixed with HTG  Intolerant to atorvastatin-myalgias  Will give trial crestor 10 mg a day  Continue on Zetia 10 mg daily  continue Vascepa 2 g twice daily..  Repeat fasting lipid profile with direct LDL in 4 to 12 weeks, already scheduled   Latest Reference Range & Units 02/03/23 15:09 01/08/24 15:12 06/09/24 04:45   Cholesterol See Comment mg/dL 273 (H) 262 (H) 165   Triglycerides See Comment mg/dL 532 (H) 347 (H) 694 (H)   HDL >=50 mg/dL 38 (L) 40 (L) 22 (L)   Non-HDL Cholesterol mg/dl 235 222    LDL Calculated 0 - 100 mg/dL See Comment 153 (H) See Comment   LDL CHOLESTEROL DIRECT 0 - 100 mg/dl   75   History of recent rhabdomyolysis.  6/13/2024 CPK improving, 362   Latest Reference Range & Units 06/08/24 12:21 06/08/24 18:45 06/09/24 04:45 06/11/24 06:02 06/12/24 05:06 06/13/24 06:52   Total CK 26 - 192 U/L 15,550 (H) 13,276 (H) 9,243 (H) 4,153 (H) 1,405 (H) 362 (H)   Seizure disorder  COPD  Tobacco abuse  Cardiac testing  TTE 6/29/2024.  EF 55%.  Wall motion normal.  Grade 2 DD.  RV normal size and function.  Mild to moderate MR.  Monitor.  PASP mildly increased at 45 mmHg          HPI  Kirsty Em is a 74 y.o.year old with hyperlipidemia, tobacco abuse, CKD, anxiety/depression, and COPD.  She has no known history of CAD, heart failure, nor arrhythmia.      Adm 6/7-6/17/24.  10 days.  [facility]  CC: Mental status change  Initially suspected she may have been depressed as they recently had to put one of the dogs down  She was subsequently found sitting on the floor, covered in feces.  Her  cleaned her up however she became confused and agitated therefore called EMS  Initially, concern for seizure-like activity. Rx keppra  Placed on stroke protocol  Her procalcitonin and lactic acid were elevated.  She also had a temperature of 104  Therefore lumbar puncture was pursued and she was  started on antibiotics  She was admitted to ICU   She was found to be in A-fib with RVR, and was evaluated by cardiology  She was diagnosed with an acute/subacute CVA, along with seizure  She converted to sinus rhythm with IV Cardizem  She transition to p.o. Cardizem 120 mg daily  She was started on IV heparin drip  Recommend transitioning to a DOAC when cleared by neurology  She was also started on both aspirin and Plavix  Lisinopril was uptitrated given she was hypertensive  She was found to have bilateral carotid artery stenosis.  She is evaluated by vascular surgery  She was also found to have rhabdomyolysis  Per neuro: sx felt more likely seizure etiology in the setting of sepsis and toxic metabolic encephalopathy in the setting of self withdrawal of AEDs   discharged with [place] catheter.   Rehab was recommended and patient was discharged to NH         7/11/24  Hospital follow-up  PMH CVA.  COPD.  Tobacco abuse.  ROS: Denies CP/palpitation.  She had recent surgery to her right hand.  It is currently casted.  She is mostly concerned about being able to function with ADLs given that she has a cast on her right lower arm.  Reports she is rehabilitating and is now using a cane  I reviewed her hospital course  She was referred to vascular surgery given her carotid disease noted on prior CAT scan of her neck  Will continue Cardizem and Eliquis,  as she is maintaining sinus rhythm by EKG  Her blood pressure is satisfactory  I will change the treatment for her triglycerides given her triglycerides are greater than 600.  All over-the-counter fish oil is not satisfactory  She will return to see a cardiologist at the [place] office, in a few months        Review of Systems   Constitutional: Negative for chills.   Cardiovascular:  Negative for chest pain, claudication, cyanosis, dyspnea on exertion, irregular heartbeat, leg swelling, near-syncope, orthopnea, palpitations, paroxysmal nocturnal dyspnea and syncope.    Respiratory:  Negative for cough and shortness of breath.    Musculoskeletal:         Recent surgery right hand   Gastrointestinal:  Negative for heartburn and nausea.   Neurological:  Negative for dizziness, focal weakness, headaches, light-headedness and weakness.   All other systems reviewed and are negative.            Assessment  Diagnoses and all orders for this visit:    New onset a-fib (HCC)    Bilateral carotid artery stenosis    Primary hypertension    Acute respiratory failure with hypoxia (HCC)    Mixed hyperlipidemia    Pre-diabetes    QT prolongation    Hypokalemia    Cerebrovascular accident (CVA), unspecified mechanism (HCC)        Past Medical History:   Diagnosis Date    Anxiety     Depression     Depression, recurrent (HCC) 04/20/2021    Hallucinations 04/20/2021    History of seizure disorder 04/20/2021       Past Surgical History:   Procedure Laterality Date    ORIF FINGER FRACTURE Right 6/27/2024    Procedure: ORIF OF RIGHT THUMB;  Surgeon: Dagoberto Yates MD;  Location: [facility];  Service: Orthopedics           Allergies  Allergies   Allergen Reactions    Lipitor [Atorvastatin] Other (See Comments)     Muscle aches         Medications    Current Outpatient Medications:     acetaminophen (TYLENOL) 325 mg tablet, Take 975 mg by mouth every 8 (eight) hours as needed for mild pain, Disp: , Rfl:     ALPRAZolam (XANAX) 2 MG tablet, Take 1 tablet (2 mg total) by mouth 4 (four) times a day for 14 days, Disp: 56 tablet, Rfl: 0    apixaban (Eliquis) 5 mg, Take 1 tablet (5 mg total) by mouth 2 (two) times a day, Disp: 60 tablet, Rfl: 5    aspirin 81 mg chewable tablet, Chew 1 tablet (81 mg total) daily, Disp: 60 tablet, Rfl: 0    diltiazem (CARDIZEM CD) 120 mg 24 hr capsule, Take 1 capsule (120 mg total) by mouth daily, Disp: 60 capsule, Rfl: 5    ezetimibe (ZETIA) 10 mg tablet, TAKE 1 TABLET BY MOUTH EVERY DAY, Disp: 100 tablet, Rfl: 1    gabapentin (NEURONTIN) 300 mg capsule, Take 300 mg by  mouth 3 (three) times a day, Disp: , Rfl:     Icosapent Ethyl 1 g CAPS, Take 2 capsules (2 g total) by mouth 2 (two) times a day, Disp: 360 capsule, Rfl: 6    levETIRAcetam (KEPPRA) 750 mg tablet, Take 1 tablet (750 mg total) by mouth every 12 (twelve) hours, Disp: 120 tablet, Rfl: 2    lisinopril (ZESTRIL) 10 mg tablet, Take 1 tablet (10 mg total) by mouth 2 (two) times a day, Disp: 120 tablet, Rfl: 0    Multiple Vitamin (multivitamin) tablet, Take 1 tablet by mouth daily, Disp: , Rfl:     naloxone (NARCAN) 4 mg/0.1 mL nasal spray, Administer 1 spray into a nostril. If no response after 2-3 minutes, give another dose in the other nostril using a new spray., Disp: 1 each, Rfl: 1    polyethylene glycol (MIRALAX) 17 g packet, Take 17 g by mouth daily as needed, Disp: , Rfl:     QUEtiapine (SEROquel) 100 mg tablet, Take 1 tablet (100 mg total) by mouth every 12 (twelve) hours, Disp: 60 tablet, Rfl: 0    vitamin B-12 (VITAMIN B-12) 1,000 mcg tablet, Take by mouth daily, Disp: , Rfl:     Current Facility-Administered Medications:     cyanocobalamin injection 1,000 mcg, 1,000 mcg, Intramuscular, Q30 Days, Brien Desouza MD, 1,000 mcg at 23 1541      Social History     Socioeconomic History    Marital status: /Civil Union     Spouse name: Not on file    Number of children: Not on file    Years of education: Not on file    Highest education level: Not on file   Occupational History    Not on file   Tobacco Use    Smoking status: Former     Current packs/day: 0.00     Average packs/day: 0.5 packs/day for 54.3 years (27.2 ttl pk-yrs)     Types: Cigarettes     Start date:      Quit date: 2024     Years since quittin.4    Smokeless tobacco: Never   Vaping Use    Vaping status: Never Used   Substance and Sexual Activity    Alcohol use: Never    Drug use: Never    Sexual activity: Yes     Partners: Male     Birth control/protection: None   Other Topics Concern    Not on file   Social History Narrative     Not on file     Social Determinants of Health     Financial Resource Strain: Medium Risk (1/1/2024)    Overall Financial Resource Strain (CARDIA)     Difficulty of Paying Living Expenses: Somewhat hard   Food Insecurity: No Food Insecurity (6/8/2024)    Hunger Vital Sign     Worried About Running Out of Food in the Last Year: Never true     Ran Out of Food in the Last Year: Never true   Transportation Needs: No Transportation Needs (6/8/2024)    PRAPARE - Transportation     Lack of Transportation (Medical): No     Lack of Transportation (Non-Medical): No   Physical Activity: Not on file   Stress: Not on file   Social Connections: Not on file   Intimate Partner Violence: Not on file   Housing Stability: Low Risk  (6/8/2024)    Housing Stability Vital Sign     Unable to Pay for Housing in the Last Year: No     Number of Times Moved in the Last Year: 0     Homeless in the Last Year: No       Family History   Problem Relation Age of Onset    Diabetes Mother     Anxiety disorder Mother     Stroke Father     No Known Problems Sister     No Known Problems Sister     No Known Problems Sister     No Known Problems Daughter     No Known Problems Daughter     No Known Problems Daughter     No Known Problems Maternal Grandmother     No Known Problems Paternal Grandmother     No Known Problems Maternal Aunt     No Known Problems Maternal Aunt     No Known Problems Maternal Aunt     No Known Problems Maternal Aunt     No Known Problems Maternal Aunt     No Known Problems Maternal Aunt     No Known Problems Maternal Aunt     No Known Problems Maternal Aunt     No Known Problems Paternal Aunt     Breast cancer Neg Hx        Physical Exam  Vitals and nursing note reviewed.   Constitutional:       General: She is not in acute distress.     Appearance: She is not diaphoretic.   HENT:      Head: Normocephalic and atraumatic.   Eyes:      Conjunctiva/sclera: Conjunctivae normal.   Cardiovascular:      Rate and Rhythm: Normal rate and  "regular rhythm.      Pulses: Intact distal pulses.      Heart sounds: Normal heart sounds.   Pulmonary:      Effort: Pulmonary effort is normal.      Breath sounds: Normal breath sounds.   Abdominal:      General: Bowel sounds are normal.      Palpations: Abdomen is soft.   Musculoskeletal:         General: Normal range of motion.      Cervical back: Normal range of motion and neck supple.      Comments: Cast right wrist.   Skin:     General: Skin is warm and dry.   Neurological:      Mental Status: She is alert and oriented to person, place, and time.         Vitals: Blood pressure 110/76, pulse 91, resp. rate 16, height 5' 1\" (1.549 m), weight 56.2 kg (124 lb), SpO2 93%.   Wt Readings from Last 3 Encounters:   09/24/24 56.2 kg (124 lb)   09/17/24 55.8 kg (123 lb)   09/06/24 55.8 kg (123 lb 1.6 oz)           Labs & Results:  Lab Results   Component Value Date    WBC 9.26 08/06/2024    HGB 12.6 08/06/2024    HCT 39.7 08/06/2024     (H) 08/06/2024     (H) 08/06/2024     No results found for: \"BNP\"  No components found for: \"CHEM\"  Total CK   Date Value Ref Range Status   06/13/2024 362 (H) 26 - 192 U/L Final   06/12/2024 1,405 (H) 26 - 192 U/L Final   06/11/2024 4,153 (H) 26 - 192 U/L Final           "

## 2024-10-02 ENCOUNTER — HOSPITAL ENCOUNTER (EMERGENCY)
Facility: HOSPITAL | Age: 74
Discharge: HOME | End: 2024-10-02
Attending: EMERGENCY MEDICINE | Admitting: EMERGENCY MEDICINE
Payer: MEDICARE

## 2024-10-02 ENCOUNTER — OFFICE VISIT (OUTPATIENT)
Dept: URGENT CARE | Facility: CLINIC | Age: 74
End: 2024-10-02
Payer: MEDICARE

## 2024-10-02 ENCOUNTER — APPOINTMENT (EMERGENCY)
Dept: CT IMAGING | Facility: HOSPITAL | Age: 74
End: 2024-10-02
Payer: MEDICARE

## 2024-10-02 VITALS
RESPIRATION RATE: 16 BRPM | HEART RATE: 115 BPM | SYSTOLIC BLOOD PRESSURE: 102 MMHG | OXYGEN SATURATION: 98 % | TEMPERATURE: 98.5 F | DIASTOLIC BLOOD PRESSURE: 72 MMHG

## 2024-10-02 VITALS
SYSTOLIC BLOOD PRESSURE: 129 MMHG | OXYGEN SATURATION: 97 % | HEART RATE: 76 BPM | RESPIRATION RATE: 20 BRPM | DIASTOLIC BLOOD PRESSURE: 79 MMHG

## 2024-10-02 DIAGNOSIS — R41.0 CONFUSION AND DISORIENTATION: ICD-10-CM

## 2024-10-02 DIAGNOSIS — N39.0 URINARY TRACT INFECTION WITHOUT HEMATURIA, SITE UNSPECIFIED: Primary | ICD-10-CM

## 2024-10-02 DIAGNOSIS — R20.0 LEFT ARM NUMBNESS: Primary | ICD-10-CM

## 2024-10-02 LAB
2HR DELTA HS TROPONIN: >1 NG/L
ALBUMIN SERPL BCG-MCNC: 4.7 G/DL (ref 3.5–5)
ALP SERPL-CCNC: 104 U/L (ref 34–104)
ALT SERPL W P-5'-P-CCNC: 45 U/L (ref 7–52)
ANION GAP SERPL CALCULATED.3IONS-SCNC: 10 MMOL/L (ref 4–13)
AST SERPL W P-5'-P-CCNC: 26 U/L (ref 13–39)
ATRIAL RATE: 96 BPM
BACTERIA UR QL AUTO: ABNORMAL /HPF
BASOPHILS # BLD AUTO: 0.06 THOUSANDS/ΜL (ref 0–0.1)
BASOPHILS NFR BLD AUTO: 1 % (ref 0–1)
BILIRUB SERPL-MCNC: 0.29 MG/DL (ref 0.2–1)
BILIRUB UR QL STRIP: NEGATIVE
BUN SERPL-MCNC: 20 MG/DL (ref 5–25)
CALCIUM SERPL-MCNC: 10.2 MG/DL (ref 8.4–10.2)
CARDIAC TROPONIN I PNL SERPL HS: 3 NG/L
CARDIAC TROPONIN I PNL SERPL HS: <2 NG/L
CHLORIDE SERPL-SCNC: 101 MMOL/L (ref 96–108)
CLARITY UR: ABNORMAL
CO2 SERPL-SCNC: 29 MMOL/L (ref 21–32)
COLOR UR: YELLOW
CREAT SERPL-MCNC: 0.79 MG/DL (ref 0.6–1.3)
EOSINOPHIL # BLD AUTO: 0.05 THOUSAND/ΜL (ref 0–0.61)
EOSINOPHIL NFR BLD AUTO: 0 % (ref 0–6)
ERYTHROCYTE [DISTWIDTH] IN BLOOD BY AUTOMATED COUNT: 13.1 % (ref 11.6–15.1)
GFR SERPL CREATININE-BSD FRML MDRD: 75 ML/MIN/1.73SQ M
GLUCOSE SERPL-MCNC: 92 MG/DL (ref 65–140)
GLUCOSE UR STRIP-MCNC: NEGATIVE MG/DL
HCT VFR BLD AUTO: 42.5 % (ref 34.8–46.1)
HGB BLD-MCNC: 13.8 G/DL (ref 11.5–15.4)
HGB UR QL STRIP.AUTO: NEGATIVE
IMM GRANULOCYTES # BLD AUTO: 0.03 THOUSAND/UL (ref 0–0.2)
IMM GRANULOCYTES NFR BLD AUTO: 0 % (ref 0–2)
KETONES UR STRIP-MCNC: NEGATIVE MG/DL
LACTATE SERPL-SCNC: 1.5 MMOL/L (ref 0.5–2)
LEUKOCYTE ESTERASE UR QL STRIP: ABNORMAL
LYMPHOCYTES # BLD AUTO: 2.81 THOUSANDS/ΜL (ref 0.6–4.47)
LYMPHOCYTES NFR BLD AUTO: 24 % (ref 14–44)
MAGNESIUM SERPL-MCNC: 2.2 MG/DL (ref 1.9–2.7)
MCH RBC QN AUTO: 32.5 PG (ref 26.8–34.3)
MCHC RBC AUTO-ENTMCNC: 32.5 G/DL (ref 31.4–37.4)
MCV RBC AUTO: 100 FL (ref 82–98)
MONOCYTES # BLD AUTO: 0.45 THOUSAND/ΜL (ref 0.17–1.22)
MONOCYTES NFR BLD AUTO: 4 % (ref 4–12)
NEUTROPHILS # BLD AUTO: 8.17 THOUSANDS/ΜL (ref 1.85–7.62)
NEUTS SEG NFR BLD AUTO: 71 % (ref 43–75)
NITRITE UR QL STRIP: POSITIVE
NON-SQ EPI CELLS URNS QL MICRO: ABNORMAL /HPF
NRBC BLD AUTO-RTO: 0 /100 WBCS
P AXIS: 67 DEGREES
PH UR STRIP.AUTO: 8.5 [PH]
PLATELET # BLD AUTO: 361 THOUSANDS/UL (ref 149–390)
PMV BLD AUTO: 10 FL (ref 8.9–12.7)
POTASSIUM SERPL-SCNC: 4.2 MMOL/L (ref 3.5–5.3)
PR INTERVAL: 144 MS
PROT SERPL-MCNC: 8.1 G/DL (ref 6.4–8.4)
PROT UR STRIP-MCNC: ABNORMAL MG/DL
QRS AXIS: 77 DEGREES
QRSD INTERVAL: 68 MS
QT INTERVAL: 356 MS
QTC INTERVAL: 449 MS
RBC # BLD AUTO: 4.24 MILLION/UL (ref 3.81–5.12)
RBC #/AREA URNS AUTO: ABNORMAL /HPF
SODIUM SERPL-SCNC: 140 MMOL/L (ref 135–147)
SP GR UR STRIP.AUTO: 1.02 (ref 1–1.03)
T WAVE AXIS: 55 DEGREES
TRI-PHOS CRY URNS QL MICRO: ABNORMAL /HPF
UROBILINOGEN UR STRIP-ACNC: <2 MG/DL
VENTRICULAR RATE: 96 BPM
WBC # BLD AUTO: 11.57 THOUSAND/UL (ref 4.31–10.16)
WBC #/AREA URNS AUTO: ABNORMAL /HPF

## 2024-10-02 PROCEDURE — 99284 EMERGENCY DEPT VISIT MOD MDM: CPT | Performed by: EMERGENCY MEDICINE

## 2024-10-02 PROCEDURE — 99285 EMERGENCY DEPT VISIT HI MDM: CPT

## 2024-10-02 PROCEDURE — 87077 CULTURE AEROBIC IDENTIFY: CPT | Performed by: EMERGENCY MEDICINE

## 2024-10-02 PROCEDURE — 70450 CT HEAD/BRAIN W/O DYE: CPT

## 2024-10-02 PROCEDURE — 93010 ELECTROCARDIOGRAM REPORT: CPT | Performed by: INTERNAL MEDICINE

## 2024-10-02 PROCEDURE — 87186 SC STD MICRODIL/AGAR DIL: CPT | Performed by: EMERGENCY MEDICINE

## 2024-10-02 PROCEDURE — 96360 HYDRATION IV INFUSION INIT: CPT

## 2024-10-02 PROCEDURE — 80053 COMPREHEN METABOLIC PANEL: CPT | Performed by: EMERGENCY MEDICINE

## 2024-10-02 PROCEDURE — 93005 ELECTROCARDIOGRAM TRACING: CPT

## 2024-10-02 PROCEDURE — 83605 ASSAY OF LACTIC ACID: CPT | Performed by: EMERGENCY MEDICINE

## 2024-10-02 PROCEDURE — 84484 ASSAY OF TROPONIN QUANT: CPT | Performed by: EMERGENCY MEDICINE

## 2024-10-02 PROCEDURE — 36415 COLL VENOUS BLD VENIPUNCTURE: CPT | Performed by: EMERGENCY MEDICINE

## 2024-10-02 PROCEDURE — 83735 ASSAY OF MAGNESIUM: CPT | Performed by: EMERGENCY MEDICINE

## 2024-10-02 PROCEDURE — 81001 URINALYSIS AUTO W/SCOPE: CPT | Performed by: EMERGENCY MEDICINE

## 2024-10-02 PROCEDURE — 99204 OFFICE O/P NEW MOD 45 MIN: CPT | Performed by: PHYSICIAN ASSISTANT

## 2024-10-02 PROCEDURE — 87086 URINE CULTURE/COLONY COUNT: CPT | Performed by: EMERGENCY MEDICINE

## 2024-10-02 PROCEDURE — G0463 HOSPITAL OUTPT CLINIC VISIT: HCPCS | Performed by: PHYSICIAN ASSISTANT

## 2024-10-02 PROCEDURE — 85025 COMPLETE CBC W/AUTO DIFF WBC: CPT | Performed by: EMERGENCY MEDICINE

## 2024-10-02 PROCEDURE — 93005 ELECTROCARDIOGRAM TRACING: CPT | Performed by: PHYSICIAN ASSISTANT

## 2024-10-02 RX ORDER — CEFDINIR 300 MG/1
300 CAPSULE ORAL ONCE
Status: COMPLETED | OUTPATIENT
Start: 2024-10-02 | End: 2024-10-02

## 2024-10-02 RX ORDER — CEFDINIR 300 MG/1
300 CAPSULE ORAL EVERY 12 HOURS SCHEDULED
Qty: 20 CAPSULE | Refills: 0 | Status: SHIPPED | OUTPATIENT
Start: 2024-10-02 | End: 2024-10-12

## 2024-10-02 RX ADMIN — SODIUM CHLORIDE 500 ML: 0.9 INJECTION, SOLUTION INTRAVENOUS at 16:45

## 2024-10-02 RX ADMIN — CEFDINIR 300 MG: 300 CAPSULE ORAL at 19:03

## 2024-10-02 NOTE — DISCHARGE INSTRUCTIONS
You were seen and evaluated today for confusion and weakness.  Your test results demonstrated urinary tract infection  Please take all medications as instructed. Follow up with your PCP as discussed.   RETURN TO THE EMERGENCY DEPARTMENT if you develop new or worsening symptoms and are unable to see your PCP.

## 2024-10-02 NOTE — PROGRESS NOTES
[facility]        NAME: Kirsty Em is a 74 y.o. female  : 1950    MRN: [id number]  DATE: 2024  TIME: 3:36 PM      Assessment and Plan     Left arm numbness [R20.0]  1. Left arm numbness  POCT ECG    Transfer to other facility      2. Confusion and disorientation            POC Testing Results    ECG - normal sinus rhythm, possible septal infarct - age undetermined     Note:   15:16 - patient arrives, begin vitals, and called EMS based on stroke-like symptoms   15:23 - obtained ECG   05:26 - EMS arrived and transferred patient to hospital     Patient Instructions   There are no Patient Instructions on file for this visit.     Follow up with primary care provider.   Go to ER if symptoms worsen.    Chief Complaint     Chief Complaint   Patient presents with    Numbness     Pt c/o left arm and hand numbness since last night around 1800. Pt's  states she is a little disoriented and couldn't tie her pants or open her makeup this morning around 0900.         History of Present Illness     Patient presents with left hand weakness, numbness, and pain x last night. She states she felt nauseous last night after dinner. She states it is difficult for her to grasp objects, such as not being able to tie her pants or open her makeup and apply it. Her  states she has been confused today sometimes as well. She has a history of new-onset A-fib and CVA in 2024.         Review of Systems     Review of Systems   Constitutional:  Negative for chills, fatigue and fever.   HENT:  Negative for congestion, ear pain, postnasal drip, rhinorrhea, sinus pressure, sinus pain and sore throat.    Eyes:  Negative for pain and visual disturbance.   Respiratory:  Negative for cough, chest tightness and shortness of breath.    Cardiovascular:  Negative for chest pain and palpitations.   Gastrointestinal:  Negative for abdominal pain, diarrhea, nausea and vomiting.   Genitourinary:  Negative for dysuria  and hematuria.   Musculoskeletal:  Negative for arthralgias, back pain and myalgias.   Skin:  Negative for rash.   Neurological:  Positive for weakness and numbness. Negative for dizziness, seizures, syncope, facial asymmetry and headaches.   Psychiatric/Behavioral:  Positive for confusion.    All other systems reviewed and are negative.        Current Medications       Current Outpatient Medications:     ALPRAZolam (XANAX) 2 MG tablet, Take 1 tablet (2 mg total) by mouth 4 (four) times a day for 14 days, Disp: 56 tablet, Rfl: 0    apixaban (Eliquis) 5 mg, Take 1 tablet (5 mg total) by mouth 2 (two) times a day, Disp: 60 tablet, Rfl: 5    diltiazem (CARDIZEM CD) 120 mg 24 hr capsule, Take 1 capsule (120 mg total) by mouth daily, Disp: 60 capsule, Rfl: 5    ezetimibe (ZETIA) 10 mg tablet, TAKE 1 TABLET BY MOUTH EVERY DAY, Disp: 100 tablet, Rfl: 1    gabapentin (NEURONTIN) 300 mg capsule, Take 300 mg by mouth 3 (three) times a day, Disp: , Rfl:     levETIRAcetam (KEPPRA) 750 mg tablet, Take 1 tablet (750 mg total) by mouth every 12 (twelve) hours, Disp: 120 tablet, Rfl: 2    Multiple Vitamin (multivitamin) tablet, Take 1 tablet by mouth daily, Disp: , Rfl:     polyethylene glycol (MIRALAX) 17 g packet, Take 17 g by mouth daily as needed, Disp: , Rfl:     QUEtiapine (SEROquel) 100 mg tablet, Take 1 tablet (100 mg total) by mouth every 12 (twelve) hours, Disp: 60 tablet, Rfl: 0    rosuvastatin (CRESTOR) 10 MG tablet, Take 1 tablet (10 mg total) by mouth daily, Disp: 30 tablet, Rfl: 6    vitamin B-12 (VITAMIN B-12) 1,000 mcg tablet, Take by mouth daily, Disp: , Rfl:     acetaminophen (TYLENOL) 325 mg tablet, Take 975 mg by mouth every 8 (eight) hours as needed for mild pain (Patient not taking: Reported on 10/2/2024), Disp: , Rfl:     aspirin 81 mg chewable tablet, Chew 1 tablet (81 mg total) daily, Disp: 60 tablet, Rfl: 0    Icosapent Ethyl 1 g CAPS, Take 2 capsules (2 g total) by mouth 2 (two) times a day, Disp: 360  capsule, Rfl: 6    lisinopril (ZESTRIL) 10 mg tablet, Take 1 tablet (10 mg total) by mouth 2 (two) times a day, Disp: 120 tablet, Rfl: 0    naloxone (NARCAN) 4 mg/0.1 mL nasal spray, Administer 1 spray into a nostril. If no response after 2-3 minutes, give another dose in the other nostril using a new spray., Disp: 1 each, Rfl: 1    Current Facility-Administered Medications:     cyanocobalamin injection 1,000 mcg, 1,000 mcg, Intramuscular, Q30 Days, Brien Desouza MD, 1,000 mcg at 01/06/23 1541    Current Allergies     Allergies as of 10/02/2024 - Reviewed 10/02/2024   Allergen Reaction Noted    Lipitor [atorvastatin] Other (See Comments) 01/08/2024              The following portions of the patient's history were reviewed and updated as appropriate: allergies, current medications, past family history, past medical history, past social history, past surgical history, and problem list.     Past Medical History:   Diagnosis Date    Anxiety     Depression     Depression, recurrent (HCC) 04/20/2021    Hallucinations 04/20/2021    History of seizure disorder 04/20/2021       Past Surgical History:   Procedure Laterality Date    ORIF FINGER FRACTURE Right 6/27/2024    Procedure: ORIF OF RIGHT THUMB;  Surgeon: Dagoberto Yates MD;  [facility];  Service: Orthopedics       Family History   Problem Relation Age of Onset    Diabetes Mother     Anxiety disorder Mother     Stroke Father     No Known Problems Sister     No Known Problems Sister     No Known Problems Sister     No Known Problems Daughter     No Known Problems Daughter     No Known Problems Daughter     No Known Problems Maternal Grandmother     No Known Problems Paternal Grandmother     No Known Problems Maternal Aunt     No Known Problems Maternal Aunt     No Known Problems Maternal Aunt     No Known Problems Maternal Aunt     No Known Problems Maternal Aunt     No Known Problems Maternal Aunt     No Known Problems Maternal Aunt     No Known  Problems Maternal Aunt     No Known Problems Paternal Aunt     Breast cancer Neg Hx          Medications have been verified.        Objective     /72   Pulse (!) 115   Temp 98.5 °F (36.9 °C)   Resp 16   SpO2 98%   No LMP recorded. Patient is postmenopausal.         Physical Exam     Physical Exam  Vitals and nursing note reviewed. Exam conducted with a chaperone present ().   Constitutional:       Appearance: Normal appearance. She is normal weight.   HENT:      Head: Normocephalic and atraumatic.   Eyes:      Extraocular Movements: Extraocular movements intact.      Conjunctiva/sclera: Conjunctivae normal.      Pupils: Pupils are equal, round, and reactive to light.   Cardiovascular:      Rate and Rhythm: Normal rate and regular rhythm.      Heart sounds: Normal heart sounds.   Pulmonary:      Effort: Pulmonary effort is normal.      Breath sounds: Normal breath sounds.   Skin:     General: Skin is warm and dry.   Neurological:      Mental Status: She is alert. She is confused.      Motor: No weakness.      Gait: Gait abnormal (slight swaying while walking).      Comments: Didn't get to assess much of neuro due to EMS arriving at a timely manner. Patient sometimes confused about the questions being asked.    Psychiatric:         Mood and Affect: Mood normal.         Behavior: Behavior normal.

## 2024-10-02 NOTE — ED PROVIDER NOTES
Final diagnoses:   Urinary tract infection without hematuria, site unspecified     ED Disposition       ED Disposition   Discharge    Condition   Stable    Date/Time   Wed Oct 2, 2024  6:55 PM    Comment   Kirsty Em discharge to home/self care.                   Assessment & Plan       Medical Decision Making  Patient is a 74-year-old female with a past medical history of atrial fibrillation and subacute CVA identified in June 2024 currently anticoagulated on Eliquis and rate controlled with Cardizem.  She had bilateral carotid artery stenosis identified for which she is following with vascular surgery.  She presents today with altered mental status and left upper extremity numbness.  She states that she last felt well yesterday.  All symptoms began after becoming ill after eating dinner. She is now oriented x3 and states that her arm weakness is improving.  Confusion resolved.  No significant clumsiness this morning.  Reports mild difficulty due to weakness in left arm opening her make-up containers but was able to apply her make-up.  Currently states that she is returning to baseline.  Suspect likely recrudescence of previous symptoms based on the waxing and waning nature of her complaints and associated confusion.  Metabolic and infectious screening workup is pending.  Will repeat CT scan of the head to ensure no subacute to acute change suggesting CVA.    Amount and/or Complexity of Data Reviewed  Labs: ordered. Decision-making details documented in ED Course.  Radiology: ordered and independent interpretation performed.     Details: No acute intracranial hemorrhage  ECG/medicine tests: independent interpretation performed.     Details: Normal sinus rhythm, 96 bpm, normal axis, normal intervals, low voltage QRS throughout, T wave inversion previously noted has resolved, nonspecific ST flattening    Risk  Prescription drug management.        ED Course as of 10/02/24 2143   Wed Oct 02, 2024   1847 UA w Reflex to  Microscopic w Reflex to Culture(!)   1847 Urine Microscopic(!)       Medications   sodium chloride 0.9 % bolus 500 mL (0 mL Intravenous Stopped 10/2/24 1745)   cefdinir (OMNICEF) capsule 300 mg (300 mg Oral Given 10/2/24 1903)       ED Risk Strat Scores                          SBIRT 20yo+      Flowsheet Row Most Recent Value   Initial Alcohol Screen: US AUDIT-C     1. How often do you have a drink containing alcohol? 0 Filed at: 10/02/2024 1619   2. How many drinks containing alcohol do you have on a typical day you are drinking?  0 Filed at: 10/02/2024 1619   3a. Male UNDER 65: How often do you have five or more drinks on one occasion? 0 Filed at: 10/02/2024 1619   3b. FEMALE Any Age, or MALE 65+: How often do you have 4 or more drinks on one occassion? 0 Filed at: 10/02/2024 1619   Audit-C Score 0 Filed at: 10/02/2024 1619   JONAH: How many times in the past year have you...    Used an illegal drug or used a prescription medication for non-medical reasons? Never Filed at: 10/02/2024 1619                            History of Present Illness       Chief Complaint   Patient presents with    Weakness - Generalized     Pt presents from urgent care via EMS, Left hand numbness starting this morning. Per urgent care note, disoriented, LKW this morning. Repetitive question. Denies falls, stroke hx in June       Past Medical History:   Diagnosis Date    Anxiety     Depression     Depression, recurrent (HCC) 04/20/2021    Hallucinations 04/20/2021    History of seizure disorder 04/20/2021      Past Surgical History:   Procedure Laterality Date    ORIF FINGER FRACTURE Right 6/27/2024    Procedure: ORIF OF RIGHT THUMB;  Surgeon: Dagoberto Yates MD;  Location: [facility];  Service: Orthopedics      Family History   Problem Relation Age of Onset    Diabetes Mother     Anxiety disorder Mother     Stroke Father     No Known Problems Sister     No Known Problems Sister     No Known Problems Sister     No Known Problems Daughter      No Known Problems Daughter     No Known Problems Daughter     No Known Problems Maternal Grandmother     No Known Problems Paternal Grandmother     No Known Problems Maternal Aunt     No Known Problems Maternal Aunt     No Known Problems Maternal Aunt     No Known Problems Maternal Aunt     No Known Problems Maternal Aunt     No Known Problems Maternal Aunt     No Known Problems Maternal Aunt     No Known Problems Maternal Aunt     No Known Problems Paternal Aunt     Breast cancer Neg Hx       Social History     Tobacco Use    Smoking status: Former     Current packs/day: 0.00     Average packs/day: 0.5 packs/day for 54.3 years (27.2 ttl pk-yrs)     Types: Cigarettes     Start date:      Quit date: 2024     Years since quittin.4    Smokeless tobacco: Never   Vaping Use    Vaping status: Never Used   Substance Use Topics    Alcohol use: Never    Drug use: Never      E-Cigarette/Vaping    E-Cigarette Use Never User       E-Cigarette/Vaping Substances    Nicotine No     THC No     CBD No     Flavoring No     Other No     Unknown No       I have reviewed and agree with the history as documented.     Patient is a 74-year-old female who presents to the emergency department with complaint of left-sided weakness that is now resolving, as well as intermittent confusion and altered mental status.          Review of Systems   Constitutional:  Positive for fatigue. Negative for fever.   Neurological:  Positive for weakness.   Psychiatric/Behavioral:  Positive for confusion.            Objective       ED Triage Vitals [10/02/24 1615]   Temp Pulse Blood Pressure Respirations SpO2 Patient Position - Orthostatic VS   -- 90 140/74 20 93 % Lying      Temp src Heart Rate Source BP Location FiO2 (%) Pain Score    -- Monitor Right arm -- --      Vitals      Date and Time Temp Pulse SpO2 Resp BP Pain Score FACES Pain Rating User   10/02/24 1830 -- 76 97 % 20 129/79 -- -- AM   10/02/24 1730 -- 75 96 % 20 132/81 -- -- AM    10/02/24 1615 -- 90 93 % 20 140/74 -- -- CB            Physical Exam  Vitals and nursing note reviewed.   Constitutional:       General: She is not in acute distress.     Appearance: Normal appearance.   HENT:      Head: Normocephalic and atraumatic.      Right Ear: External ear normal.      Left Ear: External ear normal.      Nose: Nose normal.   Cardiovascular:      Rate and Rhythm: Normal rate and regular rhythm.   Pulmonary:      Effort: Pulmonary effort is normal.      Breath sounds: Normal breath sounds.   Abdominal:      General: There is no distension.      Palpations: Abdomen is soft.      Tenderness: There is no abdominal tenderness.   Musculoskeletal:      Right lower leg: No edema.      Left lower leg: No edema.   Skin:     General: Skin is warm and dry.   Neurological:      General: No focal deficit present.      Mental Status: She is alert and oriented to person, place, and time. Mental status is at baseline.      Cranial Nerves: No cranial nerve deficit.      Motor: No weakness.   Psychiatric:         Behavior: Behavior normal.         Results Reviewed       Procedure Component Value Units Date/Time    HS Troponin I 2hr [[id number]]  (Normal) Collected: 10/02/24 1836    Lab Status: Final result Specimen: Blood from Arm, Left Updated: 10/02/24 1905     hs TnI 2hr 3 ng/L      Delta 2hr hsTnI >1 ng/L     Urine Microscopic [[id number]]  (Abnormal) Collected: 10/02/24 1756    Lab Status: Final result Specimen: Urine, Clean Catch Updated: 10/02/24 1813     RBC, UA 1-2 /hpf      WBC, UA 10-20 /hpf      Epithelial Cells Occasional /hpf      Bacteria, UA None Seen /hpf      Triplep Phos Margaret, UA Occasional /hpf     Urine culture [[id number]] Collected: 10/02/24 1756    Lab Status: In process Specimen: Urine, Clean Catch Updated: 10/02/24 1813    UA w Reflex to Microscopic w Reflex to Culture [[id number]]  (Abnormal) Collected: 10/02/24 1756    Lab Status: Final result Specimen: Urine, Clean Catch Updated:  10/02/24 1807     Color, UA Yellow     Clarity, UA Turbid     Specific Gravity, UA 1.019     pH, UA 8.5     Leukocytes, UA Moderate     Nitrite, UA Positive     Protein, UA Trace mg/dl      Glucose, UA Negative mg/dl      Ketones, UA Negative mg/dl      Urobilinogen, UA <2.0 mg/dl      Bilirubin, UA Negative     Occult Blood, UA Negative    HS Troponin 0hr (reflex protocol) [[id number]]  (Normal) Collected: 10/02/24 1635    Lab Status: Final result Specimen: Blood from Arm, Right Updated: 10/02/24 1714     hs TnI 0hr <2 ng/L     Comprehensive metabolic panel [[id number]] Collected: 10/02/24 1635    Lab Status: Final result Specimen: Blood from Arm, Right Updated: 10/02/24 1708     Sodium 140 mmol/L      Potassium 4.2 mmol/L      Chloride 101 mmol/L      CO2 29 mmol/L      ANION GAP 10 mmol/L      BUN 20 mg/dL      Creatinine 0.79 mg/dL      Glucose 92 mg/dL      Calcium 10.2 mg/dL      AST 26 U/L      ALT 45 U/L      Alkaline Phosphatase 104 U/L      Total Protein 8.1 g/dL      Albumin 4.7 g/dL      Total Bilirubin 0.29 mg/dL      eGFR 75 ml/min/1.73sq m     Narrative:      National Kid[employer] guidelines for Chronic Kidney Disease (CKD):     Stage 1 with normal or high GFR (GFR > 90 mL/min/1.73 square meters)    Stage 2 Mild CKD (GFR = 60-89 mL/min/1.73 square meters)    Stage 3A Moderate CKD (GFR = 45-59 mL/min/1.73 square meters)    Stage 3B Moderate CKD (GFR = 30-44 mL/min/1.73 square meters)    Stage 4 Severe CKD (GFR = 15-29 mL/min/1.73 square meters)    Stage 5 End Stage CKD (GFR <15 mL/min/1.73 square meters)  Note: GFR calculation is accurate only with a steady state creatinine    Magnesium [[id number]]  (Normal) Collected: 10/02/24 1635    Lab Status: Final result Specimen: Blood from Arm, Right Updated: 10/02/24 1708     Magnesium 2.2 mg/dL     Lactic acid, plasma (w/reflex if result > 2.0) [[id number]]  (Normal) Collected: 10/02/24 1635    Lab Status: Final result Specimen: Blood from Arm,  Right Updated: 10/02/24 1706     LACTIC ACID 1.5 mmol/L     Narrative:      Result may be elevated if tourniquet was used during collection.    CBC and differential [[id number]]  (Abnormal) Collected: 10/02/24 1635    Lab Status: Final result Specimen: Blood from Arm, Right Updated: 10/02/24 1654     WBC 11.57 Thousand/uL      RBC 4.24 Million/uL      Hemoglobin 13.8 g/dL      Hematocrit 42.5 %       fL      MCH 32.5 pg      MCHC 32.5 g/dL      RDW 13.1 %      MPV 10.0 fL      Platelets 361 Thousands/uL      nRBC 0 /100 WBCs      Segmented % 71 %      Immature Grans % 0 %      Lymphocytes % 24 %      Monocytes % 4 %      Eosinophils Relative 0 %      Basophils Relative 1 %      Absolute Neutrophils 8.17 Thousands/µL      Absolute Immature Grans 0.03 Thousand/uL      Absolute Lymphocytes 2.81 Thousands/µL      Absolute Monocytes 0.45 Thousand/µL      Eosinophils Absolute 0.05 Thousand/µL      Basophils Absolute 0.06 Thousands/µL             CT head without contrast   Final Interpretation by Larry Coy MD (10/02 1742)      No acute intracranial abnormality.                  Workstation performed: [id number]             Procedures    ED Medication and Procedure Management   Prior to Admission Medications   Prescriptions Last Dose Informant Patient Reported? Taking?   ALPRAZolam (XANAX) 2 MG tablet  Self No No   Sig: Take 1 tablet (2 mg total) by mouth 4 (four) times a day for 14 days   Icosapent Ethyl 1 g CAPS  Self No No   Sig: Take 2 capsules (2 g total) by mouth 2 (two) times a day   Multiple Vitamin (multivitamin) tablet  Self Yes No   Sig: Take 1 tablet by mouth daily   QUEtiapine (SEROquel) 100 mg tablet  Self No No   Sig: Take 1 tablet (100 mg total) by mouth every 12 (twelve) hours   acetaminophen (TYLENOL) 325 mg tablet  Self Yes No   Sig: Take 975 mg by mouth every 8 (eight) hours as needed for mild pain   Patient not taking: Reported on 10/2/2024   apixaban (Eliquis) 5 mg  Self No No    Sig: Take 1 tablet (5 mg total) by mouth 2 (two) times a day   aspirin 81 mg chewable tablet  Self No No   Sig: Chew 1 tablet (81 mg total) daily   diltiazem (CARDIZEM CD) 120 mg 24 hr capsule  Self No No   Sig: Take 1 capsule (120 mg total) by mouth daily   ezetimibe (ZETIA) 10 mg tablet  Self No No   Sig: TAKE 1 TABLET BY MOUTH EVERY DAY   gabapentin (NEURONTIN) 300 mg capsule  Self Yes No   Sig: Take 300 mg by mouth 3 (three) times a day   levETIRAcetam (KEPPRA) 750 mg tablet  Self No No   Sig: Take 1 tablet (750 mg total) by mouth every 12 (twelve) hours   lisinopril (ZESTRIL) 10 mg tablet  Self No No   Sig: Take 1 tablet (10 mg total) by mouth 2 (two) times a day   naloxone (NARCAN) 4 mg/0.1 mL nasal spray  Self No No   Sig: Administer 1 spray into a nostril. If no response after 2-3 minutes, give another dose in the other nostril using a new spray.   polyethylene glycol (MIRALAX) 17 g packet  Self Yes No   Sig: Take 17 g by mouth daily as needed   rosuvastatin (CRESTOR) 10 MG tablet   No No   Sig: Take 1 tablet (10 mg total) by mouth daily   vitamin B-12 (VITAMIN B-12) 1,000 mcg tablet  Self Yes No   Sig: Take by mouth daily      Facility-Administered Medications Last Administration Doses Remaining   cyanocobalamin injection 1,000 mcg 1/6/2023  3:41 PM         Discharge Medication List as of 10/2/2024  7:03 PM        CONTINUE these medications which have NOT CHANGED    Details   acetaminophen (TYLENOL) 325 mg tablet Take 975 mg by mouth every 8 (eight) hours as needed for mild pain, Historical Med      ALPRAZolam (XANAX) 2 MG tablet Take 1 tablet (2 mg total) by mouth 4 (four) times a day for 14 days, Starting Wed 6/19/2024, Normal      apixaban (Eliquis) 5 mg Take 1 tablet (5 mg total) by mouth 2 (two) times a day, Starting Wed 9/18/2024, Normal      aspirin 81 mg chewable tablet Chew 1 tablet (81 mg total) daily, Starting Tue 6/18/2024, Until Tue 9/24/2024, No Print      diltiazem (CARDIZEM CD) 120 mg 24  hr capsule Take 1 capsule (120 mg total) by mouth daily, Starting Fri 9/13/2024, Until Mon 9/8/2025, Normal      ezetimibe (ZETIA) 10 mg tablet TAKE 1 TABLET BY MOUTH EVERY DAY, Starting Wed 7/10/2024, Normal      gabapentin (NEURONTIN) 300 mg capsule Take 300 mg by mouth 3 (three) times a day, Starting Fri 4/22/2022, Historical Med      Icosapent Ethyl 1 g CAPS Take 2 capsules (2 g total) by mouth 2 (two) times a day, Starting Sat 9/14/2024, Normal      levETIRAcetam (KEPPRA) 750 mg tablet Take 1 tablet (750 mg total) by mouth every 12 (twelve) hours, Starting Wed 9/18/2024, Until Sun 11/17/2024, Normal      lisinopril (ZESTRIL) 10 mg tablet Take 1 tablet (10 mg total) by mouth 2 (two) times a day, Starting Mon 6/17/2024, Until Tue 9/24/2024, No Print      Multiple Vitamin (multivitamin) tablet Take 1 tablet by mouth daily, Historical Med      naloxone (NARCAN) 4 mg/0.1 mL nasal spray Administer 1 spray into a nostril. If no response after 2-3 minutes, give another dose in the other nostril using a new spray., Normal      polyethylene glycol (MIRALAX) 17 g packet Take 17 g by mouth daily as needed, Historical Med      QUEtiapine (SEROquel) 100 mg tablet Take 1 tablet (100 mg total) by mouth every 12 (twelve) hours, Starting Fri 7/19/2024, Normal      rosuvastatin (CRESTOR) 10 MG tablet Take 1 tablet (10 mg total) by mouth daily, Starting Tue 9/24/2024, Normal      vitamin B-12 (VITAMIN B-12) 1,000 mcg tablet Take by mouth daily, Historical Med           No discharge procedures on file.  ED SEPSIS DOCUMENTATION   Time reflects when diagnosis was documented in both MDM as applicable and the Disposition within this note       Time User Action Codes Description Comment    10/2/2024  7:01 PM Aurora Magdaleno Add [N39.0] Urinary tract infection without hematuria, site unspecified                  Aurora Magdaleno MD  10/02/24 6195

## 2024-10-03 ENCOUNTER — VBI (OUTPATIENT)
Dept: FAMILY MEDICINE CLINIC | Facility: CLINIC | Age: 74
End: 2024-10-03

## 2024-10-03 LAB
ATRIAL RATE: 91 BPM
P AXIS: 80 DEGREES
PR INTERVAL: 148 MS
QRS AXIS: 94 DEGREES
QRSD INTERVAL: 72 MS
QT INTERVAL: 374 MS
QTC INTERVAL: 460 MS
T WAVE AXIS: 68 DEGREES
VENTRICULAR RATE: 91 BPM

## 2024-10-03 PROCEDURE — 93010 ELECTROCARDIOGRAM REPORT: CPT | Performed by: INTERNAL MEDICINE

## 2024-10-03 NOTE — TELEPHONE ENCOUNTER
10/03/24 9:10 AM    Patient contacted post ED visit, outreach attempt made but message could not be left. Additional outreach attempt will be made.     Thank you.  Tomy Blanca MA  [facility]

## 2024-10-04 NOTE — TELEPHONE ENCOUNTER
10/04/24 9:00 AM    Patient contacted post ED visit, [facility] department spoke with patient/caregiver and outreach was successful.    Thank you.  Tomy Blanca MA  [facility]

## 2024-10-05 LAB — BACTERIA UR CULT: ABNORMAL

## 2024-10-08 LAB
ATRIAL RATE: 96 BPM
P AXIS: 67 DEGREES
PR INTERVAL: 144 MS
QRS AXIS: 77 DEGREES
QRSD INTERVAL: 68 MS
QT INTERVAL: 356 MS
QTC INTERVAL: 449 MS
T WAVE AXIS: 55 DEGREES
VENTRICULAR RATE: 96 BPM

## 2024-10-21 ENCOUNTER — TELEPHONE (OUTPATIENT)
Age: 74
End: 2024-10-21

## 2024-10-21 NOTE — TELEPHONE ENCOUNTER
I called patient left voice message to return my call to reschedule 10/22/24 appointment  with Mell López.

## 2024-10-25 ENCOUNTER — OFFICE VISIT (OUTPATIENT)
Dept: URGENT CARE | Facility: CLINIC | Age: 74
End: 2024-10-25
Payer: MEDICARE

## 2024-10-25 VITALS
SYSTOLIC BLOOD PRESSURE: 128 MMHG | TEMPERATURE: 97 F | HEART RATE: 98 BPM | RESPIRATION RATE: 18 BRPM | DIASTOLIC BLOOD PRESSURE: 87 MMHG | OXYGEN SATURATION: 92 %

## 2024-10-25 DIAGNOSIS — R41.0 DISORIENTED: ICD-10-CM

## 2024-10-25 DIAGNOSIS — N30.00 ACUTE CYSTITIS WITHOUT HEMATURIA: Primary | ICD-10-CM

## 2024-10-25 DIAGNOSIS — R53.83 FATIGUE, UNSPECIFIED TYPE: ICD-10-CM

## 2024-10-25 LAB
SL AMB  POCT GLUCOSE, UA: NEGATIVE
SL AMB LEUKOCYTE ESTERASE,UA: ABNORMAL
SL AMB POCT BILIRUBIN,UA: NEGATIVE
SL AMB POCT BLOOD,UA: ABNORMAL
SL AMB POCT CLARITY,UA: ABNORMAL
SL AMB POCT COLOR,UA: ABNORMAL
SL AMB POCT KETONES,UA: NEGATIVE
SL AMB POCT NITRITE,UA: POSITIVE
SL AMB POCT PH,UA: 8.5
SL AMB POCT SPECIFIC GRAVITY,UA: 1
SL AMB POCT URINE PROTEIN: 30
SL AMB POCT UROBILINOGEN: 0.2

## 2024-10-25 PROCEDURE — 87086 URINE CULTURE/COLONY COUNT: CPT | Performed by: PHYSICIAN ASSISTANT

## 2024-10-25 PROCEDURE — 81002 URINALYSIS NONAUTO W/O SCOPE: CPT | Performed by: PHYSICIAN ASSISTANT

## 2024-10-25 PROCEDURE — 87186 SC STD MICRODIL/AGAR DIL: CPT | Performed by: PHYSICIAN ASSISTANT

## 2024-10-25 PROCEDURE — 87077 CULTURE AEROBIC IDENTIFY: CPT | Performed by: PHYSICIAN ASSISTANT

## 2024-10-25 PROCEDURE — G0463 HOSPITAL OUTPT CLINIC VISIT: HCPCS | Performed by: PHYSICIAN ASSISTANT

## 2024-10-25 PROCEDURE — 99213 OFFICE O/P EST LOW 20 MIN: CPT | Performed by: PHYSICIAN ASSISTANT

## 2024-10-25 RX ORDER — SULFAMETHOXAZOLE AND TRIMETHOPRIM 800; 160 MG/1; MG/1
1 TABLET ORAL EVERY 12 HOURS SCHEDULED
Qty: 14 TABLET | Refills: 0 | Status: ON HOLD | OUTPATIENT
Start: 2024-10-25 | End: 2024-11-01

## 2024-10-25 NOTE — PROGRESS NOTES
[facility]        NAME: Kirsty Em is a 74 y.o. female  : 1950    MRN: [id number]  DATE: 2024  TIME: 3:30 PM    Assessment and Plan   Acute cystitis without hematuria [N30.00]  1. Acute cystitis without hematuria  Urine culture    Urine culture    sulfamethoxazole-trimethoprim (BACTRIM DS) 800-160 mg per tablet      2. Disoriented  POCT ECG    POCT urine dip    CANCELED: ECG 12 lead      3. Fatigue, unspecified type  POCT urine dip        Patient initially unable to provide a urine sample.  Point-of-care EKG was ordered to rule out any other possible abnormalities to be cause for her symptoms while waiting for patient to be able to provide a urine specimen.  During EKG, I observed that the patient was using accessory muscles to breathe.  However she did not seem to have heavy labored breathing in the chest, it was mainly belly breathing.  Advised patient that I would recommend that they be seen in the ER especially as she has been unable to provide a urine sample for us.  Patient given 3 more cups of water and then she was able to provide a specimen.    Patient Instructions     Patient Instructions   EKG shows no obvious change from prior imaging earlier this month.    Reviewed urine dip test with the patient and her .  Recommended starting treatment with oral antibiotics.  Advised that if she has any worsening in symptoms or if she develops any new symptoms that she should head to the ER for further evaluation.    Follow up with PCP in 3-5 days.  Proceed to  ER if symptoms worsen.    If tests are performed, our office will contact you with results only if changes need to made to the care plan discussed with you at the visit. [facility]      Chief Complaint     Chief Complaint   Patient presents with    Fatigue     Patient here with fatigue, eyes are burning and foul smelling urine. Fatigue has been ongoing for a week. The eyes  burning and foul smelling urine was noticed last night.          History of Present Illness       Patient presents today for evaluation with her .  She has been very fatigued lately and her eyes feel like they are burning.  She has just been tired and off her  states.  She thinks she may have a UTI because her urine has been smelling foul and last time that she was disoriented she had a UTI..  She denies any obvious burning or pain with urination.  She denies any vaginal discharge.  She denies any fevers, chills, back pain.  When asked if she is having difficulty breathing in regards to her low oxygen saturation, patient denies.  Her  states she did smoke a cigarette prior to entering the building.        Review of Systems   Review of Systems   Constitutional:  Negative for chills and fever.   Gastrointestinal:  Negative for vomiting.   Genitourinary:  Positive for dysuria, frequency and urgency. Negative for difficulty urinating, flank pain, hematuria and pelvic pain.   Musculoskeletal:  Negative for back pain.   Skin:  Negative for color change and rash.   All other systems reviewed and are negative.        Current Medications       Current Outpatient Medications:     acetaminophen (TYLENOL) 325 mg tablet, Take 975 mg by mouth every 8 (eight) hours as needed for mild pain, Disp: , Rfl:     ALPRAZolam (XANAX) 2 MG tablet, Take 1 tablet (2 mg total) by mouth 4 (four) times a day for 14 days, Disp: 56 tablet, Rfl: 0    apixaban (Eliquis) 5 mg, Take 1 tablet (5 mg total) by mouth 2 (two) times a day, Disp: 60 tablet, Rfl: 5    aspirin 81 mg chewable tablet, Chew 1 tablet (81 mg total) daily, Disp: 60 tablet, Rfl: 0    diltiazem (CARDIZEM CD) 120 mg 24 hr capsule, Take 1 capsule (120 mg total) by mouth daily, Disp: 60 capsule, Rfl: 5    ezetimibe (ZETIA) 10 mg tablet, TAKE 1 TABLET BY MOUTH EVERY DAY, Disp: 100 tablet, Rfl: 1    gabapentin (NEURONTIN) 300 mg capsule, Take 300 mg by mouth 3 (three)  times a day, Disp: , Rfl:     Icosapent Ethyl 1 g CAPS, Take 2 capsules (2 g total) by mouth 2 (two) times a day, Disp: 360 capsule, Rfl: 6    levETIRAcetam (KEPPRA) 750 mg tablet, Take 1 tablet (750 mg total) by mouth every 12 (twelve) hours, Disp: 120 tablet, Rfl: 2    lisinopril (ZESTRIL) 10 mg tablet, Take 1 tablet (10 mg total) by mouth 2 (two) times a day, Disp: 120 tablet, Rfl: 0    Multiple Vitamin (multivitamin) tablet, Take 1 tablet by mouth daily, Disp: , Rfl:     polyethylene glycol (MIRALAX) 17 g packet, Take 17 g by mouth daily as needed, Disp: , Rfl:     QUEtiapine (SEROquel) 100 mg tablet, Take 1 tablet (100 mg total) by mouth every 12 (twelve) hours, Disp: 60 tablet, Rfl: 0    rosuvastatin (CRESTOR) 10 MG tablet, Take 1 tablet (10 mg total) by mouth daily, Disp: 30 tablet, Rfl: 6    sulfamethoxazole-trimethoprim (BACTRIM DS) 800-160 mg per tablet, Take 1 tablet by mouth every 12 (twelve) hours for 7 days, Disp: 14 tablet, Rfl: 0    vitamin B-12 (VITAMIN B-12) 1,000 mcg tablet, Take by mouth daily, Disp: , Rfl:     naloxone (NARCAN) 4 mg/0.1 mL nasal spray, Administer 1 spray into a nostril. If no response after 2-3 minutes, give another dose in the other nostril using a new spray., Disp: 1 each, Rfl: 1    Current Facility-Administered Medications:     cyanocobalamin injection 1,000 mcg, 1,000 mcg, Intramuscular, Q30 Days, Brien Desouza MD, 1,000 mcg at 01/06/23 1541    Current Allergies     Allergies as of 10/25/2024 - Reviewed 10/25/2024   Allergen Reaction Noted    Lipitor [atorvastatin] Other (See Comments) 01/08/2024            The following portions of the patient's history were reviewed and updated as appropriate: allergies, current medications, past family history, past medical history, past social history, past surgical history and problem list.     Past Medical History:   Diagnosis Date    Anxiety     Depression     Depression, recurrent (HCC) 04/20/2021    Hallucinations 04/20/2021     History of seizure disorder 04/20/2021       Past Surgical History:   Procedure Laterality Date    ORIF FINGER FRACTURE Right 6/27/2024    Procedure: ORIF OF RIGHT THUMB;  Surgeon: Dagoberto Yates MD;  Location: [facility];  Service: Orthopedics       Family History   Problem Relation Age of Onset    Diabetes Mother     Anxiety disorder Mother     Stroke Father     No Known Problems Sister     No Known Problems Sister     No Known Problems Sister     No Known Problems Daughter     No Known Problems Daughter     No Known Problems Daughter     No Known Problems Maternal Grandmother     No Known Problems Paternal Grandmother     No Known Problems Maternal Aunt     No Known Problems Maternal Aunt     No Known Problems Maternal Aunt     No Known Problems Maternal Aunt     No Known Problems Maternal Aunt     No Known Problems Maternal Aunt     No Known Problems Maternal Aunt     No Known Problems Maternal Aunt     No Known Problems Paternal Aunt     Breast cancer Neg Hx          Medications have been verified.        Objective   /87   Pulse 98   Temp (!) 97 °F (36.1 °C)   Resp 18   SpO2 92%        Physical Exam     Physical Exam  Vitals and nursing note reviewed.   Constitutional:       General: She is not in acute distress.     Appearance: Normal appearance.   Eyes:      Conjunctiva/sclera: Conjunctivae normal.      Pupils: Pupils are equal, round, and reactive to light.   Cardiovascular:      Rate and Rhythm: Normal rate and regular rhythm.   Pulmonary:      Effort: Accessory muscle usage present. No respiratory distress.      Breath sounds: Normal breath sounds.   Abdominal:      General: Abdomen is flat.      Palpations: Abdomen is soft.      Tenderness: There is no abdominal tenderness. There is no right CVA tenderness or left CVA tenderness.   Skin:     General: Skin is warm and dry.      Capillary Refill: Capillary refill takes more than 3 seconds.   Neurological:      General: No focal deficit  present.      Mental Status: She is alert and oriented to person, place, and time.   Psychiatric:         Mood and Affect: Mood normal.         Speech: Speech normal.         Behavior: Behavior is slowed.      Comments: Patient is responding to questions.  However she is slightly slow to respond.  I am not sure with the patient is like at baseline as this is my first time seeing her.  She appears to understand everything that I am saying to her.

## 2024-10-25 NOTE — PATIENT INSTRUCTIONS
EKG shows no obvious change from prior imaging earlier this month.    Reviewed urine dip test with the patient and her .  Recommended starting treatment with oral antibiotics.  Advised that if she has any worsening in symptoms or if she develops any new symptoms that she should head to the ER for further evaluation.    Follow up with PCP in 3-5 days.  Proceed to  ER if symptoms worsen.    If tests are performed, our office will contact you with results only if changes need to made to the care plan discussed with you at the visit. You can review your full results on [facility].

## 2024-10-27 ENCOUNTER — APPOINTMENT (EMERGENCY)
Dept: CT IMAGING | Facility: HOSPITAL | Age: 74
DRG: 091 | End: 2024-10-27
Payer: MEDICARE

## 2024-10-27 ENCOUNTER — HOSPITAL ENCOUNTER (INPATIENT)
Facility: HOSPITAL | Age: 74
LOS: 3 days | Discharge: HOME HEALTH | DRG: 091 | End: 2024-10-30
Attending: EMERGENCY MEDICINE | Admitting: STUDENT IN AN ORGANIZED HEALTH CARE EDUCATION/TRAINING PROGRAM
Payer: MEDICARE

## 2024-10-27 DIAGNOSIS — N30.00 ACUTE CYSTITIS WITHOUT HEMATURIA: ICD-10-CM

## 2024-10-27 DIAGNOSIS — R41.82 AMS (ALTERED MENTAL STATUS): Primary | ICD-10-CM

## 2024-10-27 DIAGNOSIS — F41.9 ANXIETY DISORDER, UNSPECIFIED TYPE: ICD-10-CM

## 2024-10-27 DIAGNOSIS — R53.1 GENERALIZED WEAKNESS: ICD-10-CM

## 2024-10-27 DIAGNOSIS — R42 DIZZINESS: ICD-10-CM

## 2024-10-27 DIAGNOSIS — Z79.899 POLYPHARMACY: ICD-10-CM

## 2024-10-27 DIAGNOSIS — R09.02 HYPOXIA: ICD-10-CM

## 2024-10-27 DIAGNOSIS — I10 HYPERTENSION: ICD-10-CM

## 2024-10-27 DIAGNOSIS — S62.511D CLOSED DISPLACED FRACTURE OF PROXIMAL PHALANX OF RIGHT THUMB WITH ROUTINE HEALING, SUBSEQUENT ENCOUNTER: ICD-10-CM

## 2024-10-27 PROBLEM — G92.8 DRUG-INDUCED ENCEPHALOPATHY: Status: ACTIVE | Noted: 2024-10-27

## 2024-10-27 LAB
ALBUMIN SERPL BCG-MCNC: 4.2 G/DL (ref 3.5–5)
ALP SERPL-CCNC: 79 U/L (ref 34–104)
ALT SERPL W P-5'-P-CCNC: 36 U/L (ref 7–52)
AMPHETAMINES SERPL QL SCN: NEGATIVE
ANION GAP SERPL CALCULATED.3IONS-SCNC: 8 MMOL/L (ref 4–13)
APAP SERPL-MCNC: 2 UG/ML (ref 10–20)
APTT PPP: 40 SECONDS (ref 23–34)
AST SERPL W P-5'-P-CCNC: 30 U/L (ref 13–39)
ATRIAL RATE: 72 BPM
BACTERIA UR CULT: ABNORMAL
BARBITURATES UR QL: NEGATIVE
BASOPHILS # BLD AUTO: 0.04 THOUSANDS/ΜL (ref 0–0.1)
BASOPHILS NFR BLD AUTO: 1 % (ref 0–1)
BENZODIAZ UR QL: POSITIVE
BILIRUB SERPL-MCNC: 0.21 MG/DL (ref 0.2–1)
BILIRUB UR QL STRIP: NEGATIVE
BNP SERPL-MCNC: 27 PG/ML (ref 0–100)
BUN SERPL-MCNC: 15 MG/DL (ref 5–25)
CALCIUM SERPL-MCNC: 9.2 MG/DL (ref 8.4–10.2)
CARDIAC TROPONIN I PNL SERPL HS: <2 NG/L
CHLORIDE SERPL-SCNC: 105 MMOL/L (ref 96–108)
CLARITY UR: CLEAR
CO2 SERPL-SCNC: 29 MMOL/L (ref 21–32)
COCAINE UR QL: NEGATIVE
COLOR UR: NORMAL
CREAT SERPL-MCNC: 0.82 MG/DL (ref 0.6–1.3)
EOSINOPHIL # BLD AUTO: 0.11 THOUSAND/ΜL (ref 0–0.61)
EOSINOPHIL NFR BLD AUTO: 1 % (ref 0–6)
ERYTHROCYTE [DISTWIDTH] IN BLOOD BY AUTOMATED COUNT: 12.7 % (ref 11.6–15.1)
ETHANOL SERPL-MCNC: <10 MG/DL
FENTANYL UR QL SCN: NEGATIVE
FLUAV AG UPPER RESP QL IA.RAPID: NEGATIVE
FLUBV AG UPPER RESP QL IA.RAPID: NEGATIVE
GFR SERPL CREATININE-BSD FRML MDRD: 71 ML/MIN/1.73SQ M
GLUCOSE SERPL-MCNC: 88 MG/DL (ref 65–140)
GLUCOSE UR STRIP-MCNC: NEGATIVE MG/DL
HCT VFR BLD AUTO: 36.9 % (ref 34.8–46.1)
HGB BLD-MCNC: 12 G/DL (ref 11.5–15.4)
HGB UR QL STRIP.AUTO: NEGATIVE
HYDROCODONE UR QL SCN: NEGATIVE
IMM GRANULOCYTES # BLD AUTO: 0.02 THOUSAND/UL (ref 0–0.2)
IMM GRANULOCYTES NFR BLD AUTO: 0 % (ref 0–2)
INR PPP: 1.21 (ref 0.85–1.19)
KETONES UR STRIP-MCNC: NEGATIVE MG/DL
LACTATE SERPL-SCNC: 1 MMOL/L (ref 0.5–2)
LEUKOCYTE ESTERASE UR QL STRIP: NEGATIVE
LYMPHOCYTES # BLD AUTO: 2.98 THOUSANDS/ΜL (ref 0.6–4.47)
LYMPHOCYTES NFR BLD AUTO: 37 % (ref 14–44)
MCH RBC QN AUTO: 32.3 PG (ref 26.8–34.3)
MCHC RBC AUTO-ENTMCNC: 32.5 G/DL (ref 31.4–37.4)
MCV RBC AUTO: 99 FL (ref 82–98)
METHADONE UR QL: NEGATIVE
MONOCYTES # BLD AUTO: 0.54 THOUSAND/ΜL (ref 0.17–1.22)
MONOCYTES NFR BLD AUTO: 7 % (ref 4–12)
NEUTROPHILS # BLD AUTO: 4.44 THOUSANDS/ΜL (ref 1.85–7.62)
NEUTS SEG NFR BLD AUTO: 54 % (ref 43–75)
NITRITE UR QL STRIP: NEGATIVE
NRBC BLD AUTO-RTO: 0 /100 WBCS
OPIATES UR QL SCN: NEGATIVE
OXYCODONE+OXYMORPHONE UR QL SCN: NEGATIVE
P AXIS: 72 DEGREES
PCP UR QL: NEGATIVE
PH UR STRIP.AUTO: 6.5 [PH]
PLATELET # BLD AUTO: 326 THOUSANDS/UL (ref 149–390)
PMV BLD AUTO: 10.2 FL (ref 8.9–12.7)
POTASSIUM SERPL-SCNC: 3.8 MMOL/L (ref 3.5–5.3)
PR INTERVAL: 160 MS
PROT SERPL-MCNC: 7.4 G/DL (ref 6.4–8.4)
PROT UR STRIP-MCNC: NEGATIVE MG/DL
PROTHROMBIN TIME: 16 SECONDS (ref 12.3–15)
QRS AXIS: 96 DEGREES
QRSD INTERVAL: 76 MS
QT INTERVAL: 412 MS
QTC INTERVAL: 451 MS
RBC # BLD AUTO: 3.72 MILLION/UL (ref 3.81–5.12)
SALICYLATES SERPL-MCNC: <5 MG/DL (ref 3–20)
SARS-COV+SARS-COV-2 AG RESP QL IA.RAPID: NEGATIVE
SODIUM SERPL-SCNC: 142 MMOL/L (ref 135–147)
SP GR UR STRIP.AUTO: 1.02 (ref 1–1.03)
T WAVE AXIS: 62 DEGREES
THC UR QL: NEGATIVE
UROBILINOGEN UR STRIP-ACNC: <2 MG/DL
VENTRICULAR RATE: 72 BPM
WBC # BLD AUTO: 8.13 THOUSAND/UL (ref 4.31–10.16)

## 2024-10-27 PROCEDURE — 83605 ASSAY OF LACTIC ACID: CPT

## 2024-10-27 PROCEDURE — 93010 ELECTROCARDIOGRAM REPORT: CPT | Performed by: INTERNAL MEDICINE

## 2024-10-27 PROCEDURE — 74177 CT ABD & PELVIS W/CONTRAST: CPT

## 2024-10-27 PROCEDURE — 81003 URINALYSIS AUTO W/O SCOPE: CPT

## 2024-10-27 PROCEDURE — 99285 EMERGENCY DEPT VISIT HI MDM: CPT

## 2024-10-27 PROCEDURE — 96361 HYDRATE IV INFUSION ADD-ON: CPT

## 2024-10-27 PROCEDURE — 36415 COLL VENOUS BLD VENIPUNCTURE: CPT

## 2024-10-27 PROCEDURE — 70498 CT ANGIOGRAPHY NECK: CPT

## 2024-10-27 PROCEDURE — 80053 COMPREHEN METABOLIC PANEL: CPT

## 2024-10-27 PROCEDURE — 80307 DRUG TEST PRSMV CHEM ANLYZR: CPT

## 2024-10-27 PROCEDURE — 80143 DRUG ASSAY ACETAMINOPHEN: CPT

## 2024-10-27 PROCEDURE — 87804 INFLUENZA ASSAY W/OPTIC: CPT

## 2024-10-27 PROCEDURE — 85730 THROMBOPLASTIN TIME PARTIAL: CPT

## 2024-10-27 PROCEDURE — 85025 COMPLETE CBC W/AUTO DIFF WBC: CPT

## 2024-10-27 PROCEDURE — 85610 PROTHROMBIN TIME: CPT

## 2024-10-27 PROCEDURE — 99223 1ST HOSP IP/OBS HIGH 75: CPT | Performed by: STUDENT IN AN ORGANIZED HEALTH CARE EDUCATION/TRAINING PROGRAM

## 2024-10-27 PROCEDURE — 80179 DRUG ASSAY SALICYLATE: CPT

## 2024-10-27 PROCEDURE — 96374 THER/PROPH/DIAG INJ IV PUSH: CPT

## 2024-10-27 PROCEDURE — 93005 ELECTROCARDIOGRAM TRACING: CPT

## 2024-10-27 PROCEDURE — 71260 CT THORAX DX C+: CPT

## 2024-10-27 PROCEDURE — 70496 CT ANGIOGRAPHY HEAD: CPT

## 2024-10-27 PROCEDURE — 83880 ASSAY OF NATRIURETIC PEPTIDE: CPT

## 2024-10-27 PROCEDURE — 87811 SARS-COV-2 COVID19 W/OPTIC: CPT

## 2024-10-27 PROCEDURE — 82077 ASSAY SPEC XCP UR&BREATH IA: CPT

## 2024-10-27 PROCEDURE — 87040 BLOOD CULTURE FOR BACTERIA: CPT

## 2024-10-27 PROCEDURE — 84484 ASSAY OF TROPONIN QUANT: CPT

## 2024-10-27 RX ORDER — DILTIAZEM HYDROCHLORIDE 120 MG/1
120 CAPSULE, COATED, EXTENDED RELEASE ORAL DAILY
Status: DISCONTINUED | OUTPATIENT
Start: 2024-10-27 | End: 2024-10-30 | Stop reason: HOSPADM

## 2024-10-27 RX ORDER — LISINOPRIL 10 MG/1
10 TABLET ORAL 2 TIMES DAILY
Status: DISCONTINUED | OUTPATIENT
Start: 2024-10-27 | End: 2024-10-30 | Stop reason: HOSPADM

## 2024-10-27 RX ORDER — LEVETIRACETAM 500 MG/1
750 TABLET ORAL EVERY 12 HOURS SCHEDULED
Status: DISCONTINUED | OUTPATIENT
Start: 2024-10-27 | End: 2024-10-30 | Stop reason: HOSPADM

## 2024-10-27 RX ORDER — FLUTICASONE PROPIONATE 50 MCG
1 SPRAY, SUSPENSION (ML) NASAL DAILY
Status: DISCONTINUED | OUTPATIENT
Start: 2024-10-27 | End: 2024-10-30 | Stop reason: HOSPADM

## 2024-10-27 RX ORDER — QUETIAPINE FUMARATE 100 MG/1
100 TABLET, FILM COATED ORAL EVERY 12 HOURS
Status: DISCONTINUED | OUTPATIENT
Start: 2024-10-27 | End: 2024-10-30 | Stop reason: HOSPADM

## 2024-10-27 RX ORDER — ALPRAZOLAM 0.5 MG
1 TABLET ORAL 3 TIMES DAILY PRN
Status: DISCONTINUED | OUTPATIENT
Start: 2024-10-27 | End: 2024-10-29

## 2024-10-27 RX ORDER — GABAPENTIN 100 MG/1
100 CAPSULE ORAL 3 TIMES DAILY
Status: DISCONTINUED | OUTPATIENT
Start: 2024-10-27 | End: 2024-10-30 | Stop reason: HOSPADM

## 2024-10-27 RX ORDER — ICOSAPENT ETHYL 1 G/1
2 CAPSULE ORAL 2 TIMES DAILY
Status: DISCONTINUED | OUTPATIENT
Start: 2024-10-27 | End: 2024-10-30 | Stop reason: HOSPADM

## 2024-10-27 RX ORDER — ACETAMINOPHEN 325 MG/1
975 TABLET ORAL EVERY 8 HOURS PRN
Status: DISCONTINUED | OUTPATIENT
Start: 2024-10-27 | End: 2024-10-30 | Stop reason: HOSPADM

## 2024-10-27 RX ORDER — ASPIRIN 81 MG/1
81 TABLET, CHEWABLE ORAL DAILY
Status: DISCONTINUED | OUTPATIENT
Start: 2024-10-27 | End: 2024-10-30 | Stop reason: HOSPADM

## 2024-10-27 RX ORDER — EZETIMIBE 10 MG/1
10 TABLET ORAL DAILY
Status: DISCONTINUED | OUTPATIENT
Start: 2024-10-27 | End: 2024-10-30 | Stop reason: HOSPADM

## 2024-10-27 RX ADMIN — LEVETIRACETAM 750 MG: 500 TABLET, FILM COATED ORAL at 12:12

## 2024-10-27 RX ADMIN — LEVETIRACETAM 750 MG: 500 TABLET, FILM COATED ORAL at 20:49

## 2024-10-27 RX ADMIN — PIPERACILLIN AND TAZOBACTAM 4.5 G: 36; 4.5 INJECTION, POWDER, FOR SOLUTION INTRAVENOUS at 20:49

## 2024-10-27 RX ADMIN — EZETIMIBE 10 MG: 10 TABLET ORAL at 12:12

## 2024-10-27 RX ADMIN — PIPERACILLIN AND TAZOBACTAM 4.5 G: 36; 4.5 INJECTION, POWDER, FOR SOLUTION INTRAVENOUS at 17:54

## 2024-10-27 RX ADMIN — ICOSAPENT ETHYL 2 G: 1 CAPSULE, LIQUID FILLED ORAL at 20:48

## 2024-10-27 RX ADMIN — DILTIAZEM HYDROCHLORIDE 120 MG: 120 CAPSULE, COATED, EXTENDED RELEASE ORAL at 12:12

## 2024-10-27 RX ADMIN — FLUTICASONE PROPIONATE 1 SPRAY: 50 SPRAY, METERED NASAL at 22:20

## 2024-10-27 RX ADMIN — GABAPENTIN 100 MG: 100 CAPSULE ORAL at 17:00

## 2024-10-27 RX ADMIN — LISINOPRIL 10 MG: 10 TABLET ORAL at 12:13

## 2024-10-27 RX ADMIN — CEFTRIAXONE SODIUM 1000 MG: 10 INJECTION, POWDER, FOR SOLUTION INTRAVENOUS at 04:56

## 2024-10-27 RX ADMIN — APIXABAN 5 MG: 5 TABLET, FILM COATED ORAL at 17:58

## 2024-10-27 RX ADMIN — IOHEXOL 120 ML: 350 INJECTION, SOLUTION INTRAVENOUS at 02:32

## 2024-10-27 RX ADMIN — NICOTINE 1 PATCH: 7 PATCH, EXTENDED RELEASE TRANSDERMAL at 10:00

## 2024-10-27 RX ADMIN — APIXABAN 5 MG: 5 TABLET, FILM COATED ORAL at 12:13

## 2024-10-27 RX ADMIN — QUETIAPINE FUMARATE 100 MG: 100 TABLET ORAL at 12:12

## 2024-10-27 RX ADMIN — LISINOPRIL 10 MG: 10 TABLET ORAL at 17:54

## 2024-10-27 RX ADMIN — GABAPENTIN 100 MG: 100 CAPSULE ORAL at 12:12

## 2024-10-27 RX ADMIN — QUETIAPINE FUMARATE 100 MG: 100 TABLET ORAL at 22:44

## 2024-10-27 RX ADMIN — CYANOCOBALAMIN TAB 500 MCG 1000 MCG: 500 TAB at 12:12

## 2024-10-27 RX ADMIN — SODIUM CHLORIDE 1000 ML: 0.9 INJECTION, SOLUTION INTRAVENOUS at 02:18

## 2024-10-27 RX ADMIN — ASPIRIN 81 MG: 81 TABLET, CHEWABLE ORAL at 12:12

## 2024-10-27 RX ADMIN — GABAPENTIN 100 MG: 100 CAPSULE ORAL at 20:49

## 2024-10-27 NOTE — H&P
H&P - Hospitalist   Name: Kirsty Em 74 y.o. female I MRN: [id number]  Unit/Bed#: 2 E 253-01 I Date of Admission: 10/27/2024   Date of Service: 10/27/2024 I Hospital Day: 0     Assessment & Plan  Drug-induced encephalopathy    PDMP reviewed, patient lately with monthly prescription for alprazolam 2 mg tablets X 120 QTY, also had oxycodone 5 mg of which she filled 28 tablets on 7/12/2024 then got 7 more tablets on 7/19/2024.  Additionally she also on Seroquel and Keppra and recently took Bactrim for UTI.  All of the above medications for her age population are high risk for confusion/metabolic encephalopathy.  Patient's presentation is very likely due to polypharmacy +/- infection (UTI/ PNA)    - UA unremarkable in the ED (see UTI), no fever, no SIRS criteria.  UDS positive for benzodiazepines.   - CT head was done in the ED , no acute abnormality   - Blood Culture in process.   - CT chest suspicious for possible aspiration but no airspace consolidation     - Given seizure history we will avoid abrupt discontinuation of Xanax: decreased to 1 mg TID PRN starting 10/27 1800   - Decrease Gabapentin from 300 TID to 100 TID.   - Continue Seroquel and Keppra with no change.   - Watch for any respiratory distress (if any will give Narcan) and watch for any seizure symptoms.   - starting Ceftriaxone for UTI +/- possible aspiration PNA (see below)   - fall and seizure precautions   Anxiety disorder  see drug-induced encephalopathy assessment and plan  Tobacco dependence  Patient smokes about 4 cigarettes a day with emphysema on CT scan   Nicotine patch ordered  History of stroke  In June 2024 had new onset A-fib and evidence of posterior left frontal cortex infarct complicated with seizure event    Continue daily aspirin 81 mg, also on Eliquis 5 twice daily and on Keppra 750 mg twice daily  Seizure disorder (HCC)  Due to stroke, see above.continue Keppra.  Brain mass  History of meningioma, follows up with neurosurgery and  "has a scheduled MRI brain on 12/12/2024  Polypharmacy  See drug-induced encephalopathy.  74 years old on gabapentin, Xanax on top of Keppra and Seroquel and 10/25 started Bactrim for suspected UTI.   Acute cystitis without hematuria  UA 10/2 & again 10/25 positive for nitrite and leucocytes, patient was recently started in the outpatient setting on Bactrim 10/25 - present now with metabolic encephalopathy as above. Discontinue Bactrim. Will given Ceftriaxone x 4 doses. However of note, patient denies urinary symptoms - will treat give age , AMS and recent + UA, and also suspected pneumonia.    Acute respiratory failure with hypoxia (HCC)  Noted in mid 80's while in the ED requiring 2 L NC   CT Chest was done \"Mild dependent and basilar probable atelectasis. Recommend correlation for superimposed aspiration given small amount of debris/secretion within the dependent trachea. Otherwise no focal airspace consolidation or pleural effusion. \"     Given lethargy / AMS due to medications as above - at risk for aspiration . Other differential include respiratory depression due to xanax and gabapentin however RR wnl / mildly tachypnic not decreased.   Starting Rocephin for UTI +/- PNA   NC O2 titrate for goal > 90%   Diet only when fully alert  Speech eval and treat   Incentive spirometer       VTE Pharmacologic Prophylaxis:   Moderate Risk (Score 3-4) - Pharmacological DVT Prophylaxis Ordered: apixaban (Eliquis).  Code Status: Level 1 - Full Code   Discussion with family: Updated  (daughter) via phone.    Anticipated Length of Stay: Patient will be admitted on an inpatient basis with an anticipated length of stay of greater than 2 midnights secondary to me.    History of Present Illness   Chief Complaint: SYDNEY Em is a 74 y.o. female with a PMH of p-Afib, CVA, meningioma, anxiety, every day smoker 4 cig/day, who presents with lethargy/AMS . In the ED , had soft BP and SpO2 mid 80's requiring 1-2 L NC " "O2. Patient noted \"feels like I am drunk\" . She denies any focal weakness, denies any visual changes, denies any Headache, nausea or vomiting. Patient is Aox 4 but somewhat lethargic looks as if she is under substance effect. Per PDMP and hx review with the patient and the daughter on the phone (Tish) it is confirmed that she was recently started on Bactrim for UTI however denied urinary symptoms. Above impression A&P explained , with need to slowly and selectively taper off / decrease the medications of concern listed above. Patient's daughter noted that she is afraid that the patient was taking more Seroquel than what she should and whether need to take off Seroquel as well, but explained to her needs to taper of gradually and with caution not as once to decrease risk of anxiety or seizure. They agree to above A&P.     Review of Systems   Constitutional:  Negative for fever.   HENT:  Negative for nosebleeds and trouble swallowing.    Eyes:  Negative for photophobia, pain and visual disturbance.   Respiratory:  Negative for cough, shortness of breath and wheezing.    Cardiovascular:  Negative for chest pain and palpitations.   Gastrointestinal:  Negative for abdominal pain.   Genitourinary:  Negative for decreased urine volume, dysuria and hematuria.   Skin:  Negative for wound.   Neurological:  Negative for tremors, seizures, syncope, speech difficulty, weakness, numbness and headaches.   Psychiatric/Behavioral:  Positive for confusion and decreased concentration. The patient is not nervous/anxious.      Limited due to patient's lethargy , but pertinent + & - noted above under HPI    Historical Information   Past Medical History:   Diagnosis Date    Anxiety     Depression     Depression, recurrent (HCC) 04/20/2021    Hallucinations 04/20/2021    History of seizure disorder 04/20/2021     Past Surgical History:   Procedure Laterality Date    ORIF FINGER FRACTURE Right 6/27/2024    Procedure: ORIF OF RIGHT THUMB;  " Surgeon: Dagoberto Yates MD;  Location: [facility];  Service: Orthopedics     Social History     Tobacco Use    Smoking status: Every Day     Current packs/day: 0.00     Average packs/day: 0.5 packs/day for 54.3 years (27.2 ttl pk-yrs)     Types: Cigarettes     Start date:      Last attempt to quit: 2024     Years since quittin.4    Smokeless tobacco: Never   Vaping Use    Vaping status: Never Used   Substance and Sexual Activity    Alcohol use: Never    Drug use: Never    Sexual activity: Yes     Partners: Male     Birth control/protection: None     E-Cigarette/Vaping    E-Cigarette Use Never User      E-Cigarette/Vaping Substances    Nicotine No     THC No     CBD No     Flavoring No     Other No     Unknown No      Family history non-contributory  Social History:  Marital Status: /Civil Union   Occupation:   Patient Pre-hospital Living Situation: Home  Patient Pre-hospital Level of Mobility: walks with cane  Patient Pre-hospital Diet Restrictions:     Meds/Allergies   I have reviewed home medications with patient personally.  Prior to Admission medications    Medication Sig Start Date End Date Taking? Authorizing Provider   acetaminophen (TYLENOL) 325 mg tablet Take 975 mg by mouth every 8 (eight) hours as needed for mild pain    Historical Provider, MD   ALPRAZolam (XANAX) 2 MG tablet Take 1 tablet (2 mg total) by mouth 4 (four) times a day for 14 days 24   Jadiel Gaytan MD   apixaban (Eliquis) 5 mg Take 1 tablet (5 mg total) by mouth 2 (two) times a day 24   Brien Desouza MD   aspirin 81 mg chewable tablet Chew 1 tablet (81 mg total) daily 6/18/24 10/25/24  Bob Dangelo PA-C   diltiazem (CARDIZEM CD) 120 mg 24 hr capsule Take 1 capsule (120 mg total) by mouth daily 24  Brien Desouza MD   ezetimibe (ZETIA) 10 mg tablet TAKE 1 TABLET BY MOUTH EVERY DAY 7/10/24   Brien Desouza MD   gabapentin (NEURONTIN) 300 mg capsule Take 300 mg by mouth 3 (three)  times a day 4/22/22   Historical Provider, MD   Icosapent Ethyl 1 g CAPS Take 2 capsules (2 g total) by mouth 2 (two) times a day 9/14/24   SINDY Pablo   levETIRAcetam (KEPPRA) 750 mg tablet Take 1 tablet (750 mg total) by mouth every 12 (twelve) hours 9/18/24 11/17/24  Brien Desouza MD   lisinopril (ZESTRIL) 10 mg tablet Take 1 tablet (10 mg total) by mouth 2 (two) times a day 6/17/24 10/25/24  Bob Dangelo PA-C   Multiple Vitamin (multivitamin) tablet Take 1 tablet by mouth daily    Historical Provider, MD   naloxone (NARCAN) 4 mg/0.1 mL nasal spray Administer 1 spray into a nostril. If no response after 2-3 minutes, give another dose in the other nostril using a new spray. 7/19/24 7/19/25  SINDY Muñoz   polyethylene glycol (MIRALAX) 17 g packet Take 17 g by mouth daily as needed    Historical Provider, MD   QUEtiapine (SEROquel) 100 mg tablet Take 1 tablet (100 mg total) by mouth every 12 (twelve) hours 7/19/24   SINDY Muñoz   rosuvastatin (CRESTOR) 10 MG tablet Take 1 tablet (10 mg total) by mouth daily 9/24/24   Jan Smith MD   sulfamethoxazole-trimethoprim (BACTRIM DS) 800-160 mg per tablet Take 1 tablet by mouth every 12 (twelve) hours for 7 days 10/25/24 11/1/24  Bella Duncan PA-C   vitamin B-12 (VITAMIN B-12) 1,000 mcg tablet Take by mouth daily    Historical Provider, MD     Allergies   Allergen Reactions    Lipitor [Atorvastatin] Other (See Comments)     Muscle aches       Objective :  Temp:  [97 °F (36.1 °C)-97.5 °F (36.4 °C)] 97.5 °F (36.4 °C)  HR:  [70-81] 75  BP: ()/(50-62) 113/62  Resp:  [18-22] 19  SpO2:  [93 %-96 %] 96 %  O2 Device: Nasal cannula    Physical Exam  Constitutional:       Appearance: She is normal weight. She is toxic-appearing.   HENT:      Head: Normocephalic and atraumatic.      Right Ear: External ear normal.      Left Ear: External ear normal.      Nose: Nose normal.      Mouth/Throat:      Mouth: Mucous  membranes are moist.      Pharynx: Oropharynx is clear.   Eyes:      Extraocular Movements: Extraocular movements intact.      Pupils: Pupils are equal, round, and reactive to light.   Cardiovascular:      Rate and Rhythm: Normal rate and regular rhythm.      Pulses: Normal pulses.      Heart sounds: Normal heart sounds. No murmur heard.  Pulmonary:      Effort: No respiratory distress.      Breath sounds: No wheezing, rhonchi or rales.      Comments: Shallow but clear   Abdominal:      General: There is no distension.      Tenderness: There is no guarding.   Musculoskeletal:      Cervical back: No rigidity.      Right lower leg: No edema.      Left lower leg: No edema.   Skin:     Coloration: Skin is not jaundiced or pale.      Findings: No lesion or rash.   Neurological:      Mental Status: She is oriented to person, place, and time.      Comments: But lethargy /sleepy             Lines/Drains:    Urinary Catheter:  No catheter in place                Lab Results: I have reviewed the following results:  Results from last 7 days   Lab Units 10/27/24  0146   WBC Thousand/uL 8.13   HEMOGLOBIN g/dL 12.0   HEMATOCRIT % 36.9   PLATELETS Thousands/uL 326   SEGS PCT % 54   LYMPHO PCT % 37   MONO PCT % 7   EOS PCT % 1     Results from last 7 days   Lab Units 10/27/24  0146   SODIUM mmol/L 142   POTASSIUM mmol/L 3.8   CHLORIDE mmol/L 105   CO2 mmol/L 29   BUN mg/dL 15   CREATININE mg/dL 0.82   ANION GAP mmol/L 8   CALCIUM mg/dL 9.2   ALBUMIN g/dL 4.2   TOTAL BILIRUBIN mg/dL 0.21   ALK PHOS U/L 79   ALT U/L 36   AST U/L 30   GLUCOSE RANDOM mg/dL 88     Results from last 7 days   Lab Units 10/27/24  0146   INR  1.21*         Lab Results   Component Value Date    HGBA1C 6.3 (H) 06/09/2024    HGBA1C 5.8 (H) 04/20/2021     Results from last 7 days   Lab Units 10/27/24  0146   LACTIC ACID mmol/L 1.0       Imaging Results Review: I reviewed radiology reports from this admission including: CT chest and CT head.  Other Study Results  Review: EKG was reviewed.     Administrative Statements   I have spent a total time of 55 minutes in caring for this patient on the day of the visit/encounter including Diagnostic results, Prognosis, Risks and benefits of tx options, Instructions for management, Patient and family education, Importance of tx compliance, Risk factor reductions, Impressions, Counseling / Coordination of care, Documenting in the medical record, Reviewing / ordering tests, medicine, procedures  , Obtaining or reviewing history  , and Communicating with other healthcare professionals .    ** Please Note: This note has been constructed using a voice recognition system. **

## 2024-10-27 NOTE — ASSESSMENT & PLAN NOTE
UA 10/2 & again 10/25 positive for nitrite and leucocytes, patient was recently started in the outpatient setting on Bactrim 10/25 - present now with metabolic encephalopathy as above. Discontinue Bactrim. Will given Ceftriaxone x 4 doses. However of note, patient denies urinary symptoms - will treat give age , AMS and recent + UA, and also suspected pneumonia.

## 2024-10-27 NOTE — PLAN OF CARE
Problem: Potential for Falls  Goal: Patient will remain free of falls  Description: INTERVENTIONS:  - Educate patient/family on patient safety including physical limitations  - Instruct patient to call for assistance with activity   - Consult OT/PT to assist with strengthening/mobility   - Keep Call bell within reach  - Keep bed low and locked with side rails adjusted as appropriate  - Keep care items and personal belongings within reach  - Initiate and maintain comfort rounds  - Make Fall Risk Sign visible to staff  - Offer Toileting every 2 Hours, in advance of need  - Initiate/Maintain bed alarm  - Obtain necessary fall risk management equipment  - Apply yellow socks and bracelet for high fall risk patients  - Consider moving patient to room near nurses station  Outcome: Progressing     Problem: Prexisting or High Potential for Compromised Skin Integrity  Goal: Skin integrity is maintained or improved  Description: INTERVENTIONS:  - Identify patients at risk for skin breakdown  - Assess and monitor skin integrity  - Assess and monitor nutrition and hydration status  - Monitor labs   - Assess for incontinence   - Turn and reposition patient  - Assist with mobility/ambulation  - Relieve pressure over bony prominences  - Avoid friction and shearing  - Provide appropriate hygiene as needed including keeping skin clean and dry  - Evaluate need for skin moisturizer/barrier cream  - Collaborate with interdisciplinary team   - Patient/family teaching  - Consider wound care consult   Outcome: Progressing     Problem: PAIN - ADULT  Goal: Verbalizes/displays adequate comfort level or baseline comfort level  Description: Interventions:  - Encourage patient to monitor pain and request assistance  - Assess pain using appropriate pain scale  - Administer analgesics based on type and severity of pain and evaluate response  - Implement non-pharmacological measures as appropriate and evaluate response  - Consider cultural and  social influences on pain and pain management  - Notify physician/advanced practitioner if interventions unsuccessful or patient reports new pain  Outcome: Progressing     Problem: SAFETY ADULT  Goal: Patient will remain free of falls  Description: INTERVENTIONS:  - Educate patient/family on patient safety including physical limitations  - Instruct patient to call for assistance with activity   - Consult OT/PT to assist with strengthening/mobility   - Keep Call bell within reach  - Keep bed low and locked with side rails adjusted as appropriate  - Keep care items and personal belongings within reach  - Initiate and maintain comfort rounds  - Make Fall Risk Sign visible to staff  - Offer Toileting every 2 Hours, in advance of need  - Initiate/Maintain bed alarm  - Obtain necessary fall risk management equipment  - Apply yellow socks and bracelet for high fall risk patients  - Consider moving patient to room near nurses station  Outcome: Progressing  Goal: Maintain or return to baseline ADL function  Description: INTERVENTIONS:  -  Assess patient's ability to carry out ADLs; assess patient's baseline for ADL function and identify physical deficits which impact ability to perform ADLs (bathing, care of mouth/teeth, toileting, grooming, dressing, etc.)  - Assess/evaluate cause of self-care deficits   - Assess range of motion  - Assess patient's mobility; develop plan if impaired  - Assess patient's need for assistive devices and provide as appropriate  - Encourage maximum independence but intervene and supervise when necessary  - Involve family in performance of ADLs  - Assess for home care needs following discharge   - Consider OT consult to assist with ADL evaluation and planning for discharge  - Provide patient education as appropriate  Outcome: Progressing  Goal: Maintains/Returns to pre admission functional level  Description: INTERVENTIONS:  - Perform AM-PAC 6 Click Basic Mobility/ Daily Activity assessment  daily.  - Set and communicate daily mobility goal to care team and patient/family/caregiver.   - Collaborate with rehabilitation services on mobility goals if consulted  - Perform Range of Motion 2 times a day.  - Reposition patient every 2 hours.  - Dangle patient 2 times a day  - Stand patient 2 times a day  - Ambulate patient 2 times a day  - Out of bed to chair 2 times a day   - Out of bed for meals 2 times a day  - Out of bed for toileting  - Record patient progress and toleration of activity level   Outcome: Progressing     Problem: DISCHARGE PLANNING  Goal: Discharge to home or other facility with appropriate resources  Description: INTERVENTIONS:  - Identify barriers to discharge w/patient and caregiver  - Arrange for needed discharge resources and transportation as appropriate  - Identify discharge learning needs (meds, wound care, etc.)  - Arrange for interpretive services to assist at discharge as needed  - Refer to Case Management Department for coordinating discharge planning if the patient needs post-hospital services based on physician/advanced practitioner order or complex needs related to functional status, cognitive ability, or social support system  Outcome: Progressing     Problem: Knowledge Deficit  Goal: Patient/family/caregiver demonstrates understanding of disease process, treatment plan, medications, and discharge instructions  Description: Complete learning assessment and assess knowledge base.  Interventions:  - Provide teaching at level of understanding  - Provide teaching via preferred learning methods  Outcome: Progressing

## 2024-10-27 NOTE — ASSESSMENT & PLAN NOTE
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

## 2024-10-27 NOTE — ASSESSMENT & PLAN NOTE
See drug-induced encephalopathy.  74 years old on gabapentin, Xanax on top of Keppra and Seroquel and 10/25 started Bactrim for suspected UTI.

## 2024-10-27 NOTE — ASSESSMENT & PLAN NOTE
"Noted in mid 80's while in the ED requiring 2 L NC   CT Chest was done \"Mild dependent and basilar probable atelectasis. Recommend correlation for superimposed aspiration given small amount of debris/secretion within the dependent trachea. Otherwise no focal airspace consolidation or pleural effusion. \"     Given lethargy / AMS due to medications as above - at risk for aspiration . Other differential include respiratory depression due to xanax and gabapentin however RR wnl / mildly tachypnic not decreased.   Starting Rocephin for UTI +/- PNA   NC O2 titrate for goal > 90%   Diet only when fully alert  Speech eval and treat   Incentive spirometer   "

## 2024-10-27 NOTE — ASSESSMENT & PLAN NOTE
PDMP reviewed, patient lately with monthly prescription for alprazolam 2 mg tablets X 120 QTY, also had oxycodone 5 mg of which she filled 28 tablets on 7/12/2024 then got 7 more tablets on 7/19/2024.  Additionally she also on Seroquel and Keppra and recently took Bactrim for UTI.  All of the above medications for her age population are high risk for confusion/metabolic encephalopathy.  Patient's presentation is very likely due to polypharmacy +/- infection (UTI/ PNA)    - UA unremarkable in the ED (see UTI), no fever, no SIRS criteria.  UDS positive for benzodiazepines.   - CT head was done in the ED , no acute abnormality   - Blood Culture in process.   - CT chest suspicious for possible aspiration but no airspace consolidation     - Given seizure history we will avoid abrupt discontinuation of Xanax: decreased to 1 mg TID PRN starting 10/27 1800   - Decrease Gabapentin from 300 TID to 100 TID.   - Continue Seroquel and Keppra with no change.   - Watch for any respiratory distress (if any will give Narcan) and watch for any seizure symptoms.   - starting Ceftriaxone for UTI +/- possible aspiration PNA (see below)   - fall and seizure precautions

## 2024-10-27 NOTE — ED PROVIDER NOTES
Time reflects when diagnosis was documented in both MDM as applicable and the Disposition within this note       Time User Action Codes Description Comment    10/27/2024  4:54 AM Allison White [R41.82] AMS (altered mental status)     10/27/2024  4:55 AM Allison White Add [R42] Dizziness     10/27/2024  4:55 AM Allison White Add [R53.1] Generalized weakness     10/27/2024  4:55 AM Allison White Add [R09.02] Hypoxia     10/27/2024  5:54 AM Allison White [Z79.899] Polypharmacy           ED Disposition       ED Disposition   Admit    Condition   Stable    Date/Time   Sun Oct 27, 2024  4:54 AM    Comment   Case was discussed with Dr. Rodríguez and the patient's admission status was agreed to be Admission Status: inpatient status to the service of Dr. Rodríguez .               Assessment & Plan       Medical Decision Making  Hypoxic to mid 80s on room air, improvement with nasal cannula. BP has been soft, 90s systolic. Otherwise, vital signs stable. On my exam, patient is AOx3, however, keeps falling asleep during exam. Labs and CT are stable. Did give fluids and IV abx in ER to cover for infectious process. UDS + benzos, patient takes Xanax 4 times daily. Discussed with internal medicine for admission.     Amount and/or Complexity of Data Reviewed  Labs: ordered.  Radiology: ordered.    Risk  Prescription drug management.  Decision regarding hospitalization.             Medications   sodium chloride 0.9 % bolus 1,000 mL (0 mL Intravenous Stopped 10/27/24 0435)   iohexol (OMNIPAQUE) 350 MG/ML injection (MULTI-DOSE) 120 mL (120 mL Intravenous Given 10/27/24 0232)   ceftriaxone (ROCEPHIN) 1 g/50 mL in dextrose IVPB (0 mg Intravenous Stopped 10/27/24 0556)       ED Risk Strat Scores   HEART Risk Score      Flowsheet Row Most Recent Value   Heart Score Risk Calculator    History 0 Filed at: 10/27/2024 0541   ECG 0 Filed at: 10/27/2024 0541   Age 2 Filed at: 10/27/2024 0541   Risk Factors 2 Filed at: 10/27/2024 0541    Troponin 0 Filed at: 10/27/2024 0541   HEART Score 4 Filed at: 10/27/2024 0541                               SBIRT 22yo+      Flowsheet Row Most Recent Value   Initial Alcohol Screen: US AUDIT-C     1. How often do you have a drink containing alcohol? 0 Filed at: 10/27/2024 0048   2. How many drinks containing alcohol do you have on a typical day you are drinking?  0 Filed at: 10/27/2024 0048   3b. FEMALE Any Age, or MALE 65+: How often do you have 4 or more drinks on one occassion? 0 Filed at: 10/27/2024 0048   Audit-C Score 0 Filed at: 10/27/2024 0048   JONAH: How many times in the past year have you...    Used an illegal drug or used a prescription medication for non-medical reasons? Never Filed at: 10/27/2024 0048                            History of Present Illness       Chief Complaint   Patient presents with    Dizziness     Pt arrived via ems with c/o feeling dizzy       Past Medical History:   Diagnosis Date    Anxiety     Depression     Depression, recurrent (HCC) 04/20/2021    Hallucinations 04/20/2021    History of seizure disorder 04/20/2021      Past Surgical History:   Procedure Laterality Date    ORIF FINGER FRACTURE Right 6/27/2024    Procedure: ORIF OF RIGHT THUMB;  Surgeon: Dagoberto Yates MD;  Location: [facility];  Service: Orthopedics      Family History   Problem Relation Age of Onset    Diabetes Mother     Anxiety disorder Mother     Stroke Father     No Known Problems Sister     No Known Problems Sister     No Known Problems Sister     No Known Problems Daughter     No Known Problems Daughter     No Known Problems Daughter     No Known Problems Maternal Grandmother     No Known Problems Paternal Grandmother     No Known Problems Maternal Aunt     No Known Problems Maternal Aunt     No Known Problems Maternal Aunt     No Known Problems Maternal Aunt     No Known Problems Maternal Aunt     No Known Problems Maternal Aunt     No Known Problems Maternal Aunt     No Known Problems Maternal  "Aunt     No Known Problems Paternal Aunt     Breast cancer Neg Hx       Social History     Tobacco Use    Smoking status: Every Day     Current packs/day: 0.00     Average packs/day: 0.5 packs/day for 54.3 years (27.2 ttl pk-yrs)     Types: Cigarettes     Start date:      Last attempt to quit: 2024     Years since quittin.4    Smokeless tobacco: Never   Vaping Use    Vaping status: Never Used   Substance Use Topics    Alcohol use: Never    Drug use: Never      E-Cigarette/Vaping    E-Cigarette Use Never User       E-Cigarette/Vaping Substances    Nicotine No     THC No     CBD No     Flavoring No     Other No     Unknown No       I have reviewed and agree with the history as documented.     Patient is a 74-year-old female who presents to the emergency room with her daughters at bedside.  Daughters report they live in [place].  Unclear history.  Reports she is recently being treated for a UTI.  Reports patient has been lightheaded, \" dizzy\".  Dizziness described as off-balance and \" not herself\".  Associated headache and generalized weakness.  Symptoms have been ongoing for approximately x1 week.       Dizziness  Associated symptoms: headaches and weakness        Review of Systems   Reason unable to perform ROS: Poor historians and unclear hx.   Neurological:  Positive for dizziness, weakness, light-headedness and headaches.           Objective       ED Triage Vitals [10/27/24 0049]   Temperature Pulse Blood Pressure Respirations SpO2 Patient Position - Orthostatic VS   (!) 97 °F (36.1 °C) 79 111/59 18 96 % Lying      Temp Source Heart Rate Source BP Location FiO2 (%) Pain Score    Oral Monitor Right arm -- --      Vitals      Date and Time Temp Pulse SpO2 Resp BP Pain Score FACES Pain Rating User   10/27/24 0300 -- 70 95 % 19 94/50 -- -- MO   10/27/24 0200 -- 70 93 % 22 93/54 -- -- MO   10/27/24 0130 -- 76 93 % 22 93/57 -- -- MO   10/27/24 0100 -- 75 93 % 21 111/59 -- -- MO   10/27/24 0049 97 °F (36.1 " °C) 79 96 % 18 111/59 -- -- MO            Physical Exam  Vitals and nursing note reviewed.   Constitutional:       General: She is not in acute distress.     Appearance: She is well-developed.   HENT:      Head: Normocephalic and atraumatic.   Eyes:      Extraocular Movements: Extraocular movements intact.      Conjunctiva/sclera: Conjunctivae normal.      Pupils: Pupils are equal, round, and reactive to light.   Cardiovascular:      Rate and Rhythm: Normal rate and regular rhythm.      Pulses: Normal pulses.      Heart sounds: No murmur heard.  Pulmonary:      Effort: Pulmonary effort is normal. No respiratory distress.      Breath sounds: Normal breath sounds.   Abdominal:      Palpations: Abdomen is soft.      Tenderness: There is no abdominal tenderness.   Musculoskeletal:         General: No swelling.      Cervical back: Neck supple.   Skin:     General: Skin is warm and dry.      Capillary Refill: Capillary refill takes less than 2 seconds.   Neurological:      General: No focal deficit present.      Mental Status: She is alert and oriented to person, place, and time.      Comments: AOx3.  Patient falling asleep during exam.   Psychiatric:         Mood and Affect: Mood normal.         Results Reviewed       Procedure Component Value Units Date/Time    HS Troponin I 4hr [[id number]] Collected: 10/27/24 0555    Lab Status: In process Specimen: Blood from Arm, Left Updated: 10/27/24 0558    Ammonia [[id number]] Updated: 10/27/24 0553    Lab Status: No result Specimen: Blood from Arm, Left     Ethanol [[id number]]  (Normal) Collected: 10/27/24 0450    Lab Status: Final result Specimen: Blood from Arm, Left Updated: 10/27/24 0526     Ethanol Lvl <10 mg/dL     Rapid drug screen, urine [[id number]]  (Abnormal) Collected: 10/27/24 0450    Lab Status: Final result Specimen: Urine, Catheter Updated: 10/27/24 0524     Amph/Meth UR Negative     Barbiturate Ur Negative     Benzodiazepine Urine Positive     Cocaine Urine  Negative     Methadone Urine Negative     Opiate Urine Negative     PCP Ur Negative     THC Urine Negative     Oxycodone Urine Negative     Fentanyl Urine Negative     HYDROCODONE URINE Negative    Narrative:      Presumptive report. If requested, specimen will be sent to reference lab for confirmation.  FOR MEDICAL PURPOSES ONLY.   IF CONFIRMATION NEEDED PLEASE CONTACT THE LAB WITHIN 5 DAYS.    Drug Screen Cutoff Levels:  AMPHETAMINE/METHAMPHETAMINES  1000 ng/mL  BARBITURATES     200 ng/mL  BENZODIAZEPINES     200 ng/mL  COCAINE      300 ng/mL  METHADONE      300 ng/mL  OPIATES      300 ng/mL  PHENCYCLIDINE     25 ng/mL  THC       50 ng/mL  OXYCODONE      100 ng/mL  FENTANYL      5 ng/mL  HYDROCODONE     300 ng/mL    Salicylate level [[id number]]  (Normal) Collected: 10/27/24 0450    Lab Status: Final result Specimen: Blood from Arm, Left Updated: 10/27/24 0519     Salicylate Lvl <5 mg/dL     Acetaminophen level-If concentration is detectable, please discuss with medical  on call. [[id number]]  (Abnormal) Collected: 10/27/24 0450    Lab Status: Final result Specimen: Blood from Arm, Left Updated: 10/27/24 0519     Acetaminophen Level 2 ug/mL     HS Troponin I 2hr [[id number]] Collected: 10/27/24 0353    Lab Status: Final result Specimen: Blood from Arm, Left Updated: 10/27/24 0430     hs TnI 2hr <2 ng/L      Delta 2hr hsTnI --    Blood culture #1 [[id number]] Collected: 10/27/24 0218    Lab Status: In process Specimen: Blood from Arm, Right Updated: 10/27/24 0244    B-Type Natriuretic Peptide(BNP) [[id number]]  (Normal) Collected: 10/27/24 0146    Lab Status: Final result Specimen: Blood from Arm, Left Updated: 10/27/24 0225     BNP 27 pg/mL     UA w Reflex to Microscopic w Reflex to Culture [[id number]] Collected: 10/27/24 0207    Lab Status: Final result Specimen: Urine, Straight Cath Updated: 10/27/24 0221     Color, UA Light Yellow     Clarity, UA Clear     Specific Gravity, UA 1.018     pH, UA 6.5      Leukocytes, UA Negative     Nitrite, UA Negative     Protein, UA Negative mg/dl      Glucose, UA Negative mg/dl      Ketones, UA Negative mg/dl      Urobilinogen, UA <2.0 mg/dl      Bilirubin, UA Negative     Occult Blood, UA Negative    HS Troponin 0hr (reflex protocol) [[id number]]  (Normal) Collected: 10/27/24 0146    Lab Status: Final result Specimen: Blood from Arm, Left Updated: 10/27/24 0220     hs TnI 0hr <2 ng/L     Protime-INR [[id number]]  (Abnormal) Collected: 10/27/24 0146    Lab Status: Final result Specimen: Blood from Arm, Left Updated: 10/27/24 0219     Protime 16.0 seconds      INR 1.21    Narrative:      INR Therapeutic Range    Indication                                             INR Range      Atrial Fibrillation                                               2.0-3.0  Hypercoagulable State                                    2.0.2.3  Left Ventricular Asist Device                            2.0-3.0  Mechanical Heart Valve                                  -    Aortic(with afib, MI, embolism, HF, LA enlargement,    and/or coagulopathy)                                     2.0-3.0 (2.5-3.5)     Mitral                                                             2.5-3.5  Prosthetic/Bioprosthetic Heart Valve               2.0-3.0  Venous thromboembolism (VTE: VT, PE        2.0-3.0    APTT [[id number]]  (Abnormal) Collected: 10/27/24 0146    Lab Status: Final result Specimen: Blood from Arm, Left Updated: 10/27/24 0219     PTT 40 seconds     Lactic acid, plasma (w/reflex if result > 2.0) [[id number]]  (Normal) Collected: 10/27/24 0146    Lab Status: Final result Specimen: Blood from Arm, Left Updated: 10/27/24 0215     LACTIC ACID 1.0 mmol/L     Narrative:      Result may be elevated if tourniquet was used during collection.    Comprehensive metabolic panel [[id number]] Collected: 10/27/24 0146    Lab Status: Final result Specimen: Blood from Arm, Left Updated: 10/27/24 0215     Sodium 142 mmol/L       Potassium 3.8 mmol/L      Chloride 105 mmol/L      CO2 29 mmol/L      ANION GAP 8 mmol/L      BUN 15 mg/dL      Creatinine 0.82 mg/dL      Glucose 88 mg/dL      Calcium 9.2 mg/dL      AST 30 U/L      ALT 36 U/L      Alkaline Phosphatase 79 U/L      Total Protein 7.4 g/dL      Albumin 4.2 g/dL      Total Bilirubin 0.21 mg/dL      eGFR 71 ml/min/1.73sq m     Narrative:      National Kidney Disease Foundation guidelines for Chronic Kidney Disease (CKD):     Stage 1 with normal or high GFR (GFR > 90 mL/min/1.73 square meters)    Stage 2 Mild CKD (GFR = 60-89 mL/min/1.73 square meters)    Stage 3A Moderate CKD (GFR = 45-59 mL/min/1.73 square meters)    Stage 3B Moderate CKD (GFR = 30-44 mL/min/1.73 square meters)    Stage 4 Severe CKD (GFR = 15-29 mL/min/1.73 square meters)    Stage 5 End Stage CKD (GFR <15 mL/min/1.73 square meters)  Note: GFR calculation is accurate only with a steady state creatinine    FLU/COVID Rapid Antigen (30 min. TAT) - Preferred screening test in ED [[id number]]  (Normal) Collected: 10/27/24 0146    Lab Status: Final result Specimen: Nares from Nose Updated: 10/27/24 0214     SARS COV Rapid Antigen Negative     Influenza A Rapid Antigen Negative     Influenza B Rapid Antigen Negative    Narrative:      This test has been performed using the [employer] Batool 2 FLU+SARS Antigen test under the Emergency Use Authorization (EUA). This test has been validated by the  and verified by the performing laboratory. The Batool uses lateral flow immunofluorescent sandwich assay to detect SARS-COV, Influenza A and Influenza B Antigen.     The [employer] Batool 2 SARS Antigen test does not differentiate between SARS-CoV and SARS-CoV-2.     Negative results are presumptive and may be confirmed with a molecular assay, if necessary, for patient management. Negative results do not rule out SARS-CoV-2 or influenza infection and should not be used as the sole basis for treatment or patient management decisions. A  negative test result may occur if the level of antigen in a sample is below the limit of detection of this test.     Positive results are indicative of the presence of viral antigens, but do not rule out bacterial infection or co-infection with other viruses.     All test results should be used as an adjunct to clinical observations and other information available to the provider.    FOR PEDIATRIC PATIENTS - copy/paste COVID Guidelines URL to browser: [email or web address]    CBC and differential [[id number]]  (Abnormal) Collected: 10/27/24 0146    Lab Status: Final result Specimen: Blood from Arm, Left Updated: 10/27/24 0155     WBC 8.13 Thousand/uL      RBC 3.72 Million/uL      Hemoglobin 12.0 g/dL      Hematocrit 36.9 %      MCV 99 fL      MCH 32.3 pg      MCHC 32.5 g/dL      RDW 12.7 %      MPV 10.2 fL      Platelets 326 Thousands/uL      nRBC 0 /100 WBCs      Segmented % 54 %      Immature Grans % 0 %      Lymphocytes % 37 %      Monocytes % 7 %      Eosinophils Relative 1 %      Basophils Relative 1 %      Absolute Neutrophils 4.44 Thousands/µL      Absolute Immature Grans 0.02 Thousand/uL      Absolute Lymphocytes 2.98 Thousands/µL      Absolute Monocytes 0.54 Thousand/µL      Eosinophils Absolute 0.11 Thousand/µL      Basophils Absolute 0.04 Thousands/µL     Blood culture #2 [[id number]] Collected: 10/27/24 0146    Lab Status: In process Specimen: Blood from Arm, Left Updated: 10/27/24 0152            CTA head and neck with and without contrast   Final Interpretation by Baldemar Pak MD (10/27 0407)      CT Brain:  No acute intracranial abnormality.      CT Angiography: No evidence of flow-limiting stenosis or large vessel occlusive disease within the major vessels of the [place]. Suspect slightly progressed high-grade stenosis at the right ICA origin secondary to calcified and noncalcified    atheromatous plaque (approximately 80-90% by NASCET  criteria) compared to the prior study dated 6/7/2024. Similar severe stenosis of the left ICA origin (approximately 70% by NASCET criteria). Patient is already being followed by neurovascular service    per chart review in [facility].                  Workstation performed: [id number]         CT chest abdomen pelvis w contrast   Final Interpretation by Baldemar Pak MD (10/27 0422)      Mild dependent and basilar probable atelectasis. Recommend correlation for superimposed aspiration given small amount of debris/secretion within the dependent trachea. Otherwise no focal airspace consolidation or pleural effusion.      No acute abnormality identified in the abdomen or pelvis. No free air or free fluid      Redemonstrated hepatomegaly and hepatic steatosis.      Redemonstrated nonspecific endometrial thickening. GYN follow-up is recommended if not previously performed.      Additional chronic/nonemergent findings as above.         Workstation performed: [id number]             ECG 12 Lead Documentation Only    Date/Time: 10/27/2024 1:47 AM    Performed by: Allison White PA-C  Authorized by: Allison White PA-C    Indications / Diagnosis:  Cardiac work-up  ECG reviewed by me, the ED Provider: yes    Patient location:  ED  Interpretation:     Interpretation: normal    Rate:     ECG rate:  72    ECG rate assessment: normal    Rhythm:     Rhythm: sinus rhythm    ST segments:     ST segments:  Normal  T waves:     T waves: normal        ED Medication and Procedure Management   Prior to Admission Medications   Prescriptions Last Dose Informant Patient Reported? Taking?   ALPRAZolam (XANAX) 2 MG tablet  Self No No   Sig: Take 1 tablet (2 mg total) by mouth 4 (four) times a day for 14 days   Icosapent Ethyl 1 g CAPS  Self No No   Sig: Take 2 capsules (2 g total) by mouth 2 (two) times a day   Multiple Vitamin (multivitamin) tablet  Self Yes No   Sig: Take 1 tablet by mouth daily   QUEtiapine (SEROquel) 100 mg tablet  Self No No    Sig: Take 1 tablet (100 mg total) by mouth every 12 (twelve) hours   acetaminophen (TYLENOL) 325 mg tablet  Self Yes No   Sig: Take 975 mg by mouth every 8 (eight) hours as needed for mild pain   apixaban (Eliquis) 5 mg  Self No No   Sig: Take 1 tablet (5 mg total) by mouth 2 (two) times a day   aspirin 81 mg chewable tablet  Self No No   Sig: Chew 1 tablet (81 mg total) daily   diltiazem (CARDIZEM CD) 120 mg 24 hr capsule  Self No No   Sig: Take 1 capsule (120 mg total) by mouth daily   ezetimibe (ZETIA) 10 mg tablet  Self No No   Sig: TAKE 1 TABLET BY MOUTH EVERY DAY   gabapentin (NEURONTIN) 300 mg capsule  Self Yes No   Sig: Take 300 mg by mouth 3 (three) times a day   levETIRAcetam (KEPPRA) 750 mg tablet  Self No No   Sig: Take 1 tablet (750 mg total) by mouth every 12 (twelve) hours   lisinopril (ZESTRIL) 10 mg tablet  Self No No   Sig: Take 1 tablet (10 mg total) by mouth 2 (two) times a day   naloxone (NARCAN) 4 mg/0.1 mL nasal spray  Self No No   Sig: Administer 1 spray into a nostril. If no response after 2-3 minutes, give another dose in the other nostril using a new spray.   polyethylene glycol (MIRALAX) 17 g packet  Self Yes No   Sig: Take 17 g by mouth daily as needed   rosuvastatin (CRESTOR) 10 MG tablet   No No   Sig: Take 1 tablet (10 mg total) by mouth daily   sulfamethoxazole-trimethoprim (BACTRIM DS) 800-160 mg per tablet   No No   Sig: Take 1 tablet by mouth every 12 (twelve) hours for 7 days   vitamin B-12 (VITAMIN B-12) 1,000 mcg tablet  Self Yes No   Sig: Take by mouth daily      Facility-Administered Medications Last Administration Doses Remaining   cyanocobalamin injection 1,000 mcg 1/6/2023  3:41 PM         Patient's Medications   Discharge Prescriptions    No medications on file     No discharge procedures on file.  ED SEPSIS DOCUMENTATION   Time reflects when diagnosis was documented in both MDM as applicable and the Disposition within this note       Time User Action Codes Description  Comment    10/27/2024  4:54 AM Allison White [R41.82] AMS (altered mental status)     10/27/2024  4:55 AM Allison White [R42] Dizziness     10/27/2024  4:55 AM Allison White [R53.1] Generalized weakness     10/27/2024  4:55 AM Allison White [R09.02] Hypoxia     10/27/2024  5:54 AM Allison White [Z79.899] Polypharmacy                  Allison White PA-C  10/27/24 0600

## 2024-10-27 NOTE — TREATMENT PLAN
Discussed with daughters.  Will get geriatrics consult given the polypharmacy.  Patient needs medication optimization.  Questionable UTI although this is unlikely.  Patient on intravenous ceftriaxone and we shall closely follow.  Prognosis guarded.  Will get physical and Occupational Therapy consults as the patient is at risk.  Per her daughters, may need rehab placement: case management on board

## 2024-10-28 PROBLEM — R26.2 AMBULATORY DYSFUNCTION: Status: ACTIVE | Noted: 2024-10-28

## 2024-10-28 PROBLEM — Z13.39 ENCOUNTER FOR DELIRIUM ELDERLY AT RISK (DEAR) SCREENING: Status: ACTIVE | Noted: 2024-10-28

## 2024-10-28 PROBLEM — H91.90 HOH (HARD OF HEARING): Status: ACTIVE | Noted: 2024-10-28

## 2024-10-28 PROBLEM — R54 FRAILTY SYNDROME IN GERIATRIC PATIENT: Status: ACTIVE | Noted: 2024-10-28

## 2024-10-28 LAB
ALBUMIN SERPL BCG-MCNC: 4 G/DL (ref 3.5–5)
ALP SERPL-CCNC: 69 U/L (ref 34–104)
ALT SERPL W P-5'-P-CCNC: 27 U/L (ref 7–52)
AMMONIA PLAS-SCNC: 31 UMOL/L (ref 18–72)
ANION GAP SERPL CALCULATED.3IONS-SCNC: 6 MMOL/L (ref 4–13)
AST SERPL W P-5'-P-CCNC: 20 U/L (ref 13–39)
BASOPHILS # BLD AUTO: 0.04 THOUSANDS/ΜL (ref 0–0.1)
BASOPHILS NFR BLD AUTO: 1 % (ref 0–1)
BILIRUB SERPL-MCNC: 0.37 MG/DL (ref 0.2–1)
BUN SERPL-MCNC: 12 MG/DL (ref 5–25)
CALCIUM SERPL-MCNC: 9.2 MG/DL (ref 8.4–10.2)
CHLORIDE SERPL-SCNC: 103 MMOL/L (ref 96–108)
CO2 SERPL-SCNC: 30 MMOL/L (ref 21–32)
CREAT SERPL-MCNC: 0.68 MG/DL (ref 0.6–1.3)
EOSINOPHIL # BLD AUTO: 0.13 THOUSAND/ΜL (ref 0–0.61)
EOSINOPHIL NFR BLD AUTO: 2 % (ref 0–6)
ERYTHROCYTE [DISTWIDTH] IN BLOOD BY AUTOMATED COUNT: 12.6 % (ref 11.6–15.1)
GFR SERPL CREATININE-BSD FRML MDRD: 87 ML/MIN/1.73SQ M
GLUCOSE SERPL-MCNC: 110 MG/DL (ref 65–140)
HCT VFR BLD AUTO: 34.8 % (ref 34.8–46.1)
HGB BLD-MCNC: 11.4 G/DL (ref 11.5–15.4)
IMM GRANULOCYTES # BLD AUTO: 0.02 THOUSAND/UL (ref 0–0.2)
IMM GRANULOCYTES NFR BLD AUTO: 0 % (ref 0–2)
LYMPHOCYTES # BLD AUTO: 2.7 THOUSANDS/ΜL (ref 0.6–4.47)
LYMPHOCYTES NFR BLD AUTO: 34 % (ref 14–44)
MCH RBC QN AUTO: 32.4 PG (ref 26.8–34.3)
MCHC RBC AUTO-ENTMCNC: 32.8 G/DL (ref 31.4–37.4)
MCV RBC AUTO: 99 FL (ref 82–98)
MONOCYTES # BLD AUTO: 0.48 THOUSAND/ΜL (ref 0.17–1.22)
MONOCYTES NFR BLD AUTO: 6 % (ref 4–12)
NEUTROPHILS # BLD AUTO: 4.7 THOUSANDS/ΜL (ref 1.85–7.62)
NEUTS SEG NFR BLD AUTO: 57 % (ref 43–75)
NRBC BLD AUTO-RTO: 0 /100 WBCS
PLATELET # BLD AUTO: 293 THOUSANDS/UL (ref 149–390)
PMV BLD AUTO: 9.3 FL (ref 8.9–12.7)
POTASSIUM SERPL-SCNC: 3.7 MMOL/L (ref 3.5–5.3)
PROT SERPL-MCNC: 6.8 G/DL (ref 6.4–8.4)
RBC # BLD AUTO: 3.52 MILLION/UL (ref 3.81–5.12)
SODIUM SERPL-SCNC: 139 MMOL/L (ref 135–147)
WBC # BLD AUTO: 8.07 THOUSAND/UL (ref 4.31–10.16)

## 2024-10-28 PROCEDURE — 82140 ASSAY OF AMMONIA: CPT | Performed by: STUDENT IN AN ORGANIZED HEALTH CARE EDUCATION/TRAINING PROGRAM

## 2024-10-28 PROCEDURE — 80053 COMPREHEN METABOLIC PANEL: CPT | Performed by: STUDENT IN AN ORGANIZED HEALTH CARE EDUCATION/TRAINING PROGRAM

## 2024-10-28 PROCEDURE — 97167 OT EVAL HIGH COMPLEX 60 MIN: CPT

## 2024-10-28 PROCEDURE — 92610 EVALUATE SWALLOWING FUNCTION: CPT

## 2024-10-28 PROCEDURE — 99232 SBSQ HOSP IP/OBS MODERATE 35: CPT | Performed by: INTERNAL MEDICINE

## 2024-10-28 PROCEDURE — 85025 COMPLETE CBC W/AUTO DIFF WBC: CPT | Performed by: STUDENT IN AN ORGANIZED HEALTH CARE EDUCATION/TRAINING PROGRAM

## 2024-10-28 PROCEDURE — 99223 1ST HOSP IP/OBS HIGH 75: CPT

## 2024-10-28 PROCEDURE — 97163 PT EVAL HIGH COMPLEX 45 MIN: CPT

## 2024-10-28 RX ORDER — CIPROFLOXACIN 500 MG/1
500 TABLET, FILM COATED ORAL EVERY 12 HOURS SCHEDULED
Status: DISCONTINUED | OUTPATIENT
Start: 2024-10-28 | End: 2024-10-28

## 2024-10-28 RX ORDER — CEFUROXIME AXETIL 250 MG/1
500 TABLET ORAL EVERY 12 HOURS SCHEDULED
Status: DISCONTINUED | OUTPATIENT
Start: 2024-10-28 | End: 2024-10-30 | Stop reason: HOSPADM

## 2024-10-28 RX ADMIN — LISINOPRIL 10 MG: 10 TABLET ORAL at 17:26

## 2024-10-28 RX ADMIN — NICOTINE 1 PATCH: 7 PATCH, EXTENDED RELEASE TRANSDERMAL at 09:05

## 2024-10-28 RX ADMIN — APIXABAN 5 MG: 5 TABLET, FILM COATED ORAL at 17:26

## 2024-10-28 RX ADMIN — ASPIRIN 81 MG: 81 TABLET, CHEWABLE ORAL at 09:05

## 2024-10-28 RX ADMIN — QUETIAPINE FUMARATE 100 MG: 100 TABLET ORAL at 11:26

## 2024-10-28 RX ADMIN — FLUTICASONE PROPIONATE 1 SPRAY: 50 SPRAY, METERED NASAL at 09:06

## 2024-10-28 RX ADMIN — ICOSAPENT ETHYL 2 G: 1 CAPSULE, LIQUID FILLED ORAL at 09:06

## 2024-10-28 RX ADMIN — GABAPENTIN 100 MG: 100 CAPSULE ORAL at 15:28

## 2024-10-28 RX ADMIN — QUETIAPINE FUMARATE 100 MG: 100 TABLET ORAL at 22:31

## 2024-10-28 RX ADMIN — PIPERACILLIN AND TAZOBACTAM 4.5 G: 36; 4.5 INJECTION, POWDER, FOR SOLUTION INTRAVENOUS at 04:50

## 2024-10-28 RX ADMIN — CEFUROXIME AXETIL 500 MG: 250 TABLET ORAL at 20:04

## 2024-10-28 RX ADMIN — EZETIMIBE 10 MG: 10 TABLET ORAL at 09:05

## 2024-10-28 RX ADMIN — LEVETIRACETAM 750 MG: 500 TABLET, FILM COATED ORAL at 20:04

## 2024-10-28 RX ADMIN — PIPERACILLIN AND TAZOBACTAM 4.5 G: 36; 4.5 INJECTION, POWDER, FOR SOLUTION INTRAVENOUS at 13:21

## 2024-10-28 RX ADMIN — GABAPENTIN 100 MG: 100 CAPSULE ORAL at 09:05

## 2024-10-28 RX ADMIN — GABAPENTIN 100 MG: 100 CAPSULE ORAL at 20:04

## 2024-10-28 RX ADMIN — CYANOCOBALAMIN TAB 500 MCG 1000 MCG: 500 TAB at 09:05

## 2024-10-28 RX ADMIN — LEVETIRACETAM 750 MG: 500 TABLET, FILM COATED ORAL at 09:05

## 2024-10-28 RX ADMIN — ICOSAPENT ETHYL 2 G: 1 CAPSULE, LIQUID FILLED ORAL at 20:04

## 2024-10-28 RX ADMIN — APIXABAN 5 MG: 5 TABLET, FILM COATED ORAL at 09:05

## 2024-10-28 NOTE — ASSESSMENT & PLAN NOTE
Upon arrival to ED, O2 saturation in mid 80s, requiring 2 L NC; lethargic  CT chest/abd/pelvis showing: mild atelectasis, correlation for superimposed aspiration given small amount of debris/secretion within dependent trachea  Aspiration precautions  Speech therapy consulted  Continue to wean off O2 as able to, with goal > 90%

## 2024-10-28 NOTE — ASSESSMENT & PLAN NOTE
PDMP reviewed, patient lately with monthly prescription for alprazolam 2 mg tablets X 120 QTY, also had oxycodone 5 mg of which she filled 28 tablets on 7/12/2024 then got 7 more tablets on 7/19/2024.  Additionally she also on Seroquel and Keppra and recently took Bactrim for UTI.  All of the above medications for her age population are high risk for confusion/metabolic encephalopathy.  Patient's presentation is very likely due to polypharmacy +/- infection (UTI/ PNA)    - UA unremarkable in the ED (see UTI), no fever, no SIRS criteria.  UDS positive for benzodiazepines.   - CT head was done in the ED , no acute abnormality   - Blood Culture in process.   - CT chest suspicious for possible aspiration but no airspace consolidation     -Continue Xanax 1mg TID prn  - Continue Gabapentin 100 TID.   - Continue Seroquel 100mg bid  - Continue Keppra 750mg bid  - Watch for any respiratory distress (if any will give Narcan) and watch for any seizure symptoms.   - Treat UTI with Zosyn

## 2024-10-28 NOTE — ASSESSMENT & PLAN NOTE
Status post stroke in June as listed above  No longer drives  Continue Keppra  Seizure precautions

## 2024-10-28 NOTE — SPEECH THERAPY NOTE
Speech-Language Pathology Bedside Swallow Evaluation        Patient Name: Kirsty Em  Today's Date: 10/28/2024     Problem List  Principal Problem:    Drug-induced encephalopathy  Active Problems:    Anxiety disorder    Tobacco dependence    Acute respiratory failure with hypoxia (HCC)    History of stroke    Seizure disorder (HCC)    Brain mass    Polypharmacy    Acute cystitis without hematuria    Frailty syndrome in geriatric patient    Encounter for delirium elderly at risk (DEAR) screening    [place] (hard of hearing)    Ambulatory dysfunction       Past Medical History  Past Medical History:   Diagnosis Date    Anxiety     Depression     Depression, recurrent (HCC) 04/20/2021    Hallucinations 04/20/2021    History of seizure disorder 04/20/2021       Past Surgical History  Past Surgical History:   Procedure Laterality Date    ORIF FINGER FRACTURE Right 6/27/2024    Procedure: ORIF OF RIGHT THUMB;  Surgeon: Dagoberto Yates MD;  Location: [facility];  Service: Orthopedics       Summary/Impressions:    Pt known to this dept form prev admission; similar presentation.  Currently present with minimal s/s of oropharyngeal dysphagia.  Currently with adequate JEY and engagement, participates in conversational discourse.  Denies difficulties with oral intake.  Takes all PO trials with no overt s/s of aspiration.  Episodic cough evident, though low suspicion cough is directly related to PO intake. Will continue to monitor.    Recommendations:   Diet: regular diet and thin liquids - choose softer options   Meds:  1 at a time with liquid     Feeding assistance: tray set up   Frequent Oral care: 2-4x/day  Aspiration precautions and compensatory swallowing strategies: upright posture, only feed when fully alert, slow rate of feeding, small bites/sips, and alternating bites and sips  Other Recommendations/ considerations: SLP will continue to follow to ensure adequate management of oral diet and adjust as clinically indicated  x1-3     Current Medical Status  Pt is a 74 y.o. female who presented to [facility] with dizziness, hypoxia and slight hypotensive. Her O2 sat was in the mid 80s on RA and improved with NC in the ED. She was lethargic in the ED and it is noted that she has polypharmacy for her psych/seizure history. She takes Xanax, Seroquel and Keppra and recent took Bactrim for a UTI. She has a past medical history of anxiety, depression, afib, COPD, Stage 3 CKD, small L MCA stroke, carotid artery stenosis, hyperlipidemia, and brain mass. .    Past medical history:  Please see H&P for details    Special Studies:  10/27/24 CT chest:  Mild dependent and basilar probable atelectasis. Recommend correlation for superimposed aspiration given small amount of debris/secretion within the dependent trachea. Otherwise no focal airspace consolidation or pleural effusion.  No acute abnormality identified in the abdomen or pelvis. No free air or free fluid  Redemonstrated hepatomegaly and hepatic steatosis.  Redemonstrated nonspecific endometrial thickening. GYN follow-up is recommended if not previously performed.  Additional chronic/nonemergent findings as above.    Social/Education/Vocational Hx:  Pt lives with family    Swallow Information   Current Risks for Dysphagia & Aspiration: known history of dysphagia and AMS  Current Symptoms/Concerns:  AMS  Current Diet: regular diet and thin liquids   Baseline Diet: regular diet and thin liquids    Baseline Assessment   Behavior/Cognition: alert  Speech/Language Status: able to participate in basic conversation  Patient Positioning: upright in bed    Swallow Mechanism Exam   Facial: symmetrical  Labial: decreased strength; L>R  Lingual: decreased ROM?compliance with commands   [place]: unable to visualize  Mandible: adequate ROM  Dentition: edentulous  Vocal quality:clear/adequate   Volitional Cough: strong/productive   Respiratory: 1L NC     Consistencies Assessed and Performance    Consistencies Administered: thin liquids, puree, soft solids, and hard solids    Oral Stage: Adequate bolus retrieval and containment.  Prolonged mastication and oral transfer.  Mild oral retention.     Pharyngeal Stage: Laryngeal rise noted upon palpation.  Swallow initiation appears delayed, though hyolaryngeal excursion complete.  No overt s/s of aspiration. O2 saturation remains WNL.        Esophageal Concerns: none reported      Results Reviewed with: patient and RN     Plan  Will continue to follow for x1-3  Dysphagia Goals: pt will tolerate regular with thin without s/s of aspiration x1-3      Kirsten Marin MS, CCC-SLP  Speech-Language Pathologist  [place] #[id number]  [place] #[id number]

## 2024-10-28 NOTE — ASSESSMENT & PLAN NOTE
Patient at risk for delirium secondary to age, cognitive decline, poly pharmacy, hospitalization  Identify and treat underlying cause  Provide frequent redirection, reorientation, distraction techniques  Avoid deliriogenic medications such as tramadol, benzodiazepines, anticholinergics,  Benadryl  Treat pain, See geriatric pain protocol  Monitor for constipation and urinary retention  Encourage early and frequent moblization, OOB  Encourage Hydration/ Nutrition  Implement sleep hygiene, limit night time interuptions, group activities  Avoid physical restraints  Use chemical restraint only when other efforts have failed, recommend zyprexa 2.5mg IM q8h prn  Monitor Qtc, if greater than 500 do not use antipsychotic medication  If QTc greater than 460, monitor and replete and deficiency of  K and Mg, recheck EKG  EKG QTc: 451

## 2024-10-28 NOTE — ASSESSMENT & PLAN NOTE
Patient does have hearing impairment  Reported hard of hearing bilaterally, worse on L side s/p stroke  Does not wear hearing aids  Referral to Audiology previously made by PCP in August  Would recommend scheduling once discharged  Hearing impairment  correlated with depression, cognitive impairment, delirium and falls in the older adult  Speak clearly  Use sound amplifier  Speak face to face  Use clear dictation and enunciation of words

## 2024-10-28 NOTE — PLAN OF CARE
Recommendations:   Diet: regular diet and thin liquids - choose softer options   Meds: 1 at a time with liquid    Feeding assistance: tray set up   Frequent Oral care: 2-4x/day  Aspiration precautions and compensatory swallowing strategies: upright posture, only feed when fully alert, slow rate of feeding, small bites/sips, and alternating bites and sips  Other Recommendations/ considerations: SLP will continue to follow to ensure adequate management of oral diet and adjust as clinically indicated x1-3

## 2024-10-28 NOTE — ASSESSMENT & PLAN NOTE
"Noted in mid 80's while in the ED requiring 2 L NC   CT Chest was done \"Mild dependent and basilar probable atelectasis. Recommend correlation for superimposed aspiration given small amount of debris/secretion within the dependent trachea. Otherwise no focal airspace consolidation or pleural effusion. \"     Given lethargy / AMS due to medications as above - at risk for aspiration . Other differential include respiratory depression due to xanax and gabapentin however RR wnl / mildly tachypnic not decreased.   Switched Rocephin to Zosyn due to resistant bacteriuria  NC O2 titrate for goal > 90%   Diet only when fully alert  Speech recommending regular diet thin liquids   Incentive spirometer   "

## 2024-10-28 NOTE — ASSESSMENT & PLAN NOTE
Currently prescribed PTA: Xanax 2 mg four times daily, Gabapentin 300 mg TID, Keppra 750 mg Q12H, Seroquel 100 mg Q12H  Patient husbands states in rare instances, patient has severe panic attacks at night and will give extra dose of Xanax (so will get 10 mg in a day; however very rarely occurs)  Doses and medications not recommended in geriatric population due to side effects: confusion, somnolence, increased risk for falls, respiratory failure  All besides Keppra prescribed by Psychologist, Dr. Ward who she sees outpatient ~ every 3 months  Hx of depression, anxiety  Per Epic chart review appears to have been started on Seroquel and Xanax in ~ 2020/2021  Continue to wean off Xanax, Gabapentin  Currently during hospitalization: decreased Xanax to 1 mg TID PRN and has not received a dose per chart review  Continue to monitor for withdrawal symptoms/seizures since appears to have been taking Xanax at this dose and frequency since 2023  Previously prescribed 2 mg  four times daily PRN  Decreased Gabapentin to 100 mg TID scheduled, received all doses  Continuing previous dose of Seroquel and Keppra

## 2024-10-28 NOTE — PLAN OF CARE
Problem: PHYSICAL THERAPY ADULT  Goal: Performs mobility at highest level of function for planned discharge setting.  See evaluation for individualized goals.  Description: Treatment/Interventions: Functional transfer training, LE strengthening/ROM, Therapeutic exercise, Endurance training, Patient/family training, Equipment eval/education, Bed mobility, Gait training, Spoke to nursing, Elevations, OT          See flowsheet documentation for full assessment, interventions and recommendations.  10/28/2024 1248 by Alma Rosa Polo PT  Note: Prognosis: Good  Problem List: Decreased strength, Decreased endurance, Impaired balance, Decreased mobility, Pain  Assessment: Pt is 74 y.o. female seen for PT evaluation on 10/28/2024 s/p admit to [facility] on 10/27/2024 w/ Drug-induced encephalopathy. PT was consulted to assess pt's functional mobility and d/c needs. Order placed for PT eval and tx. PTA, pt resides with spouse in multi level home with 4 DAIANA, ambulates without AD. At time of eval, pt requiring min assist for all phases of mobility. Upon evaluation, pt presenting with impaired functional mobility d/t decreased strength, decreased endurance, impaired balance, decreased mobility, and activity intolerance. Pertinent PMHx and current co-morbidities affecting pt's physical performance at time of assessment include: anxiety disorder, tobacco dependence, acute respiratory failure with hypoxia, seizure disorder, brain mass, [place], ambulatory dysfunction, HTN, depression, urinary retention, gross hematuria, R thumb fracture. Personal factors affecting pt at time of eval include: inability to navigate community distances, limited home support, and positive fall history. The following objective measures performed on IE also reveal limitations: Barthel Index: 45/100, Modified [place]: 4 (moderate/severe disability), and AM-PAC 6-Clicks: 16/24. Pt's clinical presentation is currently unstable/unpredictable seen in pt's  presentation of abnormal lab value(s) and ongoing medical assessment. Overall, pt's rehab potential and prognosis to return to PLOF is good as impacted by objective findings, warranting pt to receive further skilled PT interventions to address identified impairments, activity limitation(s), and participation restriction(s). Pt to benefit from continued PT tx to address deficits as defined above and maximize level of functional independent mobility. From PT/mobility standpoint, recommend level 2, moderate resource intensity in order to facilitate return to PLOF.  Barriers to Discharge: Inaccessible home environment, Decreased caregiver support     Rehab Resource Intensity Level, PT: II (Moderate Resource Intensity)    See flowsheet documentation for full assessment.     10/28/2024 1248 by Alma Rosa Polo PT  Note: Prognosis: Good  Problem List: Decreased strength, Decreased endurance, Impaired balance, Decreased mobility, Pain  Assessment: Pt is 74 y.o. female seen for PT evaluation on 10/28/2024 s/p admit to [facility] 10/27/2024 w/ Drug-induced encephalopathy. PT was consulted to assess pt's functional mobility and d/c needs. Order placed for PT eval and tx. PTA, pt resides with spouse in multi level home with 4 DAIANA, ambulates without AD. At time of eval, pt requiring min assist for all phases of mobility. Upon evaluation, pt presenting with impaired functional mobility d/t decreased strength, decreased endurance, impaired balance, decreased mobility, and activity intolerance. Pertinent PMHx and current co-morbidities affecting pt's physical performance at time of assessment include: anxiety disorder, tobacco dependence, acute respiratory failure with hypoxia, seizure disorder, brain mass, [place], ambulatory dysfunction, HTN, depression, urinary retention, gross hematuria, R thumb fracture. Personal factors affecting pt at time of eval include: inability to navigate community distances, limited home support,  and positive fall history. The following objective measures performed on IE also reveal limitations: Barthel Index: 45/100, Modified [place]: 4 (moderate/severe disability), and AM-PAC 6-Clicks: 16/24. Pt's clinical presentation is currently unstable/unpredictable seen in pt's presentation of abnormal lab value(s) and ongoing medical assessment. Overall, pt's rehab potential and prognosis to return to PLOF is good as impacted by objective findings, warranting pt to receive further skilled PT interventions to address identified impairments, activity limitation(s), and participation restriction(s). Pt to benefit from continued PT tx to address deficits as defined above and maximize level of functional independent mobility. From PT/mobility standpoint, recommend level 2, moderate resource intensity in order to facilitate return to PLOF.  Barriers to Discharge: Inaccessible home environment, Decreased caregiver support     Rehab Resource Intensity Level, PT: II (Moderate Resource Intensity)    See flowsheet documentation for full assessment.

## 2024-10-28 NOTE — PLAN OF CARE
Problem: OCCUPATIONAL THERAPY ADULT  Goal: Performs self-care activities at highest level of function for planned discharge setting.  See evaluation for individualized goals.  Description: Treatment Interventions: ADL retraining, Functional transfer training, Endurance training, Patient/family training, Equipment evaluation/education, Compensatory technique education, Continued evaluation, Cardiac education, Energy conservation, Activityengagement          See flowsheet documentation for full assessment, interventions and recommendations.   Note: Limitation: Decreased ADL status, Decreased UE strength, Decreased endurance, Decreased self-care trans, Decreased high-level ADLs, Decreased Safe judgement during ADL  Prognosis: Good  Assessment: Pt is a 74 y.o. female seen for OT evaluation s/p admit to [facility] on 10/27/2024 w/ Drug-induced encephalopathy. Comorbidities affecting pt's functional performance at time of assessment include:Afib, anxiety, depression, HTN, amb dysfunction, centrilobular emphysema, [place], frailty syndrome in geriatric patient, and tobacco abuse . Orders placed for OT evaluation and treatment.  Performed at least two patient identifiers during session including name and wristband. Personal factors affecting pt at time of IE include:steps to enter environment, behavioral pattern, difficulty performing ADLS, difficulty performing IADLS , limited insight into deficits, decreased initiation and engagement , financial barriers, health management , and environment. Prior to admission, pt reports Ind with ADLs, Ind with IADLs, and (+) driving.  Upon evaluation: Pt requires min A with UB ADLs, mod A with LB ADLs, min A with xfers and min A with functional mobility 2* the following deficits impacting occupational performance: weakness, decreased dynamic sit/ stand balance, decreased activity tolerance, decreased standing tolerance time for self care and functional mobility, decreased safety  awareness, increased pain, impaired interpersonal skills, environmental deficits, decreased coping skills, decreased mobilty, and requiring external assistance to complete transitional movements. Pt to benefit from continued skilled OT tx while in the hospital to address deficits as defined above and maximize level of functional independence w ADL's and functional mobility. Occupational Performance areas to address include: bathing/shower, toilet hygiene, dressing, medication management, health maintenance, functional mobility, community mobility, clothing management, cleaning, meal prep, money management, and household maintenance. From OT standpoint, recommendation at time of d/c would be Level 2 (Mod Resource Intensity).     Rehab Resource Intensity Level, OT: II (Moderate Resource Intensity)

## 2024-10-28 NOTE — ASSESSMENT & PLAN NOTE
Clinical Frail Scale: 5- Mildly Frail  More evident slowing, needs help high order IADLs (transport, bills, medications)  Progressively impairs shopping and walking outside alone, meal prep and housework   Lives at home with   Has support from family; spouse and daughters  No longer drives d/t seizure history

## 2024-10-28 NOTE — PLAN OF CARE
Problem: Potential for Falls  Goal: Patient will remain free of falls  Description: INTERVENTIONS:  - Educate patient/family on patient safety including physical limitations  - Instruct patient to call for assistance with activity   - Consult OT/PT to assist with strengthening/mobility   - Keep Call bell within reach  - Keep bed low and locked with side rails adjusted as appropriate  - Keep care items and personal belongings within reach  - Initiate and maintain comfort rounds  - Make Fall Risk Sign visible to staff  - Offer Toileting every  Hours, in advance of need  - Initiate/Maintain alarm  - Obtain necessary fall risk management equipment:   - Apply yellow socks and bracelet for high fall risk patients  - Consider moving patient to room near nurses station  Outcome: Progressing     Problem: Prexisting or High Potential for Compromised Skin Integrity  Goal: Skin integrity is maintained or improved  Description: INTERVENTIONS:  - Identify patients at risk for skin breakdown  - Assess and monitor skin integrity  - Assess and monitor nutrition and hydration status  - Monitor labs   - Assess for incontinence   - Turn and reposition patient  - Assist with mobility/ambulation  - Relieve pressure over bony prominences  - Avoid friction and shearing  - Provide appropriate hygiene as needed including keeping skin clean and dry  - Evaluate need for skin moisturizer/barrier cream  - Collaborate with interdisciplinary team   - Patient/family teaching  - Consider wound care consult   Outcome: Progressing     Problem: PAIN - ADULT  Goal: Verbalizes/displays adequate comfort level or baseline comfort level  Description: Interventions:  - Encourage patient to monitor pain and request assistance  - Assess pain using appropriate pain scale  - Administer analgesics based on type and severity of pain and evaluate response  - Implement non-pharmacological measures as appropriate and evaluate response  - Consider cultural and  social influences on pain and pain management  - Notify physician/advanced practitioner if interventions unsuccessful or patient reports new pain  Outcome: Progressing     Problem: SAFETY ADULT  Goal: Patient will remain free of falls  Description: INTERVENTIONS:  - Educate patient/family on patient safety including physical limitations  - Instruct patient to call for assistance with activity   - Consult OT/PT to assist with strengthening/mobility   - Keep Call bell within reach  - Keep bed low and locked with side rails adjusted as appropriate  - Keep care items and personal belongings within reach  - Initiate and maintain comfort rounds  - Make Fall Risk Sign visible to staff  - Offer Toileting every  Hours, in advance of need  - Initiate/Maintain alarm  - Obtain necessary fall risk management equipment:   - Apply yellow socks and bracelet for high fall risk patients  - Consider moving patient to room near nurses station  Outcome: Progressing  Goal: Maintain or return to baseline ADL function  Description: INTERVENTIONS:  -  Assess patient's ability to carry out ADLs; assess patient's baseline for ADL function and identify physical deficits which impact ability to perform ADLs (bathing, care of mouth/teeth, toileting, grooming, dressing, etc.)  - Assess/evaluate cause of self-care deficits   - Assess range of motion  - Assess patient's mobility; develop plan if impaired  - Assess patient's need for assistive devices and provide as appropriate  - Encourage maximum independence but intervene and supervise when necessary  - Involve family in performance of ADLs  - Assess for home care needs following discharge   - Consider OT consult to assist with ADL evaluation and planning for discharge  - Provide patient education as appropriate  Outcome: Progressing  Goal: Maintains/Returns to pre admission functional level  Description: INTERVENTIONS:  - Perform AM-PAC 6 Click Basic Mobility/ Daily Activity assessment daily.  -  Set and communicate daily mobility goal to care team and patient/family/caregiver.   - Collaborate with rehabilitation services on mobility goals if consulted  - Perform Range of Motion  times a day.  - Reposition patient every  hours.  - Dangle patient  times a day  - Stand patient  times a day  - Ambulate patient  times a day  - Out of bed to chair  times a day   - Out of bed for meal times a day  - Out of bed for toileting  - Record patient progress and toleration of activity level   Outcome: Progressing     Problem: DISCHARGE PLANNING  Goal: Discharge to home or other facility with appropriate resources  Description: INTERVENTIONS:  - Identify barriers to discharge w/patient and caregiver  - Arrange for needed discharge resources and transportation as appropriate  - Identify discharge learning needs (meds, wound care, etc.)  - Arrange for interpretive services to assist at discharge as needed  - Refer to Case Management Department for coordinating discharge planning if the patient needs post-hospital services based on physician/advanced practitioner order or complex needs related to functional status, cognitive ability, or social support system  Outcome: Progressing     Problem: Knowledge Deficit  Goal: Patient/family/caregiver demonstrates understanding of disease process, treatment plan, medications, and discharge instructions  Description: Complete learning assessment and assess knowledge base.  Interventions:  - Provide teaching at level of understanding  - Provide teaching via preferred learning methods  Outcome: Progressing

## 2024-10-28 NOTE — ASSESSMENT & PLAN NOTE
UA 10/2 & again 10/25 positive for nitrite and leucocytes, patient was recently started in the outpatient setting on Bactrim 10/25 - present now with metabolic encephalopathy as above. Discontinue Bactrim. Will given Ceftriaxone x 4 doses. However of note, patient denies urinary symptoms - will treat give age , AMS and recent + UA, and also suspected pneumonia.      --Urine culture growing Providencia resistant strain  --Switch Zosyn to Ciprofloxacin because better TRESA

## 2024-10-28 NOTE — CASE MANAGEMENT
Case Management Assessment & Discharge Planning Note    Patient name Kirsty Em  Location 2 EAST 253/2 E 253-01 MRN [id number]  : 1950 Date 10/28/2024       Current Admission Date: 10/27/2024  Current Admission Diagnosis:Drug-induced encephalopathy   Patient Active Problem List    Diagnosis Date Noted Date Diagnosed    Frailty syndrome in geriatric patient 10/28/2024     Encounter for delirium elderly at risk (DEAR) screening 10/28/2024     Telida (hard of hearing) 10/28/2024     Ambulatory dysfunction 10/28/2024     Drug-induced encephalopathy 10/27/2024     Polypharmacy 10/27/2024     Acute cystitis without hematuria 10/27/2024     Closed displaced fracture of proximal phalanx of right thumb, initial encounter 2024     Brain mass 2024     Gross hematuria 2024     Diastolic dysfunction 2024     QT prolongation 2024     Urinary retention 2024     Depression 2024     Seizure disorder (HCC) 2024     Hypokalemia 2024     New onset a-fib (HCC) 2024     Hypertension 2024     Bilateral carotid artery stenosis 06/10/2024     Pre-diabetes 2024     History of stroke 2024     Elevated transaminase level 2024     Closed displaced fracture of proximal phalanx of right thumb 2024     Mixed hyperlipidemia 2024     Centrilobular emphysema (HCC) 2023     Acute respiratory failure with hypoxia (HCC) 2022     Anxiety disorder 2021     Tobacco dependence 2021       LOS (days): 1  Geometric Mean LOS (GMLOS) (days): 4.6  Days to GMLOS:3.1     OBJECTIVE:    Risk of Unplanned Readmission Score: 25      Current admission status: Inpatient     Preferred Pharmacy:   [facility]/pharm[facility] #[id number] - [place] - [address]  [address]  [place] [address]  Phone: [phone] Fax: [phone]    Primary Care Provider: Brien Desouza MD  Primary Insurance: MEDICARE  Secondary Insurance: BLUE  CROSS    ASSESSMENT:  Active Health Care Proxies       Wilver Em [facility] Care Representative - Spouse   Primary Phone: [phone] (Mobile)  Home Phone: [phone]                 Advance Directives  Does patient have a Health Care POA?: No  Was patient offered paperwork?: Yes (declined)  Does patient currently have a Health Care decision maker?: Yes, please see Health Care Proxy section  Does patient have Advance Directives?: No  Was patient offered paperwork?: Yes (declined)  Primary Contact: Wilver Em (spouse)  [phone] (H)   [phone] (M)    Readmission Root Cause  30 Day Readmission: No    Patient Information  Admitted from:: Home  Mental Status: Alert  During Assessment patient was accompanied by: Spouse  Assessment information provided by:: Patient, Spouse  Primary Caregiver: Self  Support Systems: Self, Spouse/significant other, Daughter  County of Residence: [place]  What city do you live in?: [place]  Home entry access options. Select all that apply.: Stairs  Number of steps to enter home.: 4  Type of Current Residence: 2 story home  Upon entering residence, is there a bedroom on the main floor (no further steps)?: No  A bedroom is located on the following floor levels of residence (select all that apply):: 2nd Floor  Upon entering residence, is there a bathroom on the main floor (no further steps)?: No  Indicate which floors of current residence have a bathroom (select all the apply):: 2nd Floor  Number of steps to 2nd floor from main floor: One Flight  Living Arrangements: Lives w/ Spouse/significant other  Is patient a ?: No    Activities of Daily Living Prior to Admission  Functional Status: Independent  Completes ADLs independently?: Yes  Ambulates independently?: Yes  Does patient use assisted devices?: No  Does patient currently own DME?: No  Does patient have a history of Outpatient Therapy (PT/OT)?: No  Does the patient have a history of Short-Term Rehab?: Yes  Does  patient have a history of HHC?: Yes ([facility])  Does patient currently have HHC?: No    Current Home Health Care  Home Health Agency Name:: [facility]    Patient Information Continued  Income Source: SSI/SSD  Does patient have prescription coverage?: Yes  Does patient receive dialysis treatments?: No  Does patient have a history of substance abuse?: No  Does patient have a history of Mental Health Diagnosis?: Yes (Depression, anxiety)  Is patient receiving treatment for mental health?: Yes (medication management for anxiety)  Has patient received inpatient treatment related to mental health in the last 2 years?: No    Means of Transportation  Means of Transport to Appts:: Drives Self        DISCHARGE DETAILS:    Discharge planning discussed with:: Patient and spouse at bedside.  Freedom of Choice: Yes  Comments - Freedom of Choice: FOC maintained - CM introduced self and role.  Reviewed Level II rec for STR vs HHC.  Patient preference is d/c home w/ HHC.  Patient has used [facility] previously, they are preferecne.  CM contacted family/caregiver?: Yes  Were Treatment Team discharge recommendations reviewed with patient/caregiver?: Yes  Did patient/caregiver verbalize understanding of patient care needs?: Yes  Were patient/caregiver advised of the risks associated with not following Treatment Team discharge recommendations?: Yes    Contacts  Patient Contacts: Wilver (spouse)  Relationship to Patient:: Family  Contact Method: In Person  Reason/Outcome: Continuity of Care, Discharge Planning, Referral    Requested Home Health Care         Is the patient interested in HHC at discharge?: Yes  Home Health Discipline requested:: Nursing, Occupational Therapy, Physical Therapy  Home Health Agency Name:: [facility]  A External Referral Reason (only applicable if external HHA name selected): Patient has established relationship with provider  Home Health Follow-Up Provider:: PCP (Brien Desouza MD)  Home Health  Services Needed:: Strengthening/Theraputic Exercises to Improve Function, Evaluate Functional Status and Safety, Gait/ADL Training  Homebound Criteria Met:: Requires the Assistance of Another Person for Safe Ambulation or to Leave the Home  Supporting Clincal Findings:: Limited Endurance    DME Referral Provided  Referral made for DME?: No    Other Referral/Resources/Interventions Provided:  Interventions: HHC  Referral Comments: [place] referral opened for [facility] w/ preference for [facility].    Treatment Team Recommendation: Short Term Rehab, Home with Home Health Care  Discharge Destination Plan:: Home with Home Health Care  Transport at Discharge : Family

## 2024-10-28 NOTE — PLAN OF CARE
Problem: Potential for Falls  Goal: Patient will remain free of falls  Description: INTERVENTIONS:  - Educate patient/family on patient safety including physical limitations  - Instruct patient to call for assistance with activity   - Consult OT/PT to assist with strengthening/mobility   - Keep Call bell within reach  - Keep bed low and locked with side rails adjusted as appropriate  - Keep care items and personal belongings within reach  - Initiate and maintain comfort rounds  - Make Fall Risk Sign visible to staff  - Offer Toileting every 2 Hours, in advance of need  - Initiate/Maintain bed alarm  - Obtain necessary fall risk management equipment:   - Apply yellow socks and bracelet for high fall risk patients  - Consider moving patient to room near nurses station  Outcome: Progressing     Problem: Prexisting or High Potential for Compromised Skin Integrity  Goal: Skin integrity is maintained or improved  Description: INTERVENTIONS:  - Identify patients at risk for skin breakdown  - Assess and monitor skin integrity  - Assess and monitor nutrition and hydration status  - Monitor labs   - Assess for incontinence   - Turn and reposition patient  - Assist with mobility/ambulation  - Relieve pressure over bony prominences  - Avoid friction and shearing  - Provide appropriate hygiene as needed including keeping skin clean and dry  - Evaluate need for skin moisturizer/barrier cream  - Collaborate with interdisciplinary team   - Patient/family teaching  - Consider wound care consult   Outcome: Progressing     Problem: PAIN - ADULT  Goal: Verbalizes/displays adequate comfort level or baseline comfort level  Description: Interventions:  - Encourage patient to monitor pain and request assistance  - Assess pain using appropriate pain scale  - Administer analgesics based on type and severity of pain and evaluate response  - Implement non-pharmacological measures as appropriate and evaluate response  - Consider cultural and  social influences on pain and pain management  - Notify physician/advanced practitioner if interventions unsuccessful or patient reports new pain  Outcome: Progressing     Problem: SAFETY ADULT  Goal: Patient will remain free of falls  Description: INTERVENTIONS:  - Educate patient/family on patient safety including physical limitations  - Instruct patient to call for assistance with activity   - Consult OT/PT to assist with strengthening/mobility   - Keep Call bell within reach  - Keep bed low and locked with side rails adjusted as appropriate  - Keep care items and personal belongings within reach  - Initiate and maintain comfort rounds  - Make Fall Risk Sign visible to staff  - Offer Toileting every 2 Hours, in advance of need  - Initiate/Maintain bed alarm  - Obtain necessary fall risk management equipment:   - Apply yellow socks and bracelet for high fall risk patients  - Consider moving patient to room near nurses station  Outcome: Progressing  Goal: Maintain or return to baseline ADL function  Description: INTERVENTIONS:  -  Assess patient's ability to carry out ADLs; assess patient's baseline for ADL function and identify physical deficits which impact ability to perform ADLs (bathing, care of mouth/teeth, toileting, grooming, dressing, etc.)  - Assess/evaluate cause of self-care deficits   - Assess range of motion  - Assess patient's mobility; develop plan if impaired  - Assess patient's need for assistive devices and provide as appropriate  - Encourage maximum independence but intervene and supervise when necessary  - Involve family in performance of ADLs  - Assess for home care needs following discharge   - Consider OT consult to assist with ADL evaluation and planning for discharge  - Provide patient education as appropriate  Outcome: Progressing  Goal: Maintains/Returns to pre admission functional level  Description: INTERVENTIONS:  - Perform AM-PAC 6 Click Basic Mobility/ Daily Activity assessment  daily.  - Set and communicate daily mobility goal to care team and patient/family/caregiver.   - Collaborate with rehabilitation services on mobility goals if consulted  - Perform Range of Motion 3 times a day.  - Reposition patient every 2 hours.  - Dangle patient 3 times a day  - Stand patient 3 times a day  - Ambulate patient 3 times a day  - Out of bed to chair 3 times a day   - Out of bed for meals 3 times a day  - Out of bed for toileting  - Record patient progress and toleration of activity level   Outcome: Progressing     Problem: DISCHARGE PLANNING  Goal: Discharge to home or other facility with appropriate resources  Description: INTERVENTIONS:  - Identify barriers to discharge w/patient and caregiver  - Arrange for needed discharge resources and transportation as appropriate  - Identify discharge learning needs (meds, wound care, etc.)  - Arrange for interpretive services to assist at discharge as needed  - Refer to Case Management Department for coordinating discharge planning if the patient needs post-hospital services based on physician/advanced practitioner order or complex needs related to functional status, cognitive ability, or social support system  Outcome: Progressing     Problem: Knowledge Deficit  Goal: Patient/family/caregiver demonstrates understanding of disease process, treatment plan, medications, and discharge instructions  Description: Complete learning assessment and assess knowledge base.  Interventions:  - Provide teaching at level of understanding  - Provide teaching via preferred learning methods  Outcome: Progressing

## 2024-10-28 NOTE — CONSULTS
Consultation - Geriatric Medicine   Name: Kirsty Em 74 y.o. female I MRN: [id number]  Unit/Bed#: 2 E 253-01 I Date of Admission: 10/27/2024   Date of Service: 10/28/2024 I Hospital Day: 1   Inpatient consult to Gerontology  Consult performed by: SINDY Mcdaniels  Consult ordered by: Bruce Mancini MD        Physician Requesting Evaluation: Bruce Mancini MD   Reason for Evaluation / Principal Problem: AMS, Polypharmacy    Assessment & Plan  Drug-induced encephalopathy  At a baseline is alert and oriented x  4   On exam patient is alert and oriented x 3, disoriented to situation  Alert to self, month/year, place  Both  and patient state have forgetfulness, memory loss but is worse in the setting of being very hard of hearing in left ear status post stroke in June  Does not wear hearing aids  Became confused/lethargic 10/26, came to hospital the early morning of 10/27  Seen at urgent care 10/25 for malodorous smelling urine, prescribed Bactrim for UTI  Per  started Friday, 10/25, one dose and received both doses on Saturday, 10/26  TSH 2021: 1.102  Vit B12 1/2024: 593  CT head 10/27/24: mild microangiopathic change  MRI 6/2024: acute infarct along posterior L frontal cortex; Small L frontal brain mass, meningioma; moderate chronic microangiopathic ischemic changes  Identify and treat reversible causes of confusion including infection, dehydration, electrolyte imbalance, anemia, hypoxia, urinary retention, constipation, pain, and sleep disturbance  At risk for delirium due to acute encephalopathy  Recommend delirium precautions  Maintain sleep-wake cycle, avoid nighttime interruptions  Provide adequate pain control  Avoid urinary retention and constipation  Provide frequent and early mobilization  Provide frequent redirection and reorientation as needed  Avoid medications that may worsen or precipitate delirium such as tramadol, benzodiazepines, anticholinergics, and  Benadryl  Redirect unwanted behaviors as first-line therapy, avoid physical restraints as able to  Continue to monitor  No formal cognitive screening per chart review, would recommend completing once more at baseline cognitively    Polypharmacy  Currently prescribed PTA: Xanax 2 mg four times daily, Gabapentin 300 mg TID, Keppra 750 mg Q12H, Seroquel 100 mg Q12H  Patient husbands states in rare instances, patient has severe panic attacks at night and will give extra dose of Xanax (so will get 10 mg in a day; however very rarely occurs)  Doses and medications not recommended in geriatric population due to side effects: confusion, somnolence, increased risk for falls, respiratory failure  All besides Keppra prescribed by Psychologist, Dr. Ward who she sees outpatient ~ every 3 months  Hx of depression, anxiety  Per Epic chart review appears to have been started on Seroquel and Xanax in ~ 2020/2021  Continue to wean off Xanax, Gabapentin  Currently during hospitalization: decreased Xanax to 1 mg TID PRN and has not received a dose per chart review  Continue to monitor for withdrawal symptoms/seizures since appears to have been taking Xanax at this dose and frequency since 2023  Previously prescribed 2 mg  four times daily PRN  Decreased Gabapentin to 100 mg TID scheduled, received all doses  Continuing previous dose of Seroquel and Keppra  Encounter for delirium elderly at risk (DEAR) screening  Patient at risk for delirium secondary to age, cognitive decline, poly pharmacy, hospitalization  Identify and treat underlying cause  Provide frequent redirection, reorientation, distraction techniques  Avoid deliriogenic medications such as tramadol, benzodiazepines, anticholinergics,  Benadryl  Treat pain, See geriatric pain protocol  Monitor for constipation and urinary retention  Encourage early and frequent moblization, OOB  Encourage Hydration/ Nutrition  Implement sleep hygiene, limit night time interuptions, group  activities  Avoid physical restraints  Use chemical restraint only when other efforts have failed, recommend zyprexa 2.5mg IM q8h prn  Monitor Qtc, if greater than 500 do not use antipsychotic medication  If QTc greater than 460, monitor and replete and deficiency of  K and Mg, recheck EKG  EKG QTc: 451  Frailty syndrome in geriatric patient  Clinical Frail Scale: 5- Mildly Frail  More evident slowing, needs help high order IADLs (transport, bills, medications)  Progressively impairs shopping and walking outside alone, meal prep and housework   Lives at home with   Has support from family; spouse and daughters  No longer drives d/t seizure history  [place] (hard of hearing)  Patient does have hearing impairment  Reported hard of hearing bilaterally, worse on L side s/p stroke  Does not wear hearing aids  Referral to Audiology previously made by PCP in August  Would recommend scheduling once discharged  Hearing impairment  correlated with depression, cognitive impairment, delirium and falls in the older adult  Speak clearly  Use sound amplifier  Speak face to face  Use clear dictation and enunciation of words  Anxiety disorder  Follows outpatient with Dr. Ward, psychiatry  Prescribed Xanax 2 mg four times daily, however as noted above, will take extra dose at night for panic attacks per , but very rare occurrence   Continue to follow up outpatient with Dr. Ward  Tobacco dependence  Continue to smoke daily  Nicotine patch currently ordered  Educate about cessation  Acute respiratory failure with hypoxia (HCC)  Upon arrival to ED, O2 saturation in mid 80s, requiring 2 L NC; lethargic  CT chest/abd/pelvis showing: mild atelectasis, correlation for superimposed aspiration given small amount of debris/secretion within dependent trachea  Aspiration precautions  Speech therapy consulted  Continue to wean off O2 as able to, with goal > 90%  History of stroke  Previously admitted in June 2024 with new onset Afib  and L posterior  frontal cortex acute to subacute infarct  Continue ASA, Eliquis and Keppra  Seizure disorder (HCC)  Status post stroke in June as listed above  No longer drives  Continue Keppra  Seizure precautions  Brain mass  Meningioma hx  Previously saw Neurosurgery 7/17/24, will have follow up in 6 months to repeat MRI  Acute cystitis without hematuria  As listed above in drug induced encephalopathy  UA 10/2, 10/25 positive for Leukocytes and Nitrites  Bactrim discontinued, give Ceftriaxone x 4 doses  Currently denies urinary symptoms at time of exam  Ambulatory dysfunction  At a baseline ambulates with cane but only when going outside of the house  Has had previous falls within the last 6 months, seen at hospital in June  PT/OT following  Fall precautions  Out of bed as tolerated  Encourage early and frequent mobilization  Encourage adequate hydration and nutrition  Provide adequate pain management   Goal is to STR  Continue with PT/OT for continued strength and balance training       History of Present Illness   Hx and PE limited by: N/a  HPI: Kirsty Em is a 74 y.o. year old female who presents with dizziness, hypoxia and slight hypotensive. Her O2 sat was in the mid 80s on RA and improved with NC in the ED. She was lethargic in the ED and it is noted that she has polypharmacy for her psych/seizure history. She takes Xanax, Seroquel and Keppra and recent took Bactrim for a UTI. She has a past medical history of anxiety, depression, afib, COPD, Stage 3 CKD, small L MCA stroke, carotid artery stenosis, hyperlipidemia, and brain mass. She is being evaluated today by Geriatrics.     She lives in a 2 story home with her spouse. The bedroom and bathroom are located on the 2nd floor with one flight of steps. She is independent with ADLs/needs assistance with IADLs which her  completes. She ambulates without assistive device in the home, but uses a cane outside with errands or appointments and has had a fall  within the last 6 months. She recently had rehab at [facility] form 6/18/24 - 7/18/24. She is hard of hearing and has L sided deafness status post stroke. She has no visual impairments. She wears upper dentures. She has support from spouse and daughters.     Upon exam today, she is laying in bed. She is calm and pleasant. Her  is also bedside. She is alert and oriented x 3, stating she does not remember a lot of what happened on Saturday night, what brought her to the hospital, but she states she was dizzy. She denies any pain currently and states that the dizziness is improving. She ate breakfast. She does not have any new complaints at this time.    Review of Systems   Constitutional:  Positive for activity change. Negative for chills and fever.   HENT:  Negative for congestion and sore throat.    Eyes:  Negative for pain and visual disturbance.   Respiratory:  Negative for cough, chest tightness and shortness of breath.    Cardiovascular:  Negative for chest pain, palpitations and leg swelling.   Gastrointestinal:  Negative for abdominal pain, constipation, diarrhea and nausea.   Genitourinary:  Negative for dysuria and hematuria.   Musculoskeletal:  Negative for arthralgias and back pain.   Skin:  Negative for color change and rash.   Neurological:  Positive for dizziness (improving). Negative for weakness.   Psychiatric/Behavioral:  Negative for confusion and sleep disturbance. The patient is not nervous/anxious.    All other systems reviewed and are negative.      Geriatric Conditions: Depression: history, Fraility: mild, Hearing impairment: [place] bilaterally, worse on L, Polypharmacy: Xanax, Seroquel, Keppra, iADL's:  independent, ADL's:  independent, Driving: no longer drives    I have reviewed the patient's PMH, PSH, Social History, Family History, Meds, and Allergies  Historical Information   Past Medical History:   Diagnosis Date    Anxiety     Depression     Depression, recurrent (HCC)  2021    Hallucinations 2021    History of seizure disorder 2021     Past Surgical History:   Procedure Laterality Date    ORIF FINGER FRACTURE Right 2024    Procedure: ORIF OF RIGHT THUMB;  Surgeon: Dagoberto Yates MD;  Location: [facility];  Service: Orthopedics     Social History     Tobacco Use    Smoking status: Every Day     Current packs/day: 0.00     Average packs/day: 0.5 packs/day for 54.3 years (27.2 ttl pk-yrs)     Types: Cigarettes     Start date:      Last attempt to quit: 2024     Years since quittin.4    Smokeless tobacco: Never   Vaping Use    Vaping status: Never Used   Substance and Sexual Activity    Alcohol use: Never    Drug use: Never    Sexual activity: Yes     Partners: Male     Birth control/protection: None     E-Cigarette/Vaping    E-Cigarette Use Never User      E-Cigarette/Vaping Substances    Nicotine No     THC No     CBD No     Flavoring No     Other No     Unknown No      Family History   Problem Relation Age of Onset    Diabetes Mother     Anxiety disorder Mother     Stroke Father     No Known Problems Sister     No Known Problems Sister     No Known Problems Sister     No Known Problems Daughter     No Known Problems Daughter     No Known Problems Daughter     No Known Problems Maternal Grandmother     No Known Problems Paternal Grandmother     No Known Problems Maternal Aunt     No Known Problems Maternal Aunt     No Known Problems Maternal Aunt     No Known Problems Maternal Aunt     No Known Problems Maternal Aunt     No Known Problems Maternal Aunt     No Known Problems Maternal Aunt     No Known Problems Maternal Aunt     No Known Problems Paternal Aunt     Breast cancer Neg Hx      Social History     Tobacco Use    Smoking status: Every Day     Current packs/day: 0.00     Average packs/day: 0.5 packs/day for 54.3 years (27.2 ttl pk-yrs)     Types: Cigarettes     Start date:      Last attempt to quit: 2024     Years since quitting:  0.4    Smokeless tobacco: Never   Vaping Use    Vaping status: Never Used   Substance and Sexual Activity    Alcohol use: Never    Drug use: Never    Sexual activity: Yes     Partners: Male     Birth control/protection: None       Current Facility-Administered Medications:     acetaminophen (TYLENOL) tablet 975 mg, Q8H PRN    ALPRAZolam (XANAX) tablet 1 mg, TID PRN    apixaban (ELIQUIS) tablet 5 mg, BID    aspirin chewable tablet 81 mg, Daily    cyanocobalamin (VITAMIN B-12) tablet 1,000 mcg, Daily    diltiazem (CARDIZEM CD) 24 hr capsule 120 mg, Daily    ezetimibe (ZETIA) tablet 10 mg, Daily    fluticasone (FLONASE) 50 mcg/act nasal spray 1 spray, Daily    gabapentin (NEURONTIN) capsule 100 mg, TID    Icosapent Ethyl CAPS 2 g, BID    levETIRAcetam (KEPPRA) tablet 750 mg, Q12H JERMAINE    lisinopril (ZESTRIL) tablet 10 mg, BID    nicotine (NICODERM CQ) 7 mg/24hr TD 24 hr patch 1 patch, Daily    [COMPLETED] piperacillin-tazobactam (ZOSYN) 4.5 g in sodium chloride 0.9 % 100 mL IV LOADING DOSE, Once, Last Rate: 4.5 g (10/27/24 5594) **FOLLOWED BY** piperacillin-tazobactam (ZOSYN) 4.5 g in sodium chloride 0.9 % 100 mL IVPB (EXTENDED INFUSION), Q8H, Last Rate: 4.5 g (10/28/24 0450)    QUEtiapine (SEROquel) tablet 100 mg, Q12H  Prior to Admission Medications   Prescriptions Last Dose Informant Patient Reported? Taking?   ALPRAZolam (XANAX) 2 MG tablet  Self No No   Sig: Take 1 tablet (2 mg total) by mouth 4 (four) times a day for 14 days   Icosapent Ethyl 1 g CAPS  Self No No   Sig: Take 2 capsules (2 g total) by mouth 2 (two) times a day   Multiple Vitamin (multivitamin) tablet  Self Yes No   Sig: Take 1 tablet by mouth daily   QUEtiapine (SEROquel) 100 mg tablet  Self No No   Sig: Take 1 tablet (100 mg total) by mouth every 12 (twelve) hours   acetaminophen (TYLENOL) 325 mg tablet  Self Yes No   Sig: Take 975 mg by mouth every 8 (eight) hours as needed for mild pain   apixaban (Eliquis) 5 mg  Self No No   Sig: Take 1 tablet  (5 mg total) by mouth 2 (two) times a day   aspirin 81 mg chewable tablet  Self No No   Sig: Chew 1 tablet (81 mg total) daily   diltiazem (CARDIZEM CD) 120 mg 24 hr capsule  Self No No   Sig: Take 1 capsule (120 mg total) by mouth daily   ezetimibe (ZETIA) 10 mg tablet  Self No No   Sig: TAKE 1 TABLET BY MOUTH EVERY DAY   gabapentin (NEURONTIN) 300 mg capsule  Self Yes No   Sig: Take 300 mg by mouth 3 (three) times a day   levETIRAcetam (KEPPRA) 750 mg tablet  Self No No   Sig: Take 1 tablet (750 mg total) by mouth every 12 (twelve) hours   lisinopril (ZESTRIL) 10 mg tablet  Self No No   Sig: Take 1 tablet (10 mg total) by mouth 2 (two) times a day   naloxone (NARCAN) 4 mg/0.1 mL nasal spray  Self No No   Sig: Administer 1 spray into a nostril. If no response after 2-3 minutes, give another dose in the other nostril using a new spray.   polyethylene glycol (MIRALAX) 17 g packet  Self Yes No   Sig: Take 17 g by mouth daily as needed   rosuvastatin (CRESTOR) 10 MG tablet   No No   Sig: Take 1 tablet (10 mg total) by mouth daily   sulfamethoxazole-trimethoprim (BACTRIM DS) 800-160 mg per tablet   No No   Sig: Take 1 tablet by mouth every 12 (twelve) hours for 7 days   vitamin B-12 (VITAMIN B-12) 1,000 mcg tablet  Self Yes No   Sig: Take by mouth daily      Facility-Administered Medications Last Administration Doses Remaining   cyanocobalamin injection 1,000 mcg 1/6/2023  3:41 PM         Lipitor [atorvastatin]    Meds/Allergies   Home medication review  Xanax 2 mg p.o. four times daily - 8/22, 30 day supply  Eliquis 5 mg p.o. BID - 10/16  Diltiazem 120 mg p.o. once daily - 9/14, 90 day supply  Ezetimibe 10 mg p.o. once daily - 8/30, 90 day supply   Gabapentin 300 mg p.o. TID - 8/11, 90 day supply   Icosapent 2 g p.o. BID - [facility] prescription  Keppra 750 mg p.o. Q12H - 9/19, 90 day supply   Seroquel 100 mg p.o. Q12H - 9/4, 90 day supply   Rosuvastatin 10 mg p.o. once daily 10/20, 30 day supply    Lisinopril 10 mg  p.o. BID - started at [facility], does not appear to still be taking, confirmed with [facility]  Personally confirmed with [facility] [phone]    Icosapent 2 g p.o. BID - mail order with [facility] Prescription mail, 9/14, 90 day supply   Patient's  brought in prescription last night to be profiled by Pharmacy, currently receiving    Not confirmed with Pharmacy due to OTC:  ASA 81 mg p.o. once daily, Vit B12 1000 mcg p.o once daily, MultiVitamin p.o. once daily, Tylenol 975 mg    Objective :  Temp:  [97.4 °F (36.3 °C)-100.2 °F (37.9 °C)] 100.2 °F (37.9 °C)  HR:  [91-97] 96  BP: (104-121)/(47-68) 104/50  Resp:  [18] 18  SpO2:  [84 %-99 %] 91 %  O2 Device: Nasal cannula  Nasal Cannula O2 Flow Rate (L/min):  [1 L/min-2 L/min] 1 L/min    Physical Exam  Vitals and nursing note reviewed.   Constitutional:       Appearance: Normal appearance. She is normal weight.   HENT:      Head: Normocephalic.      Mouth/Throat:      Mouth: Mucous membranes are moist.      Pharynx: Oropharynx is clear.   Eyes:      Conjunctiva/sclera: Conjunctivae normal.   Cardiovascular:      Rate and Rhythm: Normal rate and regular rhythm.      Pulses: Normal pulses.      Heart sounds: Normal heart sounds.   Pulmonary:      Effort: Pulmonary effort is normal. No respiratory distress.      Breath sounds: Normal breath sounds.      Comments: 1L NC  Abdominal:      General: Abdomen is flat. Bowel sounds are normal.      Palpations: Abdomen is soft.   Musculoskeletal:         General: Normal range of motion.      Cervical back: Normal range of motion.      Right lower leg: No edema.      Left lower leg: No edema.   Skin:     General: Skin is warm and dry.      Capillary Refill: Capillary refill takes less than 2 seconds.   Neurological:      Mental Status: She is alert and oriented to person, place, and time.      Gait: Gait abnormal (use of AD at this time).   Psychiatric:         Mood and Affect: Mood normal.         Behavior: Behavior normal.  "          Lab Results: I have reviewed the following results:CBC/BMP:   .     10/28/24  0643   WBC 8.07   HGB 11.4*   HCT 34.8      SODIUM 139   K 3.7      CO2 30   BUN 12   CREATININE 0.68   GLUC 110    , LFTs:   .     10/28/24  0643   AST 20   ALT 27   ALB 4.0   TBILI 0.37   ALKPHOS 69    , PTT/INR:No new results in last 24 hours. , Vitamins B1, B6, B12, A, and D: No results found for: \"B1\", \"THYROGLB\", \"RGVAZVAC27\", \"[id number]\", TSH:   , Blood Culture: No results found for: \"BLOODCX\", Urinalysis: No results found for: \"COLORU\", \"CLARITYU\", \"SPECGRAV\", \"PHUR\", \"LEUKOCYTESUR\", \"NITRITE\", \"PROTEINUA\", \"GLUCOSEU\", \"KETONESU\", \"BILIRUBINUR\", \"BLOODU\"    Imaging Results Review: I personally reviewed the following image studies in PACS and associated radiology reports: CT head/neck and CT chest, abd/pelvis. My interpretation of the radiology images/reports is: no acute abnormality in CT head/neck; slightly progressed high grade stenosis at R ICA secondary to plaque; severe stenosis in L ICA. CT chest/abd/pelv: mild atelectasis possible aspiration, hepatomegaly, hepatic steatosis, endometrial thickening.  Other Study Results Review: EKG was reviewed.     Therapies:   Basic Mobility Inpatient Raw Score: 19  -[facility] Goal: 6: Walk 10 steps or more  -[facility] Achieved: 6: Walk 10 steps or more  PT: consulted  OT: consulted  ST: consulted    VTE Prophylaxis: VTE covered by:  apixaban, Oral, 5 mg at 10/28/24 0905     and Sequential compression device (Venodyne)     Code Status: Level 1 - Full Code      Family and Social Support:   Living Arrangements: Lives w/ Spouse/significant other; Lives w/ Children  Support Systems: Self; Spouse/significant other; Daughter  Assistance Needed: na  Type of Current Residence: Private residence  Current Home Care Services: No      Goals of Care: STR      "

## 2024-10-28 NOTE — PROGRESS NOTES
Progress Note - Hospitalist   Name: Kirsty Em 74 y.o. female I MRN: [id number]  Unit/Bed#: 2 E 253-01 I Date of Admission: 10/27/2024   Date of Service: 10/28/2024 I Hospital Day: 1    Assessment & Plan  Drug-induced encephalopathy    PDMP reviewed, patient lately with monthly prescription for alprazolam 2 mg tablets X 120 QTY, also had oxycodone 5 mg of which she filled 28 tablets on 7/12/2024 then got 7 more tablets on 7/19/2024.  Additionally she also on Seroquel and Keppra and recently took Bactrim for UTI.  All of the above medications for her age population are high risk for confusion/metabolic encephalopathy.  Patient's presentation is very likely due to polypharmacy +/- infection (UTI/ PNA)    - UA unremarkable in the ED (see UTI), no fever, no SIRS criteria.  UDS positive for benzodiazepines.   - CT head was done in the ED , no acute abnormality   - Blood Culture in process.   - CT chest suspicious for possible aspiration but no airspace consolidation     -Continue Xanax 1mg TID prn  - Continue Gabapentin 100 TID.   - Continue Seroquel 100mg bid  - Continue Keppra 750mg bid  - Watch for any respiratory distress (if any will give Narcan) and watch for any seizure symptoms.   - Treat UTI with Zosyn  Anxiety disorder  see drug-induced encephalopathy assessment and plan  Tobacco dependence  Patient smokes about 4 cigarettes a day with emphysema on CT scan   Nicotine patch ordered  History of stroke  In June 2024 had new onset A-fib and evidence of posterior left frontal cortex infarct complicated with seizure event    Continue daily aspirin 81 mg, also on Eliquis 5 twice daily and on Keppra 750 mg twice daily  Seizure disorder (HCC)  Due to stroke, see above.continue Keppra.  Brain mass  History of meningioma, follows up with neurosurgery and has a scheduled MRI brain on 12/12/2024  Polypharmacy  See drug-induced encephalopathy.  74 years old on gabapentin, Xanax on top of Keppra and Seroquel and 10/25  "started Bactrim for suspected UTI.   Acute cystitis without hematuria  UA 10/2 & again 10/25 positive for nitrite and leucocytes, patient was recently started in the outpatient setting on Bactrim 10/25 - present now with metabolic encephalopathy as above. Discontinue Bactrim. Will given Ceftriaxone x 4 doses. However of note, patient denies urinary symptoms - will treat give age , AMS and recent + UA, and also suspected pneumonia.      --Urine culture growing Providencia resistant strain  --Switch Zosyn to Ciprofloxacin because better TRESA  Acute respiratory failure with hypoxia (HCC)  Noted in mid 80's while in the ED requiring 2 L NC   CT Chest was done \"Mild dependent and basilar probable atelectasis. Recommend correlation for superimposed aspiration given small amount of debris/secretion within the dependent trachea. Otherwise no focal airspace consolidation or pleural effusion. \"     Given lethargy / AMS due to medications as above - at risk for aspiration . Other differential include respiratory depression due to xanax and gabapentin however RR wnl / mildly tachypnic not decreased.   Switched Rocephin to Zosyn due to resistant bacteriuria  NC O2 titrate for goal > 90%   Diet only when fully alert  Speech recommending regular diet thin liquids   Incentive spirometer   Frailty syndrome in geriatric patient  Lives at home with   Encounter for delirium elderly at risk (DEAR) screening  Delirium precautions  [place] (hard of hearing)    Ambulatory dysfunction      VTE Pharmacologic Prophylaxis: VTE Score: 2 High Risk (Score >/= 5) - Pharmacological DVT Prophylaxis Ordered: apixaban (Eliquis). Sequential Compression Devices Ordered.    Mobility:   Basic Mobility Inpatient Raw Score: 16  JH-HLM Goal: 5: Stand one or more mins  JH-HLM Achieved: 7: Walk 25 feet or more  JH-HLM Goal achieved. Continue to encourage appropriate mobility.    Patient Centered Rounds: I performed bedside rounds with nursing staff today. "   Discussions with Specialists or Other Care Team Provider: Geriatrics    Education and Discussions with Family / Patient: Updated  () at bedside.    Current Length of Stay: 1 day(s)  Current Patient Status: Inpatient   Certification Statement: The patient will continue to require additional inpatient hospital stay due to polypharmacy  Discharge Plan: Anticipate discharge in 24-48 hrs to home.    Code Status: Level 1 - Full Code    Subjective   Patient seen and evaluated by me at the bedside. She is alert and oriented x3. She is almost back to her baseline according to her  who's at the bedside. She is currently denying any complaints. She states that she hasn't been coughing, having SOB, chest pain. No UTI symptoms. She denies any other complaints at this time.    Objective :  Temp:  [97.4 °F (36.3 °C)-100.2 °F (37.9 °C)] 100.2 °F (37.9 °C)  HR:  [87-97] 87  BP: (104-121)/(47-68) 108/61  Resp:  [18] 18  SpO2:  [84 %-99 %] 93 %  O2 Device: Nasal cannula  Nasal Cannula O2 Flow Rate (L/min):  [1 L/min-2 L/min] 1 L/min    Body mass index is 24.56 kg/m².     Input and Output Summary (last 24 hours):     Intake/Output Summary (Last 24 hours) at 10/28/2024 1326  Last data filed at 10/28/2024 0900  Gross per 24 hour   Intake 240 ml   Output --   Net 240 ml       Physical Exam  Vitals and nursing note reviewed.   Constitutional:       Appearance: Normal appearance. She is normal weight.   HENT:      Head: Normocephalic.      Mouth/Throat:      Mouth: Mucous membranes are moist.      Pharynx: Oropharynx is clear.   Eyes:      Conjunctiva/sclera: Conjunctivae normal.   Cardiovascular:      Rate and Rhythm: Normal rate and regular rhythm.      Pulses: Normal pulses.      Heart sounds: Normal heart sounds. No murmur heard.     No friction rub. No gallop.   Pulmonary:      Effort: Pulmonary effort is normal. No respiratory distress.      Breath sounds: Normal breath sounds. No wheezing or rales.       Comments: 1L NC  Abdominal:      General: Abdomen is flat. Bowel sounds are normal. There is no distension.      Palpations: Abdomen is soft.      Tenderness: There is no abdominal tenderness. There is no guarding.   Musculoskeletal:         General: Normal range of motion.      Cervical back: Normal range of motion.      Right lower leg: No edema.      Left lower leg: No edema.   Skin:     General: Skin is warm and dry.      Capillary Refill: Capillary refill takes less than 2 seconds.   Neurological:      Mental Status: She is alert and oriented to person, place, and time.   Psychiatric:         Mood and Affect: Mood normal.         Behavior: Behavior normal.           Lines/Drains:              Lab Results: I have reviewed the following results:   Results from last 7 days   Lab Units 10/28/24  0643   WBC Thousand/uL 8.07   HEMOGLOBIN g/dL 11.4*   HEMATOCRIT % 34.8   PLATELETS Thousands/uL 293   SEGS PCT % 57   LYMPHO PCT % 34   MONO PCT % 6   EOS PCT % 2     Results from last 7 days   Lab Units 10/28/24  0643   SODIUM mmol/L 139   POTASSIUM mmol/L 3.7   CHLORIDE mmol/L 103   CO2 mmol/L 30   BUN mg/dL 12   CREATININE mg/dL 0.68   ANION GAP mmol/L 6   CALCIUM mg/dL 9.2   ALBUMIN g/dL 4.0   TOTAL BILIRUBIN mg/dL 0.37   ALK PHOS U/L 69   ALT U/L 27   AST U/L 20   GLUCOSE RANDOM mg/dL 110     Results from last 7 days   Lab Units 10/27/24  0146   INR  1.21*             Results from last 7 days   Lab Units 10/27/24  0146   LACTIC ACID mmol/L 1.0       Recent Cultures (last 7 days):   Results from last 7 days   Lab Units 10/27/24  0218 10/27/24  0146 10/25/24  1525   BLOOD CULTURE  Received in Microbiology Lab. Culture in Progress. Received in Microbiology Lab. Culture in Progress.  --    URINE CULTURE   --   --  >100,000 cfu/ml Providencia rettgeri*       Imaging Results Review: No pertinent imaging studies reviewed.  Other Study Results Review: No additional pertinent studies reviewed.    Last 24 Hours Medication List:      Current Facility-Administered Medications:     acetaminophen (TYLENOL) tablet 975 mg, Q8H PRN    ALPRAZolam (XANAX) tablet 1 mg, TID PRN    apixaban (ELIQUIS) tablet 5 mg, BID    aspirin chewable tablet 81 mg, Daily    cyanocobalamin (VITAMIN B-12) tablet 1,000 mcg, Daily    diltiazem (CARDIZEM CD) 24 hr capsule 120 mg, Daily    ezetimibe (ZETIA) tablet 10 mg, Daily    fluticasone (FLONASE) 50 mcg/act nasal spray 1 spray, Daily    gabapentin (NEURONTIN) capsule 100 mg, TID    Icosapent Ethyl CAPS 2 g, BID    levETIRAcetam (KEPPRA) tablet 750 mg, Q12H JERMAINE    lisinopril (ZESTRIL) tablet 10 mg, BID    nicotine (NICODERM CQ) 7 mg/24hr TD 24 hr patch 1 patch, Daily    [COMPLETED] piperacillin-tazobactam (ZOSYN) 4.5 g in sodium chloride 0.9 % 100 mL IV LOADING DOSE, Once, Last Rate: 4.5 g (10/27/24 8574) **FOLLOWED BY** piperacillin-tazobactam (ZOSYN) 4.5 g in sodium chloride 0.9 % 100 mL IVPB (EXTENDED INFUSION), Q8H, Last Rate: 4.5 g (10/28/24 1321)    QUEtiapine (SEROquel) tablet 100 mg, Q12H    Administrative Statements   Today, Patient Was Seen By: Jules Morocho MD  I have spent a total time of 30 minutes in caring for this patient on the day of the visit/encounter including Diagnostic results.    **Please Note: This note may have been constructed using a voice recognition system.**

## 2024-10-28 NOTE — ASSESSMENT & PLAN NOTE
At a baseline is alert and oriented x  4   On exam patient is alert and oriented x 3, disoriented to situation  Alert to self, month/year, place  Both  and patient state have forgetfulness, memory loss but is worse in the setting of being very hard of hearing in left ear status post stroke in June  Does not wear hearing aids  Became confused/lethargic 10/26, came to hospital the early morning of 10/27  Seen at urgent care 10/25 for malodorous smelling urine, prescribed Bactrim for UTI  Per  started Friday, 10/25, one dose and received both doses on Saturday, 10/26  TSH 2021: 1.102  Vit B12 1/2024: 593  CT head 10/27/24: mild microangiopathic change  MRI 6/2024: acute infarct along posterior L frontal cortex; Small L frontal brain mass, meningioma; moderate chronic microangiopathic ischemic changes  Identify and treat reversible causes of confusion including infection, dehydration, electrolyte imbalance, anemia, hypoxia, urinary retention, constipation, pain, and sleep disturbance  At risk for delirium due to acute encephalopathy  Recommend delirium precautions  Maintain sleep-wake cycle, avoid nighttime interruptions  Provide adequate pain control  Avoid urinary retention and constipation  Provide frequent and early mobilization  Provide frequent redirection and reorientation as needed  Avoid medications that may worsen or precipitate delirium such as tramadol, benzodiazepines, anticholinergics, and Benadryl  Redirect unwanted behaviors as first-line therapy, avoid physical restraints as able to  Continue to monitor  No formal cognitive screening per chart review, would recommend completing once more at baseline cognitively

## 2024-10-28 NOTE — OCCUPATIONAL THERAPY NOTE
"Occupational Therapy Evaluation      Kirsty Em    10/28/2024    Principal Problem:    Drug-induced encephalopathy  Active Problems:    Anxiety disorder    Tobacco dependence    Acute respiratory failure with hypoxia (HCC)    History of stroke    Seizure disorder (HCC)    Brain mass    Polypharmacy    Acute cystitis without hematuria    Frailty syndrome in geriatric patient    Encounter for delirium elderly at risk (DEAR) screening    [place] (hard of hearing)    Ambulatory dysfunction      Past Medical History:   Diagnosis Date    Anxiety     Depression     Depression, recurrent (HCC) 04/20/2021    Hallucinations 04/20/2021    History of seizure disorder 04/20/2021       Past Surgical History:   Procedure Laterality Date    ORIF FINGER FRACTURE Right 6/27/2024    Procedure: ORIF OF RIGHT THUMB;  Surgeon: Dagoberto Yates MD;  Location: [facility];  Service: Orthopedics       10/28/24 0937   OT Last Visit   OT Visit Date 10/28/24   Note Type   Note type Evaluation   Pain Assessment   Pain Assessment Tool 0-10   Pain Score No Pain   Restrictions/Precautions   Weight Bearing Precautions Per Order No   Other Precautions Chair Alarm;Bed Alarm;Fall Risk;O2   Home Living   Type of Home House   Home Layout Multi-level;Bed/bath upstairs;Performs ADLs on one level;Stairs to enter with rails  (4 DAIANA)   Bathroom Shower/Tub Tub only  (pt reports she sits down in \"jacuzzi\" the tub)   Bathroom Toilet Standard   Bathroom Equipment   (no DME at baseline)   Bathroom Accessibility Accessible   Home Equipment   (no DME at baseline)   Prior Function   Level of [place] Independent with ADLs;Independent with functional mobility;Independent with IADLS   Lives With Spouse   Receives Help From Family  (recent OPPT per pt)   IADLs Independent with driving;Independent with meal prep;Independent with medication management  (cleaning and laundry with A)   Falls in the last 6 months 0   Vocational Retired   Lifestyle   Autonomy Pt reports " PLOF was Ind with ADLs, shares IADLs, and (+) driving   Reciprocal Relationships spouse   ADL   Eating Assistance 5  Supervision/Setup   Eating Deficit Increased time to complete;Supervision/safety;Setup   Grooming Assistance 5  Supervision/Setup   Grooming Deficit Increased time to complete;Supervision/safety;Setup   UB Bathing Assistance 4  Minimal Assistance   UB Bathing Deficit Increased time to complete;Supervision/safety;Verbal cueing;Steadying;Setup   LB Bathing Assistance 3  Moderate Assistance   LB Bathing Deficit Increased time to complete;Supervision/safety;Verbal cueing;Steadying;Setup   UB Dressing Assistance 4  Minimal Assistance   UB Dressing Deficit Increased time to complete;Supervision/safety;Verbal cueing;Steadying;Setup   LB Dressing Assistance 3  Moderate Assistance   LB Dressing Deficit Increased time to complete;Supervision/safety;Verbal cueing;Steadying;Setup   Toileting Assistance  4  Minimal Assistance   Toileting Deficit Supervison/safety;Increased time to complete;Verbal cueing;Steadying;Setup   Functional Assistance 3  Moderate Assistance   Functional Deficit Increased time to complete;Supervision/safety;Verbal cueing;Steadying;Setup   Bed Mobility   Supine to Sit 4  Minimal assistance   Additional items Assist x 1;Increased time required;Verbal cues;LE management;Bedrails   Transfers   Sit to Stand 4  Minimal assistance   Additional items Assist x 1;Increased time required;Verbal cues;Bedrails   Stand to Sit 4  Minimal assistance   Additional items Assist x 1;Increased time required;Verbal cues;Bedrails   Functional Mobility   Functional Mobility 4  Minimal assistance   Balance   Static Sitting Good   Dynamic Sitting Fair +   Static Standing Fair -   Dynamic Standing Fair -   Ambulatory Fair -   Activity Tolerance   Activity Tolerance Patient tolerated treatment well   Medical Staff Made Aware Co-evaluation performed with QU[address] secondary to complex medical condition of patient,  "possible A of 2 required to achieve and maintain transitional movements, and pt's regression of functional status from baseline. PT/OT goals were addressed separately.   RUE Assessment   RUE Assessment X  (ROM WFL/ MMT +3/5)   LUE Assessment   LUE Assessment   (ROM WFL/ MMT +3/5)   Hand Function   Gross Motor Coordination Functional   Fine Motor Coordination Functional   Sensation   Light Touch No apparent deficits   Sharp/Dull No apparent deficits   Stereognosis No apparent deficits   Proprioception   Proprioception No apparent deficits   Vision-Basic Assessment   Current Vision No visual deficits   Vision - Complex Assessment   Ocular Range of Motion Intact   Psychosocial   Psychosocial (WDL) WDL   Perception   Inattention/Neglect Appears intact   Cognition   Overall Cognitive Status   (questionable - needed)   Arousal/Participation Alert;Cooperative   Attention Attends with cues to redirect   Orientation Level Oriented to person;Oriented to place;Disoriented to time;Disoriented to situation  (oriented to month, \"2025\")   Memory Decreased short term memory;Decreased recall of recent events;Decreased recall of precautions   Following Commands Follows multistep commands with increased time or repetition   Comments Pt was agreeable to OT eval   Assessment   Limitation Decreased ADL status;Decreased UE strength;Decreased endurance;Decreased self-care trans;Decreased high-level ADLs;Decreased Safe judgement during ADL   Prognosis Good   Assessment Pt is a 74 y.o. female seen for OT evaluation s/p admit to [facility] on 10/27/2024 w/ Drug-induced encephalopathy. Comorbidities affecting pt's functional performance at time of assessment include:Afib, anxiety, depression, HTN, amb dysfunction, centrilobular emphysema, [place], frailty syndrome in geriatric patient, and tobacco abuse . Orders placed for OT evaluation and treatment.  Performed at least two patient identifiers during session including name and wristband. " Personal factors affecting pt at time of IE include:steps to enter environment, behavioral pattern, difficulty performing ADLS, difficulty performing IADLS , limited insight into deficits, decreased initiation and engagement , financial barriers, health management , and environment. Prior to admission, pt reports Ind with ADLs, Ind with IADLs, and (+) driving.  Upon evaluation: Pt requires min A with UB ADLs, mod A with LB ADLs, min A with xfers and min A with functional mobility 2* the following deficits impacting occupational performance: weakness, decreased dynamic sit/ stand balance, decreased activity tolerance, decreased standing tolerance time for self care and functional mobility, decreased safety awareness, increased pain, impaired interpersonal skills, environmental deficits, decreased coping skills, decreased mobilty, and requiring external assistance to complete transitional movements. Pt to benefit from continued skilled OT tx while in the hospital to address deficits as defined above and maximize level of functional independence w ADL's and functional mobility. Occupational Performance areas to address include: bathing/shower, toilet hygiene, dressing, medication management, health maintenance, functional mobility, community mobility, clothing management, cleaning, meal prep, money management, and household maintenance. From OT standpoint, recommendation at time of d/c would be Level 2 (Mod Resource Intensity).   Plan   Treatment Interventions ADL retraining;Functional transfer training;Endurance training;Patient/family training;Equipment evaluation/education;Compensatory technique education;Continued evaluation;Cardiac education;Energy conservation;Activityengagement   Goal Expiration Date 11/10/24   OT Frequency 3-5x/wk   Discharge Recommendation   Rehab Resource Intensity Level, OT II (Moderate Resource Intensity)   AM-PAC Daily Activity Inpatient   Lower Body Dressing 3   Bathing 3   Toileting 3    Upper Body Dressing 3   Grooming 4   Eating 4   Daily Activity Raw Score 20   Daily Activity Standardized Score (Calc for Raw Score >=11) 42.03   AM-PAC Applied Cognition Inpatient   Following a Speech/Presentation 3   Understanding Ordinary Conversation 4   Taking Medications 4   Remembering Where Things Are Placed or Put Away 4   Remembering List of 4-5 Errands 3   Taking Care of Complicated Tasks 3   Applied Cognition Raw Score 21   Applied Cognition Standardized Score 44.3     Occupational therapy Goals: In 5-7 days    Patient will verbalize and demonstrate use of energy conservation/ deep breathing technique and work simplification skills during functional activity with no verbal cues.    Patient will verbalize and demonstrate good body mechanics and joint protection techniques during ADLs/ IADLs with no verbal cues     Patient will increase OOB/ sitting tolerance to 4-6 hours per day for increased participation in self care and leisure tasks with no s/s of exertion.    Patient will identify s/s of exertion during ADL and functional mobility with no verbal cues.     Patient will verbalize/ demonstrate compensatory strategies to recover from exertion with no verbal cues.     Patient will increase standing tolerance time to 13-15 minutes with No UE support to complete sink level ADLs at modified independent level.    Patient will increase sitting tolerance at edge of bed to 30-45 minutes to complete UB ADLs at modified independent level.     Patient will demonstrate ability to safely perform LB ADLS with MI with long handled adaptive dressing equipment.    Patient/ Family will demonstrate competency with UE Home Exercise Program.       Barbara Bryan MS OTR/L

## 2024-10-28 NOTE — ASSESSMENT & PLAN NOTE
At a baseline ambulates with cane but only when going outside of the house  Has had previous falls within the last 6 months, seen at hospital in June  PT/OT following  Fall precautions  Out of bed as tolerated  Encourage early and frequent mobilization  Encourage adequate hydration and nutrition  Provide adequate pain management   Goal is to STR  Continue with PT/OT for continued strength and balance training

## 2024-10-28 NOTE — ASSESSMENT & PLAN NOTE
Previously admitted in June 2024 with new onset Afib and L posterior  frontal cortex acute to subacute infarct  Continue ASA, Eliquis and Keppra

## 2024-10-28 NOTE — ASSESSMENT & PLAN NOTE
As listed above in drug induced encephalopathy  UA 10/2, 10/25 positive for Leukocytes and Nitrites  Bactrim discontinued, give Ceftriaxone x 4 doses  Currently denies urinary symptoms at time of exam

## 2024-10-28 NOTE — ASSESSMENT & PLAN NOTE
Follows outpatient with Dr. Ward, psychiatry  Prescribed Xanax 2 mg four times daily, however as noted above, will take extra dose at night for panic attacks per , but very rare occurrence   Continue to follow up outpatient with Dr. Ward

## 2024-10-28 NOTE — PHYSICAL THERAPY NOTE
Physical Therapy Evaluation     Patient's Name: Kirsty Em    Admitting Diagnosis  Dizziness [R42]  Polypharmacy [Z79.899]  Hypoxia [R09.02]  Generalized weakness [R53.1]  AMS (altered mental status) [R41.82]    Problem List  Patient Active Problem List   Diagnosis    Anxiety disorder    Tobacco dependence    Acute respiratory failure with hypoxia (HCC)    Centrilobular emphysema (HCC)    Mixed hyperlipidemia    Elevated transaminase level    Closed displaced fracture of proximal phalanx of right thumb    History of stroke    Pre-diabetes    Bilateral carotid artery stenosis    Hypertension    New onset a-fib (HCC)    Hypokalemia    Depression    Seizure disorder (HCC)    Diastolic dysfunction    QT prolongation    Urinary retention    Gross hematuria    Brain mass    Closed displaced fracture of proximal phalanx of right thumb, initial encounter    Drug-induced encephalopathy    Polypharmacy    Acute cystitis without hematuria    Frailty syndrome in geriatric patient    Encounter for delirium elderly at risk (DEAR) screening    [place] (hard of hearing)    Ambulatory dysfunction       Past Medical History  Past Medical History:   Diagnosis Date    Anxiety     Depression     Depression, recurrent (HCC) 04/20/2021    Hallucinations 04/20/2021    History of seizure disorder 04/20/2021       Past Surgical History  Past Surgical History:   Procedure Laterality Date    ORIF FINGER FRACTURE Right 6/27/2024    Procedure: ORIF OF RIGHT THUMB;  Surgeon: Dagoberto Yates MD;  Location: [facility];  Service: Orthopedics        10/28/24 0938   PT Last Visit   PT Visit Date 10/28/24   Note Type   Note type Evaluation   Pain Assessment   Pain Assessment Tool 0-10   Pain Score No Pain   Restrictions/Precautions   Weight Bearing Precautions Per Order No   Other Precautions Chair Alarm;Bed Alarm;Fall Risk;O2   Home Living   Type of Home House   Home Layout Multi-level;Bed/bath upstairs;Performs ADLs on one level;Stairs to enter with  "rails  (4 DAIANA)   Bathroom Shower/Tub Tub/shower unit  (pt reports she sits down in the tub)   Bathroom Toilet Standard   Bathroom Equipment   (no DME at baseline)   Bathroom Accessibility Accessible   Home Equipment   (no AD at baseline)   Prior Function   Level of [place] Independent with ADLs;Independent with functional mobility   Lives With Spouse   Receives Help From Family  (recent OPPT per pt)   IADLs Independent with driving;Family/Friend/Other provides meals;Family/Friend/Other provides medication management   Falls in the last 6 months 0   Vocational Retired   General   Family/Caregiver Present No   Cognition   Arousal/Participation Alert   Attention Attends with cues to redirect   Orientation Level Oriented to person;Oriented to place;Disoriented to time;Disoriented to situation  (oriented to month, \"2025\")   Memory Decreased short term memory;Decreased recall of recent events;Decreased recall of precautions   Following Commands Follows multistep commands with increased time or repetition   Comments pt agreeable to PT evaluation   RLE Assessment   RLE Assessment   (Grossly 3+/5 observed with functional mobility)   LLE Assessment   LLE Assessment   (Grossly 3+/5 observed with functional mobility)   Coordination   Movements are Fluid and Coordinated 1   Sensation WFL   Bed Mobility   Supine to Sit 4  Minimal assistance   Additional items Assist x 1;Increased time required;Verbal cues;LE management;Bedrails   Transfers   Sit to Stand 4  Minimal assistance   Additional items Assist x 1;Increased time required;Verbal cues;Bedrails   Stand to Sit 4  Minimal assistance   Additional items Assist x 1;Increased time required;Verbal cues;Bedrails   Ambulation/Elevation   Gait pattern Decreased foot clearance;Wide ARON;Short stride;Step to  (unsteady)   Gait Assistance 4  Minimal assist   Additional items Assist x 1;Verbal cues   Assistive Device   (L UE HHA)   Distance 15' x 2   Balance   Static Sitting Good "   Dynamic Sitting Fair +   Static Standing Fair -   Dynamic Standing Fair -   Ambulatory Poor +   Endurance Deficit   Endurance Deficit Yes   Activity Tolerance   Activity Tolerance Patient tolerated treatment well   Medical Staff Made Aware Pt seen as a co-eval with OT due to the patient's co-morbidities, clinically unstable presentation, and present impairments which are a regression from the patient's baseline.   Nurse Made Aware KUSH Acevedo   Assessment   Prognosis Good   Problem List Decreased strength;Decreased endurance;Impaired balance;Decreased mobility;Pain   Assessment Pt is 74 y.o. female seen for PT evaluation on 10/28/2024 s/p admit to [facility] on 10/27/2024 w/ Drug-induced encephalopathy. PT was consulted to assess pt's functional mobility and d/c needs. Order placed for PT eval and tx. PTA, pt resides with spouse in multi level home with 4 DAIANA, ambulates without AD. At time of eval, pt requiring min assist for all phases of mobility. Upon evaluation, pt presenting with impaired functional mobility d/t decreased strength, decreased endurance, impaired balance, decreased mobility, and activity intolerance. Pertinent PMHx and current co-morbidities affecting pt's physical performance at time of assessment include: anxiety disorder, tobacco dependence, acute respiratory failure with hypoxia, seizure disorder, brain mass, [place] ambulatory dysfunction, HTN, depression, urinary retention, gross hematuria, R thumb fracture. Personal factors affecting pt at time of eval include: inability to navigate community distances, limited home support, and positive fall history. The following objective measures performed on IE also reveal limitations: Barthel Index: 45/100, Modified [place]: 4 (moderate/severe disability), and AM-PAC 6-Clicks: 16/24. Pt's clinical presentation is currently unstable/unpredictable seen in pt's presentation of abnormal lab value(s) and ongoing medical assessment. Overall, pt's  rehab potential and prognosis to return to PLOF is good as impacted by objective findings, warranting pt to receive further skilled PT interventions to address identified impairments, activity limitation(s), and participation restriction(s). Pt to benefit from continued PT tx to address deficits as defined above and maximize level of functional independent mobility. From PT/mobility standpoint, recommend level 2, moderate resource intensity in order to facilitate return to PLOF.   Barriers to Discharge Inaccessible home environment;Decreased caregiver support   Goals   Patient Goals to go home   STG Expiration Date 11/07/24   Short Term Goal #1 In 7-10 days: Increase bilateral LE strength 1/2 grade to facilitate independent mobility, Perform all bed mobility tasks modified independent to decrease caregiver burden, Perform all transfers modified independent to improve independence, Ambulate > 100 ft. with least restrictive assistive device modified independent w/o LOB and w/ normalized gait pattern 100% of the time, Navigate 4 stairs modified independent with unilateral handrail to facilitate return to previous living environment, and Increase all balance 1/2 grade to decrease risk for falls   PT Treatment Day 0   Plan   Treatment/Interventions Functional transfer training;LE strengthening/ROM;Therapeutic exercise;Endurance training;Patient/family training;Equipment eval/education;Bed mobility;Gait training;Spoke to nursing;Elevations;OT   PT Frequency 3-5x/wk   Discharge Recommendation   Rehab Resource Intensity Level, PT II (Moderate Resource Intensity)   AM-PAC Basic Mobility Inpatient   Turning in Flat Bed Without Bedrails 3   Lying on Back to Sitting on Edge of Flat Bed Without Bedrails 3   Moving Bed to Chair 3   Standing Up From Chair Using Arms 3   Walk in Room 2   Climb 3-5 Stairs With Railing 2   Basic Mobility Inpatient Raw Score 16   Basic Mobility Standardized Score 38.32   [facility] Level Of  Mobility   JH-HLM Goal 5: Stand one or more mins   JH-HLM Achieved 7: Walk 25 feet or more   Modified [place] Scale   Modified [place] Scale 4   Barthel Index   Feeding 10   Bathing 0   Grooming Score 0   Dressing Score 5   Bladder Score 5   Bowels Score 10   Toilet Use Score 5   Transfers (Bed/Chair) Score 10   Mobility (Level Surface) Score 0   Stairs Score 0   Barthel Index Score 45           Alma Rosa Polo, PT

## 2024-10-29 LAB
ANION GAP SERPL CALCULATED.3IONS-SCNC: 7 MMOL/L (ref 4–13)
BASOPHILS # BLD AUTO: 0.03 THOUSANDS/ΜL (ref 0–0.1)
BASOPHILS NFR BLD AUTO: 0 % (ref 0–1)
BUN SERPL-MCNC: 13 MG/DL (ref 5–25)
CALCIUM SERPL-MCNC: 9.7 MG/DL (ref 8.4–10.2)
CHLORIDE SERPL-SCNC: 101 MMOL/L (ref 96–108)
CO2 SERPL-SCNC: 31 MMOL/L (ref 21–32)
CREAT SERPL-MCNC: 0.63 MG/DL (ref 0.6–1.3)
EOSINOPHIL # BLD AUTO: 0.1 THOUSAND/ΜL (ref 0–0.61)
EOSINOPHIL NFR BLD AUTO: 1 % (ref 0–6)
ERYTHROCYTE [DISTWIDTH] IN BLOOD BY AUTOMATED COUNT: 12.1 % (ref 11.6–15.1)
GFR SERPL CREATININE-BSD FRML MDRD: 89 ML/MIN/1.73SQ M
GLUCOSE SERPL-MCNC: 107 MG/DL (ref 65–140)
HCT VFR BLD AUTO: 38.4 % (ref 34.8–46.1)
HGB BLD-MCNC: 12.5 G/DL (ref 11.5–15.4)
IMM GRANULOCYTES # BLD AUTO: 0.01 THOUSAND/UL (ref 0–0.2)
IMM GRANULOCYTES NFR BLD AUTO: 0 % (ref 0–2)
LYMPHOCYTES # BLD AUTO: 2.71 THOUSANDS/ΜL (ref 0.6–4.47)
LYMPHOCYTES NFR BLD AUTO: 39 % (ref 14–44)
MAGNESIUM SERPL-MCNC: 2.1 MG/DL (ref 1.9–2.7)
MCH RBC QN AUTO: 32.1 PG (ref 26.8–34.3)
MCHC RBC AUTO-ENTMCNC: 32.6 G/DL (ref 31.4–37.4)
MCV RBC AUTO: 99 FL (ref 82–98)
MONOCYTES # BLD AUTO: 0.46 THOUSAND/ΜL (ref 0.17–1.22)
MONOCYTES NFR BLD AUTO: 7 % (ref 4–12)
NEUTROPHILS # BLD AUTO: 3.69 THOUSANDS/ΜL (ref 1.85–7.62)
NEUTS SEG NFR BLD AUTO: 53 % (ref 43–75)
NRBC BLD AUTO-RTO: 0 /100 WBCS
PHOSPHATE SERPL-MCNC: 3 MG/DL (ref 2.3–4.1)
PLATELET # BLD AUTO: 336 THOUSANDS/UL (ref 149–390)
PMV BLD AUTO: 9.9 FL (ref 8.9–12.7)
POTASSIUM SERPL-SCNC: 3.9 MMOL/L (ref 3.5–5.3)
RBC # BLD AUTO: 3.89 MILLION/UL (ref 3.81–5.12)
SODIUM SERPL-SCNC: 139 MMOL/L (ref 135–147)
WBC # BLD AUTO: 7 THOUSAND/UL (ref 4.31–10.16)

## 2024-10-29 PROCEDURE — 99233 SBSQ HOSP IP/OBS HIGH 50: CPT | Performed by: INTERNAL MEDICINE

## 2024-10-29 PROCEDURE — 84100 ASSAY OF PHOSPHORUS: CPT

## 2024-10-29 PROCEDURE — 80048 BASIC METABOLIC PNL TOTAL CA: CPT

## 2024-10-29 PROCEDURE — 85025 COMPLETE CBC W/AUTO DIFF WBC: CPT

## 2024-10-29 PROCEDURE — 83735 ASSAY OF MAGNESIUM: CPT

## 2024-10-29 RX ORDER — ALPRAZOLAM 0.5 MG
2 TABLET ORAL 2 TIMES DAILY
Status: DISCONTINUED | OUTPATIENT
Start: 2024-10-29 | End: 2024-10-30 | Stop reason: HOSPADM

## 2024-10-29 RX ORDER — ALPRAZOLAM 0.5 MG
1 TABLET ORAL
Status: DISCONTINUED | OUTPATIENT
Start: 2024-10-29 | End: 2024-10-29

## 2024-10-29 RX ORDER — ALPRAZOLAM 0.5 MG
1 TABLET ORAL 2 TIMES DAILY PRN
Status: DISCONTINUED | OUTPATIENT
Start: 2024-10-29 | End: 2024-10-29

## 2024-10-29 RX ADMIN — ACETAMINOPHEN 975 MG: 325 TABLET ORAL at 22:29

## 2024-10-29 RX ADMIN — GABAPENTIN 100 MG: 100 CAPSULE ORAL at 17:00

## 2024-10-29 RX ADMIN — DILTIAZEM HYDROCHLORIDE 120 MG: 120 CAPSULE, COATED, EXTENDED RELEASE ORAL at 08:25

## 2024-10-29 RX ADMIN — ALPRAZOLAM 1 MG: 0.5 TABLET ORAL at 08:56

## 2024-10-29 RX ADMIN — APIXABAN 5 MG: 5 TABLET, FILM COATED ORAL at 17:00

## 2024-10-29 RX ADMIN — FLUTICASONE PROPIONATE 1 SPRAY: 50 SPRAY, METERED NASAL at 08:26

## 2024-10-29 RX ADMIN — ALPRAZOLAM 1 MG: 0.5 TABLET ORAL at 14:34

## 2024-10-29 RX ADMIN — CYANOCOBALAMIN TAB 500 MCG 1000 MCG: 500 TAB at 08:25

## 2024-10-29 RX ADMIN — ASPIRIN 81 MG: 81 TABLET, CHEWABLE ORAL at 08:25

## 2024-10-29 RX ADMIN — GABAPENTIN 100 MG: 100 CAPSULE ORAL at 08:25

## 2024-10-29 RX ADMIN — ACETAMINOPHEN 975 MG: 325 TABLET ORAL at 14:34

## 2024-10-29 RX ADMIN — LEVETIRACETAM 750 MG: 500 TABLET, FILM COATED ORAL at 08:25

## 2024-10-29 RX ADMIN — LEVETIRACETAM 750 MG: 500 TABLET, FILM COATED ORAL at 21:07

## 2024-10-29 RX ADMIN — ICOSAPENT ETHYL 2 G: 1 CAPSULE, LIQUID FILLED ORAL at 21:08

## 2024-10-29 RX ADMIN — CEFUROXIME AXETIL 500 MG: 250 TABLET ORAL at 21:08

## 2024-10-29 RX ADMIN — LISINOPRIL 10 MG: 10 TABLET ORAL at 08:25

## 2024-10-29 RX ADMIN — NICOTINE 1 PATCH: 7 PATCH, EXTENDED RELEASE TRANSDERMAL at 08:26

## 2024-10-29 RX ADMIN — EZETIMIBE 10 MG: 10 TABLET ORAL at 08:25

## 2024-10-29 RX ADMIN — CEFUROXIME AXETIL 500 MG: 250 TABLET ORAL at 08:26

## 2024-10-29 RX ADMIN — ALPRAZOLAM 2 MG: 0.5 TABLET ORAL at 22:58

## 2024-10-29 RX ADMIN — QUETIAPINE FUMARATE 100 MG: 100 TABLET ORAL at 22:58

## 2024-10-29 RX ADMIN — APIXABAN 5 MG: 5 TABLET, FILM COATED ORAL at 08:25

## 2024-10-29 RX ADMIN — ICOSAPENT ETHYL 2 G: 1 CAPSULE, LIQUID FILLED ORAL at 08:26

## 2024-10-29 RX ADMIN — QUETIAPINE FUMARATE 100 MG: 100 TABLET ORAL at 10:43

## 2024-10-29 RX ADMIN — GABAPENTIN 100 MG: 100 CAPSULE ORAL at 21:08

## 2024-10-29 NOTE — PROGRESS NOTES
Patient:    MRN:  [id number]    Dan[place] Request ID:  [id number]    Level of care reserved:  Home Health Agency    Partner Reserved:  [facility] [place] [id number] [phone]    Clinical needs requested:    Geography searched:  [id number]    Start of Service:    Request sent:  4:18pm EDT on 10/28/2024 by Nichol Irene    Partner reserved:  11:18am EDT on 10/29/2024 by Nichol Irene    Choice list shared:  11:18am EDT on 10/29/2024 by Nichol Irene

## 2024-10-29 NOTE — PROGRESS NOTES
Progress Note - Hospitalist   Name: Kirsty Em 74 y.o. female I MRN: [id number]  Unit/Bed#: 2 E 253-01 I Date of Admission: 10/27/2024   Date of Service: 10/29/2024 I Hospital Day: 2    Assessment & Plan  Drug-induced encephalopathy    PDMP reviewed, patient lately with monthly prescription for alprazolam 2 mg tablets X 120 QTY, also had oxycodone 5 mg of which she filled 28 tablets on 7/12/2024 then got 7 more tablets on 7/19/2024.  Additionally she also on Seroquel and Keppra and recently took Bactrim for UTI.  All of the above medications for her age population are high risk for confusion/metabolic encephalopathy.  Patient's presentation is very likely due to polypharmacy +/- infection (UTI/ PNA)    - UA unremarkable in the ED (see UTI), no fever, no SIRS criteria.  UDS positive for benzodiazepines.   - CT head was done in the ED , no acute abnormality   - Blood Culture in process.   - CT chest suspicious for possible aspiration but no airspace consolidation     -Continue Xanax 1mg TID prn  - Continue Gabapentin 100 TID.   - Continue Seroquel 100mg bid  - Continue Keppra 750mg bid  - Watch for any respiratory distress (if any will give Narcan) and watch for any seizure symptoms.   - Treat UTI with Cefuroxime  -Reaching out to Psych Dr. Ward for recommendations  Anxiety disorder  see drug-induced encephalopathy assessment and plan  Tobacco dependence  Patient smokes about 4 cigarettes a day with emphysema on CT scan   Nicotine patch ordered  History of stroke  In June 2024 had new onset A-fib and evidence of posterior left frontal cortex infarct complicated with seizure event    Continue daily aspirin 81 mg, also on Eliquis 5 twice daily and on Keppra 750 mg twice daily  Seizure disorder (HCC)  Due to stroke, see above.continue Keppra.  Brain mass  History of meningioma, follows up with neurosurgery and has a scheduled MRI brain on 12/12/2024  Polypharmacy  See drug-induced encephalopathy.  74 years old on  "gabapentin, Xanax on top of Keppra and Seroquel and 10/25 started Bactrim for suspected UTI.   Acute cystitis without hematuria  UA 10/2 & again 10/25 positive for nitrite and leucocytes, patient was recently started in the outpatient setting on Bactrim 10/25 - present now with metabolic encephalopathy as above. Discontinue Bactrim. Will given Ceftriaxone x 4 doses. However of note, patient denies urinary symptoms - will treat give age , AMS and recent + UA, and also suspected pneumonia.      --Urine culture growing Providencia resistant strain  --Continue Cefuroxime for UTI  Acute respiratory failure with hypoxia (HCC)  Noted in mid 80's while in the ED requiring 2 L NC   CT Chest was done \"Mild dependent and basilar probable atelectasis. Recommend correlation for superimposed aspiration given small amount of debris/secretion within the dependent trachea. Otherwise no focal airspace consolidation or pleural effusion. \"     Given lethargy / AMS due to medications as above - at risk for aspiration . Other differential include respiratory depression due to xanax and gabapentin however RR wnl / mildly tachypnic not decreased.   Switched Rocephin to Zosyn due to resistant bacteriuria  NC O2 titrate for goal > 90%   Diet only when fully alert  Speech recommending regular diet thin liquids   Incentive spirometer   Frailty syndrome in geriatric patient  Lives at home with   Encounter for delirium elderly at risk (DEAR) screening  Delirium precautions  [place] (hard of hearing)    Ambulatory dysfunction      VTE Pharmacologic Prophylaxis: VTE Score: 2 High Risk (Score >/= 5) - Pharmacological DVT Prophylaxis Ordered: apixaban (Eliquis). Sequential Compression Devices Ordered.    Mobility:   Basic Mobility Inpatient Raw Score: 17  JH-HLM Goal: 5: Stand one or more mins  JH-HLM Achieved: 1: Laying in bed  JH-HLM Goal achieved. Continue to encourage appropriate mobility.    Patient Centered Rounds: I performed bedside " rounds with nursing staff today.   Discussions with Specialists or Other Care Team Provider: Geriatrics    Education and Discussions with Family / Patient: Updated  () at bedside.    Current Length of Stay: 2 day(s)  Current Patient Status: Inpatient   Certification Statement: The patient will continue to require additional inpatient hospital stay due to encephalopathy  Discharge Plan: Anticipate discharge in 24-48 hrs to home.    Code Status: Level 1 - Full Code    Subjective   Patient seen and examined. She is back to her baseline in terms of her mentation, AAOx3,  at the bedside agrees. She endorses no complaints.    Objective :  Temp:  [98.7 °F (37.1 °C)-99.2 °F (37.3 °C)] 99.2 °F (37.3 °C)  HR:  [79-84] 84  BP: (111-140)/(67-81) 111/75  Resp:  [16-19] 19  SpO2:  [92 %-94 %] 94 %  O2 Device: None (Room air)  Nasal Cannula O2 Flow Rate (L/min):  [1 L/min] 1 L/min    Body mass index is 24.56 kg/m².     Input and Output Summary (last 24 hours):     Intake/Output Summary (Last 24 hours) at 10/29/2024 1412  Last data filed at 10/29/2024 0843  Gross per 24 hour   Intake 720 ml   Output 400 ml   Net 320 ml       Physical Exam  Vitals and nursing note reviewed.   Constitutional:       Appearance: Normal appearance. She is normal weight.   HENT:      Head: Normocephalic.      Mouth/Throat:      Mouth: Mucous membranes are moist.      Pharynx: Oropharynx is clear.   Eyes:      Conjunctiva/sclera: Conjunctivae normal.   Cardiovascular:      Rate and Rhythm: Normal rate and regular rhythm.      Pulses: Normal pulses.      Heart sounds: Normal heart sounds. No murmur heard.     No friction rub. No gallop.   Pulmonary:      Effort: Pulmonary effort is normal. No respiratory distress.      Breath sounds: Normal breath sounds. No wheezing or rales.      Comments: 1L NC  Abdominal:      General: Abdomen is flat. Bowel sounds are normal. There is no distension.      Palpations: Abdomen is soft.       Tenderness: There is no abdominal tenderness. There is no guarding.   Musculoskeletal:         General: Normal range of motion.      Cervical back: Normal range of motion.      Right lower leg: No edema.      Left lower leg: No edema.   Skin:     General: Skin is warm and dry.      Capillary Refill: Capillary refill takes less than 2 seconds.   Neurological:      Mental Status: She is alert and oriented to person, place, and time.   Psychiatric:         Mood and Affect: Mood normal.         Behavior: Behavior normal.           Lines/Drains:              Lab Results: I have reviewed the following results:   Results from last 7 days   Lab Units 10/29/24  0437   WBC Thousand/uL 7.00   HEMOGLOBIN g/dL 12.5   HEMATOCRIT % 38.4   PLATELETS Thousands/uL 336   SEGS PCT % 53   LYMPHO PCT % 39   MONO PCT % 7   EOS PCT % 1     Results from last 7 days   Lab Units 10/29/24  0437 10/28/24  0643   SODIUM mmol/L 139 139   POTASSIUM mmol/L 3.9 3.7   CHLORIDE mmol/L 101 103   CO2 mmol/L 31 30   BUN mg/dL 13 12   CREATININE mg/dL 0.63 0.68   ANION GAP mmol/L 7 6   CALCIUM mg/dL 9.7 9.2   ALBUMIN g/dL  --  4.0   TOTAL BILIRUBIN mg/dL  --  0.37   ALK PHOS U/L  --  69   ALT U/L  --  27   AST U/L  --  20   GLUCOSE RANDOM mg/dL 107 110     Results from last 7 days   Lab Units 10/27/24  0146   INR  1.21*             Results from last 7 days   Lab Units 10/27/24  0146   LACTIC ACID mmol/L 1.0       Recent Cultures (last 7 days):   Results from last 7 days   Lab Units 10/27/24  0218 10/27/24  0146 10/25/24  1525   BLOOD CULTURE  No Growth at 24 hrs. No Growth at 24 hrs.  --    URINE CULTURE   --   --  >100,000 cfu/ml Providencia rettgeri*       Imaging Results Review: No pertinent imaging studies reviewed.  Other Study Results Review: No additional pertinent studies reviewed.    Last 24 Hours Medication List:     Current Facility-Administered Medications:     acetaminophen (TYLENOL) tablet 975 mg, Q8H PRN    ALPRAZolam (XANAX) tablet 1 mg,  TID PRN    apixaban (ELIQUIS) tablet 5 mg, BID    aspirin chewable tablet 81 mg, Daily    cefuroxime (CEFTIN) tablet 500 mg, Q12H JERMAINE    cyanocobalamin (VITAMIN B-12) tablet 1,000 mcg, Daily    diltiazem (CARDIZEM CD) 24 hr capsule 120 mg, Daily    ezetimibe (ZETIA) tablet 10 mg, Daily    fluticasone (FLONASE) 50 mcg/act nasal spray 1 spray, Daily    gabapentin (NEURONTIN) capsule 100 mg, TID    Icosapent Ethyl CAPS 2 g, BID    levETIRAcetam (KEPPRA) tablet 750 mg, Q12H JERMAINE    lisinopril (ZESTRIL) tablet 10 mg, BID    nicotine (NICODERM CQ) 7 mg/24hr TD 24 hr patch 1 patch, Daily    QUEtiapine (SEROquel) tablet 100 mg, Q12H    Administrative Statements   Today, Patient Was Seen By: Jules Morocho MD  I have spent a total time of 30 minutes in caring for this patient on the day of the visit/encounter including Diagnostic results.    **Please Note: This note may have been constructed using a voice recognition system.**

## 2024-10-29 NOTE — ASSESSMENT & PLAN NOTE
PDMP reviewed, patient lately with monthly prescription for alprazolam 2 mg tablets X 120 QTY, also had oxycodone 5 mg of which she filled 28 tablets on 7/12/2024 then got 7 more tablets on 7/19/2024.  Additionally she also on Seroquel and Keppra and recently took Bactrim for UTI.  All of the above medications for her age population are high risk for confusion/metabolic encephalopathy.  Patient's presentation is very likely due to polypharmacy +/- infection (UTI/ PNA)    - UA unremarkable in the ED (see UTI), no fever, no SIRS criteria.  UDS positive for benzodiazepines.   - CT head was done in the ED , no acute abnormality   - Blood Culture in process.   - CT chest suspicious for possible aspiration but no airspace consolidation     -Continue Xanax 1mg TID prn  - Continue Gabapentin 100 TID.   - Continue Seroquel 100mg bid  - Continue Keppra 750mg bid  - Watch for any respiratory distress (if any will give Narcan) and watch for any seizure symptoms.   - Treat UTI with Cefuroxime  -Reaching out to Psych Dr. Ward for recommendations

## 2024-10-29 NOTE — ASSESSMENT & PLAN NOTE
UA 10/2 & again 10/25 positive for nitrite and leucocytes, patient was recently started in the outpatient setting on Bactrim 10/25 - present now with metabolic encephalopathy as above. Discontinue Bactrim. Will given Ceftriaxone x 4 doses. However of note, patient denies urinary symptoms - will treat give age , AMS and recent + UA, and also suspected pneumonia.      --Urine culture growing Providencia resistant strain  --Continue Cefuroxime for UTI

## 2024-10-29 NOTE — CASE MANAGEMENT
Case Management Progress Note    Patient name Kirsty Em  Location 2 EAST 253/2 E 253-01 MRN [id number]  : 1950 Date 10/29/2024       LOS (days): 2  Geometric Mean LOS (GMLOS) (days): 4.6  Days to GMLOS:2.3        OBJECTIVE:     Current admission status: Inpatient  Preferred Pharmacy:   [facility]/pharmacy #[id number] - [place] - [address]  [address]  [place] [id number]  Phone: [phone] Fax: [phone]    Primary Care Provider: Brien Desouza MD  Primary Insurance: MEDICARE  Secondary Insurance: BLUE CROSS    PROGRESS NOTE:  Patient reviewed with SLIM and RN for interdisciplinary rounding.  Anticipated for d/c home in 24hrs w/ HHC.  [facility] reserved in [facility] per patient choice.  AVS updated.  CM will continue to follow for any additional needs.

## 2024-10-29 NOTE — ASSESSMENT & PLAN NOTE
At a baseline is alert and oriented x  4   On exam patient is alert and oriented x 4  Noted more alert then yesterday  Both  and patient state have forgetfulness, memory loss but is worse in the setting of being very hard of hearing in left ear status post stroke in June  Does not wear hearing aids  Became confused/lethargic 10/26, came to hospital the early morning of 10/27  Seen at urgent care 10/25 for malodorous smelling urine, prescribed Bactrim for UTI  Per  started Friday, 10/25, one dose and received both doses on Saturday, 10/26  TSH 2021: 1.102  Vit B12 1/2024: 593  CT head 10/27/24: mild microangiopathic change  MRI 6/2024: acute infarct along posterior L frontal cortex; Small L frontal brain mass, meningioma; moderate chronic microangiopathic ischemic changes  Identify and treat reversible causes of confusion including infection, dehydration, electrolyte imbalance, anemia, hypoxia, urinary retention, constipation, pain, and sleep disturbance  At risk for delirium due to acute encephalopathy  Recommend delirium precautions  Maintain sleep-wake cycle, avoid nighttime interruptions  Provide adequate pain control  Avoid urinary retention and constipation  Provide frequent and early mobilization  Provide frequent redirection and reorientation as needed  Avoid medications that may worsen or precipitate delirium such as tramadol, benzodiazepines, anticholinergics, and Benadryl  Redirect unwanted behaviors as first-line therapy, avoid physical restraints as able to  Continue to monitor  No formal cognitive screening per chart review, would recommend completing once more at baseline cognitively

## 2024-10-29 NOTE — ASSESSMENT & PLAN NOTE
At a baseline ambulates with cane but only when going outside of the house  Has had previous falls within the last 6 months, seen at hospital in June  Currently using RW during hospitalization  PT/OT following  Fall precautions  Out of bed as tolerated  Encourage early and frequent mobilization  Encourage adequate hydration and nutrition  Provide adequate pain management   Goal is to STR  Continue with PT/OT for continued strength and balance training

## 2024-10-29 NOTE — ASSESSMENT & PLAN NOTE
Currently prescribed PTA: Xanax 2 mg four times daily, Gabapentin 300 mg TID, Keppra 750 mg Q12H, Seroquel 100 mg Q12H  Patient husbands states in rare instances, patient has severe panic attacks at night and will give extra dose of Xanax (so will get 10 mg in a day; however very rarely occurs)  Doses and medications not recommended in geriatric population due to side effects: confusion, somnolence, increased risk for falls, respiratory failure  All besides Keppra prescribed by Psychologist, Dr. Ward who she sees outpatient ~ every 3 months  Hx of depression, anxiety  Per Epic chart review appears to have been started on Seroquel and Xanax in ~ 2020/2021  Continue to wean off Xanax  Currently during hospitalization: decreased Xanax to 1 mg TID PRN; received one dose this morning at 0900  Continue to monitor for withdrawal symptoms/seizures since appears to have been taking Xanax at this dose and frequency since 2023  Previously prescribed 2 mg four times daily PRN  Primary provider tried to call Psychiatrist and left voicemail to discuss dosing/frequency  Decreased Gabapentin to 100 mg TID scheduled, received all doses  Continuing previous dose of Seroquel and Keppra  Complains of insomnia last night  Has not had withdrawal symptoms since hospitalization, actually less lethargic, more alert  Dose and frequency of Xanax in geriatric patient is not recommended due to side effects of drowsiness, headaches, increased fall risk, confusion, dizziness/lightheadedness, however seizure history noted  Due to seizure history since June 2024, ordered scheduled Xanax 2 mg BID once in morning and at bedtime currently   Will continue to monitor dosing/anxiety/cognition/withdrawal sxs/seizures

## 2024-10-30 ENCOUNTER — TRANSITIONAL CARE MANAGEMENT (OUTPATIENT)
Dept: FAMILY MEDICINE CLINIC | Facility: CLINIC | Age: 74
End: 2024-10-30

## 2024-10-30 VITALS
SYSTOLIC BLOOD PRESSURE: 121 MMHG | RESPIRATION RATE: 18 BRPM | TEMPERATURE: 98.2 F | HEART RATE: 70 BPM | BODY MASS INDEX: 24.55 KG/M2 | WEIGHT: 130 LBS | OXYGEN SATURATION: 93 % | HEIGHT: 61 IN | DIASTOLIC BLOOD PRESSURE: 63 MMHG

## 2024-10-30 LAB
ANION GAP SERPL CALCULATED.3IONS-SCNC: 8 MMOL/L (ref 4–13)
BASOPHILS # BLD AUTO: 0.03 THOUSANDS/ΜL (ref 0–0.1)
BASOPHILS NFR BLD AUTO: 1 % (ref 0–1)
BUN SERPL-MCNC: 8 MG/DL (ref 5–25)
CALCIUM SERPL-MCNC: 9.8 MG/DL (ref 8.4–10.2)
CHLORIDE SERPL-SCNC: 103 MMOL/L (ref 96–108)
CO2 SERPL-SCNC: 28 MMOL/L (ref 21–32)
CREAT SERPL-MCNC: 0.5 MG/DL (ref 0.6–1.3)
EOSINOPHIL # BLD AUTO: 0.18 THOUSAND/ΜL (ref 0–0.61)
EOSINOPHIL NFR BLD AUTO: 3 % (ref 0–6)
ERYTHROCYTE [DISTWIDTH] IN BLOOD BY AUTOMATED COUNT: 12.2 % (ref 11.6–15.1)
GFR SERPL CREATININE-BSD FRML MDRD: 97 ML/MIN/1.73SQ M
GLUCOSE SERPL-MCNC: 100 MG/DL (ref 65–140)
HCT VFR BLD AUTO: 36.7 % (ref 34.8–46.1)
HGB BLD-MCNC: 12.2 G/DL (ref 11.5–15.4)
IMM GRANULOCYTES # BLD AUTO: 0.01 THOUSAND/UL (ref 0–0.2)
IMM GRANULOCYTES NFR BLD AUTO: 0 % (ref 0–2)
LYMPHOCYTES # BLD AUTO: 2.72 THOUSANDS/ΜL (ref 0.6–4.47)
LYMPHOCYTES NFR BLD AUTO: 45 % (ref 14–44)
MAGNESIUM SERPL-MCNC: 2 MG/DL (ref 1.9–2.7)
MCH RBC QN AUTO: 32.1 PG (ref 26.8–34.3)
MCHC RBC AUTO-ENTMCNC: 33.2 G/DL (ref 31.4–37.4)
MCV RBC AUTO: 97 FL (ref 82–98)
MONOCYTES # BLD AUTO: 0.45 THOUSAND/ΜL (ref 0.17–1.22)
MONOCYTES NFR BLD AUTO: 8 % (ref 4–12)
NEUTROPHILS # BLD AUTO: 2.54 THOUSANDS/ΜL (ref 1.85–7.62)
NEUTS SEG NFR BLD AUTO: 43 % (ref 43–75)
NRBC BLD AUTO-RTO: 0 /100 WBCS
PHOSPHATE SERPL-MCNC: 3.7 MG/DL (ref 2.3–4.1)
PLATELET # BLD AUTO: 323 THOUSANDS/UL (ref 149–390)
PMV BLD AUTO: 9.6 FL (ref 8.9–12.7)
POTASSIUM SERPL-SCNC: 3.3 MMOL/L (ref 3.5–5.3)
RBC # BLD AUTO: 3.8 MILLION/UL (ref 3.81–5.12)
SODIUM SERPL-SCNC: 139 MMOL/L (ref 135–147)
WBC # BLD AUTO: 5.93 THOUSAND/UL (ref 4.31–10.16)

## 2024-10-30 PROCEDURE — 80048 BASIC METABOLIC PNL TOTAL CA: CPT

## 2024-10-30 PROCEDURE — 85025 COMPLETE CBC W/AUTO DIFF WBC: CPT

## 2024-10-30 PROCEDURE — 84100 ASSAY OF PHOSPHORUS: CPT

## 2024-10-30 PROCEDURE — 99239 HOSP IP/OBS DSCHRG MGMT >30: CPT | Performed by: NURSE PRACTITIONER

## 2024-10-30 PROCEDURE — 92526 ORAL FUNCTION THERAPY: CPT

## 2024-10-30 PROCEDURE — 99233 SBSQ HOSP IP/OBS HIGH 50: CPT | Performed by: INTERNAL MEDICINE

## 2024-10-30 PROCEDURE — 83735 ASSAY OF MAGNESIUM: CPT

## 2024-10-30 RX ORDER — GABAPENTIN 100 MG/1
100 CAPSULE ORAL 3 TIMES DAILY
Qty: 90 CAPSULE | Refills: 0 | Status: SHIPPED | OUTPATIENT
Start: 2024-10-30 | End: 2024-11-29

## 2024-10-30 RX ORDER — POTASSIUM CHLORIDE 1500 MG/1
40 TABLET, EXTENDED RELEASE ORAL
Status: DISCONTINUED | OUTPATIENT
Start: 2024-10-30 | End: 2024-10-30 | Stop reason: HOSPADM

## 2024-10-30 RX ORDER — LISINOPRIL 10 MG/1
10 TABLET ORAL 2 TIMES DAILY
Qty: 120 TABLET | Refills: 0 | Status: SHIPPED | OUTPATIENT
Start: 2024-10-30 | End: 2024-11-04

## 2024-10-30 RX ORDER — CEFUROXIME AXETIL 500 MG/1
500 TABLET ORAL EVERY 12 HOURS SCHEDULED
Qty: 1 TABLET | Refills: 0 | Status: SHIPPED | OUTPATIENT
Start: 2024-10-30 | End: 2024-10-31

## 2024-10-30 RX ORDER — ALPRAZOLAM 2 MG/1
2 TABLET ORAL 3 TIMES DAILY PRN
Qty: 20 TABLET | Refills: 0 | Status: SHIPPED | OUTPATIENT
Start: 2024-10-30 | End: 2024-11-09

## 2024-10-30 RX ADMIN — LEVETIRACETAM 750 MG: 500 TABLET, FILM COATED ORAL at 08:25

## 2024-10-30 RX ADMIN — APIXABAN 5 MG: 5 TABLET, FILM COATED ORAL at 08:25

## 2024-10-30 RX ADMIN — ICOSAPENT ETHYL 2 G: 1 CAPSULE, LIQUID FILLED ORAL at 08:27

## 2024-10-30 RX ADMIN — ALPRAZOLAM 2 MG: 0.5 TABLET ORAL at 09:10

## 2024-10-30 RX ADMIN — POTASSIUM CHLORIDE 40 MEQ: 1500 TABLET, EXTENDED RELEASE ORAL at 11:25

## 2024-10-30 RX ADMIN — DILTIAZEM HYDROCHLORIDE 120 MG: 120 CAPSULE, COATED, EXTENDED RELEASE ORAL at 08:25

## 2024-10-30 RX ADMIN — ASPIRIN 81 MG: 81 TABLET, CHEWABLE ORAL at 08:25

## 2024-10-30 RX ADMIN — QUETIAPINE FUMARATE 100 MG: 100 TABLET ORAL at 11:25

## 2024-10-30 RX ADMIN — EZETIMIBE 10 MG: 10 TABLET ORAL at 08:25

## 2024-10-30 RX ADMIN — ACETAMINOPHEN 975 MG: 325 TABLET ORAL at 09:10

## 2024-10-30 RX ADMIN — NICOTINE 1 PATCH: 7 PATCH, EXTENDED RELEASE TRANSDERMAL at 08:27

## 2024-10-30 RX ADMIN — FLUTICASONE PROPIONATE 1 SPRAY: 50 SPRAY, METERED NASAL at 08:28

## 2024-10-30 RX ADMIN — CEFUROXIME AXETIL 500 MG: 250 TABLET ORAL at 08:27

## 2024-10-30 RX ADMIN — CYANOCOBALAMIN TAB 500 MCG 1000 MCG: 500 TAB at 08:24

## 2024-10-30 RX ADMIN — GABAPENTIN 100 MG: 100 CAPSULE ORAL at 08:25

## 2024-10-30 NOTE — CASE MANAGEMENT
Case Management Discharge Planning Note    Patient name Kirsty Em  Location 2 EAST 253/2 E 253-01 MRN [id number]  : 1950 Date 10/30/2024       Current Admission Date: 10/27/2024  Current Admission Diagnosis:Drug-induced encephalopathy   Patient Active Problem List    Diagnosis Date Noted Date Diagnosed    Frailty syndrome in geriatric patient 10/28/2024     Encounter for delirium elderly at risk (DEAR) screening 10/28/2024     [place] (hard of hearing) 10/28/2024     Ambulatory dysfunction 10/28/2024     Drug-induced encephalopathy 10/27/2024     Polypharmacy 10/27/2024     Acute cystitis without hematuria 10/27/2024     Closed displaced fracture of proximal phalanx of right thumb, initial encounter 2024     Brain mass 2024     Gross hematuria 2024     Diastolic dysfunction 2024     QT prolongation 2024     Urinary retention 2024     Depression 2024     Seizure disorder (HCC) 2024     Hypokalemia 2024     New onset a-fib (HCC) 2024     Hypertension 2024     Bilateral carotid artery stenosis 06/10/2024     Pre-diabetes 2024     History of stroke 2024     Elevated transaminase level 2024     Closed displaced fracture of proximal phalanx of right thumb 2024     Mixed hyperlipidemia 2024     Centrilobular emphysema (HCC) 2023     Acute respiratory failure with hypoxia (HCC) 2022     Anxiety disorder 2021     Tobacco dependence 2021       LOS (days): 3  Geometric Mean LOS (GMLOS) (days): 4.6  Days to GMLOS:1.4     OBJECTIVE:  Risk of Unplanned Readmission Score: 22.89      Current admission status: Inpatient   Preferred Pharmacy:   [id number] [place] - [address]  [address]  [place] [id number]  Phone: [phone] Fax: [phone]    Primary Care Provider: Brien Desouza MD  Primary Insurance: MEDICARE  Secondary Insurance: BLUE CROSS    DISCHARGE  DETAILS:    Discharge planning discussed with:: Patient and spouse at bedside.  Freedom of Choice: Yes  Comments - Freedom of Choice: FOC maintained - CM reviewed DCP.  Patient for d/c home today with HHC.  [facility] reserved per patient preference.  Level II rec - placement declined.  Patient/spouse report no additional CM needs.  Patient is looking forward to returning home.  CM contacted family/caregiver?: Yes (present at bedside.)  Were Treatment Team discharge recommendations reviewed with patient/caregiver?: Yes  Did patient/caregiver verbalize understanding of patient care needs?: Yes  Were patient/caregiver advised of the risks associated with not following Treatment Team discharge recommendations?: Yes    Contacts  Patient Contacts: Wilver (spouse)  Relationship to Patient:: Family  Contact Method: In Person  Reason/Outcome: Continuity of Care, Discharge Planning    Requested Home Health Care         Is the patient interested in HHC at discharge?: Yes  Home Health Discipline requested:: Nursing, Occupational Therapy, Physical Therapy  [facility] Health Agency [facility]  HHA External Referral Reason (only applicable if external HHA name selected): Patient has established relationship with provider  Home Health Follow-Up Provider:: PCP (Brien Desouza MD)  Home Health Services Needed:: Evaluate Functional Status and Safety, Gait/ADL Training, Strengthening/Theraputic Exercises to Improve Function  Homebound Criteria Met:: Requires the Assistance of Another Person for Safe Ambulation or to Leave the Home  Supporting Clincal Findings:: Limited Endurance    DME Referral Provided  Referral made for DME?: No    Other Referral/Resources/Interventions Provided:  Interventions: HHC  Referral Comments: [facility] reserved for HHC.  AVS updated.  Agency updated on d/c for today.    Would you like to participate in our [facility] service program?  : No - Declined    Treatment Team Recommendation: Short Term  Rehab, Home with Home Health Care  Discharge Destination Plan:: Home with Home Health Care  Transport at Discharge : Family    IMM Given (Date):: 10/30/24  IMM Given to:: Patient (Verbal review of IMM with patient and spouse at bedside.  Understanding verbalized, no questions or concerns.  Original to medical records.)

## 2024-10-30 NOTE — ASSESSMENT & PLAN NOTE
In June 2024 had new onset A-fib and evidence of posterior left frontal cortex infarct complicated with seizure event  Continue daily aspirin 81 mg, also on Eliquis 5 twice daily and on Keppra 750 mg twice daily   Drysol Counseling:  I discussed with the patient the risks of drysol/aluminum chloride including but not limited to skin rash, itching, irritation, burning.

## 2024-10-30 NOTE — ASSESSMENT & PLAN NOTE
UA 10/2 & again 10/25 positive for nitrite and leucocytes, patient was recently started in the outpatient setting on Bactrim 10/25 - present now with metabolic encephalopathy as above. Discontinue Bactrim. Will given Ceftriaxone x 4 doses. However of note, patient denies urinary symptoms - will treat give age , AMS and recent + UA, and also suspected pneumonia.      Urine culture growing Providencia resistant strain  Continue Cefuroxime for UTI  Complete 5-day course

## 2024-10-30 NOTE — PLAN OF CARE
Problem: Potential for Falls  Goal: Patient will remain free of falls  Description: INTERVENTIONS:  - Educate patient/family on patient safety including physical limitations  - Instruct patient to call for assistance with activity   - Consult OT/PT to assist with strengthening/mobility   - Keep Call bell within reach  - Keep bed low and locked with side rails adjusted as appropriate  - Keep care items and personal belongings within reach  - Initiate and maintain comfort rounds  - Make Fall Risk Sign visible to staff  - Offer Toileting every  Hours, in advance of need  - Initiate/Maintain alarm  - Obtain necessary fall risk management equipment:   - Apply yellow socks and bracelet for high fall risk patients  - Consider moving patient to room near nurses station  Outcome: Progressing     Problem: Prexisting or High Potential for Compromised Skin Integrity  Goal: Skin integrity is maintained or improved  Description: INTERVENTIONS:  - Identify patients at risk for skin breakdown  - Assess and monitor skin integrity  - Assess and monitor nutrition and hydration status  - Monitor labs   - Assess for incontinence   - Turn and reposition patient  - Assist with mobility/ambulation  - Relieve pressure over bony prominences  - Avoid friction and shearing  - Provide appropriate hygiene as needed including keeping skin clean and dry  - Evaluate need for skin moisturizer/barrier cream  - Collaborate with interdisciplinary team   - Patient/family teaching  - Consider wound care consult   Outcome: Progressing     Problem: PAIN - ADULT  Goal: Verbalizes/displays adequate comfort level or baseline comfort level  Description: Interventions:  - Encourage patient to monitor pain and request assistance  - Assess pain using appropriate pain scale  - Administer analgesics based on type and severity of pain and evaluate response  - Implement non-pharmacological measures as appropriate and evaluate response  - Consider cultural and  social influences on pain and pain management  - Notify physician/advanced practitioner if interventions unsuccessful or patient reports new pain  Outcome: Progressing     Problem: SAFETY ADULT  Goal: Patient will remain free of falls  Description: INTERVENTIONS:  - Educate patient/family on patient safety including physical limitations  - Instruct patient to call for assistance with activity   - Consult OT/PT to assist with strengthening/mobility   - Keep Call bell within reach  - Keep bed low and locked with side rails adjusted as appropriate  - Keep care items and personal belongings within reach  - Initiate and maintain comfort rounds  - Make Fall Risk Sign visible to staff  - Offer Toileting every  Hours, in advance of need  - Initiate/Maintain alarm  - Obtain necessary fall risk management equipment:   - Apply yellow socks and bracelet for high fall risk patients  - Consider moving patient to room near nurses station  Outcome: Progressing  Goal: Maintain or return to baseline ADL function  Description: INTERVENTIONS:  -  Assess patient's ability to carry out ADLs; assess patient's baseline for ADL function and identify physical deficits which impact ability to perform ADLs (bathing, care of mouth/teeth, toileting, grooming, dressing, etc.)  - Assess/evaluate cause of self-care deficits   - Assess range of motion  - Assess patient's mobility; develop plan if impaired  - Assess patient's need for assistive devices and provide as appropriate  - Encourage maximum independence but intervene and supervise when necessary  - Involve family in performance of ADLs  - Assess for home care needs following discharge   - Consider OT consult to assist with ADL evaluation and planning for discharge  - Provide patient education as appropriate  Outcome: Progressing  Goal: Maintains/Returns to pre admission functional level  Description: INTERVENTIONS:  - Perform AM-PAC 6 Click Basic Mobility/ Daily Activity assessment daily.  -  Set and communicate daily mobility goal to care team and patient/family/caregiver.   - Collaborate with rehabilitation services on mobility goals if consulted  - Perform Range of Motion  times a day.  - Reposition patient every  hours.  - Dangle patient  times a day  - Stand patient  times a day  - Ambulate patient  times a day  - Out of bed to chair  times a day   - Out of bed for meals times a day  - Out of bed for toileting  - Record patient progress and toleration of activity level   Outcome: Progressing     Problem: DISCHARGE PLANNING  Goal: Discharge to home or other facility with appropriate resources  Description: INTERVENTIONS:  - Identify barriers to discharge w/patient and caregiver  - Arrange for needed discharge resources and transportation as appropriate  - Identify discharge learning needs (meds, wound care, etc.)  - Arrange for interpretive services to assist at discharge as needed  - Refer to Case Management Department for coordinating discharge planning if the patient needs post-hospital services based on physician/advanced practitioner order or complex needs related to functional status, cognitive ability, or social support system  Outcome: Progressing     Problem: Knowledge Deficit  Goal: Patient/family/caregiver demonstrates understanding of disease process, treatment plan, medications, and discharge instructions  Description: Complete learning assessment and assess knowledge base.  Interventions:  - Provide teaching at level of understanding  - Provide teaching via preferred learning methods  Outcome: Progressing

## 2024-10-30 NOTE — PROGRESS NOTES
Progress Note - Geriatric Medicine   Name: Kirsty Em 74 y.o. female I MRN: [id number]  Unit/Bed#: 2 E 253-01 I Date of Admission: 10/27/2024   Date of Service: 10/30/2024 I Hospital Day: 3    Assessment & Plan  Drug-induced encephalopathy  At a baseline is alert and oriented x  4   On exam patient is alert and oriented x 4  At baseline, being discharged home today  Both  and patient state have forgetfulness, memory loss but is worse in the setting of being very hard of hearing in left ear status post stroke in June  Does not wear hearing aids  Became confused/lethargic 10/26, came to hospital the early morning of 10/27  Seen at urgent care 10/25 for malodorous smelling urine, prescribed Bactrim for UTI  Per  started Friday, 10/25, one dose and received both doses on Saturday, 10/26  TSH 2021: 1.102  Vit B12 1/2024: 593  CT head 10/27/24: mild microangiopathic change  MRI 6/2024: acute infarct along posterior L frontal cortex; Small L frontal brain mass, meningioma; moderate chronic microangiopathic ischemic changes  Identify and treat reversible causes of confusion including infection, dehydration, electrolyte imbalance, anemia, hypoxia, urinary retention, constipation, pain, and sleep disturbance  At risk for delirium due to acute encephalopathy  Recommend delirium precautions  Maintain sleep-wake cycle, avoid nighttime interruptions  Provide adequate pain control  Avoid urinary retention and constipation  Provide frequent and early mobilization  Provide frequent redirection and reorientation as needed  Avoid medications that may worsen or precipitate delirium such as tramadol, benzodiazepines, anticholinergics, and Benadryl  Redirect unwanted behaviors as first-line therapy, avoid physical restraints as able to  Continue to monitor  No formal cognitive screening per chart review, would recommend completing once more at baseline cognitively    Polypharmacy  Currently prescribed PTA: Xanax 2 mg four  times daily, Gabapentin 300 mg TID, Keppra 750 mg Q12H, Seroquel 100 mg Q12H  Patient husbands states in rare instances, patient has severe panic attacks at night and will give extra dose of Xanax (so will get 10 mg in a day; however very rarely occurs)  Doses and medications not recommended in geriatric population due to side effects: confusion, somnolence, increased risk for falls, respiratory failure  All besides Keppra prescribed by Psychologist, Dr. Ward who she sees outpatient ~ every 3 months  Hx of depression, anxiety  Per Epic chart review appears to have been started on Seroquel and Xanax in ~ 2020/2021  Continue to wean off Xanax  Currently during hospitalization: decreased Xanax to 1 mg TID PRN; received one dose this morning at 0900  Continue to monitor for withdrawal symptoms/seizures since appears to have been taking Xanax at this dose and frequency since 2023  Previously prescribed 2 mg four times daily PRN  Primary provider tried to call Psychiatrist and left voicemail to discuss dosing/frequency  Decreased Gabapentin to 100 mg TID scheduled, received all doses  Continuing previous dose of Seroquel and Keppra  Slept well last night  Has not had withdrawal symptoms since hospitalization, actually less lethargic, more alert  Dose and frequency of Xanax in geriatric patient is not recommended due to side effects of drowsiness, headaches, increased fall risk, confusion, dizziness/lightheadedness, however seizure history noted  Due to seizure history since June 2024, ordered scheduled Xanax 2 mg BID once in morning and at bedtime currently   Concern for being on Xanax for over 20 years and tapering; spoke with  and patient about decreasing to 2 mg TID and has appointment with Psychiatrist mid November and will decrease to 2 mg BID at that time  Encounter for delirium elderly at risk (DEAR) screening  Patient at risk for delirium secondary to age, cognitive decline, poly pharmacy,  hospitalization  Identify and treat underlying cause  Provide frequent redirection, reorientation, distraction techniques  Avoid deliriogenic medications such as tramadol, benzodiazepines, anticholinergics,  Benadryl  Treat pain, See geriatric pain protocol  Monitor for constipation and urinary retention  Encourage early and frequent moblization, OOB  Encourage Hydration/ Nutrition  Implement sleep hygiene, limit night time interuptions, group activities  Avoid physical restraints  Use chemical restraint only when other efforts have failed, recommend zyprexa 2.5mg IM q8h prn  Monitor Qtc, if greater than 500 do not use antipsychotic medication  If QTc greater than 460, monitor and replete and deficiency of  K and Mg, recheck EKG  EKG QTc: 451  Frailty syndrome in geriatric patient  Clinical Frail Scale: 5- Mildly Frail  More evident slowing, needs help high order IADLs (transport, bills, medications)  Progressively impairs shopping and walking outside alone, meal prep and housework   Lives at home with   Has support from family; spouse and daughters  No longer drives d/t seizure history  [place] (hard of hearing)  Patient does have hearing impairment  Reported hard of hearing bilaterally, worse on L side s/p stroke  Does not wear hearing aids  Referral to Audiology previously made by PCP in August  Would recommend scheduling once discharged  Hearing impairment  correlated with depression, cognitive impairment, delirium and falls in the older adult  Speak clearly  Use sound amplifier  Speak face to face  Use clear dictation and enunciation of words  Anxiety disorder  Follows outpatient with Dr. Ward, psychiatry  Prescribed Xanax 2 mg four times daily, however as noted above, will take extra dose at night for panic attacks per , but very rare occurrence   Continue to follow up outpatient with Dr. Ward  Tobacco dependence  Continue to smoke daily  Nicotine patch currently ordered  Educate about  cessation  Acute respiratory failure with hypoxia (HCC)  Upon arrival to ED, O2 saturation in mid 80s, requiring 2 L NC; lethargic  CT chest/abd/pelvis showing: mild atelectasis, correlation for superimposed aspiration given small amount of debris/secretion within dependent trachea  Aspiration precautions  Speech therapy consulted  Continue to wean off O2 as able to, with goal > 90%  History of stroke  Previously admitted in June 2024 with new onset Afib and L posterior  frontal cortex acute to subacute infarct  Continue ASA, Eliquis and Keppra  Seizure disorder (HCC)  Status post stroke in June as listed above  No longer drives  Continue Keppra  Seizure precautions  Brain mass  Meningioma hx  Previously saw Neurosurgery 7/17/24, will have follow up in 6 months to repeat MRI  Acute cystitis without hematuria  As listed above in drug induced encephalopathy  UA 10/2, 10/25 positive for Leukocytes and Nitrites  Bactrim discontinued, given Ceftriaxone   Currently denies urinary symptoms at time of exam  Ambulatory dysfunction  At a baseline ambulates with cane but only when going outside of the house  Has had previous falls within the last 6 months, seen at hospital in June  Currently using RW during hospitalization  PT/OT following  Fall precautions  Out of bed as tolerated  Encourage early and frequent mobilization  Encourage adequate hydration and nutrition  Provide adequate pain management   Continue with PT/OT for continued strength and balance training     Subjective   Kirsty is a 74 year old female being seen today for follow up with Geriatrics. Upon exam, patient is resting in bed. Her  is bedside. She states she slept better last night. She denies CP, SOB, n/v/d/c. She has no complaints at this time.      Objective :  Temp:  [98.2 °F (36.8 °C)-98.5 °F (36.9 °C)] 98.2 °F (36.8 °C)  HR:  [70-99] 70  BP: (103-157)/(63-81) 121/63  Resp:  [18] 18  SpO2:  [91 %-93 %] 93 %  O2 Device: None (Room air)    Physical  Exam  Vitals and nursing note reviewed.   Constitutional:       Appearance: Normal appearance. She is normal weight.   HENT:      Head: Normocephalic.      Mouth/Throat:      Mouth: Mucous membranes are moist.      Pharynx: Oropharynx is clear.   Eyes:      Conjunctiva/sclera: Conjunctivae normal.   Cardiovascular:      Rate and Rhythm: Normal rate and regular rhythm.      Pulses: Normal pulses.      Heart sounds: Normal heart sounds.   Pulmonary:      Effort: Pulmonary effort is normal. No respiratory distress.      Breath sounds: Normal breath sounds.   Abdominal:      General: Abdomen is flat. Bowel sounds are normal.      Palpations: Abdomen is soft.   Musculoskeletal:         General: Normal range of motion.      Cervical back: Normal range of motion.      Right lower leg: No edema.      Left lower leg: No edema.   Skin:     General: Skin is warm and dry.      Capillary Refill: Capillary refill takes less than 2 seconds.   Neurological:      General: No focal deficit present.      Mental Status: She is alert. Mental status is at baseline.      Gait: Gait abnormal (use of RW currently).   Psychiatric:         Mood and Affect: Mood normal.         Behavior: Behavior normal.         Thought Content: Thought content normal.           Lab Results: I have reviewed the following results:CBC/BMP:   .     10/30/24  0500   WBC 5.93   HGB 12.2   HCT 36.7      SODIUM 139   K 3.3*      CO2 28   BUN 8   CREATININE 0.50*   GLUC 100   MG 2.0   PHOS 3.7      Imaging Results Review: No pertinent imaging studies reviewed.  Other Study Results Review: No additional pertinent studies reviewed.    Therapies:   Basic Mobility Inpatient Raw Score: 23  JH-HLM Goal: 7: Walk 25 feet or more  JH-HLM Achieved: 8: Walk 250 feet ot more  PT: consulted  OT: consulted    VTE Prophylaxis: VTE covered by:  apixaban, Oral, 5 mg at 10/30/24 0825     and Sequential compression device (Venodyne)     Code Status: Level 1 - Full  Code      Family and Social Support:   Discharge planning discussed with:: Patient and spouse at bedside.  Freedom of Choice: Yes  IMM Given (Date):: 10/30/24  IMM Given to:: Patient (Verbal review of IMM with patient and spouse at bedside.  Understanding verbalized, no questions or concerns.  Original to medical records.)      Goals of Care: Return home

## 2024-10-30 NOTE — DISCHARGE SUMMARY
Discharge Summary - Hospitalist   Name: Kirsty Em 74 y.o. female I MRN: [id number]  Unit/Bed#: 2 E 253-01 I Date of Admission: 10/27/2024   Date of Service: 10/30/2024 I Hospital Day: 3     Assessment & Plan  Drug-induced encephalopathy  PDMP reviewed, patient lately with monthly prescription for alprazolam 2 mg tablets X 120 QTY, also had oxycodone 5 mg of which she filled 28 tablets on 7/12/2024 then got 7 more tablets on 7/19/2024.  Additionally she also on Seroquel and Keppra and recently took Bactrim for UTI.  All of the above medications for her age population are high risk for confusion/metabolic encephalopathy.  Patient's presentation is very likely due to polypharmacy +/- infection (UTI/ PNA)    UA unremarkable in the ED (see UTI), no fever, no SIRS criteria.  UDS positive for benzodiazepines.   CT head was done in the ED , no acute abnormality   Blood Culture no growth for 48 hours  CT chest suspicious for possible aspiration but no airspace consolidation     Continue Xanax 1mg TID prn  Continue Gabapentin 100 TID.   Continue Seroquel 100mg bid  Continue Keppra 750mg bid  Treat UTI with Cefuroxime  Reaching out to Psych Dr. Ward for recommendations  Anxiety disorder  see drug-induced encephalopathy assessment and plan  Tobacco dependence  Patient smokes about 4 cigarettes a day with emphysema on CT scan   Nicotine patch ordered  History of stroke  In June 2024 had new onset A-fib and evidence of posterior left frontal cortex infarct complicated with seizure event  Continue daily aspirin 81 mg, also on Eliquis 5 twice daily and on Keppra 750 mg twice daily  Seizure disorder (HCC)  Due to stroke, see above.continue Keppra.  Brain mass  History of meningioma, follows up with neurosurgery and has a scheduled MRI brain on 12/12/2024  Polypharmacy  See drug-induced encephalopathy.  74 years old on gabapentin, Xanax on top of Keppra and Seroquel and 10/25 started Bactrim for suspected UTI.   Acute cystitis  "without hematuria  UA 10/2 & again 10/25 positive for nitrite and leucocytes, patient was recently started in the outpatient setting on Bactrim 10/25 - present now with metabolic encephalopathy as above. Discontinue Bactrim. Will given Ceftriaxone x 4 doses. However of note, patient denies urinary symptoms - will treat give age , AMS and recent + UA, and also suspected pneumonia.      Urine culture growing Providencia resistant strain  Continue Cefuroxime for UTI  Complete 5-day course  Acute respiratory failure with hypoxia (HCC)  Noted in mid 80's while in the ED requiring 2 L NC   CT Chest was done \"Mild dependent and basilar probable atelectasis. Recommend correlation for superimposed aspiration given small amount of debris/secretion within the dependent trachea. Otherwise no focal airspace consolidation or pleural effusion. \"     Given lethargy / AMS due to medications as above - at risk for aspiration . Other differential include respiratory depression due to xanax and gabapentin however RR wnl / mildly tachypnic not decreased.   Switched Rocephin to Zosyn due to resistant bacteriuria  NC O2 titrate for goal > 90%   Diet only when fully alert  Speech recommending regular diet thin liquids   Incentive spirometer   Frailty syndrome in geriatric patient  Lives at home with   Encounter for delirium elderly at risk (DEAR) screening  Delirium precautions  [place] (hard of hearing)    Ambulatory dysfunction      Discharging Physician / Practitioner: SINDY Madera  PCP: Brien Desouza MD  Admission Date:   Admission Orders (From admission, onward)       Ordered        10/27/24 0459  INPATIENT ADMISSION  Once                          Discharge Date: 10/30/24    Medical Problems       Resolved Problems  Date Reviewed: 10/25/2024   None         Consultations During Hospital Stay:  IP CONSULT TO GERONTOLOGY    Procedures Performed:   CT chest abdomen pelvis w contrast    Result Date: 10/27/2024  Mild " dependent and basilar probable atelectasis. Recommend correlation for superimposed aspiration given small amount of debris/secretion within the dependent trachea. Otherwise no focal airspace consolidation or pleural effusion. No acute abnormality identified in the abdomen or pelvis. No free air or free fluid Redemonstrated hepatomegaly and hepatic steatosis. Redemonstrated nonspecific endometrial thickening. GYN follow-up is recommended if not previously performed. Additional chronic/nonemergent findings as above. Workstation performed: [id number]     CTA head and neck with and without contrast    Result Date: 10/27/2024  CT Brain:  No acute intracranial abnormality. CT Angiography: No evidence of flow-limiting stenosis or large vessel occlusive disease within the major vessels of the [place]. Suspect slightly progressed high-grade stenosis at the right ICA origin secondary to calcified and noncalcified atheromatous plaque (approximately 80-90% by NASCET criteria) compared to the prior study dated 6/7/2024. Similar severe stenosis of the left ICA origin (approximately 70% by NASCET criteria). Patient is already being followed by neurovascular service per chart review in [facility]. Workstation performed: [id number]         Significant Findings / Test Results:   None    Incidental Findings:   None    Test Results Pending at Discharge (will require follow up):   None     Outpatient Tests Requested:  None    Complications: None    Past Medical History:   Diagnosis Date    Anxiety     Depression     Depression, recurrent (HCC) 04/20/2021    Hallucinations 04/20/2021    History of seizure disorder 04/20/2021       Reason for Admission: Dizziness (Pt arrived via ems with c/o feeling dizzy)       Hospital Course:     Kirsty Em is a 74 y.o. female patient with past medical history of above who originally presented to the hospital on 10/27/2024 due to Dizziness (Pt arrived via ems with c/o feeling dizzy) resenting with  "dizziness and altered mentation after extensive evaluation was found that this was multifactorial in the setting of UTI and polypharmacy exacerbated by Bactrim all of these medications in combination with the patient's age are what caused her altered mental status after correcting these medications and decreasing frequency and doses her mentation is returned back to baseline her antibiotics have been adjusted and she is back to normal    Please see above list of diagnoses and related plan for additional information.     Condition at Discharge: stable     Discharge Day Visit / Exam:     Subjective: She is lying in bed resting comfortably her  is at bedside she offers no acute complaints she is getting go home with home PT has been and wife are agreeable to plan  Vitals: Blood Pressure: 121/63 (10/30/24 0736)  Pulse: 70 (10/30/24 0736)  Temperature: 98.2 °F (36.8 °C) (10/30/24 0736)  Temp Source: Oral (10/30/24 0736)  Respirations: 18 (10/30/24 0736)  Height: 5' 1\" (154.9 cm) (10/27/24 0616)  Weight - Scale: 59 kg (130 lb) (10/27/24 0616)  SpO2: 93 % (10/30/24 0736)  Exam:   Physical Exam  Vitals reviewed.   Constitutional:       General: She is not in acute distress.  Cardiovascular:      Rate and Rhythm: Normal rate.   Pulmonary:      Effort: No respiratory distress.   Skin:     General: Skin is warm.   Neurological:      Mental Status: She is alert. Mental status is at baseline.   Psychiatric:         Mood and Affect: Mood normal.         Discussion with Family:  at bedside    Discharge instructions/Information to patient and family:   See after visit summary for information provided to patient and family.      Provisions for Follow-Up Care:  See after visit summary for information related to follow-up care and any pertinent home health orders.      Disposition:     Home with VNA Services (Reminder: Complete face to face encounter)    Planned Readmission: no45     Discharge Statement:  I spent 45 " minutes discharging the patient. This time was spent on the day of discharge. I had direct contact with the patient on the day of discharge. Greater than 50% of the total time was spent examining patient, answering all patient questions, arranging and discussing plan of care with patient as well as directly providing post-discharge instructions.  Additional time then spent on discharge activities.    Discharge Medications:  See after visit summary for reconciled discharge medications provided to patient and family.      ** Please Note: This note has been constructed using a voice recognition system **

## 2024-10-30 NOTE — ASSESSMENT & PLAN NOTE
At a baseline ambulates with cane but only when going outside of the house  Has had previous falls within the last 6 months, seen at hospital in June  Currently using RW during hospitalization  PT/OT following  Fall precautions  Out of bed as tolerated  Encourage early and frequent mobilization  Encourage adequate hydration and nutrition  Provide adequate pain management   Continue with PT/OT for continued strength and balance training

## 2024-10-30 NOTE — SPEECH THERAPY NOTE
Speech Language/Pathology     Speech/Language Pathology Progress Note     Patient Name: Kirsty Em    Today's Date: 10/30/2024     Problem List  Principal Problem:    Drug-induced encephalopathy  Active Problems:    Anxiety disorder    Tobacco dependence    Acute respiratory failure with hypoxia (HCC)    History of stroke    Seizure disorder (HCC)    Brain mass    Polypharmacy    Acute cystitis without hematuria    Frailty syndrome in geriatric patient    Encounter for delirium elderly at risk (DEAR) screening    [place] (hard of hearing)    Ambulatory dysfunction       Subjective:  Patient received awake and alert, upright in bed self-feeding breakfast.   present.     Previous/current diet: reg/thin     Objective:  The following consistencies were tested regular solids, thin liquids  Patient presents with WFL bolus containment, manipulation and control.  Mastication judged to be prolonged yet complete. Dentures present and in place.    No overt s/s of aspiration or distress. Vocal quality noted to remain clear.      Assessment:  Oropharyngeal swallow function appears baseline/WFL, intake is good.  No concerns reported by patient,  or nursing.      Plan:  Reg/thin   No further ST follow-up      Kirsten Marin MS, CCC-SLP  Speech-Language Pathologist  [place] #[id number]  [place] #[id number]

## 2024-10-30 NOTE — ASSESSMENT & PLAN NOTE
As listed above in drug induced encephalopathy  UA 10/2, 10/25 positive for Leukocytes and Nitrites  Bactrim discontinued, given Ceftriaxone   Currently denies urinary symptoms at time of exam

## 2024-10-30 NOTE — ASSESSMENT & PLAN NOTE
At a baseline is alert and oriented x  4   On exam patient is alert and oriented x 4  At baseline, being discharged home today  Both  and patient state have forgetfulness, memory loss but is worse in the setting of being very hard of hearing in left ear status post stroke in June  Does not wear hearing aids  Became confused/lethargic 10/26, came to hospital the early morning of 10/27  Seen at urgent care 10/25 for malodorous smelling urine, prescribed Bactrim for UTI  Per  started Friday, 10/25, one dose and received both doses on Saturday, 10/26  TSH 2021: 1.102  Vit B12 1/2024: 593  CT head 10/27/24: mild microangiopathic change  MRI 6/2024: acute infarct along posterior L frontal cortex; Small L frontal brain mass, meningioma; moderate chronic microangiopathic ischemic changes  Identify and treat reversible causes of confusion including infection, dehydration, electrolyte imbalance, anemia, hypoxia, urinary retention, constipation, pain, and sleep disturbance  At risk for delirium due to acute encephalopathy  Recommend delirium precautions  Maintain sleep-wake cycle, avoid nighttime interruptions  Provide adequate pain control  Avoid urinary retention and constipation  Provide frequent and early mobilization  Provide frequent redirection and reorientation as needed  Avoid medications that may worsen or precipitate delirium such as tramadol, benzodiazepines, anticholinergics, and Benadryl  Redirect unwanted behaviors as first-line therapy, avoid physical restraints as able to  Continue to monitor  No formal cognitive screening per chart review, would recommend completing once more at baseline cognitively

## 2024-10-30 NOTE — ASSESSMENT & PLAN NOTE
PDMP reviewed, patient lately with monthly prescription for alprazolam 2 mg tablets X 120 QTY, also had oxycodone 5 mg of which she filled 28 tablets on 7/12/2024 then got 7 more tablets on 7/19/2024.  Additionally she also on Seroquel and Keppra and recently took Bactrim for UTI.  All of the above medications for her age population are high risk for confusion/metabolic encephalopathy.  Patient's presentation is very likely due to polypharmacy +/- infection (UTI/ PNA)    UA unremarkable in the ED (see UTI), no fever, no SIRS criteria.  UDS positive for benzodiazepines.   CT head was done in the ED , no acute abnormality   Blood Culture no growth for 48 hours  CT chest suspicious for possible aspiration but no airspace consolidation     Continue Xanax 1mg TID prn  Continue Gabapentin 100 TID.   Continue Seroquel 100mg bid  Continue Keppra 750mg bid  Treat UTI with Cefuroxime  Reaching out to Psych Dr. Ward for recommendations

## 2024-10-30 NOTE — ASSESSMENT & PLAN NOTE
Currently prescribed PTA: Xanax 2 mg four times daily, Gabapentin 300 mg TID, Keppra 750 mg Q12H, Seroquel 100 mg Q12H  Patient husbands states in rare instances, patient has severe panic attacks at night and will give extra dose of Xanax (so will get 10 mg in a day; however very rarely occurs)  Doses and medications not recommended in geriatric population due to side effects: confusion, somnolence, increased risk for falls, respiratory failure  All besides Keppra prescribed by Psychologist, Dr. Ward who she sees outpatient ~ every 3 months  Hx of depression, anxiety  Per Epic chart review appears to have been started on Seroquel and Xanax in ~ 2020/2021  Continue to wean off Xanax  Currently during hospitalization: decreased Xanax to 1 mg TID PRN; received one dose this morning at 0900  Continue to monitor for withdrawal symptoms/seizures since appears to have been taking Xanax at this dose and frequency since 2023  Previously prescribed 2 mg four times daily PRN  Primary provider tried to call Psychiatrist and left voicemail to discuss dosing/frequency  Decreased Gabapentin to 100 mg TID scheduled, received all doses  Continuing previous dose of Seroquel and Keppra  Slept well last night  Has not had withdrawal symptoms since hospitalization, actually less lethargic, more alert  Dose and frequency of Xanax in geriatric patient is not recommended due to side effects of drowsiness, headaches, increased fall risk, confusion, dizziness/lightheadedness, however seizure history noted  Due to seizure history since June 2024, ordered scheduled Xanax 2 mg BID once in morning and at bedtime currently   Concern for being on Xanax for over 20 years and tapering; spoke with  and patient about decreasing to 2 mg TID and has appointment with Psychiatrist mid November and will decrease to 2 mg BID at that time

## 2024-10-31 DIAGNOSIS — Z71.89 COMPLEX CARE COORDINATION: Primary | ICD-10-CM

## 2024-11-01 ENCOUNTER — PATIENT OUTREACH (OUTPATIENT)
Dept: CASE MANAGEMENT | Facility: OTHER | Age: 74
End: 2024-11-01

## 2024-11-01 LAB
BACTERIA BLD CULT: NORMAL
BACTERIA BLD CULT: NORMAL

## 2024-11-01 NOTE — PROGRESS NOTES
Spoke with Kirsty and her . Reports she is doing good. Very happy with her care during her hospitalization   manages her medications with a pillbox. Reviewed med changes. Home health nurse will be out today.   Walking independently.   No questions or concerns to see pcp on 11/4/24  I will check back in two weeks.

## 2024-11-04 ENCOUNTER — OFFICE VISIT (OUTPATIENT)
Dept: FAMILY MEDICINE CLINIC | Facility: CLINIC | Age: 74
End: 2024-11-04
Payer: MEDICARE

## 2024-11-04 VITALS
HEART RATE: 81 BPM | SYSTOLIC BLOOD PRESSURE: 112 MMHG | BODY MASS INDEX: 23.79 KG/M2 | WEIGHT: 126 LBS | TEMPERATURE: 97.5 F | DIASTOLIC BLOOD PRESSURE: 70 MMHG | HEIGHT: 61 IN | OXYGEN SATURATION: 95 %

## 2024-11-04 DIAGNOSIS — G92.8 DRUG-INDUCED ENCEPHALOPATHY: ICD-10-CM

## 2024-11-04 DIAGNOSIS — Z09 HOSPITAL DISCHARGE FOLLOW-UP: Primary | ICD-10-CM

## 2024-11-04 DIAGNOSIS — I48.91 NEW ONSET A-FIB (HCC): ICD-10-CM

## 2024-11-04 DIAGNOSIS — I10 PRIMARY HYPERTENSION: ICD-10-CM

## 2024-11-04 DIAGNOSIS — E78.2 MIXED HYPERLIPIDEMIA: ICD-10-CM

## 2024-11-04 PROCEDURE — 99495 TRANSJ CARE MGMT MOD F2F 14D: CPT | Performed by: STUDENT IN AN ORGANIZED HEALTH CARE EDUCATION/TRAINING PROGRAM

## 2024-11-04 NOTE — PROGRESS NOTES
Transition of Care Visit  Name: Kirsty Em      : 1950      MRN: [id number]  Encounter Provider: Brien Desouza MD  Encounter Date: 2024   Encounter department: [facility] [address] [place] [address]    Assessment & Plan  Hospital discharge follow-up         Drug-induced encephalopathy         Primary hypertension  Check BP at home via home nursing. Hold lisinopril and follow up in 1 month to recheck       New onset a-fib (HCC)         Mixed hyperlipidemia    Orders:    Comprehensive metabolic panel; Future    Lipid panel; Future     Can consider senior care , hold lisnopril, recheck in the next few weeks. Check lipids    History of Present Illness     Transitional Care Management Review:   Kristy Em is a 74 y.o. female here for TCM follow up.     Kirsty Em is a 74 y.o. female patient with past medical history of above who originally presented to the hospital on 10/27/2024 due to Dizziness (Pt arrived via ems with c/o feeling dizzy) resenting with dizziness and altered mentation after extensive evaluation was found that this was multifactorial in the setting of UTI and polypharmacy exacerbated by Bactrim all of these medications in combination with the patient's age are what caused her altered mental status after correcting these medications and decreasing frequency and doses her mentation is returned back to baseline her antibiotics have been adjusted and she is back to guido SALAZAR MD for psychiatric care under Dr Rivera Mccann office at [facility]. At the hospital, xanax was decreased to TID and the gabapentin to 100mg TID (instead of 300mg TID)    During the TCM phone call patient stated:  TCM Call       Date and time call was made  10/30/2024  2:42 PM    Hospital care reviewed  Records reviewed    Patient was hospitial[facility] at  [facility]    Date of Admission  10/27/24    Date of discharge  10/30/24    Diagnosis  Drug-induced encephalopathy  "Principal problem    Disposition  Home    Were the patients medications reviewed and updated  Yes    Current Symptoms  None          TCM Call       Post hospital issues  None    Should patient be enrolled in anticoag monitoring?  No    Scheduled for follow up?  Yes    Did you obtain your prescribed medications  Yes    Do you need help managing your prescriptions or medications  No    Is transportation to your appointment needed  No    I have advised the patient to call PCP with any new or worsening symptoms  Palma Swift/ MA    Living Arrangements  Spouse or Significiant other    Support System  Spouse    The type of support provided  Emotional    Do you have social support  Yes, as much as I need    Are you recieving any outpatient services  No    Are you recieving home care services  No    Are you using any community resources  No    Current waiver services  No    Have you fallen in the last 12 months  No    Interperter language line needed  No    Counseling  Patient    Counseling topics  Activities of daily living; patient and family education    Comments  TCM scheduled for 11/04 at 3 pm          HPI  Review of Systems   Constitutional:  Negative for chills, fatigue and fever.   HENT:  Negative for rhinorrhea and sore throat.    Eyes:  Negative for visual disturbance.   Respiratory:  Negative for cough and shortness of breath.    Cardiovascular:  Negative for chest pain and palpitations.   Gastrointestinal:  Negative for abdominal pain, constipation, diarrhea, nausea and vomiting.   Genitourinary:  Negative for difficulty urinating, dysuria and frequency.   Musculoskeletal:  Negative for arthralgias and myalgias.   Skin:  Negative for color change and rash.   Neurological:  Negative for weakness and headaches.     Objective     /70   Pulse 81   Temp 97.5 °F (36.4 °C)   Ht 5' 1\" (1.549 m)   Wt 57.2 kg (126 lb)   SpO2 95%   BMI 23.81 kg/m²     Physical Exam  Constitutional:       General: She is not " in acute distress.     Appearance: Normal appearance. She is not ill-appearing.   HENT:      Head: Normocephalic and atraumatic.      Right Ear: Tympanic membrane, ear canal and external ear normal.      Left Ear: Tympanic membrane, ear canal and external ear normal.      Nose: Nose normal.      Mouth/Throat:      Mouth: Mucous membranes are moist.      Pharynx: Oropharynx is clear. No oropharyngeal exudate or posterior oropharyngeal erythema.   Eyes:      General: No scleral icterus.        Right eye: No discharge.         Left eye: No discharge.      Extraocular Movements: Extraocular movements intact.      Conjunctiva/sclera: Conjunctivae normal.      Pupils: Pupils are equal, round, and reactive to light.   Cardiovascular:      Rate and Rhythm: Normal rate and regular rhythm.      Pulses: Normal pulses.      Heart sounds: Normal heart sounds. No murmur heard.  Pulmonary:      Effort: Pulmonary effort is normal. No respiratory distress.      Breath sounds: Normal breath sounds.   Abdominal:      General: Bowel sounds are normal.      Palpations: Abdomen is soft.      Tenderness: There is no abdominal tenderness.   Musculoskeletal:         General: Normal range of motion.      Cervical back: Normal range of motion and neck supple.   Lymphadenopathy:      Cervical: No cervical adenopathy.   Skin:     General: Skin is warm and dry.      Capillary Refill: Capillary refill takes less than 2 seconds.   Neurological:      General: No focal deficit present.      Mental Status: She is alert and oriented to person, place, and time. Mental status is at baseline.      Cranial Nerves: No cranial nerve deficit.   Psychiatric:         Mood and Affect: Mood normal.       Medications have been reviewed by provider in current encounter

## 2024-11-13 ENCOUNTER — TELEPHONE (OUTPATIENT)
Age: 74
End: 2024-11-13

## 2024-11-13 NOTE — TELEPHONE ENCOUNTER
called in stating that patient needs a MRI done it has been scheduled and she needs to be under anesthesia. They need her to have medical clearance to perform this test. He is asking if Kirsty needs to be seen for this clearance or if he can get a letter written to allow her to have MRI. Can call Wilver back at [phone]

## 2024-11-14 NOTE — TELEPHONE ENCOUNTER
No clearance visit needed from my end, she is medically optimized to proceed with anesthesia assisted MRI

## 2024-11-15 ENCOUNTER — PATIENT OUTREACH (OUTPATIENT)
Dept: CASE MANAGEMENT | Facility: OTHER | Age: 74
End: 2024-11-15

## 2024-11-15 NOTE — PROGRESS NOTES
Kirsty's  answered she is sleeping. States she is doing good Continues to work with nursing PT and OT. Wilver said nurse was there yesterday and blood pressure was good does not remember the reading.  No questions about medications or appointments.

## 2024-11-19 ENCOUNTER — APPOINTMENT (OUTPATIENT)
Dept: RADIOLOGY | Facility: AMBULARY SURGERY CENTER | Age: 74
End: 2024-11-19
Attending: STUDENT IN AN ORGANIZED HEALTH CARE EDUCATION/TRAINING PROGRAM
Payer: MEDICARE

## 2024-11-19 ENCOUNTER — OFFICE VISIT (OUTPATIENT)
Dept: OBGYN CLINIC | Facility: CLINIC | Age: 74
End: 2024-11-19
Payer: MEDICARE

## 2024-11-19 VITALS — WEIGHT: 125 LBS | BODY MASS INDEX: 22.15 KG/M2 | HEIGHT: 63 IN

## 2024-11-19 DIAGNOSIS — S62.511A CLOSED DISPLACED FRACTURE OF PROXIMAL PHALANX OF RIGHT THUMB, INITIAL ENCOUNTER: ICD-10-CM

## 2024-11-19 DIAGNOSIS — S62.511D CLOSED DISPLACED FRACTURE OF PROXIMAL PHALANX OF RIGHT THUMB WITH ROUTINE HEALING, SUBSEQUENT ENCOUNTER: Primary | ICD-10-CM

## 2024-11-19 PROCEDURE — 73140 X-RAY EXAM OF FINGER(S): CPT

## 2024-11-19 PROCEDURE — 99214 OFFICE O/P EST MOD 30 MIN: CPT | Performed by: STUDENT IN AN ORGANIZED HEALTH CARE EDUCATION/TRAINING PROGRAM

## 2024-11-19 RX ORDER — FLUTICASONE PROPIONATE 50 MCG
SPRAY, SUSPENSION (ML) NASAL
COMMUNITY
Start: 2024-11-03

## 2024-11-19 NOTE — PRE-PROCEDURE INSTRUCTIONS
Pre-Surgery Instructions:   Medication Instructions    acetaminophen (TYLENOL) 325 mg tablet Uses PRN- OK to take day of surgery    ALPRAZolam (XANAX) 2 MG tablet Uses PRN- OK to take day of surgery    apixaban (Eliquis) 5 mg Take day of surgery.    aspirin 81 mg chewable tablet Take day of surgery.    diltiazem (CARDIZEM CD) 120 mg 24 hr capsule Take day of surgery.    ezetimibe (ZETIA) 10 mg tablet Take day of surgery.    fluticasone (FLONASE) 50 mcg/act nasal spray Take day of surgery.    gabapentin (NEURONTIN) 100 mg capsule Take day of surgery.    Icosapent Ethyl 1 g CAPS Take day of surgery.    levETIRAcetam (KEPPRA) 750 mg tablet Take day of surgery.    Multiple Vitamin (multivitamin) tablet Hold day of surgery.    QUEtiapine (SEROquel) 100 mg tablet Take night before surgery    rosuvastatin (CRESTOR) 10 MG tablet Take night before surgery    vitamin B-12 (VITAMIN B-12) 1,000 mcg tablet Hold day of surgery.    Medication instructions for day of procedure reviewed. Please use only a sip of water to take your instructed morning medications (if any).      You will receive a call one business day prior to procedure with an arrival time and hospital directions. If procedure is scheduled on a Monday, the hospital will be calling you on the Friday prior to your procedure. If you have not heard from anyone by 8pm, please call the hospital supervisor through the hospital  at [phone]. (Saw [phone]).     Please do not eat or drink after 11 pm the night before the MRI. Please take your medications with a sip of water at least 2 hours prior to their arrival time.     Please shower either the night prior to the procedure or the morning of the procedure. Dress in clean, comfortable clothes. All patients will be required to change into a hospital gown. Street clothes are not permitted in the MRI. Magnetic nail polish must be removed prior to the arrival.      Keep any valuables, jewelry, piercings at  home.     Please bring your insurance cards, a form of photo ID, physician order, and a list of medications with you. Arrive 15 minutes prior to your given arrival time in order to register. Please bring any prior CT or MRI studies of the same area that were not performed at a [facility] along.      Arrange for a responsible person to drive patient to and from the hospital on the day of the procedure. Visitor Guidelines discussed.     Call the prescribing physician's office with any new illnesses, exposures, or additional questions prior to procedure.

## 2024-11-19 NOTE — PROGRESS NOTES
ORTHOPAEDIC HAND, WRIST, AND ELBOW OFFICE  VISIT      ASSESSMENT/PLAN:      Diagnoses and all orders for this visit:    Closed displaced fracture of proximal phalanx of right thumb with routine healing, subsequent encounter  -     XR thumb right first digit-min 2v; Future          74 y.o. female s/p ORIF right thumb, DOS 6/27/24    The patient is doing well. X-rays were reviewed in the office today. The patient has improvement in her range of motion since her last visit. She was instructed to continue with OT. We again discussed she management for osteoporosis. We offered patient endocrinology follow-up to discuss, but she politely declined stating she already sees too many doctors. The patient was open to discussing with her PCP. The patient has an appointment with her PCP in January. I sent a message to patient's PCP to discuss at their next follow-up. PCP agreed to help with management of this. The patient is on a Centrum vitamin and was provided with the proper dosing of Vitamin D (800-1000 IU daily) and Calcium (500-600 mg daily) and was advised to check to see if the Centrum has the proper dose.      Follow Up:  PRN       To Do Next Visit:             Dagobetro Yates MD  Attending, Orthopaedic Surgery  Hand, Wrist, and Elbow Surgery  [facility]    ______________________________________________________________________________________________    CHIEF COMPLAINT:  Chief Complaint   Patient presents with   • Follow-up     Follow up of the right thumb. Its weak and sometimes she can't reach up to pull anything down or lift anything heavy.        SUBJECTIVE:  Patient is a 74 y.o. RHD female who presents today for follow up of s/p ORIF right thumb, DOS 6/27/24. The patient states she is doing well. She states she is doing well and has not complaints. She has been getting home OT. She has been taking Tylenol for pain.     Occupation: retired     I have personally reviewed all the relevant PMH,  PSH, SH, FH, Medications and allergies      PAST MEDICAL HISTORY:  Past Medical History:   Diagnosis Date   • Anxiety    • Depression    • Depression, recurrent (HCC) 2021   • Hallucinations 2021   • History of seizure disorder 2021       PAST SURGICAL HISTORY:  Past Surgical History:   Procedure Laterality Date   • ORIF FINGER FRACTURE Right 2024    Procedure: ORIF OF RIGHT THUMB;  Surgeon: Dagoberto Yates MD;  Location[facility];  Service: Orthopedics       FAMILY HISTORY:  Family History   Problem Relation Age of Onset   • Diabetes Mother    • Anxiety disorder Mother    • Stroke Father    • No Known Problems Sister    • No Known Problems Sister    • No Known Problems Sister    • No Known Problems Daughter    • No Known Problems Daughter    • No Known Problems Daughter    • No Known Problems Maternal Grandmother    • No Known Problems Paternal Grandmother    • No Known Problems Maternal Aunt    • No Known Problems Maternal Aunt    • No Known Problems Maternal Aunt    • No Known Problems Maternal Aunt    • No Known Problems Maternal Aunt    • No Known Problems Maternal Aunt    • No Known Problems Maternal Aunt    • No Known Problems Maternal Aunt    • No Known Problems Paternal Aunt    • Breast cancer Neg Hx        SOCIAL HISTORY:  Social History     Tobacco Use   • Smoking status: Every Day     Current packs/day: 0.00     Average packs/day: 0.5 packs/day for 54.3 years (27.2 ttl pk-yrs)     Types: Cigarettes     Start date:      Last attempt to quit: 2024     Years since quittin.5   • Smokeless tobacco: Never   Vaping Use   • Vaping status: Never Used   Substance Use Topics   • Alcohol use: Never   • Drug use: Never       MEDICATIONS:    Current Outpatient Medications:   •  acetaminophen (TYLENOL) 325 mg tablet, Take 975 mg by mouth every 8 (eight) hours as needed for mild pain, Disp: , Rfl:   •  ALPRAZolam (XANAX) 2 MG tablet, Take 1 tablet (2 mg total) by mouth 3 (three)  times a day as needed for anxiety for up to 10 days, Disp: 20 tablet, Rfl: 0  •  apixaban (Eliquis) 5 mg, Take 1 tablet (5 mg total) by mouth 2 (two) times a day, Disp: 60 tablet, Rfl: 5  •  aspirin 81 mg chewable tablet, Chew 1 tablet (81 mg total) daily, Disp: 60 tablet, Rfl: 0  •  diltiazem (CARDIZEM CD) 120 mg 24 hr capsule, Take 1 capsule (120 mg total) by mouth daily, Disp: 60 capsule, Rfl: 5  •  ezetimibe (ZETIA) 10 mg tablet, TAKE 1 TABLET BY MOUTH EVERY DAY, Disp: 100 tablet, Rfl: 1  •  fluticasone (FLONASE) 50 mcg/act nasal spray, PLACE 2 SPRAYS IN EACH NOSTRIL DAILY, Disp: , Rfl:   •  gabapentin (NEURONTIN) 100 mg capsule, Take 1 capsule (100 mg total) by mouth 3 (three) times a day, Disp: 90 capsule, Rfl: 0  •  Icosapent Ethyl 1 g CAPS, Take 2 capsules (2 g total) by mouth 2 (two) times a day, Disp: 360 capsule, Rfl: 6  •  levETIRAcetam (KEPPRA) 750 mg tablet, Take 1 tablet (750 mg total) by mouth every 12 (twelve) hours, Disp: 120 tablet, Rfl: 2  •  Multiple Vitamin (multivitamin) tablet, Take 1 tablet by mouth daily, Disp: , Rfl:   •  QUEtiapine (SEROquel) 100 mg tablet, Take 1 tablet (100 mg total) by mouth every 12 (twelve) hours, Disp: 60 tablet, Rfl: 0  •  rosuvastatin (CRESTOR) 10 MG tablet, Take 1 tablet (10 mg total) by mouth daily, Disp: 30 tablet, Rfl: 6  •  vitamin B-12 (VITAMIN B-12) 1,000 mcg tablet, Take by mouth daily, Disp: , Rfl:   •  naloxone (NARCAN) 4 mg/0.1 mL nasal spray, Administer 1 spray into a nostril. If no response after 2-3 minutes, give another dose in the other nostril using a new spray. (Patient not taking: Reported on 11/4/2024), Disp: 1 each, Rfl: 1    ALLERGIES:  Allergies   Allergen Reactions   • Lipitor [Atorvastatin] Other (See Comments)     Muscle aches           REVIEW OF SYSTEMS:  Musculoskeletal:        As noted in HPI.   All other systems reviewed and are negative.    VITALS:  Vitals:       LABS:  HgA1c:   Lab Results   Component Value Date    HGBA1C 6.3 (H)  06/09/2024     BMP:   Lab Results   Component Value Date    GLUCOSE 199 (H) 06/07/2024    CALCIUM 9.8 10/30/2024    K 3.3 (L) 10/30/2024    CO2 28 10/30/2024     10/30/2024    BUN 8 10/30/2024    CREATININE 0.50 (L) 10/30/2024       _____________________________________________________  PHYSICAL EXAMINATION:  General: Well developed and well nourished, alert & oriented x 3, appears comfortable  Psychiatric: Normal  HEENT: Normocephalic, Atraumatic Trachea Midline, No torticollis  Pulmonary: No audible wheezing or respiratory distress   Abdomen/GI: Non tender, non distended   Cardiovascular: No pitting edema, 2+ radial pulse   Skin: No masses, erythema, lacerations, fluctation, ulcerations  Neurovascular: Sensation Intact to the Median, Ulnar, Radial Nerve, Motor Intact to the Median, Ulnar, Radial Nerve, and Pulses Intact  Musculoskeletal: Normal, except as noted in detailed exam and in HPI.      MUSCULOSKELETAL EXAMINATION:  Right thumb  Minimal edema  MCP flexion 45  MCP hyperextends 15  IP flexion 15 hyperextension to 35 flexion  Composite fist  Full digit extension  No tenderness to palpation of thumb  Sensation intact to light touch to all digits      ___________________________________________________  STUDIES REVIEWED:  Xrays of the right thumb were reviewed and independently interpreted in PACS by Dr. Yates and demonstrate healed prior distal and proximal phalanx fractures. Some extension at proximal phalanx base          PROCEDURES PERFORMED:  Procedures  No Procedures performed today    _____________________________________________________      Scribe Attestation      I,:  Jennifer Freitas MA am acting as a scribe while in the presence of the attending physician.:       I,:  Dagoberto Yates MD personally performed the services described in this documentation    as scribed in my presence.:

## 2024-11-21 DIAGNOSIS — G40.909 SEIZURE DISORDER (HCC): ICD-10-CM

## 2024-11-21 DIAGNOSIS — I65.23 BILATERAL CAROTID ARTERY STENOSIS: ICD-10-CM

## 2024-11-21 NOTE — TELEPHONE ENCOUNTER
Patient's spouse had an appointment today and upon check in, he brought to my attention some correspondence from [facility] he received. Basically, going forward, her Rosuvastatin script MUST be ordered for a 90-day supply or else the insurance will not pay for it.    Going forward, may we please order her scripts to be 90-day supplies.    Please advise, thank you!

## 2024-11-29 PROBLEM — N30.00 ACUTE CYSTITIS WITHOUT HEMATURIA: Status: RESOLVED | Noted: 2024-10-27 | Resolved: 2024-11-29

## 2024-12-02 RX ORDER — LEVETIRACETAM 750 MG/1
750 TABLET ORAL EVERY 12 HOURS SCHEDULED
Qty: 120 TABLET | Refills: 2 | Status: SHIPPED | OUTPATIENT
Start: 2024-12-02 | End: 2025-01-31

## 2024-12-02 RX ORDER — ROSUVASTATIN CALCIUM 10 MG/1
10 TABLET, COATED ORAL DAILY
Qty: 90 TABLET | Refills: 0 | Status: SHIPPED | OUTPATIENT
Start: 2024-12-02

## 2024-12-04 ENCOUNTER — CONSULT (OUTPATIENT)
Age: 74
End: 2024-12-04
Payer: MEDICARE

## 2024-12-04 ENCOUNTER — PREP FOR PROCEDURE (OUTPATIENT)
Age: 74
End: 2024-12-04

## 2024-12-04 VITALS
BODY MASS INDEX: 21.97 KG/M2 | SYSTOLIC BLOOD PRESSURE: 128 MMHG | HEIGHT: 63 IN | DIASTOLIC BLOOD PRESSURE: 80 MMHG | OXYGEN SATURATION: 96 % | HEART RATE: 108 BPM | WEIGHT: 124 LBS

## 2024-12-04 DIAGNOSIS — R19.5 POSITIVE COLORECTAL CANCER SCREENING USING COLOGUARD TEST: Primary | ICD-10-CM

## 2024-12-04 PROCEDURE — 99204 OFFICE O/P NEW MOD 45 MIN: CPT | Performed by: PHYSICIAN ASSISTANT

## 2024-12-04 PROCEDURE — 99214 OFFICE O/P EST MOD 30 MIN: CPT | Performed by: PHYSICIAN ASSISTANT

## 2024-12-04 RX ORDER — SODIUM CHLORIDE, SODIUM LACTATE, POTASSIUM CHLORIDE, CALCIUM CHLORIDE 600; 310; 30; 20 MG/100ML; MG/100ML; MG/100ML; MG/100ML
125 INJECTION, SOLUTION INTRAVENOUS CONTINUOUS
OUTPATIENT
Start: 2024-12-04

## 2024-12-04 NOTE — PATIENT INSTRUCTIONS
Scheduled date of colonoscopy (as of today): 12/17/24  Physician performing colonoscopy: Erin  Location of colonoscopy: [place]  Bowel prep reviewed with patient: Miralax  Instructions reviewed with patient by: Danielle MORALES  Clearances: Eliquis- 2 day hold

## 2024-12-04 NOTE — PROGRESS NOTES
[facility] - Outpatient Consultation  Kirsty Em 74 y.o. female MRN: [id number]  Encounter: [id number]          ASSESSMENT AND PLAN:      1. Positive colorectal cancer screening using Cologuard test (Primary)    Patient presents for evaluation of a positive Cologuard test.  No family hx of colon cancer.  No prior colonoscopy.    Will plan for colonoscopy to investigate.    Note: She is on Eliquis for atrial fibrillation and will hold 2 days prior to the procedure.  ______________________________________________________________________    HPI:  Patient is a pleasant 74 year old female with a PMH of atrial fibrillation on Eliquis, CVA, seizures, anxiety, tobacco use who presents to the office for evaluation of a positive Cologuard. Patient had a positive Cologuard test in August.  She has never had a colonoscopy.  No family history of colon cancer.  No rectal bleeding, changes in her bowel movements, or abdominal pain.  She recently saw her cardiologist who advised her it is okay to hold her Eliquis 2 days prior to the colonoscopy.    I discussed informed consent with the patient for the colonoscopy. The risks/benefits of the procedure were discussed with the patient. Risks included, but not limited to, infection, bleeding, perforation were discussed. Patient was agreeable.       REVIEW OF SYSTEMS:    CONSTITUTIONAL: Denies any fever, chills, rigors, and weight loss.  HEENT: No earache or tinnitus. Denies hearing loss or visual disturbances.  CARDIOVASCULAR: No chest pain or palpitations.   RESPIRATORY: Denies any cough, hemoptysis, shortness of breath or dyspnea on exertion.  GASTROINTESTINAL: As noted in the History of Present Illness.   GENITOURINARY: No problems with urination. Denies any hematuria or dysuria.  NEUROLOGIC: No dizziness or vertigo, denies headaches.   MUSCULOSKELETAL: Denies any muscle or joint pain.   SKIN: Denies skin rashes or itching.   ENDOCRINE: Denies excessive  thirst. Denies intolerance to heat or cold.  PSYCHOSOCIAL: Denies depression or anxiety. Denies any recent memory loss.       Historical Information   Past Medical History:   Diagnosis Date    Anxiety     Depression     Depression, recurrent (HCC) 2021    Hallucinations 2021    History of seizure disorder 2021     Past Surgical History:   Procedure Laterality Date    ORIF FINGER FRACTURE Right 2024    Procedure: ORIF OF RIGHT THUMB;  Surgeon: Dagoberto Yates MD;  [facility];  Service: Orthopedics     Social History   Social History     Substance and Sexual Activity   Alcohol Use Never     Social History     Substance and Sexual Activity   Drug Use Never     Social History     Tobacco Use   Smoking Status Every Day    Current packs/day: 0.00    Average packs/day: 0.5 packs/day for 54.3 years (27.2 ttl pk-yrs)    Types: Cigarettes    Start date:     Last attempt to quit: 2024    Years since quittin.5   Smokeless Tobacco Never     Family History   Problem Relation Age of Onset    Diabetes Mother     Anxiety disorder Mother     Stroke Father     No Known Problems Sister     No Known Problems Sister     No Known Problems Sister     No Known Problems Daughter     No Known Problems Daughter     No Known Problems Daughter     No Known Problems Maternal Grandmother     No Known Problems Paternal Grandmother     No Known Problems Maternal Aunt     No Known Problems Maternal Aunt     No Known Problems Maternal Aunt     No Known Problems Maternal Aunt     No Known Problems Maternal Aunt     No Known Problems Maternal Aunt     No Known Problems Maternal Aunt     No Known Problems Maternal Aunt     No Known Problems Paternal Aunt     Breast cancer Neg Hx        Meds/Allergies       Current Outpatient Medications:     acetaminophen (TYLENOL) 325 mg tablet    apixaban (Eliquis) 5 mg    diltiazem (CARDIZEM CD) 120 mg 24 hr capsule    ezetimibe (ZETIA) 10 mg tablet    fluticasone  "(FLONASE) 50 mcg/act nasal spray    Icosapent Ethyl 1 g CAPS    levETIRAcetam (KEPPRA) 750 mg tablet    Multiple Vitamin (multivitamin) tablet    QUEtiapine (SEROquel) 100 mg tablet    rosuvastatin (CRESTOR) 10 MG tablet    vitamin B-12 (VITAMIN B-12) 1,000 mcg tablet    ALPRAZolam (XANAX) 2 MG tablet    aspirin 81 mg chewable tablet    gabapentin (NEURONTIN) 100 mg capsule    naloxone (NARCAN) 4 mg/0.1 mL nasal spray    Allergies   Allergen Reactions    Lipitor [Atorvastatin] Other (See Comments)     Muscle aches           Objective     Blood pressure 128/80, pulse (!) 108, height 5' 3\" (1.6 m), weight 56.2 kg (124 lb), SpO2 96%. Body mass index is 21.97 kg/m².        PHYSICAL EXAM:      General Appearance:   Alert, cooperative, no distress   HEENT:   Normocephalic, atraumatic, anicteric.     Neck:  Supple, symmetrical, trachea midline   Lungs:   Clear to auscultation bilaterally; no rales, rhonchi or wheezing; respirations unlabored    Heart::   Regular rate and rhythm; no murmur, rub, or gallop.   Abdomen:   Soft, non-tender, non-distended; normal bowel sounds; no masses, no organomegaly    Genitalia:   Deferred    Rectal:   Deferred    Extremities:  No cyanosis, clubbing   Pulses:  2+ and symmetric    Skin:  No jaundice, rashes, or lesions    Lymph nodes:  No palpable cervical lymphadenopathy        Lab Results:   No visits with results within 1 Day(s) from this visit.   Latest known visit with results is:   Admission on 10/27/2024, Discharged on 10/30/2024   Component Date Value    WBC 10/27/2024 8.13     RBC 10/27/2024 3.72 (L)     Hemoglobin 10/27/2024 12.0     Hematocrit 10/27/2024 36.9     MCV 10/27/2024 99 (H)     MCH 10/27/2024 32.3     MCHC 10/27/2024 32.5     RDW 10/27/2024 12.7     MPV 10/27/2024 10.2     Platelets 10/27/2024 326     nRBC 10/27/2024 0     Segmented % 10/27/2024 54     Immature Grans % 10/27/2024 0     Lymphocytes % 10/27/2024 37     Monocytes % 10/27/2024 7     Eosinophils Relative " 10/27/2024 1     Basophils Relative 10/27/2024 1     Absolute Neutrophils 10/27/2024 4.44     Absolute Immature Grans 10/27/2024 0.02     Absolute Lymphocytes 10/27/2024 2.98     Absolute Monocytes 10/27/2024 0.54     Eosinophils Absolute 10/27/2024 0.11     Basophils Absolute 10/27/2024 0.04     Sodium 10/27/2024 142     Potassium 10/27/2024 3.8     Chloride 10/27/2024 105     CO2 10/27/2024 29     ANION GAP 10/27/2024 8     BUN 10/27/2024 15     Creatinine 10/27/2024 0.82     Glucose 10/27/2024 88     Calcium 10/27/2024 9.2     AST 10/27/2024 30     ALT 10/27/2024 36     Alkaline Phosphatase 10/27/2024 79     Total Protein 10/27/2024 7.4     Albumin 10/27/2024 4.2     Total Bilirubin 10/27/2024 0.21     eGFR 10/27/2024 71     hs TnI 0hr 10/27/2024 <2     BNP 10/27/2024 27     LACTIC ACID 10/27/2024 1.0     SARS COV Rapid Antigen 10/27/2024 Negative     Influenza A Rapid Antigen 10/27/2024 Negative     Influenza B Rapid Antigen 10/27/2024 Negative     Color, UA 10/27/2024 Light Yellow     Clarity, UA 10/27/2024 Clear     Specific Gravity, UA 10/27/2024 1.018     pH, UA 10/27/2024 6.5     Leukocytes, UA 10/27/2024 Negative     Nitrite, UA 10/27/2024 Negative     Protein, UA 10/27/2024 Negative     Glucose, UA 10/27/2024 Negative     Ketones, UA 10/27/2024 Negative     Urobilinogen, UA 10/27/2024 <2.0     Bilirubin, UA 10/27/2024 Negative     Occult Blood, UA 10/27/2024 Negative     Blood Culture 10/27/2024 No Growth After 5 Days.     Blood Culture 10/27/2024 No Growth After 5 Days.     Protime 10/27/2024 16.0 (H)     INR 10/27/2024 1.21 (H)     PTT 10/27/2024 40 (H)     Ventricular Rate 10/27/2024 72     Atrial Rate 10/27/2024 72     HI Interval 10/27/2024 160     QRSD Interval 10/27/2024 76     QT Interval 10/27/2024 412     QTC Interval 10/27/2024 451     P Axis 10/27/2024 72     QRS Axis 10/27/2024 96     T Wave [place] 10/27/2024 62     hs TnI 2hr 10/27/2024 <2     Delta 2hr hsTnI 10/27/2024      hs TnI 4hr  10/27/2024 <2     Delta 4hr hsTnI 10/27/2024      Ammonia 10/28/2024 31     Amph/Meth UR 10/27/2024 Negative     Barbiturate Ur 10/27/2024 Negative     Benzodiazepine Urine 10/27/2024 Positive (A)     Cocaine Urine 10/27/2024 Negative     Methadone Urine 10/27/2024 Negative     Opiate Urine 10/27/2024 Negative     PCP Ur 10/27/2024 Negative     THC Urine 10/27/2024 Negative     Oxycodone Urine 10/27/2024 Negative     Fentanyl Urine 10/27/2024 Negative     HYDROCODONE URINE 10/27/2024 Negative     Ethanol Lvl 10/27/2024 <10     Salicylate Lvl 10/27/2024 <5     Acetaminophen Level 10/27/2024 2 (L)     WBC 10/28/2024 8.07     RBC 10/28/2024 3.52 (L)     Hemoglobin 10/28/2024 11.4 (L)     Hematocrit 10/28/2024 34.8     MCV 10/28/2024 99 (H)     MCH 10/28/2024 32.4     MCHC 10/28/2024 32.8     RDW 10/28/2024 12.6     MPV 10/28/2024 9.3     Platelets 10/28/2024 293     nRBC 10/28/2024 0     Segmented % 10/28/2024 57     Immature Grans % 10/28/2024 0     Lymphocytes % 10/28/2024 34     Monocytes % 10/28/2024 6     Eosinophils Relative 10/28/2024 2     Basophils Relative 10/28/2024 1     Absolute Neutrophils 10/28/2024 4.70     Absolute Immature Grans 10/28/2024 0.02     Absolute Lymphocytes 10/28/2024 2.70     Absolute Monocytes 10/28/2024 0.48     Eosinophils Absolute 10/28/2024 0.13     Basophils Absolute 10/28/2024 0.04     Sodium 10/28/2024 139     Potassium 10/28/2024 3.7     Chloride 10/28/2024 103     CO2 10/28/2024 30     ANION GAP 10/28/2024 6     BUN 10/28/2024 12     Creatinine 10/28/2024 0.68     Glucose 10/28/2024 110     Calcium 10/28/2024 9.2     AST 10/28/2024 20     ALT 10/28/2024 27     Alkaline Phosphatase 10/28/2024 69     Total Protein 10/28/2024 6.8     Albumin 10/28/2024 4.0     Total Bilirubin 10/28/2024 0.37     eGFR 10/28/2024 87     Phosphorus 10/29/2024 3.0     Magnesium 10/29/2024 2.1     Sodium 10/29/2024 139     Potassium 10/29/2024 3.9     Chloride 10/29/2024 101     CO2 10/29/2024 31      ANION GAP 10/29/2024 7     BUN 10/29/2024 13     Creatinine 10/29/2024 0.63     Glucose 10/29/2024 107     Calcium 10/29/2024 9.7     eGFR 10/29/2024 89     WBC 10/29/2024 7.00     RBC 10/29/2024 3.89     Hemoglobin 10/29/2024 12.5     Hematocrit 10/29/2024 38.4     MCV 10/29/2024 99 (H)     MCH 10/29/2024 32.1     MCHC 10/29/2024 32.6     RDW 10/29/2024 12.1     MPV 10/29/2024 9.9     Platelets 10/29/2024 336     nRBC 10/29/2024 0     Segmented % 10/29/2024 53     Immature Grans % 10/29/2024 0     Lymphocytes % 10/29/2024 39     Monocytes % 10/29/2024 7     Eosinophils Relative 10/29/2024 1     Basophils Relative 10/29/2024 0     Absolute Neutrophils 10/29/2024 3.69     Absolute Immature Grans 10/29/2024 0.01     Absolute Lymphocytes 10/29/2024 2.71     Absolute Monocytes 10/29/2024 0.46     Eosinophils Absolute 10/29/2024 0.10     Basophils Absolute 10/29/2024 0.03     Phosphorus 10/30/2024 3.7     Magnesium 10/30/2024 2.0     Sodium 10/30/2024 139     Potassium 10/30/2024 3.3 (L)     Chloride 10/30/2024 103     CO2 10/30/2024 28     ANION GAP 10/30/2024 8     BUN 10/30/2024 8     Creatinine 10/30/2024 0.50 (L)     Glucose 10/30/2024 100     Calcium 10/30/2024 9.8     eGFR 10/30/2024 97     WBC 10/30/2024 5.93     RBC 10/30/2024 3.80 (L)     Hemoglobin 10/30/2024 12.2     Hematocrit 10/30/2024 36.7     MCV 10/30/2024 97     MCH 10/30/2024 32.1     MCHC 10/30/2024 33.2     RDW 10/30/2024 12.2     MPV 10/30/2024 9.6     Platelets 10/30/2024 323     nRBC 10/30/2024 0     Segmented % 10/30/2024 43     Immature Grans % 10/30/2024 0     Lymphocytes % 10/30/2024 45 (H)     Monocytes % 10/30/2024 8     Eosinophils Relative 10/30/2024 3     Basophils Relative 10/30/2024 1     Absolute Neutrophils 10/30/2024 2.54     Absolute Immature Grans 10/30/2024 0.01     Absolute Lymphocytes 10/30/2024 2.72     Absolute Monocytes 10/30/2024 0.45     Eosinophils Absolute 10/30/2024 0.18     Basophils Absolute 10/30/2024 0.03           Radiology Results:   XR thumb right first digit-min 2v  Result Date: 11/21/2024  Narrative: XR THUMB RIGHT FIRST DIGIT-MIN 2V INDICATION: S62.511A: Displaced fracture of proximal phalanx of right thumb, initial encounter for closed fracture. COMPARISON: 9/17/2024 For the purposes of institution wide universal language the following terms will apply: (thumb=1st digit/finger, index finger=2nd digit/finger, long finger=3rd digit/finger, ring=4th digit/finger and small finger=5th digit/finger) FINDINGS: Chronic deformity of the base of the first proximal phalanx seems stable from the prior exam. Question mild chronic deformity of the first distal phalanx as well. Overall, no change from prior study. Remainder of hand appears intact and within normal limits. No significant degenerative changes. No lytic or blastic osseous lesion. Unremarkable soft tissues.     Impression: Chronic deformities of the first ray as mentioned. No acute findings Computerized Assisted Algorithm (CAA) may have been used to analyze all applicable images. Workstation performed: [id number]

## 2024-12-04 NOTE — H&P (VIEW-ONLY)
[facility] - Outpatient Consultation  Kirsty Em 74 y.o. female MRN: [id number]  Encounter: [id number]          ASSESSMENT AND PLAN:      1. Positive colorectal cancer screening using Cologuard test (Primary)    Patient presents for evaluation of a positive Cologuard test.  No family hx of colon cancer.  No prior colonoscopy.    Will plan for colonoscopy to investigate.    Note: She is on Eliquis for atrial fibrillation and will hold 2 days prior to the procedure.  ______________________________________________________________________    HPI:  Patient is a pleasant 74 year old female with a PMH of atrial fibrillation on Eliquis, CVA, seizures, anxiety, tobacco use who presents to the office for evaluation of a positive Cologuard. Patient had a positive Cologuard test in August.  She has never had a colonoscopy.  No family history of colon cancer.  No rectal bleeding, changes in her bowel movements, or abdominal pain.  She recently saw her cardiologist who advised her it is okay to hold her Eliquis 2 days prior to the colonoscopy.    I discussed informed consent with the patient for the colonoscopy. The risks/benefits of the procedure were discussed with the patient. Risks included, but not limited to, infection, bleeding, perforation were discussed. Patient was agreeable.       REVIEW OF SYSTEMS:    CONSTITUTIONAL: Denies any fever, chills, rigors, and weight loss.  HEENT: No earache or tinnitus. Denies hearing loss or visual disturbances.  CARDIOVASCULAR: No chest pain or palpitations.   RESPIRATORY: Denies any cough, hemoptysis, shortness of breath or dyspnea on exertion.  GASTROINTESTINAL: As noted in the History of Present Illness.   GENITOURINARY: No problems with urination. Denies any hematuria or dysuria.  NEUROLOGIC: No dizziness or vertigo, denies headaches.   MUSCULOSKELETAL: Denies any muscle or joint pain.   SKIN: Denies skin rashes or itching.   ENDOCRINE: Denies excessive  thirst. Denies intolerance to heat or cold.  PSYCHOSOCIAL: Denies depression or anxiety. Denies any recent memory loss.       Historical Information   Past Medical History:   Diagnosis Date    Anxiety     Depression     Depression, recurrent (HCC) 2021    Hallucinations 2021    History of seizure disorder 2021     Past Surgical History:   Procedure Laterality Date    ORIF FINGER FRACTURE Right 2024    Procedure: ORIF OF RIGHT THUMB;  Surgeon: Dagoberto Yates MD;  Location: [facility];  Service: Orthopedics     Social History   Social History     Substance and Sexual Activity   Alcohol Use Never     Social History     Substance and Sexual Activity   Drug Use Never     Social History     Tobacco Use   Smoking Status Every Day    Current packs/day: 0.00    Average packs/day: 0.5 packs/day for 54.3 years (27.2 ttl pk-yrs)    Types: Cigarettes    Start date:     Last attempt to quit: 2024    Years since quittin.5   Smokeless Tobacco Never     Family History   Problem Relation Age of Onset    Diabetes Mother     Anxiety disorder Mother     Stroke Father     No Known Problems Sister     No Known Problems Sister     No Known Problems Sister     No Known Problems Daughter     No Known Problems Daughter     No Known Problems Daughter     No Known Problems Maternal Grandmother     No Known Problems Paternal Grandmother     No Known Problems Maternal Aunt     No Known Problems Maternal Aunt     No Known Problems Maternal Aunt     No Known Problems Maternal Aunt     No Known Problems Maternal Aunt     No Known Problems Maternal Aunt     No Known Problems Maternal Aunt     No Known Problems Maternal Aunt     No Known Problems Paternal Aunt     Breast cancer Neg Hx        Meds/Allergies       Current Outpatient Medications:     acetaminophen (TYLENOL) 325 mg tablet    apixaban (Eliquis) 5 mg    diltiazem (CARDIZEM CD) 120 mg 24 hr capsule    ezetimibe (ZETIA) 10 mg tablet    fluticasone  "(FLONASE) 50 mcg/act nasal spray    Icosapent Ethyl 1 g CAPS    levETIRAcetam (KEPPRA) 750 mg tablet    Multiple Vitamin (multivitamin) tablet    QUEtiapine (SEROquel) 100 mg tablet    rosuvastatin (CRESTOR) 10 MG tablet    vitamin B-12 (VITAMIN B-12) 1,000 mcg tablet    ALPRAZolam (XANAX) 2 MG tablet    aspirin 81 mg chewable tablet    gabapentin (NEURONTIN) 100 mg capsule    naloxone (NARCAN) 4 mg/0.1 mL nasal spray    Allergies   Allergen Reactions    Lipitor [Atorvastatin] Other (See Comments)     Muscle aches           Objective     Blood pressure 128/80, pulse (!) 108, height 5' 3\" (1.6 m), weight 56.2 kg (124 lb), SpO2 96%. Body mass index is 21.97 kg/m².        PHYSICAL EXAM:      General Appearance:   Alert, cooperative, no distress   HEENT:   Normocephalic, atraumatic, anicteric.     Neck:  Supple, symmetrical, trachea midline   Lungs:   Clear to auscultation bilaterally; no rales, rhonchi or wheezing; respirations unlabored    Heart::   Regular rate and rhythm; no murmur, rub, or gallop.   Abdomen:   Soft, non-tender, non-distended; normal bowel sounds; no masses, no organomegaly    Genitalia:   Deferred    Rectal:   Deferred    Extremities:  No cyanosis, clubbing   Pulses:  2+ and symmetric    Skin:  No jaundice, rashes, or lesions    Lymph nodes:  No palpable cervical lymphadenopathy        Lab Results:   No visits with results within 1 Day(s) from this visit.   Latest known visit with results is:   Admission on 10/27/2024, Discharged on 10/30/2024   Component Date Value    WBC 10/27/2024 8.13     RBC 10/27/2024 3.72 (L)     Hemoglobin 10/27/2024 12.0     Hematocrit 10/27/2024 36.9     MCV 10/27/2024 99 (H)     MCH 10/27/2024 32.3     MCHC 10/27/2024 32.5     RDW 10/27/2024 12.7     MPV 10/27/2024 10.2     Platelets 10/27/2024 326     nRBC 10/27/2024 0     Segmented % 10/27/2024 54     Immature Grans % 10/27/2024 0     Lymphocytes % 10/27/2024 37     Monocytes % 10/27/2024 7     Eosinophils Relative " 10/27/2024 1     Basophils Relative 10/27/2024 1     Absolute Neutrophils 10/27/2024 4.44     Absolute Immature Grans 10/27/2024 0.02     Absolute Lymphocytes 10/27/2024 2.98     Absolute Monocytes 10/27/2024 0.54     Eosinophils Absolute 10/27/2024 0.11     Basophils Absolute 10/27/2024 0.04     Sodium 10/27/2024 142     Potassium 10/27/2024 3.8     Chloride 10/27/2024 105     CO2 10/27/2024 29     ANION GAP 10/27/2024 8     BUN 10/27/2024 15     Creatinine 10/27/2024 0.82     Glucose 10/27/2024 88     Calcium 10/27/2024 9.2     AST 10/27/2024 30     ALT 10/27/2024 36     Alkaline Phosphatase 10/27/2024 79     Total Protein 10/27/2024 7.4     Albumin 10/27/2024 4.2     Total Bilirubin 10/27/2024 0.21     eGFR 10/27/2024 71     hs TnI 0hr 10/27/2024 <2     BNP 10/27/2024 27     LACTIC ACID 10/27/2024 1.0     SARS COV Rapid Antigen 10/27/2024 Negative     Influenza A Rapid Antigen 10/27/2024 Negative     Influenza B Rapid Antigen 10/27/2024 Negative     Color, UA 10/27/2024 Light Yellow     Clarity, UA 10/27/2024 Clear     Specific Gravity, UA 10/27/2024 1.018     pH, UA 10/27/2024 6.5     Leukocytes, UA 10/27/2024 Negative     Nitrite, UA 10/27/2024 Negative     Protein, UA 10/27/2024 Negative     Glucose, UA 10/27/2024 Negative     Ketones, UA 10/27/2024 Negative     Urobilinogen, UA 10/27/2024 <2.0     Bilirubin, UA 10/27/2024 Negative     Occult Blood, UA 10/27/2024 Negative     Blood Culture 10/27/2024 No Growth After 5 Days.     Blood Culture 10/27/2024 No Growth After 5 Days.     Protime 10/27/2024 16.0 (H)     INR 10/27/2024 1.21 (H)     PTT 10/27/2024 40 (H)     Ventricular Rate 10/27/2024 72     Atrial Rate 10/27/2024 72     SC Interval 10/27/2024 160     QRSD Interval 10/27/2024 76     QT Interval 10/27/2024 412     QTC Interval 10/27/2024 451     P Axis 10/27/2024 72     QRS Axis 10/27/2024 96     T Wave Vance 10/27/2024 62     hs TnI 2hr 10/27/2024 <2     Delta 2hr hsTnI 10/27/2024      hs TnI 4hr  10/27/2024 <2     Delta 4hr hsTnI 10/27/2024      Ammonia 10/28/2024 31     Amph/Meth UR 10/27/2024 Negative     Barbiturate Ur 10/27/2024 Negative     Benzodiazepine Urine 10/27/2024 Positive (A)     Cocaine Urine 10/27/2024 Negative     Methadone Urine 10/27/2024 Negative     Opiate Urine 10/27/2024 Negative     PCP Ur 10/27/2024 Negative     THC Urine 10/27/2024 Negative     Oxycodone Urine 10/27/2024 Negative     Fentanyl Urine 10/27/2024 Negative     HYDROCODONE URINE 10/27/2024 Negative     Ethanol Lvl 10/27/2024 <10     Salicylate Lvl 10/27/2024 <5     Acetaminophen Level 10/27/2024 2 (L)     WBC 10/28/2024 8.07     RBC 10/28/2024 3.52 (L)     Hemoglobin 10/28/2024 11.4 (L)     Hematocrit 10/28/2024 34.8     MCV 10/28/2024 99 (H)     MCH 10/28/2024 32.4     MCHC 10/28/2024 32.8     RDW 10/28/2024 12.6     MPV 10/28/2024 9.3     Platelets 10/28/2024 293     nRBC 10/28/2024 0     Segmented % 10/28/2024 57     Immature Grans % 10/28/2024 0     Lymphocytes % 10/28/2024 34     Monocytes % 10/28/2024 6     Eosinophils Relative 10/28/2024 2     Basophils Relative 10/28/2024 1     Absolute Neutrophils 10/28/2024 4.70     Absolute Immature Grans 10/28/2024 0.02     Absolute Lymphocytes 10/28/2024 2.70     Absolute Monocytes 10/28/2024 0.48     Eosinophils Absolute 10/28/2024 0.13     Basophils Absolute 10/28/2024 0.04     Sodium 10/28/2024 139     Potassium 10/28/2024 3.7     Chloride 10/28/2024 103     CO2 10/28/2024 30     ANION GAP 10/28/2024 6     BUN 10/28/2024 12     Creatinine 10/28/2024 0.68     Glucose 10/28/2024 110     Calcium 10/28/2024 9.2     AST 10/28/2024 20     ALT 10/28/2024 27     Alkaline Phosphatase 10/28/2024 69     Total Protein 10/28/2024 6.8     Albumin 10/28/2024 4.0     Total Bilirubin 10/28/2024 0.37     eGFR 10/28/2024 87     Phosphorus 10/29/2024 3.0     Magnesium 10/29/2024 2.1     Sodium 10/29/2024 139     Potassium 10/29/2024 3.9     Chloride 10/29/2024 101     CO2 10/29/2024 31      ANION GAP 10/29/2024 7     BUN 10/29/2024 13     Creatinine 10/29/2024 0.63     Glucose 10/29/2024 107     Calcium 10/29/2024 9.7     eGFR 10/29/2024 89     WBC 10/29/2024 7.00     RBC 10/29/2024 3.89     Hemoglobin 10/29/2024 12.5     Hematocrit 10/29/2024 38.4     MCV 10/29/2024 99 (H)     MCH 10/29/2024 32.1     MCHC 10/29/2024 32.6     RDW 10/29/2024 12.1     MPV 10/29/2024 9.9     Platelets 10/29/2024 336     nRBC 10/29/2024 0     Segmented % 10/29/2024 53     Immature Grans % 10/29/2024 0     Lymphocytes % 10/29/2024 39     Monocytes % 10/29/2024 7     Eosinophils Relative 10/29/2024 1     Basophils Relative 10/29/2024 0     Absolute Neutrophils 10/29/2024 3.69     Absolute Immature Grans 10/29/2024 0.01     Absolute Lymphocytes 10/29/2024 2.71     Absolute Monocytes 10/29/2024 0.46     Eosinophils Absolute 10/29/2024 0.10     Basophils Absolute 10/29/2024 0.03     Phosphorus 10/30/2024 3.7     Magnesium 10/30/2024 2.0     Sodium 10/30/2024 139     Potassium 10/30/2024 3.3 (L)     Chloride 10/30/2024 103     CO2 10/30/2024 28     ANION GAP 10/30/2024 8     BUN 10/30/2024 8     Creatinine 10/30/2024 0.50 (L)     Glucose 10/30/2024 100     Calcium 10/30/2024 9.8     eGFR 10/30/2024 97     WBC 10/30/2024 5.93     RBC 10/30/2024 3.80 (L)     Hemoglobin 10/30/2024 12.2     Hematocrit 10/30/2024 36.7     MCV 10/30/2024 97     MCH 10/30/2024 32.1     MCHC 10/30/2024 33.2     RDW 10/30/2024 12.2     MPV 10/30/2024 9.6     Platelets 10/30/2024 323     nRBC 10/30/2024 0     Segmented % 10/30/2024 43     Immature Grans % 10/30/2024 0     Lymphocytes % 10/30/2024 45 (H)     Monocytes % 10/30/2024 8     Eosinophils Relative 10/30/2024 3     Basophils Relative 10/30/2024 1     Absolute Neutrophils 10/30/2024 2.54     Absolute Immature Grans 10/30/2024 0.01     Absolute Lymphocytes 10/30/2024 2.72     Absolute Monocytes 10/30/2024 0.45     Eosinophils Absolute 10/30/2024 0.18     Basophils Absolute 10/30/2024 0.03           Radiology Results:   XR thumb right first digit-min 2v  Result Date: 11/21/2024  Narrative: XR THUMB RIGHT FIRST DIGIT-MIN 2V INDICATION: S62.511A: Displaced fracture of proximal phalanx of right thumb, initial encounter for closed fracture. COMPARISON: 9/17/2024 For the purposes of institution wide universal language the following terms will apply: (thumb=1st digit/finger, index finger=2nd digit/finger, long finger=3rd digit/finger, ring=4th digit/finger and small finger=5th digit/finger) FINDINGS: Chronic deformity of the base of the first proximal phalanx seems stable from the prior exam. Question mild chronic deformity of the first distal phalanx as well. Overall, no change from prior study. Remainder of hand appears intact and within normal limits. No significant degenerative changes. No lytic or blastic osseous lesion. Unremarkable soft tissues.     Impression: Chronic deformities of the first ray as mentioned. No acute findings Computerized Assisted Algorithm (CAA) may have been used to analyze all applicable images. Workstation performed: [id number]

## 2024-12-10 ENCOUNTER — TELEPHONE (OUTPATIENT)
Dept: FAMILY MEDICINE CLINIC | Facility: CLINIC | Age: 74
End: 2024-12-10

## 2024-12-12 ENCOUNTER — ANESTHESIA (OUTPATIENT)
Dept: RADIOLOGY | Facility: HOSPITAL | Age: 74
End: 2024-12-12
Payer: MEDICARE

## 2024-12-12 ENCOUNTER — HOSPITAL ENCOUNTER (OUTPATIENT)
Dept: RADIOLOGY | Facility: HOSPITAL | Age: 74
Discharge: HOME | End: 2024-12-12
Attending: NEUROLOGICAL SURGERY
Payer: MEDICARE

## 2024-12-12 ENCOUNTER — TELEPHONE (OUTPATIENT)
Age: 74
End: 2024-12-12

## 2024-12-12 ENCOUNTER — ANESTHESIA EVENT (OUTPATIENT)
Dept: RADIOLOGY | Facility: HOSPITAL | Age: 74
End: 2024-12-12
Payer: MEDICARE

## 2024-12-12 VITALS
TEMPERATURE: 97.1 F | OXYGEN SATURATION: 95 % | WEIGHT: 126 LBS | DIASTOLIC BLOOD PRESSURE: 67 MMHG | SYSTOLIC BLOOD PRESSURE: 131 MMHG | HEART RATE: 75 BPM | HEIGHT: 63 IN | RESPIRATION RATE: 16 BRPM | BODY MASS INDEX: 22.32 KG/M2

## 2024-12-12 DIAGNOSIS — R93.89 ABNORMAL COMPUTED TOMOGRAPHY ANGIOGRAPHY (CTA): ICD-10-CM

## 2024-12-12 DIAGNOSIS — I65.23 BILATERAL CAROTID ARTERY STENOSIS: ICD-10-CM

## 2024-12-12 DIAGNOSIS — G93.89 BRAIN MASS: ICD-10-CM

## 2024-12-12 PROCEDURE — 70553 MRI BRAIN STEM W/O & W/DYE: CPT

## 2024-12-12 PROCEDURE — A9585 GADOBUTROL INJECTION: HCPCS | Performed by: NEUROLOGICAL SURGERY

## 2024-12-12 RX ORDER — LIDOCAINE HYDROCHLORIDE 10 MG/ML
INJECTION, SOLUTION EPIDURAL; INFILTRATION; INTRACAUDAL; PERINEURAL AS NEEDED
Status: DISCONTINUED | OUTPATIENT
Start: 2024-12-12 | End: 2024-12-12

## 2024-12-12 RX ORDER — ONDANSETRON 2 MG/ML
4 INJECTION INTRAMUSCULAR; INTRAVENOUS ONCE AS NEEDED
Status: CANCELLED | OUTPATIENT
Start: 2024-12-12

## 2024-12-12 RX ORDER — SODIUM CHLORIDE, SODIUM LACTATE, POTASSIUM CHLORIDE, CALCIUM CHLORIDE 600; 310; 30; 20 MG/100ML; MG/100ML; MG/100ML; MG/100ML
INJECTION, SOLUTION INTRAVENOUS CONTINUOUS PRN
Status: DISCONTINUED | OUTPATIENT
Start: 2024-12-12 | End: 2024-12-12

## 2024-12-12 RX ORDER — SODIUM CHLORIDE, SODIUM LACTATE, POTASSIUM CHLORIDE, CALCIUM CHLORIDE 600; 310; 30; 20 MG/100ML; MG/100ML; MG/100ML; MG/100ML
125 INJECTION, SOLUTION INTRAVENOUS CONTINUOUS
Status: DISCONTINUED | OUTPATIENT
Start: 2024-12-12 | End: 2024-12-13 | Stop reason: HOSPADM

## 2024-12-12 RX ORDER — EPHEDRINE SULFATE 50 MG/ML
INJECTION INTRAVENOUS AS NEEDED
Status: DISCONTINUED | OUTPATIENT
Start: 2024-12-12 | End: 2024-12-12

## 2024-12-12 RX ORDER — ONDANSETRON 2 MG/ML
INJECTION INTRAMUSCULAR; INTRAVENOUS AS NEEDED
Status: DISCONTINUED | OUTPATIENT
Start: 2024-12-12 | End: 2024-12-12

## 2024-12-12 RX ORDER — GADOBUTROL 604.72 MG/ML
6 INJECTION INTRAVENOUS
Status: COMPLETED | OUTPATIENT
Start: 2024-12-12 | End: 2024-12-12

## 2024-12-12 RX ORDER — LIDOCAINE HYDROCHLORIDE 10 MG/ML
0.5 INJECTION, SOLUTION EPIDURAL; INFILTRATION; INTRACAUDAL; PERINEURAL ONCE AS NEEDED
Status: DISCONTINUED | OUTPATIENT
Start: 2024-12-12 | End: 2024-12-13 | Stop reason: HOSPADM

## 2024-12-12 RX ORDER — PROPOFOL 10 MG/ML
INJECTION, EMULSION INTRAVENOUS AS NEEDED
Status: DISCONTINUED | OUTPATIENT
Start: 2024-12-12 | End: 2024-12-12

## 2024-12-12 RX ORDER — DIPHENHYDRAMINE HYDROCHLORIDE 50 MG/ML
12.5 INJECTION INTRAMUSCULAR; INTRAVENOUS ONCE AS NEEDED
Status: CANCELLED | OUTPATIENT
Start: 2024-12-12

## 2024-12-12 RX ORDER — METOCLOPRAMIDE HYDROCHLORIDE 5 MG/ML
10 INJECTION INTRAMUSCULAR; INTRAVENOUS ONCE AS NEEDED
Status: CANCELLED | OUTPATIENT
Start: 2024-12-12

## 2024-12-12 RX ADMIN — PHENYLEPHRINE HYDROCHLORIDE 200 MCG: 10 INJECTION INTRAVENOUS at 12:50

## 2024-12-12 RX ADMIN — ONDANSETRON 4 MG: 2 INJECTION INTRAMUSCULAR; INTRAVENOUS at 13:03

## 2024-12-12 RX ADMIN — EPHEDRINE SULFATE 10 MG: 50 INJECTION, SOLUTION INTRAVENOUS at 12:56

## 2024-12-12 RX ADMIN — PHENYLEPHRINE HYDROCHLORIDE 300 MCG: 10 INJECTION INTRAVENOUS at 12:57

## 2024-12-12 RX ADMIN — LIDOCAINE HYDROCHLORIDE 50 MG: 10 INJECTION, SOLUTION EPIDURAL; INFILTRATION; INTRACAUDAL; PERINEURAL at 12:28

## 2024-12-12 RX ADMIN — GADOBUTROL 6 ML: 604.72 INJECTION INTRAVENOUS at 13:18

## 2024-12-12 RX ADMIN — SODIUM CHLORIDE, SODIUM LACTATE, POTASSIUM CHLORIDE, AND CALCIUM CHLORIDE: .6; .31; .03; .02 INJECTION, SOLUTION INTRAVENOUS at 12:18

## 2024-12-12 RX ADMIN — PROPOFOL 100 MG: 10 INJECTION, EMULSION INTRAVENOUS at 12:28

## 2024-12-12 RX ADMIN — EPHEDRINE SULFATE 10 MG: 50 INJECTION, SOLUTION INTRAVENOUS at 12:49

## 2024-12-12 NOTE — ANESTHESIA POSTPROCEDURE EVALUATION
Post-Op Assessment Note    CV Status:  Stable    Pain management: adequate       Mental Status:  Alert and awake   Hydration Status:  Euvolemic   PONV Controlled:  Controlled   Airway Patency:  Patent     Post Op Vitals Reviewed: Yes    No anethesia notable event occurred.    Staff: CRNA           Last Filed PACU Vitals:  Vitals Value Taken Time   Temp     Pulse     BP     Resp     SpO2         Modified Jeffrey:  No data recorded

## 2024-12-12 NOTE — ANESTHESIA PREPROCEDURE EVALUATION
Procedure:  MRI BRAIN W WO CONTRAST    Relevant Problems   ANESTHESIA (within normal limits)      CARDIO   (+) Bilateral carotid artery stenosis   (+) Hypertension   (+) Mixed hyperlipidemia   (+) New onset a-fib (HCC)      ENDO (within normal limits)      GI/HEPATIC (within normal limits)      /RENAL (within normal limits)      GYN (within normal limits)      HEMATOLOGY (within normal limits)      MUSCULOSKELETAL (within normal limits)      NEURO/PSYCH   (+) Anxiety disorder   (+) Depression   (+) Seizure disorder (HCC)      PULMONARY   (+) Acute respiratory failure with hypoxia (HCC)   (+) Centrilobular emphysema (HCC)      Behavioral Health   (+) Tobacco dependence      Neurology/Sleep   (+) Brain mass   (+) History of stroke        Physical Exam    Airway    Mallampati score: II  TM Distance: >3 FB  Neck ROM: full     Dental   No notable dental hx     Cardiovascular  Rhythm: regular, Rate: normal, Cardiovascular exam normal    Pulmonary  Pulmonary exam normal Breath sounds clear to auscultation    Other Findings  post-pubertal.      Anesthesia Plan  ASA Score- 3     Anesthesia Type- general with ASA Monitors.         Additional Monitors:     Airway Plan: LMA.           Plan Factors-Exercise tolerance (METS): >4 METS.    Chart reviewed. EKG reviewed. Imaging results reviewed. Existing labs reviewed. Patient summary reviewed.                  Induction- intravenous.    Postoperative Plan-     Perioperative Resuscitation Plan - Level 1 - Full Code.       Informed Consent- Anesthetic plan and risks discussed with patient.  I personally reviewed this patient with the CRNA. Discussed and agreed on the Anesthesia Plan with the CRNA..      Recent labs personally reviewed:  Lab Results   Component Value Date    WBC 5.93 10/30/2024    HGB 12.2 10/30/2024     10/30/2024     Lab Results   Component Value Date    K 3.3 (L) 10/30/2024    BUN 8 10/30/2024    CREATININE 0.50 (L) 10/30/2024    GLUCOSE 199 (H) 06/07/2024      Lab Results   Component Value Date    PTT 40 (H) 10/27/2024      Lab Results   Component Value Date    INR 1.21 (H) 10/27/2024       Blood type A    Lab Results   Component Value Date    HGBA1C 6.3 (H) 06/09/2024       I, Adalid Pineda MD, have personally seen and evaluated the patient prior to anesthetic care.  I have reviewed the pre-anesthetic record, and other medical records if appropriate to the anesthetic care.  If a CRNA is involved in the case, I have reviewed the CRNA assessment, if present, and agree. Risks/benefits and alternatives discussed with patient including possible PONV, sore throat, and possibility of rare anesthetic and surgical emergencies.

## 2024-12-12 NOTE — NURSING NOTE
Brain MRI with and without contrast completed and patient tolerated procedure well. Post-procedure vital signs taken and recorded. Report given to [facility] nurse Lisa. Patient placed in for transport to be taken to [facility]. Patient offers no complaints or verbalizes any issues upon leaving MRI. Patient's wig given back to patient after scan and this RN assisted patient to place it back on her head. Patient's  at bedside in recovery.

## 2024-12-12 NOTE — TELEPHONE ENCOUNTER
Estella from [facility] called in stating pt has appt with them but does not have her Medicare ID. Needed Medicare ID. Confirmed ID with Estella.

## 2024-12-13 NOTE — ANESTHESIA POSTPROCEDURE EVALUATION
Post-Op Assessment Note    CV Status:  Stable  Pain Score: 0    Pain management: adequate       Mental Status:  Alert and awake   Hydration Status:  Euvolemic   PONV Controlled:  Controlled   Airway Patency:  Patent     Post Op Vitals Reviewed: Yes    No anethesia notable event occurred.    Staff: Anesthesiologist, with CRNAs           Last Filed PACU Vitals:  Vitals Value Taken Time   Temp     Pulse     BP     Resp     SpO2         Modified Jeffrey:  Activity: 2 (12/12/2024  1:25 PM)  Respiration: 2 (12/12/2024  1:25 PM)  Circulation: 2 (12/12/2024  1:25 PM)  Consciousness: 2 (12/12/2024  1:25 PM)  Oxygen Saturation: 2 (12/12/2024  1:25 PM)  Modified Jeffrey Score: 10 (12/12/2024  1:25 PM)

## 2024-12-17 ENCOUNTER — ANESTHESIA EVENT (OUTPATIENT)
Dept: GASTROENTEROLOGY | Facility: HOSPITAL | Age: 74
End: 2024-12-17
Payer: MEDICARE

## 2024-12-17 ENCOUNTER — HOSPITAL ENCOUNTER (OUTPATIENT)
Dept: GASTROENTEROLOGY | Facility: HOSPITAL | Age: 74
Setting detail: OUTPATIENT SURGERY
Discharge: HOME | End: 2024-12-17
Payer: MEDICARE

## 2024-12-17 ENCOUNTER — ANESTHESIA (OUTPATIENT)
Dept: GASTROENTEROLOGY | Facility: HOSPITAL | Age: 74
End: 2024-12-17
Payer: MEDICARE

## 2024-12-17 VITALS
HEART RATE: 80 BPM | BODY MASS INDEX: 21.52 KG/M2 | DIASTOLIC BLOOD PRESSURE: 84 MMHG | HEIGHT: 63 IN | RESPIRATION RATE: 22 BRPM | WEIGHT: 121.47 LBS | TEMPERATURE: 97.5 F | SYSTOLIC BLOOD PRESSURE: 151 MMHG | OXYGEN SATURATION: 95 %

## 2024-12-17 DIAGNOSIS — R19.5 POSITIVE COLORECTAL CANCER SCREENING USING COLOGUARD TEST: ICD-10-CM

## 2024-12-17 PROCEDURE — 88305 TISSUE EXAM BY PATHOLOGIST: CPT | Performed by: PATHOLOGY

## 2024-12-17 PROCEDURE — 45385 COLONOSCOPY W/LESION REMOVAL: CPT | Performed by: INTERNAL MEDICINE

## 2024-12-17 RX ORDER — SODIUM CHLORIDE, SODIUM LACTATE, POTASSIUM CHLORIDE, CALCIUM CHLORIDE 600; 310; 30; 20 MG/100ML; MG/100ML; MG/100ML; MG/100ML
125 INJECTION, SOLUTION INTRAVENOUS CONTINUOUS
Status: DISCONTINUED | OUTPATIENT
Start: 2024-12-17 | End: 2024-12-17

## 2024-12-17 RX ORDER — SODIUM CHLORIDE, SODIUM LACTATE, POTASSIUM CHLORIDE, CALCIUM CHLORIDE 600; 310; 30; 20 MG/100ML; MG/100ML; MG/100ML; MG/100ML
INJECTION, SOLUTION INTRAVENOUS CONTINUOUS PRN
Status: DISCONTINUED | OUTPATIENT
Start: 2024-12-17 | End: 2024-12-17

## 2024-12-17 RX ORDER — PROPOFOL 10 MG/ML
INJECTION, EMULSION INTRAVENOUS AS NEEDED
Status: DISCONTINUED | OUTPATIENT
Start: 2024-12-17 | End: 2024-12-17

## 2024-12-17 RX ORDER — LIDOCAINE HYDROCHLORIDE 10 MG/ML
0.5 INJECTION, SOLUTION EPIDURAL; INFILTRATION; INTRACAUDAL; PERINEURAL ONCE AS NEEDED
Status: DISCONTINUED | OUTPATIENT
Start: 2024-12-17 | End: 2024-12-21 | Stop reason: HOSPADM

## 2024-12-17 RX ORDER — LIDOCAINE HYDROCHLORIDE 10 MG/ML
0.5 INJECTION, SOLUTION EPIDURAL; INFILTRATION; INTRACAUDAL; PERINEURAL ONCE AS NEEDED
Status: CANCELLED | OUTPATIENT
Start: 2024-12-17

## 2024-12-17 RX ORDER — SODIUM CHLORIDE, SODIUM LACTATE, POTASSIUM CHLORIDE, CALCIUM CHLORIDE 600; 310; 30; 20 MG/100ML; MG/100ML; MG/100ML; MG/100ML
50 INJECTION, SOLUTION INTRAVENOUS CONTINUOUS
Status: CANCELLED | OUTPATIENT
Start: 2024-12-17

## 2024-12-17 RX ORDER — SODIUM CHLORIDE, SODIUM LACTATE, POTASSIUM CHLORIDE, CALCIUM CHLORIDE 600; 310; 30; 20 MG/100ML; MG/100ML; MG/100ML; MG/100ML
50 INJECTION, SOLUTION INTRAVENOUS CONTINUOUS
Status: DISCONTINUED | OUTPATIENT
Start: 2024-12-17 | End: 2024-12-21 | Stop reason: HOSPADM

## 2024-12-17 RX ORDER — LIDOCAINE HYDROCHLORIDE 10 MG/ML
INJECTION, SOLUTION EPIDURAL; INFILTRATION; INTRACAUDAL; PERINEURAL AS NEEDED
Status: DISCONTINUED | OUTPATIENT
Start: 2024-12-17 | End: 2024-12-17

## 2024-12-17 RX ADMIN — SODIUM CHLORIDE, SODIUM LACTATE, POTASSIUM CHLORIDE, AND CALCIUM CHLORIDE: .6; .31; .03; .02 INJECTION, SOLUTION INTRAVENOUS at 09:51

## 2024-12-17 RX ADMIN — PROPOFOL 100 MG: 10 INJECTION, EMULSION INTRAVENOUS at 09:53

## 2024-12-17 RX ADMIN — PROPOFOL 20 MG: 10 INJECTION, EMULSION INTRAVENOUS at 09:57

## 2024-12-17 RX ADMIN — PROPOFOL 30 MG: 10 INJECTION, EMULSION INTRAVENOUS at 10:13

## 2024-12-17 RX ADMIN — PROPOFOL 30 MG: 10 INJECTION, EMULSION INTRAVENOUS at 10:07

## 2024-12-17 RX ADMIN — PROPOFOL 50 MG: 10 INJECTION, EMULSION INTRAVENOUS at 10:01

## 2024-12-17 RX ADMIN — LIDOCAINE HYDROCHLORIDE 50 MG: 10 INJECTION, SOLUTION EPIDURAL; INFILTRATION; INTRACAUDAL; PERINEURAL at 09:53

## 2024-12-17 RX ADMIN — PROPOFOL 30 MG: 10 INJECTION, EMULSION INTRAVENOUS at 10:10

## 2024-12-17 NOTE — ANESTHESIA POSTPROCEDURE EVALUATION
Post-Op Assessment Note    CV Status:  Stable  Pain Score: 0    Pain management: adequate       Mental Status:  Sleepy   Hydration Status:  Euvolemic   PONV Controlled:  Controlled   Airway Patency:  Patent     Post Op Vitals Reviewed: Yes    No anethesia notable event occurred.    Staff: CRNA         Last Filed PACU Vitals:  Vitals Value Taken Time   Temp 97.5 °F (36.4 °C) 12/17/24 1023   Pulse 80 12/17/24 1043   /84 12/17/24 1043   Resp 22 12/17/24 1043   SpO2 95 % 12/17/24 1043       Modified Jeffrey:  Activity: 2 (12/17/2024 10:43 AM)  Respiration: 2 (12/17/2024 10:43 AM)  Circulation: 2 (12/17/2024 10:43 AM)  Consciousness: 2 (12/17/2024 10:43 AM)  Oxygen Saturation: 2 (12/17/2024 10:43 AM)  Modified Jeffrey Score: 10 (12/17/2024 10:43 AM)

## 2024-12-17 NOTE — ANESTHESIA PREPROCEDURE EVALUATION
Procedure:  COLONOSCOPY    Relevant Problems   CARDIO   (+) Bilateral carotid artery stenosis   (+) Hypertension   (+) Mixed hyperlipidemia   (+) New onset a-fib (HCC)      NEURO/PSYCH   (+) Anxiety disorder   (+) Depression   (+) Seizure disorder (HCC)      PULMONARY   (+) Centrilobular emphysema (HCC)      Behavioral Health   (+) Tobacco dependence      Denies recent fever, cough or other symptom of upper respiratory tract infection.    Confirmed NPO appropriate    Physical Exam    Airway    Mallampati score: II  TM Distance: >3 FB  Neck ROM: full     Dental   Comment: edentulous     Cardiovascular      Pulmonary      Other Findings  post-pubertal.    6/9/24 TTE: Normal biventricular systolic function. Grade II diastolic dysfunction. Mild to moderate MR. Mild pulmonary hypertension    Anesthesia Plan  ASA Score- 3     Anesthesia Type- IV sedation with anesthesia with ASA Monitors.         Additional Monitors:     Airway Plan:            Plan Factors-Exercise tolerance (METS): >4 METS.    Chart reviewed.   Existing labs reviewed.     Patient is a current smoker.              Induction- intravenous.    Postoperative Plan-         Informed Consent- Anesthetic plan and risks discussed with patient.

## 2024-12-17 NOTE — INTERVAL H&P NOTE
H&P reviewed. After examining the patient I find no changes in the patients condition since the H&P had been written.    Vitals:    12/17/24 0837   BP: (!) 140/101   Pulse: 95   Resp: 15   Temp: 97.8 °F (36.6 °C)   SpO2: 96%

## 2024-12-19 PROCEDURE — 88305 TISSUE EXAM BY PATHOLOGIST: CPT | Performed by: PATHOLOGY

## 2024-12-20 ENCOUNTER — HOSPITAL ENCOUNTER (OUTPATIENT)
Dept: VASCULAR ULTRASOUND | Facility: HOSPITAL | Age: 74
Discharge: HOME | End: 2024-12-20
Attending: NEUROLOGICAL SURGERY
Payer: MEDICARE

## 2024-12-20 DIAGNOSIS — G93.89 BRAIN MASS: ICD-10-CM

## 2024-12-20 DIAGNOSIS — I65.23 BILATERAL CAROTID ARTERY STENOSIS: ICD-10-CM

## 2024-12-20 DIAGNOSIS — R93.89 ABNORMAL COMPUTED TOMOGRAPHY ANGIOGRAPHY (CTA): ICD-10-CM

## 2024-12-20 PROCEDURE — 93880 EXTRACRANIAL BILAT STUDY: CPT

## 2024-12-22 DIAGNOSIS — E78.2 MIXED HYPERLIPIDEMIA: ICD-10-CM

## 2024-12-23 RX ORDER — EZETIMIBE 10 MG/1
10 TABLET ORAL DAILY
Qty: 90 TABLET | Refills: 1 | Status: SHIPPED | OUTPATIENT
Start: 2024-12-23

## 2025-01-07 ENCOUNTER — TELEPHONE (OUTPATIENT)
Age: 75
End: 2025-01-07

## 2025-01-07 NOTE — TELEPHONE ENCOUNTER
Wilver (on Communication Consent form) requesting results.   Please review and advise. Home [phone]

## 2025-01-07 NOTE — TELEPHONE ENCOUNTER
Please inform him that polyps were completely benign, we will repeat the colonoscopy in 1 year to make sure nothing is grown back.

## 2025-01-09 ENCOUNTER — APPOINTMENT (OUTPATIENT)
Dept: LAB | Facility: HOSPITAL | Age: 75
End: 2025-01-09
Payer: MEDICARE

## 2025-01-09 ENCOUNTER — OFFICE VISIT (OUTPATIENT)
Dept: FAMILY MEDICINE CLINIC | Facility: CLINIC | Age: 75
End: 2025-01-09
Payer: MEDICARE

## 2025-01-09 VITALS
HEIGHT: 63 IN | DIASTOLIC BLOOD PRESSURE: 90 MMHG | BODY MASS INDEX: 21.76 KG/M2 | RESPIRATION RATE: 16 BRPM | SYSTOLIC BLOOD PRESSURE: 120 MMHG | OXYGEN SATURATION: 96 % | WEIGHT: 122.8 LBS | HEART RATE: 95 BPM

## 2025-01-09 DIAGNOSIS — J43.2 CENTRILOBULAR EMPHYSEMA (HCC): ICD-10-CM

## 2025-01-09 DIAGNOSIS — R31.9 HEMATURIA, UNSPECIFIED TYPE: Primary | ICD-10-CM

## 2025-01-09 DIAGNOSIS — E78.2 MIXED HYPERLIPIDEMIA: ICD-10-CM

## 2025-01-09 DIAGNOSIS — I10 PRIMARY HYPERTENSION: ICD-10-CM

## 2025-01-09 DIAGNOSIS — Z00.00 ENCOUNTER FOR ANNUAL WELLNESS VISIT (AWV) IN MEDICARE PATIENT: ICD-10-CM

## 2025-01-09 DIAGNOSIS — E78.1 HYPERTRIGLYCERIDEMIA: ICD-10-CM

## 2025-01-09 DIAGNOSIS — R73.03 PRE-DIABETES: ICD-10-CM

## 2025-01-09 DIAGNOSIS — Z86.73 HISTORY OF STROKE: ICD-10-CM

## 2025-01-09 DIAGNOSIS — G40.909 NONINTRACTABLE EPILEPSY WITHOUT STATUS EPILEPTICUS, UNSPECIFIED EPILEPSY TYPE (HCC): ICD-10-CM

## 2025-01-09 DIAGNOSIS — F33.9 EPISODE OF RECURRENT MAJOR DEPRESSIVE DISORDER, UNSPECIFIED DEPRESSION EPISODE SEVERITY (HCC): ICD-10-CM

## 2025-01-09 DIAGNOSIS — I65.23 BILATERAL CAROTID ARTERY STENOSIS: ICD-10-CM

## 2025-01-09 DIAGNOSIS — R31.9 HEMATURIA, UNSPECIFIED TYPE: ICD-10-CM

## 2025-01-09 DIAGNOSIS — N18.31 STAGE 3A CHRONIC KIDNEY DISEASE (HCC): ICD-10-CM

## 2025-01-09 DIAGNOSIS — D32.9 BENIGN NEOPLASM OF MENINGES, UNSPECIFIED (HCC): ICD-10-CM

## 2025-01-09 PROBLEM — R74.01 ELEVATED TRANSAMINASE LEVEL: Status: RESOLVED | Noted: 2024-06-07 | Resolved: 2025-01-09

## 2025-01-09 PROBLEM — Z13.39 ENCOUNTER FOR DELIRIUM ELDERLY AT RISK (DEAR) SCREENING: Status: RESOLVED | Noted: 2024-10-28 | Resolved: 2025-01-09

## 2025-01-09 PROBLEM — I48.91 NEW ONSET A-FIB (HCC): Status: RESOLVED | Noted: 2024-06-12 | Resolved: 2025-01-09

## 2025-01-09 LAB
ALBUMIN SERPL BCG-MCNC: 4.5 G/DL (ref 3.5–5)
ALP SERPL-CCNC: 86 U/L (ref 34–104)
ALT SERPL W P-5'-P-CCNC: 38 U/L (ref 7–52)
ANION GAP SERPL CALCULATED.3IONS-SCNC: 6 MMOL/L (ref 4–13)
AST SERPL W P-5'-P-CCNC: 38 U/L (ref 13–39)
BACTERIA UR QL AUTO: ABNORMAL /HPF
BASOPHILS # BLD AUTO: 0.05 THOUSANDS/ΜL (ref 0–0.1)
BASOPHILS NFR BLD AUTO: 1 % (ref 0–1)
BILIRUB SERPL-MCNC: 0.28 MG/DL (ref 0.2–1)
BILIRUB UR QL STRIP: NEGATIVE
BUN SERPL-MCNC: 23 MG/DL (ref 5–25)
CALCIUM SERPL-MCNC: 9.5 MG/DL (ref 8.4–10.2)
CHLORIDE SERPL-SCNC: 108 MMOL/L (ref 96–108)
CHOLEST SERPL-MCNC: 83 MG/DL (ref ?–200)
CLARITY UR: ABNORMAL
CO2 SERPL-SCNC: 28 MMOL/L (ref 21–32)
COLOR UR: ABNORMAL
CREAT SERPL-MCNC: 0.65 MG/DL (ref 0.6–1.3)
EOSINOPHIL # BLD AUTO: 0.11 THOUSAND/ΜL (ref 0–0.61)
EOSINOPHIL NFR BLD AUTO: 2 % (ref 0–6)
ERYTHROCYTE [DISTWIDTH] IN BLOOD BY AUTOMATED COUNT: 13.5 % (ref 11.6–15.1)
GFR SERPL CREATININE-BSD FRML MDRD: 88 ML/MIN/1.73SQ M
GLUCOSE P FAST SERPL-MCNC: 82 MG/DL (ref 65–99)
GLUCOSE UR STRIP-MCNC: NEGATIVE MG/DL
HCT VFR BLD AUTO: 32.7 % (ref 34.8–46.1)
HDLC SERPL-MCNC: 45 MG/DL
HGB BLD-MCNC: 10.5 G/DL (ref 11.5–15.4)
HGB UR QL STRIP.AUTO: ABNORMAL
IMM GRANULOCYTES # BLD AUTO: 0.01 THOUSAND/UL (ref 0–0.2)
IMM GRANULOCYTES NFR BLD AUTO: 0 % (ref 0–2)
KETONES UR STRIP-MCNC: ABNORMAL MG/DL
LDLC SERPL CALC-MCNC: 24 MG/DL (ref 0–100)
LEUKOCYTE ESTERASE UR QL STRIP: ABNORMAL
LYMPHOCYTES # BLD AUTO: 3.1 THOUSANDS/ΜL (ref 0.6–4.47)
LYMPHOCYTES NFR BLD AUTO: 44 % (ref 14–44)
MCH RBC QN AUTO: 31.8 PG (ref 26.8–34.3)
MCHC RBC AUTO-ENTMCNC: 32.1 G/DL (ref 31.4–37.4)
MCV RBC AUTO: 99 FL (ref 82–98)
MONOCYTES # BLD AUTO: 0.39 THOUSAND/ΜL (ref 0.17–1.22)
MONOCYTES NFR BLD AUTO: 6 % (ref 4–12)
NEUTROPHILS # BLD AUTO: 3.34 THOUSANDS/ΜL (ref 1.85–7.62)
NEUTS SEG NFR BLD AUTO: 47 % (ref 43–75)
NITRITE UR QL STRIP: NEGATIVE
NON-SQ EPI CELLS URNS QL MICRO: ABNORMAL /HPF
NRBC BLD AUTO-RTO: 0 /100 WBCS
PH UR STRIP.AUTO: 8.5 [PH]
PLATELET # BLD AUTO: 296 THOUSANDS/UL (ref 149–390)
PMV BLD AUTO: 10.3 FL (ref 8.9–12.7)
POTASSIUM SERPL-SCNC: 3.5 MMOL/L (ref 3.5–5.3)
PROT SERPL-MCNC: 6.9 G/DL (ref 6.4–8.4)
PROT UR STRIP-MCNC: ABNORMAL MG/DL
RBC # BLD AUTO: 3.3 MILLION/UL (ref 3.81–5.12)
RBC #/AREA URNS AUTO: ABNORMAL /HPF
SODIUM SERPL-SCNC: 142 MMOL/L (ref 135–147)
SP GR UR STRIP.AUTO: <1.005 (ref 1–1.03)
TRIGL SERPL-MCNC: 72 MG/DL (ref ?–150)
TSH SERPL DL<=0.05 MIU/L-ACNC: 0.78 UIU/ML (ref 0.45–4.5)
UROBILINOGEN UR STRIP-ACNC: <2 MG/DL
WBC # BLD AUTO: 7 THOUSAND/UL (ref 4.31–10.16)
WBC #/AREA URNS AUTO: ABNORMAL /HPF

## 2025-01-09 PROCEDURE — 87077 CULTURE AEROBIC IDENTIFY: CPT | Performed by: STUDENT IN AN ORGANIZED HEALTH CARE EDUCATION/TRAINING PROGRAM

## 2025-01-09 PROCEDURE — 85025 COMPLETE CBC W/AUTO DIFF WBC: CPT

## 2025-01-09 PROCEDURE — 80061 LIPID PANEL: CPT

## 2025-01-09 PROCEDURE — 87186 SC STD MICRODIL/AGAR DIL: CPT | Performed by: STUDENT IN AN ORGANIZED HEALTH CARE EDUCATION/TRAINING PROGRAM

## 2025-01-09 PROCEDURE — 81001 URINALYSIS AUTO W/SCOPE: CPT | Performed by: STUDENT IN AN ORGANIZED HEALTH CARE EDUCATION/TRAINING PROGRAM

## 2025-01-09 PROCEDURE — 84439 ASSAY OF FREE THYROXINE: CPT

## 2025-01-09 PROCEDURE — 80053 COMPREHEN METABOLIC PANEL: CPT

## 2025-01-09 PROCEDURE — 83036 HEMOGLOBIN GLYCOSYLATED A1C: CPT

## 2025-01-09 PROCEDURE — 87086 URINE CULTURE/COLONY COUNT: CPT | Performed by: STUDENT IN AN ORGANIZED HEALTH CARE EDUCATION/TRAINING PROGRAM

## 2025-01-09 PROCEDURE — 84443 ASSAY THYROID STIM HORMONE: CPT

## 2025-01-09 PROCEDURE — G0439 PPPS, SUBSEQ VISIT: HCPCS | Performed by: STUDENT IN AN ORGANIZED HEALTH CARE EDUCATION/TRAINING PROGRAM

## 2025-01-09 PROCEDURE — 99214 OFFICE O/P EST MOD 30 MIN: CPT | Performed by: STUDENT IN AN ORGANIZED HEALTH CARE EDUCATION/TRAINING PROGRAM

## 2025-01-09 PROCEDURE — 36415 COLL VENOUS BLD VENIPUNCTURE: CPT

## 2025-01-09 NOTE — ASSESSMENT & PLAN NOTE
R carotid 70-99%. Has appointment with NS next week, may benefit from surgical consideringation as she is on ASA and crestor

## 2025-01-09 NOTE — ASSESSMENT & PLAN NOTE
Lab Results   Component Value Date    EGFR 97 10/30/2024    EGFR 89 10/29/2024    EGFR 87 10/28/2024    CREATININE 0.50 (L) 10/30/2024    CREATININE 0.63 10/29/2024    CREATININE 0.68 10/28/2024

## 2025-01-09 NOTE — PROGRESS NOTES
Name: Kirsty Em      : 1950      MRN: [id number]  Encounter Provider: Brien Desouza MD  Encounter Date: 2025   Encounter department: [facility] [address]    Assessment & Plan  Primary hypertension  Stable today, on cardizem  Orders:  •  Comprehensive metabolic panel; Future  •  CBC and differential; Future  •  Lipid panel; Future  •  TSH + Free T4; Future    Bilateral carotid artery stenosis  R carotid 70-99%. Has appointment with NS next week, may benefit from surgical consideringation as she is on ASA and crestor        Hematuria, unspecified type    Orders:  •  Urinalysis with microscopic; Future  •  Cytology, urine; Future    Benign neoplasm of meninges, unspecified (HCC)  Follows with NS       Centrilobular emphysema (HCC)         Nonintractable epilepsy without status epilepticus, unspecified epilepsy type (HCC)  On keppra       Episode of recurrent major depressive disorder, unspecified depression episode severity (HCC)         Stage 3a chronic kidney disease (HCC)  Lab Results   Component Value Date    EGFR 97 10/30/2024    EGFR 89 10/29/2024    EGFR 87 10/28/2024    CREATININE 0.50 (L) 10/30/2024    CREATININE 0.63 10/29/2024    CREATININE 0.68 10/28/2024          Mixed hyperlipidemia    Orders:  •  Lipid panel; Future    History of stroke  Secondary prevention       Encounter for annual wellness visit (AWV) in Medicare patient         Pre-diabetes    Orders:  •  Hemoglobin A1C; Future       Preventive health issues were discussed with patient, and age appropriate screening tests were ordered as noted in patient's After Visit Summary. Personalized health advice and appropriate referrals for health education or preventive services given if needed, as noted in patient's After Visit Summary.    History of Present Illness     HPI     Still with ear disocmofrt, saw ENT who did a scope. Recommended optc medications an dif no improvement will need a tube  placed.    Carotid arteru us, has follow up with NS next week    Will exeprience blood in the urine occassionally  Patient Care Team:  Brien Desouza MD as PCP - General (Family Medicine)    Review of Systems   Constitutional:  Negative for chills, fatigue and fever.   HENT:  Negative for rhinorrhea and sore throat.    Eyes:  Negative for visual disturbance.   Respiratory:  Negative for cough and shortness of breath.    Cardiovascular:  Negative for chest pain and palpitations.   Gastrointestinal:  Negative for abdominal pain, constipation, diarrhea, nausea and vomiting.   Genitourinary:  Negative for difficulty urinating, dysuria and frequency.   Musculoskeletal:  Negative for arthralgias and myalgias.   Skin:  Negative for color change and rash.   Neurological:  Negative for weakness and headaches.     Medical History Reviewed by provider this encounter:       Annual Wellness Visit Questionnaire   Kirsty is here for her Subsequent Wellness visit.     Health Risk Assessment:   Patient rates overall health as good. Patient feels that their physical health rating is slightly better. Patient is satisfied with their life. Eyesight was rated as same. Hearing was rated as slightly worse. Patient feels that their emotional and mental health rating is much better. Patients states they are never, rarely angry. Patient states they are sometimes unusually tired/fatigued. Pain experienced in the last 7 days has been none. Patient states that she has experienced weight loss or gain in last 6 months.     Depression Screening:   PHQ-9 Score: 2      Fall Risk Screening:   In the past year, patient has experienced: no history of falling in past year      Urinary Incontinence Screening:   Patient has leaked urine accidently in the last six months.     Home Safety:  Patient does not have trouble with stairs inside or outside of their home. Patient has working smoke alarms and has working carbon monoxide detector. Home safety hazards  include: none.     Nutrition:   Current diet is Regular.     Medications:   Patient is currently taking over-the-counter supplements. OTC medications include: see medication list. Patient is able to manage medications.     Activities of Daily Living (ADLs)/Instrumental Activities of Daily Living (IADLs):   Walk and transfer into and out of bed and chair?: Yes  Dress and groom yourself?: Yes    Bathe or shower yourself?: Yes    Feed yourself? Yes  Do your laundry/housekeeping?: Yes  Manage your money, pay your bills and track your expenses?: Yes  Make your own meals?: Yes    Do your own shopping?: Yes    Durable Medical Equipment Suppliers  cane    Previous Hospitalizations:   Any hospitalizations or ED visits within the last 12 months?: Yes    How many hospitalizations have you had in the last year?: 1-2    Advance Care Planning:   Living will: No    Durable POA for healthcare: No    Advanced directive: No      PREVENTIVE SCREENINGS      Cardiovascular Screening:    General: Screening Not Indicated and History Lipid Disorder      Diabetes Screening:     General: Screening Current      Colorectal Cancer Screening:     General: Screening Current      Breast Cancer Screening:     General: Screening Current      Cervical Cancer Screening:    General: Screening Not Indicated      Lung Cancer Screening:     General: Screening Current      Hepatitis C Screening:    General: Screening Current    Screening, Brief Intervention, and Referral to Treatment (SBIRT)    Screening  Typical number of drinks in a day: 0  Typical number of drinks in a week: 0  Interpretation: Low risk drinking behavior.    AUDIT-C Screenin) How often did you have a drink containing alcohol in the past year? never  2) How many drinks did you have on a typical day when you were drinking in the past year? 0  3) How often did you have 6 or more drinks on one occasion in the past year? never    AUDIT-C Score: 0  Interpretation: Score 0-2 (female):  "Negative screen for alcohol misuse    Single Item Drug Screening:  How often have you used an illegal drug (including marijuana) or a prescription medication for non-medical reasons in the past year? never    Single Item Drug Screen Score: 0  Interpretation: Negative screen for possible drug use disorder    Social Drivers of Health     Financial Resource Strain: Medium Risk (1/1/2024)    Overall Financial Resource Strain (CARDIA)    • Difficulty of Paying Living Expenses: Somewhat hard   Food Insecurity: No Food Insecurity (1/9/2025)    Hunger Vital Sign    • Worried About Running Out of Food in the Last Year: Never true    • Ran Out of Food in the Last Year: Never true   Transportation Needs: No Transportation Needs (1/9/2025)    PRAPARE - Transportation    • Lack of Transportation (Medical): No    • Lack of Transportation (Non-Medical): No   Housing Stability: Low Risk  (1/9/2025)    Housing Stability Vital Sign    • Unable to Pay for Housing in the Last Year: No    • Number of Times Moved in the Last Year: 0    • Homeless in the Last Year: No   Utilities: Not At Risk (1/9/2025)    [facility] Utilities    • Threatened with loss of utilities: No     No results found.    Objective   /90   Pulse 95   Resp 16   Ht 5' 3\" (1.6 m)   Wt 55.7 kg (122 lb 12.8 oz)   SpO2 96%   BMI 21.75 kg/m²     Physical Exam  Constitutional:       General: She is not in acute distress.     Appearance: Normal appearance. She is not ill-appearing.   HENT:      Head: Normocephalic and atraumatic.      Right Ear: Tympanic membrane, ear canal and external ear normal.      Left Ear: Tympanic membrane, ear canal and external ear normal.      Nose: Nose normal.      Mouth/Throat:      Mouth: Mucous membranes are moist.      Pharynx: Oropharynx is clear. No oropharyngeal exudate or posterior oropharyngeal erythema.   Eyes:      General: No scleral icterus.        Right eye: No discharge.         Left eye: No discharge.      Extraocular " Movements: Extraocular movements intact.      Conjunctiva/sclera: Conjunctivae normal.      Pupils: Pupils are equal, round, and reactive to light.   Cardiovascular:      Rate and Rhythm: Normal rate and regular rhythm.      Pulses: Normal pulses.      Heart sounds: Normal heart sounds. No murmur heard.  Pulmonary:      Effort: Pulmonary effort is normal. No respiratory distress.      Breath sounds: Normal breath sounds.   Abdominal:      General: Bowel sounds are normal.      Palpations: Abdomen is soft.      Tenderness: There is no abdominal tenderness.   Musculoskeletal:         General: Normal range of motion.      Cervical back: Normal range of motion and neck supple.   Lymphadenopathy:      Cervical: No cervical adenopathy.   Skin:     General: Skin is warm and dry.      Capillary Refill: Capillary refill takes less than 2 seconds.   Neurological:      General: No focal deficit present.      Mental Status: She is alert and oriented to person, place, and time. Mental status is at baseline.      Cranial Nerves: No cranial nerve deficit.   Psychiatric:         Mood and Affect: Mood normal.

## 2025-01-10 ENCOUNTER — APPOINTMENT (OUTPATIENT)
Dept: LAB | Facility: HOSPITAL | Age: 75
End: 2025-01-10
Payer: MEDICARE

## 2025-01-10 ENCOUNTER — TELEPHONE (OUTPATIENT)
Dept: FAMILY MEDICINE CLINIC | Facility: CLINIC | Age: 75
End: 2025-01-10

## 2025-01-10 DIAGNOSIS — I48.91 NEW ONSET A-FIB (HCC): ICD-10-CM

## 2025-01-10 DIAGNOSIS — R31.0 GROSS HEMATURIA: Primary | ICD-10-CM

## 2025-01-10 LAB
EST. AVERAGE GLUCOSE BLD GHB EST-MCNC: 123 MG/DL
HBA1C MFR BLD: 5.9 %
T4 FREE SERPL-MCNC: 0.72 NG/DL (ref 0.61–1.12)

## 2025-01-10 PROCEDURE — 88112 CYTOPATH CELL ENHANCE TECH: CPT | Performed by: STUDENT IN AN ORGANIZED HEALTH CARE EDUCATION/TRAINING PROGRAM

## 2025-01-10 NOTE — TELEPHONE ENCOUNTER
Pt's spouse walked in to let Dr. Desouza know that he stopped pt's Eliquis this morning d/t gross hematuria. Spouse states that he dropped off her urine for the cytology study. He just needs the ok to hold her Eliquis. If so, for how long?

## 2025-01-10 NOTE — TELEPHONE ENCOUNTER
Patients CBC is not showing that she is significantly anemic. I would recommend continuing with eliquis to prevent development of a stroke given the history of afib.  we need to have her see a urologist to help determine if there is any  pathology that is exacerbating the bleeding as she had a normal CT scan in November/October. If she is starting to get symptoms of low blood (fatigue, pale, dizziness upon standing, etc) then ER eval

## 2025-01-10 NOTE — TELEPHONE ENCOUNTER
Called and spoke with Urology they were able to get her in. Apt made for 2:45 on 1/14 at [address] [facility] [address]. Called and spoke with the . He was very upset about the whole thing not knowing why she can't be put on abx and she would be fine if she wasn't on blood thinners. I explained to patient in detail the importance of seeing urology so they can find the cause of the bleeding and the importance of her medications due to the increase risk for stroke. He expressed understanding. He was given the number to Urology as he stated he will try to get it closer here in [place].

## 2025-01-11 LAB — BACTERIA UR CULT: ABNORMAL

## 2025-01-14 ENCOUNTER — OFFICE VISIT (OUTPATIENT)
Dept: UROLOGY | Facility: CLINIC | Age: 75
End: 2025-01-14
Payer: MEDICARE

## 2025-01-14 VITALS
HEART RATE: 91 BPM | SYSTOLIC BLOOD PRESSURE: 114 MMHG | HEIGHT: 63 IN | OXYGEN SATURATION: 95 % | BODY MASS INDEX: 21.79 KG/M2 | WEIGHT: 123 LBS | DIASTOLIC BLOOD PRESSURE: 74 MMHG

## 2025-01-14 DIAGNOSIS — N39.0 URINARY TRACT INFECTION WITH HEMATURIA, SITE UNSPECIFIED: ICD-10-CM

## 2025-01-14 DIAGNOSIS — R31.0 GROSS HEMATURIA: Primary | ICD-10-CM

## 2025-01-14 DIAGNOSIS — R31.9 URINARY TRACT INFECTION WITH HEMATURIA, SITE UNSPECIFIED: ICD-10-CM

## 2025-01-14 LAB
SL AMB  POCT GLUCOSE, UA: NORMAL
SL AMB LEUKOCYTE ESTERASE,UA: NORMAL
SL AMB POCT BILIRUBIN,UA: NORMAL
SL AMB POCT BLOOD,UA: NORMAL
SL AMB POCT CLARITY,UA: NORMAL
SL AMB POCT COLOR,UA: YELLOW
SL AMB POCT KETONES,UA: NORMAL
SL AMB POCT NITRITE,UA: NORMAL
SL AMB POCT PH,UA: 8.5
SL AMB POCT SPECIFIC GRAVITY,UA: 1
SL AMB POCT URINE PROTEIN: NORMAL
SL AMB POCT UROBILINOGEN: 0.2

## 2025-01-14 PROCEDURE — 88112 CYTOPATH CELL ENHANCE TECH: CPT | Performed by: STUDENT IN AN ORGANIZED HEALTH CARE EDUCATION/TRAINING PROGRAM

## 2025-01-14 PROCEDURE — 81002 URINALYSIS NONAUTO W/O SCOPE: CPT | Performed by: PHYSICIAN ASSISTANT

## 2025-01-14 PROCEDURE — 99213 OFFICE O/P EST LOW 20 MIN: CPT | Performed by: PHYSICIAN ASSISTANT

## 2025-01-14 RX ORDER — SULFAMETHOXAZOLE AND TRIMETHOPRIM 800; 160 MG/1; MG/1
1 TABLET ORAL EVERY 12 HOURS SCHEDULED
Qty: 14 TABLET | Refills: 0 | Status: SHIPPED | OUTPATIENT
Start: 2025-01-14 | End: 2025-01-22

## 2025-01-14 NOTE — PROGRESS NOTES
UROLOGY PROGRESS NOTE   Patient Identifiers: Kirsty Em (MRN [id number])  Date of Service: 1/14/2025    Subjective:   74-year-old female history of urinary retention and recurrent urinary tract infection.  She has been having gross hematuria.  Currently her urine shows leuks and red blood cells.  Her urine culture was + January 9 but not treated.  CT abdomen pelvis with contrast in October showed no concerning  findings.  Accompanied by her .  Current smoker.    Reason for visit: UTI and hematuria follow-up    Objective:     VITALS:    Vitals:    01/14/25 1447   BP: 114/74   Pulse: 91   SpO2: 95%           LABS:  Lab Results   Component Value Date    HGB 10.5 (L) 01/09/2025    HCT 32.7 (L) 01/09/2025    WBC 7.00 01/09/2025     01/09/2025   ]    Lab Results   Component Value Date    K 3.5 01/09/2025     01/09/2025    CO2 28 01/09/2025    BUN 23 01/09/2025    CREATININE 0.65 01/09/2025    CALCIUM 9.5 01/09/2025    GLUCOSE 199 (H) 06/07/2024   ]        INPATIENT MEDS:    Current Outpatient Medications:     acetaminophen (TYLENOL) 325 mg tablet, Take 975 mg by mouth every 8 (eight) hours as needed for mild pain, Disp: , Rfl:     apixaban (Eliquis) 5 mg, Take 1 tablet (5 mg total) by mouth 2 (two) times a day, Disp: 60 tablet, Rfl: 5    diltiazem (CARDIZEM CD) 120 mg 24 hr capsule, Take 1 capsule (120 mg total) by mouth daily, Disp: 60 capsule, Rfl: 5    ezetimibe (ZETIA) 10 mg tablet, TAKE 1 TABLET BY MOUTH EVERY DAY, Disp: 90 tablet, Rfl: 1    fluticasone (FLONASE) 50 mcg/act nasal spray, PLACE 2 SPRAYS IN EACH NOSTRIL DAILY, Disp: , Rfl:     Icosapent Ethyl 1 g CAPS, Take 2 capsules (2 g total) by mouth 2 (two) times a day, Disp: 360 capsule, Rfl: 6    levETIRAcetam (KEPPRA) 750 mg tablet, Take 1 tablet (750 mg total) by mouth every 12 (twelve) hours, Disp: 120 tablet, Rfl: 2    Multiple Vitamin (multivitamin) tablet, Take 1 tablet by mouth daily, Disp: , Rfl:     QUEtiapine (SEROquel) 100 mg  "tablet, Take 1 tablet (100 mg total) by mouth every 12 (twelve) hours, Disp: 60 tablet, Rfl: 0    rosuvastatin (CRESTOR) 10 MG tablet, Take 1 tablet (10 mg total) by mouth daily, Disp: 90 tablet, Rfl: 0    sulfamethoxazole-trimethoprim (BACTRIM DS) 800-160 mg per tablet, Take 1 tablet by mouth every 12 (twelve) hours for 7 days, Disp: 14 tablet, Rfl: 0    vitamin B-12 (VITAMIN B-12) 1,000 mcg tablet, Take by mouth daily, Disp: , Rfl:     ALPRAZolam (XANAX) 2 MG tablet, Take 1 tablet (2 mg total) by mouth 3 (three) times a day as needed for anxiety for up to 10 days, Disp: 20 tablet, Rfl: 0    aspirin 81 mg chewable tablet, Chew 1 tablet (81 mg total) daily, Disp: 60 tablet, Rfl: 0    gabapentin (NEURONTIN) 100 mg capsule, Take 1 capsule (100 mg total) by mouth 3 (three) times a day, Disp: 90 capsule, Rfl: 0    naloxone (NARCAN) 4 mg/0.1 mL nasal spray, Administer 1 spray into a nostril. If no response after 2-3 minutes, give another dose in the other nostril using a new spray. (Patient not taking: Reported on 11/4/2024), Disp: 1 each, Rfl: 1      Physical Exam:   /74 (BP Location: Left arm, Patient Position: Sitting, Cuff Size: Adult)   Pulse 91   Ht 5' 3\" (1.6 m)   Wt 55.8 kg (123 lb)   SpO2 95%   BMI 21.79 kg/m²   GEN: no acute distress    RESP: breathing comfortably with no accessory muscle use    ABD: soft, non-tender, non-distended   INCISION:    EXT: no significant peripheral edema       RADIOLOGY:   IMPRESSION:     Mild dependent and basilar probable atelectasis. Recommend correlation for superimposed aspiration given small amount of debris/secretion within the dependent trachea. Otherwise no focal airspace consolidation or pleural effusion.     No acute abnormality identified in the abdomen or pelvis. No free air or free fluid     Redemonstrated hepatomegaly and hepatic steatosis.     Redemonstrated nonspecific endometrial thickening. GYN follow-up is recommended if not previously performed. "     Assessment:   #1.  Gross hematuria  #2.  Urinary tract infection    Plan:   -I sent antibiotics to her pharmacy  -I recommended she talk to her primary physician about a gynecology evaluation as recommended here in October CAT scan  -Follow-up for cystoscopy in the office  -

## 2025-01-15 ENCOUNTER — TELEPHONE (OUTPATIENT)
Dept: FAMILY MEDICINE CLINIC | Facility: CLINIC | Age: 75
End: 2025-01-15

## 2025-01-15 DIAGNOSIS — R93.89 ABNORMAL CT SCAN: Primary | ICD-10-CM

## 2025-01-17 ENCOUNTER — OFFICE VISIT (OUTPATIENT)
Dept: NEUROLOGY | Facility: CLINIC | Age: 75
End: 2025-01-17
Payer: MEDICARE

## 2025-01-17 VITALS
HEART RATE: 90 BPM | WEIGHT: 121.5 LBS | SYSTOLIC BLOOD PRESSURE: 110 MMHG | DIASTOLIC BLOOD PRESSURE: 70 MMHG | BODY MASS INDEX: 21.53 KG/M2 | HEIGHT: 63 IN

## 2025-01-17 DIAGNOSIS — R73.03 PRE-DIABETES: ICD-10-CM

## 2025-01-17 DIAGNOSIS — I65.23 BILATERAL CAROTID ARTERY STENOSIS: ICD-10-CM

## 2025-01-17 DIAGNOSIS — I48.91 ATRIAL FIBRILLATION (HCC): ICD-10-CM

## 2025-01-17 DIAGNOSIS — Z86.73 HISTORY OF STROKE: Primary | ICD-10-CM

## 2025-01-17 DIAGNOSIS — F17.200 TOBACCO DEPENDENCE: ICD-10-CM

## 2025-01-17 DIAGNOSIS — G40.909 SEIZURE DISORDER (HCC): ICD-10-CM

## 2025-01-17 DIAGNOSIS — I10 PRIMARY HYPERTENSION: ICD-10-CM

## 2025-01-17 PROCEDURE — G2211 COMPLEX E/M VISIT ADD ON: HCPCS | Performed by: PSYCHIATRY & NEUROLOGY

## 2025-01-17 PROCEDURE — 99214 OFFICE O/P EST MOD 30 MIN: CPT | Performed by: PSYCHIATRY & NEUROLOGY

## 2025-01-17 NOTE — PROGRESS NOTES
Name: Kirsty Em      : 1950      MRN: [id number]  Encounter Provider: Liam Ferrer MD  Encounter Date: 2025   Encounter department: NEUROLOGY [facility]  :  Assessment & Plan  History of stroke  Patient Instructions    Stroke: Kirsty presents for a follow-up evaluation with regard to her prior stroke.  She reports no new symptoms at this time.  She does have a little bit of a complex stroke risk profile with both atrial fibrillation and significant carotid stenosis  -For stroke prevention she should continue her combination of aspirin, Eliquis, Crestor, Zetia, and appropriate blood pressure and glycemic control  -We recommend maintaining an LDL cholesterol of less than 70, hemoglobin A1c of less than 7%, and blood pressure of less than 130/80 all of which she has achieved  -I would recommend she check her blood pressure away from the doctor's office occasionally to make sure that the numbers are good most of the time  -I would strongly encourage her to completely quit smoking to help reduce the risk of her carotid arteries worsening    She is going to be seeing the neurosurgery group in January.  From my standpoint she would be cleared for intervention either for her carotid arteries or for her meningioma at the neurosurgeons discretion    She is due to see the cardiologist in March of next year.  We will work with her to try and set that appointment as it was not done at the time of her last visit    With regard to her seizure disorder she has not had any potential breakthrough seizures since  of last year.  We will fill out the appropriate seizure update form to send to the Department of Transportation.  She will continue her current Keppra at 750 mg twice per day.  She is not in need of repeat EEG at this time    She will return to the office to see me directly in 6 months but I would be happy to see her sooner if the need should arise.  If she has strokelike symptoms such as  sudden painless loss of vision or double vision, difficulty speaking or swallowing, vertigo/room spinning that does not quickly resolve, or sudden weakness/numbness/loss of coordination affecting 1 side of the face or body she should proceed by ambulance to the nearest emergency room immediately.  While taking Eliquis if she falls and strikes her head or suddenly has the worst headache of her life she should likewise proceed by ambulance to the nearest emergency room    For safety, I would recommend she avoid other blood thinning medications including anti-inflammatory medicines and any alcohol.  With regard to her seizure history we would generally recommend showers as opposed to baths, that she avoid long periods of insomnia/lack of sleep, that she avoid working on ladders or on rooftops, and she should be particularly careful to not run out of her antiseizure medicine that suddenly stopping could bring on a seizure event.         Primary hypertension         Bilateral carotid artery stenosis         Seizure disorder (HCC)         Tobacco dependence         Pre-diabetes         Atrial fibrillation (HCC)               History of Present Illness   HPI    Jia presents with her significant other for a follow-up with regard to her prior stroke.  She reports no new strokelike symptoms.  She is taking her medication and at this point in time did not endorse significant bleeding or bruising issues.  We reviewed her stroke risk factors today.  In particular, she does have atrial fibrillation and will be due to see the cardiology group this coming March although her appointment is not currently scheduled.  She also has significant bilateral carotid artery stenosis.  Although the location of her stroke would suggest that the bilateral carotid artery stenosis did not directly cause it, nevertheless it is severe enough that she may warrant intervention.  She should follow-up with the neurosurgery group in this regard as well  "as relating to her meningioma.    She reports that she had a few seizures \"a long time ago.\"  Her last event before recent history was in 2009.  Most recently when she came to the hospital with possible seizure it was also thought that she may be experiencing toxic or metabolic encephalopathy rather than breakthrough seizure.  She had been off of Keppra for having self weaned (months).  She denies any family history of epilepsy.    We discussed the importance of completely quitting smoking.  She reports currently smoking approximately 4 cigarettes/day.    Lab Results   Component Value Date/Time    CHOLESTEROL 83 01/09/2025 04:03 PM     Lab Results   Component Value Date/Time    TRIG 72 01/09/2025 04:03 PM     Lab Results   Component Value Date/Time    HDL 45 (L) 01/09/2025 04:03 PM     Lab Results   Component Value Date/Time    LDLCALC 24 01/09/2025 04:03 PM       Lab Results   Component Value Date/Time    HGBA1C 5.9 (H) 01/09/2025 04:03 PM     Lab Results   Component Value Date/Time     01/09/2025 04:03 PM       Results for orders placed during the hospital encounter of 12/20/24    VAS carotid complete study    Narrative  [facility] REPORT  CLINICAL:  Indications:  Patient presents for follow up on abnormal CTA.  Operative History:  no prior cardiovascular surgeries  Risk Factors  The patient has history of HTN, Hyperlipidemia and smoking (current) 0.5 ppd.  Clinical  Right Pressure:  126/70 mm Hg, Left Pressure:  122/64 mm Hg.    FINDINGS:    Right        Impression  PSV  EDV (cm/s)  Direction of Flow  Ratio  Dist. ICA                 84          25                      1.14  Mid. ICA                  89          23                      1.20  Prox. ICA    70 - 99%    343         132                      4.64  Dist CCA                  52          17  Mid CCA                   74          15                      1.06  Prox CCA                  70          15  Ext Carotid               60           7 "                      0.80  Prox Vert                 61          17  Antegrade  Subclavian               140           0    Left         Impression  PSV  EDV (cm/s)  Direction of Flow  Ratio  Dist. ICA                 55          27                      0.58  Mid. ICA                 136          39                      1.45  Prox. ICA    70%+        294          94                      3.13  Dist CCA                  90          31  Mid CCA                   94          36                      0.85  Prox CCA                 111          36  Ext Carotid               76          11                      0.81  Prox Vert                 49          18  Antegrade  Subclavian                95           5        CONCLUSION:    Impression  RIGHT:  There is 70-99% stenosis noted in the internal carotid artery. Plaque is  heterogenous and irregular.  Vertebral artery flow is antegrade. There is no significant subclavian artery  disease.    LEFT:  There is >70% stenosis noted in the internal carotid artery. Plaque is  heterogenous and irregular.  Vertebral artery flow is antegrade. There is no significant subclavian artery  disease.    No prior carotid study for comparison.  Recommend repeat testing in 6 months as per protocol unless otherwise  indicated.    SIGNATURE:  Electronically Signed by: MASON HERRMANN MD [facility] on 2024-12-20 02:58:37 PM    Results for orders placed during the hospital encounter of 10/27/24    CTA head and neck with and without contrast    Narrative  CTA NECK AND BRAIN WITH AND WITHOUT CONTRAST    INDICATION: dizziness, generalized weakness, unclear hx    COMPARISON:   10/2/2024, 6/7/2024, 6/8/2024.    TECHNIQUE:  Routine CT imaging of the Brain without contrast.Post contrast imaging was performed after administration of iodinated contrast through the neck and brain. Post contrast axial 0.625 mm images timed to opacify the arterial system.  3D  rendering was performed on an independent  workstation.   MIP reconstructions performed. Coronal and sagittal reconstructions were performed of the non contrast portion of the brain.    Radiation dose length product (DLP) for this visit:  1240.21 mGy-cm .  This examination, like all CT scans performed in the [facility], was performed utilizing techniques to minimize radiation dose exposure, including the use of  iterative reconstruction and automated exposure control.    IV Contrast:  120 mL of iohexol (OMNIPAQUE)    IMAGE QUALITY:   Diagnostic slightly motion-degraded. Findings below within limitation.    FINDINGS:  NONCONTRAST BRAIN  PARENCHYMA: Decreased attenuation is noted in periventricular and subcortical white matter demonstrating an appearance that is statistically most likely to represent mild microangiopathic change; this appearance is similar when compared to most recent  prior examination. Chronic infarct    No CT signs of acute infarction.  No intracranial mass, mass effect or midline shift.  No acute parenchymal hemorrhage.    VENTRICLES AND EXTRA-AXIAL SPACES: Stable volume loss. No hydrocephalus or extra-axial collection. Unchanged 8 mm calcified extra-axial lesion along the left frontal convexity, statistically meningioma.    VISUALIZED ORBITS: Normal.    PARANASAL SINUSES: Normal.    CTA NECK    ARCH AND GREAT VESSELS: Mild atherosclerotic changes with no severe stenosis.  VERTEBRAL ARTERIES: Patent extracranial segments.  RIGHT CAROTID: Atherosclerotic disease of the distal common carotid artery and proximal cervical ICA segment. There is high-grade stenosis (approximately 80%) at the ICA origin secondary to calcified and noncalcified atheromatous plaque. This is similar  to slightly progressed from the prior study.    No dissection.  LEFT CAROTID: Atherosclerotic disease of the distal common carotid artery and proximal cervical ICA segment. There is high-grade stenosis (approximately 70%) at the ICA origin secondary to  calcified and noncalcified atheromatous plaque. This is similar  to the prior study.    No dissection.  NASCET criteria was used to determine the degree of internal carotid artery diameter stenosis.      CTA BRAIN:  INTERNAL CAROTID ARTERIES: Similar atherosclerotic disease of the bilateral cavernous and supraclinoid segments without significant stenosis. Patent ICA termini.  ANTERIOR CEREBRAL ARTERY CIRCULATION:  No stenosis or occlusion. Azygos variant anatomy reidentified.  MIDDLE CEREBRAL ARTERY CIRCULATION:  No stenosis or occlusion.  DISTAL VERTEBRAL ARTERIES:  No stenosis or occlusion. Left dominant. Patent posterior inferior cerebellar artery origins  BASILAR ARTERY:  No stenosis or occlusion.  POSTERIOR CEREBRAL ARTERIES: No stenosis or occlusion. Hypoplastic posterior communicating arteries again noted  VENOUS STRUCTURES: Patent.    NON VASCULAR ANATOMY  BONY STRUCTURES:  No acute osseous abnormality. Chronic left mastoid effusion.    SOFT TISSUES OF THE NECK:  Normal.    THORACIC INLET: Please refer to report of concurrent CT.    Impression  CT Brain:  No acute intracranial abnormality.    CT Angiography: No evidence of flow-limiting stenosis or large vessel occlusive disease within the major vessels of the [place]. Suspect slightly progressed high-grade stenosis at the right ICA origin secondary to calcified and noncalcified  atheromatous plaque (approximately 80-90% by NASCET criteria) compared to the prior study dated 6/7/2024. Similar severe stenosis of the left ICA origin (approximately 70% by NASCET criteria). Patient is already being followed by neurovascular service  per chart review in [facility].            Workstation performed: [id number]            Review of Systems   Constitutional:  Negative for appetite change, fatigue and fever.   HENT: Negative.  Negative for hearing loss, tinnitus, trouble swallowing and voice change.    Eyes: Negative.  Negative for photophobia, pain and visual  "disturbance.   Respiratory: Negative.  Negative for shortness of breath.    Cardiovascular: Negative.  Negative for palpitations.   Gastrointestinal: Negative.  Negative for nausea and vomiting.   Endocrine: Negative.  Negative for cold intolerance.   Genitourinary: Negative.  Negative for dysuria, frequency and urgency.   Musculoskeletal:  Negative for back pain, gait problem, myalgias, neck pain and neck stiffness.   Skin: Negative.  Negative for rash.   Allergic/Immunologic: Negative.    Neurological: Negative.  Negative for dizziness, tremors, seizures, syncope, facial asymmetry, speech difficulty, weakness, light-headedness, numbness and headaches.   Hematological: Negative.  Does not bruise/bleed easily.   Psychiatric/Behavioral: Negative.  Negative for confusion, hallucinations and sleep disturbance.     I have personally reviewed the MA's review of systems and made changes as necessary.         Objective   /70 (BP Location: Left arm, Patient Position: Sitting, Cuff Size: Standard)   Pulse 90   Ht 5' 3\" (1.6 m)   Wt 55.1 kg (121 lb 8 oz)   BMI 21.52 kg/m²     Physical Exam  Neurological Exam    At the time of my evaluation she was awake and alert and in no distress.  She is a reasonable historian with no dysarthria or aphasia.  Cranial nerves II through XII are symmetrically intact.  There is no clear new cranial neuropathies or lateralizing signs.  She was able to rise without assistance and her gait was stable.          "

## 2025-01-17 NOTE — ASSESSMENT & PLAN NOTE
Patient Instructions    Stroke: Kirsty presents for a follow-up evaluation with regard to her prior stroke.  She reports no new symptoms at this time.  She does have a little bit of a complex stroke risk profile with both atrial fibrillation and significant carotid stenosis  -For stroke prevention she should continue her combination of aspirin, Eliquis, Crestor, Zetia, and appropriate blood pressure and glycemic control  -We recommend maintaining an LDL cholesterol of less than 70, hemoglobin A1c of less than 7%, and blood pressure of less than 130/80 all of which she has achieved  -I would recommend she check her blood pressure away from the doctor's office occasionally to make sure that the numbers are good most of the time  -I would strongly encourage her to completely quit smoking to help reduce the risk of her carotid arteries worsening    She is going to be seeing the neurosurgery group in January.  From my standpoint she would be cleared for intervention either for her carotid arteries or for her meningioma at the neurosurgeons discretion    She is due to see the cardiologist in March of next year.  We will work with her to try and set that appointment as it was not done at the time of her last visit    With regard to her seizure disorder she has not had any potential breakthrough seizures since June of last year.  We will fill out the appropriate seizure update form to send to the Department of Transportation.  She will continue her current Keppra at 750 mg twice per day.  She is not in need of repeat EEG at this time    She will return to the office to see me directly in 6 months but I would be happy to see her sooner if the need should arise.  If she has strokelike symptoms such as sudden painless loss of vision or double vision, difficulty speaking or swallowing, vertigo/room spinning that does not quickly resolve, or sudden weakness/numbness/loss of coordination affecting 1 side of the face or body she  should proceed by ambulance to the nearest emergency room immediately.  While taking Eliquis if she falls and strikes her head or suddenly has the worst headache of her life she should likewise proceed by ambulance to the nearest emergency room    For safety, I would recommend she avoid other blood thinning medications including anti-inflammatory medicines and any alcohol.  With regard to her seizure history we would generally recommend showers as opposed to baths, that she avoid long periods of insomnia/lack of sleep, that she avoid working on ladders or on rooftops, and she should be particularly careful to not run out of her antiseizure medicine that suddenly stopping could bring on a seizure event.

## 2025-01-17 NOTE — PATIENT INSTRUCTIONS
Stroke: Kirsty presents for a follow-up evaluation with regard to her prior stroke.  She reports no new symptoms at this time.  She does have a little bit of a complex stroke risk profile with both atrial fibrillation and significant carotid stenosis  -For stroke prevention she should continue her combination of aspirin, Eliquis, Crestor, Zetia, and appropriate blood pressure and glycemic control  -We recommend maintaining an LDL cholesterol of less than 70, hemoglobin A1c of less than 7%, and blood pressure of less than 130/80 all of which she has achieved  -I would recommend she check her blood pressure away from the doctor's office occasionally to make sure that the numbers are good most of the time  -I would strongly encourage her to completely quit smoking to help reduce the risk of her carotid arteries worsening    She is going to be seeing the neurosurgery group in January.  From my standpoint she would be cleared for intervention either for her carotid arteries or for her meningioma at the neurosurgeons discretion    She is due to see the cardiologist in March of next year.  We will work with her to try and set that appointment as it was not done at the time of her last visit    With regard to her seizure disorder she has not had any potential breakthrough seizures since June of last year.  We will fill out the appropriate seizure update form to send to the Department of Transportation.  She will continue her current Keppra at 750 mg twice per day.  She is not in need of repeat EEG at this time    She will return to the office to see me directly in 6 months but I would be happy to see her sooner if the need should arise.  If she has strokelike symptoms such as sudden painless loss of vision or double vision, difficulty speaking or swallowing, vertigo/room spinning that does not quickly resolve, or sudden weakness/numbness/loss of coordination affecting 1 side of the face or body she should proceed by  ambulance to the nearest emergency room immediately.  While taking Eliquis if she falls and strikes her head or suddenly has the worst headache of her life she should likewise proceed by ambulance to the nearest emergency room    For safety, I would recommend she avoid other blood thinning medications including anti-inflammatory medicines and any alcohol.  With regard to her seizure history we would generally recommend showers as opposed to baths, that she avoid long periods of insomnia/lack of sleep, that she avoid working on ladders or on rooftops, and she should be particularly careful to not run out of her antiseizure medicine that suddenly stopping could bring on a seizure event.

## 2025-01-20 ENCOUNTER — TELEPHONE (OUTPATIENT)
Age: 75
End: 2025-01-20

## 2025-01-20 NOTE — TELEPHONE ENCOUNTER
Spoke with pt, confused about referral, discussed CT results and need for follow-up. She verbalized understanding but did not want to be seen sooner-sched 4/16/25-due to the number of appts she has currently. She verbalized understanding of test results and need for appt, placed on wait list for sooner availability

## 2025-01-22 ENCOUNTER — TELEPHONE (OUTPATIENT)
Dept: RADIOLOGY | Facility: HOSPITAL | Age: 75
End: 2025-01-22

## 2025-01-22 ENCOUNTER — OFFICE VISIT (OUTPATIENT)
Dept: NEUROSURGERY | Facility: CLINIC | Age: 75
End: 2025-01-22
Payer: MEDICARE

## 2025-01-22 ENCOUNTER — APPOINTMENT (OUTPATIENT)
Dept: LAB | Facility: CLINIC | Age: 75
End: 2025-01-22
Payer: MEDICARE

## 2025-01-22 VITALS
HEART RATE: 94 BPM | OXYGEN SATURATION: 99 % | SYSTOLIC BLOOD PRESSURE: 122 MMHG | TEMPERATURE: 97.4 F | HEIGHT: 63 IN | RESPIRATION RATE: 13 BRPM | BODY MASS INDEX: 21.44 KG/M2 | WEIGHT: 121 LBS | DIASTOLIC BLOOD PRESSURE: 64 MMHG

## 2025-01-22 DIAGNOSIS — I65.23 CAROTID STENOSIS, ASYMPTOMATIC, BILATERAL: Primary | ICD-10-CM

## 2025-01-22 DIAGNOSIS — F17.200 TOBACCO DEPENDENCE: ICD-10-CM

## 2025-01-22 DIAGNOSIS — I65.23 BILATERAL CAROTID ARTERY STENOSIS: ICD-10-CM

## 2025-01-22 DIAGNOSIS — Z79.01 LONG TERM (CURRENT) USE OF ANTICOAGULANTS: ICD-10-CM

## 2025-01-22 DIAGNOSIS — D32.9 BENIGN NEOPLASM OF MENINGES, UNSPECIFIED (HCC): ICD-10-CM

## 2025-01-22 DIAGNOSIS — Z01.812 PRE-OPERATIVE LABORATORY EXAMINATION: ICD-10-CM

## 2025-01-22 DIAGNOSIS — I65.23 CAROTID STENOSIS, ASYMPTOMATIC, BILATERAL: ICD-10-CM

## 2025-01-22 LAB
APTT PPP: 42 SECONDS (ref 23–34)
INR PPP: 1.31 (ref 0.85–1.19)
PROTHROMBIN TIME: 16.5 SECONDS (ref 12.3–15)

## 2025-01-22 PROCEDURE — 85610 PROTHROMBIN TIME: CPT

## 2025-01-22 PROCEDURE — 36415 COLL VENOUS BLD VENIPUNCTURE: CPT

## 2025-01-22 PROCEDURE — 99215 OFFICE O/P EST HI 40 MIN: CPT | Performed by: NEUROLOGICAL SURGERY

## 2025-01-22 PROCEDURE — 85730 THROMBOPLASTIN TIME PARTIAL: CPT

## 2025-01-22 RX ORDER — SODIUM CHLORIDE 9 MG/ML
75 INJECTION, SOLUTION INTRAVENOUS CONTINUOUS
OUTPATIENT
Start: 2025-01-22

## 2025-01-22 RX ORDER — CLOPIDOGREL BISULFATE 75 MG/1
75 TABLET ORAL DAILY
Qty: 90 TABLET | Refills: 0 | Status: SHIPPED | OUTPATIENT
Start: 2025-01-22

## 2025-01-22 RX ORDER — ASPIRIN 325 MG
325 TABLET ORAL DAILY
Qty: 90 TABLET | Refills: 3 | Status: SHIPPED | OUTPATIENT
Start: 2025-01-22

## 2025-01-22 NOTE — PROGRESS NOTES
Name: Kirsty Em      : 1950      MRN: [id number]  Encounter Provider: Soto Quintana MD  Encounter Date: 2025   Encounter department: [facility] [place]  :  Assessment & Plan  Carotid stenosis, asymptomatic, bilateral  Previous left MCA territory stroke in the context of new diagnosis A-fib and bilateral carotid stenosis.  Interval Doppler and CTA demonstrate bilateral progressive stenosis with now 70 to 99% on the right and greater than 70% on the left.  Given evidence of progressive carotid stenosis and her history of stroke I have recommended treatment with carotid revascularization.  We discussed options including open CEA, TCAR, and transfemoral carotid stenting.  I have recommended the latter at this juncture.  We discussed the risks as well as expected hospitalization.  She understands that these risks include bleeding, vascular injury, cardiac complications, and stroke.  She understands that the goal of this procedure is to reestablish hemodynamic flow to the intracranial circulation and decrease her risk of future stroke.  We discussed the importance of dual antiplatelet therapy leading up to the procedure with the plan to transition her to antiplatelet monotherapy afterwards.  She is also currently on Eliquis.    We will plan to start aspirin 325 and Plavix 75 1 week prior to the procedure.  Hold Eliquis 24 hours prior to the procedure while continuing aspirin and Plavix.  After the procedure transition her to Plavix and Eliquis.    We will plan on likely treating the right side first based on the significant hemodynamic stenosis there and then treating the left in a delayed fashion approximately 4 weeks later.  If she has significant atheromatous disease on the left and irregular plaque we may elect to treat that side first.    He signed consent today and we will plan on doing this in the near future.      Orders:    aspirin 325 mg tablet; Take 1 tablet (325 mg  total) by mouth daily Begin 7 days prior to procedure    clopidogrel (PLAVIX) 75 mg tablet; Take 1 tablet (75 mg total) by mouth daily Begin 7 days prior to procedure    P2Y12 Platelet inhibitor; Future    IR cerebral angiography / intervention; Future    Benign neoplasm of meninges, unspecified (HCC)  2.  Small left frontal meningioma  New to monitor this with noninvasive imaging in 1 year       Tobacco dependence  3. Tobacco abuse  We spent greater than 5 min discussing the risks associated with tobacco and smoking.  We discussed the importance of smoking cessation relationship to the vascular risk factors aneurysm growth and rupture.             History of Present Illness     HPI  Kirsty Em is a 74 y.o. female who presented presented to the hospital and was discovered to have small left MCA stroke in the context of new onset A-fib and carotid stenosis bilaterally.  She was also found to have a small left convexity meningioma.    She presents today with a 6-month follow-up carotid Doppler.     She has been doing neurologically well with the exception of some anxiety.  She still smokes 3 to 4 cigarettes daily.    Review of Systems   Constitutional:  Negative for fatigue.   HENT:  Positive for tinnitus (left ear ringing- f/u w/ ent).    Eyes:  Negative for visual disturbance.   Gastrointestinal: Negative.    Genitourinary: Negative.    Musculoskeletal:  Positive for gait problem (uses cane for support).   Neurological:  Positive for headaches (occ). Negative for dizziness, tremors, seizures, syncope, speech difficulty, weakness and numbness.   Hematological:  Bruises/bleeds easily (meds).   Psychiatric/Behavioral:  Negative for confusion, decreased concentration and sleep disturbance.     I have personally reviewed the MA's review of systems and made changes as necessary.       Objective   /64 (BP Location: Right arm, Patient Position: Sitting, Cuff Size: Standard)   Pulse 94   Temp (!) 97.4 °F (36.3 °C)  "(Temporal)   Resp 13   Ht 5' 3\" (1.6 m)   Wt 54.9 kg (121 lb)   SpO2 99%   BMI 21.43 kg/m²     Physical Exam  Neurologic Exam  She is well appearing.  Affect is appropriate. Body mass index is 21.43 kg/m².. She is awake alert and oriented.  Hearing and vision are grossly intact.   Her pupils are equal round reactive to light.  Her extraocular movements are intact.  Her face is symmetric.  Tongue is midline.  Facial sensation is intact and symmetric throughout.  Shoulder shrug is 5/5.  There is no drift or dysmetria.     She has full strength in her bilateral upper and lower extremities.  She has normal muscle tone muscle bulk.  Her biceps reflexes and patellar reflexes are 2+ and symmetric.  Choco sign negative bilaterally.  Sensation intact to light touch and pinprick throughout.  Her gait is normal.     Her heart rate is regular.  Normal respiratory effort.  Abdomen nondistended.  Radial pulses 2+.     We reviewed the results of her MRI, CTA, and carotid Dopplers as well as reports.    Administrative Statements   I have spent a total time of 45 minutes in caring for this patient on the day of the visit/encounter including Diagnostic results, Prognosis, Risks and benefits of tx options, Instructions for management, Patient and family education, Importance of tx compliance, Risk factor reductions, Impressions, Counseling / Coordination of care, Documenting in the medical record, Reviewing / ordering tests, medicine, procedures  , Obtaining or reviewing history  , and Communicating with other healthcare professionals .   "

## 2025-01-22 NOTE — Clinical Note
Shady Keene. I saw Kirsty today.  We will plan on treatment of her progressive carotid disease in the very near future.  Thanks for ensuring she had appropriate follow-up with us as well.  Soto

## 2025-01-22 NOTE — ASSESSMENT & PLAN NOTE
3. Tobacco abuse  We spent greater than 5 min discussing the risks associated with tobacco and smoking.  We discussed the importance of smoking cessation relationship to the vascular risk factors aneurysm growth and rupture.

## 2025-01-23 ENCOUNTER — APPOINTMENT (OUTPATIENT)
Dept: LAB | Facility: HOSPITAL | Age: 75
End: 2025-01-23
Payer: MEDICARE

## 2025-01-23 ENCOUNTER — TELEPHONE (OUTPATIENT)
Dept: RADIOLOGY | Facility: HOSPITAL | Age: 75
End: 2025-01-23

## 2025-01-23 LAB
BILIRUB UR QL STRIP: NEGATIVE
CLARITY UR: CLEAR
COLOR UR: YELLOW
GLUCOSE UR STRIP-MCNC: NEGATIVE MG/DL
HGB UR QL STRIP.AUTO: NEGATIVE
KETONES UR STRIP-MCNC: NEGATIVE MG/DL
LEUKOCYTE ESTERASE UR QL STRIP: NEGATIVE
NITRITE UR QL STRIP: NEGATIVE
PH UR STRIP.AUTO: 6 [PH]
PROT UR STRIP-MCNC: NEGATIVE MG/DL
SP GR UR STRIP.AUTO: 1.02 (ref 1–1.03)
UROBILINOGEN UR STRIP-ACNC: <2 MG/DL

## 2025-01-23 PROCEDURE — 81003 URINALYSIS AUTO W/O SCOPE: CPT

## 2025-01-23 NOTE — PRE-PROCEDURE INSTRUCTIONS
Pre-procedure Instructions for Interventional Radiology  [address] [address]  INTERVENTIONAL RADIOLOGY [phone]    You are scheduled for a/an Cerebral Angiogram with Intervention.    On Thursday 1/30/25.    Your tentative arrival time is 8 AM.  [facility] will notify you the day before your procedure with the exact arrival time and the location to arrive.    To prepare for your procedure:  Please arrange for someone to drive you home after the procedure and stay with you until the next morning if you are instructed to do so.  This is typically for patients receiving some type of sedative or anesthetic for the procedure.  DO NOT EAT OR DRINK ANYTHING after midnight on the evening before your procedure including candy & gum.  ONLY SIPS OF WATER with your medications are allowed on the morning of your procedure.  TAKE ALL OF YOUR REGULAR MEDICATIONS THE MORNING OF YOUR PROCEDURE with sips of water!  We may call you to stop some of your blood sugar, blood pressure and blood thinning medications depending on the procedure.  Please take all of these medications unless we instruct you to stop them.  If you have an allergy to x-ray dye, please contact Interventional Radiology for an x-ray dye preparation which usually consists of an oral steroid and Benadryl.    The day of your procedure:  Bring a list of the medications you take at home.  Bring medications you take for breathing problems (such as inhalers), medications for chest pain, or both.  Bring a case for your glasses or contacts.  Bring your insurance card and a form of photo ID.  Please leave all valuables such as credit cards and jewelry at home.  Report to the admitting office to the left of the registration desk in the main lobby at the [facility], Entrance B.  You will then be directed to the Short Stay Center.  While your procedure is being performed, your family may wait in the Radiology Waiting Room on  the 1st floor in Radiology.  if they need to leave, they may provide a number to be called following the procedure.   Be prepared to stay overnight just in case. Sometimes procedures will indicate the need for further observation or treatment.   If you are scheduled for a follow-up visit with the Interventional Radiologist after your procedure, you will be called with a date and time.    Special Instructions (Medications to stop taking before your procedure etc.):  ASA 81 mg stopped 1/23/25;Eliquis last dose 1/28/25 and restart 1/31/25. mg and Plavix 75 mg AM daily started today 1/23/25. P2Y12 blood draw 1/29/25 at [facility].Above reviewed with her  [place].

## 2025-01-27 ENCOUNTER — HOSPITAL ENCOUNTER (EMERGENCY)
Facility: HOSPITAL | Age: 75
Discharge: HOME | End: 2025-01-28
Payer: MEDICARE

## 2025-01-27 VITALS
RESPIRATION RATE: 18 BRPM | HEART RATE: 92 BPM | TEMPERATURE: 98.5 F | SYSTOLIC BLOOD PRESSURE: 138 MMHG | DIASTOLIC BLOOD PRESSURE: 75 MMHG | OXYGEN SATURATION: 97 %

## 2025-01-27 DIAGNOSIS — R04.0 LEFT-SIDED EPISTAXIS: Primary | ICD-10-CM

## 2025-01-27 DIAGNOSIS — Z79.01 CURRENT USE OF ANTICOAGULANT THERAPY: ICD-10-CM

## 2025-01-27 PROCEDURE — 99284 EMERGENCY DEPT VISIT MOD MDM: CPT

## 2025-01-28 LAB
ABO GROUP BLD: NORMAL
ANION GAP SERPL CALCULATED.3IONS-SCNC: 5 MMOL/L (ref 4–13)
BASOPHILS # BLD AUTO: 0.06 THOUSANDS/ΜL (ref 0–0.1)
BASOPHILS NFR BLD AUTO: 1 % (ref 0–1)
BLD GP AB SCN SERPL QL: NEGATIVE
BUN SERPL-MCNC: 22 MG/DL (ref 5–25)
CALCIUM SERPL-MCNC: 9.5 MG/DL (ref 8.4–10.2)
CHLORIDE SERPL-SCNC: 106 MMOL/L (ref 96–108)
CO2 SERPL-SCNC: 30 MMOL/L (ref 21–32)
CREAT SERPL-MCNC: 0.65 MG/DL (ref 0.6–1.3)
EOSINOPHIL # BLD AUTO: 0.12 THOUSAND/ΜL (ref 0–0.61)
EOSINOPHIL NFR BLD AUTO: 2 % (ref 0–6)
ERYTHROCYTE [DISTWIDTH] IN BLOOD BY AUTOMATED COUNT: 13.2 % (ref 11.6–15.1)
GFR SERPL CREATININE-BSD FRML MDRD: 88 ML/MIN/1.73SQ M
GLUCOSE SERPL-MCNC: 121 MG/DL (ref 65–140)
HCT VFR BLD AUTO: 30.3 % (ref 34.8–46.1)
HGB BLD-MCNC: 10 G/DL (ref 11.5–15.4)
IMM GRANULOCYTES # BLD AUTO: 0.01 THOUSAND/UL (ref 0–0.2)
IMM GRANULOCYTES NFR BLD AUTO: 0 % (ref 0–2)
LYMPHOCYTES # BLD AUTO: 2.75 THOUSANDS/ΜL (ref 0.6–4.47)
LYMPHOCYTES NFR BLD AUTO: 35 % (ref 14–44)
MCH RBC QN AUTO: 32.6 PG (ref 26.8–34.3)
MCHC RBC AUTO-ENTMCNC: 33 G/DL (ref 31.4–37.4)
MCV RBC AUTO: 99 FL (ref 82–98)
MONOCYTES # BLD AUTO: 0.47 THOUSAND/ΜL (ref 0.17–1.22)
MONOCYTES NFR BLD AUTO: 6 % (ref 4–12)
NEUTROPHILS # BLD AUTO: 4.41 THOUSANDS/ΜL (ref 1.85–7.62)
NEUTS SEG NFR BLD AUTO: 56 % (ref 43–75)
NRBC BLD AUTO-RTO: 0 /100 WBCS
PLATELET # BLD AUTO: 292 THOUSANDS/UL (ref 149–390)
PMV BLD AUTO: 9.5 FL (ref 8.9–12.7)
POTASSIUM SERPL-SCNC: 3.8 MMOL/L (ref 3.5–5.3)
RBC # BLD AUTO: 3.07 MILLION/UL (ref 3.81–5.12)
RH BLD: POSITIVE
SODIUM SERPL-SCNC: 141 MMOL/L (ref 135–147)
SPECIMEN EXPIRATION DATE: NORMAL
WBC # BLD AUTO: 7.82 THOUSAND/UL (ref 4.31–10.16)

## 2025-01-28 PROCEDURE — 86900 BLOOD TYPING SEROLOGIC ABO: CPT

## 2025-01-28 PROCEDURE — 36415 COLL VENOUS BLD VENIPUNCTURE: CPT

## 2025-01-28 PROCEDURE — 86850 RBC ANTIBODY SCREEN: CPT

## 2025-01-28 PROCEDURE — 30903 CONTROL OF NOSEBLEED: CPT

## 2025-01-28 PROCEDURE — 86901 BLOOD TYPING SEROLOGIC RH(D): CPT

## 2025-01-28 PROCEDURE — 99285 EMERGENCY DEPT VISIT HI MDM: CPT

## 2025-01-28 PROCEDURE — 85025 COMPLETE CBC W/AUTO DIFF WBC: CPT

## 2025-01-28 PROCEDURE — 80048 BASIC METABOLIC PNL TOTAL CA: CPT

## 2025-01-28 RX ORDER — GINSENG 100 MG
1 CAPSULE ORAL ONCE
Status: COMPLETED | OUTPATIENT
Start: 2025-01-28 | End: 2025-01-28

## 2025-01-28 RX ORDER — OXYMETAZOLINE HYDROCHLORIDE 0.05 G/100ML
2 SPRAY NASAL ONCE
Status: COMPLETED | OUTPATIENT
Start: 2025-01-28 | End: 2025-01-28

## 2025-01-28 RX ORDER — TRANEXAMIC ACID 100 MG/ML
500 INJECTION, SOLUTION INTRAVENOUS ONCE
Status: COMPLETED | OUTPATIENT
Start: 2025-01-28 | End: 2025-01-28

## 2025-01-28 RX ADMIN — OXYMETAZOLINE HYDROCHLORIDE 2 SPRAY: 0.5 SPRAY NASAL at 00:55

## 2025-01-28 RX ADMIN — BACITRACIN ZINC 1 LARGE APPLICATION: 500 OINTMENT TOPICAL at 02:19

## 2025-01-28 RX ADMIN — TRANEXAMIC ACID 500 MG: 1 INJECTION, SOLUTION INTRAVENOUS at 00:57

## 2025-01-28 NOTE — ED PROVIDER NOTES
Time reflects when diagnosis was documented in both MDM as applicable and the Disposition within this note       Time User Action Codes Description Comment    1/28/2025  3:06 AM Boris White [R04.0] Left-sided epistaxis     1/28/2025  3:07 AM Boris White Add [Z79.01] Current use of anticoagulant therapy     1/28/2025  3:07 AM Boris White Add [Z48.00] Encounter for removal of nasal packing     1/28/2025  3:07 AM Boris White Remove [Z48.00] Encounter for removal of nasal packing     1/28/2025  3:09 AM Boris White Add [Z48.00] Encounter for removal of nasal packing     1/28/2025  3:09 AM Boris White Remove [Z48.00] Encounter for removal of nasal packing           ED Disposition       ED Disposition   AMA    Condition   --    Date/Time   Tue Jan 28, 2025  3:53 AM    Comment   Date: 1/28/2025  Patient: Kirsty Em  Admitted: 1/27/2025 11:58 PM  Attending Provider: Boris Clements*    Kirsty Em or her authorized caregiver has made the decision for the patient to leave the emergency department against the advice of  her attending physician. She or her authorized caregiver has been informed and understands the inherent risks, including death, hemorrhage, acute blood loss anemia, low blood pressure, heart attack, stroke, disability.  She or her authorized caregive r has decided to accept the responsibility for this decision. Kirsty Em and all necessary parties have been advised that she may return for further evaluation or treatment. Her condition at time of discharge was fair.  Kirsty Em had current vital s igns as follows:  /75   Pulse 92   Temp 98.5 °F (36.9 °C) (Oral)   Resp 18                Assessment & Plan       Medical Decision Making  74-year-old female presenting to emergency department for evaluation of epistaxis    Upon arrival patient had moderate left-sided epistaxis.  It was difficult to elucidate whether epistaxis was anterior or posterior on  exam.  Patient had elements of both.  Did try Afrin, pressure with minimal to no relief.  Did try soaking Rhino Rocket and TXA and inflating which did not stop bleeding.  Rhino Rocket was removed, had patient blow nose again and through direct inspection it appears that epistaxis is originating from the anterior nare.  2 x Murocel were placed in the anterior naris and activated using Afrin.  She was subsequently observed in the emergency department.  Posterior oropharynx was cleared and patient was observed.  During time of observation there was no apparent recrudescence of blood tracking down posterior oropharynx.  Patient did have a small amount of oozing from her anterior packing.  Did recommend patient stay in the hospital, did speak with ENT on-call who was amenable to visiting patient in the hospital if she was admitted.  This was relayed to patient. Patient refuses admission and is requesting to leave AGAINST MEDICAL ADVICE, states she does not want to stay.  Did relay to patient that it is not my official recommendation that she leave AGAINST MEDICAL ADVICE and that she could continue to bleed.  She verbalized understanding.  Patient demonstrated capacity to make her own medical decisions in the emergency department.  Demonstrated understanding of the risks of leaving with ongoing bleeding. I read the AMA form to the patient prior to her signing it.  Patient subsequently left AGAINST MEDICAL ADVICE.  Prior to leaving I did highlight on patient's discharge paperwork the number she should call to schedule with [facility] and the number for [facility].  Recommend she call and follow-up as soon as possible.  Instructed patient to return the emergency department at any time if she has ongoing bleeding or for any new symptoms.  Subsequently left the emergency department.    Amount and/or Complexity of Data Reviewed  Labs: ordered.    Risk  OTC drugs.  Prescription drug management.             Medications    oxymetazoline (AFRIN) 0.05 % nasal spray 2 spray (2 sprays Each Nare Given 1/28/25 0055)   tranexamic acid 100mg/mL (TOPICAL/EPISTAXIS) 500 mg (500 mg Nasal Given 1/28/25 0057)   bacitracin topical ointment 1 large application (1 large application Topical Given 1/28/25 0219)       ED Risk Strat Scores                                              History of Present Illness       Chief Complaint   Patient presents with    Nose Bleed     Pt arrives c/o nose bleed starting at approx 2030. Pt takes eliquis and ASA. Bleeding under control in triage       Past Medical History:   Diagnosis Date    Anxiety     Atrial fibrillation (HCC)     Depression     Depression, recurrent (HCC) 04/20/2021    Hallucinations 04/20/2021    History of seizure disorder 04/20/2021      Past Surgical History:   Procedure Laterality Date    ORIF FINGER FRACTURE Right 6/27/2024    Procedure: ORIF OF RIGHT THUMB;  Surgeon: Dagoberto Yates MD;  Location: [facility];  Service: Orthopedics      Family History   Problem Relation Age of Onset    Diabetes Mother     Anxiety disorder Mother     Stroke Father     No Known Problems Sister     No Known Problems Sister     No Known Problems Sister     No Known Problems Daughter     No Known Problems Daughter     No Known Problems Daughter     No Known Problems Maternal Grandmother     No Known Problems Paternal Grandmother     No Known Problems Maternal Aunt     No Known Problems Maternal Aunt     No Known Problems Maternal Aunt     No Known Problems Maternal Aunt     No Known Problems Maternal Aunt     No Known Problems Maternal Aunt     No Known Problems Maternal Aunt     No Known Problems Maternal Aunt     No Known Problems Paternal Aunt     Breast cancer Neg Hx       Social History     Tobacco Use    Smoking status: Every Day     Current packs/day: 0.00     Average packs/day: 0.5 packs/day for 54.3 years (27.2 ttl pk-yrs)     Types: Cigarettes     Start date: 1970     Last attempt to quit: 05/2024      Years since quittin.7    Smokeless tobacco: Never   Vaping Use    Vaping status: Never Used   Substance Use Topics    Alcohol use: Never    Drug use: Never      E-Cigarette/Vaping    E-Cigarette Use Never User       E-Cigarette/Vaping Substances    Nicotine No     THC No     CBD No     Flavoring No     Other No     Unknown No       I have reviewed and agree with the history as documented.     Patient is a 74-year-old female with a past medical history significant for A-fib, bilateral carotid stenosis, prior CVA, hypertension, prediabetes, COPD, seizure disorder, CKD presenting to the emergency department for evaluation of left-sided epistaxis.  Patient reports today she was in her normal state of health.  Around 8:00 PM she blew her nose and then developed left-sided epistaxis.  She reports trying to stop it at home however has been unsuccessful.  She notes multiple anticoagulant, antiplatelet agents.  She was placed on Plavix and aspirin in addition to her Eliquis for an upcoming procedure.  She reports she did not take her Eliquis this evening.  She states she is otherwise well without lightheadedness, shortness of breath, chest pain, dizziness.        Review of Systems   Constitutional:  Negative for chills and fever.   HENT:  Positive for nosebleeds. Negative for ear pain and sore throat.         L-sided epistaxis   Eyes:  Negative for pain and visual disturbance.   Respiratory:  Negative for cough and shortness of breath.    Cardiovascular:  Negative for chest pain and palpitations.   Gastrointestinal:  Negative for abdominal pain and vomiting.   Genitourinary:  Negative for dysuria and hematuria.   Musculoskeletal:  Negative for arthralgias and back pain.   Skin:  Negative for color change and rash.   Neurological:  Negative for seizures and syncope.   All other systems reviewed and are negative.          Objective       ED Triage Vitals   Temperature Pulse Blood Pressure Respirations SpO2 Patient  Position - Orthostatic VS   01/27/25 2340 01/27/25 2339 01/27/25 2340 01/27/25 2339 01/27/25 2339 --   98.5 °F (36.9 °C) 92 138/75 18 97 %       Temp Source Heart Rate Source BP Location FiO2 (%) Pain Score    01/27/25 2340 01/27/25 2339 -- -- --    Oral Monitor         Vitals      Date and Time Temp Pulse SpO2 Resp BP Pain Score FACES Pain Rating User   01/27/25 2340 98.5 °F (36.9 °C) -- -- -- 138/75 -- -- FREDERICK   01/27/25 2339 -- 92 97 % 18 -- -- -- FREDERICK            Physical Exam  Vitals and nursing note reviewed.   Constitutional:       General: She is not in acute distress.     Appearance: Normal appearance. She is not ill-appearing, toxic-appearing or diaphoretic.   HENT:      Head: Normocephalic and atraumatic.      Nose:      Comments: L-sided epistaxis, R nares clear  Eyes:      General: No scleral icterus.        Right eye: No discharge.         Left eye: No discharge.      Extraocular Movements: Extraocular movements intact.      Conjunctiva/sclera: Conjunctivae normal.   Cardiovascular:      Rate and Rhythm: Normal rate.      Pulses: Normal pulses.      Heart sounds: Normal heart sounds. No murmur heard.     No friction rub. No gallop.   Pulmonary:      Effort: Pulmonary effort is normal. No respiratory distress.      Breath sounds: Normal breath sounds. No stridor. No wheezing, rhonchi or rales.      Comments: No wheezing, rales, rhonchi  Abdominal:      General: Abdomen is flat. Bowel sounds are normal. There is no distension.      Palpations: Abdomen is soft.      Tenderness: There is no abdominal tenderness. There is no guarding or rebound.      Comments: Abdomen soft, NTTP   Musculoskeletal:         General: No swelling. Normal range of motion.      Cervical back: Normal range of motion. No rigidity.      Right lower leg: No edema.      Left lower leg: No edema.   Skin:     General: Skin is warm and dry.      Capillary Refill: Capillary refill takes less than 2 seconds.      Coloration: Skin is not  jaundiced.      Findings: No bruising or lesion.   Neurological:      General: No focal deficit present.      Mental Status: She is alert and oriented to person, place, and time. Mental status is at baseline.   Psychiatric:         Mood and Affect: Mood normal.         Behavior: Behavior normal.         Thought Content: Thought content normal.         Judgment: Judgment normal.         Results Reviewed       Procedure Component Value Units Date/Time    Basic metabolic panel [[id number]] Collected: 01/28/25 0118    Lab Status: Final result Specimen: Blood from Arm, Right Updated: 01/28/25 0143     Sodium 141 mmol/L      Potassium 3.8 mmol/L      Chloride 106 mmol/L      CO2 30 mmol/L      ANION GAP 5 mmol/L      BUN 22 mg/dL      Creatinine 0.65 mg/dL      Glucose 121 mg/dL      Calcium 9.5 mg/dL      eGFR 88 ml/min/1.73sq m     Narrative:      National Kid[employer] Foundation guidelines for Chronic Kidney Disease (CKD):     Stage 1 with normal or high GFR (GFR > 90 mL/min/1.73 square meters)    Stage 2 Mild CKD (GFR = 60-89 mL/min/1.73 square meters)    Stage 3A Moderate CKD (GFR = 45-59 mL/min/1.73 square meters)    Stage 3B Moderate CKD (GFR = 30-44 mL/min/1.73 square meters)    Stage 4 Severe CKD (GFR = 15-29 mL/min/1.73 square meters)    Stage 5 End Stage CKD (GFR <15 mL/min/1.73 square meters)  Note: GFR calculation is accurate only with a steady state creatinine    CBC and differential [[id number]]  (Abnormal) Collected: 01/28/25 0118    Lab Status: Final result Specimen: Blood from Arm, Right Updated: 01/28/25 0124     WBC 7.82 Thousand/uL      RBC 3.07 Million/uL      Hemoglobin 10.0 g/dL      Hematocrit 30.3 %      MCV 99 fL      MCH 32.6 pg      MCHC 33.0 g/dL      RDW 13.2 %      MPV 9.5 fL      Platelets 292 Thousands/uL      nRBC 0 /100 WBCs      Segmented % 56 %      Immature Grans % 0 %      Lymphocytes % 35 %      Monocytes % 6 %      Eosinophils Relative 2 %      Basophils Relative 1 %      Absolute  Neutrophils 4.41 Thousands/µL      Absolute Immature Grans 0.01 Thousand/uL      Absolute Lymphocytes 2.75 Thousands/µL      Absolute Monocytes 0.47 Thousand/µL      Eosinophils Absolute 0.12 Thousand/µL      Basophils Absolute 0.06 Thousands/µL             No orders to display       Procedures    ED Medication and Procedure Management   Prior to Admission Medications   Prescriptions Last Dose Informant Patient Reported? Taking?   ALPRAZolam (XANAX) 2 MG tablet   No No   Sig: Take 1 tablet (2 mg total) by mouth 3 (three) times a day as needed for anxiety for up to 10 days   Icosapent Ethyl 1 g CAPS  Self No No   Sig: Take 2 capsules (2 g total) by mouth 2 (two) times a day   Multiple Vitamin (multivitamin) tablet  Self Yes No   Sig: Take 1 tablet by mouth daily   QUEtiapine (SEROquel) 100 mg tablet  Self No No   Sig: Take 1 tablet (100 mg total) by mouth every 12 (twelve) hours   acetaminophen (TYLENOL) 325 mg tablet  Self Yes No   Sig: Take 975 mg by mouth every 8 (eight) hours as needed for mild pain   apixaban (Eliquis) 5 mg  Self No No   Sig: Take 1 tablet (5 mg total) by mouth 2 (two) times a day   aspirin 325 mg tablet   No No   Sig: Take 1 tablet (325 mg total) by mouth daily Begin 7 days prior to procedure   aspirin 81 mg chewable tablet  Self No No   Sig: Chew 1 tablet (81 mg total) daily   clopidogrel (PLAVIX) 75 mg tablet   No No   Sig: Take 1 tablet (75 mg total) by mouth daily Begin 7 days prior to procedure   diltiazem (CARDIZEM CD) 120 mg 24 hr capsule  Self No No   Sig: Take 1 capsule (120 mg total) by mouth daily   ezetimibe (ZETIA) 10 mg tablet  Self No No   Sig: TAKE 1 TABLET BY MOUTH EVERY DAY   fluticasone (FLONASE) 50 mcg/act nasal spray  Self Yes No   Sig: PLACE 2 SPRAYS IN EACH NOSTRIL DAILY   gabapentin (NEURONTIN) 100 mg capsule   No No   Sig: Take 1 capsule (100 mg total) by mouth 3 (three) times a day   levETIRAcetam (KEPPRA) 750 mg tablet  Self No No   Sig: Take 1 tablet (750 mg total)  by mouth every 12 (twelve) hours   naloxone (NARCAN) 4 mg/0.1 mL nasal spray  Self No No   Sig: Administer 1 spray into a nostril. If no response after 2-3 minutes, give another dose in the other nostril using a new spray.   rosuvastatin (CRESTOR) 10 MG tablet  Self No No   Sig: Take 1 tablet (10 mg total) by mouth daily   vitamin B-12 (VITAMIN B-12) 1,000 mcg tablet  Self Yes No   Sig: Take by mouth daily      Facility-Administered Medications: None     Discharge Medication List as of 1/28/2025  3:57 AM        CONTINUE these medications which have NOT CHANGED    Details   acetaminophen (TYLENOL) 325 mg tablet Take 975 mg by mouth every 8 (eight) hours as needed for mild pain, Historical Med      ALPRAZolam (XANAX) 2 MG tablet Take 1 tablet (2 mg total) by mouth 3 (three) times a day as needed for anxiety for up to 10 days, Starting Wed 10/30/2024, Until Wed 1/22/2025 at 2359, Normal      apixaban (Eliquis) 5 mg Take 1 tablet (5 mg total) by mouth 2 (two) times a day, Starting Wed 9/18/2024, Normal      aspirin 325 mg tablet Take 1 tablet (325 mg total) by mouth daily Begin 7 days prior to procedure, Starting Wed 1/22/2025, Normal      aspirin 81 mg chewable tablet Chew 1 tablet (81 mg total) daily, Starting Tue 6/18/2024, Until Wed 1/22/2025, No Print      clopidogrel (PLAVIX) 75 mg tablet Take 1 tablet (75 mg total) by mouth daily Begin 7 days prior to procedure, Starting Wed 1/22/2025, Normal      diltiazem (CARDIZEM CD) 120 mg 24 hr capsule Take 1 capsule (120 mg total) by mouth daily, Starting Fri 9/13/2024, Until Mon 9/8/2025, Normal      ezetimibe (ZETIA) 10 mg tablet TAKE 1 TABLET BY MOUTH EVERY DAY, Starting Mon 12/23/2024, Normal      fluticasone (FLONASE) 50 mcg/act nasal spray PLACE 2 SPRAYS IN EACH NOSTRIL DAILY, Historical Med      gabapentin (NEURONTIN) 100 mg capsule Take 1 capsule (100 mg total) by mouth 3 (three) times a day, Starting Wed 10/30/2024, Until Wed 1/22/2025, Normal      Icosapent Ethyl  1 g CAPS Take 2 capsules (2 g total) by mouth 2 (two) times a day, Starting Sat 9/14/2024, Normal      levETIRAcetam (KEPPRA) 750 mg tablet Take 1 tablet (750 mg total) by mouth every 12 (twelve) hours, Starting Mon 12/2/2024, Until Fri 1/31/2025, Normal      Multiple Vitamin (multivitamin) tablet Take 1 tablet by mouth daily, Historical Med      naloxone (NARCAN) 4 mg/0.1 mL nasal spray Administer 1 spray into a nostril. If no response after 2-3 minutes, give another dose in the other nostril using a new spray., Normal      QUEtiapine (SEROquel) 100 mg tablet Take 1 tablet (100 mg total) by mouth every 12 (twelve) hours, Starting Fri 7/19/2024, Normal      rosuvastatin (CRESTOR) 10 MG tablet Take 1 tablet (10 mg total) by mouth daily, Starting Mon 12/2/2024, Normal      vitamin B-12 (VITAMIN B-12) 1,000 mcg tablet Take by mouth daily, Historical Med             ED SEPSIS DOCUMENTATION   Time reflects when diagnosis was documented in both MDM as applicable and the Disposition within this note       Time User Action Codes Description Comment    1/28/2025  3:06 AM Boris White Add [R04.0] Left-sided epistaxis     1/28/2025  3:07 AM Boris White Add [Z79.01] Current use of anticoagulant therapy     1/28/2025  3:07 AM Boris White Add [Z48.00] Encounter for removal of nasal packing     1/28/2025  3:07 AM Boris White Remove [Z48.00] Encounter for removal of nasal packing     1/28/2025  3:09 AM Boris White Add [Z48.00] Encounter for removal of nasal packing     1/28/2025  3:09 AM Boris White Remove [Z48.00] Encounter for removal of nasal packing                  Boris White DO  01/28/25 0429

## 2025-01-29 ENCOUNTER — ANESTHESIA EVENT (OUTPATIENT)
Dept: ANESTHESIOLOGY | Facility: HOSPITAL | Age: 75
End: 2025-01-29

## 2025-01-29 ENCOUNTER — ANESTHESIA (OUTPATIENT)
Dept: ANESTHESIOLOGY | Facility: HOSPITAL | Age: 75
End: 2025-01-29

## 2025-01-29 ENCOUNTER — APPOINTMENT (OUTPATIENT)
Dept: LAB | Facility: CLINIC | Age: 75
End: 2025-01-29
Payer: MEDICARE

## 2025-01-29 LAB — PA ADP BLD-ACNC: 6 PRU (ref 194–418)

## 2025-01-29 PROCEDURE — 36415 COLL VENOUS BLD VENIPUNCTURE: CPT

## 2025-01-29 PROCEDURE — 85576 BLOOD PLATELET AGGREGATION: CPT

## 2025-01-30 ENCOUNTER — ANESTHESIA (OUTPATIENT)
Dept: RADIOLOGY | Facility: HOSPITAL | Age: 75
End: 2025-01-30
Payer: MEDICARE

## 2025-01-30 ENCOUNTER — HOSPITAL ENCOUNTER (INPATIENT)
Dept: RADIOLOGY | Facility: HOSPITAL | Age: 75
LOS: 2 days | Discharge: HOME | DRG: 035 | End: 2025-02-01
Attending: NEUROLOGICAL SURGERY | Admitting: NEUROLOGICAL SURGERY
Payer: MEDICARE

## 2025-01-30 ENCOUNTER — ANESTHESIA EVENT (OUTPATIENT)
Dept: RADIOLOGY | Facility: HOSPITAL | Age: 75
End: 2025-01-30
Payer: MEDICARE

## 2025-01-30 DIAGNOSIS — I48.11 LONGSTANDING PERSISTENT ATRIAL FIBRILLATION (HCC): Primary | ICD-10-CM

## 2025-01-30 DIAGNOSIS — I65.23 CAROTID STENOSIS, ASYMPTOMATIC, BILATERAL: ICD-10-CM

## 2025-01-30 DIAGNOSIS — N30.01 ACUTE CYSTITIS WITH HEMATURIA: ICD-10-CM

## 2025-01-30 DIAGNOSIS — Z86.73 HISTORY OF STROKE: ICD-10-CM

## 2025-01-30 LAB
AMORPH URATE CRY URNS QL MICRO: ABNORMAL
ATRIAL RATE: 60 BPM
BACTERIA UR QL AUTO: ABNORMAL /HPF
BILIRUB UR QL STRIP: NEGATIVE
CLARITY UR: ABNORMAL
COLOR UR: YELLOW
GLUCOSE UR STRIP-MCNC: NEGATIVE MG/DL
HGB UR QL STRIP.AUTO: ABNORMAL
KCT BLD-ACNC: 148 SEC (ref 89–137)
KCT BLD-ACNC: 231 SEC (ref 89–137)
KCT BLD-ACNC: 250 SEC (ref 89–137)
KETONES UR STRIP-MCNC: ABNORMAL MG/DL
LEUKOCYTE ESTERASE UR QL STRIP: ABNORMAL
MUCOUS THREADS UR QL AUTO: ABNORMAL
NITRITE UR QL STRIP: POSITIVE
NON-SQ EPI CELLS URNS QL MICRO: ABNORMAL /HPF
PH UR STRIP.AUTO: 8.5 [PH]
PLATELET # BLD AUTO: 229 THOUSANDS/UL (ref 149–390)
PMV BLD AUTO: 9.2 FL (ref 8.9–12.7)
PR INTERVAL: 0 MS
PROT UR STRIP-MCNC: ABNORMAL MG/DL
QRS AXIS: 81 DEGREES
QRSD INTERVAL: 83 MS
QT INTERVAL: 421 MS
QTC INTERVAL: 421 MS
RBC #/AREA URNS AUTO: ABNORMAL /HPF
SP GR UR STRIP.AUTO: >=1.05 (ref 1–1.03)
SPECIMEN SOURCE: ABNORMAL
T WAVE AXIS: 85 DEGREES
UROBILINOGEN UR STRIP-ACNC: <2 MG/DL
VENTRICULAR RATE: 60 BPM
WBC #/AREA URNS AUTO: ABNORMAL /HPF

## 2025-01-30 PROCEDURE — C1894 INTRO/SHEATH, NON-LASER: HCPCS

## 2025-01-30 PROCEDURE — 36226 PLACE CATH VERTEBRAL ART: CPT

## 2025-01-30 PROCEDURE — C1876 STENT, NON-COA/NON-COV W/DEL: HCPCS

## 2025-01-30 PROCEDURE — 87086 URINE CULTURE/COLONY COUNT: CPT | Performed by: PSYCHIATRY & NEUROLOGY

## 2025-01-30 PROCEDURE — 99291 CRITICAL CARE FIRST HOUR: CPT | Performed by: PSYCHIATRY & NEUROLOGY

## 2025-01-30 PROCEDURE — C1725 CATH, TRANSLUMIN NON-LASER: HCPCS

## 2025-01-30 PROCEDURE — 02HV33Z INSERTION OF INFUSION DEVICE INTO SUPERIOR VENA CAVA, PERCUTANEOUS APPROACH: ICD-10-PCS | Performed by: NEUROLOGICAL SURGERY

## 2025-01-30 PROCEDURE — B315YZZ FLUOROSCOPY OF BILATERAL COMMON CAROTID ARTERIES USING OTHER CONTRAST: ICD-10-PCS | Performed by: NEUROLOGICAL SURGERY

## 2025-01-30 PROCEDURE — C1887 CATHETER, GUIDING: HCPCS

## 2025-01-30 PROCEDURE — 75898 FOLLOW-UP ANGIOGRAPHY: CPT

## 2025-01-30 PROCEDURE — 87186 SC STD MICRODIL/AGAR DIL: CPT | Performed by: PSYCHIATRY & NEUROLOGY

## 2025-01-30 PROCEDURE — 037L3DZ DILATION OF LEFT INTERNAL CAROTID ARTERY WITH INTRALUMINAL DEVICE, PERCUTANEOUS APPROACH: ICD-10-PCS | Performed by: NEUROLOGICAL SURGERY

## 2025-01-30 PROCEDURE — 37215 TRANSCATH STENT CCA W/EPS: CPT

## 2025-01-30 PROCEDURE — C1884 EMBOLIZATION PROTECT SYST: HCPCS

## 2025-01-30 PROCEDURE — C1751 CATH, INF, PER/CENT/MIDLINE: HCPCS

## 2025-01-30 PROCEDURE — 61650 EVASC PRLNG ADMN RX AGNT 1ST: CPT

## 2025-01-30 PROCEDURE — B41FYZZ FLUOROSCOPY OF RIGHT LOWER EXTREMITY ARTERIES USING OTHER CONTRAST: ICD-10-PCS | Performed by: NEUROLOGICAL SURGERY

## 2025-01-30 PROCEDURE — 37215 TRANSCATH STENT CCA W/EPS: CPT | Performed by: NEUROLOGICAL SURGERY

## 2025-01-30 PROCEDURE — C1760 CLOSURE DEV, VASC: HCPCS

## 2025-01-30 PROCEDURE — 85347 COAGULATION TIME ACTIVATED: CPT

## 2025-01-30 PROCEDURE — 85049 AUTOMATED PLATELET COUNT: CPT | Performed by: NEUROLOGICAL SURGERY

## 2025-01-30 PROCEDURE — 93010 ELECTROCARDIOGRAM REPORT: CPT | Performed by: INTERNAL MEDICINE

## 2025-01-30 PROCEDURE — C1769 GUIDE WIRE: HCPCS

## 2025-01-30 PROCEDURE — B31FYZZ FLUOROSCOPY OF LEFT VERTEBRAL ARTERY USING OTHER CONTRAST: ICD-10-PCS | Performed by: NEUROLOGICAL SURGERY

## 2025-01-30 PROCEDURE — 81001 URINALYSIS AUTO W/SCOPE: CPT | Performed by: PSYCHIATRY & NEUROLOGY

## 2025-01-30 PROCEDURE — 36223 PLACE CATH CAROTID/INOM ART: CPT

## 2025-01-30 PROCEDURE — 93005 ELECTROCARDIOGRAM TRACING: CPT

## 2025-01-30 PROCEDURE — 87077 CULTURE AEROBIC IDENTIFY: CPT | Performed by: PSYCHIATRY & NEUROLOGY

## 2025-01-30 RX ORDER — SODIUM CHLORIDE 9 MG/ML
75 INJECTION, SOLUTION INTRAVENOUS CONTINUOUS
Status: DISCONTINUED | OUTPATIENT
Start: 2025-01-30 | End: 2025-01-31

## 2025-01-30 RX ORDER — ALBUMIN HUMAN 50 G/1000ML
25 SOLUTION INTRAVENOUS ONCE
Status: COMPLETED | OUTPATIENT
Start: 2025-01-30 | End: 2025-01-30

## 2025-01-30 RX ORDER — GLYCOPYRROLATE 0.2 MG/ML
INJECTION INTRAMUSCULAR; INTRAVENOUS AS NEEDED
Status: DISCONTINUED | OUTPATIENT
Start: 2025-01-30 | End: 2025-01-30

## 2025-01-30 RX ORDER — NOREPINEPHRINE BITARTRATE 1 MG/ML
INJECTION, SOLUTION INTRAVENOUS
Status: DISPENSED
Start: 2025-01-30 | End: 2025-01-31

## 2025-01-30 RX ORDER — HEPARIN SODIUM 5000 [USP'U]/ML
5000 INJECTION, SOLUTION INTRAVENOUS; SUBCUTANEOUS EVERY 12 HOURS SCHEDULED
Status: DISCONTINUED | OUTPATIENT
Start: 2025-01-31 | End: 2025-01-31

## 2025-01-30 RX ORDER — HYDRALAZINE HYDROCHLORIDE 20 MG/ML
15 INJECTION INTRAMUSCULAR; INTRAVENOUS
Status: DISCONTINUED | OUTPATIENT
Start: 2025-01-30 | End: 2025-01-30

## 2025-01-30 RX ORDER — PHENYLEPHRINE HYDROCHLORIDE 10 MG/ML
INJECTION INTRAVENOUS
Status: DISPENSED
Start: 2025-01-30 | End: 2025-01-30

## 2025-01-30 RX ORDER — ONDANSETRON 2 MG/ML
INJECTION INTRAMUSCULAR; INTRAVENOUS AS NEEDED
Status: DISCONTINUED | OUTPATIENT
Start: 2025-01-30 | End: 2025-01-30

## 2025-01-30 RX ORDER — LIDOCAINE HYDROCHLORIDE 10 MG/ML
INJECTION, SOLUTION EPIDURAL; INFILTRATION; INTRACAUDAL; PERINEURAL AS NEEDED
Status: COMPLETED | OUTPATIENT
Start: 2025-01-30 | End: 2025-01-30

## 2025-01-30 RX ORDER — EPHEDRINE SULFATE 50 MG/ML
INJECTION INTRAVENOUS AS NEEDED
Status: DISCONTINUED | OUTPATIENT
Start: 2025-01-30 | End: 2025-01-30

## 2025-01-30 RX ORDER — OXYCODONE AND ACETAMINOPHEN 5; 325 MG/1; MG/1
1 TABLET ORAL EVERY 6 HOURS PRN
Status: DISCONTINUED | OUTPATIENT
Start: 2025-01-30 | End: 2025-02-01 | Stop reason: HOSPADM

## 2025-01-30 RX ORDER — CLOPIDOGREL BISULFATE 75 MG/1
75 TABLET ORAL DAILY
Status: DISCONTINUED | OUTPATIENT
Start: 2025-01-31 | End: 2025-01-31

## 2025-01-30 RX ORDER — SODIUM CHLORIDE 9 MG/ML
75 INJECTION, SOLUTION INTRAVENOUS CONTINUOUS
Status: DISCONTINUED | OUTPATIENT
Start: 2025-01-30 | End: 2025-01-30

## 2025-01-30 RX ORDER — FENTANYL CITRATE 50 UG/ML
INJECTION, SOLUTION INTRAMUSCULAR; INTRAVENOUS AS NEEDED
Status: DISCONTINUED | OUTPATIENT
Start: 2025-01-30 | End: 2025-01-30

## 2025-01-30 RX ORDER — LABETALOL HYDROCHLORIDE 5 MG/ML
5 INJECTION, SOLUTION INTRAVENOUS
Status: DISCONTINUED | OUTPATIENT
Start: 2025-01-30 | End: 2025-01-30

## 2025-01-30 RX ORDER — SODIUM CHLORIDE 9 MG/ML
INJECTION, SOLUTION INTRAVENOUS CONTINUOUS PRN
Status: DISCONTINUED | OUTPATIENT
Start: 2025-01-30 | End: 2025-01-30

## 2025-01-30 RX ORDER — HEPARIN SODIUM 1000 [USP'U]/ML
INJECTION, SOLUTION INTRAVENOUS; SUBCUTANEOUS AS NEEDED
Status: DISCONTINUED | OUTPATIENT
Start: 2025-01-30 | End: 2025-01-30

## 2025-01-30 RX ORDER — ASPIRIN 325 MG
325 TABLET ORAL DAILY
Status: DISCONTINUED | OUTPATIENT
Start: 2025-01-31 | End: 2025-01-31

## 2025-01-30 RX ORDER — NOREPINEPHRINE BITARTRATE 1 MG/ML
INJECTION, SOLUTION INTRAVENOUS
Status: DISPENSED
Start: 2025-01-30 | End: 2025-01-30

## 2025-01-30 RX ORDER — LIDOCAINE HYDROCHLORIDE 20 MG/ML
INJECTION, SOLUTION EPIDURAL; INFILTRATION; INTRACAUDAL; PERINEURAL
Status: COMPLETED | OUTPATIENT
Start: 2025-01-30 | End: 2025-01-30

## 2025-01-30 RX ORDER — IODIXANOL 320 MG/ML
300 INJECTION, SOLUTION INTRAVASCULAR
Status: COMPLETED | OUTPATIENT
Start: 2025-01-30 | End: 2025-01-30

## 2025-01-30 RX ORDER — ALBUMIN HUMAN 50 G/1000ML
SOLUTION INTRAVENOUS
Status: DISPENSED
Start: 2025-01-30 | End: 2025-01-31

## 2025-01-30 RX ADMIN — DEXMEDETOMIDINE HYDROCHLORIDE 8 MCG: 100 INJECTION, SOLUTION INTRAVENOUS at 10:31

## 2025-01-30 RX ADMIN — SODIUM CHLORIDE 75 ML/HR: 0.9 INJECTION, SOLUTION INTRAVENOUS at 12:27

## 2025-01-30 RX ADMIN — GLYCOPYRROLATE 0.2 MG: 0.2 INJECTION, SOLUTION INTRAMUSCULAR; INTRAVENOUS at 10:47

## 2025-01-30 RX ADMIN — DEXMEDETOMIDINE HYDROCHLORIDE 8 MCG: 100 INJECTION, SOLUTION INTRAVENOUS at 09:21

## 2025-01-30 RX ADMIN — OXYCODONE HYDROCHLORIDE AND ACETAMINOPHEN 1 TABLET: 5; 325 TABLET ORAL at 22:22

## 2025-01-30 RX ADMIN — ALBUMIN (HUMAN) 25 G: 12.5 INJECTION, SOLUTION INTRAVENOUS at 12:21

## 2025-01-30 RX ADMIN — SODIUM CHLORIDE: 0.9 INJECTION, SOLUTION INTRAVENOUS at 09:00

## 2025-01-30 RX ADMIN — PHENYLEPHRINE HYDROCHLORIDE 50 MCG/MIN: 10 INJECTION INTRAVENOUS at 09:16

## 2025-01-30 RX ADMIN — NOREPINEPHRINE BITARTRATE 4 MCG/MIN: 1 INJECTION INTRAVENOUS at 14:22

## 2025-01-30 RX ADMIN — LIDOCAINE HYDROCHLORIDE 1 ML: 20 INJECTION, SOLUTION EPIDURAL; INFILTRATION; INTRACAUDAL; PERINEURAL at 09:09

## 2025-01-30 RX ADMIN — LIDOCAINE HYDROCHLORIDE 10 ML: 10 INJECTION, SOLUTION EPIDURAL; INFILTRATION; INTRACAUDAL; PERINEURAL at 09:33

## 2025-01-30 RX ADMIN — DEXMEDETOMIDINE HYDROCHLORIDE 8 MCG: 100 INJECTION, SOLUTION INTRAVENOUS at 09:03

## 2025-01-30 RX ADMIN — HEPARIN SODIUM 4000 UNITS: 1000 INJECTION INTRAVENOUS; SUBCUTANEOUS at 10:30

## 2025-01-30 RX ADMIN — ONDANSETRON 4 MG: 2 INJECTION INTRAMUSCULAR; INTRAVENOUS at 11:06

## 2025-01-30 RX ADMIN — GLYCOPYRROLATE 0.1 MG: 0.2 INJECTION, SOLUTION INTRAMUSCULAR; INTRAVENOUS at 09:58

## 2025-01-30 RX ADMIN — FENTANYL CITRATE 25 MCG: 50 INJECTION INTRAMUSCULAR; INTRAVENOUS at 09:21

## 2025-01-30 RX ADMIN — ALBUMIN (HUMAN) 25 G: 12.5 INJECTION, SOLUTION INTRAVENOUS at 20:06

## 2025-01-30 RX ADMIN — IODIXANOL 200 ML: 320 INJECTION, SOLUTION INTRAVASCULAR at 11:32

## 2025-01-30 RX ADMIN — GLYCOPYRROLATE 0.1 MG: 0.2 INJECTION, SOLUTION INTRAMUSCULAR; INTRAVENOUS at 10:44

## 2025-01-30 RX ADMIN — EPHEDRINE SULFATE 10 MG: 50 INJECTION, SOLUTION INTRAVENOUS at 09:32

## 2025-01-30 NOTE — PROCEDURES
Venous Access Line Insertion    Date/Time: 1/30/2025 8:58 AM    Performed by: Nani Mcgarry  Authorized by: Soto Quintana MD    Patient location:  IR  Consent:     Consent obtained:  Written    Consent given by:  Patient  Universal protocol:     Procedure explained and questions answered to patient or proxy's satisfaction: yes      Immediately prior to procedure, a time out was called: yes      Relevant documents present and verified: yes      Test results available and properly labeled: yes      Radiology Images displayed and confirmed.  If images not available, report reviewed: yes      Required blood products, implants, devices, and special equipment available: yes      Site/side marked: yes      Patient identity confirmed:  Verbally with patient and arm band  Pre-procedure details:     Hand hygiene: Hand hygiene performed prior to insertion      Sterile barrier technique: All elements of maximal sterile technique followed      Skin preparation:  2% chlorhexidine    Skin preparation agent: Skin preparation agent completely dried prior to procedure    Procedure details:     Complex Venous Access Line Type: PICC      Complex Venous Access Line Indications: medications requiring central line      Catheter tip vessel location: atriocaval junction      Orientation:  Left    Location:  Brachial    Catheter size:  5 Fr    Total catheter length (cm):  39    Catheter out on skin (cm):  1    Max flow rate:  999.99    Arm circumference:  26    Patient position:  Flat    Sterile ultrasound techniques: Sterile gel and sterile probe covers were used      Number of attempts:  1    Successful placement: yes      Landmarks identified: yes    Anesthesia (see MAR for exact dosages):     Anesthesia method:  None  Post-procedure details:     Post-procedure:  Dressing applied and securement device placed    Post-procedure complications: none      Patient tolerance of procedure:  Tolerated well, no immediate complications     Observer: Yes      Observer name:  Jan Chen RN

## 2025-01-30 NOTE — QUICK NOTE
ENT contacted by Dr. Quintana to remove nasal packing    Left 3cm Merocel x2 removed without incident. No bleeding in nasal cavity or posterior oropharynx.     Sharon Ann MD PGY-2  [facility] Otolaryngology - Head and Neck Surgery  Available on Epic Secure Chat.  Please contact ENT Resident [place]ayatek chat Role for any questions or concerns.

## 2025-01-30 NOTE — H&P
H&P - Critical Care/ICU   Name: Kirsty Em 74 y.o. female I MRN: [id number]  Unit/Bed#: ICU 08 I Date of Admission: 1/30/2025   Date of Service: 1/30/2025 I Hospital Day: 0       Assessment & Plan   Assessment  Bilateral carotid artery stenosis  S/p left ICA angioplasty, stent  A-fib  Urinary retention  CKD 3a        Neuro:   Diagnosis: Hx of stroke  S/p left ICA angioplasty and stent  Plan: neruochecks Q1hr  Analgesia: Percocet PRN/Q6hr     CV:   Diagnosis: Hypotension, A-fib  MAP ~60 postoperatively  Plan: Monitor BP, SBP <160, MAP >65  Levophed, shelley for hypotension, wean as tolerated  Continuous telemetry monitoring for A-fib    Pulm:  Diagnosis: No current issues  Plan: Supplemental O2 as needed for comfort  Incentive spirometry    GI:   Diagnosis: No current issues      :   Diagnosis: Urinary retention, CKD 3a  Plan: Urinary retention protocol  Consider obtaining UA if urinary symptoms develop  Morning BMP for creatinine    F/E/N:   F: IVF at 75 mL/hr  E: monitor electrolytes with morning BMP  N: regular diet    Heme/Onc:   Diagnosis: S/p left ICH stent  Plan: Groin extravasation checks Q30min for 6 hours, followed by Q1hr for 24 hr  Begin  mg QD, plavix 75 mg QD 1/31  Will d/c on eliquis    Endo:   Diagnosis: no current concerns   Plan: blood glucose on morning BMP    ID:   Diagnosis: No current issues  Plan: monitor fever, vitals    MSK/Skin:   Diagnosis: Groin extravasation  Plan: puncture site checks Q30min for 6 hours, followed by Q1hr for 24 hr      Disposition: Critical care, likely d/c tomorrow    History of Present Illness   Kirsty Em is a 74 y.o. with PMHx of bilateral carotid artery stenosis, stroke, CKD 3a, HLD, HTN, A-fib, anxiety/depression presented 1/30 for elective carotid/vertebral arteriogram and subsequent stenting.  She ended up having a left ICA angioplasty with stent given the results of the arteriogram.  Following the procedure patient was noted to be hypotensive with MAP of  55-65. She also had a small groin extravasation that had resolved but will need to be monitored.        History obtained from chart review.  Review of Systems: Review of Systems   Constitutional:  Negative for chills and fever.   Respiratory:  Negative for chest tightness and shortness of breath.    Cardiovascular:  Negative for chest pain.   Gastrointestinal:  Negative for abdominal pain, constipation and nausea.   Genitourinary:  Positive for decreased urine volume and difficulty urinating. Negative for dysuria and hematuria.   Skin:  Positive for wound.        Puncture site extravasation   Neurological:  Negative for dizziness and headaches.       Historical Information   Past Medical History:  No date: Anxiety  No date: Atrial fibrillation (HCC)  No date: Depression  04/20/2021: Depression, recurrent (HCC)  04/20/2021: Hallucinations  04/20/2021: History of seizure disorder Past Surgical History:  1/30/2025: IR CEREBRAL ANGIOGRAPHY / INTERVENTION  6/27/2024: ORIF FINGER FRACTURE; Right      Comment:  Procedure: ORIF OF RIGHT THUMB;  Surgeon: Dagoberto Yates MD;  Location: [facility];  Service:                Orthopedics   Current Outpatient Medications   Medication Instructions    acetaminophen (TYLENOL) 975 mg, Every 8 hours PRN    ALPRAZolam (XANAX) 2 mg, Oral, 3 times daily PRN    apixaban (ELIQUIS) 5 mg, Oral, 2 times daily    aspirin 81 mg, Oral, Daily    aspirin 325 mg, Oral, Daily, Begin 7 days prior to procedure    clopidogrel (PLAVIX) 75 mg, Oral, Daily, Begin 7 days prior to procedure    diltiazem (CARDIZEM CD) 120 mg, Oral, Daily    ezetimibe (ZETIA) 10 mg, Oral, Daily    fluticasone (FLONASE) 50 mcg/act nasal spray PLACE 2 SPRAYS IN EACH NOSTRIL DAILY    gabapentin (NEURONTIN) 100 mg, Oral, 3 times daily    Icosapent Ethyl 2 g, Oral, 2 times daily    levETIRAcetam (KEPPRA) 750 mg, Oral, Every 12 hours scheduled    Multiple Vitamin (multivitamin) tablet 1 tablet, Daily    naloxone  (NARCAN) 4 mg/0.1 mL nasal spray Administer 1 spray into a nostril. If no response after 2-3 minutes, give another dose in the other nostril using a new spray.    QUEtiapine (SEROQUEL) 100 mg, Oral, Every 12 hours    rosuvastatin (CRESTOR) 10 mg, Oral, Daily    vitamin B-12 (VITAMIN B-12) 1,000 mcg tablet Daily    Allergies   Allergen Reactions    Lipitor [Atorvastatin] Other (See Comments)     Muscle aches      Social History     Tobacco Use    Smoking status: Every Day     Current packs/day: 0.00     Average packs/day: 0.5 packs/day for 54.3 years (27.2 ttl pk-yrs)     Types: Cigarettes     Start date:      Last attempt to quit: 2024     Years since quittin.7    Smokeless tobacco: Never   Vaping Use    Vaping status: Never Used   Substance Use Topics    Alcohol use: Never    Drug use: Never    Family History   Problem Relation Age of Onset    Diabetes Mother     Anxiety disorder Mother     Stroke Father     No Known Problems Sister     No Known Problems Sister     No Known Problems Sister     No Known Problems Daughter     No Known Problems Daughter     No Known Problems Daughter     No Known Problems Maternal Grandmother     No Known Problems Paternal Grandmother     No Known Problems Maternal Aunt     No Known Problems Maternal Aunt     No Known Problems Maternal Aunt     No Known Problems Maternal Aunt     No Known Problems Maternal Aunt     No Known Problems Maternal Aunt     No Known Problems Maternal Aunt     No Known Problems Maternal Aunt     No Known Problems Paternal Aunt     Breast cancer Neg Hx           Objective :                   Vitals I/O      Most Recent Min/Max in 24hrs   Temp 97.5 °F (36.4 °C) Temp  Min: 95.2 °F (35.1 °C)  Max: 97.5 °F (36.4 °C)   Pulse 58 Pulse  Min: 46  Max: 91   Resp 15 Resp  Min: 8  Max: 37   BP (!) 87/46 BP  Min: 74/45  Max: 120/66   O2 Sat 97 % SpO2  Min: 83 %  Max: 100 %      Intake/Output Summary (Last 24 hours) at 2025 6161  Last data filed at  1/30/2025 1630  Gross per 24 hour   Intake 790.75 ml   Output 600 ml   Net 190.75 ml       Diet NPO; Sips with meds  Diet Regular; Regular House    Invasive Monitoring           Physical Exam   Physical Exam  Eyes:      General: No dysconjugate gaze     Extraocular Movements: Extraocular movements intact.      Pupils: Pupils are equal, round, and reactive to light.   Skin:     Findings: Wound present.      Comments: Puncture site with gauze, no current bleeding   HENT:      Head: Normocephalic and atraumatic.   Cardiovascular:      Rate and Rhythm: Normal rate and regular rhythm.      Heart sounds: Normal heart sounds.      Comments: History of A-fib, currently regular rhythm  Musculoskeletal:         General: No swelling. Normal range of motion.   Abdominal: General: Bowel sounds are normal.      Palpations: Abdomen is soft.      Tenderness: There is no abdominal tenderness.   Constitutional:       General: She is not in acute distress.     Appearance: She is not ill-appearing.   Pulmonary:      Effort: Pulmonary effort is normal. No respiratory distress.      Breath sounds: Normal breath sounds. No wheezing.   Neurological:      General: No focal deficit present.      Mental Status: She is alert and oriented to person, place and time. Mental status is at baseline. She is calm.      Cranial Nerves: No facial asymmetry.      Motor: No motor deficit.          Diagnostic Studies        Lab Results: I have reviewed the following results:     Medications:  Scheduled PRN   albumin human, ,   [START ON 1/31/2025] aspirin, 325 mg, Daily  [START ON 1/31/2025] clopidogrel, 75 mg, Daily  [START ON 1/31/2025] heparin (porcine), 5,000 Units, Q12H JERMAINE  norepinephrine, ,   norepinephrine, ,   phenylephrine HCl, ,       albumin human, ,   labetalol, 5 mg, Q15 Min PRN   And  hydrALAZINE, 15 mg, Q15 Min PRN   And  niCARdipine, 1-15 mg/hr, Continuous PRN  norepinephrine, ,   norepinephrine, ,   oxyCODONE-acetaminophen, 1 tablet, Q6H  PRN  sodium chloride, 500 mL, Once PRN   Followed by  [START ON 1/31/2025] phenylephine,  mcg/min, Continuous PRN  phenylephrine HCl, ,        Continuous    niCARdipine, 1-15 mg/hr  norepinephrine, 1-30 mcg/min, Last Rate: 4 mcg/min (01/30/25 1422)  [START ON 1/31/2025] phenylephine,  mcg/min  sodium chloride, 75 mL/hr, Last Rate: 75 mL/hr (01/30/25 1227)         Labs:   CBC    Recent Labs     01/30/25  1233        BMP    No recent results    Coags    No recent results     Additional Electrolytes  No recent results       Blood Gas    No recent results  No recent results LFTs  No recent results    Infectious  No recent results  Glucose  No recent results

## 2025-01-30 NOTE — H&P
"Patient seen and examined independently.  Clinic note from 1/22/24 remains current.  Patient is not on any new medications and has not had any new medical problems.  Patient remains on dapt, eliquis held. Nasal packing removed without difficulty. No bleeding.   /66   Pulse 91   Temp (!) 96.6 °F (35.9 °C) (Temporal)   Resp 18   Ht 5' 3\" (1.6 m)   Wt 54.9 kg (121 lb)   SpO2 96%   BMI 21.43 kg/m²  On exam, patient is neurologically intact.  Heart rate is regular.  Breath sounds are clear.  Plan to proceed with diagnostic cerebral arteriogram to better delineate degree of stenosis with plan for treatment.     "

## 2025-01-30 NOTE — ANESTHESIA PREPROCEDURE EVALUATION
Procedure:  IR CEREBRAL ANGIOGRAPHY / INTERVENTION    Relevant Problems   ANESTHESIA (within normal limits)      CARDIO   (+) Atrial fibrillation (HCC)   (+) Bilateral carotid artery stenosis   (+) Hypertension   (+) Mixed hyperlipidemia      /RENAL   (+) Stage 3a chronic kidney disease (HCC)      NEURO/PSYCH   (+) Anxiety disorder   (+) Depression   (+) Episode of recurrent major depressive disorder, unspecified depression episode severity (HCC)   (+) Seizure disorder (HCC)      PULMONARY   (+) Acute respiratory failure with hypoxia (HCC)   (+) Centrilobular emphysema (HCC)        Physical Exam    Airway    Mallampati score: III  TM Distance: >3 FB  Neck ROM: full     Dental        Cardiovascular      Pulmonary      Other Findings  post-pubertal.      Anesthesia Plan  ASA Score- 3     Anesthesia Type- IV sedation with anesthesia with ASA Monitors.         Additional Monitors: arterial line.    Airway Plan:            Plan Factors-Exercise tolerance (METS): >4 METS.    Chart reviewed. EKG reviewed.  Existing labs reviewed. Patient summary reviewed.    Patient is a current smoker.              Induction- intravenous.    Postoperative Plan-         Informed Consent- Anesthetic plan and risks discussed with patient.  I personally reviewed this patient with the CRNA. Discussed and agreed on the Anesthesia Plan with the CRNA..      NPO Status:  Vitals Value Taken Time   Date of last liquid 01/30/25 01/30/25 0754   Time of last liquid 0530 01/30/25 0754   Date of last solid 01/29/25 01/30/25 0754   Time of last solid

## 2025-01-30 NOTE — SEDATION DOCUMENTATION
Cerebral angiogram with intervention performed by Dr. Quintana. Right groin access closed with a MYNX. Right knee immobilizer applied. Pt will recover in ICU.

## 2025-01-30 NOTE — LETTER
[facility] INTENSIVE CARE UNIT  [address] [address]  Dept: [phone]    January 30, 2025     Patient: Kirsty Em   YOB: 1950   Date of Visit: 1/29/2025       To Whom it May Concern:    Kirsty Em is under my professional care. She was inpatient at the hospital from 1/29/25 to 01/30/25. She will likely be discharged from the hospital 1/31/25. Her daughter arrived yesterday to be with her mother while she was in the hospital.    If you have any questions or concerns, please don't hesitate to call.         Sincerely,          Carlos Maloney MD

## 2025-01-30 NOTE — PLAN OF CARE
Problem: PAIN - ADULT  Goal: Verbalizes/displays adequate comfort level or baseline comfort level  Description: Interventions:  - Encourage patient to monitor pain and request assistance  - Assess pain using appropriate pain scale  - Administer analgesics based on type and severity of pain and evaluate response  - Implement non-pharmacological measures as appropriate and evaluate response  - Consider cultural and social influences on pain and pain management  - Notify physician/advanced practitioner if interventions unsuccessful or patient reports new pain  Outcome: Progressing     Problem: INFECTION - ADULT  Goal: Absence or prevention of progression during hospitalization  Description: INTERVENTIONS:  - Assess and monitor for signs and symptoms of infection  - Monitor lab/diagnostic results  - Monitor all insertion sites, i.e. indwelling lines, tubes, and drains  - Monitor endotracheal if appropriate and nasal secretions for changes in amount and color  - [place] appropriate cooling/warming therapies per order  - Administer medications as ordered  - Instruct and encourage patient and family to use good hand hygiene technique  - Identify and instruct in appropriate isolation precautions for identified infection/condition  Outcome: Progressing  Goal: Absence of fever/infection during neutropenic period  Description: INTERVENTIONS:  - Monitor WBC    Outcome: Progressing     Problem: SAFETY ADULT  Goal: Patient will remain free of falls  Description: INTERVENTIONS:  - Educate patient/family on patient safety including physical limitations  - Instruct patient to call for assistance with activity   - Consult OT/PT to assist with strengthening/mobility   - Keep Call bell within reach  - Keep bed low and locked with side rails adjusted as appropriate  - Keep care items and personal belongings within reach  - Initiate and maintain comfort rounds  - Make Fall Risk Sign visible to staff  - Offer Toileting every  Hours,  in advance of need  - Initiate/Maintain alarm  - Obtain necessary fall risk management equipment:   - Apply yellow socks and bracelet for high fall risk patients  - Consider moving patient to room near nurses station  Outcome: Progressing  Goal: Maintain or return to baseline ADL function  Description: INTERVENTIONS:  -  Assess patient's ability to carry out ADLs; assess patient's baseline for ADL function and identify physical deficits which impact ability to perform ADLs (bathing, care of mouth/teeth, toileting, grooming, dressing, etc.)  - Assess/evaluate cause of self-care deficits   - Assess range of motion  - Assess patient's mobility; develop plan if impaired  - Assess patient's need for assistive devices and provide as appropriate  - Encourage maximum independence but intervene and supervise when necessary  - Involve family in performance of ADLs  - Assess for home care needs following discharge   - Consider OT consult to assist with ADL evaluation and planning for discharge  - Provide patient education as appropriate  Outcome: Progressing  Goal: Maintains/Returns to pre admission functional level  Description: INTERVENTIONS:  - Perform AM-PAC 6 Click Basic Mobility/ Daily Activity assessment daily.  - Set and communicate daily mobility goal to care team and patient/family/caregiver.   - Collaborate with rehabilitation services on mobility goals if consulted  - Perform Range of Motion  times a day.  - Reposition patient every  hours.  - Dangle patient  times a day  - Stand patient  times a day  - Ambulate patient  times a day  - Out of bed to chair  times a day   - Out of bed for meals  times a day  - Out of bed for toileting  - Record patient progress and toleration of activity level   Outcome: Progressing     Problem: DISCHARGE PLANNING  Goal: Discharge to home or other facility with appropriate resources  Description: INTERVENTIONS:  - Identify barriers to discharge w/patient and caregiver  - Arrange for  needed discharge resources and transportation as appropriate  - Identify discharge learning needs (meds, wound care, etc.)  - Arrange for interpretive services to assist at discharge as needed  - Refer to Case Management Department for coordinating discharge planning if the patient needs post-hospital services based on physician/advanced practitioner order or complex needs related to functional status, cognitive ability, or social support system  Outcome: Progressing     Problem: Knowledge Deficit  Goal: Patient/family/caregiver demonstrates understanding of disease process, treatment plan, medications, and discharge instructions  Description: Complete learning assessment and assess knowledge base.  Interventions:  - Provide teaching at level of understanding  - Provide teaching via preferred learning methods  Outcome: Progressing     Problem: Nutrition/Hydration-ADULT  Goal: Nutrient/Hydration intake appropriate for improving, restoring or maintaining nutritional needs  Description: Monitor and assess patient's nutrition/hydration status for malnutrition. Collaborate with interdisciplinary team and initiate plan and interventions as ordered.  Monitor patient's weight and dietary intake as ordered or per policy. Utilize nutrition screening tool and intervene as necessary. Determine patient's food preferences and provide high-protein, high-caloric foods as appropriate.     INTERVENTIONS:  - Monitor oral intake, urinary output, labs, and treatment plans  - Assess nutrition and hydration status and recommend course of action  - Evaluate amount of meals eaten  - Assist patient with eating if necessary   - Allow adequate time for meals  - Recommend/ encourage appropriate diets, oral nutritional supplements, and vitamin/mineral supplements  - Order, calculate, and assess calorie counts as needed  - Recommend, monitor, and adjust tube feedings and TPN/PPN based on assessed needs  - Assess need for intravenous fluids  -  Provide specific nutrition/hydration education as appropriate  - Include patient/family/caregiver in decisions related to nutrition  Outcome: Progressing     Problem: Potential for Falls  Goal: Patient will remain free of falls  Description: INTERVENTIONS:  - Educate patient/family on patient safety including physical limitations  - Instruct patient to call for assistance with activity   - Consult OT/PT to assist with strengthening/mobility   - Keep Call bell within reach  - Keep bed low and locked with side rails adjusted as appropriate  - Keep care items and personal belongings within reach  - Initiate and maintain comfort rounds  - Make Fall Risk Sign visible to staff  - Offer Toileting every  Hours, in advance of need  - Initiate/Maintain alarm  - Obtain necessary fall risk management equipment:   - Apply yellow socks and bracelet for high fall risk patients  - Consider moving patient to room near nurses station  Outcome: Progressing

## 2025-01-30 NOTE — TREATMENT PLAN
Patient PPD 0 Right carotid artery stenting.  On asa and plavix.  There was small extrav that resolved. SBP<160, map>65.  Right groin check.  Pressure dressing in place no erythema, ecchymosis or hematoma noted.  Bilateral pedal pulses 2+.  Patient states she is hungry and tired but offers no other complaints.  She is moving all 4 extremities.  Neurosurgery will continue to follow, call with any further questions or concerns.  Tentative plan for discharge home tomorrow.

## 2025-01-30 NOTE — ANESTHESIA PROCEDURE NOTES
Arterial Line Insertion    Performed by: Maris Erazo CRNA  Authorized by: Ta Mahmood DO  Consent: Verbal consent obtained. Written consent obtained.  Risks and benefits: risks, benefits and alternatives were discussed  Consent given by: patient  Patient understanding: patient states understanding of the procedure being performed  Patient consent: the patient's understanding of the procedure matches consent given  Procedure consent: procedure consent matches procedure scheduled  Relevant documents: relevant documents present and verified  Patient identity confirmed: verbally with patient and hospital-assigned identification number  Preparation: Patient was prepped and draped in the usual sterile fashion.  Indications: hemodynamic monitoring  Orientation:  Right  Location: radial arterylidocaine (XYLOCAINE) 2% - Infiltration   1 mL - 1/30/2025 9:09:00 AM  Procedure Details:  Needle gauge: 20  Seldinger technique: Seldinger technique used  Number of attempts: 1    Post-procedure:  Post-procedure: dressing applied  Waveform: good waveform  Post-procedure CNS: unchanged  Patient tolerance: Patient tolerated the procedure well with no immediate complications and patient tolerated the procedure well with no immediate complications

## 2025-01-30 NOTE — ANESTHESIA POSTPROCEDURE EVALUATION
Post-Op Assessment Note    CV Status:  Stable  Pain Score: 0    Pain management: adequate       Mental Status:  Awake and alert   Hydration Status:  Stable   PONV Controlled:  None   Airway Patency:  Patent     Post Op Vitals Reviewed: Yes    No anethesia notable event occurred.    Staff: CRNA   Comments: Patient transferred to ICU on portable monitor, VSS and airway patent throughout. BP low with MAPs 55-65. Dr. Quintana at bedside, receiving fluids and pressure support ordered as needed.          Last Filed PACU Vitals:  Vitals Value Taken Time   Temp     Pulse 66 01/30/25 1148   BP 94/46 (66) 01/30/25 1144   Resp 12 01/30/25 1148   SpO2 98 % 01/30/25 1148   Vitals shown include unfiled device data.

## 2025-01-30 NOTE — OP NOTE
OPERATIVE REPORT  PATIENT NAME: Kirsty Em     :  1950  MRN: [id number]   Pt Location: Interventional radiology    SURGERY DATE: 25     Preop Diagnosis:  1.  Bilateral  internal carotid artery stenosis  2.  Remote history of left MCA stroke likely secondary to A-fib    Postop Diagnosis  1.  Bilateral  internal carotid artery stenosis  2.  Remote history of left MCA stroke likely secondary to A-fib    Procedure:  Femoral arteriogram.  Right Common Carotid Arteriogram  Left Common Carotid Arteriogram  Left Vertebral Artery Arteriogram  Left ICA angioplasty and stenting with distal protection device  Intraprocedural and postprocedural arteriography  Limited Right Femoral Arteriogram    Surgeon:   Soto Quintana MD    Specimen(s):  None    Estimated Blood Loss:   None    Drains:  None    Anesthesia Type:   Monitored Anesthesia Care     Complications:  None    Operative Indications:  Kirsty Em  is a very pleasant 74 y.o. female  who suffers from progressive bilateral carotid stenosis on noninvasive imaging.  After discussing the risks and benefits of a diagnostic cerebral arteriogram including bleeding, stroke, groin hematoma, and death the patient elected to proceed.     Operative details:  After obtaining written informed consent, the patient was brought into the operating room and moved to the OR table in supine fashion. The groin was prepped and draped in the usual sterile fashion. Monitored anesthesia care was induced. 10 cc of intradermal lidocaine was infused into the right femoral area and percutaneous access with a 5-Azerbaijani micropuncture kit was obtained into the right femoral artery.  A short sheath was placed.  Femoral arteriogram was performed.  There was some extravasation and pressure was held for 15 minutes.  Repeat arteriography was performed which demonstrates no further extravasation.  As such I elected to proceed with the case.    A 6 Azerbaijani shuttle sheath was placed.   A 5-Azerbaijani angled  Farias catheter was then advanced into the aorta, and over the aortic arch over a Glidewire.     The catheter was then advanced into the right common carotid artery. AP lateral and oblique images of the right cervical carotid were obtained.     AP lateral and magnified oblique images of the right intracranial carotid circulation were obtained.     The catheter was then withdrawn into the aortic arch and advanced into the left vertebral artery. Transfascial lateral and oblique images of the left vertebral artery intracranial circulation were obtained.     The left common carotid artery was then catheterized. AP lateral and oblique images of the left cervical carotid were obtained.     AP lateral and magnified oblique images of the left intracranial carotid circulation were obtained.     The shuttle sheath was then advanced into the mid cervical right common carotid artery. A baseline ACT was obtained and the patient was heparinized with 4000 units of intravenous heparin. ACTs were checked regularly throughout the case to obtain a level 1.5-2 times greater than the baseline.    A spider wire distal protection device was then advanced into the cervical carotid over a Zoom microwire.  The area of stenosis was crossed and a 5 mm spider distal protection device was deployed in the distal right internal carotid artery.  Intraprocedural arteriogram and physical exam was then performed.    Next a 6.0 mm x 30 mm monorail ely balloon was advanced over spider wire and area of stenosis.  This was then inflated to nominal pressure.  The patient experienced bradycardia without significant hypotension that was treated by Anesthesia.  The balloon was then deflated and removed.  Intraprocedural arteriography and physical exam was then performed.    Next an 8 mm precise stent was advanced into the internal carotid artery along the area of most significant stenosis and deployed under constant fluoroscopy.  Intraprocedural  arteriography and physical exam were then performed.    The distal protection device was then removed in intraprocedural arteriography of the right common carotid artery was performed as well as the intracranial circulation.    The sheath was then withdrawn and a limited right femoral arteriogram was run.  There is no evidence of further extravasation.  Puncture site was found to be compatible with a Mynx Closure device and this was done successfully. There was appropriate hemostasis.  Groin remains soft without evidence of expansile hematoma. the patient was then awoken from monitored anesthesia care and found to be in baseline neurologic condition. All sponge and needle counts were correct.        INTERPRETATION OF ANGIOGRAPHIC FINDINGS:   The right femoral artery demonstrates antegrade flow.  There is extravasation from the circumflex artery.  Delayed imaging demonstrates cessation of extravasation with decreased filling of the circumflex artery.    2. The Left common carotid circulation reveals antegrade flow into the internal and external carotid arteries. There is hemodynamically significant carotid stenosis as defined by NASCET criteria.  There is approximately 75% stenosis at the left ICA bifurcation. Intracranially, there is antegrade flow into the middle cerebral and anterior cerebral arteries. There is no evidence of aneurysm, AVM, or vascular malformation. The capillary and venous phases are unremarkable.      3. The Left vertebral intracranial circulation reveals retrograde reflux down the contralateral vertebral artery. Both PICAs are well visualized. Antegrade flow is present into all the posterior circulation branches.  There is reflux across the right posterior communicating artery into the anterior circulation.  There are no AVMs or aneurysms. The capillary and venous phases are unremarkable.     4. The Right common carotid circulation reveals antegrade flow into the internal and external carotid  arteries.  There is approximately 85% stenosis of the internal carotid artery immediately distal to the bifurcation.  There is ulcerated plaque.  The area of maximal stenosis is at the C3 body.  Intracranially there is delayed transit time with filling into the middle cerebral and anterior cerebral artery.  There is no evidence of aneurysm or vascular malformation.  The capillary and venous phases are otherwise unremarkable.    5. Intraprocedural arteriography reveals placement of the distal protection device into the distal right internal carotid artery.  Post angioplasty arteriography reveals improvement of flow along the bifurcation without evidence of thrombus/embolus in the distal protection device.  Post stent deployment reveals significant improvement degree of stenosis along the internal carotid artery origin/common carotid/ICA bifurcation.  The stent spans the C2-4 bodies.    6. Postprocedural arteriography reveals antegrade flow without significant hemodynamic stenosis along the carotid bifurcation.  Additionally there is antegrade flow into the anterior cerebral and middle cerebral arteries.  There is no evidence of embolism or intracranial thrombosis.  Capillary and venous phases are unremarkable.    7. Right femoral arteriogram reveals normal puncture site anatomy.  There is no evidence of recurrent extravasation from the circumflex artery.    Impression:  1. Successful right carotid artery stenting below the level of C1.    Patient Disposition:  Critical Care Unit    SIGNATURE: Soto Quintana MD  DATE: 01/30/25   TIME: 11:24 AM

## 2025-01-31 ENCOUNTER — APPOINTMENT (INPATIENT)
Dept: RADIOLOGY | Facility: HOSPITAL | Age: 75
DRG: 035 | End: 2025-01-31
Payer: MEDICARE

## 2025-01-31 LAB
ABO GROUP BLD: NORMAL
ANION GAP SERPL CALCULATED.3IONS-SCNC: 9 MMOL/L (ref 4–13)
APTT PPP: 32 SECONDS (ref 23–34)
BLD GP AB SCN SERPL QL: NEGATIVE
BUN SERPL-MCNC: 16 MG/DL (ref 5–25)
CALCIUM SERPL-MCNC: 8.5 MG/DL (ref 8.4–10.2)
CHLORIDE SERPL-SCNC: 105 MMOL/L (ref 96–108)
CO2 SERPL-SCNC: 24 MMOL/L (ref 21–32)
CREAT SERPL-MCNC: 0.57 MG/DL (ref 0.6–1.3)
ERYTHROCYTE [DISTWIDTH] IN BLOOD BY AUTOMATED COUNT: 13.3 % (ref 11.6–15.1)
GFR SERPL CREATININE-BSD FRML MDRD: 92 ML/MIN/1.73SQ M
GLUCOSE SERPL-MCNC: 149 MG/DL (ref 65–140)
HCT VFR BLD AUTO: 21.7 % (ref 34.8–46.1)
HCT VFR BLD AUTO: 23.2 % (ref 34.8–46.1)
HGB BLD-MCNC: 7.1 G/DL (ref 11.5–15.4)
HGB BLD-MCNC: 7.2 G/DL (ref 11.5–15.4)
INR PPP: 1.19 (ref 0.85–1.19)
MCH RBC QN AUTO: 33.8 PG (ref 26.8–34.3)
MCHC RBC AUTO-ENTMCNC: 32.7 G/DL (ref 31.4–37.4)
MCV RBC AUTO: 103 FL (ref 82–98)
PLATELET # BLD AUTO: 245 THOUSANDS/UL (ref 149–390)
PMV BLD AUTO: 10.4 FL (ref 8.9–12.7)
POTASSIUM SERPL-SCNC: 3.4 MMOL/L (ref 3.5–5.3)
PROTHROMBIN TIME: 15.4 SECONDS (ref 12.3–15)
RBC # BLD AUTO: 2.1 MILLION/UL (ref 3.81–5.12)
RH BLD: POSITIVE
SODIUM SERPL-SCNC: 138 MMOL/L (ref 135–147)
SPECIMEN EXPIRATION DATE: NORMAL
WBC # BLD AUTO: 8.93 THOUSAND/UL (ref 4.31–10.16)

## 2025-01-31 PROCEDURE — 94664 DEMO&/EVAL PT USE INHALER: CPT

## 2025-01-31 PROCEDURE — 94760 N-INVAS EAR/PLS OXIMETRY 1: CPT

## 2025-01-31 PROCEDURE — 85610 PROTHROMBIN TIME: CPT | Performed by: NEUROLOGICAL SURGERY

## 2025-01-31 PROCEDURE — 85014 HEMATOCRIT: CPT

## 2025-01-31 PROCEDURE — 99024 POSTOP FOLLOW-UP VISIT: CPT | Performed by: NEUROLOGICAL SURGERY

## 2025-01-31 PROCEDURE — 86901 BLOOD TYPING SEROLOGIC RH(D): CPT

## 2025-01-31 PROCEDURE — 97167 OT EVAL HIGH COMPLEX 60 MIN: CPT

## 2025-01-31 PROCEDURE — 86920 COMPATIBILITY TEST SPIN: CPT

## 2025-01-31 PROCEDURE — 71045 X-RAY EXAM CHEST 1 VIEW: CPT

## 2025-01-31 PROCEDURE — 97163 PT EVAL HIGH COMPLEX 45 MIN: CPT

## 2025-01-31 PROCEDURE — 85730 THROMBOPLASTIN TIME PARTIAL: CPT | Performed by: NEUROLOGICAL SURGERY

## 2025-01-31 PROCEDURE — 86850 RBC ANTIBODY SCREEN: CPT

## 2025-01-31 PROCEDURE — P9016 RBC LEUKOCYTES REDUCED: HCPCS

## 2025-01-31 PROCEDURE — 86900 BLOOD TYPING SEROLOGIC ABO: CPT

## 2025-01-31 PROCEDURE — 99291 CRITICAL CARE FIRST HOUR: CPT | Performed by: EMERGENCY MEDICINE

## 2025-01-31 PROCEDURE — 85018 HEMOGLOBIN: CPT

## 2025-01-31 PROCEDURE — 85027 COMPLETE CBC AUTOMATED: CPT | Performed by: NEUROLOGICAL SURGERY

## 2025-01-31 PROCEDURE — 80048 BASIC METABOLIC PNL TOTAL CA: CPT | Performed by: NEUROLOGICAL SURGERY

## 2025-01-31 PROCEDURE — NC001 PR NO CHARGE: Performed by: EMERGENCY MEDICINE

## 2025-01-31 RX ORDER — LEVALBUTEROL INHALATION SOLUTION 1.25 MG/3ML
1.25 SOLUTION RESPIRATORY (INHALATION) 3 TIMES DAILY
Status: DISCONTINUED | OUTPATIENT
Start: 2025-01-31 | End: 2025-01-31

## 2025-01-31 RX ORDER — EZETIMIBE 10 MG/1
10 TABLET ORAL DAILY
Status: DISCONTINUED | OUTPATIENT
Start: 2025-02-01 | End: 2025-02-01 | Stop reason: HOSPADM

## 2025-01-31 RX ORDER — ASPIRIN 81 MG/1
81 TABLET, CHEWABLE ORAL DAILY
Status: DISCONTINUED | OUTPATIENT
Start: 2025-01-31 | End: 2025-01-31

## 2025-01-31 RX ORDER — ONDANSETRON 2 MG/ML
4 INJECTION INTRAMUSCULAR; INTRAVENOUS EVERY 4 HOURS PRN
Status: DISCONTINUED | OUTPATIENT
Start: 2025-01-31 | End: 2025-02-01 | Stop reason: HOSPADM

## 2025-01-31 RX ORDER — ALPRAZOLAM 0.5 MG
2 TABLET ORAL 3 TIMES DAILY PRN
Status: DISCONTINUED | OUTPATIENT
Start: 2025-01-31 | End: 2025-01-31

## 2025-01-31 RX ORDER — GABAPENTIN 100 MG/1
100 CAPSULE ORAL 3 TIMES DAILY
Status: DISCONTINUED | OUTPATIENT
Start: 2025-01-31 | End: 2025-02-01 | Stop reason: HOSPADM

## 2025-01-31 RX ORDER — ALPRAZOLAM 0.5 MG
1 TABLET ORAL 3 TIMES DAILY PRN
Status: DISCONTINUED | OUTPATIENT
Start: 2025-01-31 | End: 2025-02-01 | Stop reason: HOSPADM

## 2025-01-31 RX ORDER — ASPIRIN 81 MG/1
81 TABLET, CHEWABLE ORAL DAILY
Status: DISCONTINUED | OUTPATIENT
Start: 2025-02-01 | End: 2025-02-01

## 2025-01-31 RX ORDER — QUETIAPINE FUMARATE 100 MG/1
100 TABLET, FILM COATED ORAL EVERY 12 HOURS
Status: DISCONTINUED | OUTPATIENT
Start: 2025-01-31 | End: 2025-02-01 | Stop reason: HOSPADM

## 2025-01-31 RX ORDER — METOCLOPRAMIDE HYDROCHLORIDE 5 MG/ML
10 INJECTION INTRAMUSCULAR; INTRAVENOUS EVERY 6 HOURS PRN
Status: DISCONTINUED | OUTPATIENT
Start: 2025-01-31 | End: 2025-02-01 | Stop reason: HOSPADM

## 2025-01-31 RX ORDER — CLOPIDOGREL BISULFATE 75 MG/1
75 TABLET ORAL DAILY
Status: DISCONTINUED | OUTPATIENT
Start: 2025-02-01 | End: 2025-02-01 | Stop reason: HOSPADM

## 2025-01-31 RX ORDER — POTASSIUM CHLORIDE 29.8 MG/ML
40 INJECTION INTRAVENOUS ONCE
Status: COMPLETED | OUTPATIENT
Start: 2025-01-31 | End: 2025-02-01

## 2025-01-31 RX ORDER — LEVETIRACETAM 750 MG/1
750 TABLET ORAL EVERY 12 HOURS SCHEDULED
Status: DISCONTINUED | OUTPATIENT
Start: 2025-01-31 | End: 2025-02-01 | Stop reason: HOSPADM

## 2025-01-31 RX ORDER — ONDANSETRON 2 MG/ML
INJECTION INTRAMUSCULAR; INTRAVENOUS
Status: COMPLETED
Start: 2025-01-31 | End: 2025-01-31

## 2025-01-31 RX ORDER — DILTIAZEM HYDROCHLORIDE 30 MG/1
30 TABLET, FILM COATED ORAL EVERY 6 HOURS SCHEDULED
Status: DISCONTINUED | OUTPATIENT
Start: 2025-01-31 | End: 2025-02-01 | Stop reason: HOSPADM

## 2025-01-31 RX ORDER — CEFAZOLIN SODIUM 2 G/50ML
2000 SOLUTION INTRAVENOUS EVERY 8 HOURS
Status: DISCONTINUED | OUTPATIENT
Start: 2025-01-31 | End: 2025-02-01 | Stop reason: HOSPADM

## 2025-01-31 RX ADMIN — METOCLOPRAMIDE 10 MG: 5 INJECTION, SOLUTION INTRAMUSCULAR; INTRAVENOUS at 09:54

## 2025-01-31 RX ADMIN — NOREPINEPHRINE BITARTRATE 8 MCG/MIN: 1 INJECTION INTRAVENOUS at 01:03

## 2025-01-31 RX ADMIN — APIXABAN 5 MG: 5 TABLET, FILM COATED ORAL at 10:29

## 2025-01-31 RX ADMIN — DILTIAZEM HYDROCHLORIDE 30 MG: 30 TABLET ORAL at 23:40

## 2025-01-31 RX ADMIN — ALPRAZOLAM 2 MG: 0.5 TABLET ORAL at 12:42

## 2025-01-31 RX ADMIN — DILTIAZEM HYDROCHLORIDE 30 MG: 30 TABLET ORAL at 19:37

## 2025-01-31 RX ADMIN — CEFAZOLIN SODIUM 2000 MG: 2 SOLUTION INTRAVENOUS at 12:42

## 2025-01-31 RX ADMIN — ONDANSETRON 4 MG: 2 INJECTION INTRAMUSCULAR; INTRAVENOUS at 09:15

## 2025-01-31 RX ADMIN — LEVETIRACETAM 750 MG: 750 TABLET, FILM COATED ORAL at 20:58

## 2025-01-31 RX ADMIN — CLOPIDOGREL BISULFATE 75 MG: 75 TABLET ORAL at 08:53

## 2025-01-31 RX ADMIN — NOREPINEPHRINE BITARTRATE 2 MCG/MIN: 1 INJECTION INTRAVENOUS at 12:53

## 2025-01-31 RX ADMIN — POTASSIUM CHLORIDE 40 MEQ: 29.8 INJECTION, SOLUTION INTRAVENOUS at 08:53

## 2025-01-31 RX ADMIN — SODIUM CHLORIDE 75 ML/HR: 0.9 INJECTION, SOLUTION INTRAVENOUS at 01:06

## 2025-01-31 RX ADMIN — OXYCODONE HYDROCHLORIDE AND ACETAMINOPHEN 1 TABLET: 5; 325 TABLET ORAL at 05:25

## 2025-01-31 RX ADMIN — GABAPENTIN 100 MG: 100 CAPSULE ORAL at 20:58

## 2025-01-31 RX ADMIN — HEPARIN SODIUM 5000 UNITS: 5000 INJECTION, SOLUTION INTRAVENOUS; SUBCUTANEOUS at 08:53

## 2025-01-31 RX ADMIN — CEFAZOLIN SODIUM 2000 MG: 2 SOLUTION INTRAVENOUS at 19:39

## 2025-01-31 RX ADMIN — QUETIAPINE FUMARATE 100 MG: 100 TABLET ORAL at 19:37

## 2025-01-31 RX ADMIN — ALPRAZOLAM 1 MG: 0.5 TABLET ORAL at 19:37

## 2025-01-31 NOTE — TREATMENT PLAN
Discussed case with Dr. Quintana.  Eliquis was stopped but verified with nurse Ba and she was given Eliquis.  Aspirin 81 mg was ordered to possibly start tomorrow.  Hemoglobin appears stable.  Will reevaluate patient this afternoon but spoke with her and her  and there is a chance she would likely stay overnight.

## 2025-01-31 NOTE — PLAN OF CARE
Problem: PAIN - ADULT  Goal: Verbalizes/displays adequate comfort level or baseline comfort level  Description: Interventions:  - Encourage patient to monitor pain and request assistance  - Assess pain using appropriate pain scale  - Administer analgesics based on type and severity of pain and evaluate response  - Implement non-pharmacological measures as appropriate and evaluate response  - Consider cultural and social influences on pain and pain management  - Notify physician/advanced practitioner if interventions unsuccessful or patient reports new pain  Outcome: Progressing     Problem: INFECTION - ADULT  Goal: Absence or prevention of progression during hospitalization  Description: INTERVENTIONS:  - Assess and monitor for signs and symptoms of infection  - Monitor lab/diagnostic results  - Monitor all insertion sites, i.e. indwelling lines, tubes, and drains  - Monitor endotracheal if appropriate and nasal secretions for changes in amount and color  - Jackson appropriate cooling/warming therapies per order  - Administer medications as ordered  - Instruct and encourage patient and family to use good hand hygiene technique  - Identify and instruct in appropriate isolation precautions for identified infection/condition  Outcome: Progressing  Goal: Absence of fever/infection during neutropenic period  Description: INTERVENTIONS:  - Monitor WBC    Outcome: Progressing     Problem: SAFETY ADULT  Goal: Patient will remain free of falls  Description: INTERVENTIONS:  - Educate patient/family on patient safety including physical limitations  - Instruct patient to call for assistance with activity   - Consult OT/PT to assist with strengthening/mobility   - Keep Call bell within reach  - Keep bed low and locked with side rails adjusted as appropriate  - Keep care items and personal belongings within reach  - Initiate and maintain comfort rounds  - Make Fall Risk Sign visible to staff  - Offer Toileting every Hours, in  advance of need  - Initiate/Maintain alarm  - Obtain necessary fall risk management equipment:   - Apply yellow socks and bracelet for high fall risk patients  - Consider moving patient to room near nurses station  Outcome: Progressing  Goal: Maintain or return to baseline ADL function  Description: INTERVENTIONS:  -  Assess patient's ability to carry out ADLs; assess patient's baseline for ADL function and identify physical deficits which impact ability to perform ADLs (bathing, care of mouth/teeth, toileting, grooming, dressing, etc.)  - Assess/evaluate cause of self-care deficits   - Assess range of motion  - Assess patient's mobility; develop plan if impaired  - Assess patient's need for assistive devices and provide as appropriate  - Encourage maximum independence but intervene and supervise when necessary  - Involve family in performance of ADLs  - Assess for home care needs following discharge   - Consider OT consult to assist with ADL evaluation and planning for discharge  - Provide patient education as appropriate  Outcome: Progressing  Goal: Maintains/Returns to pre admission functional level  Description: INTERVENTIONS:  - Perform AM-PAC 6 Click Basic Mobility/ Daily Activity assessment daily.  - Set and communicate daily mobility goal to care team and patient/family/caregiver.   - Collaborate with rehabilitation services on mobility goals if consulted  - Perform Range of Motion  times a day.  - Reposition patient every  hours.  - Dangle patient  times a day  - Stand patient  times a day  - Ambulate patient times a day  - Out of bed to chair  times a day   - Out of bed for meals  times a day  - Out of bed for toileting  - Record patient progress and toleration of activity level   Outcome: Progressing     Problem: DISCHARGE PLANNING  Goal: Discharge to home or other facility with appropriate resources  Description: INTERVENTIONS:  - Identify barriers to discharge w/patient and caregiver  - Arrange for  needed discharge resources and transportation as appropriate  - Identify discharge learning needs (meds, wound care, etc.)  - Arrange for interpretive services to assist at discharge as needed  - Refer to Case Management Department for coordinating discharge planning if the patient needs post-hospital services based on physician/advanced practitioner order or complex needs related to functional status, cognitive ability, or social support system  Outcome: Progressing     Problem: Knowledge Deficit  Goal: Patient/family/caregiver demonstrates understanding of disease process, treatment plan, medications, and discharge instructions  Description: Complete learning assessment and assess knowledge base.  Interventions:  - Provide teaching at level of understanding  - Provide teaching via preferred learning methods  Outcome: Progressing     Problem: Nutrition/Hydration-ADULT  Goal: Nutrient/Hydration intake appropriate for improving, restoring or maintaining nutritional needs  Description: Monitor and assess patient's nutrition/hydration status for malnutrition. Collaborate with interdisciplinary team and initiate plan and interventions as ordered.  Monitor patient's weight and dietary intake as ordered or per policy. Utilize nutrition screening tool and intervene as necessary. Determine patient's food preferences and provide high-protein, high-caloric foods as appropriate.     INTERVENTIONS:  - Monitor oral intake, urinary output, labs, and treatment plans  - Assess nutrition and hydration status and recommend course of action  - Evaluate amount of meals eaten  - Assist patient with eating if necessary   - Allow adequate time for meals  - Recommend/ encourage appropriate diets, oral nutritional supplements, and vitamin/mineral supplements  - Order, calculate, and assess calorie counts as needed  - Recommend, monitor, and adjust tube feedings and TPN/PPN based on assessed needs  - Assess need for intravenous fluids  -  Provide specific nutrition/hydration education as appropriate  - Include patient/family/caregiver in decisions related to nutrition  Outcome: Progressing     Problem: Potential for Falls  Goal: Patient will remain free of falls  Description: INTERVENTIONS:  - Educate patient/family on patient safety including physical limitations  - Instruct patient to call for assistance with activity   - Consult OT/PT to assist with strengthening/mobility   - Keep Call bell within reach  - Keep bed low and locked with side rails adjusted as appropriate  - Keep care items and personal belongings within reach  - Initiate and maintain comfort rounds  - Make Fall Risk Sign visible to staff  - Offer Toileting every  Hours, in advance of need  - Initiate/Maintain alarm  - Obtain necessary fall risk management equipment:   - Apply yellow socks and bracelet for high fall risk patients  - Consider moving patient to room near nurses station  Outcome: Progressing     Problem: Prexisting or High Potential for Compromised Skin Integrity  Goal: Skin integrity is maintained or improved  Description: INTERVENTIONS:  - Identify patients at risk for skin breakdown  - Assess and monitor skin integrity  - Assess and monitor nutrition and hydration status  - Monitor labs   - Assess for incontinence   - Turn and reposition patient  - Assist with mobility/ambulation  - Relieve pressure over bony prominences  - Avoid friction and shearing  - Provide appropriate hygiene as needed including keeping skin clean and dry  - Evaluate need for skin moisturizer/barrier cream  - Collaborate with interdisciplinary team   - Patient/family teaching  - Consider wound care consult   Outcome: Progressing

## 2025-01-31 NOTE — PLAN OF CARE
Problem: OCCUPATIONAL THERAPY ADULT  Goal: Performs self-care activities at highest level of function for planned discharge setting.  See evaluation for individualized goals.  Description: Treatment Interventions: ADL retraining, Functional transfer training, Endurance training, Cognitive reorientation, Patient/family training, Equipment evaluation/education, Compensatory technique education, Activityengagement          See flowsheet documentation for full assessment, interventions and recommendations.   Note: Limitation: Decreased ADL status, Decreased Safe judgement during ADL, Decreased endurance, Decreased self-care trans, Decreased high-level ADLs  Prognosis: Good  Assessment: Pt is a 74 y.o. female who was [facility] to [facility] on 1/30/2025 with B carotid stenosis s/p Right carotid artery stenting below the level of C1  . Patient  has a past medical history of Anxiety, Atrial fibrillation (HCC), Depression, Depression, recurrent (HCC), Hallucinations, and History of seizure disorder.   At baseline pt was completing adls and mobility independently- shares homemaking with spouse. Pt [facility] Currently pt requires min assist for overall ADLS and min assist for functional mobility/transfers. Pt currently presents with impairments in the following categories -steps to enter environment, difficulty performing ADLS, difficulty performing IADLS , limited insight into deficits, and environment activity tolerance, endurance, standing balance/tolerance, and sitting balance/tolerance. These impairments, as well as pt's fatigue, pain, risk for falls, and home environment  limit pt's ability to safely engage in all baseline areas of occupation, includingbathing, dressing, toileting, functional mobility/transfers, community mobility, laundry , house maintenance, medication management, meal prep, cleaning, social participation , and leisure activities  From OT standpoint,  recommend Level III resources upon D/C. OT will continue to follow to address the below stated goals.     Rehab Resource Intensity Level, OT: III (Minimum Resource Intensity)

## 2025-01-31 NOTE — RESPIRATORY THERAPY NOTE
RT Protocol Note  Kirsty Em 74 y.o. female MRN: [id number]  Unit/Bed#: ICU 08 Encounter: [id number]    Assessment    Active Problems:    Bilateral carotid artery stenosis    Benign neoplasm of meninges, unspecified (HCC)      Home Pulmonary Medications:  None        Past Medical History:   Diagnosis Date    Anxiety     Atrial fibrillation (HCC)     Depression     Depression, recurrent (HCC) 2021    Hallucinations 2021    History of seizure disorder 2021     Social History     Socioeconomic History    Marital status: /Civil Union     Spouse name: Not on file    Number of children: Not on file    Years of education: Not on file    Highest education level: Not on file   Occupational History    Not on file   Tobacco Use    Smoking status: Every Day     Current packs/day: 0.00     Average packs/day: 0.5 packs/day for 54.3 years (27.2 ttl pk-yrs)     Types: Cigarettes     Start date:      Last attempt to quit: 2024     Years since quittin.7    Smokeless tobacco: Never   Vaping Use    Vaping status: Never Used   Substance and Sexual Activity    Alcohol use: Never    Drug use: Never    Sexual activity: Yes     Partners: Male     Birth control/protection: None   Other Topics Concern    Not on file   Social History Narrative    Not on file     Social Drivers of Health     Financial Resource Strain: Medium Risk (2024)    Overall Financial Resource Strain (CARDIA)     Difficulty of Paying Living Expenses: Somewhat hard   Food Insecurity: No Food Insecurity (2025)    Hunger Vital Sign     Worried About Running Out of Food in the Last Year: Never true     Ran Out of Food in the Last Year: Never true   Transportation Needs: No Transportation Needs (2025)    PRAPARE - Transportation     Lack of Transportation (Medical): No     Lack of Transportation (Non-Medical): No   Physical Activity: Not on file   Stress: Not on file   Social Connections: Not on file   Intimate Partner Violence:  "Patient Unable To Answer (10/27/2024)    Nursing IPS     Feels Physically and Emotionally Safe: Not on file     Physically Hurt by Someone: Not on file     Humiliated or Emotionally Abused by Someone: Not on file     Physically Hurt by Someone: Patient unable to answer     Hurt or Threatened by Someone: Patient unable to answer   Housing Stability: Low Risk  (1/9/2025)    Housing Stability Vital Sign     Unable to Pay for Housing in the Last Year: No     Number of Times Moved in the Last Year: 0     Homeless in the Last Year: No       Subjective         Objective    Physical Exam:   Assessment Type: Assess only  General Appearance: Awake, Alert  Respiratory Pattern: Spontaneous, Symmetrical, Normal  Chest Assessment: Chest expansion symmetrical, Trachea midline  Bilateral Breath Sounds: Clear  Cough: Strong    Vitals:  Blood pressure 120/50, pulse 89, temperature 98.3 °F (36.8 °C), resp. rate 20, height 5' 3\" (1.6 m), weight 54.9 kg (121 lb), SpO2 96%.          Imaging and other studies: Results Review Statement: No pertinent imaging studies reviewed.          Plan    Respiratory Plan: No distress/Pulmonary history        Resp Comments: (P) Pt admitted for bilateral corotid stenosis s/p IR cerebral angiogram yesterday assessed for RCP. Pt currently on 3L NC, bbs clear, normal WOB, denies any SOB. Pt states she is a current smoker, 20+ pk yrs, but now only smokes \"a few a day.\" Pt does not wear O2 at baseline, takes no bronchodilators at home, denies pulmonary hx. No recent CXR available. D/C'd TID xopenex and atrovent per protocol, RT will cont to follow pt per RCP and adjust bronchodilator therapy as indicated.   "

## 2025-01-31 NOTE — ANESTHESIA POSTPROCEDURE EVALUATION
Post-Op Assessment Note    CV Status:  Stable    Pain management: adequate       Mental Status:  Alert and awake   Hydration Status:  Euvolemic   PONV Controlled:  Controlled   Airway Patency:  Patent     Post Op Vitals Reviewed: Yes    No anethesia notable event occurred.    Staff: Anesthesiologist           Last Filed PACU Vitals:  Vitals Value Taken Time   Temp     Pulse 58 01/31/25 1231   BP     Resp 20 01/31/25 1231   SpO2 95 % 01/31/25 1231   Vitals shown include unfiled device data.

## 2025-01-31 NOTE — CASE MANAGEMENT
Case Management Assessment & Discharge Planning Note    Patient name Kirsty Em  Location ICU 08/ICU 08 MRN [id number]  : 1950 Date 2025       Current Admission Date: 2025  Current Admission Diagnosis:Bilateral carotid artery stenosis   Patient Active Problem List    Diagnosis Date Noted Date Diagnosed    Atrial fibrillation (HCC) 2025     Benign neoplasm of meninges, unspecified (HCC) 2025     Frailty syndrome in geriatric patient 10/28/2024     [place] (hard of hearing) 10/28/2024     Ambulatory dysfunction 10/28/2024     Drug-induced encephalopathy 10/27/2024     Polypharmacy 10/27/2024     Closed displaced fracture of proximal phalanx of right thumb, initial encounter 2024     Brain mass 2024     Gross hematuria 2024     Diastolic dysfunction 2024     QT prolongation 2024     Urinary retention 2024     Depression 2024     Seizure disorder (HCC) 2024     Hypokalemia 2024     Hypertension 2024     Bilateral carotid artery stenosis 06/10/2024     Pre-diabetes 2024     History of stroke 2024     Closed displaced fracture of proximal phalanx of right thumb 2024     Mixed hyperlipidemia 2024     Centrilobular emphysema (HCC) 2023     Stage 3a chronic kidney disease (HCC) 2023     Acute respiratory failure with hypoxia (HCC) 2022     Anxiety disorder 2021     Episode of recurrent major depressive disorder, unspecified depression episode severity (HCC) 2021     Tobacco dependence 2021       LOS (days): 1  Geometric Mean LOS (GMLOS) (days): 1.2  Days to GMLOS:0     OBJECTIVE:    Risk of Unplanned Readmission Score: 21.98       Current admission status: Inpatient       Preferred Pharmacy:   [facility]/pharmacy #[id number] - [place] - [address]  [address]  [place] [id number]  Phone: [phone] Fax: [phone]    Primary Care Provider: Brien Desouza,  MD    Primary Insurance: MEDICARE  Secondary Insurance: BLUE CROSS    ASSESSMENT:  Active Health Care Proxies       Wilver Em [facility] Care Representative - Spouse   Primary Phone: [phone] (Mobile)  Home Phone: [phone]                  Patient Information  Admitted from:: Home  Mental Status: Alert  Assessment information provided by:: Patient  Support Systems: Self, Spouse/significant other, Daughter  County of Residence: [place]  What city do you live in?: [address]  Home entry access options. Select all that apply.: Stairs  Do the steps have railings?: Yes  Type of Current Residence: [address] [place] home  Upon entering residence, is there a bedroom on the main floor (no further steps)?: Yes  Upon entering residence, is there a bathroom on the main floor (no further steps)?: Yes  Living Arrangements: Lives w/ Spouse/significant other  Is patient a ?: No    Activities of Daily Living Prior to Admission  Functional Status: Independent  Completes ADLs independently?: Yes  Ambulates independently?: Yes  Does patient use assisted devices?: Yes  Does patient currently own DME?: Yes  What DME does the patient currently own?: Straight Cane  Does patient have a history of Outpatient Therapy (PT/OT)?: Yes  Does the patient have a history of Short-Term Rehab?: No  Does patient have a history of HHC?: Yes  Does patient currently have HHC?: No    Patient Information Continued  Income Source: Pension/group home  Does patient have prescription coverage?: Yes  Does patient receive dialysis treatments?: No  Does patient have a history of Mental Health Diagnosis?: Yes  Is patient receiving treatment for mental health?: Yes (Rx)    Means of Transportation  Means of Transport to Appts:: Family transport    DISCHARGE DETAILS:  Discharge planning discussed with:: Patient bedside  Freedom of Choice: Yes  Comments - Freedom of Choice: agreeable to HHC blanket referral     Were Treatment Team discharge  recommendations reviewed with patient/caregiver?: Yes  Did patient/caregiver verbalize understanding of patient care needs?: Yes     Requested Home Health Care         Is the patient interested in HHC at discharge?: Yes  Home Health Discipline requested:: Occupational Therapy, Physical Therapy  Home Health Follow-Up Provider:: PCP  Home Health Services Needed:: Evaluate Functional Status and Safety, Gait/ADL Training, Strengthening/Theraputic Exercises to Improve Function  Homebound Criteria Met:: Uses an Assist Device (i.e. cane, walker, etc)  Supporting Clincal Findings:: Fatigues Easliy in Short Distances    DME Referral Provided  Referral made for DME?: No (patient declined RW order)    Other Referral/Resources/Interventions Provided:  Interventions: HHC  Referral Comments: [place] referral in [place] for HHC with patient permission awaiting response, CM following    Treatment Team Recommendation: Home with Home Health Care  Discharge Destination Plan:: Home with Home Health Care  Transport at Discharge : Family

## 2025-01-31 NOTE — PROGRESS NOTES
Progress Note - Neurosurgery   Name: Kirsty Em 74 y.o. female I MRN: [id number]  Unit/Bed#: ICU 08 I Date of Admission: 1/30/2025   Date of Service: 1/31/2025 I Hospital Day: 1    Assessment & Plan  Bilateral carotid artery stenosis  PPD 1 Right carotid artery stenting below the level of C1 ( 1/30/25)  History of prior left MCA territory stroke in the context of new diagnosed A-fib and bilateral carotid stenosis.  Given evidence of progressive carotid stenosis and history of stroke carotid revascularization was recommended.  Preprocedure patient was on aspirin 325 mg and Plavix 75 mg as well as Eliquis which was held 24 hours prior to procedure.  Tentative plan for treatment of left carotid stenosis in roughly 4 weeks.  Patient did have small extrav that resolved.  Continue to monitor right groin site.    No new imaging to review    Plan:  Continue frequent neurological checks.   STAT CT/CTA head with any neurological decline including drop GCS of 2pts within 1 hr.  Recommend SBP <160mmH g and MAPs>65  Currently on levo wean as tolerated  Aspirin was stopped.  Patient was started on Eliquis 5 mg twice daily and continued on Plavix 75 mg daily  Pain well-controlled on current regimen  Medical management per neuro ICU team  Potassium 3.4 which was repleted  Hemoglobin 10 preprocedure and 7.2 postprocedure.  Continue to monitor and trend recommend >8  Patient having some voiding difficulty.  She was straight cathed once.  She feels as though she is not emptying her bladder completely.  Possible UTI  She did have 1 episode of vomiting and continues to be nauseous, as needed Zofran  Patient bradycardic  Patient currently on nasal cannula supplemental oxygen, wean as tolerated  Mobilize with PT/OT  DVT PPX: SCDs   Tentative plan for possible discharge home later this afternoon pending progress and medical stability    Neurosurgery will continue to follow as primary, tentative plan for discharge home later this  afternoon if medically stable, call with any further question or concerns.  Benign neoplasm of meninges, unspecified (HCC)  Small left frontal meningioma  Continue surveillance in 1 year        Subjective Patient complains of nausea and did vomit once.  She does complain that she feels as though she does not empty her bladder completely.  Offers no other complaints.      Objective : Patient comfortably sitting out in recliner, NAD.  Temp:  [95.2 °F (35.1 °C)-98.4 °F (36.9 °C)] 98.4 °F (36.9 °C)  HR:  [18-82] 72  BP: (74-87)/(45-54) 87/46  Resp:  [8-37] 18  SpO2:  [83 %-100 %] 96 %  O2 Device: Nasal cannula    I/O         01/29 0701 01/30 0700 01/30 0701  01/31 0700 01/31 0701 02/01 0700    I.V. (mL/kg)  2166.6 (39.5)     IV Piggyback  500     Total Intake(mL/kg)  2666.6 (48.6)     Urine (mL/kg/hr)  1450     Total Output  1450     Net  +1216.6                    General appearance: alert, appears stated age, cooperative and no distress  Head: Normocephalic, without obvious abnormality, atraumatic  Eyes: EOMI, PERRL, conjugate gaze  Neck: supple, symmetrical, trachea midline   Lungs: non labored breathing  Heart: regular heart rate  Neurologic:   Mental status: Alert, oriented x3, thought content appropriate, speech is clear, following commands  Cranial nerves: grossly intact (Cranial nerves II-XII)  Sensory: normal to light touch all extremities x 4  Motor: moving all extremities without focal weakness, strength 5/5 throughout  Right groin site open to air, no erythema or hematoma noted.  Small area of ecchymosis.  Bilateral pedal pulses 2+    Lab Results: I have reviewed the following results:  Recent Labs     01/31/25  0510 01/31/25  0810   WBC 8.93  --    HGB 7.1* 7.2*   HCT 21.7* 23.2*     --    SODIUM 138  --    K 3.4*  --      --    CO2 24  --    BUN 16  --    CREATININE 0.57*  --    GLUC 149*  --    PTT 32  --    INR 1.19  --        VTE Pharmacologic Prophylaxis: Sequential compression device  (Venodyne)

## 2025-01-31 NOTE — PHYSICAL THERAPY NOTE
Physical Therapy Evaluation     Patient's Name: Kirsty Em    Admitting Diagnosis  Carotid stenosis, asymptomatic, bilateral [I65.23]    Problem List  Patient Active Problem List   Diagnosis    Anxiety disorder    Episode of recurrent major depressive disorder, unspecified depression episode severity (HCC)    Tobacco dependence    Acute respiratory failure with hypoxia (HCC)    Centrilobular emphysema (HCC)    Stage 3a chronic kidney disease (HCC)    Mixed hyperlipidemia    Closed displaced fracture of proximal phalanx of right thumb    History of stroke    Pre-diabetes    Bilateral carotid artery stenosis    Hypertension    Hypokalemia    Depression    Seizure disorder (HCC)    Diastolic dysfunction    QT prolongation    Urinary retention    Gross hematuria    Brain mass    Closed displaced fracture of proximal phalanx of right thumb, initial encounter    Drug-induced encephalopathy    Polypharmacy    Frailty syndrome in geriatric patient    [place] (hard of hearing)    Ambulatory dysfunction    Benign neoplasm of meninges, unspecified (HCC)    Atrial fibrillation (HCC)       Past Medical History  Past Medical History:   Diagnosis Date    Anxiety     Atrial fibrillation (HCC)     Depression     Depression, recurrent (HCC) 04/20/2021    Hallucinations 04/20/2021    History of seizure disorder 04/20/2021       Past Surgical History  Past Surgical History:   Procedure Laterality Date    IR CEREBRAL ANGIOGRAPHY / INTERVENTION  1/30/2025    ORIF FINGER FRACTURE Right 6/27/2024    Procedure: ORIF OF RIGHT THUMB;  Surgeon: Dagoberto Yates MD;  Location: [facility];  Service: Orthopedics        01/31/25 1011   PT Last Visit   PT Visit Date 01/31/25   Note Type   Note type Evaluation   Pain Assessment   Pain Assessment Tool 0-10   Pain Score 4   Pain Location/Orientation Location: Head   Hospital Pain Intervention(s) Repositioned;Ambulation/increased activity   Restrictions/Precautions   Weight Bearing Precautions Per Order  No   Other Precautions Pain;Fall Risk;Multiple lines;Bed Alarm;Chair Alarm;Impulsive;Telemetry;O2   Home Living   Type of Home House   Home Layout Two level;Stairs to enter with rails   Home Equipment Cane   Prior Function   Level of [place] Independent with functional mobility   Lives With Spouse   Receives Help From Family   Vocational Retired   General   Family/Caregiver Present Yes   Cognition   Orientation Level Oriented X4   Subjective   Subjective Pt willing and agreeable to PT session   RLE Assessment   RLE Assessment WFL   LLE Assessment   LLE Assessment WFL   Coordination   Movements are Fluid and Coordinated 0   Coordination and Movement Description slow and guarded   Bed Mobility   Supine to Sit Unable to assess   Sit to Supine 4  Minimal assistance   Additional items Assist x 1   Transfers   Sit to Stand 4  Minimal assistance   Additional items Assist x 1   Stand to Sit 4  Minimal assistance   Additional items Assist x 1   Ambulation/Elevation   Gait pattern Excessively slow;Short stride;Shuffling;Decreased foot clearance;Forward Flexion   Gait Assistance 4  Minimal assist   Additional items Assist x 1   Assistive Device Rolling walker   Distance 45+45   Balance   Static Sitting Fair   Dynamic Sitting Fair -   Static Standing Poor +   Ambulatory Poor +   Endurance Deficit   Endurance Deficit Yes   Endurance Deficit Description limited by fatigue, nausea, and Spo2   Activity Tolerance   Activity Tolerance Patient limited by fatigue;Treatment limited secondary to medical complications (Comment)   Medical Staff Made Aware OT for D/C planning   Nurse Made Aware yes, nsg gave clearance to work with pt   Assessment   Prognosis Fair   Problem List Impaired balance;Decreased endurance;Decreased mobility;Decreased coordination;Decreased cognition;Impaired judgement;Decreased safety awareness;Pain;Orthopedic restrictions   Assessment Pt is 74 y.o. female seen for PT evaluation s/p admit to [facility] on 1/30/2025 w/  B/L carotid stenosis s/p R carotid stenting. PT consulted to assess pt's functional mobility and d/c needs. Order placed for PT eval and tx, w/ up w/ A and up as tolerated order. Comorbidities affecting pt's physical performance at time of assessment include:  has a past medical history of Anxiety, Atrial fibrillation (HCC), Depression, Depression, recurrent (HCC), Hallucinations, and History of seizure disorder. PTA, pt was ambulates community distances and elevations, lives in multi-level home, and retired. Personal factors affecting pt at time of IE include: inaccessible home environment, stairs to enter home, inability to ambulate household distances, limited home support, level of education, positive fall history, impulsivity, unable to perform physical activity, inability to perform IADLs, and inability to perform ADLs. Please find objective findings from PT assessment regarding body systems outlined above with impairments and limitations including impaired balance, decreased endurance, impaired coordination, gait deviations, pain, decreased activity tolerance, decreased functional mobility tolerance, decreased safety awareness, impaired judgement, fall risk, SOB upon exertion, and decreased cognition. Pt ambulated with mildly unsteady guarded gait with increased SOB and nausea. Noted Spo2 in low 80s post ambulation. Pt and spouse report she does not use O2 at home, + smoking history. The following objective measures performed on IE also reveal limitations: The patient's AM-PAC Basic Mobility Inpatient Short Form Raw Score is 18, Standardized Score is 41.05. A standardized score less than 42.9 suggests the patient may benefit from discharge to post-acute rehabilitation services, although anticipate pt will progress and can D/C home with increased support from spouse. Please also refer to the recommendation of the Physical Therapist for safe discharge planning. Pt's clinical  presentation is currently unstable/unpredictable seen in pt's presentation of critical care monitoring. Pt to benefit from continued PT tx to address deficits as defined above and maximize level of functional independent mobility and consistency. From PT/mobility standpoint, recommendation at time of d/c would be level III pending progress in order to facilitate return to PLOF.   Barriers to Discharge Decreased caregiver support;Inaccessible home environment   Goals   Patient Goals To go home   STG Expiration Date 02/12/25   Short Term Goal #1 1. Complete bed mobility and transfers I to decrease need for caregiver in home. 2. Ambulate 300' I to complete household and community mobility without A. 3. Improve dynamic balance to good to decrease need for UE support during ambulation. 4. Be educated & demonstate 12 steps to be able to enter home without A.   Plan   Treatment/Interventions OT;Spoke to nursing;Gait training;Bed mobility;Patient/family training;Endurance training;Functional transfer training;LE strengthening/ROM   PT Frequency 3-5x/wk   Discharge Recommendation   Rehab Resource Intensity Level, PT III (Minimum Resource Intensity)  (HHPT)   Equipment Recommended Walker   Walker Package Recommended Wheeled walker   Change/add to Walker Package? No   AM-PAC Basic Mobility Inpatient   Turning in Flat Bed Without Bedrails 3   Lying on Back to Sitting on Edge of Flat Bed Without Bedrails 3   Moving Bed to Chair 3   Standing Up From Chair Using Arms 3   Walk in Room 3   Climb 3-5 Stairs With Railing 3   Basic Mobility Inpatient Raw Score 18   Basic Mobility Standardized Score 41.05   [facility] Highest Level Of Mobility   -HLM Goal 6: Walk 10 steps or more   -HLM Achieved 7: Walk 25 feet or more           Tasneem Thompson, PT

## 2025-01-31 NOTE — PROGRESS NOTES
Progress Note - Critical Care/ICU   Name: Kirsty Em 74 y.o. female I MRN: [id number]  Unit/Bed#: ICU 08 I Date of Admission: 1/30/2025   Date of Service: 1/31/2025 I Hospital Day: 1       Assessment & Plan   Assessment  Bilateral carotid artery stenosis  S/p left ICA angioplasty, stent  History of stroke  A-fib  Urinary retention  CKD 3a           Neuro:   Diagnosis: Hx of stroke  S/p left ICA angioplasty and stent 1/30  Plan: neruochecks Q1hr  Analgesia: Percocet PRN/Q6hr      CV:   Diagnosis: Hypotension, A-fib  MAP ~60 postoperatively  Plan: Monitor BP, SBP <160, MAP >65  Levophed, shelley for hypotension, wean as tolerated  Continuous telemetry monitoring for A-fib     Pulm:  Diagnosis: Hypoxia  Plan: Supplemental O2 as needed to keep O2 sat >90%  Atrovent, xopenex TID nebulizer  Incentive spirometry     GI:   Diagnosis: Vomiting  Morning 1/31 patient has vomited 2x  Plan: Reglan, zofran IV PRN    :   Diagnosis: Urinary retention, CKD 3a  Plan: Urinary retention protocol  UA - nitrite positive, large leukocytes  Follow up UA culture  Morning BMP for creatinine  Ancef Q8hr for potential UTI     F/E/N:   F: IVF at 75 mL/hr  E: monitor electrolytes with morning BMP  N: regular diet     Heme/Onc:   Diagnosis: S/p left ICH stent, anemia  Hgb 10.0 > 7.1 s/p procedure  Plan: type and screen, transfuse 1 unit  Groin extravasation checks Q1hr for 24 hr  Plavix 75 mg QD, ASA 81 mg  Holding eliquis given concern for post-op bleed, will d/c on eliquis     Endo:   Diagnosis: no current concerns   Plan: blood glucose on morning BMP     ID:   Diagnosis: Potential UTI  UA - nitrite positive, large leukocytes  Plan: Ancef Q8hr  monitor fever, vitals     MSK/Skin:   Diagnosis: Groin extravasation  Plan: puncture site checks Q4hr      Disposition: Critical care, patient likely to d/c today,     ICU Core Measures     A: Assess, Prevent, and Manage Pain Has pain been assessed? Yes  Need for changes to pain regimen? No   B: Both  SAT/SAT  N/A   C: Choice of Sedation RASS Goal: N/A patient not on sedation  Need for changes to sedation or analgesia regimen? NA   D: Delirium CAM-ICU: Negative   E: Early Mobility  Plan for early mobility? Yes   F: Family Engagement Plan for family engagement today? Yes       Review of Invasive Devices:      Central access plan: Medications requiring central line pressors  [place] Plan: Keep arterial line for hemodynamic monitoring    Prophylaxis:  VTE VTE covered by:  heparin (porcine), Subcutaneous       Stress Ulcer  not ordered         24 Hour Events : Patient states she had a headache overnight and still feels that she is retaining urine and not voiding entirely. She has remained on levophed and her MAP has been >65. She has been bradycardic with HR between 40-50 at times early this morning. She vomited twice this morning but felt better after eating breakfast.     Subjective     Review of Systems: See HPI for Review of Systems    Objective :                   Vitals I/O      Most Recent Min/Max in 24hrs   Temp 98.3 °F (36.8 °C) Temp  Min: 95.2 °F (35.1 °C)  Max: 98.3 °F (36.8 °C)   Pulse (!) 42 Pulse  Min: 42  Max: 91   Resp 22 Resp  Min: 8  Max: 37   BP (!) 87/46 BP  Min: 74/45  Max: 120/66   O2 Sat 96 % SpO2  Min: 83 %  Max: 100 %      Intake/Output Summary (Last 24 hours) at 1/31/2025 0653  Last data filed at 1/31/2025 0400  Gross per 24 hour   Intake 2456.63 ml   Output 1450 ml   Net 1006.63 ml       Diet NPO; Sips with meds    Invasive Monitoring           Physical Exam   Physical Exam  Eyes:      General: No visual field deficit.     Extraocular Movements: Extraocular movements intact.      Pupils: Pupils are equal, round, and reactive to light.   Skin:     General: Skin is warm.      Comments: Right femoral access site with bandage in place without bleeding   HENT:      Head: Normocephalic and atraumatic.      Nose:      Comments: NC 2L   Cardiovascular:      Rate and Rhythm: Normal rate and regular  rhythm.      Pulses: Normal pulses.      Heart sounds: Normal heart sounds.   Musculoskeletal:         General: Normal range of motion.   Abdominal: General: There is no distension.      Palpations: Abdomen is soft.      Tenderness: There is no abdominal tenderness.   Constitutional:       General: She is not in acute distress.     Appearance: She is not ill-appearing.      Interventions: She is not sedated.  Pulmonary:      Effort: Pulmonary effort is normal. No respiratory distress.      Breath sounds: Normal breath sounds. No wheezing.   Neurological:      General: No focal deficit present.      Mental Status: She is alert and oriented to person, place and time. Mental status is at baseline. She is calm.      Cranial Nerves: No facial asymmetry.      Motor: Strength full and intact in all extremities. No motor deficit.        Diagnostic Studies        Lab Results: I have reviewed the following results:     Medications:  Scheduled PRN   aspirin, 325 mg, Daily  clopidogrel, 75 mg, Daily  heparin (porcine), 5,000 Units, Q12H JERMAINE  potassium chloride, 40 mEq, Once      oxyCODONE-acetaminophen, 1 tablet, Q6H PRN  phenylephine,  mcg/min, Continuous PRN       Continuous    norepinephrine, 1-30 mcg/min, Last Rate: 8 mcg/min (01/31/25 0103)  phenylephine,  mcg/min  sodium chloride, 75 mL/hr, Last Rate: 75 mL/hr (01/31/25 0106)         Labs:   CBC    Recent Labs     01/30/25  1233 01/31/25  0510   WBC  --  8.93   HGB  --  7.1*   HCT  --  21.7*    245     BMP    Recent Labs     01/31/25  0510   SODIUM 138   K 3.4*      CO2 24   AGAP 9   BUN 16   CREATININE 0.57*   CALCIUM 8.5       Coags    Recent Labs     01/31/25  0510   INR 1.19   PTT 32        Additional Electrolytes  No recent results       Blood Gas    No recent results  No recent results LFTs  No recent results    Infectious  No recent results  Glucose  Recent Labs     01/31/25  0510   GLUC 149*

## 2025-01-31 NOTE — TREATMENT PLAN
Hemoglobin 7.2, per neuro ICU will transfuse 1 unit of packed red blood cells.  UTI was started on Ancef.  Vomiting has subsided.  Patient remains bradycardic.  Patient remains on supplemental oxygen, wean as tolerated.  She desatted while working with physical therapy.  Patient will remain inpatient and see how she is tomorrow.  Neurosurgery will continue to follow as primary, call with any further questions or concerns.

## 2025-01-31 NOTE — PROGRESS NOTES
I have personally seen and examined patient and reviewed all data with resident. Agree with note, assessment and plan. Critical care time 44 minutes. Please refer to attending comments below. Critical care time does not include procedures, family meeting or teaching.       Left carotid stenosis with h/o stroke left MCA  s/p stent placement 1/30/2025  Right cardotid  Hypotension  Bradycardia  Afib- presently NSR  Respiratory insuffiencey with hypoxia  Anemia- acute blood loss  UTI  Urinary retention-protocol for retention initiated  Tobacco abuse/ nicotine dependence-smoking cessation discussed  Hepatomegaly with hepatic steatosis  Left frontal meningoma    Exam:  Patient is awake and alert, moves all extremities, follows commands without difficulty, no dysarthria or aphasia, she is anxious at baseline.  Right lower extremity pulses intact and palpable.  No obvious hematoma.    Goal SBP:  Normotensive with MAP >65    IO +1.2L  UO 1.4L    Micro:  1/30/2025 urine culture positive Proteus    Vital signs reviewed from overnight patient maintained mean arterial pressure with the assistance of norepinephrine.  She did have episodes of bradycardia that appeared to be improving. UA concerning for UTI.  Patient vomit x 2 this  am.  No new imaging to review this morning.  Hemoglobin this morning 7.2.  Potassium of 3.4.  Blood glucose within acceptable range.    D/W NS plan to continue with ASA and plavix. Hold on AC    Start ancef for likely UTI    Transfuse 1 unit RBC    Monitor access site. If patient declines in Hb consider CT and to exclude RPH    Schedule nebulizers    CXR    Repeat Hb    Retention protocol    Update 5:22 PM    Urinalysis positive for Proteus species.  Ancef has been initiated.    Chest x-ray reviewed.  Patient has flattening of the diaphragms as well as left greater than right basilar atelectasis.  Initiate incentive spirometry and encourage out of bed to chair.  Attempt to wean off oxygen.  Aggressive  airway clearance protocol.  Nebulizers available as patient has significant smoking history.    Bladder scan with 330 mL urine.  After bladder scan patient did void additional 250 mL.  Continue to monitor with retention protocol.

## 2025-01-31 NOTE — OCCUPATIONAL THERAPY NOTE
"    Occupational Therapy Evaluation     Patient Name: Kirsty Em  Today's Date: 1/31/2025  Problem List  Active Problems:    Bilateral carotid artery stenosis    Benign neoplasm of meninges, unspecified (HCC)    Past Medical History  Past Medical History:   Diagnosis Date    Anxiety     Atrial fibrillation (HCC)     Depression     Depression, recurrent (HCC) 04/20/2021    Hallucinations 04/20/2021    History of seizure disorder 04/20/2021     Past Surgical History  Past Surgical History:   Procedure Laterality Date    IR CEREBRAL ANGIOGRAPHY / INTERVENTION  1/30/2025    ORIF FINGER FRACTURE Right 6/27/2024    Procedure: ORIF OF RIGHT THUMB;  Surgeon: Dagoberto Yates MD;  Location: [facility];  Service: Orthopedics         01/31/25 1010   OT Last Visit   OT Visit Date 01/31/25   Note Type   Note type Evaluation   Pain Assessment   Pain Assessment Tool 0-10   Pain Score 4   Pain Location/Orientation Location: [facility] Pain Intervention(s) Repositioned;Ambulation/increased activity;Emotional support;Relaxation technique   Restrictions/Precautions   Weight Bearing Precautions Per Order No   Other Precautions Cognitive;Chair Alarm;Bed Alarm;Multiple lines;Fall Risk;Pain   Home Living   Type of Home House   Home Layout Two level;Stairs to enter with rails   Prior Function   Level of [place] Independent with ADLs;Independent with functional mobility;Needs assistance with IADLS   Lives With Spouse   Receives Help From Family   IADLs Independent with meal prep;Independent with medication management;Family/Friend/Other provides transportation   Falls in the last 6 months 1 to 4   Vocational Retired   Lifestyle   Autonomy I adls and mobility- i iadls - shares homemaking with spouse   Reciprocal Relationships supportive family   Service to Others retired   Intrinsic Gratification active pta   Subjective   Subjective \"I'm tired\"   ADL   Eating Assistance 5  Supervision/Setup   Grooming Assistance 5  " Supervision/Setup   UB Bathing Assistance 5  Supervision/Setup   LB Bathing Assistance 4  Minimal Assistance   UB Dressing Assistance 5  Supervision/Setup   LB Dressing Assistance 4  Minimal Assistance   Toileting Assistance  4  Minimal Assistance   Bed Mobility   Sit to Supine 5  Supervision   Transfers   Sit to Stand 4  Minimal assistance   Stand to Sit 4  Minimal assistance   Functional Mobility   Functional Mobility 4  Minimal assistance   Additional items Hand hold assistance   Balance   Static Sitting Fair +   Dynamic Sitting Fair   Static Standing Fair -   Dynamic Standing Fair -   Ambulatory Poor +   Activity Tolerance   Activity Tolerance Patient limited by fatigue;Treatment limited secondary to medical complications (Comment);Patient limited by pain   Medical Staff Made Aware PT present for co-eval 2* medical complexity, comorbidities and limited overall tolerance to activities   RUE Assessment   RUE Assessment WFL   LUE Assessment   LUE Assessment WFL   Cognition   Arousal/Participation Arousable;Cooperative   Attention Attends with cues to redirect   Orientation Level Oriented X4   Memory Decreased recall of precautions   Following Commands Follows one step commands with increased time or repetition   Assessment   Limitation Decreased ADL status;Decreased Safe judgement during ADL;Decreased endurance;Decreased self-care trans;Decreased high-level ADLs   Prognosis Good   Assessment Pt is a 74 y.o. female who was admitted to [facility] on 1/30/2025 with B carotid stenosis s/p Right carotid artery stenting below the level of C1  . Patient  has a past medical history of Anxiety, Atrial fibrillation (HCC), Depression, Depression, recurrent (HCC), Hallucinations, and History of seizure disorder.   At baseline pt was completing adls and mobility independently- shares homemaking with spouse. Pt lives with spouse in [address] home with [facility]. Currently pt requires min assist for overall ADLS and min  assist for functional mobility/transfers. Pt currently presents with impairments in the following categories -steps to enter environment, difficulty performing ADLS, difficulty performing IADLS , limited insight into deficits, and environment activity tolerance, endurance, standing balance/tolerance, and sitting balance/tolerance. These impairments, as well as pt's fatigue, pain, risk for falls, and home environment  limit pt's ability to safely engage in all baseline areas of occupation, includingbathing, dressing, toileting, functional mobility/transfers, community mobility, laundry , house maintenance, medication management, meal prep, cleaning, social participation , and leisure activities  From OT standpoint, recommend Level III resources upon D/C. OT will continue to follow to address the below stated goals.   Goals   Patient Goals go home   LTG Time Frame 10-14   Long Term Goal #1 1) Mod I UB/LB adls after setup with use of LHAE PRN  2)  Mod I toileting and clothing management  3) Mod I bed mobility  4) Mod I functional mob/transfers to and from all surfaces with fair+ to good balance/safety   5) Increase activity tolerance to 30-35min for participation in adls and enjoyable activities  6) Assess DME needs   7) Demonstrate good carryover with safe use of AD during functional tasks   8) Assess DME needs   9) Ongiong functional cognitive assessment to assist with safe d/c recommendations   Plan   Treatment Interventions ADL retraining;Functional transfer training;Endurance training;Cognitive reorientation;Patient/family training;Equipment evaluation/education;Compensatory technique education;Activityengagement   Goal Expiration Date 02/14/25   OT Frequency 2-3x/wk   Discharge Recommendation   Rehab Resource Intensity Level, OT III (Minimum Resource Intensity)   AM-PAC Daily Activity Inpatient   Lower Body Dressing 3   Bathing 3   Toileting 3   Upper Body Dressing 3   Grooming 3   Eating 4   Daily Activity Raw  Score 19   Daily Activity Standardized Score (Calc for Raw Score >=11) 40.22   AM-PAC Applied Cognition Inpatient   Following a Speech/Presentation 3   Understanding Ordinary Conversation 4   Taking Medications 3   Remembering Where Things Are Placed or Put Away 3   Remembering List of 4-5 Errands 3   Taking Care of Complicated Tasks 3   Applied Cognition Raw Score 19   Applied Cognition Standardized Score 39.77   End of Consult   Education Provided Yes;Family or social support of family present for education by provider   Patient Position at End of Consult Bedside chair;Bed/Chair alarm activated;All needs within reach   Nurse Communication Nurse aware of consult       The patient's raw score on the AM-PAC Daily Activity Inpatient Short Form is 19. A raw score of greater than or equal to 19 suggests the patient may benefit from discharge to home. Please refer to the recommendation of the Occupational Therapist for safe discharge planning.      Documentation Completed By:    ADELA Sheth/L  [employer] Certified - [id number]

## 2025-01-31 NOTE — ASSESSMENT & PLAN NOTE
PPD 1 Right carotid artery stenting below the level of C1 ( 1/30/25)  History of prior left MCA territory stroke in the context of new diagnosed A-fib and bilateral carotid stenosis.  Given evidence of progressive carotid stenosis and history of stroke carotid revascularization was recommended.  Preprocedure patient was on aspirin 325 mg and Plavix 75 mg as well as Eliquis which was held 24 hours prior to procedure.  Tentative plan for treatment of left carotid stenosis in roughly 4 weeks.  Patient did have small extrav that resolved.  Continue to monitor right groin site.    No new imaging to review    Plan:  Continue frequent neurological checks.   STAT CT/CTA head with any neurological decline including drop GCS of 2pts within 1 hr.  Recommend SBP <160mmH g and MAPs>65  Currently on levo wean as tolerated  Aspirin was stopped.  Patient was started on Eliquis 5 mg twice daily and continued on Plavix 75 mg daily  Pain well-controlled on current regimen  Medical management per neuro ICU team  Potassium 3.4 which was repleted  Hemoglobin 10 preprocedure and 7.2 postprocedure.  Continue to monitor and trend recommend >8  Patient having some voiding difficulty.  She was straight cathed once.  She feels as though she is not emptying her bladder completely.  Possible UTI  She did have 1 episode of vomiting and continues to be nauseous, as needed Zofran  Patient bradycardic  Patient currently on nasal cannula supplemental oxygen, wean as tolerated  Mobilize with PT/OT  DVT PPX: SCDs   Tentative plan for possible discharge home later this afternoon pending progress and medical stability    Neurosurgery will continue to follow as primary, tentative plan for discharge home later this afternoon if medically stable, call with any further question or concerns.

## 2025-01-31 NOTE — PLAN OF CARE
Problem: PHYSICAL THERAPY ADULT  Goal: Performs mobility at highest level of function for planned discharge setting.  See evaluation for individualized goals.  Description: Treatment/Interventions: OT, Spoke to nursing, Gait training, Bed mobility, Patient/family training, Endurance training, Functional transfer training, LE strengthening/ROM  Equipment Recommended: Walker       See flowsheet documentation for full assessment, interventions and recommendations.  Note: Prognosis: Fair  Problem List: Impaired balance, Decreased endurance, Decreased mobility, Decreased coordination, Decreased cognition, Impaired judgement, Decreased safety awareness, Pain, Orthopedic restrictions  Assessment: Pt is 74 y.o. female seen for PT evaluation s/p admit to [facility] on 1/30/2025 w/  B/L carotid stenosis s/p R carotid stenting. PT consulted to assess pt's functional mobility and d/c needs. Order placed for PT eval and tx, w/ up w/ A and up as tolerated order. Comorbidities affecting pt's physical performance at time of assessment include:  has a past medical history of Anxiety, Atrial fibrillation (HCC), Depression, Depression, recurrent (HCC), Hallucinations, and History of seizure disorder. PTA, pt was ambulates community distances and elevations, lives in multi-level home, and retired. Personal factors affecting pt at time of IE include: inaccessible home environment, stairs to enter home, inability to ambulate household distances, limited home support, level of education, positive fall history, impulsivity, unable to perform physical activity, inability to perform IADLs, and inability to perform ADLs. Please find objective findings from PT assessment regarding body systems outlined above with impairments and limitations including impaired balance, decreased endurance, impaired coordination, gait deviations, pain, decreased activity tolerance, decreased functional mobility tolerance, decreased safety awareness,  impaired judgement, fall risk, SOB upon exertion, and decreased cognition. Pt ambulated with mildly unsteady guarded gait with increased SOB and nausea. The following objective measures performed on IE also reveal limitations: The patient's AM-PAC Basic Mobility Inpatient Short Form Raw Score is 18, Standardized Score is 41.05. A standardized score less than 42.9 suggests the patient may benefit from discharge to post-acute rehabilitation services, although anticipate pt will progress and can D/C home with increased support from spouse. Please also refer to the recommendation of the Physical Therapist for safe discharge planning. Pt's clinical presentation is currently unstable/unpredictable seen in pt's presentation of critical care monitoring. Pt to benefit from continued PT tx to address deficits as defined above and maximize level of functional independent mobility and consistency. From PT/mobility standpoint, recommendation at time of d/c would be level III pending progress in order to facilitate return to PLOF.  Barriers to Discharge: Decreased caregiver support, Inaccessible home environment     Rehab Resource Intensity Level, PT: III (Minimum Resource Intensity) (HHPT)    See flowsheet documentation for full assessment.

## 2025-01-31 NOTE — PLAN OF CARE
Problem: PAIN - ADULT  Goal: Verbalizes/displays adequate comfort level or baseline comfort level  Description: Interventions:  - Encourage patient to monitor pain and request assistance  - Assess pain using appropriate pain scale  - Administer analgesics based on type and severity of pain and evaluate response  - Implement non-pharmacological measures as appropriate and evaluate response  - Consider cultural and social influences on pain and pain management  - Notify physician/advanced practitioner if interventions unsuccessful or patient reports new pain  Outcome: Progressing     Problem: INFECTION - ADULT  Goal: Absence or prevention of progression during hospitalization  Description: INTERVENTIONS:  - Assess and monitor for signs and symptoms of infection  - Monitor lab/diagnostic results  - Monitor all insertion sites, i.e. indwelling lines, tubes, and drains  - Monitor endotracheal if appropriate and nasal secretions for changes in amount and color  - Blum appropriate cooling/warming therapies per order  - Administer medications as ordered  - Instruct and encourage patient and family to use good hand hygiene technique  - Identify and instruct in appropriate isolation precautions for identified infection/condition  Outcome: Progressing  Goal: Absence of fever/infection during neutropenic period  Description: INTERVENTIONS:  - Monitor WBC    Outcome: Progressing     Problem: INFECTION - ADULT  Goal: Absence of fever/infection during neutropenic period  Description: INTERVENTIONS:  - Monitor WBC    Outcome: Progressing     Problem: SAFETY ADULT  Goal: Patient will remain free of falls  Description: INTERVENTIONS:  - Educate patient/family on patient safety including physical limitations  - Instruct patient to call for assistance with activity   - Consult OT/PT to assist with strengthening/mobility   - Keep Call bell within reach  - Keep bed low and locked with side rails adjusted as appropriate  - Keep  care items and personal belongings within reach  - Initiate and maintain comfort rounds  - Make Fall Risk Sign visible to staff  - Offer Toileting every  Hours, in advance of need  - Initiate/Maintain alarm  - Obtain necessary fall risk management equipment:   Problem: Knowledge Deficit  Goal: Patient/family/caregiver demonstrates understanding of disease process, treatment plan, medications, and discharge instructions  Description: Complete learning assessment and assess knowledge base.  Interventions:  - Provide teaching at level of understanding  - Provide teaching via preferred learning methods  Outcome: Progressing     Problem: DISCHARGE PLANNING  Goal: Discharge to home or other facility with appropriate resources  Description: INTERVENTIONS:  - Identify barriers to discharge w/patient and caregiver  - Arrange for needed discharge resources and transportation as appropriate  - Identify discharge learning needs (meds, wound care, etc.)  - Arrange for interpretive services to assist at discharge as needed  - Refer to Case Management Department for coordinating discharge planning if the patient needs post-hospital services based on physician/advanced practitioner order or complex needs related to functional status, cognitive ability, or social support system  Outcome: Progressing     Problem: Nutrition/Hydration-ADULT  Goal: Nutrient/Hydration intake appropriate for improving, restoring or maintaining nutritional needs  Description: Monitor and assess patient's nutrition/hydration status for malnutrition. Collaborate with interdisciplinary team and initiate plan and interventions as ordered.  Monitor patient's weight and dietary intake as ordered or per policy. Utilize nutrition screening tool and intervene as necessary. Determine patient's food preferences and provide high-protein, high-caloric foods as appropriate.     INTERVENTIONS:  - Monitor oral intake, urinary output, labs, and treatment plans  - Assess  nutrition and hydration status and recommend course of action  - Evaluate amount of meals eaten  - Assist patient with eating if necessary   - Allow adequate time for meals  - Recommend/ encourage appropriate diets, oral nutritional supplements, and vitamin/mineral supplements  - Order, calculate, and assess calorie counts as needed  - Recommend, monitor, and adjust tube feedings and TPN/PPN based on assessed needs  - Assess need for intravenous fluids  - Provide specific nutrition/hydration education as appropriate  - Include patient/family/caregiver in decisions related to nutrition  Outcome: Progressing     Problem: Potential for Falls  Goal: Patient will remain free of falls  Description: INTERVENTIONS:  - Educate patient/family on patient safety including physical limitations  - Instruct patient to call for assistance with activity   - Consult OT/PT to assist with strengthening/mobility   - Keep Call bell within reach  - Keep bed low and locked with side rails adjusted as appropriate  - Keep care items and personal belongings within reach  - Initiate and maintain comfort rounds  - Make Fall Risk Sign visible to staff  - Offer Toileting every  Hours, in advance of need  - Initiate/Maintain alarm  - Obtain necessary fall risk management equipment:   - Apply yellow socks and bracelet for high fall risk patients  - Consider moving patient to room near nurses station  Outcome: Progressing     - Apply yellow socks and bracelet for high fall risk patients  - Consider moving patient to room near nurses station  Outcome: Progressing  Goal: Maintain or return to baseline ADL function  Description: INTERVENTIONS:  -  Assess patient's ability to carry out ADLs; assess patient's baseline for ADL function and identify physical deficits which impact ability to perform ADLs (bathing, care of mouth/teeth, toileting, grooming, dressing, etc.)  - Assess/evaluate cause of self-care deficits   - Assess range of motion  - Assess  patient's mobility; develop plan if impaired  - Assess patient's need for assistive devices and provide as appropriate  - Encourage maximum independence but intervene and supervise when necessary  - Involve family in performance of ADLs  - Assess for home care needs following discharge   - Consider OT consult to assist with ADL evaluation and planning for discharge  - Provide patient education as appropriate  Outcome: Progressing  Goal: Maintains/Returns to pre admission functional level  Description: INTERVENTIONS:  - Perform AM-PAC 6 Click Basic Mobility/ Daily Activity assessment daily.  - Set and communicate daily mobility goal to care team and patient/family/caregiver.   - Collaborate with rehabilitation services on mobility goals if consulted  - Perform Range of Motion  times a day.  - Reposition patient every  hours.  - Dangle patient  times a day  - Stand patient  times a day  - Ambulate patient  times a day  - Out of bed to chair  times a day   - Out of bed for meals   Problem: DISCHARGE PLANNING  Goal: Discharge to home or other facility with appropriate resources  Description: INTERVENTIONS:  - Identify barriers to discharge w/patient and caregiver  - Arrange for needed discharge resources and transportation as appropriate  - Identify discharge learning needs (meds, wound care, etc.)  - Arrange for interpretive services to assist at discharge as needed  - Refer to Case Management Department for coordinating discharge planning if the patient needs post-hospital services based on physician/advanced practitioner order or complex needs related to functional status, cognitive ability, or social support system  Outcome: Progressing    times a day  - Out of bed for toileting  - Record patient progress and toleration of activity level   Outcome: Progressing

## 2025-02-01 VITALS
SYSTOLIC BLOOD PRESSURE: 120 MMHG | OXYGEN SATURATION: 95 % | DIASTOLIC BLOOD PRESSURE: 50 MMHG | HEIGHT: 63 IN | WEIGHT: 121 LBS | RESPIRATION RATE: 29 BRPM | HEART RATE: 68 BPM | BODY MASS INDEX: 21.44 KG/M2 | TEMPERATURE: 98.1 F

## 2025-02-01 LAB
ABO GROUP BLD BPU: NORMAL
ANION GAP SERPL CALCULATED.3IONS-SCNC: 7 MMOL/L (ref 4–13)
BACTERIA UR CULT: ABNORMAL
BASOPHILS # BLD AUTO: 0.03 THOUSANDS/ΜL (ref 0–0.1)
BASOPHILS NFR BLD AUTO: 0 % (ref 0–1)
BPU ID: NORMAL
BUN SERPL-MCNC: 6 MG/DL (ref 5–25)
CALCIUM SERPL-MCNC: 8.6 MG/DL (ref 8.4–10.2)
CHLORIDE SERPL-SCNC: 110 MMOL/L (ref 96–108)
CO2 SERPL-SCNC: 26 MMOL/L (ref 21–32)
CREAT SERPL-MCNC: 0.49 MG/DL (ref 0.6–1.3)
CROSSMATCH: NORMAL
EOSINOPHIL # BLD AUTO: 0.09 THOUSAND/ΜL (ref 0–0.61)
EOSINOPHIL NFR BLD AUTO: 1 % (ref 0–6)
ERYTHROCYTE [DISTWIDTH] IN BLOOD BY AUTOMATED COUNT: 16.1 % (ref 11.6–15.1)
GFR SERPL CREATININE-BSD FRML MDRD: 97 ML/MIN/1.73SQ M
GLUCOSE SERPL-MCNC: 94 MG/DL (ref 65–140)
HCT VFR BLD AUTO: 25.2 % (ref 34.8–46.1)
HGB BLD-MCNC: 8.4 G/DL (ref 11.5–15.4)
IMM GRANULOCYTES # BLD AUTO: 0.02 THOUSAND/UL (ref 0–0.2)
IMM GRANULOCYTES NFR BLD AUTO: 0 % (ref 0–2)
LYMPHOCYTES # BLD AUTO: 2.77 THOUSANDS/ΜL (ref 0.6–4.47)
LYMPHOCYTES NFR BLD AUTO: 34 % (ref 14–44)
MAGNESIUM SERPL-MCNC: 1.7 MG/DL (ref 1.9–2.7)
MCH RBC QN AUTO: 32.7 PG (ref 26.8–34.3)
MCHC RBC AUTO-ENTMCNC: 33.3 G/DL (ref 31.4–37.4)
MCV RBC AUTO: 98 FL (ref 82–98)
MONOCYTES # BLD AUTO: 0.71 THOUSAND/ΜL (ref 0.17–1.22)
MONOCYTES NFR BLD AUTO: 9 % (ref 4–12)
NEUTROPHILS # BLD AUTO: 4.47 THOUSANDS/ΜL (ref 1.85–7.62)
NEUTS SEG NFR BLD AUTO: 56 % (ref 43–75)
NRBC BLD AUTO-RTO: 0 /100 WBCS
PHOSPHATE SERPL-MCNC: 2.6 MG/DL (ref 2.3–4.1)
PLATELET # BLD AUTO: 213 THOUSANDS/UL (ref 149–390)
PMV BLD AUTO: 10.5 FL (ref 8.9–12.7)
POTASSIUM SERPL-SCNC: 3.3 MMOL/L (ref 3.5–5.3)
RBC # BLD AUTO: 2.57 MILLION/UL (ref 3.81–5.12)
SODIUM SERPL-SCNC: 143 MMOL/L (ref 135–147)
UNIT DISPENSE STATUS: NORMAL
UNIT PRODUCT CODE: NORMAL
UNIT PRODUCT VOLUME: 350 ML
UNIT RH: NORMAL
WBC # BLD AUTO: 8.09 THOUSAND/UL (ref 4.31–10.16)

## 2025-02-01 PROCEDURE — 80048 BASIC METABOLIC PNL TOTAL CA: CPT

## 2025-02-01 PROCEDURE — NC001 PR NO CHARGE: Performed by: EMERGENCY MEDICINE

## 2025-02-01 PROCEDURE — 84100 ASSAY OF PHOSPHORUS: CPT

## 2025-02-01 PROCEDURE — 99024 POSTOP FOLLOW-UP VISIT: CPT | Performed by: NEUROLOGICAL SURGERY

## 2025-02-01 PROCEDURE — 94664 DEMO&/EVAL PT USE INHALER: CPT

## 2025-02-01 PROCEDURE — 85025 COMPLETE CBC W/AUTO DIFF WBC: CPT

## 2025-02-01 PROCEDURE — 99233 SBSQ HOSP IP/OBS HIGH 50: CPT | Performed by: EMERGENCY MEDICINE

## 2025-02-01 PROCEDURE — 83735 ASSAY OF MAGNESIUM: CPT

## 2025-02-01 RX ORDER — POTASSIUM CHLORIDE 1500 MG/1
40 TABLET, EXTENDED RELEASE ORAL ONCE
Status: COMPLETED | OUTPATIENT
Start: 2025-02-01 | End: 2025-02-01

## 2025-02-01 RX ORDER — CEPHALEXIN 500 MG/1
500 CAPSULE ORAL EVERY 6 HOURS SCHEDULED
Qty: 20 CAPSULE | Refills: 0 | Status: SHIPPED | OUTPATIENT
Start: 2025-02-01 | End: 2025-02-06

## 2025-02-01 RX ORDER — MAGNESIUM SULFATE HEPTAHYDRATE 40 MG/ML
2 INJECTION, SOLUTION INTRAVENOUS ONCE
Status: COMPLETED | OUTPATIENT
Start: 2025-02-01 | End: 2025-02-01

## 2025-02-01 RX ADMIN — GABAPENTIN 100 MG: 100 CAPSULE ORAL at 08:51

## 2025-02-01 RX ADMIN — CLOPIDOGREL BISULFATE 75 MG: 75 TABLET, FILM COATED ORAL at 08:51

## 2025-02-01 RX ADMIN — ASPIRIN 81 MG CHEWABLE TABLET 81 MG: 81 TABLET CHEWABLE at 08:51

## 2025-02-01 RX ADMIN — APIXABAN 5 MG: 5 TABLET, FILM COATED ORAL at 14:00

## 2025-02-01 RX ADMIN — DILTIAZEM HYDROCHLORIDE 30 MG: 30 TABLET ORAL at 05:08

## 2025-02-01 RX ADMIN — CEFAZOLIN SODIUM 2000 MG: 2 SOLUTION INTRAVENOUS at 12:49

## 2025-02-01 RX ADMIN — ALPRAZOLAM 1 MG: 0.5 TABLET ORAL at 09:11

## 2025-02-01 RX ADMIN — POTASSIUM CHLORIDE 40 MEQ: 1500 TABLET, EXTENDED RELEASE ORAL at 07:44

## 2025-02-01 RX ADMIN — QUETIAPINE FUMARATE 100 MG: 100 TABLET ORAL at 05:13

## 2025-02-01 RX ADMIN — MAGNESIUM SULFATE HEPTAHYDRATE 2 G: 40 INJECTION, SOLUTION INTRAVENOUS at 07:44

## 2025-02-01 RX ADMIN — ALPRAZOLAM 1 MG: 0.5 TABLET ORAL at 02:03

## 2025-02-01 RX ADMIN — LEVETIRACETAM 750 MG: 750 TABLET, FILM COATED ORAL at 08:51

## 2025-02-01 RX ADMIN — CEFAZOLIN SODIUM 2000 MG: 2 SOLUTION INTRAVENOUS at 05:08

## 2025-02-01 RX ADMIN — DILTIAZEM HYDROCHLORIDE 30 MG: 30 TABLET ORAL at 12:49

## 2025-02-01 RX ADMIN — EZETIMIBE 10 MG: 10 TABLET ORAL at 08:51

## 2025-02-01 NOTE — PROGRESS NOTES
I have personally seen and examined patient and reviewed all data with resident. Agree with note, assessment and plan. Critical care time 0 min . Please refer to attending comments below. Critical care time does not include procedures, family meeting or teaching.     Left carotid stenosis with h/o stroke left MCA  s/p stent placement 1/30/2025  Right cardotid stenosis  Seizure disorder-continue Keppra  Hypotension  Bradycardia  Afib- presently NSR  Respiratory insuffiencey with hypoxia  Anemia- acute blood loss  UTI  Urinary retention-protocol for retention initiated  Tobacco abuse/ nicotine dependence-smoking cessation discussed  Hepatomegaly with hepatic steatosis  Left frontal meningoma  Hyperlipidemia-continue Zetia  Prediabetes-monitor blood glucose and diet control    Exam:  Ambulating with nursing, non focal motor exam, follows commands, has recall and memory challenges that are at baseline per     Goal SBP:  Normotensive with MAP >65     IO -198 mL  UO 1.7 L with 2 unmeasured events    Devices:  1/30/2025 PICC line  1/30/2025 right radial A-line  1/31/2025 external urinary catheter    Micro:  1/30/2025 urine culture positive Proteus    Antimicrobials:  Ancef 2 g IV every 8 hours    Post stent interventions:  Aspirin 81 mg daily  Plavix 75 mg daily    Atrial fibrillation medications:  Diltiazem 30 mg every 6 hours    Anxiety interventions:  As needed Versed    Patient had multiple events where she reported anxiety.  She reports that she take Xanax 2 mg 3 times a day for her anxiety as well as Seroquel at night.  She received 1 unit of packed red blood cells secondary to decreased to 3 g of hemoglobin after endovascular intervention and ongoing requirement of norepinephrine.  She was also initiated on antibiotics for urinary tract infection.    Vital signs reviewed from overnight patient went 1 episode of low mean arterial pressure that did not require intervention.  Otherwise patient has been  hemodynamically appropriate.  Laboratory data reviewed patient's chloride elevated at 110.  Potassium is 3.3 and magnesium is 1.7.  Hemoglobin is 8.4 which has appropriately improved after 1 unit of packed red blood cells.  Urine culture greater than 100,000 Proteus species.  Glucoses are less than 180.    Patient is currently off vasopressors.  Continue to monitor hemodynamic status with goal mean arterial pressure greater than 65.    Patient is currently on aspirin and Plavix.  There was discussion about starting anticoagulation with Eliquis.  Will review with endovascular neurosurgery plan for antiplatelet versus anticoagulation versus combination of medications with patient's known history of atrial fibrillation and prior strokes.    Continue on detail exam.  Patient was started on every 6 hour medication as opposed to extended release when she was still requiring vasopressors.  Can transition back to her home medications if she remains hemodynamically appropriate.    Continue with antibiotics for Proteus UTI.  Sensitivities are still pending.  Patient is currently on Ancef.    Update:     Home medications started overnight    Nausea resolved.     Ambulated with nursing     Discontinue arterial line and PICC line    Transition to PO antibiotics Keflex

## 2025-02-01 NOTE — DISCHARGE INSTR - AVS FIRST PAGE
Discharge Instructions  Carotid stenting     Activity:  Do not lift, push or pull more than 10 pounds for 2 weeks.  Avoid bending, lifting and twisting for 2 weeks. No running. No athletic activities until cleared.   May walk as tolerated. Recommend 4 short walks daily.     Surgical incision care:  Do not immerse the incisions in water  Do not apply any creams or ointments to the incision, unless otherwise instructed by [facility].  Monitor incision daily. Contact office if increasing redness, drainage, pain or swelling occurs around the incisions or if you develop a fever greater than 101F  Do not dye/perm hair or use any hair products until cleared by Neurosurgery.     Postoperative medication:  Please contact office for questions regarding dosage and modifications.  Do not operate heavy machinery or vehicles while taking sedating medications.      Follow-up for as scheduled for post operative check with Dr. Quintana

## 2025-02-01 NOTE — PLAN OF CARE
Problem: PAIN - ADULT  Goal: Verbalizes/displays adequate comfort level or baseline comfort level  Description: Interventions:  - Encourage patient to monitor pain and request assistance  - Assess pain using appropriate pain scale  - Administer analgesics based on type and severity of pain and evaluate response  - Implement non-pharmacological measures as appropriate and evaluate response  - Consider cultural and social influences on pain and pain management  - Notify physician/advanced practitioner if interventions unsuccessful or patient reports new pain  Outcome: Progressing     Problem: INFECTION - ADULT  Goal: Absence or prevention of progression during hospitalization  Description: INTERVENTIONS:  - Assess and monitor for signs and symptoms of infection  - Monitor lab/diagnostic results  - Monitor all insertion sites, i.e. indwelling lines, tubes, and drains  - Monitor endotracheal if appropriate and nasal secretions for changes in amount and color  - [place] appropriate cooling/warming therapies per order  - Administer medications as ordered  - Instruct and encourage patient and family to use good hand hygiene technique  - Identify and instruct in appropriate isolation precautions for identified infection/condition  Outcome: Progressing  Goal: Absence of fever/infection during neutropenic period  Description: INTERVENTIONS:  - Monitor WBC    Outcome: Progressing     Problem: SAFETY ADULT  Goal: Patient will remain free of falls  Description: INTERVENTIONS:  - Educate patient/family on patient safety including physical limitations  - Instruct patient to call for assistance with activity   - Consult OT/PT to assist with strengthening/mobility   - Keep Call bell within reach  - Keep bed low and locked with side rails adjusted as appropriate  - Keep care items and personal belongings within reach  - Initiate and maintain comfort rounds  - Make Fall Risk Sign visible to staff  - Apply yellow socks and bracelet  for high fall risk patients  - Consider moving patient to room near nurses station  Outcome: Progressing  Goal: Maintain or return to baseline ADL function  Description: INTERVENTIONS:  -  Assess patient's ability to carry out ADLs; assess patient's baseline for ADL function and identify physical deficits which impact ability to perform ADLs (bathing, care of mouth/teeth, toileting, grooming, dressing, etc.)  - Assess/evaluate cause of self-care deficits   - Assess range of motion  - Assess patient's mobility; develop plan if impaired  - Assess patient's need for assistive devices and provide as appropriate  - Encourage maximum independence but intervene and supervise when necessary  - Involve family in performance of ADLs  - Assess for home care needs following discharge   - Consider OT consult to assist with ADL evaluation and planning for discharge  - Provide patient education as appropriate  Outcome: Progressing  Goal: Maintains/Returns to pre admission functional level  Description: INTERVENTIONS:  - Perform AM-PAC 6 Click Basic Mobility/ Daily Activity assessment daily.  - Set and communicate daily mobility goal to care team and patient/family/caregiver.   - Collaborate with rehabilitation services on mobility goals if consulted  - Out of bed for toileting  - Record patient progress and toleration of activity level   Outcome: Progressing     Problem: DISCHARGE PLANNING  Goal: Discharge to home or other facility with appropriate resources  Description: INTERVENTIONS:  - Identify barriers to discharge w/patient and caregiver  - Arrange for needed discharge resources and transportation as appropriate  - Identify discharge learning needs (meds, wound care, etc.)  - Arrange for interpretive services to assist at discharge as needed  - Refer to Case Management Department for coordinating discharge planning if the patient needs post-hospital services based on physician/advanced practitioner order or complex needs  related to functional status, cognitive ability, or social support system  Outcome: Progressing     Problem: Knowledge Deficit  Goal: Patient/family/caregiver demonstrates understanding of disease process, treatment plan, medications, and discharge instructions  Description: Complete learning assessment and assess knowledge base.  Interventions:  - Provide teaching at level of understanding  - Provide teaching via preferred learning methods  Outcome: Progressing     Problem: Nutrition/Hydration-ADULT  Goal: Nutrient/Hydration intake appropriate for improving, restoring or maintaining nutritional needs  Description: Monitor and assess patient's nutrition/hydration status for malnutrition. Collaborate with interdisciplinary team and initiate plan and interventions as ordered.  Monitor patient's weight and dietary intake as ordered or per policy. Utilize nutrition screening tool and intervene as necessary. Determine patient's food preferences and provide high-protein, high-caloric foods as appropriate.     INTERVENTIONS:  - Monitor oral intake, urinary output, labs, and treatment plans  - Assess nutrition and hydration status and recommend course of action  - Evaluate amount of meals eaten  - Assist patient with eating if necessary   - Allow adequate time for meals  - Recommend/ encourage appropriate diets, oral nutritional supplements, and vitamin/mineral supplements  - Order, calculate, and assess calorie counts as needed  - Recommend, monitor, and adjust tube feedings and TPN/PPN based on assessed needs  - Assess need for intravenous fluids  - Provide specific nutrition/hydration education as appropriate  - Include patient/family/caregiver in decisions related to nutrition  Outcome: Progressing     Problem: Potential for Falls  Goal: Patient will remain free of falls  Description: INTERVENTIONS:  - Educate patient/family on patient safety including physical limitations  - Instruct patient to call for assistance with  activity   - Consult OT/PT to assist with strengthening/mobility   - Keep Call bell within reach  - Keep bed low and locked with side rails adjusted as appropriate  - Keep care items and personal belongings within reach  - Initiate and maintain comfort rounds  - Make Fall Risk Sign visible to staff  - Apply yellow socks and bracelet for high fall risk patients  - Consider moving patient to room near nurses station  Outcome: Progressing     Problem: Prexisting or High Potential for Compromised Skin Integrity  Goal: Skin integrity is maintained or improved  Description: INTERVENTIONS:  - Identify patients at risk for skin breakdown  - Assess and monitor skin integrity  - Assess and monitor nutrition and hydration status  - Monitor labs   - Assess for incontinence   - Turn and reposition patient  - Assist with mobility/ambulation  - Relieve pressure over bony prominences  - Avoid friction and shearing  - Provide appropriate hygiene as needed including keeping skin clean and dry  - Evaluate need for skin moisturizer/barrier cream  - Collaborate with interdisciplinary team   - Patient/family teaching  - Consider wound care consult   Outcome: Progressing

## 2025-02-01 NOTE — PROGRESS NOTES
Progress Note - Critical Care/ICU   Name: Kirsty Em 74 y.o. female I MRN: [id number]  Unit/Bed#: ICU 08 I Date of Admission: 1/30/2025   Date of Service: 2/1/2025 I Hospital Day: 2       Assessment & Plan   Assessment  Bilateral carotid artery stenosis  S/p left ICA angioplasty, stent  History of stroke  Bradycardia  Hypotension  A-fib  Urinary retention  UTI  CKD 3a          Neuro:   Diagnosis: Hx of stroke, anxiety  S/p left ICA angioplasty and stent 1/30  Plan: neruochecks Q1hr  Analgesia: Percocet PRN/Q6hr     Diagnosis: Anxiety  Plan: Xanax 1 mg TID/PRN, Seroquel 100 mg Q12hr    CV:   Diagnosis: Hypotension, A-fib  MAP ~60 postoperatively, currently between 65-80 on A-line  Plan: Monitor BP, SBP <160, MAP >65  Off levo/shelley since 1/31 afternoon  Continuous telemetry monitoring for A-fib, currently sinus rhythm     Pulm:  Diagnosis: Hypoxia  Plan: Supplemental O2 as needed to keep O2 sat >90%  CXR 1/31 - no consolidation, emphysematous disease  Respiratory protocol  Incentive spirometry     GI:   Diagnosis: Vomiting  Morning 1/31 patient vomited 2x, as of 2/1 no longer feeling nauseous  Plan: Reglan, zofran IV PRN     :   Diagnosis: UTI, CKD 3a  UA - nitrite positive, large leukocytes  Urine culture - >100k cfu Proteus, susceptible to cefazolin  Plan: Urinary retention protocol, pure wick  Ancef Q8hr, 4 total doses > will begin PO Keflex 500 mg Q6hr for 5 days     F/E/N:   F: no fluids  E: monitor electrolytes with morning BMP, replete as needed  N: regular diet     Heme/Onc:   Diagnosis: S/p left ICH stent, anemia  Hgb 10.0 > 7.1 s/p procedure > 8.4 s/p 1 unit pRBC  Plan: Groin extravasation checks Q4hr for 24 hr  Plavix 75 mg QD d/c ASA 81 mg per neurosurgery  Resume eliquis BID     Endo:   Diagnosis: no current concerns   Plan: blood glucose on morning BMP     ID:   Diagnosis: UTI  UA - nitrite positive, large leukocytes  Urine culture - >100k cfu Proteus, susceptible to cefazolin  Plan: Ancef Q8hr, 4  total doses > will begin PO Keflex 500 mg Q6hr for 5 days  monitor fever, vitals     MSK/Skin:   Diagnosis: Groin extravasation  Plan: puncture site checks Q4hr  PT/OT eval and treat        Disposition: Discharge home, patient has been off pressors since yesterday afternoon. Hgb efe appropriately and remained stable s/p 1 unit pRBC. Can likely be d/c to home.    ICU Core Measures     A: Assess, Prevent, and Manage Pain Has pain been assessed? Yes  Need for changes to pain regimen? No   B: Both SAT/SAT  N/A   C: Choice of Sedation RASS Goal: N/A patient not on sedation  Need for changes to sedation or analgesia regimen? NA   D: Delirium CAM-ICU: Negative   E: Early Mobility  Plan for early mobility? Yes   F: Family Engagement Plan for family engagement today? Yes       Antibiotic Review: Awaiting culture results.  Ancef for proteus, awaiting sensitivities    Review of Invasive Devices:      Central access plan:  will remove prior to discharge  Radha Plan: Discontinue arterial line    Prophylaxis:  VTE VTE covered by:    None       Stress Ulcer  not ordered         24 Hour Events : No overnight events. Patient has been urinating frequently with pure wick. She denies headaches, dizziness, nausea. She is concerned about receiving anxiety medications and hoping to be discharged today.     Subjective   Review of Systems: See HPI for Review of Systems    Objective :                   Vitals I/O      Most Recent Min/Max in 24hrs   Temp 98.5 °F (36.9 °C) Temp  Min: 98.2 °F (36.8 °C)  Max: 98.7 °F (37.1 °C)   Pulse 62 Pulse  Min: 18  Max: 89   Resp (!) 23 Resp  Min: 18  Max: 28   /50 BP  Min: 120/50  Max: 120/50   O2 Sat 92 % SpO2  Min: 92 %  Max: 98 %      Intake/Output Summary (Last 24 hours) at 2/1/2025 0644  Last data filed at 2/1/2025 0201  Gross per 24 hour   Intake 1551.76 ml   Output 1750 ml   Net -198.24 ml       Diet Regular; Regular House    Invasive Monitoring           Physical Exam   Physical Exam  Eyes:       Extraocular Movements: Extraocular movements intact.      Pupils: Pupils are equal, round, and reactive to light.   Skin:     Comments: Right groin access point bandaged and non-bleeding   HENT:      Head: Normocephalic and atraumatic.   Cardiovascular:      Rate and Rhythm: Normal rate and regular rhythm.      Pulses: Normal pulses.      Heart sounds: Normal heart sounds.   Musculoskeletal:         General: Normal range of motion.   Abdominal:      Palpations: Abdomen is soft.      Tenderness: There is no abdominal tenderness.   Constitutional:       General: She is not in acute distress.     Appearance: She is not ill-appearing.   Pulmonary:      Effort: Pulmonary effort is normal. No respiratory distress.      Breath sounds: No wheezing.      Comments: 2L NC  Neurological:      General: No focal deficit present.      Mental Status: She is alert and oriented to person, place and time. She is calm.      Cranial Nerves: No facial asymmetry.      Motor: Strength full and intact in all extremities. No motor deficit.   Genitourinary/Anorectal:  external catheter present.        Diagnostic Studies        Lab Results: I have reviewed the following results:     Medications:  Scheduled PRN   aspirin, 81 mg, Daily  cefazolin, 2,000 mg, Q8H  clopidogrel, 75 mg, Daily  diltiazem, 30 mg, Q6H JERMAINE  ezetimibe, 10 mg, Daily  gabapentin, 100 mg, TID  levETIRAcetam, 750 mg, Q12H JERMAINE  magnesium sulfate, 2 g, Once  potassium chloride, 40 mEq, Once  QUEtiapine, 100 mg, Q12H      ALPRAZolam, 1 mg, TID PRN  metoclopramide, 10 mg, Q6H PRN  ondansetron, 4 mg, Q4H PRN  oxyCODONE-acetaminophen, 1 tablet, Q6H PRN  phenylephine,  mcg/min, Continuous PRN       Continuous    phenylephine,  mcg/min         Labs:   CBC    Recent Labs     01/31/25  0510 01/31/25  0810 02/01/25  0507   WBC 8.93  --  8.09   HGB 7.1* 7.2* 8.4*   HCT 21.7* 23.2* 25.2*     --  213     BMP    Recent Labs     01/31/25  0510 02/01/25  0507   SODIUM  138 143   K 3.4* 3.3*    110*   CO2 24 26   AGAP 9 7   BUN 16 6   CREATININE 0.57* 0.49*   CALCIUM 8.5 8.6       Coags    Recent Labs     01/31/25  0510   INR 1.19   PTT 32        Additional Electrolytes  Recent Labs     02/01/25  0507   MG 1.7*   PHOS 2.6          Blood Gas    No recent results  No recent results LFTs  No recent results    Infectious  No recent results  Glucose  Recent Labs     01/31/25  0510 02/01/25  0507   GLUC 149* 94

## 2025-02-03 ENCOUNTER — TELEPHONE (OUTPATIENT)
Dept: NEUROSURGERY | Facility: CLINIC | Age: 75
End: 2025-02-03

## 2025-02-03 ENCOUNTER — TRANSITIONAL CARE MANAGEMENT (OUTPATIENT)
Dept: FAMILY MEDICINE CLINIC | Facility: CLINIC | Age: 75
End: 2025-02-03

## 2025-02-03 NOTE — TELEPHONE ENCOUNTER
Completed post angio/intervention callback to Kirsty Em at primary contact number spoke with her  Wilver. He reports patient has complained of some fatigue and was resting during our call. She otherwise has not reported  any pain, swelling, drainage, fevers at the puncture site. Confirmed understanding of post procedure medications as he manages these for Kirsty. She is to continue on Eliquis 5 mg BID and Plavix 75mg daily until advised otherwise. Has a supply of medication. Advised that it is normal to experience fatigue and mild headaches for a few days after the procedure. Advised that tylenol should provide adequate relief. Explained that she should contact the office if she experiences any pain, swelling, or drainage from the site, and report to the ER or call 911 if she experiences WHOL, visual disturbance, confusion/disorientation, slurred speech, or ambulatory dysfunction. Reminded of post procedure follow-up scheduled on 3/17/2025. Patient appreciative of call.

## 2025-02-03 NOTE — ASSESSMENT & PLAN NOTE
PPD 2 Right carotid artery stenting below the level of C1 ( 1/30/25)  History of prior left MCA territory stroke in the context of new diagnosed A-fib and bilateral carotid stenosis.  Given evidence of progressive carotid stenosis and history of stroke carotid revascularization was recommended.  Preprocedure patient was on aspirin 325 mg and Plavix 75 mg as well as Eliquis which was held 24 hours prior to procedure.  Tentative plan for treatment of left carotid stenosis in roughly 4 weeks.  Patient did have small extrav that resolved.  Continue to monitor right groin site.    Plan:  Continue frequent neurological checks.   STAT CT/CTA head with any neurological decline including drop GCS of 2pts within 1 hr.  Recommend SBP <160mmH g and MAPs>65  levo weaned off  Patient was started on Eliquis 5 mg twice daily and continued on Plavix 75 mg daily  Keflex at DC for UTI   Cleared for DC on PPD 2.     Neurosurgery discharged patient home. call with any further question or concerns.

## 2025-02-03 NOTE — DISCHARGE SUMMARY
Discharge Summary - Neurosurgery   Name: Kirsty Em 74 y.o. female I MRN: [id number]  Unit/Bed#: ICU 08 I Date of Admission: 1/30/2025   Date of Service: 2/2/2025 I Hospital Day: 2    Assessment & Plan  Bilateral carotid artery stenosis  PPD 2 Right carotid artery stenting below the level of C1 ( 1/30/25)  History of prior left MCA territory stroke in the context of new diagnosed A-fib and bilateral carotid stenosis.  Given evidence of progressive carotid stenosis and history of stroke carotid revascularization was recommended.  Preprocedure patient was on aspirin 325 mg and Plavix 75 mg as well as Eliquis which was held 24 hours prior to procedure.  Tentative plan for treatment of left carotid stenosis in roughly 4 weeks.  Patient did have small extrav that resolved.  Continue to monitor right groin site.    Plan:  Continue frequent neurological checks.   STAT CT/CTA head with any neurological decline including drop GCS of 2pts within 1 hr.  Recommend SBP <160mmH g and MAPs>65  levo weaned off  Patient was started on Eliquis 5 mg twice daily and continued on Plavix 75 mg daily  Keflex at DC for UTI   Cleared for DC on PPD 2.     Neurosurgery discharged patient home. call with any further question or concerns.  Benign neoplasm of meninges, unspecified (HCC)  Small left frontal meningioma  Continue surveillance in 1 year  Discharge Date: 2/1/25    Admitting Diagnosis: Carotid stenosis, asymptomatic, bilateral [I65.23]    Discharge Diagnosis: See above  Medical Problems       Resolved Problems  Date Reviewed: 12/12/2024   None         Attending Physician: Dr. Quintana    Consulting Physician: Critical Care    Procedures Performed: R carotid artery stenting    Pathology: None     Hospital Course: Kirsty Em is a 75 y/o F who presented to the outpatient office  with history of prior left MCA territory stroke in the context of new diagnosed A-fib and bilateral carotid stenosis. Given evidence of progressive  carotid stenosis and history of stroke carotid revascularization was recommended. Patient underwent the above stated procedure under general anesthesia with some groin extrav noted post procedure. This was monitored closely. 1 unit of blood was gicen. Hgb stabilized. Patient started on eliquis and plavix. She was monitored in the ICU. Respiratory status improved and weaned off pressors. Patient cleared for home. Patient was cleared medically for discharge on PPD 2.  Prior to discharge, postoperative instructions were discussed with patient.  During that time, all questions and concerns were addressed.  Patient will follow up outpatient in as scheduled. Patient will require treatment of her L carotid artery stenosis which is tentatively planned for 4 weeks.     Condition at Discharge: good     Discharge Instructions/Information to Patient and Family:   See after visit summary for information provided to patient and family.      Provisions for Follow-Up Care:  See after visit summary for information related to follow-up care and any pertinent home health orders.      Disposition: Home    Planned Readmission: Yes Patient will require treatment of her L carotid artery stenosis at a later time     Discharge Statement   I spent 15 minutes discharging the patient. This time was spent on the day of discharge.    Discharge Medications:  See after visit summary for reconciled discharge medications provided to patient and family.

## 2025-02-04 ENCOUNTER — TELEPHONE (OUTPATIENT)
Dept: NEUROSURGERY | Facility: CLINIC | Age: 75
End: 2025-02-04

## 2025-02-04 NOTE — TELEPHONE ENCOUNTER
Soto Quintana MD to Me    2/4/25  2:33 PM  She just can not stop her eliquis and plavix...      Faxed Dr Quintana's response to Dr Corral's office at [phone]

## 2025-02-04 NOTE — TELEPHONE ENCOUNTER
Call received from Dr Corral's office with ENT at [facility] stating they are seeing patient and are  questioning if patient would be cleared from a neurosurgical perspective to have a tube placed in her left ear.     Advised this RN will route to Dr Quintana and fax his response to [phone].     She was appreciative

## 2025-02-12 ENCOUNTER — OFFICE VISIT (OUTPATIENT)
Dept: NEUROSURGERY | Facility: CLINIC | Age: 75
End: 2025-02-12
Payer: MEDICARE

## 2025-02-12 ENCOUNTER — TELEPHONE (OUTPATIENT)
Age: 75
End: 2025-02-12

## 2025-02-12 ENCOUNTER — TELEPHONE (OUTPATIENT)
Dept: NEUROSURGERY | Facility: CLINIC | Age: 75
End: 2025-02-12

## 2025-02-12 VITALS
WEIGHT: 121 LBS | TEMPERATURE: 97.2 F | RESPIRATION RATE: 13 BRPM | DIASTOLIC BLOOD PRESSURE: 66 MMHG | SYSTOLIC BLOOD PRESSURE: 128 MMHG | BODY MASS INDEX: 21.44 KG/M2 | HEIGHT: 63 IN | OXYGEN SATURATION: 98 % | HEART RATE: 95 BPM

## 2025-02-12 DIAGNOSIS — I65.23 CAROTID STENOSIS, ASYMPTOMATIC, BILATERAL: Primary | ICD-10-CM

## 2025-02-12 PROCEDURE — 99213 OFFICE O/P EST LOW 20 MIN: CPT | Performed by: NEUROLOGICAL SURGERY

## 2025-02-12 RX ORDER — SODIUM CHLORIDE 9 MG/ML
75 INJECTION, SOLUTION INTRAVENOUS CONTINUOUS
Status: CANCELLED | OUTPATIENT
Start: 2025-02-12

## 2025-02-12 NOTE — PROGRESS NOTES
Name: Kirsty Em      : 1950      MRN: [id number]  Encounter Provider: Soto Quintana MD  Encounter Date: 2025   Encounter department: [facility]  :  Assessment & Plan  Carotid stenosis, asymptomatic, bilateral  1.  2-week status post right carotid artery stenting in the context of prior left MCA territory stroke and new diagnosis of atrial fibrillation with progressive stenosis on carotid Doppler.  She has recovered well after her right carotid artery stent.  She remains on Eliquis and Plavix.  She had no significant femoral hematoma or bruising.  She was discharged on hospital day 2 after her blood pressure and heart rate improved.    We reviewed her imaging in detail.  She has significant improvement of her stenosis.  At this juncture we will move forward with treatment of her contralateral side.    2.  Left carotid artery stenosis  We will treat this in approximately 1 week in order to minimize her time on Plavix in the context of Eliquis usage.    We will start aspirin 325   Stop Eliquis   Plan for stenting on 2025    If all goes well we will plan to transition back to Plavix and Eliquis at discharge.  I will also set her up for cardiology follow-up as she does not know who is managing her A-fib as an outpatient.  Orders:    IR cerebral angiography / intervention; Future        History of Present Illness   HPI  Kirsty Em is a 74 y.o. female who presented presented to the hospital and was discovered to have small left MCA stroke in the context of new onset A-fib and carotid stenosis bilaterally.  She was also found to have a small left convexity meningioma.     sHe is now 2 weeks status post right carotid artery stenting.  She remains on aspirin and Eliquis.  She has no new neurologic changes.      Review of Systems   Constitutional:  Positive for fatigue.   HENT:  Positive for tinnitus (ringing left ear).    Eyes:  Negative for  "visual disturbance.   Gastrointestinal: Negative.    Genitourinary: Negative.    Musculoskeletal:  Positive for gait problem (ambualtes w/ cane).   Neurological:  Positive for headaches (occ). Negative for dizziness, tremors, seizures, syncope, speech difficulty, weakness and numbness.   Hematological:  Bruises/bleeds easily (meds).   Psychiatric/Behavioral:  Negative for confusion, decreased concentration and sleep disturbance.     I have personally reviewed the MA's review of systems and made changes as necessary.       Objective   /66 (BP Location: Right arm, Patient Position: Sitting, Cuff Size: Standard)   Pulse 95   Temp (!) 97.2 °F (36.2 °C) (Temporal)   Resp 13   Ht 5' 3\" (1.6 m)   Wt 54.9 kg (121 lb)   SpO2 98%   BMI 21.43 kg/m²     Physical Exam  Neurological Exam  She is well appearing.  Affect is appropriate. Body mass index is 21.43 kg/m².. She is awake alert and oriented.  Hearing and vision are grossly intact.   Her pupils are equal round reactive to light.  Her extraocular movements are intact.  Her face is symmetric.  Tongue is midline.  Facial sensation is intact and symmetric throughout.  Shoulder shrug is 5/5.  There is no drift or dysmetria.     She has full strength in her bilateral upper and lower extremities.  Ambulates with a cane.    Her heart rate is IRregular.  Normal respiratory effort.  Abdomen nondistended.  Radial pulses 2+.     We reviewed her procedural arteriogram in detail as well as the reports.    Administrative Statements   I have spent a total time of 45 minutes in caring for this patient on the day of the visit/encounter including Diagnostic results, Prognosis, Risks and benefits of tx options, Instructions for management, Patient and family education, Importance of tx compliance, Risk factor reductions, Impressions, Counseling / Coordination of care, Documenting in the medical record, Reviewing / ordering tests, medicine, procedures  , Obtaining or reviewing " history  , and Communicating with other healthcare professionals .

## 2025-02-12 NOTE — TELEPHONE ENCOUNTER
LV - left detailed message regarding Dr. Quintana wanting to schedule 2 part of procedure on 2/21, provided call back number to confirm date.

## 2025-02-12 NOTE — TELEPHONE ENCOUNTER
"Hello,    The following message was sent via e-mail to the leadership team:     Please advise if you can help facilitate the following overbook request:    Patient Name: Kirsty Em    Patient MRN: [id number]    Call back #: [phone]    Insurance: Medicare A&B    Department:Cardiology    Speciality: General Cardiology    Reason for overbook request: CARDIAC CLEARANCE PRIOR TO PROCEDURE (14-30 DAYS PRIOR TO PROCEDURE)    Comments (Write \"N/a\" if no comments): [facility] a pre op clearance appointment with cardiology prior to patient's upcoming procedure on 2/21/25 with Dr. Quintana. Patient is established with Dr. Smith in [place]. Patient does prefer to go to the [place] offices for a pre op clearance appointment.     Requested doctor and location: Any doctor available/[facility]    Date of current appointment: 3/14/25      Thank you.    "

## 2025-02-14 ENCOUNTER — TELEPHONE (OUTPATIENT)
Dept: RADIOLOGY | Facility: HOSPITAL | Age: 75
End: 2025-02-14

## 2025-02-14 NOTE — PRE-PROCEDURE INSTRUCTIONS
Pre-procedure Instructions for Interventional Radiology  [address] [address]  INTERVENTIONAL RADIOLOGY [phone]    You are scheduled for a/an cerebral angiogram/intervention.    On Friday 2-21-25.    Your tentative arrival time is 8am.  Short stay will notify you the day before your procedure with the exact arrival time and the location to arrive.    To prepare for your procedure:  Please arrange for someone to drive you home after the procedure and stay with you until the next morning if you are instructed to do so.  This is typically for patients receiving some type of sedative or anesthetic for the procedure.  DO NOT EAT OR DRINK ANYTHING after midnight on the evening before your procedure including candy & gum.  ONLY SIPS OF WATER with your medications are allowed on the morning of your procedure.  TAKE ALL OF YOUR REGULAR MEDICATIONS THE MORNING OF YOUR PROCEDURE with sips of water!  We may call you to stop some of your blood sugar, blood pressure and blood thinning medications depending on the procedure.  Please take all of these medications unless we instruct you to stop them.  If you have an allergy to x-ray dye, please contact Interventional Radiology for an x-ray dye preparation which usually consists of an oral steroid and Benadryl.  If you wear a Glucose Monitor, you may be asked to remove it for your procedure if we are using x-ray.  These devices need to be removed when we are imaging with x-ray near the device since the radiation can cause the unit to malfunction.  If possible and not too inconvenient, you may want to schedule your exam closer to day 14 of your 14 day device so your device is not wasted.    The day of your procedure:  Bring a list of the medications you take at home.  Bring medications you take for breathing problems (such as inhalers), medications for chest pain, or both.  Bring a case for your glasses or contacts.  Bring your  insurance card and a form of photo ID.  Please leave all valuables such as credit cards and jewelry at home.  Report to the admitting office to the left of the registration desk in the main lobby at the [facility], Entrance B.  You will then be directed to the Short Stay Center.  While your procedure is being performed, your family may wait in the Radiology Waiting Room on the 1st floor in Radiology.  if they need to leave, they may provide a number to be called following the procedure.   Be prepared to stay overnight just in case. Sometimes procedures will indicate the need for further observation or treatment.   If you are scheduled for a follow-up visit with the Interventional Radiologist after your procedure, you will be called with a date and time.    Special Instructions (Medications to stop taking before your procedure etc.):  Start aspirin 325mg on 2-17-25 as directed by Dr Quintana.  Stop Eliquis on 2-19-25.   Do not need to see cardiology BEFORE procedure on 2-21-25 per Dr Quintana.

## 2025-02-18 DIAGNOSIS — I65.23 BILATERAL CAROTID ARTERY STENOSIS: ICD-10-CM

## 2025-02-18 DIAGNOSIS — I65.23 CAROTID STENOSIS, ASYMPTOMATIC, BILATERAL: ICD-10-CM

## 2025-02-18 NOTE — TELEPHONE ENCOUNTER
Hello! Carotid stenting planned 2/21 90 days supply of Plavix requested. Can you sign if appropriate or advise of changes? Thanks!

## 2025-02-19 DIAGNOSIS — I65.23 BILATERAL CAROTID ARTERY STENOSIS: ICD-10-CM

## 2025-02-19 RX ORDER — CLOPIDOGREL BISULFATE 75 MG/1
75 TABLET ORAL DAILY
Qty: 90 TABLET | Refills: 0 | Status: ON HOLD | OUTPATIENT
Start: 2025-02-19

## 2025-02-19 RX ORDER — ROSUVASTATIN CALCIUM 10 MG/1
10 TABLET, COATED ORAL DAILY
Qty: 90 TABLET | Refills: 1 | Status: ON HOLD | OUTPATIENT
Start: 2025-02-19

## 2025-02-20 RX ORDER — ROSUVASTATIN CALCIUM 10 MG/1
10 TABLET, COATED ORAL DAILY
Qty: 90 TABLET | Refills: 0 | OUTPATIENT
Start: 2025-02-20

## 2025-02-21 ENCOUNTER — ANESTHESIA (OUTPATIENT)
Dept: RADIOLOGY | Facility: HOSPITAL | Age: 75
DRG: 034 | End: 2025-02-21
Payer: MEDICARE

## 2025-02-21 ENCOUNTER — ANESTHESIA EVENT (OUTPATIENT)
Dept: RADIOLOGY | Facility: HOSPITAL | Age: 75
DRG: 034 | End: 2025-02-21
Payer: MEDICARE

## 2025-02-21 ENCOUNTER — DOCUMENTATION (OUTPATIENT)
Dept: ANESTHESIOLOGY | Facility: HOSPITAL | Age: 75
End: 2025-02-21

## 2025-02-21 ENCOUNTER — HOSPITAL ENCOUNTER (INPATIENT)
Dept: RADIOLOGY | Facility: HOSPITAL | Age: 75
LOS: 3 days | Discharge: HOME | DRG: 034 | End: 2025-02-24
Attending: NEUROLOGICAL SURGERY | Admitting: NEUROLOGICAL SURGERY
Payer: MEDICARE

## 2025-02-21 DIAGNOSIS — I65.23 CAROTID STENOSIS, ASYMPTOMATIC, BILATERAL: ICD-10-CM

## 2025-02-21 DIAGNOSIS — F17.200 TOBACCO DEPENDENCE: ICD-10-CM

## 2025-02-21 DIAGNOSIS — J96.01 ACUTE RESPIRATORY FAILURE WITH HYPOXIA (HCC): ICD-10-CM

## 2025-02-21 DIAGNOSIS — I48.11 LONGSTANDING PERSISTENT ATRIAL FIBRILLATION (HCC): Primary | ICD-10-CM

## 2025-02-21 DIAGNOSIS — J43.2 CENTRILOBULAR EMPHYSEMA (HCC): ICD-10-CM

## 2025-02-21 LAB
ALBUMIN SERPL BCG-MCNC: 3.7 G/DL (ref 3.5–5)
ALP SERPL-CCNC: 60 U/L (ref 34–104)
ALT SERPL W P-5'-P-CCNC: 20 U/L (ref 7–52)
ANION GAP SERPL CALCULATED.3IONS-SCNC: 6 MMOL/L (ref 4–13)
AST SERPL W P-5'-P-CCNC: 21 U/L (ref 13–39)
BASOPHILS # BLD AUTO: 0.05 THOUSANDS/ΜL (ref 0–0.1)
BASOPHILS NFR BLD AUTO: 1 % (ref 0–1)
BILIRUB SERPL-MCNC: 0.31 MG/DL (ref 0.2–1)
BUN SERPL-MCNC: 13 MG/DL (ref 5–25)
CALCIUM SERPL-MCNC: 8 MG/DL (ref 8.4–10.2)
CHLORIDE SERPL-SCNC: 110 MMOL/L (ref 96–108)
CO2 SERPL-SCNC: 28 MMOL/L (ref 21–32)
CREAT SERPL-MCNC: 0.5 MG/DL (ref 0.6–1.3)
EOSINOPHIL # BLD AUTO: 0.11 THOUSAND/ΜL (ref 0–0.61)
EOSINOPHIL NFR BLD AUTO: 2 % (ref 0–6)
ERYTHROCYTE [DISTWIDTH] IN BLOOD BY AUTOMATED COUNT: 15 % (ref 11.6–15.1)
GFR SERPL CREATININE-BSD FRML MDRD: 97 ML/MIN/1.73SQ M
GLUCOSE SERPL-MCNC: 145 MG/DL (ref 65–140)
HCT VFR BLD AUTO: 27.9 % (ref 34.8–46.1)
HGB BLD-MCNC: 8.7 G/DL (ref 11.5–15.4)
IMM GRANULOCYTES # BLD AUTO: 0.02 THOUSAND/UL (ref 0–0.2)
IMM GRANULOCYTES NFR BLD AUTO: 0 % (ref 0–2)
KCT BLD-ACNC: 154 SEC (ref 89–137)
KCT BLD-ACNC: 177 SEC (ref 89–137)
KCT BLD-ACNC: 256 SEC (ref 89–137)
LYMPHOCYTES # BLD AUTO: 2.8 THOUSANDS/ΜL (ref 0.6–4.47)
LYMPHOCYTES NFR BLD AUTO: 41 % (ref 14–44)
MAGNESIUM SERPL-MCNC: 1.7 MG/DL (ref 1.9–2.7)
MCH RBC QN AUTO: 31.4 PG (ref 26.8–34.3)
MCHC RBC AUTO-ENTMCNC: 31.2 G/DL (ref 31.4–37.4)
MCV RBC AUTO: 101 FL (ref 82–98)
MONOCYTES # BLD AUTO: 0.57 THOUSAND/ΜL (ref 0.17–1.22)
MONOCYTES NFR BLD AUTO: 8 % (ref 4–12)
NEUTROPHILS # BLD AUTO: 3.36 THOUSANDS/ΜL (ref 1.85–7.62)
NEUTS SEG NFR BLD AUTO: 48 % (ref 43–75)
NRBC BLD AUTO-RTO: 0 /100 WBCS
PHOSPHATE SERPL-MCNC: 4 MG/DL (ref 2.3–4.1)
PLATELET # BLD AUTO: 290 THOUSANDS/UL (ref 149–390)
PMV BLD AUTO: 9.6 FL (ref 8.9–12.7)
POTASSIUM SERPL-SCNC: 3.2 MMOL/L (ref 3.5–5.3)
PROT SERPL-MCNC: 5.9 G/DL (ref 6.4–8.4)
RBC # BLD AUTO: 2.77 MILLION/UL (ref 3.81–5.12)
SODIUM SERPL-SCNC: 144 MMOL/L (ref 135–147)
SPECIMEN SOURCE: ABNORMAL
T4 FREE SERPL-MCNC: 0.75 NG/DL (ref 0.61–1.12)
TSH SERPL DL<=0.05 MIU/L-ACNC: 1.64 UIU/ML (ref 0.45–4.5)
VIT B12 SERPL-MCNC: 2348 PG/ML (ref 180–914)
WBC # BLD AUTO: 6.91 THOUSAND/UL (ref 4.31–10.16)

## 2025-02-21 PROCEDURE — 36215 PLACE CATHETER IN ARTERY: CPT

## 2025-02-21 PROCEDURE — 85347 COAGULATION TIME ACTIVATED: CPT

## 2025-02-21 PROCEDURE — 83735 ASSAY OF MAGNESIUM: CPT

## 2025-02-21 PROCEDURE — 77001 FLUOROGUIDE FOR VEIN DEVICE: CPT

## 2025-02-21 PROCEDURE — B343ZZZ ULTRASONOGRAPHY OF RIGHT COMMON CAROTID ARTERY: ICD-10-PCS | Performed by: NEUROLOGICAL SURGERY

## 2025-02-21 PROCEDURE — 36223 PLACE CATH CAROTID/INOM ART: CPT

## 2025-02-21 PROCEDURE — 36573 INSJ PICC RS&I 5 YR+: CPT

## 2025-02-21 PROCEDURE — 99291 CRITICAL CARE FIRST HOUR: CPT | Performed by: PSYCHIATRY & NEUROLOGY

## 2025-02-21 PROCEDURE — 37215 TRANSCATH STENT CCA W/EPS: CPT

## 2025-02-21 PROCEDURE — C1751 CATH, INF, PER/CENT/MIDLINE: HCPCS

## 2025-02-21 PROCEDURE — 84100 ASSAY OF PHOSPHORUS: CPT

## 2025-02-21 PROCEDURE — C1894 INTRO/SHEATH, NON-LASER: HCPCS

## 2025-02-21 PROCEDURE — 76937 US GUIDE VASCULAR ACCESS: CPT

## 2025-02-21 PROCEDURE — C1769 GUIDE WIRE: HCPCS

## 2025-02-21 PROCEDURE — C1887 CATHETER, GUIDING: HCPCS

## 2025-02-21 PROCEDURE — 84439 ASSAY OF FREE THYROXINE: CPT

## 2025-02-21 PROCEDURE — 36573 INSJ PICC RS&I 5 YR+: CPT | Performed by: NEUROLOGICAL SURGERY

## 2025-02-21 PROCEDURE — C1760 CLOSURE DEV, VASC: HCPCS

## 2025-02-21 PROCEDURE — 61650 EVASC PRLNG ADMN RX AGNT 1ST: CPT | Performed by: NEUROLOGICAL SURGERY

## 2025-02-21 PROCEDURE — 037L3DZ DILATION OF LEFT INTERNAL CAROTID ARTERY WITH INTRALUMINAL DEVICE, PERCUTANEOUS APPROACH: ICD-10-PCS | Performed by: NEUROLOGICAL SURGERY

## 2025-02-21 PROCEDURE — 82607 VITAMIN B-12: CPT

## 2025-02-21 PROCEDURE — 02HV33Z INSERTION OF INFUSION DEVICE INTO SUPERIOR VENA CAVA, PERCUTANEOUS APPROACH: ICD-10-PCS | Performed by: NEUROLOGICAL SURGERY

## 2025-02-21 PROCEDURE — 37215 TRANSCATH STENT CCA W/EPS: CPT | Performed by: NEUROLOGICAL SURGERY

## 2025-02-21 PROCEDURE — 84443 ASSAY THYROID STIM HORMONE: CPT

## 2025-02-21 PROCEDURE — 36620 INSERTION CATHETER ARTERY: CPT | Performed by: NEUROLOGICAL SURGERY

## 2025-02-21 PROCEDURE — 85025 COMPLETE CBC W/AUTO DIFF WBC: CPT

## 2025-02-21 PROCEDURE — 80053 COMPREHEN METABOLIC PANEL: CPT

## 2025-02-21 PROCEDURE — B41FZZZ FLUOROSCOPY OF RIGHT LOWER EXTREMITY ARTERIES: ICD-10-PCS | Performed by: NEUROLOGICAL SURGERY

## 2025-02-21 PROCEDURE — 03HY32Z INSERTION OF MONITORING DEVICE INTO UPPER ARTERY, PERCUTANEOUS APPROACH: ICD-10-PCS | Performed by: NEUROLOGICAL SURGERY

## 2025-02-21 PROCEDURE — C1876 STENT, NON-COA/NON-COV W/DEL: HCPCS

## 2025-02-21 PROCEDURE — 93005 ELECTROCARDIOGRAM TRACING: CPT

## 2025-02-21 PROCEDURE — B314ZZZ FLUOROSCOPY OF LEFT COMMON CAROTID ARTERY: ICD-10-PCS | Performed by: NEUROLOGICAL SURGERY

## 2025-02-21 RX ORDER — ONDANSETRON 2 MG/ML
INJECTION INTRAMUSCULAR; INTRAVENOUS AS NEEDED
Status: DISCONTINUED | OUTPATIENT
Start: 2025-02-21 | End: 2025-02-21

## 2025-02-21 RX ORDER — HYDRALAZINE HYDROCHLORIDE 20 MG/ML
15 INJECTION INTRAMUSCULAR; INTRAVENOUS
Status: DISCONTINUED | OUTPATIENT
Start: 2025-02-21 | End: 2025-02-21

## 2025-02-21 RX ORDER — ONDANSETRON 2 MG/ML
4 INJECTION INTRAMUSCULAR; INTRAVENOUS ONCE AS NEEDED
Status: CANCELLED | OUTPATIENT
Start: 2025-02-21

## 2025-02-21 RX ORDER — FENTANYL CITRATE/PF 50 MCG/ML
25 SYRINGE (ML) INJECTION
Refills: 0 | Status: DISCONTINUED | OUTPATIENT
Start: 2025-02-21 | End: 2025-02-21 | Stop reason: HOSPADM

## 2025-02-21 RX ORDER — EPHEDRINE SULFATE 50 MG/ML
INJECTION INTRAVENOUS AS NEEDED
Status: DISCONTINUED | OUTPATIENT
Start: 2025-02-21 | End: 2025-02-21

## 2025-02-21 RX ORDER — LIDOCAINE HYDROCHLORIDE 10 MG/ML
INJECTION, SOLUTION EPIDURAL; INFILTRATION; INTRACAUDAL; PERINEURAL AS NEEDED
Status: COMPLETED | OUTPATIENT
Start: 2025-02-21 | End: 2025-02-21

## 2025-02-21 RX ORDER — FENTANYL CITRATE/PF 50 MCG/ML
25 SYRINGE (ML) INJECTION
Refills: 0 | Status: CANCELLED | OUTPATIENT
Start: 2025-02-21

## 2025-02-21 RX ORDER — MAGNESIUM SULFATE HEPTAHYDRATE 40 MG/ML
2 INJECTION, SOLUTION INTRAVENOUS ONCE
Status: COMPLETED | OUTPATIENT
Start: 2025-02-21 | End: 2025-02-21

## 2025-02-21 RX ORDER — QUETIAPINE FUMARATE 100 MG/1
100 TABLET, FILM COATED ORAL EVERY 12 HOURS
Status: DISCONTINUED | OUTPATIENT
Start: 2025-02-21 | End: 2025-02-24 | Stop reason: HOSPADM

## 2025-02-21 RX ORDER — HEPARIN SODIUM 1000 [USP'U]/ML
INJECTION, SOLUTION INTRAVENOUS; SUBCUTANEOUS AS NEEDED
Status: DISCONTINUED | OUTPATIENT
Start: 2025-02-21 | End: 2025-02-21

## 2025-02-21 RX ORDER — HEPARIN SODIUM 5000 [USP'U]/ML
5000 INJECTION, SOLUTION INTRAVENOUS; SUBCUTANEOUS EVERY 8 HOURS SCHEDULED
Status: DISCONTINUED | OUTPATIENT
Start: 2025-02-21 | End: 2025-02-21

## 2025-02-21 RX ORDER — IODIXANOL 320 MG/ML
200 INJECTION, SOLUTION INTRAVASCULAR
Status: COMPLETED | OUTPATIENT
Start: 2025-02-21 | End: 2025-02-21

## 2025-02-21 RX ORDER — LEVETIRACETAM 750 MG/1
750 TABLET ORAL EVERY 12 HOURS SCHEDULED
Status: DISCONTINUED | OUTPATIENT
Start: 2025-02-21 | End: 2025-02-24 | Stop reason: HOSPADM

## 2025-02-21 RX ORDER — ALBUMIN HUMAN 50 G/1000ML
12.5 SOLUTION INTRAVENOUS ONCE
Status: COMPLETED | OUTPATIENT
Start: 2025-02-21 | End: 2025-02-21

## 2025-02-21 RX ORDER — CHLORHEXIDINE GLUCONATE ORAL RINSE 1.2 MG/ML
15 SOLUTION DENTAL EVERY 12 HOURS SCHEDULED
Status: DISCONTINUED | OUTPATIENT
Start: 2025-02-21 | End: 2025-02-24 | Stop reason: HOSPADM

## 2025-02-21 RX ORDER — ALPRAZOLAM 0.5 MG
1 TABLET ORAL
Status: DISCONTINUED | OUTPATIENT
Start: 2025-02-21 | End: 2025-02-24 | Stop reason: HOSPADM

## 2025-02-21 RX ORDER — SODIUM CHLORIDE 9 MG/ML
INJECTION, SOLUTION INTRAVENOUS CONTINUOUS PRN
Status: DISCONTINUED | OUTPATIENT
Start: 2025-02-21 | End: 2025-02-21

## 2025-02-21 RX ORDER — FLUTICASONE PROPIONATE 50 MCG
1 SPRAY, SUSPENSION (ML) NASAL DAILY
Status: DISCONTINUED | OUTPATIENT
Start: 2025-02-22 | End: 2025-02-24 | Stop reason: HOSPADM

## 2025-02-21 RX ORDER — VERAPAMIL HYDROCHLORIDE 2.5 MG/ML
INJECTION, SOLUTION INTRAVENOUS AS NEEDED
Status: COMPLETED | OUTPATIENT
Start: 2025-02-21 | End: 2025-02-21

## 2025-02-21 RX ORDER — ACETAMINOPHEN 325 MG/1
650 TABLET ORAL EVERY 6 HOURS PRN
Status: DISCONTINUED | OUTPATIENT
Start: 2025-02-21 | End: 2025-02-24 | Stop reason: HOSPADM

## 2025-02-21 RX ORDER — GLYCOPYRROLATE 0.2 MG/ML
0.2 INJECTION INTRAMUSCULAR; INTRAVENOUS ONCE
Status: COMPLETED | OUTPATIENT
Start: 2025-02-21 | End: 2025-02-21

## 2025-02-21 RX ORDER — CLOPIDOGREL BISULFATE 75 MG/1
75 TABLET ORAL DAILY
Status: DISCONTINUED | OUTPATIENT
Start: 2025-02-22 | End: 2025-02-24 | Stop reason: HOSPADM

## 2025-02-21 RX ORDER — ALPRAZOLAM 0.5 MG
2 TABLET ORAL 3 TIMES DAILY PRN
Status: DISCONTINUED | OUTPATIENT
Start: 2025-02-21 | End: 2025-02-21

## 2025-02-21 RX ORDER — FENTANYL CITRATE 50 UG/ML
INJECTION, SOLUTION INTRAMUSCULAR; INTRAVENOUS AS NEEDED
Status: DISCONTINUED | OUTPATIENT
Start: 2025-02-21 | End: 2025-02-21

## 2025-02-21 RX ORDER — SODIUM CHLORIDE 9 MG/ML
75 INJECTION, SOLUTION INTRAVENOUS CONTINUOUS
Status: DISCONTINUED | OUTPATIENT
Start: 2025-02-21 | End: 2025-02-21

## 2025-02-21 RX ORDER — ONDANSETRON 2 MG/ML
4 INJECTION INTRAMUSCULAR; INTRAVENOUS EVERY 6 HOURS PRN
Status: DISCONTINUED | OUTPATIENT
Start: 2025-02-21 | End: 2025-02-24 | Stop reason: HOSPADM

## 2025-02-21 RX ORDER — POTASSIUM CHLORIDE 29.8 MG/ML
40 INJECTION INTRAVENOUS ONCE
Status: COMPLETED | OUTPATIENT
Start: 2025-02-21 | End: 2025-02-21

## 2025-02-21 RX ORDER — EZETIMIBE 10 MG/1
10 TABLET ORAL DAILY
Status: DISCONTINUED | OUTPATIENT
Start: 2025-02-22 | End: 2025-02-24 | Stop reason: HOSPADM

## 2025-02-21 RX ORDER — GLYCOPYRROLATE 0.2 MG/ML
INJECTION INTRAMUSCULAR; INTRAVENOUS AS NEEDED
Status: DISCONTINUED | OUTPATIENT
Start: 2025-02-21 | End: 2025-02-21

## 2025-02-21 RX ORDER — POTASSIUM CHLORIDE 1500 MG/1
40 TABLET, EXTENDED RELEASE ORAL ONCE
Status: COMPLETED | OUTPATIENT
Start: 2025-02-21 | End: 2025-02-21

## 2025-02-21 RX ORDER — ONDANSETRON 2 MG/ML
4 INJECTION INTRAMUSCULAR; INTRAVENOUS ONCE AS NEEDED
Status: DISCONTINUED | OUTPATIENT
Start: 2025-02-21 | End: 2025-02-21 | Stop reason: HOSPADM

## 2025-02-21 RX ORDER — GABAPENTIN 100 MG/1
100 CAPSULE ORAL 3 TIMES DAILY
Status: DISCONTINUED | OUTPATIENT
Start: 2025-02-21 | End: 2025-02-24 | Stop reason: HOSPADM

## 2025-02-21 RX ORDER — LABETALOL HYDROCHLORIDE 5 MG/ML
5 INJECTION, SOLUTION INTRAVENOUS
Status: DISCONTINUED | OUTPATIENT
Start: 2025-02-21 | End: 2025-02-21

## 2025-02-21 RX ADMIN — IODIXANOL 125 ML: 320 INJECTION, SOLUTION INTRAVASCULAR at 11:33

## 2025-02-21 RX ADMIN — DEXMEDETOMIDINE HYDROCHLORIDE 8 MCG: 100 INJECTION, SOLUTION INTRAVENOUS at 09:56

## 2025-02-21 RX ADMIN — POTASSIUM CHLORIDE 40 MEQ: 29.8 INJECTION, SOLUTION INTRAVENOUS at 16:37

## 2025-02-21 RX ADMIN — ALBUMIN (HUMAN) 12.5 G: 12.5 INJECTION, SOLUTION INTRAVENOUS at 12:25

## 2025-02-21 RX ADMIN — LIDOCAINE HYDROCHLORIDE 10 ML: 10 INJECTION, SOLUTION EPIDURAL; INFILTRATION; INTRACAUDAL; PERINEURAL at 09:50

## 2025-02-21 RX ADMIN — PHENYLEPHRINE HYDROCHLORIDE 200 MCG: 10 INJECTION INTRAVENOUS at 10:54

## 2025-02-21 RX ADMIN — NOREPINEPHRINE BITARTRATE 7 MCG/MIN: 1 INJECTION, SOLUTION, CONCENTRATE INTRAVENOUS at 16:43

## 2025-02-21 RX ADMIN — NOREPINEPHRINE BITARTRATE 10 MCG/MIN: 1 INJECTION, SOLUTION, CONCENTRATE INTRAVENOUS at 22:53

## 2025-02-21 RX ADMIN — SODIUM CHLORIDE 1000 ML: 0.9 INJECTION, SOLUTION INTRAVENOUS at 14:45

## 2025-02-21 RX ADMIN — SODIUM CHLORIDE: 0.9 INJECTION, SOLUTION INTRAVENOUS at 09:43

## 2025-02-21 RX ADMIN — FENTANYL CITRATE 25 MCG: 50 INJECTION INTRAMUSCULAR; INTRAVENOUS at 09:45

## 2025-02-21 RX ADMIN — PHENYLEPHRINE HYDROCHLORIDE 200 MCG: 10 INJECTION INTRAVENOUS at 10:50

## 2025-02-21 RX ADMIN — GLYCOPYRROLATE 0.2 MG: 0.2 INJECTION, SOLUTION INTRAMUSCULAR; INTRAVENOUS at 12:42

## 2025-02-21 RX ADMIN — LIDOCAINE HYDROCHLORIDE 5 ML: 10 INJECTION, SOLUTION EPIDURAL; INFILTRATION; INTRACAUDAL; PERINEURAL at 09:33

## 2025-02-21 RX ADMIN — ACETAMINOPHEN 650 MG: 325 TABLET, FILM COATED ORAL at 19:49

## 2025-02-21 RX ADMIN — ONDANSETRON 4 MG: 2 INJECTION INTRAMUSCULAR; INTRAVENOUS at 10:50

## 2025-02-21 RX ADMIN — EPHEDRINE SULFATE 10 MG: 50 INJECTION, SOLUTION INTRAVENOUS at 11:01

## 2025-02-21 RX ADMIN — ALPRAZOLAM 1 MG: 0.5 TABLET ORAL at 22:48

## 2025-02-21 RX ADMIN — QUETIAPINE FUMARATE 100 MG: 100 TABLET ORAL at 22:48

## 2025-02-21 RX ADMIN — PHENYLEPHRINE HYDROCHLORIDE 200 MCG: 10 INJECTION INTRAVENOUS at 10:41

## 2025-02-21 RX ADMIN — GLYCOPYRROLATE 0.2 MG: 0.2 INJECTION, SOLUTION INTRAMUSCULAR; INTRAVENOUS at 10:37

## 2025-02-21 RX ADMIN — GABAPENTIN 100 MG: 100 CAPSULE ORAL at 22:48

## 2025-02-21 RX ADMIN — MAGNESIUM SULFATE HEPTAHYDRATE 2 G: 40 INJECTION, SOLUTION INTRAVENOUS at 16:37

## 2025-02-21 RX ADMIN — EPHEDRINE SULFATE 10 MG: 50 INJECTION, SOLUTION INTRAVENOUS at 10:38

## 2025-02-21 RX ADMIN — LEVETIRACETAM 750 MG: 750 TABLET, FILM COATED ORAL at 22:48

## 2025-02-21 RX ADMIN — PHENYLEPHRINE HYDROCHLORIDE 100 MCG: 10 INJECTION INTRAVENOUS at 10:40

## 2025-02-21 RX ADMIN — LIDOCAINE HYDROCHLORIDE 5 ML: 10 INJECTION, SOLUTION EPIDURAL; INFILTRATION; INTRACAUDAL; PERINEURAL at 09:22

## 2025-02-21 RX ADMIN — NOREPINEPHRINE BITARTRATE 2 MCG/MIN: 1 INJECTION, SOLUTION, CONCENTRATE INTRAVENOUS at 12:31

## 2025-02-21 RX ADMIN — HEPARIN SODIUM 2000 UNITS: 1000 INJECTION INTRAVENOUS; SUBCUTANEOUS at 10:33

## 2025-02-21 RX ADMIN — POTASSIUM CHLORIDE 40 MEQ: 1500 TABLET, EXTENDED RELEASE ORAL at 16:37

## 2025-02-21 RX ADMIN — PHENYLEPHRINE HYDROCHLORIDE 300 MCG: 10 INJECTION INTRAVENOUS at 10:46

## 2025-02-21 RX ADMIN — HEPARIN SODIUM 4000 UNITS: 1000 INJECTION INTRAVENOUS; SUBCUTANEOUS at 10:17

## 2025-02-21 RX ADMIN — GABAPENTIN 100 MG: 100 CAPSULE ORAL at 16:37

## 2025-02-21 RX ADMIN — VERAPAMIL HYDROCHLORIDE 10 MG: 2.5 INJECTION INTRAVENOUS at 10:48

## 2025-02-21 RX ADMIN — EPHEDRINE SULFATE 10 MG: 50 INJECTION, SOLUTION INTRAVENOUS at 10:40

## 2025-02-21 RX ADMIN — GLYCOPYRROLATE 0.2 MG: 0.2 INJECTION, SOLUTION INTRAMUSCULAR; INTRAVENOUS at 09:37

## 2025-02-21 RX ADMIN — DEXMEDETOMIDINE HYDROCHLORIDE 4 MCG: 100 INJECTION, SOLUTION INTRAVENOUS at 09:45

## 2025-02-21 NOTE — OP NOTE
OPERATIVE REPORT  PATIENT NAME: Kirsty Em     :  1950  MRN: [id number]   Pt Location: Interventional radiology    SURGERY DATE: 25     Preop Diagnosis:  1. Bilateral internal carotid artery stenosis  2. Status post Right PASTOR  3. Afib  4. Emphysema     Postop Diagnosis  1. Bilateral internal carotid artery stenosis  2. Status post Right PASTOR  3. Afib  4. Emphysema     Procedure:  Placement of left cephaic PICC line with the aid of ultrasound  Placement of left radial arterial line with the aid of ultrasound  Right Common Carotid Arteriogram  Left Common Carotid Arteriogram  Left ICA angioplasty and stenting with distal protection device  Infusion of verapamil into left internal carotid artery over period of greater than 10 minutes for the treatment of vasospasm.  Intraprocedural and postprocedural arteriography  Limited Right Femoral Arteriogram    Surgeon:   Soto Quintana MD    Specimen(s):  None    Estimated Blood Loss:   None    Drains:  None    Anesthesia Type:   Monitored Anesthesia Care     Complications:  None    Operative Indications:  Kirsty Em  is a very pleasant 74 y.o. female  who suffered from a stroke thought to be secondary to atrial fibrillation but also had progressive bilateral carotid stenosis.  Recently she underwent a right carotid angioplasty and stent and returns in a delayed fashion for treatment of her left side. After discussing the risks and benefits of a diagnostic cerebral arteriogram including bleeding, stroke, groin hematoma, and death the patient elected to proceed.     Operative details:  After obtaining written informed consent, the patient was brought into the operating room and moved to the OR table in supine fashion.    Due to the lack of appropriate venous access, the patient was consented for PICC line.  Using sterile technique and ultrasound guidance to place the needle under live imaging the left cephalic vein was punctured and a wire was advanced under  fluoroscopic guidance to the cavoatrial junction.  A 5 [place] double-lumen PICC was then advanced to a length 41 cm and confirmed location using x-ray.  Arm circumference was 26 cm.    Next using sterile technique and ultrasound guidance a 20-gauge Angiocath was advanced into the left radial artery.  This was used for continuous blood pressure monitoring.    Next, the groin was prepped and draped in the usual sterile fashion. Monitored anesthesia care was induced. 10 cc of intradermal lidocaine was infused into the right femoral area and percutaneous access with a 5-[place] micropuncture kit was obtained into the right femoral artery. A 6 [place] shuttle sheath was placed.  A baseline ACT was obtained and the patient was heparinized with 5000 units of intravenous heparin. ACTs were checked regularly throughout the case to obtain a level 1.5-2 times greater than the baseline. A 5-[place] angled CiteeCarenstein catheter was then advanced into the aorta, and over the aortic arch over a Glidewire.     The catheter was then advanced into the right common carotid artery. AP lateral and oblique images of right left cervical carotid were obtained.     AP lateral and magnified oblique images of the right intracranial carotid circulation were obtained.     The left common carotid artery was then catheterized. AP lateral and oblique images of the left cervical carotid were obtained.     AP lateral and magnified oblique images of the left intracranial carotid circulation were obtained.     The shuttle sheath was then advanced into the left mid cervical common carotid artery.  A spider wire distal protection device was then advanced into the cervical carotid over a Traxcess microwire.  The area of stenosis was crossed and a 5 mm spider distal protection device was deployed in the distal right internal carotid artery.  Intraprocedural arteriogram and physical exam was then performed.    Next a 6.0 mm x 30 mm monorail ely balloon was  advanced over spider wire and area of stenosis.  This was then inflated to nominal pressure.  The patient experienced significant brief bradycardia without significant hypotension that was treated by Anesthesia.  The balloon was then deflated and removed.  Vital signs rapidly returned to baseline.  Intraprocedural arteriography and physical exam was then performed.    Next an 8 mm size stent was advanced into the internal carotid artery along the area of most significant stenosis and deployed under constant fluoroscopy.  Intraprocedural arteriography and physical exam were then performed.    The distal protection device was then removed in intraprocedural arteriography of the left common carotid artery was performed as well as the intracranial circulation.    10 mg of intra-arterial verapamil was then infused into the left common carotid and internal carotid artery circulation over period of greater than 10 minutes.    The sheath was then withdrawn and a limited right femoral arteriogram was run. Puncture site was found to be compatible with a Mynx Closure device and this was done successfully. There was appropriate hemostasis. The patient was then awoken from monitored anesthesia care and found to be in baseline neurologic condition. All sponge and needle counts were correct.        INTERPRETATION OF ANGIOGRAPHIC FINDINGS:   1.  Chest x-ray demonstrates appropriate placement of the PICC line at the cavoatrial junction without any intrathoracic evidence of complication.    2.  Right common carotid artery demonstrates antegrade flow into the internal and external carotid arteries.  There is patency of the previously placed stent without any evidence of residual stenosis.  Intracranially there is antegrade flow into the middle cerebral and anterior cerebral artery.  The A2 is azygous in nature.  There is no evidence of aneurysm or vascular malformation.  The capillary and venous phases are on remarkable.    3. The  Left common carotid circulation reveals antegrade flow into the internal and external carotid arteries. There is significant carotid stenosis as defined by NASCET criteria.  There is approximately 70% stenosis at the left ICA bifurcation. Intracranially, there is antegrade flow into the middle cerebral and anterior cerebral arteries. There is no evidence of aneurysm, AVM, or vascular malformation. The capillary and venous phases are unremarkable.      4. Intraprocedural arteriography reveals placement of the distal protection device into the distal left internal carotid artery.  Post angioplasty arteriography reveals improvement of flow along the bifurcation without evidence of thrombus/embolus in the distal protection device.  Post stent deployment reveals significant improvement degree of stenosis along the internal carotid artery origin/common carotid/ICA bifurcation.      5. Postprocedural arteriography reveals antegrade flow without significant hemodynamic stenosis along the carotid bifurcation.  Additionally there is antegrade flow into the anterior cerebral and middle cerebral arteries.  There is no evidence of embolism or intracranial thrombosis.  Capillary and venous phases are unremarkable.    6. Limited Right femoral arteriogram reveals normal puncture site anatomy.    Impression:  1. Successful right carotid artery stenting below the level of C1.    Patient Disposition:  PACU     SIGNATURE: Soto Quintana MD  DATE: 02/21/25   TIME: 11:49 AM

## 2025-02-21 NOTE — CONSULTS
"Consultation - Critical Care/ICU   Name: Kirsty Em 74 y.o. female I MRN: [id number]  Unit/Bed#: ICU 06 I Date of Admission: 2/21/2025   Date of Service: 2/21/2025 I Hospital Day: 0   Inpatient consult to Medical Critical Care  Consult performed by: Troy Lane DO  Consult ordered by: Soto Quintana MD        Physician Requesting Evaluation: Soto Quintana MD   Reason for Evaluation / Principal Problem: \"Carotid stenosis, asymptomatic, bilateral\" \"Longstanding persistent atrial fibrillation\"        Assessment & Plan   Active Hospital Problems    Diagnosis Date Noted POA    Bilateral carotid artery stenosis 06/10/2024 Yes    Centrilobular emphysema (HCC) 01/06/2023 Yes    Atrial fibrillation (HCC) 01/17/2025 Yes    Benign neoplasm of meninges, unspecified (HCC) 01/09/2025 Yes    Hypertension 06/11/2024 Yes    History of stroke 06/08/2024 Not Applicable    Stage 3a chronic kidney disease (HCC) 01/06/2023 Yes      Resolved Hospital Problems   No resolved problems to display.     Neuro/Psych:  Diagnoses: S/p R ICA angioplasty and stenting, L MCA stroke (6/2024), bilateral carotid stenosis, seizures, anxiety, depression  Plan:  Eliquis 5 mg BID  Plavix 75 mg daily  Neuro checks q1h  Continue home Keppra 750 mg every 12 hours  Continue home Seroquel 100 mg BID  Continue home B12 1000 mcg daily  Continue home gabapentin 100 mg TID  Holding home xanax 2 mg TID PRN  Analgesia: Multimodal.     CV:  Diagnoses: Atrial fibrillation, hypertension, hyperlipidemia, postoperative hypotension and bradycardia  Plan:  Will obtain EKG  Normal saline boluses as needed  Levophed gtt  Holding home diltiazem 120 mg qd  Eliquis 5 mg twice daily  Continue home Zetia 10 mg daily  SBP <160, MAP >65  Continuous cardiopulmonary monitoring    Pulm:  Diagnoses: Centrilobular emphysema, tobacco dependence  Plan:  Incentive spirometry  Continue home Flonase   Supplemental O2 as needed for comfort  Continuous pulse oximetry. Maintain O2 sat >92%. "     GI:  Diagnoses: No active issues  Last BM: 2/20    :  Diagnoses: CKD 3a, urinary retention  Plan:  Urinary retention protocol  Monitor I/Os.    F/E/N:  Plan:   F: Fluid resuscitation prn.  E: Monitor and replete electrolytes for Mg >2, Phos >3, K >4.  N: Regular diet    Heme/Onc:   Diagnoses: Anemia  Plan:  Monitor hgb  Check B12  Transfuse if <7.0  VTE prophylaxis: Eliquis 5 mg BID    Endo:  Diagnoses: No active issues  Plan:   Monitor blood glucose.    ID:  Diagnoses: No active issues  Temperature: Afebrile  Plan:  Monitor fever curve and WBC.    MSK/Skin:  Diagnoses: No active issues  Plan:  Monitor puncture site  PT/OT when appropriate. Encourage OOB and ambulation when appropriate. Local wound care prn.    LDAs:  Lines - Left radial a-line, left brachial PICC  Drains - None  Airways - Room air      Disposition: Critical care    History of Present Illness   Kirsty Em is a 74 y.o. with PMHx of L MCA stroke (6/2024), atrial fibrillation on Eliquis, bilateral carotid stenosis, HTN, HLD, CKD 3a, anxiety, depression, centrilobular emphysema, tobacco dependence, prediabetes, and seizures who presents for right ICA angioplasty and stenting.  Patient underwent procedure without intraoperative complications.  Postoperatively patient became hypotensive, received 250 mL 5% albumin and was started on Levophed.  She then became bradycardic in the 30s and was given 0.2 mg of glycopyrrolate which increased her heart rate to 60s.  Reportedly patient had similar hemodynamic issues after her right coronary artery stenting 2 weeks ago.  Patient seen and examined at the bedside.  Patient remains hypotensive and bradycardic, she denies chest pain, abdominal pain, shortness of breath, lightheadedness, dizziness, weakness, numbness, headache, and vision changes.     Patient has a history of a left MCA stroke that occurred in June 2024 that was suspected to be secondary to newly diagnosed atrial fibrillation during that  hospitalization but also has bilateral carotid stenosis.  Patient follows with outpatient neurology and neurosurgery. She had a left coronary artery stent placed 2 weeks ago.     History obtained from chart review and the patient.  Review of Systems: See HPI for Review of Systems    Historical Information   Past Medical History:  No date: Anxiety  No date: Atrial fibrillation (HCC)  No date: Depression  04/20/2021: Depression, recurrent (HCC)  04/20/2021: Hallucinations  04/20/2021: History of seizure disorder Past Surgical History:  1/30/2025: IR CEREBRAL ANGIOGRAPHY / INTERVENTION  6/27/2024: ORIF FINGER FRACTURE; Right      Comment:  Procedure: ORIF OF RIGHT THUMB;  Surgeon: Dagoberto Yates MD;  Location: [facility];  Service:                Orthopedics   Current Outpatient Medications   Medication Instructions    acetaminophen (TYLENOL) 975 mg, Every 8 hours PRN    ALPRAZolam (XANAX) 2 mg, Oral, 3 times daily PRN    apixaban (ELIQUIS) 5 mg, Oral, 2 times daily    clopidogrel (PLAVIX) 75 mg, Oral, Daily, Begin 7 days prior to procedure    diltiazem (CARDIZEM CD) 120 mg, Oral, Daily    ezetimibe (ZETIA) 10 mg, Oral, Daily    fluticasone (FLONASE) 50 mcg/act nasal spray PLACE 2 SPRAYS IN EACH NOSTRIL DAILY    gabapentin (NEURONTIN) 100 mg, Oral, 3 times daily    Icosapent Ethyl 2 g, Oral, 2 times daily    levETIRAcetam (KEPPRA) 750 mg, Oral, Every 12 hours scheduled    Multiple Vitamin (multivitamin) tablet 1 tablet, Daily    naloxone (NARCAN) 4 mg/0.1 mL nasal spray Administer 1 spray into a nostril. If no response after 2-3 minutes, give another dose in the other nostril using a new spray.    QUEtiapine (SEROQUEL) 100 mg, Oral, Every 12 hours    rosuvastatin (CRESTOR) 10 mg, Oral, Daily    vitamin B-12 (VITAMIN B-12) 1,000 mcg tablet Daily    Allergies   Allergen Reactions    Lipitor [Atorvastatin] Other (See Comments)     Muscle aches      Social History     Tobacco Use    Smoking status: Every  Day     Current packs/day: 0.00     Average packs/day: 0.5 packs/day for 54.3 years (27.2 ttl pk-yrs)     Types: Cigarettes     Start date:      Last attempt to quit: 2024     Years since quittin.8    Smokeless tobacco: Never   Vaping Use    Vaping status: Never Used   Substance Use Topics    Alcohol use: Never    Drug use: Never    Family History   Problem Relation Age of Onset    Diabetes Mother     Anxiety disorder Mother     Stroke Father     No Known Problems Sister     No Known Problems Sister     No Known Problems Sister     No Known Problems Daughter     No Known Problems Daughter     No Known Problems Daughter     No Known Problems Maternal Grandmother     No Known Problems Paternal Grandmother     No Known Problems Maternal Aunt     No Known Problems Maternal Aunt     No Known Problems Maternal Aunt     No Known Problems Maternal Aunt     No Known Problems Maternal Aunt     No Known Problems Maternal Aunt     No Known Problems Maternal Aunt     No Known Problems Maternal Aunt     No Known Problems Paternal Aunt     Breast cancer Neg Hx           Objective :                   Vitals I/O      Most Recent Min/Max in 24hrs   Temp (!) 95.6 °F (35.3 °C) Temp  Min: 95.6 °F (35.3 °C)  Max: 98.5 °F (36.9 °C)   Pulse (!) 52 Pulse  Min: 43  Max: 86   Resp 20 Resp  Min: 14  Max: 20   BP (!) 88/55 BP  Min: 66/44  Max: 132/72   O2 Sat 95 % SpO2  Min: 91 %  Max: 98 %      Intake/Output Summary (Last 24 hours) at 2025 1358  Last data filed at 2025 1100  Gross per 24 hour   Intake 500 ml   Output --   Net 500 ml       Diet NPO; Sips with meds    Invasive Monitoring   Arterial Line  [place] /44  Arterial Line BP  Min: 78/38  Max: 126/58   MAP 68 mmHg  Arterial Line MAP (mmHg)  Min: 54 mmHg  Max: 84 mmHg           Physical Exam   Physical Exam  Vitals and nursing note reviewed.   Eyes:      General: No scleral icterus.        Right eye: No discharge.         Left eye: No discharge.      Extraocular  Movements: Extraocular movements intact.      Pupils: Pupils are equal, round, and reactive to light.   Skin:     General: Skin is warm and dry.   HENT:      Head: Normocephalic and atraumatic.      Right Ear: Hearing normal.      Left Ear: Hearing normal.      Nose: No congestion or epistaxis.      Mouth/Throat:      Mouth: Mucous membranes are moist.      Pharynx: No oropharyngeal exudate.   Cardiovascular:      Rate and Rhythm: Regular rhythm. Bradycardia present.      Heart sounds: Normal heart sounds.   Abdominal: General: Bowel sounds are normal.      Palpations: Abdomen is soft.      Tenderness: There is no abdominal tenderness.   Constitutional:       General: She is not in acute distress.     Appearance: She is not toxic-appearing.      Interventions: She is not sedated, not intubated and not restrained.  Pulmonary:      Effort: Pulmonary effort is normal. She is not intubated.      Breath sounds: Normal breath sounds.   Psychiatric:         Speech: Speech is not no receptive aphasia or no expressive aphasia.   Neurological:      General: No focal deficit present.      Mental Status: She is alert. She is calm.      Cranial Nerves: No dysarthria or facial asymmetry.      Sensory: Sensation is intact.      Motor: gross motor function is at baseline for patient. No motor deficit.      Comments:   Disoriented to month and year  Oriented to place          Diagnostic Studies        Lab Results: I have reviewed the following results:     Medications:  Scheduled PRN   [START ON 2/22/2025] apixaban, 5 mg, BID  [START ON 2/22/2025] clopidogrel, 75 mg, Daily      labetalol, 5 mg, Q15 Min PRN   And  hydrALAZINE, 15 mg, Q15 Min PRN   And  niCARdipine, 1-15 mg/hr, Continuous PRN  sodium chloride, 500 mL, Once PRN   Followed by  [START ON 2/22/2025] phenylephine,  mcg/min, Continuous PRN       Continuous    niCARdipine, 1-15 mg/hr  norepinephrine (LEVOPHED) 4 mg (STANDARD CONCENTRATION) IV in sodium chloride 0.9%  250 mL, 1-30 mcg/min, Last Rate: 4 mcg/min (02/21/25 1325)  [START ON 2/22/2025] phenylephine,  mcg/min  sodium chloride, 75 mL/hr, Last Rate: Stopped (02/21/25 1100)         Labs:   CBC    No recent results  BMP    No recent results    Coags    No recent results     Additional Electrolytes  No recent results       Blood Gas    No recent results  No recent results LFTs  No recent results    Infectious  No recent results  Glucose  No recent results

## 2025-02-21 NOTE — DISCHARGE INSTRUCTIONS
?  The following instructions will help you care for yourself, or be cared for upon your return home today. These are guidelines for your care right after your surgery only.   ?  Notify Your Doctor or Nurse if you have any of the following:  ?  SYMPTOMS OF WOUND INFECTION--   Increased pain in or around the incision   Swelling around the incision  Any drainage from the incision  Incision separates or opens up  Warmth in the tissues around the incision  Redness or tenderness on the skin near the incision   Fever (temperature greater than 101 degrees F)   ?  NEUROLOGICAL CHANGES--  Change in alertness  Increased sleepiness   Nausea and vomiting   New onset of numbness or weakness in arms or legs   New problems with your bowels or bladder  New or worse problems with balance or walking  Seizures, new or worsening  ?  UNRELIEVED HEADACHE PAIN--  New or increased pain unrelieved with pain medications   Pain associated with nausea and vomiting   Pain associated with other symptoms  ?  QUESTIONS OR PROBLEMS--  Any questions or problems that you are unsure about  Wound Care:  Keep Incision Clean and Dry   You may shower daily, but do not soak incision. Pat dry after showering.   No tub baths, soaking, swimming for 1 week after angiogram.   You do not need to cover the incision. Mild to moderate bruising and tenderness to the site is expected and may last up to 1-2 weeks after your procedure.   ?  A closure device was placed at the catheter insertion site. This is MRI compatible. Remove the dressing 24 hours after your procedure.   If your groin site is bleeding, apply firm pressure for 10 minutes. Reinforce dressing rather than removing and checking frequently. If continues to bleed through the dressing after 1 hour, contact your neurosurgeon's office.   Anticipatory Education:  ?  PAIN MED W/ Acetaminophen (Tylenol)  --IF a prescription for pain medicine has been sent home with you:  --Narcotic pain medication may cause  constipation. Be sure to take stool softeners or laxatives while you are on narcotic pain medication.   --Do not drive after taking prescription pain medicine.   ?  If this medicine is too strong, or no longer necessary, or we did NOT recommend/prescribe oral narcotics, you may take:   - Tylenol Extra-strength/Acetaminophen, 2 tablets every 4-6 hours as needed for mild pain. DO NOT TAKE MORE THAN 4000MG PER DAY from combined sources. NOTE: Remember to eat when taking pain medicines in order to avoid nausea. Watch for constipation. Eat plenty of fruits, vegetables, juices, and drink 6-8 glasses of water each day.   Constipation: Stay active and drink at least 6-8 cups of fluid each day to prevent constipation. If you need a laxative or stool softener follow the package directions or consult with your local pharmacists if you have questions.  ?  After anesthesia, rest for 24 hours. Do not drive, drink alcohol beverages or make any important decisions during this time. General anesthesia may cause sore throat, jaw discomfort or muscle aches. These symptoms can last for one or two days.  Activity: Please follow these instructions:  Advance your activity as you can tolerate. You may do light house work; nothing strenuous   You may walk all you want. You may go up and down the steps. Use the railing for support  Do not do excessive bending, straining or heavy lifting for 48 hours after your procedure  Do not drive or return to work until you are instructed   It is normal for your energy level and sleep patterns to change after surgery.   Get extra sleep at night and take naps during the day to help you feel less tired.   Take rest periods during the day.   Complete recovery may take several weeks.  ?  You may resume driving after 24-48 hours recovery.   You may return to work after 48 hours of recovery.   ?  Diet:  Your doctor has recommended that you follow these diet instructions at home. Refer to the patient education  materials you received during your hospital stay. If you would like more nutrition counseling, ask your doctor about making an appointment with an outpatient dietitian.  Resume your home diet  ?  Medications:  Please resume your home medications as instructed.   ?  Home Supplies and Equipment:  none  Additional Contacts:  ?  CONTACTS FOR NEUROSURGERY: You may call your neurosurgeon’s office if you have questions between 8:30 am and 4:30 pm. You may request to speak to the nurse practitioner who is available Monday through Friday.   ?  For off hours or the weekend you may call your neurosurgeon's office to leave a message.

## 2025-02-21 NOTE — ANESTHESIA PREPROCEDURE EVALUATION
Procedure:  IR CEREBRAL ANGIOGRAPHY / INTERVENTION    Relevant Problems   ANESTHESIA (within normal limits)   (-) History of anesthesia complications      CARDIO   (+) Atrial fibrillation (HCC)   (+) Bilateral carotid artery stenosis   (+) Hypertension   (+) Mixed hyperlipidemia      ENDO (within normal limits)      GI/HEPATIC (within normal limits)      /RENAL   (+) Stage 3a chronic kidney disease (HCC)      HEMATOLOGY (within normal limits)      NEURO/PSYCH   (+) Anxiety disorder   (+) Depression   (+) Episode of recurrent major depressive disorder, unspecified depression episode severity (HCC)   (+) Seizure disorder (HCC)      PULMONARY   (+) Centrilobular emphysema (HCC)      Nervous and Auditory   (+) [place] (hard of hearing)      Behavioral Health   (+) Tobacco dependence      Other   (+) Diastolic dysfunction   (+) Pre-diabetes        Physical Exam    Airway    Mallampati score: II  TM Distance: >3 FB  Neck ROM: full     Dental    upper dentures    Cardiovascular  Rhythm: regular, Rate: normal    Pulmonary  Pulmonary exam normal Breath sounds clear to auscultation    Other Findings  post-pubertal.      Anesthesia Plan  ASA Score- 3     Anesthesia Type- IV sedation with anesthesia with ASA Monitors.         Additional Monitors: arterial line.    Airway Plan:            Plan Factors-    Chart reviewed. EKG reviewed.  Existing labs reviewed. Patient summary reviewed.    Patient is not a current smoker.  Patient did not smoke on day of surgery.            Induction- intravenous.    Postoperative Plan-     Perioperative Resuscitation Plan - Level 1 - Full Code.       Informed Consent- Anesthetic plan and risks discussed with patient and spouse.  I personally reviewed this patient with the CRNA. Discussed and agreed on the Anesthesia Plan with the CRNA..      NPO Status:  Vitals Value Taken Time   Date of last liquid 02/20/25 02/21/25 0821   Time of last liquid 1900 02/21/25 0821   Date of last solid 02/20/25  02/21/25 0821   Time of last solid 1900 02/21/25 0821       Echo (6/9/24):      Left Ventricle: Left ventricular cavity size is normal. Wall thickness is normal. The left ventricular ejection fraction is 55%. Systolic function is normal. Wall motion is normal. Diastolic function is moderately abnormal, consistent with grade II (pseudonormal) relaxation.    Right Ventricle: Right ventricular cavity size is normal. Systolic function is normal.    Mitral Valve: There is mild to moderate regurgitation.    Tricuspid Valve: There is mild regurgitation.    Pulmonary Artery: The estimated pulmonary artery systolic pressure is 45.0 mmHg. The pulmonary artery systolic pressure is mildly increased.      NPO and allergies verified.    Plan:  MAC/sedation, arterial line    Benefits and risks of sedation were discussed with the patient including possibility of recall under sedation and the potential for conversion to general anesthesia if necessary.  All questions were answered.  Anesthesia consent was obtained from the patient.

## 2025-02-21 NOTE — PROCEDURES
Venous Access Line Insertion    Date/Time: 2/21/2025 9:29 AM    Performed by: Nani Mcgarry  Authorized by: Soto Quintana MD    Patient location:  IR  Consent:     Consent obtained:  Written    Consent given by:  Patient    Risks discussed:  Arterial puncture, incorrect placement, nerve damage, infection and bleeding    Alternatives discussed:  No treatment, delayed treatment, alternative treatment and observation  Universal protocol:     Procedure explained and questions answered to patient or proxy's satisfaction: yes      Immediately prior to procedure, a time out was called: yes      Relevant documents present and verified: yes      Test results available and properly labeled: yes      Radiology Images displayed and confirmed.  If images not available, report reviewed: yes      Required blood products, implants, devices, and special equipment available: yes      Site/side marked: yes      Patient identity confirmed:  Verbally with patient and arm band  Pre-procedure details:     Hand hygiene: Hand hygiene performed prior to insertion      Sterile barrier technique: All elements of maximal sterile technique followed      Skin preparation:  ChloraPrep    Skin preparation agent: Skin preparation agent completely dried prior to procedure    Procedure details:     Complex Venous Access Line Type: PICC      Complex Venous Access Line Indications: no peripheral vascular access      Orientation:  Left    Location:  Brachial    Procedural supplies:  Double lumen    Catheter size:  5 Fr    Total catheter length (cm):  41    Catheter out on skin (cm):  0    Max flow rate:  999ml/hr    Arm circumference:  26    Patient evaluated for contraindications to access (i.e. fistula, thrombosis, etc): Yes      Approach: percutaneous technique used      Patient position:  Flat    Ultrasound image availability:  Still images obtained    Sterile ultrasound techniques: Sterile gel and sterile probe covers were used      Number of  attempts:  1    Successful placement: yes      Landmarks identified: yes    Anesthesia (see MAR for exact dosages):     Anesthesia method:  Local infiltration    Local anesthetic:  Lidocaine 1% w/o epi  Post-procedure details:     Post-procedure:  Dressing applied and securement device placed    Assessment:  Blood return through all ports, placement verified by x-ray and free fluid flow    Post-procedure complications: none      Patient tolerance of procedure:  Tolerated well, no immediate complications

## 2025-02-21 NOTE — H&P
"Patient seen and examined independently.  Clinic note from 2/12/25 remains current.  Patient is not on any new medications and has not had any new medical problems.  Patient remains on dapt, eliquis held.  /72 (BP Location: Right arm)   Pulse 86   Temp 98.5 °F (36.9 °C) (Oral)   Resp 18   Ht 5' 3\" (1.6 m)   SpO2 91%   BMI 21.43 kg/m²  On exam, patient is neurologically intact.  Heart rate is regular.  Breath sounds are clear.  Plan to proceed with diagnostic cerebral arteriogram for left PASTOR.    "

## 2025-02-21 NOTE — RESPIRATORY THERAPY NOTE
RT Protocol Note  Kirsty Em 74 y.o. female MRN: [id number]  Unit/Bed#: ICU 06 Encounter: [id number]    Assessment    Principal Problem:    Bilateral carotid artery stenosis  Active Problems:    Centrilobular emphysema (HCC)    Stage 3a chronic kidney disease (HCC)    History of stroke    Hypertension    Benign neoplasm of meninges, unspecified (HCC)    Atrial fibrillation (HCC)      Home Pulmonary Medications:    Home Devices/Therapy: Other (Comment)    Past Medical History:   Diagnosis Date    Anxiety     Atrial fibrillation (HCC)     Depression     Depression, recurrent (HCC) 2021    Hallucinations 2021    History of seizure disorder 2021     Social History     Socioeconomic History    Marital status: /Civil Union     Spouse name: None    Number of children: None    Years of education: None    Highest education level: None   Occupational History    None   Tobacco Use    Smoking status: Every Day     Current packs/day: 0.00     Average packs/day: 0.5 packs/day for 54.3 years (27.2 ttl pk-yrs)     Types: Cigarettes     Start date:      Last attempt to quit: 2024     Years since quittin.8    Smokeless tobacco: Never   Vaping Use    Vaping status: Never Used   Substance and Sexual Activity    Alcohol use: Never    Drug use: Never    Sexual activity: Yes     Partners: Male     Birth control/protection: None   Other Topics Concern    None   Social History Narrative    None     Social Drivers of Health     Financial Resource Strain: Medium Risk (2024)    Overall Financial Resource Strain (CARDIA)     Difficulty of Paying Living Expenses: Somewhat hard   Food Insecurity: No Food Insecurity (2025)    Nursing - Inadequate Food Risk Classification     Worried About Running Out of Food in the Last Year: Never true     Ran Out of Food in the Last Year: Never true     Ran Out of Food in the Last Year: Never true   Transportation Needs: No Transportation Needs (2025)    Nursing -  "Transportation Risk Classification     Lack of Transportation: Not on file     Lack of Transportation: No   Physical Activity: Not on file   Stress: Not on file   Social Connections: Not on file   Intimate Partner Violence: Unknown (2025)    Nursing IPS     Feels Physically and Emotionally Safe: Not on file     Physically Hurt by Someone: Not on file     Humiliated or Emotionally Abused by Someone: Not on file     Physically Hurt by Someone: No     Hurt or Threatened by Someone: No   Housing Stability: Unknown (2025)    Nursing: Inadequate Housing Risk Classification     Has Housing: Not on file     Worried About Losing Housing: Not on file     Unable to Get Utilities: Not on file     Unable to Pay for Housing in the Last Year: No     Has Housin       Subjective         Objective    Physical Exam:   Assessment Type: Assess only  General Appearance: Awake, Alert  Respiratory Pattern: Normal  Chest Assessment: Chest expansion symmetrical  Bilateral Breath Sounds: Clear    Vitals:  Blood pressure 105/62, pulse (!) 50, temperature 97.6 °F (36.4 °C), temperature source Oral, resp. rate 17, height 5' 3\" (1.6 m), weight 57.5 kg (126 lb 12.2 oz), SpO2 95%.          Imaging and other studies: Results Review Statement: I reviewed radiology reports from this admission including: xray(s).          Plan    Respiratory Plan: Discontinue Protocol        Resp Comments: (P) Pt admitted for carotid stenosis, asymptomatic, bilaterol.  Pt assess per respiratory protocol at this time.  Per chart, Pt has no history of pulmonary disease nor takes any bronchodilators at home. Pt is resting on RA with good SP02 at 97, VS WNL, will d/c respiratory protocol.   "

## 2025-02-21 NOTE — SEDATION DOCUMENTATION
Cerebral angiogram/carotid stenting completed in IR under anesthesia care by Dr CAROLINE Quintana.  Bedrest start time 1115.  Awaiting transfer to ICU-6 or PACU.  To monitor in IR holding until bed/staff available to receive patient.  Dr Quintana aware.   at bedside.

## 2025-02-21 NOTE — ANESTHESIA POSTPROCEDURE EVALUATION
Post-Op Assessment Note    CV Status:  Stable    Pain management: adequate       Mental Status:  Alert and awake   Hydration Status:  Euvolemic   PONV Controlled:  Controlled   Airway Patency:  Patent     Post Op Vitals Reviewed: Yes    No anethesia notable event occurred.    Staff: CRNA           Last Filed PACU Vitals:  Vitals Value Taken Time   Temp     Pulse 68    /61    Resp     SpO2 98 RA               
Post-Op Assessment Note    CV Status:  Stable    Pain management: adequate       Mental Status:  Alert and awake   Hydration Status:  Euvolemic and stable   PONV Controlled:  None   Airway Patency:  Patent and adequate  Two or more mitigation strategies used for obstructive sleep apnea   Post Op Vitals Reviewed: Yes    No anethesia notable event occurred.    Staff: Anesthesiologist, with CRNAs           Last Filed PACU Vitals:  Vitals Value Taken Time   Temp     Pulse 71 02/21/25 12:45   BP 98/63 02/21/25 12:45   Resp 19 02/21/25 12:45   SpO2 98 % 02/21/25 12:45   Vitals shown include unfiled device data.    Modified Jeffrey:     Vitals Value Taken Time   Activity 2 02/21/25 1245   Respiration 2 02/21/25 1245   Circulation 2 02/21/25 1245   Consciousness 2 02/21/25 1245   Oxygen Saturation 1 02/21/25 1245     Modified Jeffrey Score: 9            
Ambulatory

## 2025-02-22 ENCOUNTER — TELEPHONE (OUTPATIENT)
Dept: OTHER | Facility: OTHER | Age: 75
End: 2025-02-22

## 2025-02-22 ENCOUNTER — APPOINTMENT (INPATIENT)
Dept: RADIOLOGY | Facility: HOSPITAL | Age: 75
DRG: 034 | End: 2025-02-22
Payer: MEDICARE

## 2025-02-22 LAB
ANION GAP SERPL CALCULATED.3IONS-SCNC: 6 MMOL/L (ref 4–13)
APTT PPP: 36 SECONDS (ref 23–34)
BACTERIA UR QL AUTO: ABNORMAL /HPF
BILIRUB UR QL STRIP: NEGATIVE
BUN SERPL-MCNC: 13 MG/DL (ref 5–25)
CALCIUM SERPL-MCNC: 8.1 MG/DL (ref 8.4–10.2)
CHLORIDE SERPL-SCNC: 111 MMOL/L (ref 96–108)
CLARITY UR: CLEAR
CO2 SERPL-SCNC: 24 MMOL/L (ref 21–32)
COLOR UR: ABNORMAL
CREAT SERPL-MCNC: 0.55 MG/DL (ref 0.6–1.3)
ERYTHROCYTE [DISTWIDTH] IN BLOOD BY AUTOMATED COUNT: 14.8 % (ref 11.6–15.1)
FLUAV RNA RESP QL NAA+PROBE: NEGATIVE
FLUBV RNA RESP QL NAA+PROBE: NEGATIVE
GFR SERPL CREATININE-BSD FRML MDRD: 94 ML/MIN/1.73SQ M
GLUCOSE SERPL-MCNC: 124 MG/DL (ref 65–140)
GLUCOSE UR STRIP-MCNC: NEGATIVE MG/DL
HCT VFR BLD AUTO: 27.2 % (ref 34.8–46.1)
HGB BLD-MCNC: 8.5 G/DL (ref 11.5–15.4)
HGB UR QL STRIP.AUTO: NEGATIVE
INR PPP: 1.13 (ref 0.85–1.19)
KETONES UR STRIP-MCNC: NEGATIVE MG/DL
LEUKOCYTE ESTERASE UR QL STRIP: ABNORMAL
MCH RBC QN AUTO: 32 PG (ref 26.8–34.3)
MCHC RBC AUTO-ENTMCNC: 31.3 G/DL (ref 31.4–37.4)
MCV RBC AUTO: 102 FL (ref 82–98)
NITRITE UR QL STRIP: NEGATIVE
NON-SQ EPI CELLS URNS QL MICRO: ABNORMAL /HPF
PH UR STRIP.AUTO: 7 [PH]
PLATELET # BLD AUTO: 278 THOUSANDS/UL (ref 149–390)
PMV BLD AUTO: 10.1 FL (ref 8.9–12.7)
POTASSIUM SERPL-SCNC: 4.4 MMOL/L (ref 3.5–5.3)
PROT UR STRIP-MCNC: NEGATIVE MG/DL
PROTHROMBIN TIME: 14.8 SECONDS (ref 12.3–15)
RBC # BLD AUTO: 2.66 MILLION/UL (ref 3.81–5.12)
RBC #/AREA URNS AUTO: ABNORMAL /HPF
RSV RNA RESP QL NAA+PROBE: NEGATIVE
SARS-COV-2 RNA RESP QL NAA+PROBE: NEGATIVE
SODIUM SERPL-SCNC: 141 MMOL/L (ref 135–147)
SP GR UR STRIP.AUTO: 1.01 (ref 1–1.03)
UROBILINOGEN UR STRIP-ACNC: <2 MG/DL
WBC # BLD AUTO: 10.96 THOUSAND/UL (ref 4.31–10.16)
WBC #/AREA URNS AUTO: ABNORMAL /HPF

## 2025-02-22 PROCEDURE — 81001 URINALYSIS AUTO W/SCOPE: CPT | Performed by: PSYCHIATRY & NEUROLOGY

## 2025-02-22 PROCEDURE — 99024 POSTOP FOLLOW-UP VISIT: CPT | Performed by: NEUROLOGICAL SURGERY

## 2025-02-22 PROCEDURE — 85610 PROTHROMBIN TIME: CPT | Performed by: NEUROLOGICAL SURGERY

## 2025-02-22 PROCEDURE — 87040 BLOOD CULTURE FOR BACTERIA: CPT

## 2025-02-22 PROCEDURE — 71045 X-RAY EXAM CHEST 1 VIEW: CPT

## 2025-02-22 PROCEDURE — 85730 THROMBOPLASTIN TIME PARTIAL: CPT | Performed by: NEUROLOGICAL SURGERY

## 2025-02-22 PROCEDURE — 99291 CRITICAL CARE FIRST HOUR: CPT | Performed by: PSYCHIATRY & NEUROLOGY

## 2025-02-22 PROCEDURE — 80048 BASIC METABOLIC PNL TOTAL CA: CPT | Performed by: NEUROLOGICAL SURGERY

## 2025-02-22 PROCEDURE — 87147 CULTURE TYPE IMMUNOLOGIC: CPT | Performed by: PSYCHIATRY & NEUROLOGY

## 2025-02-22 PROCEDURE — 0241U HB NFCT DS VIR RESP RNA 4 TRGT: CPT

## 2025-02-22 PROCEDURE — 87186 SC STD MICRODIL/AGAR DIL: CPT | Performed by: PSYCHIATRY & NEUROLOGY

## 2025-02-22 PROCEDURE — 85027 COMPLETE CBC AUTOMATED: CPT | Performed by: NEUROLOGICAL SURGERY

## 2025-02-22 PROCEDURE — 87086 URINE CULTURE/COLONY COUNT: CPT | Performed by: PSYCHIATRY & NEUROLOGY

## 2025-02-22 PROCEDURE — 87077 CULTURE AEROBIC IDENTIFY: CPT | Performed by: PSYCHIATRY & NEUROLOGY

## 2025-02-22 RX ORDER — ALBUMIN HUMAN 50 G/1000ML
12.5 SOLUTION INTRAVENOUS ONCE
Status: COMPLETED | OUTPATIENT
Start: 2025-02-22 | End: 2025-02-22

## 2025-02-22 RX ORDER — NOREPINEPHRINE BITARTRATE 1 MG/ML
INJECTION, SOLUTION INTRAVENOUS
Status: DISPENSED
Start: 2025-02-22 | End: 2025-02-23

## 2025-02-22 RX ADMIN — GABAPENTIN 100 MG: 100 CAPSULE ORAL at 21:53

## 2025-02-22 RX ADMIN — LEVETIRACETAM 750 MG: 750 TABLET, FILM COATED ORAL at 21:53

## 2025-02-22 RX ADMIN — GABAPENTIN 100 MG: 100 CAPSULE ORAL at 15:57

## 2025-02-22 RX ADMIN — ACETAMINOPHEN 650 MG: 325 TABLET, FILM COATED ORAL at 03:54

## 2025-02-22 RX ADMIN — APIXABAN 5 MG: 5 TABLET, FILM COATED ORAL at 08:19

## 2025-02-22 RX ADMIN — NOREPINEPHRINE BITARTRATE 12 MCG/MIN: 1 INJECTION, SOLUTION, CONCENTRATE INTRAVENOUS at 03:00

## 2025-02-22 RX ADMIN — APIXABAN 5 MG: 5 TABLET, FILM COATED ORAL at 21:53

## 2025-02-22 RX ADMIN — QUETIAPINE FUMARATE 100 MG: 100 TABLET ORAL at 21:52

## 2025-02-22 RX ADMIN — QUETIAPINE FUMARATE 100 MG: 100 TABLET ORAL at 10:46

## 2025-02-22 RX ADMIN — ACETAMINOPHEN 650 MG: 325 TABLET, FILM COATED ORAL at 14:14

## 2025-02-22 RX ADMIN — ACETAMINOPHEN 650 MG: 325 TABLET, FILM COATED ORAL at 21:52

## 2025-02-22 RX ADMIN — CEFTRIAXONE SODIUM 1000 MG: 10 INJECTION, POWDER, FOR SOLUTION INTRAVENOUS at 18:25

## 2025-02-22 RX ADMIN — GABAPENTIN 100 MG: 100 CAPSULE ORAL at 08:18

## 2025-02-22 RX ADMIN — FLUTICASONE PROPIONATE 1 SPRAY: 50 SPRAY, METERED NASAL at 08:20

## 2025-02-22 RX ADMIN — CHLORHEXIDINE GLUCONATE 15 ML: 1.2 SOLUTION ORAL at 21:53

## 2025-02-22 RX ADMIN — CLOPIDOGREL BISULFATE 75 MG: 75 TABLET, FILM COATED ORAL at 08:18

## 2025-02-22 RX ADMIN — NOREPINEPHRINE BITARTRATE 3 MCG/MIN: 1 INJECTION, SOLUTION, CONCENTRATE INTRAVENOUS at 19:13

## 2025-02-22 RX ADMIN — ALPRAZOLAM 1 MG: 0.5 TABLET ORAL at 21:52

## 2025-02-22 RX ADMIN — LEVETIRACETAM 750 MG: 750 TABLET, FILM COATED ORAL at 08:18

## 2025-02-22 RX ADMIN — ALBUMIN (HUMAN) 12.5 G: 12.5 INJECTION, SOLUTION INTRAVENOUS at 08:18

## 2025-02-22 RX ADMIN — EZETIMIBE 10 MG: 10 TABLET ORAL at 08:18

## 2025-02-22 RX ADMIN — CHLORHEXIDINE GLUCONATE 15 ML: 1.2 SOLUTION ORAL at 08:18

## 2025-02-22 RX ADMIN — NOREPINEPHRINE BITARTRATE: 1 INJECTION INTRAVENOUS at 03:51

## 2025-02-22 RX ADMIN — CYANOCOBALAMIN TAB 500 MCG 1000 MCG: 500 TAB at 08:20

## 2025-02-22 NOTE — ASSESSMENT & PLAN NOTE
PPD#1 L PASTOR with Dr. Quintana (2/21)  In setting of bilateral carotid artery stenosis.  S/p R PASTOR with Dr. Quintana on 1/31.    Plan:  Continue to monitor neuro exam closely.  STAT CTA head with decline in GCS > 2 pts in 1 hour.  Noted hypotensive and bradycardic, which occurred during last admission as well.  On levo @ 8 mcg/min  Continue Eliquis 5 mg BID and Plavix 75 mg daily.  Mobilize with PT/OT.  DVT ppx: SCDs.    Neurosurgery will continue to follow as primary. Patient is not medically cleared for discharge due to hypotension and hypoxia. Call with questions.

## 2025-02-22 NOTE — PROGRESS NOTES
Progress Note - Critical Care/ICU   Name: Kirsty Em 74 y.o. female I MRN: [id number]  Unit/Bed#: ICU 06 I Date of Admission: 2/21/2025   Date of Service: 2/22/2025 I Hospital Day: 1      Assessment & Plan   Active Hospital Problems    Diagnosis Date Noted POA    Bilateral carotid artery stenosis 06/10/2024 Yes    Centrilobular emphysema (HCC) 01/06/2023 Yes    Atrial fibrillation (HCC) 01/17/2025 Yes    Benign neoplasm of meninges, unspecified (HCC) 01/09/2025 Yes    Hypertension 06/11/2024 Yes    History of stroke 06/08/2024 Not Applicable    Stage 3a chronic kidney disease (HCC) 01/06/2023 Yes      Resolved Hospital Problems   No resolved problems to display.     Neuro/Psych:  Diagnoses: S/p R ICA angioplasty and stenting, L MCA stroke (6/2024), bilateral carotid stenosis, seizures, anxiety, depression  Plan:  Eliquis 5 mg BID  Plavix 75 mg daily  Neuro checks q1h  Continue home Keppra 750 mg every 12 hours  Continue home Seroquel 100 mg BID  Continue home B12 1000 mcg daily  Continue home gabapentin 100 mg TID  Holding home xanax 2 mg TID PRN  Analgesia: Multimodal.      CV:  Diagnoses: Atrial fibrillation, hypertension, hyperlipidemia, postoperative hypotension and bradycardia  Plan:  IVF as needed for hypotension  Levophed gtt, wean as able  Holding home diltiazem 120 mg qd  Continue Eliquis 5 mg twice daily  Continue home Zetia 10 mg daily  SBP <160, MAP >65  Continuous cardiopulmonary monitoring     Pulm:  Diagnoses: Centrilobular emphysema, tobacco dependence  Plan:  Incentive spirometry  Continue home Flonase   Supplemental O2 as needed for comfort  Continuous pulse oximetry. Maintain O2 sat >92%.      GI:  Diagnoses: No active issues  Last BM: 2/20     :  Diagnoses: CKD 3a, urinary retention  Plan:  Urinary retention protocol  Monitor I/Os.     F/E/N:  Plan:   F: Fluid resuscitation prn.  E: Monitor and replete electrolytes for Mg >2, Phos >3, K >4.  N: Regular diet     Heme/Onc:   Diagnoses:  Anemia  Hgb stable, B12 2348  Plan:  Monitor hgb  Transfuse if <7.0  VTE prophylaxis: Eliquis 5 mg BID     Endo:  Diagnoses: No active issues  Plan:   Monitor blood glucose.     ID:  Diagnoses: No active issues  Temperature: Afebrile  Plan:  Monitor fever curve and WBC.     MSK/Skin:  Diagnoses: No active issues  Plan:  Monitor puncture site  PT/OT when appropriate. Encourage OOB and ambulation when appropriate. Local wound care prn.     LDAs:  Lines - Left radial a-line, left brachial PICC  Drains - None  Airways - Room air    Disposition: Critical care    ICU Core Measures     A: Assess, Prevent, and Manage Pain Has pain been assessed? Yes  Need for changes to pain regimen? No   B: Both SAT/SAT  N/A   C: Choice of Sedation RASS Goal: N/A patient not on sedation  Need for changes to sedation or analgesia regimen? NA   D: Delirium CAM-ICU: Negative   E: Early Mobility  Plan for early mobility? Yes   F: Family Engagement Plan for family engagement today? Yes       Review of Invasive Devices:      Central access plan: Medications requiring central line  Radha Plan: Keep arterial line for hemodynamic monitoring    Prophylaxis:  VTE VTE covered by:  apixaban, Oral       Stress Ulcer  not ordered         24 Hour Events : Overnight patient required levophed drip increase to 12 and home xanax was ordered as 1 mg at bedtime PRN. Patient seen and evaluated at the bedside.  Patient resting comfortably in bed.  She denies chest pain, shortness of breath, abdominal pain, lightheadedness, dizziness, nausea, vomiting, headache, vision changes, weakness, numbness, and difficulty talking.    Subjective   Review of Systems: See HPI for Review of Systems    Objective :                   Vitals I/O      Most Recent Min/Max in 24hrs   Temp 97.6 °F (36.4 °C) Temp  Min: 95.6 °F (35.3 °C)  Max: 98.5 °F (36.9 °C)   Pulse (!) 48 Pulse  Min: 40  Max: 86   Resp 22 Resp  Min: 14  Max: 53   /58 BP  Min: 66/44  Max: 135/61   O2 Sat 100 %  SpO2  Min: 91 %  Max: 100 %      Intake/Output Summary (Last 24 hours) at 2/22/2025 0713  Last data filed at 2/22/2025 0554  Gross per 24 hour   Intake 3261.91 ml   Output 1000 ml   Net 2261.91 ml       Diet Regular; Regular House    Invasive Monitoring   Arterial Line  Radha /42  Arterial Line BP  Min: 78/38  Max: 134/44   MAP 68 mmHg  Arterial Line MAP (mmHg)  Min: 52 mmHg  Max: 84 mmHg           Physical Exam   Physical Exam  Vitals and nursing note reviewed.   Eyes:      General:         Right eye: No discharge.         Left eye: No discharge.      Extraocular Movements: Extraocular movements intact.      Pupils: Pupils are equal, round, and reactive to light.   Skin:     General: Skin is warm and dry.      Coloration: Skin is not jaundiced.   HENT:      Head: Normocephalic and atraumatic.      Right Ear: Hearing normal.      Left Ear: Hearing normal.      Nose: No congestion or epistaxis.      Mouth/Throat:      Mouth: Mucous membranes are moist.      Pharynx: No oropharyngeal exudate.   Cardiovascular:      Rate and Rhythm: Regular rhythm. Bradycardia present.      Heart sounds: Normal heart sounds.   Abdominal: General: Bowel sounds are normal.      Palpations: Abdomen is soft.      Tenderness: There is no abdominal tenderness.   Constitutional:       General: She is not in acute distress.     Appearance: She is not toxic-appearing.      Interventions: She is not sedated, not intubated and not restrained.  Pulmonary:      Effort: Pulmonary effort is normal. She is not intubated.      Breath sounds: Normal breath sounds.   Psychiatric:         Speech: Speech is not no receptive aphasia or no expressive aphasia.   Neurological:      General: No focal deficit present.      Mental Status: She is alert.      Cranial Nerves: No dysarthria or facial asymmetry.      Sensory: Sensation is intact.      Motor: gross motor function is at baseline for patient.      Comments:   Oriented to place and  month  Disoriented to year (2005)          Diagnostic Studies        Lab Results: I have reviewed the following results:     Medications:  Scheduled PRN   apixaban, 5 mg, BID  chlorhexidine, 15 mL, Q12H JERMAINE  clopidogrel, 75 mg, Daily  vitamin B-12, 1,000 mcg, Daily  ezetimibe, 10 mg, Daily  fluticasone, 1 spray, Daily  gabapentin, 100 mg, TID  levETIRAcetam, 750 mg, Q12H JERMAINE  QUEtiapine, 100 mg, Q12H      acetaminophen, 650 mg, Q6H PRN  ALPRAZolam, 1 mg, HS PRN  ondansetron, 4 mg, Q6H PRN       Continuous    norepinephrine (LEVOPHED) 4 mg (STANDARD CONCENTRATION) IV in sodium chloride 0.9% 250 mL, 1-30 mcg/min, Last Rate: 8 mcg/min (02/22/25 0554)         Labs:   CBC    Recent Labs     02/21/25  1524 02/22/25  0448   WBC 6.91 10.96*   HGB 8.7* 8.5*   HCT 27.9* 27.2*    278     BMP    Recent Labs     02/21/25  1524 02/22/25  0448   SODIUM 144 141   K 3.2* 4.4   * 111*   CO2 28 24   AGAP 6 6   BUN 13 13   CREATININE 0.50* 0.55*   CALCIUM 8.0* 8.1*       Coags    Recent Labs     02/22/25  0448   INR 1.13   PTT 36*        Additional Electrolytes  Recent Labs     02/21/25  1524   MG 1.7*   PHOS 4.0          Blood Gas    No recent results  No recent results LFTs  Recent Labs     02/21/25  1524   ALT 20   AST 21   ALKPHOS 60   ALB 3.7   TBILI 0.31       Infectious  No recent results  Glucose  Recent Labs     02/21/25  1524 02/22/25  0448   GLUC 145* 124

## 2025-02-22 NOTE — PROGRESS NOTES
Progress Note - Neurosurgery   Name: Kirsty Em 74 y.o. female I MRN: [id number]  Unit/Bed#: ICU 06 I Date of Admission: 2/21/2025   Date of Service: 2/22/2025 I Hospital Day: 1    Assessment & Plan  Bilateral carotid artery stenosis  PPD#1 L PASTOR with Dr. Quintana (2/21)  In setting of bilateral carotid artery stenosis.  S/p R PASTOR with Dr. Quintana on 1/31.    Plan:  Continue to monitor neuro exam closely.  STAT CTA head with decline in GCS > 2 pts in 1 hour.  Noted hypotensive and bradycardic, which occurred during last admission as well.  On levo @ 8 mcg/min  Continue Eliquis 5 mg BID and Plavix 75 mg daily.  Mobilize with PT/OT.  DVT ppx: SCDs.    Neurosurgery will continue to follow as primary. Patient is not medically cleared for discharge due to hypotension and hypoxia. Call with questions.      Centrilobular emphysema (HCC)  Smokes < 1 PPD x many years.  Requiring 4-5 L O2 via NC to maintain O2sat > 90.  Stage 3a chronic kidney disease (HCC)  Lab Results   Component Value Date    EGFR 94 02/22/2025    EGFR 97 02/21/2025    EGFR 97 02/01/2025    CREATININE 0.55 (L) 02/22/2025    CREATININE 0.50 (L) 02/21/2025    CREATININE 0.49 (L) 02/01/2025     History of stroke    Hypertension    Benign neoplasm of meninges, unspecified (HCC)    Atrial fibrillation (HCC)      Please contact the SecureChat role for the Neurosurgery service with any questions/concerns.    Subjective   Reports feeling well.  States she slept overnight.  Very appreciative of staff care.  Denies chest pain or SOB.    Objective :  Temp:  [95.6 °F (35.3 °C)-97.6 °F (36.4 °C)] 97.6 °F (36.4 °C)  HR:  [40-71] 48  BP: ()/(40-78) 119/58  Resp:  [14-53] 22  SpO2:  [94 %-100 %] 100 %  O2 Device: Nasal cannula  Nasal Cannula O2 Flow Rate (L/min):  [4 L/min] 4 L/min    I/O         02/20 0701 02/21 0700 02/21 0701 02/22 0700 02/22 0701 02/23 0700    P.O.  780     I.V. (mL/kg)  1081.9 (18.8)     IV Piggyback  1400     Total Intake(mL/kg)  3261.9  (56.7)     Urine (mL/kg/hr)  1000     Total Output  1000     Net  +2261.9                  Physical Exam  Vitals and nursing note reviewed.   Constitutional:       General: She is not in acute distress.     Appearance: She is well-developed.   HENT:      Head: Normocephalic and atraumatic.   Eyes:      General: Lids are normal.      Extraocular Movements: Extraocular movements intact.      Conjunctiva/sclera: Conjunctivae normal.      Pupils: Pupils are equal, round, and reactive to light.   Cardiovascular:      Heart sounds: No murmur heard.  Pulmonary:      Effort: Pulmonary effort is normal. No respiratory distress.   Abdominal:      Tenderness: There is no abdominal tenderness.   Musculoskeletal:         General: No swelling.      Cervical back: Neck supple.   Skin:     General: Skin is warm and dry.      Capillary Refill: Capillary refill takes less than 2 seconds.      Comments: R groin access site with minimal ecchymosis, no induration.  +DP/PT pulses.   Neurological:      Mental Status: She is alert.      Motor: Motor strength is normal.  Psychiatric:         Mood and Affect: Mood normal.      Neurological Exam  Mental Status  Alert. Oriented only to person and place.  Stated date was January 2005.    Cranial Nerves  CN II: Visual acuity is normal. Visual fields full to confrontation.  CN III, IV, VI: Extraocular movements intact bilaterally. Normal lids and orbits bilaterally. Pupils equal round and reactive to light bilaterally.  CN V: Facial sensation is normal.  CN VII: Full and symmetric facial movement.  CN VIII: Hearing is normal.  CN IX, X: Palate elevates symmetrically. Normal gag reflex.  CN XI: Shoulder shrug strength is normal.  CN XII: Tongue midline without atrophy or fasciculations.    Motor   Strength is 5/5 throughout all four extremities.        Lab Results: I have reviewed the following results:  Recent Labs     02/21/25  1524 02/22/25  0448   WBC 6.91 10.96*   HGB 8.7* 8.5*   HCT 27.9*  27.2*    278   SODIUM 144 141   K 3.2* 4.4   * 111*   CO2 28 24   BUN 13 13   CREATININE 0.50* 0.55*   GLUC 145* 124   MG 1.7*  --    PHOS 4.0  --    AST 21  --    ALT 20  --    ALB 3.7  --    TBILI 0.31  --    ALKPHOS 60  --    PTT  --  36*   INR  --  1.13       Imaging Results Review: No pertinent imaging studies reviewed.  Other Study Results Review: No additional pertinent studies reviewed.    VTE Pharmacologic Prophylaxis: VTE covered by:  apixaban, Oral    and Sequential compression device (Venodyne)

## 2025-02-22 NOTE — ASSESSMENT & PLAN NOTE
Lab Results   Component Value Date    EGFR 94 02/22/2025    EGFR 97 02/21/2025    EGFR 97 02/01/2025    CREATININE 0.55 (L) 02/22/2025    CREATININE 0.50 (L) 02/21/2025    CREATININE 0.49 (L) 02/01/2025

## 2025-02-22 NOTE — PLAN OF CARE
Problem: Communication Impairment  Goal: Ability to express needs and understand communication  Description: Assess patient's communication skills and ability to understand information.  Patient will demonstrate use of effective communication techniques, alternative methods of communication and understanding even if not able to speak.     - Encourage communication and provide alternate methods of communication as needed.  - Collaborate with case management/ for discharge needs.  - Include patient/family/caregiver in decisions related to communication.  Outcome: Progressing     Problem: Nutrition  Goal: Nutrition/Hydration status is improving  Description: Monitor and assess patient's nutrition/hydration status for malnutrition (ex- brittle hair, bruises, dry skin, pale skin and conjunctiva, muscle wasting, smooth red tongue, and disorientation). Collaborate with interdisciplinary team and initiate plan and interventions as ordered.  Monitor patient's weight and dietary intake as ordered or per policy. Utilize nutrition screening tool and intervene per policy. Determine patient's food preferences and provide high-protein, high-caloric foods as appropriate.     - Assist patient with eating.  - Allow adequate time for meals.  - Encourage patient to take dietary supplement as ordered.  - Collaborate with clinical nutritionist.  - Include patient/family/caregiver in decisions related to nutrition.  Outcome: Progressing     Problem: INFECTION - ADULT  Goal: Absence or prevention of progression during hospitalization  Description: INTERVENTIONS:  - Assess and monitor for signs and symptoms of infection  - Monitor lab/diagnostic results  - Monitor all insertion sites, i.e. indwelling lines, tubes, and drains  - Monitor endotracheal if appropriate and nasal secretions for changes in amount and color  - [place] appropriate cooling/warming therapies per order  - Administer medications as ordered  - Instruct  and encourage patient and family to use good hand hygiene technique  - Identify and instruct in appropriate isolation precautions for identified infection/condition  Outcome: Progressing  Goal: Absence of fever/infection during neutropenic period  Description: INTERVENTIONS:  - Monitor WBC    Outcome: Progressing     Problem: DISCHARGE PLANNING  Goal: Discharge to home or other facility with appropriate resources  Description: INTERVENTIONS:  - Identify barriers to discharge w/patient and caregiver  - Arrange for needed discharge resources and transportation as appropriate  - Identify discharge learning needs (meds, wound care, etc.)  - Arrange for interpretive services to assist at discharge as needed  - Refer to Case Management Department for coordinating discharge planning if the patient needs post-hospital services based on physician/advanced practitioner order or complex needs related to functional status, cognitive ability, or social support system  Outcome: Progressing     Problem: Knowledge Deficit  Goal: Patient/family/caregiver demonstrates understanding of disease process, treatment plan, medications, and discharge instructions  Description: Complete learning assessment and assess knowledge base.  Interventions:  - Provide teaching at level of understanding  - Provide teaching via preferred learning methods  Outcome: Progressing

## 2025-02-22 NOTE — PLAN OF CARE
Problem: Neurological Deficit  Goal: Neurological status is stable or improving  Description: Interventions:  - Monitor and assess patient's level of consciousness, motor function, sensory function, and level of assistance needed for ADLs.   - Monitor and report changes from baseline. Collaborate with interdisciplinary team to initiate plan and implement interventions as ordered.   - Provide and maintain a safe environment.  - Consider seizure precautions.  - Consider fall precautions.  - Consider aspiration precautions.  - Consider bleeding precautions.  Outcome: Progressing     Problem: Activity Intolerance/Impaired Mobility  Goal: Mobility/activity is maintained at optimum level for patient  Description: Interventions:  - Assess and monitor patient  barriers to mobility and need for assistive/adaptive devices.  - Assess patient's emotional response to limitations.  - Collaborate with interdisciplinary team and initiate plans and interventions as ordered.  - Encourage independent activity per ability.  - Maintain proper body alignment.  - Perform active/passive rom as tolerated/ordered.  - Plan activities to conserve energy.  - Turn patient as appropriate  Outcome: Progressing     Problem: Communication Impairment  Goal: Ability to express needs and understand communication  Description: Assess patient's communication skills and ability to understand information.  Patient will demonstrate use of effective communication techniques, alternative methods of communication and understanding even if not able to speak.     - Encourage communication and provide alternate methods of communication as needed.  - Collaborate with case management/ for discharge needs.  - Include patient/family/caregiver in decisions related to communication.  Outcome: Progressing     Problem: Potential for Aspiration  Goal: Non-ventilated patient's risk of aspiration is minimized  Description: Assess and monitor vital signs,  respiratory status, and labs (WBC).  Monitor for signs of aspiration (tachypnea, cough, rales, wheezing, cyanosis, fever).    - Assess and monitor patient's ability to swallow.  - Place patient up in chair to eat if possible.  - HOB up at 90 degrees to eat if unable to get patient up into chair.  - Supervise patient during oral intake.   - Instruct patient/ family to take small bites.  - Instruct patient/ family to take small single sips when taking liquids.  - Follow patient-specific strategies generated by speech pathologist.  Outcome: Progressing  Goal: Ventilated patient's risk of aspiration is minimized  Description: Assess and monitor vital signs, respiratory status, airway cuff pressure, and labs (WBC).  Monitor for signs of aspiration (tachypnea, cough, rales, wheezing, cyanosis, fever).    - Elevate head of bed 30 degrees if patient has tube feeding.  - Monitor tube feeding.  Outcome: Progressing     Problem: Nutrition  Goal: Nutrition/Hydration status is improving  Description: Monitor and assess patient's nutrition/hydration status for malnutrition (ex- brittle hair, bruises, dry skin, pale skin and conjunctiva, muscle wasting, smooth red tongue, and disorientation). Collaborate with interdisciplinary team and initiate plan and interventions as ordered.  Monitor patient's weight and dietary intake as ordered or per policy. Utilize nutrition screening tool and intervene per policy. Determine patient's food preferences and provide high-protein, high-caloric foods as appropriate.     - Assist patient with eating.  - Allow adequate time for meals.  - Encourage patient to take dietary supplement as ordered.  - Collaborate with clinical nutritionist.  - Include patient/family/caregiver in decisions related to nutrition.  Outcome: Progressing     Problem: PAIN - ADULT  Goal: Verbalizes/displays adequate comfort level or baseline comfort level  Description: Interventions:  - Encourage patient to monitor pain and  request assistance  - Assess pain using appropriate pain scale  - Administer analgesics based on type and severity of pain and evaluate response  - Implement non-pharmacological measures as appropriate and evaluate response  - Consider cultural and social influences on pain and pain management  - Notify physician/advanced practitioner if interventions unsuccessful or patient reports new pain  Outcome: Progressing     Problem: INFECTION - ADULT  Goal: Absence or prevention of progression during hospitalization  Description: INTERVENTIONS:  - Assess and monitor for signs and symptoms of infection  - Monitor lab/diagnostic results  - Monitor all insertion sites, i.e. indwelling lines, tubes, and drains  - Monitor endotracheal if appropriate and nasal secretions for changes in amount and color  - Kamiah appropriate cooling/warming therapies per order  - Administer medications as ordered  - Instruct and encourage patient and family to use good hand hygiene technique  - Identify and instruct in appropriate isolation precautions for identified infection/condition  Outcome: Progressing  Goal: Absence of fever/infection during neutropenic period  Description: INTERVENTIONS:  - Monitor WBC    Outcome: Progressing     Problem: SAFETY ADULT  Goal: Patient will remain free of falls  Description: INTERVENTIONS:  - Educate patient/family on patient safety including physical limitations  - Instruct patient to call for assistance with activity   - Consult OT/PT to assist with strengthening/mobility   - Keep Call bell within reach  - Keep bed low and locked with side rails adjusted as appropriate  - Keep care items and personal belongings within reach  - Initiate and maintain comfort rounds  - Make Fall Risk Sign visible to staff  - Offer Toileting every  Hours, in advance of need  - Initiate/Maintain alarm  - Obtain necessary fall risk management equipment:   - Apply yellow socks and bracelet for high fall risk patients  -  Consider moving patient to room near nurses station  Outcome: Progressing  Goal: Maintain or return to baseline ADL function  Description: INTERVENTIONS:  -  Assess patient's ability to carry out ADLs; assess patient's baseline for ADL function and identify physical deficits which impact ability to perform ADLs (bathing, care of mouth/teeth, toileting, grooming, dressing, etc.)  - Assess/evaluate cause of self-care deficits   - Assess range of motion  - Assess patient's mobility; develop plan if impaired  - Assess patient's need for assistive devices and provide as appropriate  - Encourage maximum independence but intervene and supervise when necessary  - Involve family in performance of ADLs  - Assess for home care needs following discharge   - Consider OT consult to assist with ADL evaluation and planning for discharge  - Provide patient education as appropriate  Outcome: Progressing  Goal: Maintains/Returns to pre admission functional level  Description: INTERVENTIONS:  - Perform AM-PAC 6 Click Basic Mobility/ Daily Activity assessment daily.  - Set and communicate daily mobility goal to care team and patient/family/caregiver.   - Collaborate with rehabilitation services on mobility goals if consulted  - Perform Range of Motion  times a day.  - Reposition patient every  hours.  - Dangle patient  times a day  - Stand patient  times a day  - Ambulate patient  times a day  - Out of bed to chair  times a day   - Out of bed for meals  times a day  - Out of bed for toileting  - Record patient progress and toleration of activity level   Outcome: Progressing     Problem: DISCHARGE PLANNING  Goal: Discharge to home or other facility with appropriate resources  Description: INTERVENTIONS:  - Identify barriers to discharge w/patient and caregiver  - Arrange for needed discharge resources and transportation as appropriate  - Identify discharge learning needs (meds, wound care, etc.)  - Arrange for interpretive services to  assist at discharge as needed  - Refer to Case Management Department for coordinating discharge planning if the patient needs post-hospital services based on physician/advanced practitioner order or complex needs related to functional status, cognitive ability, or social support system  Outcome: Progressing     Problem: Knowledge Deficit  Goal: Patient/family/caregiver demonstrates understanding of disease process, treatment plan, medications, and discharge instructions  Description: Complete learning assessment and assess knowledge base.  Interventions:  - Provide teaching at level of understanding  - Provide teaching via preferred learning methods  Outcome: Progressing

## 2025-02-23 LAB
ANION GAP SERPL CALCULATED.3IONS-SCNC: 5 MMOL/L (ref 4–13)
BUN SERPL-MCNC: 10 MG/DL (ref 5–25)
CALCIUM SERPL-MCNC: 8.7 MG/DL (ref 8.4–10.2)
CHLORIDE SERPL-SCNC: 108 MMOL/L (ref 96–108)
CO2 SERPL-SCNC: 30 MMOL/L (ref 21–32)
CORTIS SERPL-MCNC: 8.2 UG/DL
CREAT SERPL-MCNC: 0.5 MG/DL (ref 0.6–1.3)
ERYTHROCYTE [DISTWIDTH] IN BLOOD BY AUTOMATED COUNT: 14.7 % (ref 11.6–15.1)
GFR SERPL CREATININE-BSD FRML MDRD: 97 ML/MIN/1.73SQ M
GLUCOSE SERPL-MCNC: 111 MG/DL (ref 65–140)
HCT VFR BLD AUTO: 25.9 % (ref 34.8–46.1)
HGB BLD-MCNC: 8.2 G/DL (ref 11.5–15.4)
MAGNESIUM SERPL-MCNC: 1.7 MG/DL (ref 1.9–2.7)
MCH RBC QN AUTO: 32 PG (ref 26.8–34.3)
MCHC RBC AUTO-ENTMCNC: 31.7 G/DL (ref 31.4–37.4)
MCV RBC AUTO: 101 FL (ref 82–98)
PHOSPHATE SERPL-MCNC: 3.7 MG/DL (ref 2.3–4.1)
PLATELET # BLD AUTO: 252 THOUSANDS/UL (ref 149–390)
PMV BLD AUTO: 10.2 FL (ref 8.9–12.7)
POTASSIUM SERPL-SCNC: 3.5 MMOL/L (ref 3.5–5.3)
RBC # BLD AUTO: 2.56 MILLION/UL (ref 3.81–5.12)
SODIUM SERPL-SCNC: 143 MMOL/L (ref 135–147)
WBC # BLD AUTO: 8.85 THOUSAND/UL (ref 4.31–10.16)

## 2025-02-23 PROCEDURE — 83735 ASSAY OF MAGNESIUM: CPT

## 2025-02-23 PROCEDURE — 80048 BASIC METABOLIC PNL TOTAL CA: CPT

## 2025-02-23 PROCEDURE — 84100 ASSAY OF PHOSPHORUS: CPT

## 2025-02-23 PROCEDURE — 82533 TOTAL CORTISOL: CPT | Performed by: PSYCHIATRY & NEUROLOGY

## 2025-02-23 PROCEDURE — 99024 POSTOP FOLLOW-UP VISIT: CPT | Performed by: NEUROLOGICAL SURGERY

## 2025-02-23 PROCEDURE — 99291 CRITICAL CARE FIRST HOUR: CPT | Performed by: PSYCHIATRY & NEUROLOGY

## 2025-02-23 PROCEDURE — 94664 DEMO&/EVAL PT USE INHALER: CPT

## 2025-02-23 PROCEDURE — 94640 AIRWAY INHALATION TREATMENT: CPT

## 2025-02-23 PROCEDURE — 85027 COMPLETE CBC AUTOMATED: CPT

## 2025-02-23 PROCEDURE — 94760 N-INVAS EAR/PLS OXIMETRY 1: CPT

## 2025-02-23 RX ORDER — LEVALBUTEROL INHALATION SOLUTION 1.25 MG/3ML
1.25 SOLUTION RESPIRATORY (INHALATION)
Status: DISCONTINUED | OUTPATIENT
Start: 2025-02-23 | End: 2025-02-24 | Stop reason: HOSPADM

## 2025-02-23 RX ORDER — POTASSIUM CHLORIDE 1500 MG/1
40 TABLET, EXTENDED RELEASE ORAL ONCE
Status: COMPLETED | OUTPATIENT
Start: 2025-02-23 | End: 2025-02-23

## 2025-02-23 RX ORDER — POLYETHYLENE GLYCOL 3350 17 G/17G
17 POWDER, FOR SOLUTION ORAL DAILY
Status: DISCONTINUED | OUTPATIENT
Start: 2025-02-23 | End: 2025-02-24 | Stop reason: HOSPADM

## 2025-02-23 RX ORDER — ALBUMIN HUMAN 50 G/1000ML
25 SOLUTION INTRAVENOUS ONCE
Status: COMPLETED | OUTPATIENT
Start: 2025-02-23 | End: 2025-02-23

## 2025-02-23 RX ORDER — ACETAMINOPHEN 10 MG/ML
1000 INJECTION, SOLUTION INTRAVENOUS ONCE
Status: COMPLETED | OUTPATIENT
Start: 2025-02-23 | End: 2025-02-23

## 2025-02-23 RX ORDER — FUROSEMIDE 10 MG/ML
10 INJECTION INTRAMUSCULAR; INTRAVENOUS ONCE
Status: COMPLETED | OUTPATIENT
Start: 2025-02-23 | End: 2025-02-23

## 2025-02-23 RX ORDER — AMOXICILLIN 250 MG
1 CAPSULE ORAL
Status: DISCONTINUED | OUTPATIENT
Start: 2025-02-23 | End: 2025-02-23

## 2025-02-23 RX ORDER — SODIUM CHLORIDE, SODIUM GLUCONATE, SODIUM ACETATE, POTASSIUM CHLORIDE, MAGNESIUM CHLORIDE, SODIUM PHOSPHATE, DIBASIC, AND POTASSIUM PHOSPHATE .53; .5; .37; .037; .03; .012; .00082 G/100ML; G/100ML; G/100ML; G/100ML; G/100ML; G/100ML; G/100ML
500 INJECTION, SOLUTION INTRAVENOUS ONCE
Status: DISCONTINUED | OUTPATIENT
Start: 2025-02-23 | End: 2025-02-23

## 2025-02-23 RX ORDER — BUDESONIDE AND FORMOTEROL FUMARATE DIHYDRATE 80; 4.5 UG/1; UG/1
2 AEROSOL RESPIRATORY (INHALATION) 2 TIMES DAILY
Status: DISCONTINUED | OUTPATIENT
Start: 2025-02-23 | End: 2025-02-24 | Stop reason: HOSPADM

## 2025-02-23 RX ORDER — FUROSEMIDE 10 MG/ML
20 INJECTION INTRAMUSCULAR; INTRAVENOUS ONCE
Status: COMPLETED | OUTPATIENT
Start: 2025-02-23 | End: 2025-02-23

## 2025-02-23 RX ORDER — MAGNESIUM SULFATE HEPTAHYDRATE 40 MG/ML
2 INJECTION, SOLUTION INTRAVENOUS ONCE
Status: COMPLETED | OUTPATIENT
Start: 2025-02-23 | End: 2025-02-23

## 2025-02-23 RX ORDER — AMOXICILLIN 250 MG
1 CAPSULE ORAL 2 TIMES DAILY
Status: DISCONTINUED | OUTPATIENT
Start: 2025-02-23 | End: 2025-02-24 | Stop reason: HOSPADM

## 2025-02-23 RX ADMIN — SENNOSIDES AND DOCUSATE SODIUM 1 TABLET: 50; 8.6 TABLET ORAL at 16:52

## 2025-02-23 RX ADMIN — FUROSEMIDE 10 MG: 10 INJECTION, SOLUTION INTRAMUSCULAR; INTRAVENOUS at 12:55

## 2025-02-23 RX ADMIN — POLYETHYLENE GLYCOL 3350 17 G: 17 POWDER, FOR SOLUTION ORAL at 08:46

## 2025-02-23 RX ADMIN — APIXABAN 5 MG: 5 TABLET, FILM COATED ORAL at 21:42

## 2025-02-23 RX ADMIN — APIXABAN 5 MG: 5 TABLET, FILM COATED ORAL at 08:45

## 2025-02-23 RX ADMIN — GABAPENTIN 100 MG: 100 CAPSULE ORAL at 21:42

## 2025-02-23 RX ADMIN — LEVALBUTEROL HYDROCHLORIDE 1.25 MG: 1.25 SOLUTION RESPIRATORY (INHALATION) at 14:39

## 2025-02-23 RX ADMIN — LEVALBUTEROL HYDROCHLORIDE 1.25 MG: 1.25 SOLUTION RESPIRATORY (INHALATION) at 19:51

## 2025-02-23 RX ADMIN — LEVETIRACETAM 750 MG: 750 TABLET, FILM COATED ORAL at 21:42

## 2025-02-23 RX ADMIN — SENNOSIDES AND DOCUSATE SODIUM 1 TABLET: 50; 8.6 TABLET ORAL at 08:51

## 2025-02-23 RX ADMIN — GABAPENTIN 100 MG: 100 CAPSULE ORAL at 16:49

## 2025-02-23 RX ADMIN — CEFTRIAXONE SODIUM 1000 MG: 10 INJECTION, POWDER, FOR SOLUTION INTRAVENOUS at 16:49

## 2025-02-23 RX ADMIN — GABAPENTIN 100 MG: 100 CAPSULE ORAL at 08:46

## 2025-02-23 RX ADMIN — BUDESONIDE AND FORMOTEROL FUMARATE DIHYDRATE 2 PUFF: 80; 4.5 AEROSOL RESPIRATORY (INHALATION) at 18:16

## 2025-02-23 RX ADMIN — CHLORHEXIDINE GLUCONATE 15 ML: 1.2 SOLUTION ORAL at 21:42

## 2025-02-23 RX ADMIN — CHLORHEXIDINE GLUCONATE 15 ML: 1.2 SOLUTION ORAL at 08:45

## 2025-02-23 RX ADMIN — ALBUMIN (HUMAN) 25 G: 12.5 INJECTION, SOLUTION INTRAVENOUS at 17:40

## 2025-02-23 RX ADMIN — ALPRAZOLAM 1 MG: 0.5 TABLET ORAL at 22:38

## 2025-02-23 RX ADMIN — BUDESONIDE AND FORMOTEROL FUMARATE DIHYDRATE 2 PUFF: 80; 4.5 AEROSOL RESPIRATORY (INHALATION) at 12:52

## 2025-02-23 RX ADMIN — FUROSEMIDE 20 MG: 10 INJECTION, SOLUTION INTRAMUSCULAR; INTRAVENOUS at 17:40

## 2025-02-23 RX ADMIN — MAGNESIUM SULFATE HEPTAHYDRATE 2 G: 40 INJECTION, SOLUTION INTRAVENOUS at 08:50

## 2025-02-23 RX ADMIN — QUETIAPINE FUMARATE 100 MG: 100 TABLET ORAL at 22:38

## 2025-02-23 RX ADMIN — LEVETIRACETAM 750 MG: 750 TABLET, FILM COATED ORAL at 08:46

## 2025-02-23 RX ADMIN — ACETAMINOPHEN 1000 MG: 10 INJECTION INTRAVENOUS at 17:40

## 2025-02-23 RX ADMIN — CLOPIDOGREL BISULFATE 75 MG: 75 TABLET, FILM COATED ORAL at 08:46

## 2025-02-23 RX ADMIN — CYANOCOBALAMIN TAB 500 MCG 1000 MCG: 500 TAB at 08:45

## 2025-02-23 RX ADMIN — IPRATROPIUM BROMIDE 0.5 MG: 0.5 SOLUTION RESPIRATORY (INHALATION) at 19:51

## 2025-02-23 RX ADMIN — IPRATROPIUM BROMIDE 0.5 MG: 0.5 SOLUTION RESPIRATORY (INHALATION) at 14:39

## 2025-02-23 RX ADMIN — QUETIAPINE FUMARATE 100 MG: 100 TABLET ORAL at 10:12

## 2025-02-23 RX ADMIN — ACETAMINOPHEN 650 MG: 325 TABLET, FILM COATED ORAL at 12:52

## 2025-02-23 RX ADMIN — EZETIMIBE 10 MG: 10 TABLET ORAL at 08:46

## 2025-02-23 RX ADMIN — POTASSIUM CHLORIDE 40 MEQ: 1500 TABLET, EXTENDED RELEASE ORAL at 08:46

## 2025-02-23 RX ADMIN — ACETAMINOPHEN 650 MG: 325 TABLET, FILM COATED ORAL at 04:36

## 2025-02-23 RX ADMIN — FLUTICASONE PROPIONATE 1 SPRAY: 50 SPRAY, METERED NASAL at 08:46

## 2025-02-23 NOTE — TELEPHONE ENCOUNTER
Patient's daughter calling to reach hospitalized patient. Provided phone number for the campus location.

## 2025-02-23 NOTE — ASSESSMENT & PLAN NOTE
Lab Results   Component Value Date    EGFR 97 02/23/2025    EGFR 94 02/22/2025    EGFR 97 02/21/2025    CREATININE 0.50 (L) 02/23/2025    CREATININE 0.55 (L) 02/22/2025    CREATININE 0.50 (L) 02/21/2025

## 2025-02-23 NOTE — PROGRESS NOTES
Progress Note - Critical Care/ICU   Name: Kirsty Em 74 y.o. female I MRN: [id number]  Unit/Bed#: ICU 06 I Date of Admission: 2/21/2025   Date of Service: 2/23/2025 I Hospital Day: 2      Assessment & Plan   Active Hospital Problems    Diagnosis Date Noted POA    Bilateral carotid artery stenosis 06/10/2024 Yes    Centrilobular emphysema (HCC) 01/06/2023 Yes    Atrial fibrillation (HCC) 01/17/2025 Yes    Benign neoplasm of meninges, unspecified (HCC) 01/09/2025 Yes    Hypertension 06/11/2024 Yes    History of stroke 06/08/2024 Not Applicable    Stage 3a chronic kidney disease (HCC) 01/06/2023 Yes      Resolved Hospital Problems   No resolved problems to display.     Neuro/Psych:  Diagnoses: Status post right ICA angioplasty and stenting, left MCA stroke June 2024, bilateral carotid stenosis, seizures, anxiety, depression  Plan:  Neuro checks every 4 hours  Sedation: None  Analgesia: Tylenol as needed  Eliquis 5 mg twice daily  75 mg daily  Continue home Keppra 750 every 12  Continue home Seroquel 100 twice daily  Continue home B12 1000 mcg daily  Continue gabapentin 100 mg 3 times daily  Xanax 1 mg nightly as needed, takes 2 mg at home 3 times daily    CV:  Diagnoses: Bradycardia, hypotension, atrial fibrillation  Plan:  Likely in the setting of recent carotid intervention  On 1 of levo this morning  Holding home diltiazem 120 daily in the setting of bradycardia and hypotension  Continuing home Zetia 10 mg daily  Systolic goal less than 160, greater than 100, MAP greater than 65  Continuous cardiopulmonary monitoring. Maintain MAP >65.    Pulm:   Diagnoses: Emphysema  Imaging: Chest x-ray yesterday showing opacity in the left lingula, less likely pneumonia, no pneumothorax,   Awaiting official read  Blood gases: None  Supplemental O2: 4 L nasal cannula. Wean as tolerated.  Plan:  Continue incentive spirometry  Continuous pulse oximetry. Maintain O2 sat >92%.     GI:  Diagnoses: No active issues  Last BM: before  admission  Plan:  Bowel regimen: MiraLAX and senna daily  GI prophylaxis: None, tolerating diet    :  Diagnoses: Chronic kidney disease stage IIIa, urinary retention  Creatinine trend: Stable, 0.5  Baseline creatinine: 0.5  Urine output 2200 yesterday  Plan:  Urinary retention protocol, has voided spontaneously  Monitor I/Os.    F/E/N:  Diagnoses:  Plan:   F: Tolerating diet. Fluid resuscitation prn.  E: Monitor and replete electrolytes for Mg >2, Phos >3, K >4.  N: Regular house diet    Heme/Onc:  Diagnoses: Anemia  Plan:  Hemoglobin 8.2 from 8.5.  VTE prophylaxis: Eliquis 5 mg twice daily, SCDs    Endo:  Diagnoses: No active issues  Plan:   Glucoses range from 1 11-1 45  Insulin: None. Monitor blood glucose.    ID:  Diagnoses: Urinary tract infection  Culture data: Pending urine cultures  Labs: WBC 8.8.  Temperature: Afebrile  UA with large leuks and large white cells on microscopic  Plan:  Abx: Ceftriaxone for UTI  Monitor fever curve and WBC.    MSK/Skin:  Diagnoses: No active issues  Plan:  PT/OT when appropriate. Encourage OOB and ambulation when appropriate. Local wound care prn.    LDAs:  Lines -PICC line left upper extremity  Drains -none  Airways -none    Disposition: Critical care    ICU Core Measures     A: Assess, Prevent, and Manage Pain Has pain been assessed? Yes  Need for changes to pain regimen? No   B: Both SAT/SAT  N/A   C: Choice of Sedation RASS Goal: 0 Alert and Calm  Need for changes to sedation or analgesia regimen? No   D: Delirium CAM-ICU: Negative   E: Early Mobility  Plan for early mobility? No   F: Family Engagement Plan for family engagement today? Yes       Antibiotic Review: Awaiting culture results.     Review of Invasive Devices:      Central access plan: Patient requires PICC secondary to chronic health conditions      Prophylaxis:  VTE VTE covered by:  apixaban, Oral, 5 mg at 02/22/25 6055       Stress Ulcer  not ordered         24 Hour Events : No acute events  overnight  Subjective   Review of Systems: See HPI for Review of Systems    Objective :                   Vitals I/O      Most Recent Min/Max in 24hrs   Temp 99 °F (37.2 °C) Temp  Min: 98.9 °F (37.2 °C)  Max: 100.4 °F (38 °C)   Pulse 64 Pulse  Min: 50  Max: 86   Resp 20 Resp  Min: 20  Max: 50   BP (!) 86/45 BP  Min: 86/45  Max: 166/84   O2 Sat 98 % SpO2  Min: 91 %  Max: 100 %      Intake/Output Summary (Last 24 hours) at 2/23/2025 0747  Last data filed at 2/23/2025 0501  Gross per 24 hour   Intake 818.2 ml   Output 2225 ml   Net -1406.8 ml       Diet Regular; Regular House    Invasive Monitoring           Physical Exam   Physical Exam  Vitals and nursing note reviewed.   Eyes:      General:         Right eye: No discharge.         Left eye: No discharge.      Extraocular Movements: Extraocular movements intact.      Pupils: Pupils are equal, round, and reactive to light.   Skin:     General: Skin is warm and dry.      Coloration: Skin is not jaundiced.   HENT:      Head: Normocephalic and atraumatic.      Right Ear: Hearing normal.      Left Ear: Hearing normal.      Nose: No congestion or epistaxis.      Mouth/Throat:      Mouth: Mucous membranes are moist.      Pharynx: No oropharyngeal exudate.   Cardiovascular:      Rate and Rhythm: Normal rate and regular rhythm.      Heart sounds: Normal heart sounds.   Abdominal: General: Bowel sounds are normal.      Palpations: Abdomen is soft.      Tenderness: There is no abdominal tenderness.   Constitutional:       General: She is not in acute distress.     Appearance: She is not toxic-appearing.      Interventions: She is not sedated, not intubated and not restrained.  Pulmonary:      Effort: Pulmonary effort is normal. No accessory muscle usage, respiratory distress or accessory muscle usage. She is not intubated.      Breath sounds: Normal breath sounds. No stridor. No wheezing or rales.   Psychiatric:         Speech: Speech is not no receptive aphasia or no  expressive aphasia.   Neurological:      General: No focal deficit present.      Mental Status: She is alert.      Cranial Nerves: Cranial nerves 2-12 are intact. No cranial nerve deficit, dysarthria or facial asymmetry.      Sensory: Sensation is intact.      Motor: gross motor function is at baseline for patient. Strength full and intact in all extremities. No pronator drift or motor deficit.      Comments: AOx4          Diagnostic Studies        Lab Results: I have reviewed the following results:     Medications:  Scheduled PRN   apixaban, 5 mg, BID  cefTRIAXone, 1,000 mg, Q24H  chlorhexidine, 15 mL, Q12H JERMAINE  clopidogrel, 75 mg, Daily  vitamin B-12, 1,000 mcg, Daily  ezetimibe, 10 mg, Daily  fluticasone, 1 spray, Daily  gabapentin, 100 mg, TID  levETIRAcetam, 750 mg, Q12H JERMAINE  magnesium sulfate, 2 g, Once  multi-electrolyte, 500 mL, Once  potassium chloride, 40 mEq, Once  QUEtiapine, 100 mg, Q12H      acetaminophen, 650 mg, Q6H PRN  ALPRAZolam, 1 mg, HS PRN  ondansetron, 4 mg, Q6H PRN       Continuous    norepinephrine (LEVOPHED) 4 mg (STANDARD CONCENTRATION) IV in sodium chloride 0.9% 250 mL, 1-30 mcg/min, Last Rate: 1 mcg/min (02/23/25 0448)         Labs:   CBC    Recent Labs     02/22/25  0448 02/23/25  0442   WBC 10.96* 8.85   HGB 8.5* 8.2*   HCT 27.2* 25.9*    252     BMP    Recent Labs     02/22/25  0448 02/23/25  0442   SODIUM 141 143   K 4.4 3.5   * 108   CO2 24 30   AGAP 6 5   BUN 13 10   CREATININE 0.55* 0.50*   CALCIUM 8.1* 8.7       Coags    Recent Labs     02/22/25  0448   INR 1.13   PTT 36*        Additional Electrolytes  Recent Labs     02/21/25  1524 02/23/25  0442   MG 1.7* 1.7*   PHOS 4.0 3.7          Blood Gas    No recent results  No recent results LFTs  Recent Labs     02/21/25  1524   ALT 20   AST 21   ALKPHOS 60   ALB 3.7   TBILI 0.31       Infectious  No recent results  Glucose  Recent Labs     02/21/25  1524 02/22/25  0448 02/23/25  0442   GLUC 145* 124 111

## 2025-02-23 NOTE — PROGRESS NOTES
Progress Note - Neurosurgery   Name: Kirsty Em 74 y.o. female I MRN: [id number]  Unit/Bed#: ICU 06 I Date of Admission: 2/21/2025   Date of Service: 2/23/2025 I Hospital Day: 2    Assessment & Plan  Bilateral carotid artery stenosis  PPD#2 L PASTOR with Dr. Quintana (2/21)  In setting of bilateral carotid artery stenosis.  S/p R PASTOR with Dr. Quintana on 1/31.    Plan:  Continue to monitor neuro exam closely.  STAT CTA head with decline in GCS > 2 pts in 1 hour.  Noted hypotensive and bradycardic, which occurred during last admission as well.  On levo @ 1 mcg/min  Maintain MAP > 65  Continue Eliquis 5 mg BID and Plavix 75 mg daily.  Mobilize with PT/OT.  DVT ppx: SCDs.    Neurosurgery will continue to follow as primary. Patient is not medically cleared for discharge due to hypotension and hypoxia. Call with questions.      Centrilobular emphysema (HCC)  Smokes < 1 PPD x many years.  Requiring 4-5 L O2 via NC to maintain O2sat > 90.  Stage 3a chronic kidney disease (HCC)  Lab Results   Component Value Date    EGFR 97 02/23/2025    EGFR 94 02/22/2025    EGFR 97 02/21/2025    CREATININE 0.50 (L) 02/23/2025    CREATININE 0.55 (L) 02/22/2025    CREATININE 0.50 (L) 02/21/2025     History of stroke    Hypertension    Benign neoplasm of meninges, unspecified (HCC)    Atrial fibrillation (HCC)      Please contact the SecureChat role for the Neurosurgery service with any questions/concerns.    Subjective   Relates she feels well.  Denies HA.   Denies SOB.  Eager to go home but understands that blood pressure and hypoxia must improve.    Objective :  Temp:  [98.9 °F (37.2 °C)-100.4 °F (38 °C)] 99 °F (37.2 °C)  HR:  [60-86] 64  BP: ()/(45-84) 86/45  Resp:  [20-50] 20  SpO2:  [91 %-100 %] 98 %    I/O         02/21 0701 02/22 0700 02/22 0701 02/23 0700 02/23 0701 02/24 0700    P.O. 780 240     I.V. (mL/kg) 1081.9 (18.8) 228.2 (4)     IV Piggyback 1400 350     Total Intake(mL/kg) 3261.9 (56.7) 818.2 (14.2)     Urine (mL/kg/hr)  1000 2225 (1.6)     Total Output 1000 2225     Net +2261.9 -1406.8                  Physical Exam  Vitals and nursing note reviewed.   Constitutional:       General: She is not in acute distress.     Appearance: She is well-developed.   HENT:      Head: Normocephalic and atraumatic.   Eyes:      General: Lids are normal.      Extraocular Movements: Extraocular movements intact.      Conjunctiva/sclera: Conjunctivae normal.      Pupils: Pupils are equal, round, and reactive to light.   Cardiovascular:      Heart sounds: No murmur heard.  Pulmonary:      Effort: Pulmonary effort is normal. No respiratory distress.   Abdominal:      Tenderness: There is no abdominal tenderness.   Musculoskeletal:         General: No swelling.      Cervical back: Neck supple.   Skin:     General: Skin is warm and dry.      Capillary Refill: Capillary refill takes less than 2 seconds.      Comments: R groin access site with minimal ecchymosis, no induration.  +DP/PT pulses.   Neurological:      Mental Status: She is alert.      Motor: Motor strength is normal.  Psychiatric:         Mood and Affect: Mood normal.      Neurological Exam  Mental Status  Alert. Oriented only to person and place.  Stated date was January 2005.    Cranial Nerves  CN II: Visual acuity is normal. Visual fields full to confrontation.  CN III, IV, VI: Extraocular movements intact bilaterally. Normal lids and orbits bilaterally. Pupils equal round and reactive to light bilaterally.  CN V: Facial sensation is normal.  CN VII: Full and symmetric facial movement.  CN VIII: Hearing is normal.  CN IX, X: Palate elevates symmetrically. Normal gag reflex.  CN XI: Shoulder shrug strength is normal.  CN XII: Tongue midline without atrophy or fasciculations.    Motor   Strength is 5/5 throughout all four extremities.        Lab Results: I have reviewed the following results:  Recent Labs     02/21/25  1524 02/22/25  0448 02/23/25  0442   WBC 6.91 10.96* 8.85   HGB 8.7* 8.5*  8.2*   HCT 27.9* 27.2* 25.9*    278 252   SODIUM 144 141 143   K 3.2* 4.4 3.5   * 111* 108   CO2 28 24 30   BUN 13 13 10   CREATININE 0.50* 0.55* 0.50*   GLUC 145* 124 111   MG 1.7*  --  1.7*   PHOS 4.0  --  3.7   AST 21  --   --    ALT 20  --   --    ALB 3.7  --   --    TBILI 0.31  --   --    ALKPHOS 60  --   --    PTT  --  36*  --    INR  --  1.13  --        Imaging Results Review: No pertinent imaging studies reviewed.  Other Study Results Review: No additional pertinent studies reviewed.    VTE Pharmacologic Prophylaxis: VTE covered by:  apixaban, Oral, 5 mg at 02/23/25 0845    and Sequential compression device (Venodyne)

## 2025-02-23 NOTE — ASSESSMENT & PLAN NOTE
PPD#2 L PASTOR with Dr. Quintana (2/21)  In setting of bilateral carotid artery stenosis.  S/p R PASTOR with Dr. Quintana on 1/31.    Plan:  Continue to monitor neuro exam closely.  STAT CTA head with decline in GCS > 2 pts in 1 hour.  Noted hypotensive and bradycardic, which occurred during last admission as well.  On levo @ 1 mcg/min  Maintain MAP > 65  Continue Eliquis 5 mg BID and Plavix 75 mg daily.  Mobilize with PT/OT.  DVT ppx: SCDs.    Neurosurgery will continue to follow as primary. Patient is not medically cleared for discharge due to hypotension and hypoxia. Call with questions.

## 2025-02-23 NOTE — PLAN OF CARE
Problem: Neurological Deficit  Goal: Neurological status is stable or improving  Description: Interventions:  - Monitor and assess patient's level of consciousness, motor function, sensory function, and level of assistance needed for ADLs.   - Monitor and report changes from baseline. Collaborate with interdisciplinary team to initiate plan and implement interventions as ordered.   - Provide and maintain a safe environment.  - Consider seizure precautions.  - Consider fall precautions.  - Consider aspiration precautions.  - Consider bleeding precautions.  Outcome: Not Progressing

## 2025-02-23 NOTE — PLAN OF CARE
Problem: Neurological Deficit  Goal: Neurological status is stable or improving  Description: Interventions:  - Monitor and assess patient's level of consciousness, motor function, sensory function, and level of assistance needed for ADLs.   - Monitor and report changes from baseline. Collaborate with interdisciplinary team to initiate plan and implement interventions as ordered.   - Provide and maintain a safe environment.  - Consider seizure precautions.  - Consider fall precautions.  - Consider aspiration precautions.  - Consider bleeding precautions.  Outcome: Progressing     Problem: Activity Intolerance/Impaired Mobility  Goal: Mobility/activity is maintained at optimum level for patient  Description: Interventions:  - Assess and monitor patient  barriers to mobility and need for assistive/adaptive devices.  - Assess patient's emotional response to limitations.  - Collaborate with interdisciplinary team and initiate plans and interventions as ordered.  - Encourage independent activity per ability.  - Maintain proper body alignment.  - Perform active/passive rom as tolerated/ordered.  - Plan activities to conserve energy.  - Turn patient as appropriate  Outcome: Progressing     Problem: Communication Impairment  Goal: Ability to express needs and understand communication  Description: Assess patient's communication skills and ability to understand information.  Patient will demonstrate use of effective communication techniques, alternative methods of communication and understanding even if not able to speak.     - Encourage communication and provide alternate methods of communication as needed.  - Collaborate with case management/ for discharge needs.  - Include patient/family/caregiver in decisions related to communication.  Outcome: Progressing     Problem: Potential for Aspiration  Goal: Non-ventilated patient's risk of aspiration is minimized  Description: Assess and monitor vital signs,  respiratory status, and labs (WBC).  Monitor for signs of aspiration (tachypnea, cough, rales, wheezing, cyanosis, fever).    - Assess and monitor patient's ability to swallow.  - Place patient up in chair to eat if possible.  - HOB up at 90 degrees to eat if unable to get patient up into chair.  - Supervise patient during oral intake.   - Instruct patient/ family to take small bites.  - Instruct patient/ family to take small single sips when taking liquids.  - Follow patient-specific strategies generated by speech pathologist.  Outcome: Progressing  Goal: Ventilated patient's risk of aspiration is minimized  Description: Assess and monitor vital signs, respiratory status, airway cuff pressure, and labs (WBC).  Monitor for signs of aspiration (tachypnea, cough, rales, wheezing, cyanosis, fever).    - Elevate head of bed 30 degrees if patient has tube feeding.  - Monitor tube feeding.  Outcome: Progressing     Problem: Nutrition  Goal: Nutrition/Hydration status is improving  Description: Monitor and assess patient's nutrition/hydration status for malnutrition (ex- brittle hair, bruises, dry skin, pale skin and conjunctiva, muscle wasting, smooth red tongue, and disorientation). Collaborate with interdisciplinary team and initiate plan and interventions as ordered.  Monitor patient's weight and dietary intake as ordered or per policy. Utilize nutrition screening tool and intervene per policy. Determine patient's food preferences and provide high-protein, high-caloric foods as appropriate.     - Assist patient with eating.  - Allow adequate time for meals.  - Encourage patient to take dietary supplement as ordered.  - Collaborate with clinical nutritionist.  - Include patient/family/caregiver in decisions related to nutrition.  Outcome: Progressing     Problem: PAIN - ADULT  Goal: Verbalizes/displays adequate comfort level or baseline comfort level  Description: Interventions:  - Encourage patient to monitor pain and  request assistance  - Assess pain using appropriate pain scale  - Administer analgesics based on type and severity of pain and evaluate response  - Implement non-pharmacological measures as appropriate and evaluate response  - Consider cultural and social influences on pain and pain management  - Notify physician/advanced practitioner if interventions unsuccessful or patient reports new pain  Outcome: Progressing     Problem: INFECTION - ADULT  Goal: Absence or prevention of progression during hospitalization  Description: INTERVENTIONS:  - Assess and monitor for signs and symptoms of infection  - Monitor lab/diagnostic results  - Monitor all insertion sites, i.e. indwelling lines, tubes, and drains  - Monitor endotracheal if appropriate and nasal secretions for changes in amount and color  - [place] appropriate cooling/warming therapies per order  - Administer medications as ordered  - Instruct and encourage patient and family to use good hand hygiene technique  - Identify and instruct in appropriate isolation precautions for identified infection/condition  Outcome: Progressing  Goal: Absence of fever/infection during neutropenic period  Description: INTERVENTIONS:  - Monitor WBC    Outcome: Progressing     Problem: SAFETY ADULT  Goal: Patient will remain free of falls  Description: INTERVENTIONS:  - Educate patient/family on patient safety including physical limitations  - Instruct patient to call for assistance with activity   - Consult OT/PT to assist with strengthening/mobility   - Keep Call bell within reach  - Keep bed low and locked with side rails adjusted as appropriate  - Keep care items and personal belongings within reach  - Initiate and maintain comfort rounds  - Make Fall Risk Sign visible to staff  - Offer Toileting every  Hours, in advance of need  - Initiate/Maintain alarm  - Obtain necessary fall risk management equipment:   - Apply yellow socks and bracelet for high fall risk patients  -  Consider moving patient to room near nurses station  Outcome: Progressing  Goal: Maintain or return to baseline ADL function  Description: INTERVENTIONS:  -  Assess patient's ability to carry out ADLs; assess patient's baseline for ADL function and identify physical deficits which impact ability to perform ADLs (bathing, care of mouth/teeth, toileting, grooming, dressing, etc.)  - Assess/evaluate cause of self-care deficits   - Assess range of motion  - Assess patient's mobility; develop plan if impaired  - Assess patient's need for assistive devices and provide as appropriate  - Encourage maximum independence but intervene and supervise when necessary  - Involve family in performance of ADLs  - Assess for home care needs following discharge   - Consider OT consult to assist with ADL evaluation and planning for discharge  - Provide patient education as appropriate  Outcome: Progressing  Goal: Maintains/Returns to pre admission functional level  Description: INTERVENTIONS:  - Perform AM-PAC 6 Click Basic Mobility/ Daily Activity assessment daily.  - Set and communicate daily mobility goal to care team and patient/family/caregiver.   - Collaborate with rehabilitation services on mobility goals if consulted  - Perform Range of Motion  times a day.  - Reposition patient every  hours.  - Dangle patient  times a day  - Stand patient  times a day  - Ambulate patient  times a day  - Out of bed to chair  times a day   - Out of bed for meals times a day  - Out of bed for toileting  - Record patient progress and toleration of activity level   Outcome: Progressing     Problem: DISCHARGE PLANNING  Goal: Discharge to home or other facility with appropriate resources  Description: INTERVENTIONS:  - Identify barriers to discharge w/patient and caregiver  - Arrange for needed discharge resources and transportation as appropriate  - Identify discharge learning needs (meds, wound care, etc.)  - Arrange for interpretive services to  assist at discharge as needed  - Refer to Case Management Department for coordinating discharge planning if the patient needs post-hospital services based on physician/advanced practitioner order or complex needs related to functional status, cognitive ability, or social support system  Outcome: Progressing     Problem: Knowledge Deficit  Goal: Patient/family/caregiver demonstrates understanding of disease process, treatment plan, medications, and discharge instructions  Description: Complete learning assessment and assess knowledge base.  Interventions:  - Provide teaching at level of understanding  - Provide teaching via preferred learning methods  Outcome: Progressing     Problem: Prexisting or High Potential for Compromised Skin Integrity  Goal: Skin integrity is maintained or improved  Description: INTERVENTIONS:  - Identify patients at risk for skin breakdown  - Assess and monitor skin integrity  - Assess and monitor nutrition and hydration status  - Monitor labs   - Assess for incontinence   - Turn and reposition patient  - Assist with mobility/ambulation  - Relieve pressure over bony prominences  - Avoid friction and shearing  - Provide appropriate hygiene as needed including keeping skin clean and dry  - Evaluate need for skin moisturizer/barrier cream  - Collaborate with interdisciplinary team   - Patient/family teaching  - Consider wound care consult   Outcome: Progressing

## 2025-02-24 ENCOUNTER — TRANSITIONAL CARE MANAGEMENT (OUTPATIENT)
Dept: FAMILY MEDICINE CLINIC | Facility: CLINIC | Age: 75
End: 2025-02-24

## 2025-02-24 VITALS
RESPIRATION RATE: 19 BRPM | HEIGHT: 63 IN | WEIGHT: 126.76 LBS | DIASTOLIC BLOOD PRESSURE: 89 MMHG | BODY MASS INDEX: 22.46 KG/M2 | TEMPERATURE: 98.2 F | OXYGEN SATURATION: 92 % | SYSTOLIC BLOOD PRESSURE: 140 MMHG | HEART RATE: 56 BPM

## 2025-02-24 LAB
ANION GAP SERPL CALCULATED.3IONS-SCNC: 8 MMOL/L (ref 4–13)
BUN SERPL-MCNC: 9 MG/DL (ref 5–25)
CA-I BLD-SCNC: 1.15 MMOL/L (ref 1.12–1.32)
CALCIUM SERPL-MCNC: 9.2 MG/DL (ref 8.4–10.2)
CHLORIDE SERPL-SCNC: 104 MMOL/L (ref 96–108)
CO2 SERPL-SCNC: 31 MMOL/L (ref 21–32)
CREAT SERPL-MCNC: 0.48 MG/DL (ref 0.6–1.3)
ERYTHROCYTE [DISTWIDTH] IN BLOOD BY AUTOMATED COUNT: 14.5 % (ref 11.6–15.1)
GFR SERPL CREATININE-BSD FRML MDRD: 98 ML/MIN/1.73SQ M
GLUCOSE SERPL-MCNC: 101 MG/DL (ref 65–140)
HCT VFR BLD AUTO: 24.7 % (ref 34.8–46.1)
HGB BLD-MCNC: 8.1 G/DL (ref 11.5–15.4)
MAGNESIUM SERPL-MCNC: 1.8 MG/DL (ref 1.9–2.7)
MCH RBC QN AUTO: 32.3 PG (ref 26.8–34.3)
MCHC RBC AUTO-ENTMCNC: 32.8 G/DL (ref 31.4–37.4)
MCV RBC AUTO: 98 FL (ref 82–98)
PHOSPHATE SERPL-MCNC: 3.7 MG/DL (ref 2.3–4.1)
PLATELET # BLD AUTO: 219 THOUSANDS/UL (ref 149–390)
PMV BLD AUTO: 10.2 FL (ref 8.9–12.7)
POTASSIUM SERPL-SCNC: 3.5 MMOL/L (ref 3.5–5.3)
RBC # BLD AUTO: 2.51 MILLION/UL (ref 3.81–5.12)
SODIUM SERPL-SCNC: 143 MMOL/L (ref 135–147)
WBC # BLD AUTO: 8.95 THOUSAND/UL (ref 4.31–10.16)

## 2025-02-24 PROCEDURE — 82330 ASSAY OF CALCIUM: CPT

## 2025-02-24 PROCEDURE — 99024 POSTOP FOLLOW-UP VISIT: CPT | Performed by: NEUROLOGICAL SURGERY

## 2025-02-24 PROCEDURE — 97163 PT EVAL HIGH COMPLEX 45 MIN: CPT

## 2025-02-24 PROCEDURE — 99232 SBSQ HOSP IP/OBS MODERATE 35: CPT | Performed by: INTERNAL MEDICINE

## 2025-02-24 PROCEDURE — 84100 ASSAY OF PHOSPHORUS: CPT

## 2025-02-24 PROCEDURE — 85027 COMPLETE CBC AUTOMATED: CPT

## 2025-02-24 PROCEDURE — 80048 BASIC METABOLIC PNL TOTAL CA: CPT

## 2025-02-24 PROCEDURE — 94640 AIRWAY INHALATION TREATMENT: CPT

## 2025-02-24 PROCEDURE — 94664 DEMO&/EVAL PT USE INHALER: CPT

## 2025-02-24 PROCEDURE — 90677 PCV20 VACCINE IM: CPT | Performed by: STUDENT IN AN ORGANIZED HEALTH CARE EDUCATION/TRAINING PROGRAM

## 2025-02-24 PROCEDURE — 94760 N-INVAS EAR/PLS OXIMETRY 1: CPT

## 2025-02-24 PROCEDURE — G0009 ADMIN PNEUMOCOCCAL VACCINE: HCPCS | Performed by: STUDENT IN AN ORGANIZED HEALTH CARE EDUCATION/TRAINING PROGRAM

## 2025-02-24 PROCEDURE — 97167 OT EVAL HIGH COMPLEX 60 MIN: CPT

## 2025-02-24 PROCEDURE — 83735 ASSAY OF MAGNESIUM: CPT

## 2025-02-24 RX ORDER — BUDESONIDE AND FORMOTEROL FUMARATE DIHYDRATE 80; 4.5 UG/1; UG/1
2 AEROSOL RESPIRATORY (INHALATION) 2 TIMES DAILY
Qty: 10.2 G | Refills: 0 | Status: SHIPPED | OUTPATIENT
Start: 2025-02-24

## 2025-02-24 RX ORDER — DILTIAZEM HYDROCHLORIDE 120 MG/1
120 CAPSULE, COATED, EXTENDED RELEASE ORAL DAILY
Status: DISCONTINUED | OUTPATIENT
Start: 2025-02-25 | End: 2025-02-24 | Stop reason: HOSPADM

## 2025-02-24 RX ORDER — DILTIAZEM HYDROCHLORIDE 120 MG/1
120 CAPSULE, COATED, EXTENDED RELEASE ORAL DAILY
Status: DISCONTINUED | OUTPATIENT
Start: 2025-02-24 | End: 2025-02-24

## 2025-02-24 RX ORDER — MAGNESIUM SULFATE HEPTAHYDRATE 40 MG/ML
2 INJECTION, SOLUTION INTRAVENOUS ONCE
Status: COMPLETED | OUTPATIENT
Start: 2025-02-24 | End: 2025-02-24

## 2025-02-24 RX ORDER — NITROFURANTOIN 25; 75 MG/1; MG/1
100 CAPSULE ORAL 2 TIMES DAILY WITH MEALS
Qty: 10 CAPSULE | Refills: 0 | Status: SHIPPED | OUTPATIENT
Start: 2025-02-24 | End: 2025-03-01

## 2025-02-24 RX ORDER — NITROFURANTOIN 25; 75 MG/1; MG/1
100 CAPSULE ORAL 2 TIMES DAILY WITH MEALS
Status: DISCONTINUED | OUTPATIENT
Start: 2025-02-24 | End: 2025-02-24 | Stop reason: HOSPADM

## 2025-02-24 RX ORDER — POTASSIUM CHLORIDE 1500 MG/1
40 TABLET, EXTENDED RELEASE ORAL ONCE
Status: COMPLETED | OUTPATIENT
Start: 2025-02-24 | End: 2025-02-24

## 2025-02-24 RX ADMIN — CHLORHEXIDINE GLUCONATE 15 ML: 1.2 SOLUTION ORAL at 09:03

## 2025-02-24 RX ADMIN — GABAPENTIN 100 MG: 100 CAPSULE ORAL at 08:59

## 2025-02-24 RX ADMIN — SENNOSIDES AND DOCUSATE SODIUM 1 TABLET: 50; 8.6 TABLET ORAL at 08:59

## 2025-02-24 RX ADMIN — LEVETIRACETAM 750 MG: 750 TABLET, FILM COATED ORAL at 08:59

## 2025-02-24 RX ADMIN — POTASSIUM CHLORIDE 40 MEQ: 1500 TABLET, EXTENDED RELEASE ORAL at 08:59

## 2025-02-24 RX ADMIN — LEVALBUTEROL HYDROCHLORIDE 1.25 MG: 1.25 SOLUTION RESPIRATORY (INHALATION) at 07:32

## 2025-02-24 RX ADMIN — NITROFURANTOIN MONOHYDRATE/MACROCRYSTALLINE 100 MG: 25; 75 CAPSULE ORAL at 11:39

## 2025-02-24 RX ADMIN — EZETIMIBE 10 MG: 10 TABLET ORAL at 08:58

## 2025-02-24 RX ADMIN — ACETAMINOPHEN 650 MG: 325 TABLET, FILM COATED ORAL at 03:51

## 2025-02-24 RX ADMIN — PNEUMOCOCCAL 20-VALENT CONJUGATE VACCINE 0.5 ML
2.2; 2.2; 2.2; 2.2; 2.2; 2.2; 2.2; 2.2; 2.2; 2.2; 2.2; 2.2; 2.2; 2.2; 2.2; 2.2; 4.4; 2.2; 2.2; 2.2 INJECTION, SUSPENSION INTRAMUSCULAR at 11:40

## 2025-02-24 RX ADMIN — CYANOCOBALAMIN TAB 500 MCG 1000 MCG: 500 TAB at 11:38

## 2025-02-24 RX ADMIN — FLUTICASONE PROPIONATE 1 SPRAY: 50 SPRAY, METERED NASAL at 11:38

## 2025-02-24 RX ADMIN — MAGNESIUM SULFATE HEPTAHYDRATE 2 G: 40 INJECTION, SOLUTION INTRAVENOUS at 09:00

## 2025-02-24 RX ADMIN — CLOPIDOGREL BISULFATE 75 MG: 75 TABLET, FILM COATED ORAL at 08:59

## 2025-02-24 RX ADMIN — BUDESONIDE AND FORMOTEROL FUMARATE DIHYDRATE 2 PUFF: 80; 4.5 AEROSOL RESPIRATORY (INHALATION) at 09:06

## 2025-02-24 RX ADMIN — QUETIAPINE FUMARATE 100 MG: 100 TABLET ORAL at 11:49

## 2025-02-24 RX ADMIN — POLYETHYLENE GLYCOL 3350 17 G: 17 POWDER, FOR SOLUTION ORAL at 09:00

## 2025-02-24 RX ADMIN — APIXABAN 5 MG: 5 TABLET, FILM COATED ORAL at 09:05

## 2025-02-24 RX ADMIN — IPRATROPIUM BROMIDE 0.5 MG: 0.5 SOLUTION RESPIRATORY (INHALATION) at 07:32

## 2025-02-24 NOTE — PROGRESS NOTES
Progress Note - Critical Care/ICU   Name: Kirsty Em 74 y.o. female I MRN: [id number]  Unit/Bed#: ICU 06 I Date of Admission: 2/21/2025   Date of Service: 2/24/2025 I Hospital Day: 3      Assessment & Plan   Active Hospital Problems    Diagnosis Date Noted POA    Bilateral carotid artery stenosis 06/10/2024 Yes    Centrilobular emphysema (HCC) 01/06/2023 Yes    Atrial fibrillation (HCC) 01/17/2025 Yes    Benign neoplasm of meninges, unspecified (HCC) 01/09/2025 Yes    Hypertension 06/11/2024 Yes    History of stroke 06/08/2024 Not Applicable    Stage 3a chronic kidney disease (HCC) 01/06/2023 Yes      Resolved Hospital Problems   No resolved problems to display.     Neuro/Psych:  Diagnoses: Status post right ICA angioplasty and stenting, left MCA stroke June 2024, bilateral carotid stenosis, seizures, anxiety, depression  Plan:  Neuro checks every 4 hours  Sedation: None  Analgesia: Tylenol as needed for headache  Eliquis 5 mg twice daily  Plavix 75 mg daily  Continue home Keppra 750 every 12  Continue home Seroquel 100 twice daily  Continue home B12 1000 mcg daily  Continue gabapentin 100 mg 3 times daily  Xanax 1 mg nightly as needed, takes 2 mg at home 3 times daily     CV:  Diagnoses: Bradycardia, hypotension, atrial fibrillation  TSH, T4, cortisol WNL  Bradycardia expected secondary to recent carotid stenting  Plan:  Off levophed gtt  Holding home diltiazem 120 daily in the setting of bradycardia and hypotension  Continuing home Zetia 10 mg daily  SBP <160 and >100, MAP >65  Continuous cardiopulmonary monitoring. Maintain MAP >65.     Pulm:   Diagnoses: Emphysema  Imaging: CXR 2/22 showing opacity in the left lingula, less likely pneumonia, no pneumothorax,   Awaiting official read  Blood gases: None  Supplemental O2: 4 L nasal cannula. Wean as tolerated.  B lines on bedside lung US  Plan:  Symbicort twice daily  Xopenex 3 times daily  Flonase daily  Atrovent neb 3 times daily  S/p lasix 30 mg 2/23  Continue  incentive spirometry  Continuous pulse oximetry. Maintain O2 sat >92%.      GI:  Diagnoses: No active issues  Last BM: 2/23 x3  Plan:  Bowel regimen: MiraLAX and senna daily  GI prophylaxis: None, tolerating diet     :  Diagnoses: CKD 3a, urinary retention  Creatinine trend: Stable  Baseline creatinine: 0.5  Plan:  Urinary retention protocol  Monitor I/Os.     F/E/N:  Diagnoses:  Plan:   F: Tolerating diet. Fluid resuscitation prn.  E: Monitor and replete electrolytes for Mg >2, Phos >3, K >4.  N: Regular house diet     Heme/Onc:  Diagnoses: Anemia  Hgb 8.1, stable  Plan:  VTE prophylaxis: Eliquis 5 mg twice daily, SCDs     Endo:  Diagnoses: No active issues  Glucose stable, controlled  Plan:   Insulin: None. Monitor blood glucose.     ID:  Diagnoses: Urinary tract infection  Culture data: >100k enterococcus species  Temperature: Afebrile  UA with large leuks and large white cells on microscopic  Plan:  Abx: Will transition from ceftriaxone to nitrofurantoin   Prevnar  Monitor fever curve and WBC.     MSK/Skin:  Diagnoses: No active issues  Plan:  PT/OT when appropriate. Encourage OOB and ambulation when appropriate. Local wound care prn.     LDAs:  Lines - PICC line left upper extremity  Drains - None  Airways - None    Disposition: Critical care    ICU Core Measures     A: Assess, Prevent, and Manage Pain Has pain been assessed? Yes  Need for changes to pain regimen? No   B: Both SAT/SAT  N/A   C: Choice of Sedation RASS Goal: N/A patient not on sedation  Need for changes to sedation or analgesia regimen? NA   D: Delirium CAM-ICU: Unable to perform secondary to Acute cognitive dysfunction   E: Early Mobility  Plan for early mobility? Yes   F: Family Engagement Plan for family engagement today? Yes       Antibiotic Review: Awaiting culture results.     Review of Invasive Devices:      Central access plan: Patient requires PICC secondary to chronic health conditions      Prophylaxis:  VTE VTE covered  by:  apixaban, Oral, 5 mg at 02/23/25 2142       Stress Ulcer  not ordered         24 Hour Events : Overnight patient was weaned off of levophed. Patient seen and evaluated at the bedside this morning.     Subjective   Review of Systems: See HPI for Review of Systems    Objective :                   Vitals I/O      Most Recent Min/Max in 24hrs   Temp 98 °F (36.7 °C) Temp  Min: 97.6 °F (36.4 °C)  Max: 98.6 °F (37 °C)   Pulse 62 Pulse  Min: 44  Max: 74   Resp 20 Resp  Min: 13  Max: 30   /76 BP  Min: 92/58  Max: 170/76   O2 Sat 96 % SpO2  Min: 89 %  Max: 100 %      Intake/Output Summary (Last 24 hours) at 2/24/2025 0655  Last data filed at 2/24/2025 0601  Gross per 24 hour   Intake 1652.49 ml   Output 2500 ml   Net -847.51 ml       Diet Regular; Regular House    Invasive Monitoring           Physical Exam   Physical Exam  Vitals and nursing note reviewed.   Eyes:      General:         Right eye: No discharge.         Left eye: No discharge.      Extraocular Movements: Extraocular movements intact.      Pupils: Pupils are equal, round, and reactive to light.   Skin:     General: Skin is warm and dry.      Coloration: Skin is not jaundiced.   HENT:      Head: Normocephalic and atraumatic.      Right Ear: Hearing normal.      Left Ear: Hearing normal.      Nose: No rhinorrhea or epistaxis.      Mouth/Throat:      Mouth: Mucous membranes are moist.      Pharynx: No oropharyngeal exudate.   Cardiovascular:      Rate and Rhythm: Normal rate and regular rhythm.      Heart sounds: Normal heart sounds.   Musculoskeletal:         General: No swelling.   Abdominal: General: Bowel sounds are normal.      Palpations: Abdomen is soft.      Tenderness: There is no abdominal tenderness.   Constitutional:       General: She is not in acute distress.     Appearance: She is not toxic-appearing.      Interventions: She is not sedated, not intubated and not restrained.  Pulmonary:      Effort: Pulmonary effort is normal. She is not  intubated.      Breath sounds: Normal breath sounds.   Psychiatric:         Speech: Speech is not no receptive aphasia or no expressive aphasia.   Neurological:      Mental Status: She is alert and oriented to person, place and time.      Cranial Nerves: No dysarthria or facial asymmetry.      Sensory: No sensory deficit.      Motor: No motor deficit.          Diagnostic Studies        Lab Results: I have reviewed the following results:     Medications:  Scheduled PRN   apixaban, 5 mg, BID  budesonide-formoterol, 2 puff, BID  cefTRIAXone, 1,000 mg, Q24H  chlorhexidine, 15 mL, Q12H JERMAINE  clopidogrel, 75 mg, Daily  vitamin B-12, 1,000 mcg, Daily  ezetimibe, 10 mg, Daily  fluticasone, 1 spray, Daily  gabapentin, 100 mg, TID  ipratropium, 0.5 mg, TID  levalbuterol, 1.25 mg, TID  levETIRAcetam, 750 mg, Q12H JERMAINE  polyethylene glycol, 17 g, Daily  QUEtiapine, 100 mg, Q12H  senna-docusate sodium, 1 tablet, BID      acetaminophen, 650 mg, Q6H PRN  ALPRAZolam, 1 mg, HS PRN  ondansetron, 4 mg, Q6H PRN       Continuous          Labs:   CBC    Recent Labs     02/23/25 0442 02/24/25  0512   WBC 8.85 8.95   HGB 8.2* 8.1*   HCT 25.9* 24.7*    219     BMP    Recent Labs     02/23/25  0442   SODIUM 143   K 3.5      CO2 30   AGAP 5   BUN 10   CREATININE 0.50*   CALCIUM 8.7       Coags    No recent results     Additional Electrolytes  Recent Labs     02/23/25  0442 02/24/25  0512   MG 1.7*  --    PHOS 3.7  --    CAIONIZED  --  1.15          Blood Gas    No recent results  No recent results LFTs  No recent results    Infectious  No recent results  Glucose  Recent Labs     02/23/25  0442   GLUC 111

## 2025-02-24 NOTE — ASSESSMENT & PLAN NOTE
Lab Results   Component Value Date    EGFR 98 02/24/2025    EGFR 97 02/23/2025    EGFR 94 02/22/2025    CREATININE 0.48 (L) 02/24/2025    CREATININE 0.50 (L) 02/23/2025    CREATININE 0.55 (L) 02/22/2025

## 2025-02-24 NOTE — DISCHARGE SUMMARY
Discharge Summary - Neurosurgery   Name: Kirsty Em 74 y.o. female I MRN: [id number]  Unit/Bed#: ICU 06 I Date of Admission: 2/21/2025   Date of Service: 2/24/2025 I Hospital Day: 3      1. Longstanding persistent atrial fibrillation (HCC)  -     Inpatient consult to Medical Critical Care; Standing  -     Inpatient consult to Medical Critical Care  2. Carotid stenosis, asymptomatic, bilateral  -     IR cerebral angiography / intervention; Standing  -     IR cerebral angiography / intervention  -     Inpatient consult to Case Management; Standing  -     Inpatient Consult to Nutrition Services; Standing  -     Inpatient consult to Medical Critical Care; Standing  -     Inpatient consult to Case Management  -     Inpatient Consult to Nutrition Services  -     Inpatient consult to Medical Critical Care  3. Tobacco dependence  4. Centrilobular emphysema (HCC)  Assessment & Plan:  Smokes < 1 PPD x many years.  Requiring 4-5 L O2 via NC to maintain O2sat > 90 over weekend, now able to maintain O2sat 90-93 on RA.  Has attempted home oxygen in past but did not use it.  States she has SpO2 monitor at home.  Advised to follow up with PCP/pulmonologist regarding hypoxia, smoking cessation.  5. Acute respiratory failure with hypoxia (HCC)     PPD#3 L PASTOR with Dr. Quintana (2/21)  In setting of bilateral carotid artery stenosis.  S/p R PASTOR with Dr. Quintana on 1/31.    Plan:  Continue to monitor neuro exam closely.  STAT CTA head with decline in GCS > 2 pts in 1 hour.  Noted hypotensive and bradycardic, which occurred during last admission as well.  Levo discontinued, maintaining MAP > 65 without assistance.  Continue Eliquis 5 mg BID and Plavix 75 mg daily.  Mobilize with PT/OT.  DVT ppx: SCDs.    Neurosurgery will continue to follow as primary. Plan to discharge patient home today. Call with questions.      Neurological Exam  Mental Status  Alert. Oriented only to person and place.  Stated date was January 2005.    Cranial  Nerves  CN II: Visual acuity is normal. Visual fields full to confrontation.  CN III, IV, VI: Extraocular movements intact bilaterally. Normal lids and orbits bilaterally. Pupils equal round and reactive to light bilaterally.  CN V: Facial sensation is normal.  CN VII: Full and symmetric facial movement.  CN VIII: Hearing is normal.  CN IX, X: Palate elevates symmetrically. Normal gag reflex.  CN XI: Shoulder shrug strength is normal.  CN XII: Tongue midline without atrophy or fasciculations.    Motor   Strength is 5/5 throughout all four extremities.    Discharge Summary- Neurosurgery   Kirsty Em 74 y.o. female MRN: [id number]  Unit/Bed#: ICU 06 Encounter: [id number]      Admission Date:   Admission Orders (From admission, onward)       Ordered        02/21/25 1115  Inpatient Admission  Once                            Discharge Date: 2/24/25    Admitting Diagnosis: Carotid stenosis, asymptomatic, bilateral [I65.23]    Discharge Diagnosis: carotid stenosis  Medical Problems       Resolved Problems  Date Reviewed: 12/12/2024   None         Attending Physician: Soto Quintana MD    Consulting Physician: Jennifer ([facility])    Procedures Performed: * No procedures listed *    Pathology: N/A    Hospital Course: The patient presented on 2/21/25 to [facility] for elective stenting of left carotid artery secondary to severe stenosis with Dr. Quintana. She previously had right carotid artery treated in January, also by Dr. Quintana. She tolerated the procedure well and without complication and was transferred to the PACU and then the ICU in good condition.    Over the weekend, she experienced hypotension requiring pressor support and hypoxia requiring supplemental oxygen via nasal canula. She continued to progress and eventually weaned from pressor support and tolerated room air with mild hypoxia (O2 sat < 93%). She reported feeling well and being very anxious to leave the hospital. She was treated for Klebsiella UTI. She  was discharged home in good condition on post procedure day 3.    Condition at Discharge: good     Discharge Instructions/Information to Patient and Family:   See after visit summary for information provided to patient and family.      Provisions for Follow-Up Care:  See after visit summary for information related to follow-up care and any pertinent home health orders.      Disposition: Home    Planned Readmission: No    Discharge Statement   I spent 20 minutes discharging the patient. This time was spent on the day of discharge. I had direct contact with the patient on the day of discharge. Additional documentation is required if more than 30 minutes were spent on discharge.     Discharge Medications:  See after visit summary for reconciled discharge medications provided to patient and family.

## 2025-02-24 NOTE — PHYSICAL THERAPY NOTE
Physical Therapy Evaluation     Patient's Name: Kirsty Em    Admitting Diagnosis  Carotid stenosis, asymptomatic, bilateral [I65.23]    Problem List  Patient Active Problem List   Diagnosis    Anxiety disorder    Episode of recurrent major depressive disorder, unspecified depression episode severity (HCC)    Tobacco dependence    Acute respiratory failure with hypoxia (HCC)    Centrilobular emphysema (HCC)    Stage 3a chronic kidney disease (HCC)    Mixed hyperlipidemia    Closed displaced fracture of proximal phalanx of right thumb    History of stroke    Pre-diabetes    Bilateral carotid artery stenosis    Hypertension    Hypokalemia    Depression    Seizure disorder (HCC)    Diastolic dysfunction    QT prolongation    Urinary retention    Gross hematuria    Brain mass    Closed displaced fracture of proximal phalanx of right thumb, initial encounter    Drug-induced encephalopathy    Polypharmacy    Frailty syndrome in geriatric patient    [place] (hard [place] hearing)    Ambulatory dysfunction    Benign neoplasm of meninges, unspecified (HCC)    Atrial fibrillation (HCC)       Past Medical History  Past Medical History:   Diagnosis Date    Anxiety     Atrial fibrillation (HCC)     Depression     Depression, recurrent (HCC) 04/20/2021    Hallucinations 04/20/2021    History of seizure disorder 04/20/2021       Past Surgical History  Past Surgical History:   Procedure Laterality Date    IR CEREBRAL ANGIOGRAPHY / INTERVENTION  1/30/2025    ORIF FINGER FRACTURE Right 6/27/2024    Procedure: ORIF OF RIGHT THUMB;  Surgeon: Dagoberto Yates MD;  [facility]: [facility];  Service: Orthopedics        02/24/25 1035   PT Last Visit   PT Visit Date 02/24/25   Note Type   Note type Evaluation   Pain Assessment   Pain Assessment Tool 0-10   Pain Score No Pain   Hospital Pain Intervention(s) Repositioned;Ambulation/increased activity   Restrictions/Precautions   Weight Bearing Precautions Per Order No   Home Living   Type of Home House    Home Layout Two level;Stairs to enter with rails   Home Equipment Cane   Prior Function   Level of [place] Independent with functional mobility   Lives With Spouse   Receives Help From Family   General   Family/Caregiver Present No   Cognition   Orientation Level Oriented X4   Subjective   Subjective Pt willing and agreeable to PT session   RLE Assessment   RLE Assessment WFL   LLE Assessment   LLE Assessment WFL   Coordination   Movements are Fluid and Coordinated 0   Coordination and Movement Description slow and guarded   Bed Mobility   Supine to Sit Unable to assess   Sit to Supine Unable to assess   Additional Comments Pt left resting in chair, call bell in reach, chair alarm active   Transfers   Sit to Stand 4  Minimal assistance   Additional items Assist x 1   Stand to Sit 4  Minimal assistance   Additional items Assist x 1   Ambulation/Elevation   Gait pattern Decreased foot clearance;Shuffling;Inconsistent alec;Excessively slow   Gait Assistance 4  Minimal assist   Additional items Assist x 1   Assistive Device Rolling walker   Distance 55+55   Balance   Static Sitting Fair   Dynamic Sitting Fair -   Static Standing Poor +   Ambulatory Poor +   Endurance Deficit   Endurance Deficit Yes   Endurance Deficit Description limited by fatigue, weakness, and balance compared to baseline   Activity Tolerance   Activity Tolerance Patient limited by fatigue;Patient limited by pain   Medical Staff Made Aware OT for D/C planning   Nurse Made Aware yes, nsg gave clearance to work with pt   Assessment   Prognosis Fair   Problem List Decreased strength;Decreased range of motion;Decreased endurance;Impaired balance;Decreased mobility;Decreased coordination;Decreased cognition;Decreased safety awareness;Impaired judgement;Pain   Assessment Pt is 74 y.o. female seen for PT evaluation s/p admit to [facility] on [facility]/21/2025 w/ Bilateral carotid artery stenosis. PT consulted to assess pt's functional mobility  and d/c needs. Order placed for PT eval and tx, w/ up w/ A order. Comorbidities affecting pt's physical performance at time of assessment include:  has a past medical history of Anxiety, Atrial fibrillation (HCC), Depression, Depression, recurrent (HCC), Hallucinations, and History of seizure disorder. PTA, pt was ambulates household distances and lives in multi-level home. Personal factors affecting pt at time of IE include: inability to navigate level surfaces w/o external assistance, decreased cognition, unable to perform physical activity, limited insight into impairments, and inability to perform IADLs. Please find objective findings from PT assessment regarding body systems outlined above with impairments and limitations including weakness, impaired balance, decreased endurance, impaired coordination, gait deviations, pain, decreased activity tolerance, decreased functional mobility tolerance, decreased safety awareness, impaired judgement, fall risk, and decreased cognition. Pt required increased time to complete transfers. Required use of RW to improve stability with gait. Noted SpO2 in mid 80s during ambulation on RA, trial with O2 which improved to low 90s, nsg aware. High risk of falls with decreased knowledge of current limitations and safety.The following objective measures performed on IE also reveal limitations: The patient's AM-PAC Basic Mobility Inpatient Short Form Raw Score is 17, Standardized Score is 39.67. A standardized score less than 42.9 suggests the patient may benefit from discharge to post-acute rehabilitation services. Please also refer to the recommendation of the Physical Therapist for safe discharge planning. Pt's clinical presentation is currently unstable/unpredictable seen in pt's presentation of critical care monitoring. Pt to benefit from continued PT tx to address deficits as defined above and maximize level of functional independent mobility and consistency. From PT/mobility  standpoint, recommendation at time of d/c would be level III pending progress in order to facilitate return to PLOF.   Barriers to Discharge Inaccessible home environment;Decreased caregiver support   Goals   Patient Goals To go home   STG Expiration Date 03/08/25   Short Term Goal #1 1. Complete bed mobility and transfers I to decrease need for caregiver in home. 2. Ambulate 300' I to complete household and community mobility without A. 3. Improve dynamic balance to good to decrease need for UE support during ambulation. 4. Be educated & demonstate 12 steps to be able to enter home without A.   Plan   Treatment/Interventions Functional transfer training;LE strengthening/ROM;OT;Spoke to nursing;Bed mobility;Gait training;Patient/family training   PT Frequency 3-5x/wk   Discharge Recommendation   Rehab Resource Intensity Level, PT III (Minimum Resource Intensity)   Equipment Recommended Walker   AM-PAC Basic Mobility Inpatient   Turning in Flat Bed Without Bedrails 3   Lying on Back to Sitting on Edge of Flat Bed Without Bedrails 3   Moving Bed to Chair 3   Standing Up From Chair Using Arms 3   Walk in Room 3   Climb 3-5 Stairs With Railing 2   Basic Mobility Inpatient Raw Score 17   Basic Mobility Standardized Score 39.67   [facility] Highest Level Of Mobility   -HLM Goal 5: Stand one or more mins   -HLM Achieved 7: Walk 25 feet or more         Tasneem Thompson, PT

## 2025-02-24 NOTE — PLAN OF CARE
Problem: Neurological Deficit  Goal: Neurological status is stable or improving  Description: Interventions:  - Monitor and assess patient's level of consciousness, motor function, sensory function, and level of assistance needed for ADLs.   - Monitor and report changes from baseline. Collaborate with interdisciplinary team to initiate plan and implement interventions as ordered.   - Provide and maintain a safe environment.  - Consider seizure precautions.  - Consider fall precautions.  - Consider aspiration precautions.  - Consider bleeding precautions.  Outcome: Progressing     Problem: Activity Intolerance/Impaired Mobility  Goal: Mobility/activity is maintained at optimum level for patient  Description: Interventions:  - Assess and monitor patient  barriers to mobility and need for assistive/adaptive devices.  - Assess patient's emotional response to limitations.  - Collaborate with interdisciplinary team and initiate plans and interventions as ordered.  - Encourage independent activity per ability.  - Maintain proper body alignment.  - Perform active/passive rom as tolerated/ordered.  - Plan activities to conserve energy.  - Turn patient as appropriate  Outcome: Progressing     Problem: Communication Impairment  Goal: Ability to express needs and understand communication  Description: Assess patient's communication skills and ability to understand information.  Patient will demonstrate use of effective communication techniques, alternative methods of communication and understanding even if not able to speak.     - Encourage communication and provide alternate methods of communication as needed.  - Collaborate with case management/ for discharge needs.  - Include patient/family/caregiver in decisions related to communication.  Outcome: Progressing     Problem: Potential for Aspiration  Goal: Non-ventilated patient's risk of aspiration is minimized  Description: Assess and monitor vital signs,  respiratory status, and labs (WBC).  Monitor for signs of aspiration (tachypnea, cough, rales, wheezing, cyanosis, fever).    - Assess and monitor patient's ability to swallow.  - Place patient up in chair to eat if possible.  - HOB up at 90 degrees to eat if unable to get patient up into chair.  - Supervise patient during oral intake.   - Instruct patient/ family to take small bites.  - Instruct patient/ family to take small single sips when taking liquids.  - Follow patient-specific strategies generated by speech pathologist.  Outcome: Progressing  Goal: Ventilated patient's risk of aspiration is minimized  Description: Assess and monitor vital signs, respiratory status, airway cuff pressure, and labs (WBC).  Monitor for signs of aspiration (tachypnea, cough, rales, wheezing, cyanosis, fever).    - Elevate head of bed 30 degrees if patient has tube feeding.  - Monitor tube feeding.  Outcome: Progressing     Problem: Nutrition  Goal: Nutrition/Hydration status is improving  Description: Monitor and assess patient's nutrition/hydration status for malnutrition (ex- brittle hair, bruises, dry skin, pale skin and conjunctiva, muscle wasting, smooth red tongue, and disorientation). Collaborate with interdisciplinary team and initiate plan and interventions as ordered.  Monitor patient's weight and dietary intake as ordered or per policy. Utilize nutrition screening tool and intervene per policy. Determine patient's food preferences and provide high-protein, high-caloric foods as appropriate.     - Assist patient with eating.  - Allow adequate time for meals.  - Encourage patient to take dietary supplement as ordered.  - Collaborate with clinical nutritionist.  - Include patient/family/caregiver in decisions related to nutrition.  Outcome: Progressing     Problem: PAIN - ADULT  Goal: Verbalizes/displays adequate comfort level or baseline comfort level  Description: Interventions:  - Encourage patient to monitor pain and  request assistance  - Assess pain using appropriate pain scale  - Administer analgesics based on type and severity of pain and evaluate response  - Implement non-pharmacological measures as appropriate and evaluate response  - Consider cultural and social influences on pain and pain management  - Notify physician/advanced practitioner if interventions unsuccessful or patient reports new pain  Outcome: Progressing     Problem: INFECTION - ADULT  Goal: Absence or prevention of progression during hospitalization  Description: INTERVENTIONS:  - Assess and monitor for signs and symptoms of infection  - Monitor lab/diagnostic results  - Monitor all insertion sites, i.e. indwelling lines, tubes, and drains  - Monitor endotracheal if appropriate and nasal secretions for changes in amount and color  - [place] appropriate cooling/warming therapies per order  - Administer medications as ordered  - Instruct and encourage patient and family to use good hand hygiene technique  - Identify and instruct in appropriate isolation precautions for identified infection/condition  Outcome: Progressing  Goal: Absence of fever/infection during neutropenic period  Description: INTERVENTIONS:  - Monitor WBC    Outcome: Progressing     Problem: SAFETY ADULT  Goal: Patient will remain free of falls  Description: INTERVENTIONS:  - Educate patient/family on patient safety including physical limitations  - Instruct patient to call for assistance with activity   - Consult OT/PT to assist with strengthening/mobility   - Keep Call bell within reach  - Keep bed low and locked with side rails adjusted as appropriate  - Keep care items and personal belongings within reach  - Initiate and maintain comfort rounds  - Make Fall Risk Sign visible to staff  - Offer Toileting every  Hours, in advance of need  - Initiate/Maintain alarm  - Obtain necessary fall risk management equipment:   - Apply yellow socks and bracelet for high fall risk patients  -  Consider moving patient to room near nurses station  Outcome: Progressing  Goal: Maintain or return to baseline ADL function  Description: INTERVENTIONS:  -  Assess patient's ability to carry out ADLs; assess patient's baseline for ADL function and identify physical deficits which impact ability to perform ADLs (bathing, care of mouth/teeth, toileting, grooming, dressing, etc.)  - Assess/evaluate cause of self-care deficits   - Assess range of motion  - Assess patient's mobility; develop plan if impaired  - Assess patient's need for assistive devices and provide as appropriate  - Encourage maximum independence but intervene and supervise when necessary  - Involve family in performance of ADLs  - Assess for home care needs following discharge   - Consider OT consult to assist with ADL evaluation and planning for discharge  - Provide patient education as appropriate  Outcome: Progressing  Goal: Maintains/Returns to pre admission functional level  Description: INTERVENTIONS:  - Perform AM-PAC 6 Click Basic Mobility/ Daily Activity assessment daily.  - Set and communicate daily mobility goal to care team and patient/family/caregiver.   - Collaborate with rehabilitation services on mobility goals if consulted  - Perform Range of Motion  times a day.  - Reposition patient every  hours.  - Dangle patient  times a day  - Stand patient  times a day  - Ambulate patient  times a day  - Out of bed to chair  times a day   - Out of bed for meals    times a day  - Out of bed for toileting  - Record patient progress and toleration of activity level   Outcome: Progressing     Problem: DISCHARGE PLANNING  Goal: Discharge to home or other facility with appropriate resources  Description: INTERVENTIONS:  - Identify barriers to discharge w/patient and caregiver  - Arrange for needed discharge resources and transportation as appropriate  - Identify discharge learning needs (meds, wound care, etc.)  - Arrange for interpretive services to  assist at discharge as needed  - Refer to Case Management Department for coordinating discharge planning if the patient needs post-hospital services based on physician/advanced practitioner order or complex needs related to functional status, cognitive ability, or social support system  Outcome: Progressing     Problem: Knowledge Deficit  Goal: Patient/family/caregiver demonstrates understanding of disease process, treatment plan, medications, and discharge instructions  Description: Complete learning assessment and assess knowledge base.  Interventions:  - Provide teaching at level of understanding  - Provide teaching via preferred learning methods  Outcome: Progressing     Problem: Prexisting or High Potential for Compromised Skin Integrity  Goal: Skin integrity is maintained or improved  Description: INTERVENTIONS:  - Identify patients at risk for skin breakdown  - Assess and monitor skin integrity  - Assess and monitor nutrition and hydration status  - Monitor labs   - Assess for incontinence   - Turn and reposition patient  - Assist with mobility/ambulation  - Relieve pressure over bony prominences  - Avoid friction and shearing  - Provide appropriate hygiene as needed including keeping skin clean and dry  - Evaluate need for skin moisturizer/barrier cream  - Collaborate with interdisciplinary team   - Patient/family teaching  - Consider wound care consult   Outcome: Progressing

## 2025-02-24 NOTE — OCCUPATIONAL THERAPY NOTE
"    Occupational Therapy Evaluation     Patient Name: Kirsty Em  Today's Date: 2/24/2025  Problem List  Principal Problem:    Bilateral carotid artery stenosis  Active Problems:    Centrilobular emphysema (HCC)    Stage 3a chronic kidney disease (HCC)    History of stroke    Hypertension    Benign neoplasm of meninges, unspecified (HCC)    Atrial fibrillation (HCC)    Past Medical History  Past Medical History:   Diagnosis Date    Anxiety     Atrial fibrillation (HCC)     Depression     Depression, recurrent (HCC) 04/20/2021    Hallucinations 04/20/2021    History of seizure disorder 04/20/2021     Past Surgical History  Past Surgical History:   Procedure Laterality Date    IR CEREBRAL ANGIOGRAPHY / INTERVENTION  1/30/2025    ORIF FINGER FRACTURE Right 6/27/2024    Procedure: ORIF OF RIGHT THUMB;  Surgeon: Dagoberto Yates MD;  [facility];  Service: Orthopedics         02/24/25 1020   OT Last Visit   OT Visit Date 02/24/25   Note Type   Note type Evaluation   Pain Assessment   Pain Assessment Tool 0-10   Pain Score No Pain   Restrictions/Precautions   Weight Bearing Precautions Per Order No   Home Living   Type of Home House   Home Layout Two level;Stairs to enter with rails   Home Equipment Cane   Prior Function   Level of [place] Independent with ADLs;Independent with functional mobility;Needs assistance with IADLS   Lives With Spouse   Receives Help From Family   IADLs Family/Friend/Other provides transportation;Family/Friend/Other provides meals;Family/Friend/Other provides medication management   Falls in the last 6 months 0   Vocational Retired   Lifestyle   Autonomy I adls and mobility - spouse assists PRN - spouse manages majority of iadls   Reciprocal Relationships supportive family   Service to Others retired   Intrinsic Gratification mostly sedentary - reports she was more active in the past   Subjective   Subjective \"I'm ready to go home\"   ADL   Eating Assistance 7  Independent "   Grooming Assistance 5  Supervision/Setup   UB Bathing Assistance 5  Supervision/Setup   LB Bathing Assistance 4  Minimal Assistance   UB Dressing Assistance 5  Supervision/Setup   LB Dressing Assistance 4  Minimal Assistance   Toileting Assistance  4  Minimal Assistance   Bed Mobility   Additional Comments oob on commode   Transfers   Sit to Stand 4  Minimal assistance   Stand to Sit 4  Minimal assistance   Toilet transfer 4  Minimal assistance   Functional Mobility   Functional Mobility 4  Minimal assistance   Additional items Rolling walker   Balance   Static Sitting Good   Dynamic Sitting Fair +   Static Standing Fair   Dynamic Standing Fair -   Ambulatory Fair -   Activity Tolerance   Activity Tolerance Patient limited by fatigue;Treatment limited secondary to medical complications (Comment)   RUE Assessment   RUE Assessment WFL   LUE Assessment   LUE Assessment WFL   Cognition   Arousal/Participation Arousable;Cooperative   Attention Attends with cues to redirect   Orientation Level Oriented to person;Oriented to place;Oriented to time;Oriented to situation   Memory Decreased recall of precautions   Following Commands Follows one step commands without difficulty   Comments noted to be slow to process/respond at times   Assessment   Limitation Decreased ADL status;Decreased Safe judgement during ADL;Decreased endurance;Decreased self-care trans;Decreased high-level ADLs   Prognosis Good   Assessment Pt is a 74 y.o. female who was [facility] with Bilateral carotid artery stenosis s/p Left PASTOR . Patient   has a past medical history of Anxiety, Atrial fibrillation (HCC), Depression, Depression, recurrent (HCC), Hallucinations, and History of seizure disorder.  At baseline pt was completing adls and mobility independently - spouse assists PRN and manages majority of iadls. Pt lives with spouse in 2 [place] home. Currently pt requires min assist for overall ADLS and min assist for  functional mobility/transfers. Pt currently presents with impairments in the following categories -steps to enter environment, difficulty performing ADLS, difficulty performing IADLS , limited insight into deficits, health management , and environment activity tolerance, endurance, standing balance/tolerance, memory, insight, safety , judgement , and attention . These impairments, as well as pt's fatigue, SOB, risk for falls, and home environment  limit pt's ability to safely engage in all baseline areas of occupation, includingbathing, dressing, toileting, functional mobility/transfers, community mobility, laundry , driving, house maintenance, medication management, meal prep, cleaning, social participation , and leisure activities  From OT standpoint, recommend Level III resources upon D/C. OT will continue to follow to address the below stated goals.   Goals   Patient Goals go home   LTG Time Frame 10-14   Long Term Goal #1 1) Mod I UB/LB adls after setup with use of LHAE PRN  2)  Mod I toileting and clothing management  3) Mod I bed mobility  4) Mod I functional mob/transfers to and from all surfaces with fair+ to good balance/safety   5) Increase activity tolerance to 30-35min for participation in adls and enjoyable activities  6) Assess DME needs   7) Demonstrate good carryover with safe use of AD during functional tasks   8) Assess DME needs   9) Ongoing functional cognitive assessment to assist with safe d/c recommendations   Plan   Treatment Interventions ADL retraining;Functional transfer training;Endurance training;Cognitive reorientation;Patient/family training;Equipment evaluation/education;Compensatory technique education;Energy conservation;Activityengagement   Goal Expiration Date 03/10/25   OT Frequency 2-3x/wk   Discharge Recommendation   Rehab Resource Intensity Level, OT III (Minimum Resource Intensity)   AM-PAC Daily Activity Inpatient   Lower Body Dressing 3   Bathing 3   Toileting 3   Upper  Body Dressing 3   Grooming 3   Eating 4   Daily Activity Raw Score 19   Daily Activity Standardized Score (Calc for Raw Score >=11) 40.22   AM-PAC Applied Cognition Inpatient   Following a Speech/Presentation 3   Understanding Ordinary Conversation 4   Taking Medications 3   Remembering Where Things Are Placed or Put Away 3   Remembering List of 4-5 Errands 3   Taking Care of Complicated Tasks 3   Applied Cognition Raw Score 19   Applied Cognition Standardized Score 39.77   End of Consult   Education Provided Yes   Patient Position at End of Consult Bedside chair;Bed/Chair alarm activated;All needs within reach   Nurse Communication Nurse aware of consult       The patient's raw score on the AM-PAC Daily Activity Inpatient Short Form is 19. A raw score of greater than or equal to 19 suggests the patient may benefit from discharge to home. Please refer to the recommendation of the Occupational Therapist for safe discharge planning.    Documentation Completed By:    ADELA Sheth/L  [employer] Certified - [id number]

## 2025-02-24 NOTE — ASSESSMENT & PLAN NOTE
PPD#3 L PASTOR with Dr. Quintana (2/21)  In setting of bilateral carotid artery stenosis.  S/p R PASTOR with Dr. Quintana on 1/31.    Plan:  Continue to monitor neuro exam closely.  STAT CTA head with decline in GCS > 2 pts in 1 hour.  Noted hypotensive and bradycardic, which occurred during last admission as well.  Levo discontinued, maintaining MAP > 65 without assistance.  Continue Eliquis 5 mg BID and Plavix 75 mg daily.  Mobilize with PT/OT.  DVT ppx: SCDs.    Neurosurgery will continue to follow as primary. Plan to discharge patient home today. Call with questions.

## 2025-02-24 NOTE — ASSESSMENT & PLAN NOTE
Smokes < 1 PPD x many years.  Requiring 4-5 L O2 via NC to maintain O2sat > 90 over weekend, now able to maintain O2sat 90-93 on RA.  Has attempted home oxygen in past but did not use it.  States she has SpO2 monitor at home.  Advised to follow up with PCP/pulmonologist regarding hypoxia, smoking cessation.

## 2025-02-25 ENCOUNTER — PATIENT OUTREACH (OUTPATIENT)
Dept: CASE MANAGEMENT | Facility: OTHER | Age: 75
End: 2025-02-25

## 2025-02-25 ENCOUNTER — TELEPHONE (OUTPATIENT)
Dept: NEUROSURGERY | Facility: CLINIC | Age: 75
End: 2025-02-25

## 2025-02-25 LAB
ATRIAL RATE: 39 BPM
BACTERIA UR CULT: ABNORMAL
P AXIS: 46 DEGREES
PR INTERVAL: 263 MS
QRS AXIS: 81 DEGREES
QRSD INTERVAL: 79 MS
QT INTERVAL: 558 MS
QTC INTERVAL: 450 MS
T WAVE AXIS: 197 DEGREES
VENTRICULAR RATE: 39 BPM

## 2025-02-25 PROCEDURE — 93010 ELECTROCARDIOGRAM REPORT: CPT | Performed by: INTERNAL MEDICINE

## 2025-02-25 NOTE — TELEPHONE ENCOUNTER
Completed post angio/intervention callback to Kirsty Rayshawnmecca at primary contact number spoke with her  Wilver as she is laying down resting. The patient denies any pain, swelling, drainage, fevers at the puncture site. Confirmed understanding of post procedure medications, continue on Eliquis 5mg BID and Plavix 75mg daily until advised otherwise. Has a supply of medication. Reports headaches but admits they are at baseline. Advised that it is normal to experience fatigue and mild headaches for a few days after the procedure. Advised that tylenol should provide adequate relief. Explained that he should contact the office if he experiences any pain, swelling, or drainage from the site, and report to the ER or call 911 if he experiences WHOL, visual disturbance, confusion/disorientation, slurred speech, or ambulatory dysfunction. Reminded of post procedure follow-up scheduled on 3/17/2025. Patient appreciative of call.

## 2025-02-26 ENCOUNTER — RESULTS FOLLOW-UP (OUTPATIENT)
Dept: CCU | Facility: HOSPITAL | Age: 75
End: 2025-02-26

## 2025-02-26 ENCOUNTER — PATIENT OUTREACH (OUTPATIENT)
Dept: CASE MANAGEMENT | Facility: OTHER | Age: 75
End: 2025-02-26

## 2025-02-26 NOTE — PROGRESS NOTES
Spoke with Kirsty and her  Napoleon. Kirsty reports she is doing good since coming home from hospital, Throat hurts inside. Napoleon will call surgeons office to let them know. Reviewed  that she can take tylenol 975mg for pain every 8 hours . She is taking her antibiotic for bladder infection,  No questions about medications or discharge instructions  No care management needs identified.

## 2025-02-28 LAB
BACTERIA BLD CULT: NORMAL
BACTERIA BLD CULT: NORMAL

## 2025-03-01 DIAGNOSIS — G40.909 SEIZURE DISORDER (HCC): ICD-10-CM

## 2025-03-03 RX ORDER — LEVETIRACETAM 750 MG/1
750 TABLET ORAL EVERY 12 HOURS SCHEDULED
Qty: 180 TABLET | Refills: 1 | OUTPATIENT
Start: 2025-03-03 | End: 2025-05-02

## 2025-03-10 ENCOUNTER — OFFICE VISIT (OUTPATIENT)
Dept: FAMILY MEDICINE CLINIC | Facility: CLINIC | Age: 75
End: 2025-03-10
Payer: MEDICARE

## 2025-03-10 VITALS
OXYGEN SATURATION: 94 % | SYSTOLIC BLOOD PRESSURE: 122 MMHG | HEART RATE: 88 BPM | HEIGHT: 63 IN | BODY MASS INDEX: 20.02 KG/M2 | WEIGHT: 113 LBS | TEMPERATURE: 97.8 F | DIASTOLIC BLOOD PRESSURE: 78 MMHG

## 2025-03-10 DIAGNOSIS — D63.1 ANEMIA DUE TO CHRONIC KIDNEY DISEASE, ON CHRONIC DIALYSIS (HCC): ICD-10-CM

## 2025-03-10 DIAGNOSIS — Z99.2 ANEMIA DUE TO CHRONIC KIDNEY DISEASE, ON CHRONIC DIALYSIS (HCC): ICD-10-CM

## 2025-03-10 DIAGNOSIS — I65.23 BILATERAL CAROTID ARTERY STENOSIS: Primary | ICD-10-CM

## 2025-03-10 DIAGNOSIS — I10 PRIMARY HYPERTENSION: ICD-10-CM

## 2025-03-10 DIAGNOSIS — I48.91 ATRIAL FIBRILLATION, UNSPECIFIED TYPE (HCC): ICD-10-CM

## 2025-03-10 DIAGNOSIS — J43.2 CENTRILOBULAR EMPHYSEMA (HCC): ICD-10-CM

## 2025-03-10 DIAGNOSIS — N18.6 ANEMIA DUE TO CHRONIC KIDNEY DISEASE, ON CHRONIC DIALYSIS (HCC): ICD-10-CM

## 2025-03-10 DIAGNOSIS — J96.01 ACUTE RESPIRATORY FAILURE WITH HYPOXIA (HCC): ICD-10-CM

## 2025-03-10 PROCEDURE — 99495 TRANSJ CARE MGMT MOD F2F 14D: CPT | Performed by: STUDENT IN AN ORGANIZED HEALTH CARE EDUCATION/TRAINING PROGRAM

## 2025-03-10 NOTE — PROGRESS NOTES
Transition of Care Visit  Name: Kirsty Em      : 1950      MRN: [id number]  Encounter Provider: Brien Desouza MD  Encounter Date: 3/10/2025   Encounter department: [facility] [address]    Assessment & Plan  Bilateral carotid artery stenosis  S/p surgical correction        Atrial fibrillation, unspecified type (HCC)         Primary hypertension  Stable today       Centrilobular emphysema (HCC)  Brething stable       Acute respiratory failure with hypoxia (HCC)  Resolved at this itme       Anemia due to chronic kidney disease, on chronic dialysis (HCC)  Lab Results   Component Value Date    EGFR 98 2025    EGFR 97 2025    EGFR 94 2025    CREATININE 0.48 (L) 2025    CREATININE 0.50 (L) 2025    CREATININE 0.55 (L) 2025     Orders:  •  CBC (Includes Diff/Plt) (Refl); Future         History of Present Illness     Transitional Care Management Review:   Kirsty Em is a 74 y.o. female here for TCM follow up.     25 to [facility] for elective stenting of left carotid artery secondary to severe stenosis with Dr. Quintana. She previously had right carotid artery treated in January, also by Dr. Quintana. She tolerated the procedure well and without complication and was transferred to the PACU and then the ICU in good condition.     Over the weekend, she experienced hypotension requiring pressor support and hypoxia requiring supplemental oxygen via nasal canula. She continued to progress and eventually weaned from pressor support and tolerated room air with mild hypoxia (O2 sat < 93%). She reported feeling well and being very anxious to leave the hospital. She was treated for Klebsiella UTI. She was discharged home in good condition on post procedure day 3.    Also had a unit of blood    During the TCM phone call patient stated:  TCM Call     Date and time call was made  2025  1:44 PM    Hospital care reviewed  Records reviewed     Patient was hospitialized at  [facility]    Date of Admission  02/21/25    Date of discharge  02/24/25    Diagnosis  Bilateral carotid artery stenosis    Disposition  Home    Were the patients medications reviewed and updated  Yes    Current Symptoms  Fatigue; Weakness      TCM Call     Post hospital issues  None    Should patient be enrolled in anticoag monitoring?  No    Scheduled for follow up?  Yes    Did you obtain your prescribed medications  Yes    Do you need help managing your prescriptions or medications  No    Is transportation to your appointment needed  No    I have advised the patient to call PCP with any new or worsening symptoms  GREGORIO Bonilla    Living Arrangements  Spouse or Significiant other    Support System  Spouse    The type of support provided  Emotional    Do you have social support  Yes, as much as I need    Are you recieving any outpatient services  No    Are you recieving home care services  No    Are you using any community resources  No    Current waiver services  No    Have you fallen in the last 12 months  No    Interperter language line needed  No    Counseling  Family    Counseling topics  Activities of daily living; patient and family education    Comments  DECLINED TCM        HPI  Review of Systems   Constitutional:  Negative for chills, fatigue and fever.   HENT:  Negative for rhinorrhea and sore throat.    Eyes:  Negative for visual disturbance.   Respiratory:  Negative for cough and shortness of breath.    Cardiovascular:  Negative for chest pain and palpitations.   Gastrointestinal:  Negative for abdominal pain, constipation, diarrhea, nausea and vomiting.   Genitourinary:  Negative for difficulty urinating, dysuria and frequency.   Musculoskeletal:  Negative for arthralgias and myalgias.   Skin:  Negative for color change and rash.   Neurological:  Negative for weakness and headaches.     Objective   /78   Pulse 88   Temp 97.8 °F (36.6 °C)   Ht 5'  "3\" (1.6 m)   Wt 51.3 kg (113 lb)   SpO2 94%   BMI 20.02 kg/m²     Physical Exam  Constitutional:       General: She is not in acute distress.     Appearance: Normal appearance. She is not ill-appearing.   HENT:      Head: Normocephalic and atraumatic.      Right Ear: Tympanic membrane, ear canal and external ear normal.      Left Ear: Tympanic membrane, ear canal and external ear normal.      Nose: Nose normal.      Mouth/Throat:      Mouth: Mucous membranes are moist.      Pharynx: Oropharynx is clear. No oropharyngeal exudate or posterior oropharyngeal erythema.   Eyes:      General: No scleral icterus.        Right eye: No discharge.         Left eye: No discharge.      Extraocular Movements: Extraocular movements intact.      Conjunctiva/sclera: Conjunctivae normal.      Pupils: Pupils are equal, round, and reactive to light.   Cardiovascular:      Rate and Rhythm: Normal rate and regular rhythm.      Pulses: Normal pulses.      Heart sounds: Normal heart sounds. No murmur heard.  Pulmonary:      Effort: Pulmonary effort is normal. No respiratory distress.      Breath sounds: Normal breath sounds.   Abdominal:      General: Bowel sounds are normal.      Palpations: Abdomen is soft.      Tenderness: There is no abdominal tenderness.   Musculoskeletal:         General: Normal range of motion.      Cervical back: Normal range of motion and neck supple.   Lymphadenopathy:      Cervical: No cervical adenopathy.   Skin:     General: Skin is warm and dry.      Capillary Refill: Capillary refill takes less than 2 seconds.   Neurological:      General: No focal deficit present.      Mental Status: She is alert and oriented to person, place, and time. Mental status is at baseline.      Cranial Nerves: No cranial nerve deficit.   Psychiatric:         Mood and Affect: Mood normal.       Medications have been reviewed by provider in current encounter      "

## 2025-03-17 ENCOUNTER — OFFICE VISIT (OUTPATIENT)
Dept: NEUROSURGERY | Facility: CLINIC | Age: 75
End: 2025-03-17

## 2025-03-17 VITALS
SYSTOLIC BLOOD PRESSURE: 136 MMHG | RESPIRATION RATE: 15 BRPM | HEIGHT: 63 IN | BODY MASS INDEX: 20.02 KG/M2 | HEART RATE: 95 BPM | WEIGHT: 113 LBS | DIASTOLIC BLOOD PRESSURE: 78 MMHG | OXYGEN SATURATION: 95 % | TEMPERATURE: 97.5 F

## 2025-03-17 DIAGNOSIS — I65.23 BILATERAL CAROTID ARTERY STENOSIS: Primary | ICD-10-CM

## 2025-03-17 DIAGNOSIS — I48.91 ATRIAL FIBRILLATION, UNSPECIFIED TYPE (HCC): ICD-10-CM

## 2025-03-17 DIAGNOSIS — D32.9 BENIGN NEOPLASM OF MENINGES, UNSPECIFIED (HCC): ICD-10-CM

## 2025-03-17 PROCEDURE — 99024 POSTOP FOLLOW-UP VISIT: CPT | Performed by: NEUROLOGICAL SURGERY

## 2025-03-17 NOTE — PROGRESS NOTES
Name: Kirsty Em      : 1950      MRN: [id number]  Encounter Provider: Soto Quintana MD  Encounter Date: 3/17/2025   Encounter department: [facility] [place]  :  Assessment & Plan  Bilateral carotid artery stenosis  1.  R PASTOR 2025  2. L PASTOR 25  Now approx 3 weeks status post L PASTOR.  She remains on Plavix and Eliquis.  No complication.  I will plan on seeing her back in 3 to 4 weeks with carotid Dopplers and then transition her to aspirin monotherapy with her Eliquis.       Atrial fibrillation, unspecified type (HCC)  See above       Benign neoplasm of meninges, unspecified (HCC)  3.  Small convexity mass consistent with meningioma  Plan for repeat MRI at 6-month follow-up             History of Present Illness     Kirsty Em is a 74 y.o. female who presents follow-up after bilateral carotid stenting.  She originally presented with a left MCA stroke in the context of new onset atrial fibrillation and carotid stenosis bilaterally.  She was also found to have a small convexity meningioma.      She is now approximately 3 weeks after left carotid stenting.  She remains on Eliquis and Plavix.  She denies any new neurologic changes.    Review of Systems   Constitutional:  Negative for fatigue.   HENT:  Negative for tinnitus.    Eyes:  Negative for visual disturbance.   Musculoskeletal:  Positive for gait problem (ambulates w/ cane -no recent falls).   Neurological:  Positive for seizures (hx of seizures) and headaches (occ). Negative for dizziness, tremors, syncope, speech difficulty, weakness and numbness.   Hematological:  Does not bruise/bleed easily.   Psychiatric/Behavioral:  Negative for confusion, decreased concentration and sleep disturbance.      I have personally reviewed the MA's review of systems and made changes as necessary.    Past Medical History   Past Medical History:   Diagnosis Date    Anxiety     Atrial fibrillation (HCC)     Depression     Depression,  recurrent (HCC) 04/20/2021    Hallucinations 04/20/2021    History of seizure disorder 04/20/2021     Past Surgical History:   Procedure Laterality Date    IR CEREBRAL ANGIOGRAPHY / INTERVENTION  1/30/2025    ORIF FINGER FRACTURE Right 6/27/2024    Procedure: ORIF OF RIGHT THUMB;  Surgeon: Dagoberto Yates MD;  Location: [facility];  Service: Orthopedics     Family History   Problem Relation Age of Onset    Diabetes Mother     Anxiety disorder Mother     Stroke Father     No Known Problems Sister     No Known Problems Sister     No Known Problems Sister     No Known Problems Daughter     No Known Problems Daughter     No Known Problems Daughter     No Known Problems Maternal Grandmother     No Known Problems Paternal Grandmother     No Known Problems Maternal Aunt     No Known Problems Maternal Aunt     No Known Problems Maternal Aunt     No Known Problems Maternal Aunt     No Known Problems Maternal Aunt     No Known Problems Maternal Aunt     No Known Problems Maternal Aunt     No Known Problems Maternal Aunt     No Known Problems Paternal Aunt     Breast cancer Neg Hx      she reports that she has been smoking cigarettes. She started smoking about 55 years ago. She has a 27.2 pack-year smoking history. She has never used smokeless tobacco. She reports that she does not drink alcohol and does not use drugs.  Current Outpatient Medications   Medication Instructions    acetaminophen (TYLENOL) 975 mg, Every 8 hours PRN    ALPRAZolam (XANAX) 2 mg, Oral, 3 times daily PRN    apixaban (ELIQUIS) 5 mg, Oral, 2 times daily    budesonide-formoterol (SYMBICORT) 80-4.5 MCG/ACT inhaler 2 puffs, Inhalation, 2 times daily, Rinse mouth after use.    clopidogrel (PLAVIX) 75 mg, Oral, Daily, Begin 7 days prior to procedure    diltiazem (CARDIZEM CD) 120 mg, Oral, Daily    ezetimibe (ZETIA) 10 mg, Oral, Daily    fluticasone (FLONASE) 50 mcg/act nasal spray PLACE 2 SPRAYS IN EACH NOSTRIL DAILY    gabapentin (NEURONTIN) 100 mg,  "Oral, 3 times daily    Icosapent Ethyl 2 g, Oral, 2 times daily    levETIRAcetam (KEPPRA) 750 mg, Oral, Every 12 hours scheduled    Multiple Vitamin (multivitamin) tablet 1 tablet, Daily    naloxone (NARCAN) 4 mg/0.1 mL nasal spray Administer 1 spray into a nostril. If no response after 2-3 minutes, give another dose in the other nostril using a new spray.    QUEtiapine (SEROQUEL) 100 mg, Oral, Every 12 hours    rosuvastatin (CRESTOR) 10 mg, Oral, Daily    vitamin B-12 (VITAMIN B-12) 1,000 mcg tablet Daily     Allergies   Allergen Reactions    Atorvastatin Other (See Comments) and Myalgia     Muscle aches      Objective   /78 (BP Location: Right arm, Patient Position: Sitting, Cuff Size: Standard)   Pulse 95   Temp 97.5 °F (36.4 °C) (Temporal)   Resp 15   Ht 5' 3\" (1.6 m)   Wt 51.3 kg (113 lb)   SpO2 95%   BMI 20.02 kg/m²     Physical Exam  Neurological Exam  She is well appearing.  Affect is appropriate. Body mass index is 20.02 kg/m².. She is awake alert and oriented.  Hearing and vision are grossly intact.   Her pupils are equal round reactive to light.  Her extraocular movements are intact.  Her face is symmetric.  Tongue is midline.  Facial sensation is intact and symmetric throughout.  Shoulder shrug is 5/5.  There is no drift or dysmetria.     She has full strength in her bilateral upper and lower extremities.  She has normal muscle tone muscle bulk.  Her biceps reflexes and patellar reflexes are 2+ and symmetric.  Choco sign negative bilaterally.  Sensation intact to light touch and pinprick throughout.  Ambulates with a cane.     Her heart rate is irregularly irregular.  Normal respiratory effort.  Abdomen nondistended.  Radial pulses 2+.     We reviewed her angiogram in deatil and the reports    Administrative Statements   I have spent a total time of 30 minutes in caring for this patient on the day of the visit/encounter including Diagnostic results, Prognosis, Risks and benefits of tx " options, Instructions for management, Patient and family education, Importance of tx compliance, Risk factor reductions, Impressions, Counseling / Coordination of care, Documenting in the medical record, Reviewing/placing orders in the medical record (including tests, medications, and/or procedures), and Obtaining or reviewing history  .

## 2025-03-17 NOTE — ASSESSMENT & PLAN NOTE
1.  R PASTOR 1/30/2025  2. L PASTOR 2/21/25  Now approx 3 weeks status post L PASTOR.  She remains on Plavix and Eliquis.  No complication.  I will plan on seeing her back in 3 to 4 weeks with carotid Dopplers and then transition her to aspirin monotherapy with her Eliquis.

## 2025-03-17 NOTE — ASSESSMENT & PLAN NOTE
3.  Small convexity mass consistent with meningioma  Plan for repeat MRI at 6-month follow-up

## 2025-03-18 DIAGNOSIS — I48.91 NEW ONSET A-FIB (HCC): ICD-10-CM

## 2025-03-19 RX ORDER — APIXABAN 5 MG/1
5 TABLET, FILM COATED ORAL 2 TIMES DAILY
Qty: 60 TABLET | Refills: 5 | Status: SHIPPED | OUTPATIENT
Start: 2025-03-19

## 2025-04-01 DIAGNOSIS — G40.909 SEIZURE DISORDER (HCC): ICD-10-CM

## 2025-04-02 RX ORDER — LEVETIRACETAM 750 MG/1
750 TABLET ORAL 2 TIMES DAILY
Qty: 180 TABLET | Refills: 1 | Status: SHIPPED | OUTPATIENT
Start: 2025-04-02

## 2025-04-16 ENCOUNTER — HOSPITAL ENCOUNTER (OUTPATIENT)
Dept: VASCULAR ULTRASOUND | Facility: HOSPITAL | Age: 75
Discharge: HOME | End: 2025-04-16
Payer: MEDICARE

## 2025-04-16 DIAGNOSIS — I65.23 CAROTID STENOSIS, ASYMPTOMATIC, BILATERAL: ICD-10-CM

## 2025-04-16 PROCEDURE — 93880 EXTRACRANIAL BILAT STUDY: CPT | Performed by: INTERNAL MEDICINE

## 2025-04-16 PROCEDURE — 93880 EXTRACRANIAL BILAT STUDY: CPT

## 2025-04-23 ENCOUNTER — TELEMEDICINE (OUTPATIENT)
Dept: NEUROSURGERY | Facility: CLINIC | Age: 75
End: 2025-04-23
Payer: MEDICARE

## 2025-04-23 DIAGNOSIS — I65.23 CAROTID STENOSIS, ASYMPTOMATIC, BILATERAL: Primary | ICD-10-CM

## 2025-04-23 DIAGNOSIS — D32.9 BENIGN NEOPLASM OF MENINGES, UNSPECIFIED (HCC): ICD-10-CM

## 2025-04-23 PROCEDURE — 99213 OFFICE O/P EST LOW 20 MIN: CPT | Performed by: NEUROLOGICAL SURGERY

## 2025-04-23 NOTE — PROGRESS NOTES
Virtual Regular VisitName: Kirsty Em      : 1950      MRN: [id number]  Encounter Provider: Soto Quintana MD  Encounter Date: 2025   Encounter department: [facility] [place]  :  Assessment & Plan  Carotid stenosis, asymptomatic, bilateral  Status post bilateral carotid artery stenting right 2025 and left 2025.  Her 6 weeks post treatment Dopplers demonstrate bilateral patent stents.  Will repeat these in 6 months.  She is on Eliquis and Plavix.  We will DC her Plavix and transition her to aspirin 81.  Her  requested not to put a new prescription in as he has this at home.  She should continue her Eliquis for atrial fibrillation.      Orders:    VAS carotid complete study; Future    Benign neoplasm of meninges, unspecified (HCC)  Small convexity mass likely meningioma  We will reevaluate this at her next visit.  Previously stable.  She will likely require MRI at 1 year           History of Present Illness     Pleasant 74-year-old female who presents for follow-up after bilateral carotid artery stenting with a new Doppler.  She originally presented with left MCA stroke and history of new onset atrial fibrillation.  She was also found to have bilateral carotid stenosis.  She also has a small convexity meningioma.  She is now 6 weeks status post treatment of her right carotid artery.  She remains on She is neurologically stable.   Plavix and Eliquis without any hemorrhagic complications.      Her past medical and surgical history reviewed.    Her imaging was reviewed in detail as well as the reports.    Review of systems obtained by the MA reviewed and updated below.    Review of Systems   HENT:  Negative for tinnitus.    Eyes:  Negative for visual disturbance.   Gastrointestinal: Negative.    Genitourinary: Negative.    Musculoskeletal:  Positive for gait problem (ambulates with cane).   Neurological:  Positive for seizures (hx of seizures -last episode )  and headaches (occ). Negative for dizziness, tremors, syncope, speech difficulty, weakness and numbness.   Hematological:  Bruises/bleeds easily (meds).   Psychiatric/Behavioral:  Negative for confusion, decreased concentration and sleep disturbance.        Objective   There were no vitals taken for this visit.    Physical Exam  She is awake and alert.  Her face is symmetric.  She appears to have the symmetric strength in her bilateral upper extremities.  She is seated for the telephone interview.  She is able to give her own history and answer her own questions.    Administrative Statements   Encounter provider Soto Quintana MD    The Patient is located at Home and in the following state in which I hold an active license [place].    The patient was identified by name and date of birth. Kirsty Em was informed that this is a telemedicine visit and that the visit is being conducted through the Epic Embedded platform. She agrees to proceed..  My office door was closed. No one else was in the room.  She acknowledged consent and understanding of privacy and security of the video platform. The patient has agreed to participate and understands they can discontinue the visit at any time.    I have spent a total time of 20 minutes in caring for this patient on the day of the visit/encounter including Diagnostic results, Prognosis, Risks and benefits of tx options, Instructions for management, Patient and family education, Importance of tx compliance, Risk factor reductions, Impressions, Counseling / Coordination of care, Documenting in the medical record, Reviewing/placing orders in the medical record (including tests, medications, and/or procedures), and Obtaining or reviewing history  , not including the time spent for establishing the audio/video connection.

## 2025-04-23 NOTE — ASSESSMENT & PLAN NOTE
Small convexity mass likely meningioma  We will reevaluate this at her next visit.  Previously stable.  She will likely require MRI at 1 year

## 2025-04-24 ENCOUNTER — TELEPHONE (OUTPATIENT)
Dept: NEUROSURGERY | Facility: CLINIC | Age: 75
End: 2025-04-24

## 2025-04-24 NOTE — TELEPHONE ENCOUNTER
Spoke to pts s/o. 6 month f/u made for 10/22/25 @ 2:00 with Dr. Quintana in [facility].   Pt was then transferred to  to set up VAS CAROTID COMPLETE STUDY.

## 2025-04-24 NOTE — TELEPHONE ENCOUNTER
I CALLED PT SHE WAS VIRTUAL WITH  DR GUERRERO SHE NEEDS VASULAR CAROTID STUDY 6MONTHS THEN F/U DR GUERRERO CALLED AND GOT SPOUSE HENRY WHO STATES HE WAS NOT AVAIL TO ARRANGE HER APTS RIGHT NOW AS HE TAKES CARE OF ALL THAT FOR HER HE ASKED I TRY HIM BACK LATER TODAY

## 2025-05-08 ENCOUNTER — CONSULT (OUTPATIENT)
Dept: OBGYN CLINIC | Facility: CLINIC | Age: 75
End: 2025-05-08

## 2025-05-08 VITALS — WEIGHT: 117.2 LBS | SYSTOLIC BLOOD PRESSURE: 118 MMHG | BODY MASS INDEX: 20.76 KG/M2 | DIASTOLIC BLOOD PRESSURE: 78 MMHG

## 2025-05-08 DIAGNOSIS — R93.89 ABNORMAL CT SCAN: ICD-10-CM

## 2025-05-08 RX ORDER — POTASSIUM CHLORIDE 750 MG/1
10 TABLET, EXTENDED RELEASE ORAL 2 TIMES DAILY
COMMUNITY

## 2025-05-08 NOTE — PROGRESS NOTES
"Name: Kirsty Em      : 1950      MRN: [id number]  Encounter Provider: Brenda Pepper PA-C  Encounter Date: 2025   Encounter department: [facility] [place]  :  Assessment & Plan  Abnormal CT scan  Pelvic ultrasound ordered for better characterization of abnormal CT findings.  I discussed with the patient that we will likely need to do an endometrial biopsy for further evaluation in the setting of endometrial thickening.  I advised the patient to schedule appointment for an annual exam  Patient advised to call the office if she develops any pelvic pain or postmenopausal bleeding  Orders:    US pelvis complete w transvaginal; Future    Ambulatory Referral to Obstetrics / Gynecology        History of Present Illness   HPI  Kirsty Em is a 75 y.o. female   who presents for follow up regarding an abnormal CT on 10/27/24 the imaging was ordered for the indication of \" dizziness, generalized weakness, unclear history\".  Reproductive findings showed Reidentified endometrial thickening, nonspecific. Appearance is similar to the prior. Stable 2.5 cm right adnexal probable paraovarian cyst     She denies pelvic pain, bloating, PMB, or unusual discharge.    Patient has not followed up with an OB/GYN in several years.     Review of Systems  Current Outpatient Medications on File Prior to Visit   Medication Sig Dispense Refill    acetaminophen (TYLENOL) 325 mg tablet Take 975 mg by mouth every 8 (eight) hours as needed for mild pain      diltiazem (CARDIZEM CD) 120 mg 24 hr capsule Take 1 capsule (120 mg total) by mouth daily 60 capsule 5    Eliquis 5 MG TAKE 1 TABLET BY MOUTH TWICE A DAY 60 tablet 5    ezetimibe (ZETIA) 10 mg tablet TAKE 1 TABLET BY MOUTH EVERY DAY 90 tablet 1    fluticasone (FLONASE) 50 mcg/act nasal spray PLACE 2 SPRAYS IN EACH NOSTRIL DAILY      gabapentin (NEURONTIN) 100 mg capsule Take 1 capsule (100 mg total) by mouth 3 (three) times a day 90 capsule 0 "    Icosapent Ethyl 1 g CAPS Take 2 capsules (2 g total) by mouth 2 (two) times a day 360 capsule 6    levETIRAcetam (KEPPRA) 750 mg tablet TAKE 1 TABLET (750 MG TOTAL) BY MOUTH EVERY 12 (TWELVE) HOURS 180 tablet 1    Multiple Vitamin (multivitamin) tablet Take 1 tablet by mouth daily      naloxone (NARCAN) 4 mg/0.1 mL nasal spray Administer 1 spray into a nostril. If no response after 2-3 minutes, give another dose in the other nostril using a new spray. 1 each 1    QUEtiapine (SEROquel) 100 mg tablet Take 1 tablet (100 mg total) by mouth every 12 (twelve) hours 60 tablet 0    rosuvastatin (CRESTOR) 10 MG tablet TAKE 1 TABLET BY MOUTH EVERY DAY 90 tablet 1    ALPRAZolam (XANAX) 2 MG tablet Take 1 tablet (2 mg total) by mouth 3 (three) times a day as needed for anxiety for up to 10 days 20 tablet 0    budesonide-formoterol (SYMBICORT) 80-4.5 MCG/ACT inhaler Inhale 2 puffs 2 (two) times a day Rinse mouth after use. (Patient not taking: Reported on 2025) 10.2 g 0    MAGNESIUM OXIDE 400 PO Take 400 mg by mouth daily      potassium chloride (Klor-Con) 10 mEq tablet Take 10 mEq by mouth 2 (two) times a day      vitamin B-12 (VITAMIN B-12) 1,000 mcg tablet Take by mouth daily (Patient not taking: Reported on 2025)       No current facility-administered medications on file prior to visit.      Social History     Tobacco Use    Smoking status: Light Smoker     Current packs/day: 0.00     Average packs/day: 0.5 packs/day for 54.3 years (27.2 ttl pk-yrs)     Types: Cigarettes     Start date:      Last attempt to quit: 2024     Years since quittin.0    Smokeless tobacco: Never   Vaping Use    Vaping status: Never Used   Substance and Sexual Activity    Alcohol use: Never    Drug use: Never    Sexual activity: Yes     Partners: Male     Birth control/protection: None        Objective   /78 (BP Location: Left arm, Patient Position: Sitting, Cuff Size: Standard)   Wt 53.2 kg (117 lb 3.2 oz)   BMI 20.76  kg/m²      Physical Exam  Constitutional:       Appearance: Normal appearance.   HENT:      Head: Normocephalic and atraumatic.      Nose: Nose normal.   Eyes:      Extraocular Movements: Extraocular movements intact.   Pulmonary:      Effort: Pulmonary effort is normal.   Abdominal:      Tenderness: There is no abdominal tenderness. There is no guarding or rebound.   Genitourinary:     Comments: Deferred today, will follow-up for an annual exam  Musculoskeletal:         General: Normal range of motion.   Neurological:      Mental Status: She is alert.   Psychiatric:         Mood and Affect: Mood normal.         Behavior: Behavior normal.       Brenda Pepper PA-C

## 2025-05-14 DIAGNOSIS — E78.2 MIXED HYPERLIPIDEMIA: ICD-10-CM

## 2025-05-15 RX ORDER — EZETIMIBE 10 MG/1
10 TABLET ORAL DAILY
Qty: 90 TABLET | Refills: 1 | Status: SHIPPED | OUTPATIENT
Start: 2025-05-15

## 2025-05-19 DIAGNOSIS — I65.23 CAROTID STENOSIS, ASYMPTOMATIC, BILATERAL: ICD-10-CM

## 2025-05-19 DIAGNOSIS — F41.9 ANXIETY DISORDER, UNSPECIFIED TYPE: ICD-10-CM

## 2025-05-20 RX ORDER — QUETIAPINE FUMARATE 100 MG/1
100 TABLET, FILM COATED ORAL EVERY 12 HOURS
Qty: 60 TABLET | Refills: 0 | Status: SHIPPED | OUTPATIENT
Start: 2025-05-20

## 2025-05-20 RX ORDER — BUDESONIDE AND FORMOTEROL FUMARATE DIHYDRATE 80; 4.5 UG/1; UG/1
2 AEROSOL RESPIRATORY (INHALATION) 2 TIMES DAILY
Qty: 10.2 G | Refills: 5 | Status: SHIPPED | OUTPATIENT
Start: 2025-05-20

## 2025-05-30 ENCOUNTER — APPOINTMENT (OUTPATIENT)
Dept: RADIOLOGY | Facility: CLINIC | Age: 75
End: 2025-05-30
Payer: MEDICARE

## 2025-05-30 ENCOUNTER — HOSPITAL ENCOUNTER (OUTPATIENT)
Dept: ULTRASOUND IMAGING | Facility: CLINIC | Age: 75
Discharge: HOME | End: 2025-05-30
Payer: MEDICARE

## 2025-05-30 DIAGNOSIS — R93.89 ABNORMAL CT SCAN: ICD-10-CM

## 2025-05-30 PROCEDURE — 76856 US EXAM PELVIC COMPLETE: CPT

## 2025-05-30 PROCEDURE — 76830 TRANSVAGINAL US NON-OB: CPT

## 2025-06-02 ENCOUNTER — RESULTS FOLLOW-UP (OUTPATIENT)
Dept: OBGYN CLINIC | Facility: CLINIC | Age: 75
End: 2025-06-02

## 2025-06-04 NOTE — PROGRESS NOTES
Name: Kirsty Em      : 1950      MRN: [id number]  Encounter Provider: Brenda Pepper PA-C  Encounter Date: 2025   Encounter department: [facility]  :  Assessment & Plan  Encounter for screening mammogram for breast cancer    Orders:    Mammo screening bilateral w 3d and cad; Future    Encounter for annual routine gynecological examination    Continue calcium and vitamin D supplementation daily unless otherwise directed. Avoid falls.   DEXA scan-   ordered  Call your insurance company to verify coverage prior to completing any ordered tests.      Annual mammogram ordered and monthly breast self exam recommended .  Colonoscopy- due 2025         Return to office in one year or sooner, if needed.      Osteoporosis without current pathological fracture, unspecified osteoporosis type    Orders:    DXA bone density spine hip and pelvis; Future    Thickened endometrium    EMB scheduled 2025  Thoroughly reviewed the procedure with the patient  recommended Tylenol 30 minutes prior to procedure for pain        History of Present Illness   HPI  Kirsty Em is a 75 y.o. female who presents routine yearly examination she is accompanied by her         We rereviewed her pelvic ultrasound findings and indication for endometrial biopsy.  Patient denies pelvic pain, vaginal bleeding, issues with bowel movements or issues with urination.    Pelvic ultrasound 2025: Thickened endometrium measuring 15 mm with internal vascularity and a possible 38 mm lesion within the endometrial canal. Tissue sampling is recommended.  Right ovarian simple cyst 2.6 cm O RADS 2    Menopause 52 yrs.  Vasomotor symptoms Yes - hot flashes  Sexually active yes  HRT No  History of abnormal pap: No  Last pap Not on file , not indicated   Self breast exam no  Mammogram 2024   Lifetime Tyrer-Cuzick: 1.7% fibroglandular density BI-RAD 1   Next Mammogram  - order  placed   Colon Cancer screenin2024 , type colonoscopy  , follow up 1 year   DEXA 2023  , Findings osteoporosis     Hereditary Cancer Screening  FH Breast/Ovarian cancer: denies  FH Uterine cancer: denies  FH Colon cancer: denies  Cancer-related family history is negative for Breast cancer.    Review of Systems   Constitutional:  Negative for chills, fatigue and fever.   Gastrointestinal:  Negative for abdominal pain, anal bleeding, blood in stool, constipation, diarrhea, nausea and vomiting.   Genitourinary:  Negative for difficulty urinating, dyspareunia, dysuria, frequency, hematuria, menstrual problem, pelvic pain, urgency, vaginal bleeding, vaginal discharge and vaginal pain.   Skin:  Negative for rash.     Medications Ordered Prior to Encounter[1]   Social History[2]     Objective   /88 (BP Location: Left arm, Patient Position: Sitting, Cuff Size: Standard)   Wt 53.6 kg (118 lb 3.2 oz)   BMI 20.94 kg/m²      Physical Exam  Vitals and nursing note reviewed.   Constitutional:       General: She is not in acute distress.     Appearance: She is well-developed.   HENT:      Head: Normocephalic and atraumatic.     Eyes:      Extraocular Movements: Extraocular movements intact.     Pulmonary:      Effort: Pulmonary effort is normal.   Chest:   Breasts:     Right: No swelling, bleeding, inverted nipple, mass, nipple discharge, skin change or tenderness.      Left: No swelling, bleeding, inverted nipple, mass, nipple discharge, skin change or tenderness.   Abdominal:      General: There is distension.      Palpations: Abdomen is soft.      Tenderness: There is no abdominal tenderness. There is no guarding or rebound.      Hernia: There is no hernia in the left inguinal area or right inguinal area.   Genitourinary:     General: Normal vulva.      Exam position: Lithotomy position.      Pubic Area: No rash.       Labia:         Right: No rash, tenderness or lesion.         Left: No rash,  tenderness or lesion.       Urethra: No urethral pain or urethral swelling.      Vagina: No vaginal discharge, erythema, tenderness, bleeding or lesions.      Cervix: No cervical motion tenderness, discharge, friability, lesion, erythema or cervical bleeding.      Uterus: Not enlarged and not tender.       Adnexa:         Right: No mass, tenderness or fullness.          Left: No mass, tenderness or fullness.        Comments: Vaginal atrophy  Lymphadenopathy:      Upper Body:      Right upper body: No supraclavicular, axillary or pectoral adenopathy.      Left upper body: No supraclavicular, axillary or pectoral adenopathy.      Lower Body: No right inguinal adenopathy. No left inguinal adenopathy.     Skin:     General: Skin is warm and dry.     Neurological:      Mental Status: She is alert.     Psychiatric:         Mood and Affect: Mood normal.         Behavior: Behavior normal.         Brenda Pepper PA-C         [1]   Current Outpatient Medications on File Prior to Visit   Medication Sig Dispense Refill    acetaminophen (TYLENOL) 325 mg tablet Take 975 mg by mouth every 8 (eight) hours as needed for mild pain      ALPRAZolam (XANAX) 2 MG tablet Take 1 tablet (2 mg total) by mouth 3 (three) times a day as needed for anxiety for up to 10 days 20 tablet 0    budesonide-formoterol (SYMBICORT) 80-4.5 MCG/ACT inhaler Inhale 2 puffs 2 (two) times a day Rinse mouth after use. 10.2 g 5    diltiazem (CARDIZEM CD) 120 mg 24 hr capsule Take 1 capsule (120 mg total) by mouth daily 60 capsule 5    Eliquis 5 MG TAKE 1 TABLET BY MOUTH TWICE A DAY 60 tablet 5    ezetimibe (ZETIA) 10 mg tablet TAKE 1 TABLET BY MOUTH EVERY DAY 90 tablet 1    fluticasone (FLONASE) 50 mcg/act nasal spray       gabapentin (NEURONTIN) 100 mg capsule Take 1 capsule (100 mg total) by mouth 3 (three) times a day 90 capsule 0    Icosapent Ethyl 1 g CAPS Take 2 capsules (2 g total) by mouth 2 (two) times a day 360 capsule 6    levETIRAcetam (KEPPRA) 750  mg tablet TAKE 1 TABLET (750 MG TOTAL) BY MOUTH EVERY 12 (TWELVE) HOURS 180 tablet 1    MAGNESIUM OXIDE 400 PO Take 400 mg by mouth in the morning.      Multiple Vitamin (multivitamin) tablet Take 1 tablet by mouth in the morning.      naloxone (NARCAN) 4 mg/0.1 mL nasal spray Administer 1 spray into a nostril. If no response after 2-3 minutes, give another dose in the other nostril using a new spray. 1 each 1    potassium chloride (Klor-Con) 10 mEq tablet Take 10 mEq by mouth in the morning and 10 mEq in the evening.      QUEtiapine (SEROquel) 100 mg tablet Take 1 tablet (100 mg total) by mouth every 12 (twelve) hours 60 tablet 0    rosuvastatin (CRESTOR) 10 MG tablet TAKE 1 TABLET BY MOUTH EVERY DAY 90 tablet 1    vitamin B-12 (VITAMIN B-12) 1,000 mcg tablet Take by mouth daily (Patient not taking: Reported on 2025)       No current facility-administered medications on file prior to visit.   [2]   Social History  Tobacco Use    Smoking status: Light Smoker     Current packs/day: 0.00     Average packs/day: 0.5 packs/day for 54.3 years (27.2 ttl pk-yrs)     Types: Cigarettes     Start date:      Last attempt to quit: 2024     Years since quittin.0    Smokeless tobacco: Never   Vaping Use    Vaping status: Never Used   Substance and Sexual Activity    Alcohol use: Never    Drug use: Never    Sexual activity: Yes     Partners: Male     Birth control/protection: None

## 2025-06-05 ENCOUNTER — ANNUAL EXAM (OUTPATIENT)
Dept: OBGYN CLINIC | Facility: CLINIC | Age: 75
End: 2025-06-05
Payer: MEDICARE

## 2025-06-05 VITALS — BODY MASS INDEX: 20.94 KG/M2 | SYSTOLIC BLOOD PRESSURE: 132 MMHG | DIASTOLIC BLOOD PRESSURE: 88 MMHG | WEIGHT: 118.2 LBS

## 2025-06-05 DIAGNOSIS — Z12.31 ENCOUNTER FOR SCREENING MAMMOGRAM FOR BREAST CANCER: ICD-10-CM

## 2025-06-05 DIAGNOSIS — Z01.419 ENCOUNTER FOR ANNUAL ROUTINE GYNECOLOGICAL EXAMINATION: Primary | ICD-10-CM

## 2025-06-05 DIAGNOSIS — M81.0 OSTEOPOROSIS WITHOUT CURRENT PATHOLOGICAL FRACTURE, UNSPECIFIED OSTEOPOROSIS TYPE: ICD-10-CM

## 2025-06-05 DIAGNOSIS — R93.89 THICKENED ENDOMETRIUM: ICD-10-CM

## 2025-06-05 PROCEDURE — G0101 CA SCREEN;PELVIC/BREAST EXAM: HCPCS

## 2025-06-05 NOTE — PROGRESS NOTES
I had the clerical team reach out to our patient to notify her that we are unable to perform the EMB in office today at her annual exam.  I am in the office alone without another provider.  Due to being a new provider, I still require supervision to perform this procedure.  Patient was offered an EMB appointment tomorrow 6/6/2025 but patient did not accept the offer and would like to think about when she would like to reschedule.  Patient plans to still come in for her annual exam despite being told that she will not be getting the EMB today.

## 2025-06-10 ENCOUNTER — OFFICE VISIT (OUTPATIENT)
Dept: CARDIOLOGY CLINIC | Facility: CLINIC | Age: 75
End: 2025-06-10
Payer: MEDICARE

## 2025-06-10 VITALS
DIASTOLIC BLOOD PRESSURE: 68 MMHG | BODY MASS INDEX: 20.38 KG/M2 | RESPIRATION RATE: 16 BRPM | OXYGEN SATURATION: 90 % | HEART RATE: 89 BPM | SYSTOLIC BLOOD PRESSURE: 114 MMHG | HEIGHT: 63 IN | WEIGHT: 115 LBS

## 2025-06-10 DIAGNOSIS — I48.91 ATRIAL FIBRILLATION, UNSPECIFIED TYPE (HCC): Primary | ICD-10-CM

## 2025-06-10 PROCEDURE — 99214 OFFICE O/P EST MOD 30 MIN: CPT | Performed by: INTERNAL MEDICINE

## 2025-06-10 NOTE — PROGRESS NOTES
Cardiology    Office Visit Note  Kirsty Em   75 y.o.   female   MRN: [id number]  [facility]   [place]    PCP: Brien Desouza MD      The patient is here for cardiology follow-up.  She was previously seen in [place] for a hospital follow-up.  She was admitted to [place] with mental status changes June 7 to June 17, 2024 and was found to have an acute/subacute CVA and new onset atrial fibrillation.  She was also found to have bilateral carotid disease that has been treated medically.  She was started on Eliquis and treated with aspirin, diltiazem, Zetia, Vascepa, lisinopril.  She also had a seizure. She follows with nuerology, ortho, pcp, neurosurgery. She broke her thumb twice.     She now feels well. No bleeding on eliquis. No cp, sob, palpitations, syncope. She had bilateral carotid stenting since last ov. Still smoking.     EKG shows NSR, PRWP.    Assessment/Plan  New onset atrial fibrillation.  Newly discovered, when admitted with mental status changes and found to have an acute/subacute CVA. ADM 6/7 - 6/17/2024          She is doing well, stable and asymptomatic from a cv standpoint.         Chads vasc score is 5 - continue eliquis        Rate controlled with Cardizem  mg daily  OAC with Eliquis 5 mg twice daily and asa per neurosurg- carotid stents  Echo 6/29/2024: EF 55%.  Grade 2 diastolic dysfunction.  Mild to moderate MR.  PASP 45 mmHg  EKG today NSR 92 bpm  Acute/subacute CVA, L MCA  MRI with focal acute to subacute infarct along the posterior left frontal cortex.  Tolerating crestor and zetia     Bilateral carotid artery stenosis- Status post bilateral carotid artery stenting right 1/30/2025 and left 2/21/2025. Followed by neurosurgery.     Hypertension.  /68 started on Cardizem  mg daily  Dyslipidemia, mixed with HTG  Intolerant to atorvastatin-myalgias  Tolerating zetia and crestor    LDL 24   Latest Reference Range & Units 02/03/23 15:09 01/08/24 15:12  06/09/24 04:45   Cholesterol See Comment mg/dL 273 (H) 262 (H) 165   Triglycerides See Comment mg/dL 532 (H) 347 (H) 694 (H)   HDL >=50 mg/dL 38 (L) 40 (L) 22 (L)   Non-HDL Cholesterol mg/dl 235 222    LDL Calculated 0 - 100 mg/dL See Comment 153 (H) See Comment   LDL CHOLESTEROL DIRECT 0 - 100 mg/dl   75   History of  rhabdomyolysis.  6/13/2024 CPK improving, 362   Latest Reference Range & Units 06/08/24 12:21 06/08/24 18:45 06/09/24 04:45 06/11/24 06:02 06/12/24 05:06 06/13/24 06:52   Total CK 26 - 192 U/L 15,550 (H) 13,276 (H) 9,243 (H) 4,153 (H) 1,405 (H) 362 (H)   Seizure disorder  COPD  Tobacco abuse  Cardiac testing  TTE 6/29/2024.  EF 55%.  Wall motion normal.  Grade 2 DD.  RV normal size and function.  Mild to moderate MR.  Monitor.  PASP mildly increased at 45 mmHg          HPI  Kirsty Em is a 75 y.o.year old with PAF, hyperlipidemia, tobacco abuse, CKD, anxiety/depression, and COPD.  She has no known history of CAD, heart failure, nor arrhythmia.      Adm 6/7-6/17/24.  10 days.  [facility]  CC: Mental status change  Initially suspected she may have been depressed as they recently had to put one of the dogs down  She was subsequently found sitting on the floor, covered in feces.  Her  cleaned her up however she became confused and agitated therefore called EMS  Initially, concern for seizure-like activity. Rx keppra  Placed on stroke protocol  Her procalcitonin and lactic acid were elevated.  She also had a temperature of 104  Therefore lumbar puncture was pursued and she was started on antibiotics  She was admitted to ICU   She was found to be in A-fib with RVR, and was evaluated by cardiology  She was diagnosed with an acute/subacute CVA, along with seizure  She converted to sinus rhythm with IV Cardizem  She transition to p.o. Cardizem 120 mg daily  She was started on IV heparin drip  Recommend transitioning to a DOAC when cleared by neurology  She was also started on both aspirin and  Plavix  Lisinopril was uptitrated given she was hypertensive  She was found to have bilateral carotid artery stenosis.  She is evaluated by vascular surgery  She was also found to have rhabdomyolysis  Per neuro: sx felt more likely seizure etiology in the setting of sepsis and toxic metabolic encephalopathy in the setting of self withdrawal of AEDs   discharged with Pinto catheter.   Rehab was recommended and patient was discharged to NH         7/11/24  Hospital follow-up  PMH CVA.  COPD.  Tobacco abuse.  ROS: Denies CP/palpitation.  She had recent surgery to her right hand.  It is currently casted.  She is mostly concerned about being able to function with ADLs given that she has a cast on her right lower arm.  Reports she is rehabilitating and is now using a cane  I reviewed her hospital course  She was referred to vascular surgery given her carotid disease noted on prior CAT scan of her neck  Will continue Cardizem and Eliquis,  as she is maintaining sinus rhythm by EKG  Her blood pressure is satisfactory  I will change the treatment for her triglycerides given her triglycerides are greater than 600.  All over-the-counter fish oil is not satisfactory  She will return to see a cardiologist at the [place] office, in a few months        Review of Systems   Constitutional: Negative for chills.   Cardiovascular:  Negative for chest pain, claudication, cyanosis, dyspnea on exertion, irregular heartbeat, leg swelling, near-syncope, orthopnea, palpitations, paroxysmal nocturnal dyspnea and syncope.   Respiratory:  Negative for cough and shortness of breath.    Musculoskeletal:         Recent surgery right hand   Gastrointestinal:  Negative for heartburn and nausea.   Neurological:  Negative for dizziness, focal weakness, headaches, light-headedness and weakness.   All other systems reviewed and are negative.            Assessment  Diagnoses and all orders for this visit:    Atrial fibrillation, unspecified type  (HCC)        Past Medical History:   Diagnosis Date    Anxiety     Atrial fibrillation (HCC)     Depression     Depression, recurrent (HCC) 04/20/2021    Hallucinations 04/20/2021    History of seizure disorder 04/20/2021       Past Surgical History:   Procedure Laterality Date    IR CEREBRAL ANGIOGRAPHY / INTERVENTION  1/30/2025    IR CEREBRAL ANGIOGRAPHY / INTERVENTION  2/21/2025    ORIF FINGER FRACTURE Right 6/27/2024    Procedure: ORIF OF RIGHT THUMB;  Surgeon: Dagoberto Yates MD;  Location: [facility];  Service: Orthopedics           Allergies  Allergies   Allergen Reactions    Atorvastatin Other (See Comments) and Myalgia     Muscle aches         Medications    Current Outpatient Medications:     acetaminophen (TYLENOL) 325 mg tablet, Take 975 mg by mouth every 8 (eight) hours as needed for mild pain, Disp: , Rfl:     ALPRAZolam (XANAX) 2 MG tablet, Take 1 tablet (2 mg total) by mouth 3 (three) times a day as needed for anxiety for up to 10 days, Disp: 20 tablet, Rfl: 0    budesonide-formoterol (SYMBICORT) 80-4.5 MCG/ACT inhaler, Inhale 2 puffs 2 (two) times a day Rinse mouth after use., Disp: 10.2 g, Rfl: 5    diltiazem (CARDIZEM CD) 120 mg 24 hr capsule, Take 1 capsule (120 mg total) by mouth daily, Disp: 60 capsule, Rfl: 5    Eliquis 5 MG, TAKE 1 TABLET BY MOUTH TWICE A DAY, Disp: 60 tablet, Rfl: 5    ezetimibe (ZETIA) 10 mg tablet, TAKE 1 TABLET BY MOUTH EVERY DAY, Disp: 90 tablet, Rfl: 1    fluticasone (FLONASE) 50 mcg/act nasal spray, , Disp: , Rfl:     gabapentin (NEURONTIN) 100 mg capsule, Take 1 capsule (100 mg total) by mouth 3 (three) times a day, Disp: 90 capsule, Rfl: 0    Icosapent Ethyl 1 g CAPS, Take 2 capsules (2 g total) by mouth 2 (two) times a day, Disp: 360 capsule, Rfl: 6    levETIRAcetam (KEPPRA) 750 mg tablet, TAKE 1 TABLET (750 MG TOTAL) BY MOUTH EVERY 12 (TWELVE) HOURS, Disp: 180 tablet, Rfl: 1    MAGNESIUM OXIDE 400 PO, Take 400 mg by mouth in the morning., Disp: , Rfl:      Multiple Vitamin (multivitamin) tablet, Take 1 tablet by mouth in the morning., Disp: , Rfl:     naloxone (NARCAN) 4 mg/0.1 mL nasal spray, Administer 1 spray into a nostril. If no response after 2-3 minutes, give another dose in the other nostril using a new spray., Disp: 1 each, Rfl: 1    potassium chloride (Klor-Con) 10 mEq tablet, Take 10 mEq by mouth in the morning and 10 mEq in the evening., Disp: , Rfl:     QUEtiapine (SEROquel) 100 mg tablet, Take 1 tablet (100 mg total) by mouth every 12 (twelve) hours, Disp: 60 tablet, Rfl: 0    rosuvastatin (CRESTOR) 10 MG tablet, TAKE 1 TABLET BY MOUTH EVERY DAY, Disp: 90 tablet, Rfl: 1    vitamin B-12 (VITAMIN B-12) 1,000 mcg tablet, Take by mouth daily (Patient not taking: Reported on 2025), Disp: , Rfl:       Social History     Socioeconomic History    Marital status: /Civil Union     Spouse name: Not on file    Number of children: Not on file    Years of education: Not on file    Highest education level: Not on file   Occupational History    Not on file   Tobacco Use    Smoking status: Light Smoker     Current packs/day: 0.00     Average packs/day: 0.5 packs/day for 54.3 years (27.2 ttl pk-yrs)     Types: Cigarettes     Start date:      Last attempt to quit: 2024     Years since quittin.1    Smokeless tobacco: Never   Vaping Use    Vaping status: Never Used   Substance and Sexual Activity    Alcohol use: Never    Drug use: Never    Sexual activity: Yes     Partners: Male     Birth control/protection: None   Other Topics Concern    Not on file   Social History Narrative    Not on file     Social Drivers of Health     Financial Resource Strain: Medium Risk (2024)    Overall Financial Resource Strain (CARDIA)     Difficulty of Paying Living Expenses: Somewhat hard   Food Insecurity: No Food Insecurity (2025)    Nursing - Inadequate Food Risk Classification     Worried About Running Out of Food in the Last Year: Never true     Ran Out of  Food in the Last Year: Never true     Ran Out of Food in the Last Year: Never true   Transportation Needs: No Transportation Needs (2025)    Nursing - Transportation Risk Classification     Lack of Transportation: Not on file     Lack of Transportation: No   Physical Activity: Not on file   Stress: Not on file   Social Connections: Not on file   Intimate Partner Violence: Unknown (2025)    Nursing IPS     Feels Physically and Emotionally Safe: Not on file     Physically Hurt by Someone: Not on file     Humiliated or Emotionally Abused by Someone: Not on file     Physically Hurt by Someone: No     Hurt or Threatened by Someone: No   Housing Stability: Unknown (2025)    Nursing: Inadequate Housing Risk Classification     Has Housing: Not on file     Worried About Losing Housing: Not on file     Unable to Get Utilities: Not on file     Unable to Pay for Housing in the Last Year: No     Has Housin       Family History   Problem Relation Name Age of Onset    Diabetes Mother Anni Arroyo     Anxiety disorder Mother Anni Arroyo     Stroke Father kalie     No Known Problems Sister      No Known Problems Sister      No Known Problems Sister      No Known Problems Daughter      No Known Problems Daughter      No Known Problems Daughter      No Known Problems Maternal Grandmother      No Known Problems Paternal Grandmother      No Known Problems Maternal Aunt      No Known Problems Maternal Aunt      No Known Problems Maternal Aunt      No Known Problems Maternal Aunt      No Known Problems Maternal Aunt      No Known Problems Maternal Aunt      No Known Problems Maternal Aunt      No Known Problems Maternal Aunt      No Known Problems Paternal Aunt      Breast cancer Neg Hx             Vitals: There were no vitals taken for this visit.   Wt Readings from Last 3 Encounters:   25 53.6 kg (118 lb 3.2 oz)   25 53.2 kg (117 lb 3.2 oz)   25 51.3 kg (113 lb)   GEN: Alert and oriented x 3,  "in no acute distress.  Well appearing and well nourished.   HEENT: Sclera anicteric, conjunctivae pink, mucous membranes moist. Oropharynx clear.   NECK: Supple, no carotid bruits, no significant JVD. Trachea midline, no thyromegaly.   HEART: Regular rhythm, normal S1 and S2, no murmurs, clicks, gallops or rubs. PMI nondisplaced, no thrills.   LUNGS: Clear to auscultation bilaterally; no wheezes, rales, or rhonchi. No increased work of breathing or signs of respiratory distress.   ABDOMEN: Soft, nontender, nondistended, normoactive bowel sounds.   EXTREMITIES: Skin warm and well perfused, no clubbing, cyanosis, or edema.  NEURO: No focal findings. Normal speech. Mood and affect normal.   SKIN: Normal without suspicious lesions on exposed skin.          Labs & Results:  Lab Results   Component Value Date    WBC 8.95 02/24/2025    HGB 8.1 (L) 02/24/2025    HCT 24.7 (L) 02/24/2025    MCV 98 02/24/2025     02/24/2025     BNP   Date Value Ref Range Status   10/27/2024 27 0 - 100 pg/mL Final     No components found for: \"CHEM\"  Total CK   Date Value Ref Range Status   06/13/2024 362 (H) 26 - 192 U/L Final   06/12/2024 1,405 (H) 26 - 192 U/L Final   06/11/2024 4,153 (H) 26 - 192 U/L Final           "

## 2025-06-12 NOTE — PROGRESS NOTES
Name: Kirsty Em      : 1950      MRN: [id number]  Encounter Provider: Isis Eugene PA-C  Encounter Date: 2025   Encounter department: [facility]  :  Assessment & Plan  Thickened endometrium  -Endometrial biopsy attempted today given endometrial lining of 15 mm  -Small sample was collected and sent to pathology, will determine if D&C procedure is needed  -Will follow-up pending pathology results  Orders:    Tissue Exam    Cyst of ovary, unspecified laterality  -Ca 125 ordered for further evaluation of right ovarian cyst  -Repeat US ordered to be repeated in 8 weeks   Orders:    US pelvis complete w transvaginal; Future    ; Future      History of Present Illness   HPI  Kirsty Em is a 75 y.o.  female who presents for EMB for an incidental finding of endometrial thickness of 15 mm on pelvic US and a 2.6 cm simple right ovarian cyst categorized as ORADs 2. The patient denies any postmenopausal bleeding or pelvic pain/fullness.     Review of Systems   Genitourinary:  Negative for pelvic pain and vaginal bleeding.       Past Medical History[1]    Past Surgical History[2]    Allergies   Allergen Reactions    Atorvastatin Other (See Comments) and Myalgia     Muscle aches       Objective   /76 (BP Location: Left arm, Patient Position: Sitting, Cuff Size: Standard)   Wt 52.2 kg (115 lb)   BMI 20.37 kg/m²      Physical Exam  Constitutional:       Appearance: Normal appearance. She is well-developed and well-groomed.   Genitourinary:      No lesions in the vagina.      Right Labia: No rash, tenderness, lesions or skin changes.     Left Labia: No tenderness, lesions, skin changes or rash.     No vaginal discharge, erythema, tenderness or bleeding.      No cervical discharge, friability, lesion, polyp or eversion.   HENT:      Head: Normocephalic and atraumatic.   Pulmonary:      Effort: Pulmonary effort is normal.   Abdominal:      General:  Abdomen is flat.     Neurological:      Mental Status: She is alert.     Psychiatric:         Mood and Affect: Mood normal.         Behavior: Behavior normal. Behavior is cooperative.         Thought Content: Thought content normal.         Judgment: Judgment normal.          Endometrial biopsy    Date/Time: 6/16/2025 3:30 PM    Performed by: Isis Eugene PA-C  Authorized by: Isis Eugene PA-C    [place] Protocol:  procedure performed by consultantConsent: Verbal consent obtained  Risks and benefits: risks, benefits and alternatives were discussed  Consent given by: patient  Patient understanding: patient states understanding of the procedure being performed  Required items: required blood products, implants, devices, and special equipment available    Indication:     Indications comment:  Thickened endometrium on ultrasound  Pre-procedure:     Negative urine pregnancy test: no    Procedure:     Procedure: endometrial biopsy with Pipelle      A bivalve speculum was placed in the vagina: yes      Cervix cleaned and prepped: yes      A paracervical block was performed: no      An intracervical block was performed: no      The cervix was dilated: no      Uterus sounded: yes      Uterus sound depth (cm):  8    Specimen collected: specimen collected and sent to pathology      Specimen collected comment:  Unsure is sufficient sample size    Patient tolerated procedure well with no complications: yes    Findings:     Cervix: normal    Comments:     Procedure comments:  Speculum was placed in the vagina.  The cervix was cleaned, prepped with Betadine. The uterus was sounded with the Pipelle.  The specimen was collected using the  Pipelle.  Hemostasis was achieved without the use of silver nitrate.  The speculum was then removed.  Discussed next steps with patient.             [1]   Past Medical History:  Diagnosis Date    Anxiety     Atrial fibrillation (HCC)     Depression     Depression, recurrent (HCC)  04/20/2021    Hallucinations 04/20/2021    History of seizure disorder 04/20/2021   [2]   Past Surgical History:  Procedure Laterality Date    IR CEREBRAL ANGIOGRAPHY / INTERVENTION  1/30/2025    IR CEREBRAL ANGIOGRAPHY / INTERVENTION  2/21/2025    ORIF FINGER FRACTURE Right 6/27/2024    Procedure: ORIF OF RIGHT THUMB;  Surgeon: Dagoberto Yates MD;  Location: [facility];  Service: Orthopedics

## 2025-06-16 ENCOUNTER — OFFICE VISIT (OUTPATIENT)
Dept: OBGYN CLINIC | Facility: CLINIC | Age: 75
End: 2025-06-16
Payer: MEDICARE

## 2025-06-16 VITALS — SYSTOLIC BLOOD PRESSURE: 120 MMHG | WEIGHT: 115 LBS | BODY MASS INDEX: 20.37 KG/M2 | DIASTOLIC BLOOD PRESSURE: 76 MMHG

## 2025-06-16 DIAGNOSIS — R93.89 THICKENED ENDOMETRIUM: Primary | ICD-10-CM

## 2025-06-16 DIAGNOSIS — N83.209 CYST OF OVARY, UNSPECIFIED LATERALITY: ICD-10-CM

## 2025-06-16 PROCEDURE — 58100 BIOPSY OF UTERUS LINING: CPT

## 2025-06-16 PROCEDURE — 99213 OFFICE O/P EST LOW 20 MIN: CPT

## 2025-06-16 PROCEDURE — 88342 IMHCHEM/IMCYTCHM 1ST ANTB: CPT | Performed by: PATHOLOGY

## 2025-06-16 PROCEDURE — 88305 TISSUE EXAM BY PATHOLOGIST: CPT | Performed by: PATHOLOGY

## 2025-06-16 PROCEDURE — 88344 IMHCHEM/IMCYTCHM EA MLT ANTB: CPT | Performed by: PATHOLOGY

## 2025-06-19 PROCEDURE — 88305 TISSUE EXAM BY PATHOLOGIST: CPT | Performed by: PATHOLOGY

## 2025-06-19 PROCEDURE — 88344 IMHCHEM/IMCYTCHM EA MLT ANTB: CPT | Performed by: PATHOLOGY

## 2025-06-19 PROCEDURE — 88342 IMHCHEM/IMCYTCHM 1ST ANTB: CPT | Performed by: PATHOLOGY

## 2025-06-20 ENCOUNTER — RESULTS FOLLOW-UP (OUTPATIENT)
Dept: OBGYN CLINIC | Facility: CLINIC | Age: 75
End: 2025-06-20

## 2025-06-20 NOTE — TELEPHONE ENCOUNTER
Called patient to discuss endometrial biopsy results with patient.  Discussed that given that there was insufficient sampling and that she has a lesion in the endometrium measuring 3.8 cm, D&C is recommended at this time.  She has an appointment scheduled with Dr. Thomas 7/2 for a D&C consult.  Advised patient to keep this appointment.  All questions and concerns were answered to satisfaction.

## 2025-07-07 NOTE — PROGRESS NOTES
Name: Kirsty Em      : 1950      MRN: [id number]  Encounter Provider: Liam Carvajal MD  Encounter Date: 2025   Encounter department: [facility]  :  Assessment & Plan  Gross hematuria  Urine cytology negative  Cystoscopy negative   Imaging negative  Follow up PRN         Tobacco dependence               History of Present Illness   Kirsty Em is a 75 y.o. female who presents for gross hematuria  Since resolved  Negative cystoscopy today    The following portions of the patient's history were reviewed and updated as appropriate: allergies, current medications, past family history, past medical history, past social history, past surgical history and problem list.      Review of Systems   Constitutional: Negative.    HENT: Negative.     Eyes: Negative.    Respiratory: Negative.     Cardiovascular: Negative.    Gastrointestinal: Negative.    Endocrine: Negative.    Genitourinary: Negative.    Musculoskeletal: Negative.    Skin: Negative.    Allergic/Immunologic: Negative.    Neurological: Negative.    Hematological: Negative.    Psychiatric/Behavioral: Negative.            Objective   There were no vitals taken for this visit.    Physical Exam  Vitals reviewed. Exam conducted with a chaperone present.   Constitutional:       General: She is not in acute distress.     Appearance: Normal appearance. She is not ill-appearing, toxic-appearing or diaphoretic.   HENT:      Head: Normocephalic and atraumatic.   Pulmonary:      Effort: No respiratory distress.   Abdominal:      General: There is no distension.   Genitourinary:     Comments: Age related vaginal atrophy, no vaginal lesions    Musculoskeletal:         General: No swelling.     Skin:     Coloration: Skin is not jaundiced.     Neurological:      General: No focal deficit present.      Mental Status: She is alert and oriented to person, place, and time.      Cranial Nerves: No cranial nerve deficit.     Psychiatric:          Mood and Affect: Mood normal.         Behavior: Behavior normal.         Thought Content: Thought content normal.         Judgment: Judgment normal.           Results     Lab Results   Component Value Date    GLUCOSE 199 (H) 06/07/2024    CALCIUM 9.2 02/24/2025    K 3.5 02/24/2025    CO2 31 02/24/2025     02/24/2025    BUN 9 02/24/2025    CREATININE 0.48 (L) 02/24/2025     Lab Results   Component Value Date    WBC 8.95 02/24/2025    HGB 8.1 (L) 02/24/2025    HCT 24.7 (L) 02/24/2025    MCV 98 02/24/2025     02/24/2025       Office Urine Dip  No results found for this or any previous visit (from the past hour).

## 2025-07-09 ENCOUNTER — OFFICE VISIT (OUTPATIENT)
Dept: OBGYN CLINIC | Facility: CLINIC | Age: 75
End: 2025-07-09
Payer: MEDICARE

## 2025-07-09 ENCOUNTER — PREP FOR PROCEDURE (OUTPATIENT)
Dept: OBGYN CLINIC | Facility: CLINIC | Age: 75
End: 2025-07-09

## 2025-07-09 ENCOUNTER — PROCEDURE VISIT (OUTPATIENT)
Dept: UROLOGY | Facility: CLINIC | Age: 75
End: 2025-07-09
Payer: MEDICARE

## 2025-07-09 VITALS
WEIGHT: 115 LBS | SYSTOLIC BLOOD PRESSURE: 142 MMHG | OXYGEN SATURATION: 91 % | HEART RATE: 102 BPM | HEIGHT: 63 IN | BODY MASS INDEX: 20.38 KG/M2 | DIASTOLIC BLOOD PRESSURE: 76 MMHG

## 2025-07-09 VITALS — SYSTOLIC BLOOD PRESSURE: 104 MMHG | BODY MASS INDEX: 20.37 KG/M2 | DIASTOLIC BLOOD PRESSURE: 72 MMHG | WEIGHT: 115 LBS

## 2025-07-09 DIAGNOSIS — Z71.2 PERSON CONSULTING FOR EXPLANATION OF EXAMINATION OR TEST FINDING: ICD-10-CM

## 2025-07-09 DIAGNOSIS — F17.200 TOBACCO DEPENDENCE: ICD-10-CM

## 2025-07-09 DIAGNOSIS — R31.0 GROSS HEMATURIA: Primary | ICD-10-CM

## 2025-07-09 DIAGNOSIS — R93.89 ENDOMETRIAL THICKENING ON ULTRASOUND: Primary | ICD-10-CM

## 2025-07-09 DIAGNOSIS — R93.89 THICKENED ENDOMETRIUM: Primary | ICD-10-CM

## 2025-07-09 DIAGNOSIS — Z01.818 PREOPERATIVE CLEARANCE: ICD-10-CM

## 2025-07-09 PROBLEM — Z99.81 OXYGEN DEPENDENT: Status: ACTIVE | Noted: 2024-06-17

## 2025-07-09 PROBLEM — J44.9 CHRONIC OBSTRUCTIVE LUNG DISEASE (HCC): Status: ACTIVE | Noted: 2024-01-06

## 2025-07-09 PROBLEM — S62.511A CLOSED DISPLACED FRACTURE OF PROXIMAL PHALANX OF RIGHT THUMB: Status: RESOLVED | Noted: 2024-06-07 | Resolved: 2025-07-09

## 2025-07-09 PROBLEM — R26.9 ABNORMAL GAIT: Status: ACTIVE | Noted: 2024-06-17

## 2025-07-09 PROBLEM — R41.841 COGNITIVE COMMUNICATION DISORDER: Status: ACTIVE | Noted: 2024-06-17

## 2025-07-09 PROBLEM — J96.01 ACUTE RESPIRATORY FAILURE WITH HYPOXIA (HCC): Status: RESOLVED | Noted: 2022-06-29 | Resolved: 2025-07-09

## 2025-07-09 PROBLEM — J34.3 NASAL TURBINATE HYPERTROPHY: Status: ACTIVE | Noted: 2025-04-02

## 2025-07-09 PROBLEM — H90.3 SENSORINEURAL HEARING LOSS (SNHL) OF BOTH EARS: Status: ACTIVE | Noted: 2025-04-02

## 2025-07-09 PROBLEM — I65.29 CAROTID ARTERY OBSTRUCTION: Status: RESOLVED | Noted: 2024-06-17 | Resolved: 2025-07-09

## 2025-07-09 PROBLEM — J31.0 CHRONIC RHINITIS: Status: ACTIVE | Noted: 2025-04-02

## 2025-07-09 PROBLEM — I65.29 CAROTID ARTERY OBSTRUCTION: Status: ACTIVE | Noted: 2024-06-17

## 2025-07-09 PROBLEM — R26.2 AMBULATORY DYSFUNCTION: Status: RESOLVED | Noted: 2024-10-28 | Resolved: 2025-07-09

## 2025-07-09 PROBLEM — S62.511A CLOSED DISPLACED FRACTURE OF PROXIMAL PHALANX OF RIGHT THUMB, INITIAL ENCOUNTER: Status: RESOLVED | Noted: 2024-08-06 | Resolved: 2025-07-09

## 2025-07-09 PROBLEM — J96.21 ACUTE ON CHRONIC RESPIRATORY FAILURE WITH HYPOXEMIA (HCC): Status: ACTIVE | Noted: 2022-06-29

## 2025-07-09 PROCEDURE — 52000 CYSTOURETHROSCOPY: CPT | Performed by: UROLOGY

## 2025-07-09 PROCEDURE — 99214 OFFICE O/P EST MOD 30 MIN: CPT | Performed by: OBSTETRICS & GYNECOLOGY

## 2025-07-09 PROCEDURE — 99213 OFFICE O/P EST LOW 20 MIN: CPT | Performed by: UROLOGY

## 2025-07-09 NOTE — PROGRESS NOTES
Name: Kirsty Em      : 1950      MRN: [id number]  Encounter Provider: Cyndee Thomas MD  Encounter Date: 2025   Encounter department: [facility]  :  Assessment & Plan  Endometrial thickening on ultrasound  Very thickened endoemetrium with possible lesion.   27mm    EB in office scant. Plan for D&C Hysteroscopy with removal of endometrial lesion Exam under anesthesia    We reviewed the risks of the surgery including but not limited to infection, bleeding, need for blood transfusion and other resuscitative measures. As well as full consent form review.    Touch screen unavailable in the office, patient unable to use mouse to sign consent. Form printed and patient signed form.             Preoperative clearance    Orders:    Ambulatory Referral to Family Practice; Future        History of Present Illness   74yo  with thickened endometrium on imaging.  Office sample inadequate/unable to be collected.     Thickening on imaging 27mm. NO bleeding reported.   Very complicated medical history.         Gynecologic Exam  She reports no genital itching, genital lesions, genital odor, genital rash, pelvic pain, vaginal bleeding or vaginal discharge. Pertinent negatives include no chills, constipation, diarrhea, dysuria, fever, flank pain, frequency, hematuria, nausea, sore throat, urgency or vomiting.     Kirsty Em is a 75 y.o. female who presents  to discuss D&C. EMB completed 25 with Isis. Scant tissue/benign.     Experiencing vaginal odor for the last 2 weeks.         Review of Systems   Constitutional:  Negative for activity change, appetite change, chills, fatigue and fever.   HENT:  Negative for rhinorrhea, sneezing and sore throat.    Eyes:  Negative for visual disturbance.   Respiratory:  Negative for cough, shortness of breath and wheezing.    Cardiovascular:  Negative for chest pain, palpitations and leg swelling.   Gastrointestinal:  Negative  for abdominal distention, constipation, diarrhea, nausea and vomiting.   Genitourinary:  Negative for decreased urine volume, difficulty urinating, dyspareunia, dysuria, flank pain, frequency, genital sores, hematuria, menstrual problem, pelvic pain, urgency, vaginal bleeding, vaginal discharge and vaginal pain.   Neurological:  Negative for syncope and light-headedness.          Objective   /72 (BP Location: Left arm, Patient Position: Sitting, Cuff Size: Standard)   Wt 52.2 kg (115 lb)   BMI 20.37 kg/m²      Physical Exam  Constitutional:       General: She is not in acute distress.     Appearance: She is well-developed. She is not diaphoretic.   Neck:      Vascular: No JVD.     Cardiovascular:      Rate and Rhythm: Normal rate and regular rhythm.      Heart sounds: Normal heart sounds. No murmur heard.     No friction rub. No gallop.   Pulmonary:      Effort: Pulmonary effort is normal. No respiratory distress.      Breath sounds: Normal breath sounds.   Abdominal:      General: Bowel sounds are normal. There is no distension.      Palpations: Abdomen is soft.      Tenderness: There is no abdominal tenderness. There is no guarding or rebound.   Genitourinary:     Labia:         Right: No rash, tenderness or lesion.         Left: No rash, tenderness or lesion.       Vagina: Normal. No vaginal discharge, erythema, tenderness or bleeding.      Cervix: No cervical motion tenderness, discharge, friability or erythema.      Uterus: Not deviated, not enlarged, not fixed and not tender.       Adnexa:         Right: No mass, tenderness or fullness.          Left: No mass, tenderness or fullness.        Comments: Atrophic vaginitis    Musculoskeletal:      Cervical back: Neck supple.      Right lower leg: No edema.      Left lower leg: No edema.   Lymphadenopathy:      Cervical: No cervical adenopathy.     Neurological:      Mental Status: She is alert and oriented to person, place, and time.      Deep Tendon  Reflexes: Reflexes are normal and symmetric.

## 2025-07-09 NOTE — PROGRESS NOTES
Office Cystoscopy Procedure Note    Indication:    Hematuria    Informed consent   The risks, benefits, complications, treatment options, and expected outcomes were discussed with the patient. The patient concurred with the proposed plan and provided informed consent.    Anesthesia  Lidocaine jelly 2%    Antibiotic prophylaxis   None    Procedure  In the presence of a female nurse, the patient was placed in the supine frog-legged position, was prepped and draped in the usual manner using sterile technique, and 2% lidocaine jelly instilled into the urethra. Prior to this pelvic examination showed atrophic labia majora and minora, a small vaginal introitus, poor quality vaginal mucosa, and showed no pelvic floor descensus and no urethral hypermobility. Upon stress maneuvers there was no stress incontinence.  A 17 F flexible cystoscope was then inserted into the urethra and the urethra and bladder carefully examined.  The following findings were noted:    Findings:  Urethra:  Normal  Bladder:  Normal  Ureteral orifices:  Normal  Other findings:  None, retroflexed view confirms    Specimens: None                 Complications:    None; patient tolerated the procedure well           Disposition: To home            Condition: Stable    Plan: Some debris in the bladder, clean mucosa, no tumors, negative gross hematuria workup       Cystoscopy     Date/Time  7/9/2025 2:00 PM     Performed by  Liam Carvajal MD   Authorized by  Liam Carvajal MD       Universal Protocol:  procedure performed by consultantConsent: Verbal consent obtained. Written consent obtained  Risks and benefits: risks, benefits and alternatives were discussed  Consent given by: patient  Patient understanding: patient states understanding of the procedure being performed  Patient consent: the patient's understanding of the procedure matches consent given  Procedure consent: procedure consent matches procedure scheduled  Relevant documents: relevant  Pt called stating he want 90 day supply.   documents present and verified  Test results: test results available and properly labeled  Site marked: the operative site was not marked  Radiology Images displayed and confirmed. If images not available, report reviewed: imaging studies available  Required items: required blood products, implants, devices, and special equipment available  Patient identity confirmed: verbally with patient and provided demographic data      Procedure Details:  Procedure type: cystoscopy    Patient tolerance: Patient tolerated the procedure well with no immediate complications    Additional Procedure Details: Office Cystoscopy Procedure Note    Indication:    Hematuria    Informed consent   The risks, benefits, complications, treatment options, and expected outcomes were discussed with the patient. The patient concurred with the proposed plan and provided informed consent.    Anesthesia  Lidocaine jelly 2%    Antibiotic prophylaxis   None    Procedure  In the presence of a female nurse, the patient was placed in the supine frog-legged position, was prepped and draped in the usual manner using sterile technique, and 2% lidocaine jelly instilled into the urethra. Prior to this pelvic examination showed atrophic labia majora and minora, a small vaginal introitus, poor quality vaginal mucosa, and showed no pelvic floor descensus and no urethral hypermobility. Upon stress maneuvers there was no stress incontinence.  A 17 F flexible cystoscope was then inserted into the urethra and the urethra and bladder carefully examined.  The following findings were noted:    Findings:  Urethra:  Normal  Bladder:  Normal  Ureteral orifices:  Normal  Other findings:  None, retroflexed view confirms    Specimens: None                 Complications:    None; patient tolerated the procedure well           Disposition: To home            Condition: Stable    Plan: Some debris in the bladder, clean mucosa, no tumors, negative gross hematuria  workup

## 2025-07-09 NOTE — ASSESSMENT & PLAN NOTE
Very thickened endoemetrium with possible lesion.   27mm    EB in office scant. Plan for D&C Hysteroscopy with removal of endometrial lesion Exam under anesthesia    We reviewed the risks of the surgery including but not limited to infection, bleeding, need for blood transfusion and other resuscitative measures. As well as full consent form review.    Touch screen unavailable in the office, patient unable to use mouse to sign consent. Form printed and patient signed form.

## 2025-07-10 ENCOUNTER — OFFICE VISIT (OUTPATIENT)
Dept: FAMILY MEDICINE CLINIC | Facility: CLINIC | Age: 75
End: 2025-07-10
Payer: MEDICARE

## 2025-07-10 VITALS
DIASTOLIC BLOOD PRESSURE: 80 MMHG | BODY MASS INDEX: 20.37 KG/M2 | OXYGEN SATURATION: 93 % | WEIGHT: 115 LBS | RESPIRATION RATE: 16 BRPM | HEART RATE: 90 BPM | SYSTOLIC BLOOD PRESSURE: 140 MMHG

## 2025-07-10 DIAGNOSIS — F33.9 EPISODE OF RECURRENT MAJOR DEPRESSIVE DISORDER, UNSPECIFIED DEPRESSION EPISODE SEVERITY (HCC): ICD-10-CM

## 2025-07-10 DIAGNOSIS — N18.31 STAGE 3A CHRONIC KIDNEY DISEASE (HCC): ICD-10-CM

## 2025-07-10 DIAGNOSIS — Z01.818 PRE-OP EVALUATION: Primary | ICD-10-CM

## 2025-07-10 DIAGNOSIS — I10 PRIMARY HYPERTENSION: ICD-10-CM

## 2025-07-10 DIAGNOSIS — J43.2 CENTRILOBULAR EMPHYSEMA (HCC): ICD-10-CM

## 2025-07-10 DIAGNOSIS — R93.89 ENDOMETRIAL THICKENING ON ULTRASOUND: ICD-10-CM

## 2025-07-10 DIAGNOSIS — I48.91 ATRIAL FIBRILLATION, UNSPECIFIED TYPE (HCC): ICD-10-CM

## 2025-07-10 DIAGNOSIS — F17.200 TOBACCO DEPENDENCE: ICD-10-CM

## 2025-07-10 DIAGNOSIS — R94.31 QT PROLONGATION: ICD-10-CM

## 2025-07-10 PROCEDURE — G2211 COMPLEX E/M VISIT ADD ON: HCPCS | Performed by: STUDENT IN AN ORGANIZED HEALTH CARE EDUCATION/TRAINING PROGRAM

## 2025-07-10 PROCEDURE — 99214 OFFICE O/P EST MOD 30 MIN: CPT | Performed by: STUDENT IN AN ORGANIZED HEALTH CARE EDUCATION/TRAINING PROGRAM

## 2025-07-10 NOTE — PROGRESS NOTES
Name: Kirsty Em      : 1950      MRN: [id number]  Encounter Provider: Brien Desouza MD  Encounter Date: 7/10/2025   Encounter department: [address]  :  Assessment & Plan  Pre-op evaluation         Endometrial thickening on ultrasound                History of Present Illness {?Quick Links Encounters * My Last Note * Last Note in Specialty * Snapshot * Since Last Visit * History :90882}  HPI  Review of Systems    Objective {?Quick Links Trend Vitals * Enter New Vitals * Results Review * Timeline (Adult) * Labs * Imaging * Cardiology * Procedures * Lung Cancer Screening * Surgical eConsent :50884}  /80 (BP Location: Right arm, Cuff Size: Standard)   Pulse 90   Resp 16   Wt 52.2 kg (115 lb)   SpO2 93%   BMI 20.37 kg/m²      Physical Exam  {Administrative / Billing Section (Optional):89319}

## 2025-07-14 ENCOUNTER — PREP FOR PROCEDURE (OUTPATIENT)
Dept: OBGYN CLINIC | Facility: CLINIC | Age: 75
End: 2025-07-14

## 2025-07-14 ENCOUNTER — TELEPHONE (OUTPATIENT)
Dept: OBGYN CLINIC | Facility: CLINIC | Age: 75
End: 2025-07-14

## 2025-07-14 DIAGNOSIS — Z01.818 PRE-OP TESTING: Primary | ICD-10-CM

## 2025-07-14 NOTE — TELEPHONE ENCOUNTER
Talked to patient and , she is scheduled for her surgical procedure on 9/16/2025 with Dr. Thomas at the [facility]. Patient is aware of lab work, EKG and PCP clearance needed prior to her surgical procedure. Patient aware NO post op needed just her annual yearly appointment.

## 2025-07-14 NOTE — TELEPHONE ENCOUNTER
----- Message from Cyndee Thomas MD sent at 2025  3:41 PM EDT -----  Regarding: D&C hysteroscopy  [facility] GYN Department  Surgery Scheduling Sheet    Patient Name: Kirsty Em  : 1950    Provider: Cyndee Thomas MD     Needed: no Language: N/A    Procedure: exam under anesthesia and D&C hysteroscopy   Diagnosis: Postmenopausal bleeding, endometrial thickening    Special Needs or Equipment: none  Anesthesia: conscious sedation or general anesthesia    Length of stay: outpatient  Does patient have  comorbid conditions that will require close perioperative monitoring prior to safe discharge: no  The patient has comorbid conditions that will require close perioperative monitoring prior to safe discharge, including N/A. This may require acute care beyond the usual and routine recovery period. As such, inpatient admission post-operatively is expected and appropriate, and anticipated hospital length of stay will be >2 midnights.      Pre-Admission Testing Needed: yes   Labs ordered: cbc, hgb A1C, cmp, and EKG    PAT's recommended in prep for procedure ordered?: Yes    Medical Clearance Needed: yes; Provider: Deo BOONE Form Signed (tubals/hysterectomy): Not Indicated  Surgical Drink Given: no     How many days out of work: 1 week(s)     How many days no drivin week(s)       Are other appointments needed?  yes  Interval for post op appt: routine gyn follow up/annual      For Surgical Scheduler:  Pre-op Appt:   Post op Appt:  Consult/medical clearance appt:

## 2025-07-16 PROBLEM — Z99.81 OXYGEN DEPENDENT: Status: RESOLVED | Noted: 2024-06-17 | Resolved: 2025-07-16

## 2025-07-16 PROBLEM — J96.21 ACUTE ON CHRONIC RESPIRATORY FAILURE WITH HYPOXEMIA (HCC): Status: RESOLVED | Noted: 2022-06-29 | Resolved: 2025-07-16

## 2025-07-16 PROBLEM — J31.0 CHRONIC RHINITIS: Status: RESOLVED | Noted: 2025-04-02 | Resolved: 2025-07-16

## 2025-07-16 PROBLEM — J44.9 CHRONIC OBSTRUCTIVE LUNG DISEASE (HCC): Status: RESOLVED | Noted: 2024-01-06 | Resolved: 2025-07-16

## 2025-07-16 PROBLEM — J34.3 NASAL TURBINATE HYPERTROPHY: Status: RESOLVED | Noted: 2025-04-02 | Resolved: 2025-07-16

## 2025-07-16 NOTE — PROGRESS NOTES
Pre-operative Clearance  Name: Kirsty Em      : 1950      MRN: [id number]  Encounter Provider: Biren Desouza MD  Encounter Date: 7/10/2025   Encounter department: [facility] [address] [place]    :  Assessment & Plan  Pre-op evaluation         Endometrial thickening on ultrasound         Atrial fibrillation, unspecified type (HCC)         Primary hypertension         Centrilobular emphysema (HCC)         Stage 3a chronic kidney disease (HCC)  Lab Results   Component Value Date    EGFR 98 2025    EGFR 97 2025    EGFR 94 2025    CREATININE 0.48 (L) 2025    CREATININE 0.50 (L) 2025    CREATININE 0.55 (L) 2025            Episode of recurrent major depressive disorder, unspecified depression episode severity (HCC)           Tobacco dependence         QT prolongation             Pre-operative Clearance:     Revised Cardiac Risk Index:  RCI RISK CLASS III (2 risk factors, risk of major cardiac complications approximately 3.6%)    Clearance:  Patient is medically optimized (CLEARED) for proposed surgery without any additional cardiac testing.      Medication Instructions:   - Avoid aspirin containing medications or non-steroidal anti-inflammatory drugs one week preceding surgery    - Antiepileptic meds: Continue to take this medication on your normal schedule.  - Antipsychotic meds: Continue to take this medication on your normal schedule.  - Calcium channel blockers: Continue to take this medication on your normal schedule.  - Direct Xa Inhibitor (ie, Eliquis, Xarelto): Stop taking medication 3 days prior to procedure/surgery.  - Hyperlipidemia meds: Continue to take this medication on your normal schedule.       History of Present Illness     Pre-Op Examination     Surgery: D and C for thickened endometrium   Anticipated Date of Surgery: 2025   Surgeon: Dr Dafne Lee     Previous history of bleeding disorders or clots?: Yes    Previous  Anesthesia reaction?: No    Prolonged steroid use in the last 6 months?: No      Assessment of Cardiac Risk:   - Unstable or severe angina or MI in the last 6 weeks or history of stent placement in the last year?: No    - Decompensated heart failure (e.g. New onset heart failure, NYHA  Class IV heart failure, or worsening existing heart failure)?: No    - Significant arrhythmias such as high grade AV block, symptomatic ventricular arrhythmia, newly recognized ventricular tachycardia, supraventricular tachycardia with resting heart rate >100, or symptomatic bradycardia?: No    - Severe heart valve disease including aortic stenosis or symptomatic mitral stenosis?: No      Pre-operative Risk Factors:  - Elevated-risk surgery: Yes    - History of cerebrovascular disease: Yes    - History of ischemic heart disease: No    - History of congestive heart failure: No    - Pre-operative treatment with insulin: No    - Pre-operative creatinine >2 mg/dL: No      Duke Activity Status Index (DASI):    - DASI Total Score: 26.95   - METs: 6.1     Medications of Perioperative Concern:    Anti-coagulants (Coumadin, Xarelto, Pradaxa, Eliquis, Lixiana) and calcium channel blockers    Review of Systems   Constitutional:  Negative for chills, fatigue and fever.   HENT:  Negative for rhinorrhea and sore throat.    Eyes:  Negative for visual disturbance.   Respiratory:  Negative for cough and shortness of breath.    Cardiovascular:  Negative for chest pain and palpitations.   Gastrointestinal:  Negative for abdominal pain, constipation, diarrhea, nausea and vomiting.   Genitourinary:  Negative for difficulty urinating, dysuria and frequency.   Musculoskeletal:  Negative for arthralgias and myalgias.   Skin:  Negative for color change and rash.   Neurological:  Negative for weakness and headaches.     Past Medical History   Past Medical History[1]  Past Surgical History[2]  Family History[3]  Social History[4]  Medications[5]  Allergies    Allergen Reactions   • Atorvastatin Other (See Comments) and Myalgia     Muscle aches     Objective   /80 (BP Location: Right arm, Cuff Size: Standard)   Pulse 90   Resp 16   Wt 52.2 kg (115 lb)   SpO2 93%   BMI 20.37 kg/m²     Physical Exam  Constitutional:       General: She is not in acute distress.     Appearance: Normal appearance. She is not ill-appearing.   HENT:      Head: Normocephalic and atraumatic.      Right Ear: Tympanic membrane, ear canal and external ear normal.      Left Ear: Tympanic membrane, ear canal and external ear normal.      Nose: Nose normal.      Mouth/Throat:      Mouth: Mucous membranes are moist.      Pharynx: Oropharynx is clear. No oropharyngeal exudate or posterior oropharyngeal erythema.     Eyes:      General: No scleral icterus.        Right eye: No discharge.         Left eye: No discharge.      Extraocular Movements: Extraocular movements intact.      Conjunctiva/sclera: Conjunctivae normal.      Pupils: Pupils are equal, round, and reactive to light.       Cardiovascular:      Rate and Rhythm: Normal rate and regular rhythm.      Pulses: Normal pulses.      Heart sounds: Normal heart sounds. No murmur heard.  Pulmonary:      Effort: Pulmonary effort is normal. No respiratory distress.      Breath sounds: Normal breath sounds.   Abdominal:      General: Bowel sounds are normal.      Palpations: Abdomen is soft.      Tenderness: There is no abdominal tenderness.     Musculoskeletal:         General: Normal range of motion.      Cervical back: Normal range of motion and neck supple.   Lymphadenopathy:      Cervical: No cervical adenopathy.     Skin:     General: Skin is warm and dry.      Capillary Refill: Capillary refill takes less than 2 seconds.     Neurological:      General: No focal deficit present.      Mental Status: She is alert and oriented to person, place, and time. Mental status is at baseline.      Cranial Nerves: No cranial nerve deficit.      Psychiatric:         Mood and Affect: Mood normal.           Brien Desouza MD         [1]  Past Medical History:  Diagnosis Date   • Anxiety    • Atrial fibrillation (HCC)    • Depression    • Depression, recurrent (HCC) 2021   • Hallucinations 2021   • History of seizure disorder 2021   [2]  Past Surgical History:  Procedure Laterality Date   • IR CEREBRAL ANGIOGRAPHY / INTERVENTION  2025   • IR CEREBRAL ANGIOGRAPHY / INTERVENTION  2025   • ORIF FINGER FRACTURE Right 2024    Procedure: ORIF OF RIGHT THUMB;  Surgeon: Dagoberto Yates MD;  Location: [facility];  Service: Orthopedics   [3]  Family History  Problem Relation Name Age of Onset   • Diabetes Mother Anni Arroyo    • Anxiety disorder Mother Anni Arroyo    • Stroke Father kalie    • No Known Problems Sister     • No Known Problems Sister     • No Known Problems Sister     • No Known Problems Daughter     • No Known Problems Daughter     • No Known Problems Daughter     • No Known Problems Maternal Grandmother     • No Known Problems Paternal Grandmother     • No Known Problems Maternal Aunt     • No Known Problems Maternal Aunt     • No Known Problems Maternal Aunt     • No Known Problems Maternal Aunt     • No Known Problems Maternal Aunt     • No Known Problems Maternal Aunt     • No Known Problems Maternal Aunt     • No Known Problems Maternal Aunt     • No Known Problems Paternal Aunt     • Breast cancer Neg Hx     [4]  Social History  Tobacco Use   • Smoking status: Light Smoker     Current packs/day: 0.00     Average packs/day: 0.5 packs/day for 54.3 years (27.2 ttl pk-yrs)     Types: Cigarettes     Start date:      Last attempt to quit: 2024     Years since quittin.2   • Smokeless tobacco: Never   Vaping Use   • Vaping status: Never Used   Substance and Sexual Activity   • Alcohol use: Never   • Drug use: Never   • Sexual activity: Yes     Partners: Male     Birth control/protection: None    [5]  Current Outpatient Medications on File Prior to Visit   Medication Sig   • budesonide-formoterol (SYMBICORT) 80-4.5 MCG/ACT inhaler Inhale 2 puffs 2 (two) times a day Rinse mouth after use.   • diltiazem (CARDIZEM CD) 120 mg 24 hr capsule Take 1 capsule (120 mg total) by mouth daily   • Eliquis 5 MG TAKE 1 TABLET BY MOUTH TWICE A DAY   • ezetimibe (ZETIA) 10 mg tablet TAKE 1 TABLET BY MOUTH EVERY DAY   • fluticasone (FLONASE) 50 mcg/act nasal spray    • gabapentin (NEURONTIN) 100 mg capsule Take 1 capsule (100 mg total) by mouth 3 (three) times a day   • Icosapent Ethyl 1 g CAPS Take 2 capsules (2 g total) by mouth 2 (two) times a day   • levETIRAcetam (KEPPRA) 750 mg tablet TAKE 1 TABLET (750 MG TOTAL) BY MOUTH EVERY 12 (TWELVE) HOURS   • MAGNESIUM OXIDE 400 PO Take 400 mg by mouth in the morning.   • Multiple Vitamin (multivitamin) tablet Take 1 tablet by mouth in the morning.   • naloxone (NARCAN) 4 mg/0.1 mL nasal spray Administer 1 spray into a nostril. If no response after 2-3 minutes, give another dose in the other nostril using a new spray.   • potassium chloride (Klor-Con) 10 mEq tablet Take 10 mEq by mouth in the morning and 10 mEq in the evening.   • QUEtiapine (SEROquel) 100 mg tablet Take 1 tablet (100 mg total) by mouth every 12 (twelve) hours   • rosuvastatin (CRESTOR) 10 MG tablet TAKE 1 TABLET BY MOUTH EVERY DAY   • vitamin B-12 (VITAMIN B-12) 1,000 mcg tablet Take by mouth in the morning.   • acetaminophen (TYLENOL) 325 mg tablet Take 975 mg by mouth every 8 (eight) hours as needed for mild pain (Patient not taking: Reported on 7/9/2025)   • ALPRAZolam (XANAX) 2 MG tablet Take 1 tablet (2 mg total) by mouth 3 (three) times a day as needed for anxiety for up to 10 days

## 2025-07-25 ENCOUNTER — OFFICE VISIT (OUTPATIENT)
Dept: NEUROLOGY | Facility: CLINIC | Age: 75
End: 2025-07-25
Payer: MEDICARE

## 2025-07-25 VITALS
SYSTOLIC BLOOD PRESSURE: 142 MMHG | DIASTOLIC BLOOD PRESSURE: 80 MMHG | WEIGHT: 117 LBS | HEART RATE: 101 BPM | HEIGHT: 63 IN | BODY MASS INDEX: 20.73 KG/M2

## 2025-07-25 DIAGNOSIS — I65.23 BILATERAL CAROTID ARTERY STENOSIS: ICD-10-CM

## 2025-07-25 DIAGNOSIS — Z86.73 HISTORY OF STROKE: Primary | ICD-10-CM

## 2025-07-25 DIAGNOSIS — I10 PRIMARY HYPERTENSION: ICD-10-CM

## 2025-07-25 DIAGNOSIS — I48.91 ATRIAL FIBRILLATION, UNSPECIFIED TYPE (HCC): ICD-10-CM

## 2025-07-25 DIAGNOSIS — E78.2 MIXED HYPERLIPIDEMIA: ICD-10-CM

## 2025-07-25 DIAGNOSIS — G40.909 SEIZURE DISORDER (HCC): ICD-10-CM

## 2025-07-25 DIAGNOSIS — F17.200 TOBACCO DEPENDENCE: ICD-10-CM

## 2025-07-25 PROCEDURE — 99214 OFFICE O/P EST MOD 30 MIN: CPT | Performed by: PSYCHIATRY & NEUROLOGY

## 2025-07-25 PROCEDURE — G2211 COMPLEX E/M VISIT ADD ON: HCPCS | Performed by: PSYCHIATRY & NEUROLOGY

## 2025-07-25 RX ORDER — ASPIRIN 81 MG/1
81 TABLET, CHEWABLE ORAL DAILY
Start: 2025-07-25

## 2025-07-25 NOTE — PROGRESS NOTES
Name: Kirsty Em      : 1950      MRN: [id number]  Encounter Provider: Liam Ferrer MD  Encounter Date: 2025   Encounter department: [facility]OLOG[facility]  :  Assessment & Plan  History of stroke  Primary hypertension  Bilateral carotid artery stenosis  Atrial fibrillation, unspecified type (HCC)  Mixed hyperlipidemia  Seizure disorder (HCC)  Tobacco dependence    Patient Instructions   Stroke: Kirsty presents for a follow-up evaluation with regard to her prior stroke.  She reports no new stroke symptoms.  She takes her medicine as prescribed.  She did not endorse significant bleeding or bruising issues.  - For ongoing stroke prevention she should continue her combination of Eliquis, Crestor, and appropriate blood pressure and glycemic control.  - We recommend an ongoing LDL cholesterol of less than 70 and hemoglobin A1c of less than 7%.  - She should occasionally check her blood pressure away from the doctor's office and the numbers should be less than 130/80 most of the time  - We will defer to the neurosurgery group with regard to monitoring of her bilateral carotid artery stents.  With regard to her carotid artery stents the neurosurgery group had suggested she should take baby aspirin once per day.  I agree this is appropriate.  - We did talk briefly today in the office about the importance of quitting smoking.  She is doing much better than she was historically and that she only has about 5 cigarettes/day.  I would recommend that she work to specifically change her habits around the times when she would normally smoke to try and help reduce those cravings, and work towards completely eliminating tobacco  - She does not report any breakthrough seizures so we will plan to keep Keppra at her current baseline dose.    From my standpoint she is doing very well indeed.  She will return to the office to see me directly in 1 year but I would be happy to work with her at any time.  If  she were to have strokelike symptoms such as sudden painless loss of vision or double vision, difficulty speaking or swallowing, vertigo or room spinning that does not quickly resolve, or weakness or numbness or loss of coordination affecting 1 side of the face or body she should proceed by ambulance to the nearest emergency room immediately.  While taking Eliquis if she were to fall and strike her head or to suddenly have the worst headache of her life she should likewise proceed by ambulance to the nearest emergency room.      Orders:  •  aspirin 81 mg chewable tablet; Chew 1 tablet (81 mg total) daily          History of Present Illness   HPI     History of Present Illness  The patient presents for a follow-up evaluation regarding her prior stroke.    She reports no new stroke symptoms and has been adhering to her prescribed medication regimen without experiencing any significant bleeding or bruising issues. Her sleep pattern is normal, and she engages in regular physical activity. She does not monitor her blood pressure at home.     No seizures or seizure-like episodes have occurred since her last visit.    She continues to smoke, consuming about 5 cigarettes daily, but is making efforts to quit.  We had a discussion in regard to methods to help her quit    She is scheduled for a D and C procedure in 09/2025.     SOCIAL HISTORY:    Tobacco: The patient smokes about 5 cigarettes daily.    Sleep: Normal sleep pattern    Lab Results   Component Value Date/Time    CHOLESTEROL 83 01/09/2025 04:03 PM     Lab Results   Component Value Date/Time    TRIG 72 01/09/2025 04:03 PM     Lab Results   Component Value Date/Time    HDL 45 (L) 01/09/2025 04:03 PM     Lab Results   Component Value Date/Time    LDLCALC 24 01/09/2025 04:03 PM       Lab Results   Component Value Date/Time    HGBA1C 5.9 (H) 01/09/2025 04:03 PM     Lab Results   Component Value Date/Time     01/09/2025 04:03 PM       Results for orders placed  during the hospital encounter of 04/16/25    VAS carotid complete study    Narrative  [facility] REPORT  CLINICAL:  Indications:  Initial Post-op evaluation s/p revascularization.  Patient is asymptomatic at  this time.  Operative History:  bilateral ICA stents  Risk Factors  The patient has history of HTN, Hyperlipidemia and smoking (current) 0.5 ppd.  Clinical  Right Pressure:  136/ mm Hg, Left Pressure:  136/ mm Hg.    FINDINGS:    Right              Impression     PSV  EDV (cm/s)  Direction of Flow  Ratio  Dist. ICA                          56          24                      0.98  Mid. ICA                           54          20                      0.94  Prox. ICA - Stent  Widely Patent   45          20                      0.79  Dist CCA - Stent                   46          15  Mid CCA                            58          16                      0.76  Prox CCA                           75          17                      0.98  Ext Carotid                        51          12                      0.89  Prox Vert                          68          17  Antegrade  Subclavian                         96           0  Innominate                         76          11    Left               Impression     PSV  EDV (cm/s)  Direction of Flow  Ratio  Dist. ICA                          40          15                      0.61  Mid. ICA                           65          22                      0.99  Prox. ICA - Stent  Widely Patent   50          22                      0.75  Dist CCA - Stent                   59          16  Mid CCA                            66          14                      1.25  Prox CCA                           53          10  Ext Carotid                        74          14                      1.12  Prox Vert                          58          19  Antegrade  Subclavian                        114           0        CONCLUSION:  Impression  RIGHT:  Widely patent internal  carotid artery and endarterectomy site and stent.  Vertebral artery flow is antegrade. There is no significant subclavian artery  disease.  LEFT:  Widely patent internal carotid artery and endarterectomy site and stent.  Vertebral artery flow is antegrade. There is no significant subclavian artery  disease.    Compared to previous study on 12/20/2024, there is widely patent bilateral ICA  stents s/p revascularization.  Recommend repeat testing in 1 year as per protocol unless otherwise indicated.    SIGNATURE:  Electronically Signed by: MASON HERRMANN MD [facility] on 2025-04-16 05:30:13 PM    Results for orders placed during the hospital encounter of 10/27/24    CTA head and neck with and without contrast    Narrative  CTA NECK AND BRAIN WITH AND WITHOUT CONTRAST    INDICATION: dizziness, generalized weakness, unclear hx    COMPARISON:   10/2/2024, 6/7/2024, 6/8/2024.    TECHNIQUE:  Routine CT imaging of the Brain without contrast.Post contrast imaging was performed after administration of iodinated contrast through the neck and brain. Post contrast axial 0.625 mm images timed to opacify the arterial system.  3D  rendering was performed on an independent workstation.   MIP reconstructions performed. Coronal and sagittal reconstructions were performed of the non contrast portion of the brain.    Radiation dose length product (DLP) for this visit:  1240.21 mGy-cm .  This examination, like all CT scans performed in the [facility], was performed utilizing techniques to minimize radiation dose exposure, including the use of  iterative reconstruction and automated exposure control.    IV Contrast:  120 mL of iohexol (OMNIPAQUE)    IMAGE QUALITY:   Diagnostic slightly motion-degraded. Findings below within limitation.    FINDINGS:  NONCONTRAST BRAIN  PARENCHYMA: Decreased attenuation is noted in periventricular and subcortical white matter demonstrating an appearance that is statistically most likely  to represent mild microangiopathic change; this appearance is similar when compared to most recent  prior examination. Chronic infarct    No CT signs of acute infarction.  No intracranial mass, mass effect or midline shift.  No acute parenchymal hemorrhage.    VENTRICLES AND EXTRA-AXIAL SPACES: Stable volume loss. No hydrocephalus or extra-axial collection. Unchanged 8 mm calcified extra-axial lesion along the left frontal convexity, statistically meningioma.    VISUALIZED ORBITS: Normal.    PARANASAL SINUSES: Normal.    CTA NECK    ARCH AND GREAT VESSELS: Mild atherosclerotic changes with no severe stenosis.  VERTEBRAL ARTERIES: Patent extracranial segments.  RIGHT CAROTID: Atherosclerotic disease of the distal common carotid artery and proximal cervical ICA segment. There is high-grade stenosis (approximately 80%) at the ICA origin secondary to calcified and noncalcified atheromatous plaque. This is similar  to slightly progressed from the prior study.    No dissection.  LEFT CAROTID: Atherosclerotic disease of the distal common carotid artery and proximal cervical ICA segment. There is high-grade stenosis (approximately 70%) at the ICA origin secondary to calcified and noncalcified atheromatous plaque. This is similar  to the prior study.    No dissection.  NASCET criteria was used to determine the degree of internal carotid artery diameter stenosis.      CTA BRAIN:  INTERNAL CAROTID ARTERIES: Similar atherosclerotic disease of the bilateral cavernous and supraclinoid segments without significant stenosis. Patent ICA termini.  ANTERIOR CEREBRAL ARTERY CIRCULATION:  No stenosis or occlusion. Azygos variant anatomy reidentified.  MIDDLE CEREBRAL ARTERY CIRCULATION:  No stenosis or occlusion.  DISTAL VERTEBRAL ARTERIES:  No stenosis or occlusion. Left dominant. Patent posterior inferior cerebellar artery origins  BASILAR ARTERY:  No stenosis or occlusion.  POSTERIOR CEREBRAL ARTERIES: No stenosis or occlusion.  Hypoplastic posterior communicating arteries again noted  VENOUS STRUCTURES: Patent.    NON VASCULAR ANATOMY  BONY STRUCTURES:  No acute osseous abnormality. Chronic left mastoid effusion.    SOFT TISSUES OF THE NECK:  Normal.    THORACIC INLET: Please refer to report of concurrent CT.    Impression  CT Brain:  No acute intracranial abnormality.    CT Angiography: No evidence of flow-limiting stenosis or large vessel occlusive disease within the major vessels of the [place]. Suspect slightly progressed high-grade stenosis at the right ICA origin secondary to calcified and noncalcified  atheromatous plaque (approximately 80-90% by NASCET criteria) compared to the prior study dated 6/7/2024. Similar severe stenosis of the left ICA origin (approximately 70% by NASCET criteria). Patient is already being followed by neurovascular service  per chart review in [facility].            Workstation performed: [id number]                  Review of Systems   Constitutional:  Negative for appetite change, fatigue and fever.   HENT: Negative.  Negative for hearing loss, tinnitus, trouble swallowing and voice change.    Eyes: Negative.  Negative for photophobia, pain and visual disturbance.   Respiratory: Negative.  Negative for shortness of breath.    Cardiovascular: Negative.  Negative for palpitations.   Gastrointestinal: Negative.  Negative for nausea and vomiting.   Endocrine: Negative.  Negative for cold intolerance.   Genitourinary: Negative.  Negative for dysuria, frequency and urgency.   Musculoskeletal:  Negative for back pain, gait problem, myalgias, neck pain and neck stiffness.   Skin: Negative.  Negative for rash.   Allergic/Immunologic: Negative.    Neurological:  Negative for dizziness, tremors, seizures, syncope, facial asymmetry, speech difficulty, weakness, light-headedness, numbness and headaches.   Hematological: Negative.  Does not bruise/bleed easily.   Psychiatric/Behavioral: Negative.  Negative for  "confusion, hallucinations and sleep disturbance.     I have personally reviewed the MA's review of systems and made changes as necessary.         Objective   /80 (BP Location: Left arm, Patient Position: Sitting, Cuff Size: Standard)   Pulse 101   Ht 5' 3\" (1.6 m)   Wt 53.1 kg (117 lb)   BMI 20.73 kg/m²     Physical Exam  Neurological Exam    At the time of my examination the patient was awake and alert and in no distress.  They are an excellent historian with no dysarthria or aphasia.  There were no clear new cranial neuropathies or lateralizing signs.  There were no significant tremors or ataxia.  The patient was able to rise easily without assistance and their gait was stable.            "

## 2025-07-25 NOTE — PATIENT INSTRUCTIONS
Stroke: Kirsty presents for a follow-up evaluation with regard to her prior stroke.  She reports no new stroke symptoms.  She takes her medicine as prescribed.  She did not endorse significant bleeding or bruising issues.  - For ongoing stroke prevention she should continue her combination of Eliquis, Crestor, and appropriate blood pressure and glycemic control.  - We recommend an ongoing LDL cholesterol of less than 70 and hemoglobin A1c of less than 7%.  - She should occasionally check her blood pressure away from the doctor's office and the numbers should be less than 130/80 most of the time  - We will defer to the neurosurgery group with regard to monitoring of her bilateral carotid artery stents.  With regard to her carotid artery stents the neurosurgery group had suggested she should take baby aspirin once per day.  I agree this is appropriate.  - We did talk briefly today in the office about the importance of quitting smoking.  She is doing much better than she was historically and that she only has about 5 cigarettes/day.  I would recommend that she work to specifically change her habits around the times when she would normally smoke to try and help reduce those cravings, and work towards completely eliminating tobacco  - She does not report any breakthrough seizures so we will plan to keep Keppra at her current baseline dose.    From my standpoint she is doing very well indeed.  She will return to the office to see me directly in 1 year but I would be happy to work with her at any time.  If she were to have strokelike symptoms such as sudden painless loss of vision or double vision, difficulty speaking or swallowing, vertigo or room spinning that does not quickly resolve, or weakness or numbness or loss of coordination affecting 1 side of the face or body she should proceed by ambulance to the nearest emergency room immediately.  While taking Eliquis if she were to fall and strike her head or to suddenly  have the worst headache of her life she should likewise proceed by ambulance to the nearest emergency room.

## 2025-07-26 NOTE — ASSESSMENT & PLAN NOTE
Patient Instructions   Stroke: Kirsty presents for a follow-up evaluation with regard to her prior stroke.  She reports no new stroke symptoms.  She takes her medicine as prescribed.  She did not endorse significant bleeding or bruising issues.  - For ongoing stroke prevention she should continue her combination of Eliquis, Crestor, and appropriate blood pressure and glycemic control.  - We recommend an ongoing LDL cholesterol of less than 70 and hemoglobin A1c of less than 7%.  - She should occasionally check her blood pressure away from the doctor's office and the numbers should be less than 130/80 most of the time  - We will defer to the neurosurgery group with regard to monitoring of her bilateral carotid artery stents.  With regard to her carotid artery stents the neurosurgery group had suggested she should take baby aspirin once per day.  I agree this is appropriate.  - We did talk briefly today in the office about the importance of quitting smoking.  She is doing much better than she was historically and that she only has about 5 cigarettes/day.  I would recommend that she work to specifically change her habits around the times when she would normally smoke to try and help reduce those cravings, and work towards completely eliminating tobacco  - She does not report any breakthrough seizures so we will plan to keep Keppra at her current baseline dose.    From my standpoint she is doing very well indeed.  She will return to the office to see me directly in 1 year but I would be happy to work with her at any time.  If she were to have strokelike symptoms such as sudden painless loss of vision or double vision, difficulty speaking or swallowing, vertigo or room spinning that does not quickly resolve, or weakness or numbness or loss of coordination affecting 1 side of the face or body she should proceed by ambulance to the nearest emergency room immediately.  While taking Eliquis if she were to fall and strike  her head or to suddenly have the worst headache of her life she should likewise proceed by ambulance to the nearest emergency room.      Orders:  •  aspirin 81 mg chewable tablet; Chew 1 tablet (81 mg total) daily

## 2025-08-04 DIAGNOSIS — I48.91 NEW ONSET A-FIB (HCC): ICD-10-CM

## 2025-08-06 RX ORDER — DILTIAZEM HYDROCHLORIDE 120 MG/1
120 CAPSULE, COATED, EXTENDED RELEASE ORAL DAILY
Qty: 90 CAPSULE | Refills: 1 | Status: SHIPPED | OUTPATIENT
Start: 2025-08-06

## (undated) DEVICE — ANTIBACTERIAL UNDYED BRAIDED (POLYGLACTIN 910), SYNTHETIC ABSORBABLE SUTURE: Brand: COATED VICRYL

## (undated) DEVICE — SUT MONOCRYL 4-0 PS-2 27 IN Y426H

## (undated) DEVICE — EXOFIN PRECISION PEN HIGH VISCOSITY TOPICAL SKIN ADHESIVE: Brand: EXOFIN PRECISION PEN, 1G

## (undated) DEVICE — CUFF TOURNIQUET 18 X 4 IN QUICK CONNECT DISP 1 BLADDER

## (undated) DEVICE — DISPOSABLE EQUIPMENT COVER: Brand: SMALL TOWEL DRAPE

## (undated) DEVICE — GAUZE SPONGES,16 PLY: Brand: CURITY

## (undated) DEVICE — SUT CHROMIC 5-0 P-3 18 IN 687G

## (undated) DEVICE — PAD CAST 3 IN COTTON NON STRL

## (undated) DEVICE — SUT VICRYL 2-0 SH 27 IN UNDYED J417H

## (undated) DEVICE — 3M™ STERI-STRIP™ BLEND TONE SKIN CLOSURES, B1557, TAN, 1/2 IN X 4 IN (12MM X 100MM), 6 STRIPS/ENVELOPE: Brand: 3M™ STERI-STRIP™

## (undated) DEVICE — GLOVE SRG BIOGEL ECLIPSE 7

## (undated) DEVICE — INTENDED FOR TISSUE SEPARATION, AND OTHER PROCEDURES THAT REQUIRE A SHARP SURGICAL BLADE TO PUNCTURE OR CUT.: Brand: BARD-PARKER ® CARBON RIB-BACK BLADES

## (undated) DEVICE — GLOVE INDICATOR PI UNDERGLOVE SZ 7 BLUE

## (undated) DEVICE — TUBING SUCTION 5MM X 12 FT

## (undated) DEVICE — ACE WRAP 4 IN STERILE

## (undated) DEVICE — DRAPE C-ARM X-RAY

## (undated) DEVICE — SPONGE SCRUB 4 PCT CHLORHEXIDINE

## (undated) DEVICE — OCCLUSIVE GAUZE STRIP,3% BISMUTH TRIBROMOPHENATE IN PETROLATUM BLEND: Brand: XEROFORM

## (undated) DEVICE — STERILE BETHLEHEM PLASTIC HAND: Brand: CARDINAL HEALTH

## (undated) DEVICE — SPLINT 3 X 15 IN XFAST SET PLASTER